# Patient Record
Sex: FEMALE | Race: WHITE | NOT HISPANIC OR LATINO | Employment: OTHER | URBAN - METROPOLITAN AREA
[De-identification: names, ages, dates, MRNs, and addresses within clinical notes are randomized per-mention and may not be internally consistent; named-entity substitution may affect disease eponyms.]

---

## 2017-01-04 DIAGNOSIS — Z94.0 KIDNEY REPLACED BY TRANSPLANT: Primary | ICD-10-CM

## 2017-01-13 ENCOUNTER — DOCUMENTATION ONLY (OUTPATIENT)
Dept: TRANSPLANT | Facility: CLINIC | Age: 67
End: 2017-01-13

## 2017-01-13 LAB
EXT ALBUMIN: 3.9
EXT ALKALINE PHOSPHATASE: 67
EXT ALT: 19
EXT AST: 25
EXT BILIRUBIN TOTAL: 0.4
EXT BUN: 25
EXT CALCIUM: 9.5
EXT CHLORIDE: 106
EXT CO2: 29
EXT CREATININE: 1.7 MG/DL
EXT EOSINOPHIL%: 1.7
EXT GFR MDRD NON AF AMER: 37
EXT GLUCOSE: 89
EXT HEMATOCRIT: 29.8
EXT HEMOGLOBIN: 9.6
EXT LYMPH%: 24.5
EXT MONOCYTES%: 8.3
EXT PLATELETS: 209
EXT POTASSIUM: 3.8
EXT PROT/CREAT RATIO UR: 2.02
EXT PROTEIN TOTAL: 5.6
EXT SODIUM: 140 MMOL/L
EXT TACROLIMUS LVL: 3.7
EXT WBC: 3.5

## 2017-01-16 NOTE — PROGRESS NOTES
Results reviewed, and action is needed: Please obtain 24 hr urine for protein and creatinine clearance. Also, I recommend increaseing tacro,imus to 4/3 if this is a 12 hr trough.

## 2017-01-17 ENCOUNTER — TELEPHONE (OUTPATIENT)
Dept: TRANSPLANT | Facility: CLINIC | Age: 67
End: 2017-01-17

## 2017-01-17 DIAGNOSIS — Z94.0 KIDNEY REPLACED BY TRANSPLANT: Primary | ICD-10-CM

## 2017-01-17 RX ORDER — TACROLIMUS 1 MG/1
CAPSULE ORAL
Qty: 210 CAPSULE | Refills: 11 | Status: SHIPPED | OUTPATIENT
Start: 2017-01-17 | End: 2019-07-26 | Stop reason: DRUGHIGH

## 2017-01-17 NOTE — TELEPHONE ENCOUNTER
Spoke to pt, reviewed labs and Dr hCau's instructions.  Pt confirmed 12hr prograf level, will increase to 4/3.  Pt annual clinic apt scheduled for 1/25/17, will give pt 24hr urine collection jug and will schedule 24hr urine clearance and repeat prograf level.  Pt voices no complaints at present.

## 2017-01-25 ENCOUNTER — OFFICE VISIT (OUTPATIENT)
Dept: NEPHROLOGY | Facility: CLINIC | Age: 67
End: 2017-01-25
Payer: MEDICARE

## 2017-01-25 ENCOUNTER — OFFICE VISIT (OUTPATIENT)
Dept: TRANSPLANT | Facility: CLINIC | Age: 67
End: 2017-01-25
Payer: MEDICARE

## 2017-01-25 ENCOUNTER — LAB VISIT (OUTPATIENT)
Dept: LAB | Facility: HOSPITAL | Age: 67
End: 2017-01-25
Payer: MEDICARE

## 2017-01-25 VITALS
BODY MASS INDEX: 19.94 KG/M2 | WEIGHT: 119.69 LBS | HEART RATE: 102 BPM | DIASTOLIC BLOOD PRESSURE: 78 MMHG | SYSTOLIC BLOOD PRESSURE: 140 MMHG | OXYGEN SATURATION: 98 % | HEIGHT: 65 IN

## 2017-01-25 VITALS
OXYGEN SATURATION: 100 % | HEART RATE: 95 BPM | HEIGHT: 65 IN | RESPIRATION RATE: 18 BRPM | SYSTOLIC BLOOD PRESSURE: 146 MMHG | BODY MASS INDEX: 19.98 KG/M2 | DIASTOLIC BLOOD PRESSURE: 92 MMHG | WEIGHT: 119.94 LBS | TEMPERATURE: 97 F

## 2017-01-25 DIAGNOSIS — N18.4 CKD (CHRONIC KIDNEY DISEASE), STAGE IV: Chronic | ICD-10-CM

## 2017-01-25 DIAGNOSIS — D84.9 IMMUNOSUPPRESSION: ICD-10-CM

## 2017-01-25 DIAGNOSIS — I12.9 RENAL HYPERTENSION, STAGE 1-4 OR UNSPECIFIED CHRONIC KIDNEY DISEASE: Chronic | ICD-10-CM

## 2017-01-25 DIAGNOSIS — Z94.0 LIVING-DONOR KIDNEY TRANSPLANT RECIPIENT: Chronic | ICD-10-CM

## 2017-01-25 DIAGNOSIS — Z94.0 LIVING-DONOR KIDNEY TRANSPLANT RECIPIENT: Primary | Chronic | ICD-10-CM

## 2017-01-25 DIAGNOSIS — R80.1 PERSISTENT PROTEINURIA: ICD-10-CM

## 2017-01-25 DIAGNOSIS — T86.11 ACUTE REJECTION OF KIDNEY TRANSPLANT: ICD-10-CM

## 2017-01-25 DIAGNOSIS — N18.4 CKD (CHRONIC KIDNEY DISEASE), STAGE IV: Primary | Chronic | ICD-10-CM

## 2017-01-25 LAB
BACTERIA #/AREA URNS AUTO: ABNORMAL /HPF
BILIRUB UR QL STRIP: NEGATIVE
CLARITY UR REFRACT.AUTO: ABNORMAL
COLOR UR AUTO: YELLOW
CREAT UR-MCNC: 122 MG/DL
GLUCOSE UR QL STRIP: NEGATIVE
HGB UR QL STRIP: NEGATIVE
HYALINE CASTS UR QL AUTO: 6 /LPF
KETONES UR QL STRIP: NEGATIVE
LEUKOCYTE ESTERASE UR QL STRIP: ABNORMAL
MICROSCOPIC COMMENT: ABNORMAL
NITRITE UR QL STRIP: NEGATIVE
NON-SQ EPI CELLS #/AREA URNS AUTO: 1 /HPF
PH UR STRIP: 5 [PH] (ref 5–8)
PROT UR QL STRIP: ABNORMAL
PROT UR-MCNC: 251 MG/DL
PROT/CREAT RATIO, UR: 2.06
RBC #/AREA URNS AUTO: 1 /HPF (ref 0–4)
SP GR UR STRIP: 1.02 (ref 1–1.03)
SQUAMOUS #/AREA URNS AUTO: 1 /HPF
URN SPEC COLLECT METH UR: ABNORMAL
UROBILINOGEN UR STRIP-ACNC: NEGATIVE EU/DL
WBC #/AREA URNS AUTO: 5 /HPF (ref 0–5)

## 2017-01-25 PROCEDURE — 99215 OFFICE O/P EST HI 40 MIN: CPT | Mod: S$PBB,,, | Performed by: INTERNAL MEDICINE

## 2017-01-25 PROCEDURE — 99999 PR PBB SHADOW E&M-EST. PATIENT-LVL IV: CPT | Mod: PBBFAC,GC,,

## 2017-01-25 PROCEDURE — 99213 OFFICE O/P EST LOW 20 MIN: CPT | Mod: PBBFAC | Performed by: INTERNAL MEDICINE

## 2017-01-25 PROCEDURE — 99213 OFFICE O/P EST LOW 20 MIN: CPT | Mod: S$PBB,GC,, | Performed by: INTERNAL MEDICINE

## 2017-01-25 PROCEDURE — 99214 OFFICE O/P EST MOD 30 MIN: CPT | Mod: PBBFAC,27 | Performed by: INTERNAL MEDICINE

## 2017-01-25 PROCEDURE — 99999 PR PBB SHADOW E&M-EST. PATIENT-LVL III: CPT | Mod: PBBFAC,,, | Performed by: INTERNAL MEDICINE

## 2017-01-25 NOTE — MR AVS SNAPSHOT
[facility]  [address]  [place] [address]  Phone: [phone]                  Malissa Schwartz   2017 3:00 PM   Office Visit    Description:  Female : 1950   Provider:  Nicole Trejo MD   Department:  [facility]           Reason for Visit     Kidney Transplant Reassessment           Diagnoses this Visit        Comments    CKD (chronic kidney disease), stage IV    -  Primary     Living-donor kidney transplant recipient         Acute rejection of kidney transplant         Immunosuppression         Renal hypertension, stage 1-4 or unspecified chronic kidney disease         Persistent proteinuria                To Do List           Goals (5 Years of Data)     None      [facility]     [facility] Care [facility] -  Assistance  Registered nurses in the [facility] provide clinical advisement, health education, appointment booking, and other advisory services.  Call for this free service at [phone].             Medications           Message regarding Medications     Verify the changes and/or additions to your medication regime listed below are the same as discussed with your clinician today.  If any of these changes or additions are incorrect, please notify your healthcare provider.             Verify that the below list of medications is an accurate representation of the medications you are currently taking.  If none reported, the list may be blank. If incorrect, please contact your healthcare provider. Carry this list with you in case of emergency.           Current Medications     calcium carbonate (OS-STEVEN) 600 mg (1,500 mg) Tab Take 600 mg by mouth once daily.     cloNIDine 0.2 mg/24 hr td ptwk (CATAPRES-TTS-2) 0.2 mg/24 hr Place 1 patch onto the skin every 7 days.    furosemide (LASIX) 20 MG tablet Take 1 tablet (20 mg total) by mouth 2 (two) times daily.    losartan (COZAAR) 50 MG tablet Take 2 tablets (100 mg total) by  "mouth once daily.    metoprolol succinate (TOPROL-XL) 25 MG 24 hr tablet Take 2 tablets (50 mg total) by mouth once daily.    multivitamin capsule Take 1 capsule by mouth once daily.    mycophenolate (CELLCEPT) 250 mg Cap Take 4 capsules (1,000 mg total) by mouth 2 (two) times daily.    omega-3 acid ethyl esters (LOVAZA) 1 gram capsule Take 1 capsule (1 g total) by mouth 2 (two) times daily.    ondansetron (ZOFRAN-ODT) 8 MG TbDL Take 1 tablet (8 mg total) by mouth every 6 (six) hours as needed (nausea/vomiting).    oxycodone-acetaminophen (PERCOCET) 5-325 mg per tablet Take 2 tablets by mouth every 4 (four) hours as needed for Pain.    polyethylene glycol (GLYCOLAX) 17 gram PwPk Take 17 g by mouth once daily.    pravastatin (PRAVACHOL) 10 MG tablet Take 10 mg by mouth once daily.    predniSONE (DELTASONE) 5 MG tablet TAKE ONE TABLET BY MOUTH ONCE DAILY    tacrolimus (PROGRAF) 1 MG Cap Take 4 mg every AM and 3 mg every PM.  Route: Oral.  Z94.0 Kidney Transplant.    trazodone (DESYREL) 100 MG tablet Take 100 mg by mouth every evening.     zolpidem (AMBIEN CR) 12.5 MG CR tablet Take 12.5 mg by mouth nightly as needed.            Clinical Reference Information           Vital Signs - Last Recorded  Most recent update: 1/25/2017  3:05 PM by Letha Banegas MA    BP Pulse Temp Resp Ht Wt    (!) 146/92 (BP Location: Right arm, Patient Position: Sitting, BP Method: Automatic) 95 97.2 °F (36.2 °C) (Oral) 18 5' 5" (1.651 m) 54.4 kg (119 lb 14.9 oz)    SpO2 BMI             100% 19.96 kg/m2         Blood Pressure          Most Recent Value    BP  (!)  146/92      Allergies as of 1/25/2017     Codeine      Immunizations Administered on Date of Encounter - 1/25/2017     None      Maintenance Dialysis History     Patient has no recorded history of maintenance dialysis.      Transplant Information        Txp Date Organ Coordinator Care Team     Kidney Tracy Cisse Referring Physician:  Nicole Trejo MD   Current " Nephrologist:  Kina Minor MD          Txp Date Organ Coordinator Care Team    1/15/2007 Kidney Joycelyn PEPE Rojo, ALAINA Surgeon:  Zac Orona MD   Current Nephrologist:  Toño Mejía MD         Instructions    Thank you for entrusting your transplant care to us, and visiting me today.    Plan to see a kidney doctor closer to home every 3-4 months.  See your primary care doctor about weight loss; ask about thyroid.  Please feel free to ask any questions and raise any concerns regarding your health care.    Warmest Regards,  Dr. Maria Del Carmen Trejo

## 2017-01-25 NOTE — PROGRESS NOTES
Kidney Post-Transplant Assessment    Referring Physician: Nicole Trejo  Current Nephrologist: Kina Blank    ORGAN: LEFT KIDNEY  Donor Type: living  Cold Ischemia: 60 mins  Induction Medications: zenapax - daclizumab    Subjective:     CC:  Reassessment of renal allograft function and management of chronic immunosuppression.    HPI:  Ms. Schwartz is a 66 y.o. year old White female who received a living kidney transplant on 1/15/07.  She has CKD stage 3 - GFR 30-59 and her baseline creatinine is between 1.5-1.9. She takes mycophenolate mofetil, prednisone and tacrolimus for maintenance immunosuppression. She denies any recent hospitalizations or ER visits since her previous clinic visit.    Pertinent History:  -Malissa received a living unrelated kidney transplant from a friend on 1/15/07. 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   -Enrolled in [employer] Study until March 2009 when study closed, the started tacrolimus.  -09/10/2007 mild acute rejection; +C4D. Glomerulitis with one activated endothelial cell: treated with Solumedrol pulse 375 mg IVBP x 3 doses with short prednisone taper; thymoglobin 100 mg x 6 doses  -10/04/2007 minimal histologic changes no acute rejection  - Kidney biopsy 1/8/15: C4d + suspicious of AMR, inflammatory changes also noted with signs of chronic rejection. Biopsy negative for ACR and vascular rejection, 10-20% fibrosis. Rx: IVIG x 2- (1/14 & 1/15) rituximab 1/16.   -Biopsy 3/4/16: 23 glomeruli, 3 globally sclerosed. 10% fibrosis. Mod microcirc inflamation with glomerulitis but C4D negative (<5%). Also +TG, and possible CNI toxicity. No AVR or ACR.    Malissa feels well and has no complaints. She is concerned about the protein in urine and worsened renal function. She reports no edema, CP, dyspnea. She denies any kidney-related complaints, such as pain over allograft, flank pain, dysuria, frequency, or other LUTS.     Review of  "Systems   Constitutional: Positive for appetite change (poor) and unexpected weight change. Negative for fatigue. Fever: 20 # in last year.   HENT: Negative.    Eyes: Negative for visual disturbance.   Respiratory: Negative for shortness of breath.    Cardiovascular: Negative for chest pain.   Gastrointestinal: Negative for abdominal pain.   Genitourinary: Negative for difficulty urinating.   Musculoskeletal: Negative for joint swelling.   Skin: Negative for rash.   Allergic/Immunologic: Positive for immunocompromised state.   Neurological: Negative for tremors and headaches.   Hematological: Does not bruise/bleed easily.   Psychiatric/Behavioral: Negative.      Objective:   Blood pressure (!) 146/92, pulse 95, temperature 97.2 °F (36.2 °C), temperature source Oral, resp. rate 18, height 5' 5" (1.651 m), weight 54.4 kg (119 lb 14.9 oz), SpO2 100 %.body mass index is 19.96 kg/(m^2).    Physical Exam   Constitutional: No distress.   HENT:   Head: Normocephalic.   Eyes: Conjunctivae are normal. Pupils are equal, round, and reactive to light.   Neck: No JVD present.   Cardiovascular: Normal rate, regular rhythm and normal heart sounds.    Pulmonary/Chest: Effort normal and breath sounds normal. No respiratory distress.   Abdominal: Soft. Bowel sounds are normal. She exhibits no mass. There is no tenderness.   Musculoskeletal: She exhibits no edema.   Psychiatric: She has a normal mood and affect. Thought content normal.     Labs:  Lab Results   Component Value Date    EXTANC 1528 02/16/2015    EXTWBC 3.5 (L) 01/10/2017    EXTSEGS 62.7 02/16/2016    EXTPLATELETS 209 01/10/2017    EXTHEMOGLOBI 9.6 (L) 01/10/2017    EXTHEMATOCRI 29.8 (L) 01/10/2017    EXTCREATININ 1.7 01/10/2017    EXTSODIUM 140 01/10/2017    EXTPOTASSIUM 3.8 01/10/2017    EXTBUN 25 01/10/2017    EXTCO2 29 01/10/2017    EXTCALCIUM 9.5 01/10/2017    EXTPHOSPHORU 3.4 02/16/2016    EXTGLUCOSE 89 01/10/2017    EXTALBUMIN 3.9 01/10/2017    EXTAST 25 01/10/2017 "    EXTALT 19 01/10/2017    EXTBILITOTAL 0.4 01/10/2017     Lab Results   Component Value Date    EXTTACROLVL 3.7 01/10/2017    EXTPROTCRE 2.02 (H) 01/10/2017   Labs were reviewed with the patient.    Assessment:     1. CKD (chronic kidney disease), stage IV    2. Living-donor kidney transplant recipient on 01/15/2007    3. ABMR of kidney transplant 1/8/15    4. Immunosuppression - Inducted with Medrol 500 mg; maintained on Prograf, Cellcept and Prednisone    5. Renal hypertension, stage 1-4 or unspecified chronic kidney disease    6. Persistent proteinuria        Plan:   Allograft function assessment  - CKD is declining, and she is likely not a candidate for intervention.     She also has Proteinuria with known h/o AMR and TG. A 24 hr urine has been requested given the urine p/c ratios are fluctuating.    Immunosuppression evaluation  -Continue tacrolimus-based immunosuppression.  Will continue to watch signs, symptoms and levels for side effects and drug toxicity. Plan no changes    Renal HTN - I educated on importance of keeping BPs <140/90    Education dominated our conversation of 45 min face to face, about 35 min on counseling. I discussed progressive nature of CKD. I suggested she establish with a nephrologist close to home. We discussed she should avoid NSAIDS. She needs labs Q3-4 mos and ongoing BP monitoring. We also touched on possible re-transplant in the future, but I explained she would have to start the process as does everyone else.    Follow-up:   Annual follow-up with kidney transplant clinic with repeat labs, including renal function panel with UA, urine protein/creatinine ratio, and drug trough level q3 months.  Patient also reminded to follow-up with general nephrologist.    Nicole Trejo MD       Education:   Material provided to the patient.  Patient reminded to call with any health changes since these can be early signs of significant complications.  Also, I advised the patient to  be sure any new medications or changes of old medications are discussed with either a pharmacist or physician knowledgeable with transplant to avoid rejection/drug toxicity related to significant drug interactions.    UNOS Patient Status  Functional Status: 80% - Normal activity with effort: some symptoms of disease  Physical Capacity: No Limitations

## 2017-01-25 NOTE — LETTER
January 25, 2017        Kina Minor  [address]  [facility] [address]  Phone: [phone]  Fax: [phone]     Toño Mejía  [address] [place] [address]  Phone: [phone]  Fax: [phone]             [facility]  [address]  [facility] [address]  Phone: [phone]   Patient: Malissa Schwartz   MR Number: [id number]   YOB: 1950   Date of Visit: 1/25/2017       Dear Dr. Toño Mejía, Kina Minor    Thank you for referring Malissa Schwartz to me for evaluation. Attached you will find relevant portions of my assessment and plan of care.    If you have questions, please do not hesitate to call me. I look forward to following Malissa Schwartz along with you.    Sincerely,    Nicole Trejo MD    Enclosure    If you would like to receive this communication electronically, please contact [email or web address] or [phone] to request [employer] access.    [employer] is a tool which provides read-only access to select patient information with whom you have a relationship. Its easy to use and provides real time access to review your patients record including encounter summaries, notes, results, and demographic information.    If you feel you have received this communication in error or would no longer like to receive these types of communications, please e-mail [email or web address]

## 2017-01-25 NOTE — PATIENT INSTRUCTIONS
Thank you for entrusting your transplant care to us, and visiting me today.    Plan to see a kidney doctor closer to home every 3-4 months.  See your primary care doctor about weight loss; ask about thyroid.  Please feel free to ask any questions and raise any concerns regarding your health care.    Warmest Regards,  Dr. Maria Del Carmen Trejo

## 2017-01-30 NOTE — PROGRESS NOTES
CHIEF COMPLAINT/HPI: Malissa is a 66 y.o. female for evaluation of CKD 4 with hx of renal transplant.     Ms Schwartz has a hx of live donor renal transplant in 2007. 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   She is undergoing re-evaluation for re-transplant. Her baseline creatinine is 1.5-1.7. BP is 140/78.       She is on increased dose of prograf 4 mg in am and 3 mg in pm , cellcept 1000 mg twice daily and prednisone 5 mg daily. .     She had a CT scan on 7/21 for evaluation of pulm nodules and renal mass and mass was characterized as    She had a left nephrectomy  on 9/22/16 due to complex partially cystic mass in the left midpole measuring 5.7 x 4.7 x 5.0 cm.    Her Hgb is stable at 9.6/29.   TSAT of 20 and Ferritin 270.  UPC is up to 2.0. She is on Lasix 20 mg daily, losartan 100mg daily and clonidine 0.2 mg patch.     She continues to complain of fatigue.          Past Medical History   Diagnosis Date    Acute rejection of kidney transplant 9/10/07 9/10/2007     09/10/2007 mild acute rejection; +C4D. Glomerulitis with one activated endothelial cell: treated with Solumedrol pulse 375 mg IVBP x 3 doses with short prednisone taper; thymoglobin 100 mg x 6 doses 10/04/2007 minimal histologic changes no acute rejection     Allergy     Anemia of renal disease     CKD (chronic kidney disease), stage IV     CMV (cytomegalovirus) status positive     Colon polyp      benign    GN (glomerulonephritis)     Herpes infection, other 2009     on buttocks    Hyperlipidemia     Hypertension     Immunosuppression     Living-donor kidney transplant recipient 01/15/2007    Meningitis      history of viral meningitis    Proteinuria 1/5/2015    Renal hypertension     S/P breast augmentation     Skin cancer     Urinary tract infection        Malissa reports that she has quit smoking. Her smoking use included Cigarettes. She has a 15.00 pack-year smoking history. She has never used smokeless tobacco. She reports that she  drinks alcohol. She reports that she does not use illicit drugs.    Family History   Problem Relation Age of Onset    Kidney disease Sister      ESRD    Kidney disease Brother      CKD from HTN    Hypertension Brother     Heart failure Mother     Heart failure Father     Diabetes Neg Hx     Stroke Neg Hx        ROS:    General: No fever, chills, change in appetite or weight loss. +fatigue  Skin: No rashes or pruritus  Head: No headaches or recent head trauma  Eyes: No changes in vision or eye pain  Nodes: No swollen glands  Pulmonary: No dyspnea, wheezes, cough or sputum production  Cardiovascular: No chest pain, edema, PND, orthopnea or reduced exercise tolerance  Abdomen: No abdominal pain, nausea, vomiting, constipation or diarrhea  Urinary: No flank pain, dysuria or hematuria  Peripheral Vascular: No claudication or cyanosis  Musculoskeletal: No joint stiffness, swelling or back pain  Neurologic: No history of seizures, tremors, forcal weakness or numbness    PE:    HEENT: Normocephalic, atraumatic, EOMI, PERRLA, normal conjunctive and lids, supple neck with no thyromeglay or masses, normal oropharynx, hearing intact  Cardiovascular: Regular without murmur, gallop or rub, no edema  Respiratory: Clear bilaterally without rales, rhonchi, wheezes with normal effort  Abdomen: Soft, nontender/nondistended, positive bowel sounds, no hepatospenomegaly  Extremities: No cyanosis, clubbing, normal gait, no edema  Skin: No rashes, ulcers, induration  Psych: Oriented x 3 with normal mood and effect    Impression/Plan:      1. CKD 4 in tranplanted kidney       Ms Schwartz has a hx of live donor renal transplant in 2007. 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   She is undergoing re-evaluation for re-transplant. Her baseline creatinine is 1.5-1.7. BP is 140/78.       She is on increased dose of prograf 4 mg in am and 3 mg in pm , cellcept 1000 mg twice daily and prednisone 5 mg daily. .     She had a CT scan on 7/21 for  evaluation of pulm nodules and renal mass and mass was characterized as    She had a left nephrectomy  on 9/22/16 due to complex partially cystic mass in the left midpole measuring 5.7 x 4.7 x 5.0 cm.    Her Hgb is stable at 9.6/29. Since previous visit she has received IV iron transfusions in [place].   TSAT of 20 and Ferritin 270.  UPC is up to 2.0. She is on Lasix 20 mg daily, losartan 100mg daily, clonidine 0.2 mg patch and Toprol XL 25 mg daily.     She continues to complain of fatigue.         Her increase in creatinine may be due to increased prograf dose. She will see transplant nephrology today.   She was instructed in importance of low salt diet, and she will keep a blood pressure log at home x 2 weeks and we will discuss the BP results.          Lab Results   Component Value Date    CREATININE 1.5 (H) 11/09/2016     Protein Creatinine Ratios:  Currently on max dose Cozaar 100mg daily. UPC increased to 2 grams.     Prot/Creat Ratio, Ur   Date Value Ref Range Status   01/25/2017 2.06 (H) 0.00 - 0.20 Final   11/09/2016 1.29 (H) 0.00 - 0.20 Final   08/31/2016 1.10 (H) 0.00 - 0.20 Final     ·   ·   Acid-Base: lytes stable   Lab Results   Component Value Date     11/09/2016    K 3.9 11/09/2016    CO2 25 11/09/2016     2. HTN: Blood pressures- well controlled on current regimen  Clonidine patch  Lasix 20 mg twice daily  Cozaar 100 mg daily  toprol xl 25 mg daily        3. Renal osteodystrophy: last PTH   Lab Results   Component Value Date    PTH 96.0 (H) 11/09/2016    CALCIUM 9.1 11/09/2016    PHOS 3.5 11/09/2016       4. Anemia:   Lab Results   Component Value Date    HGB 9.1 (L) 09/21/2016        5. DM:  Last HbA1C - no hx of DM.     6. Lipid management:   Lab Results   Component Value Date    LDLCALC 82.6 06/15/2016         Follow up in 6 months.    Seen with Dr Suzi Tim MD  Nephrology Fellow  Pager [phone]   Cell [phone]

## 2017-02-03 NOTE — PROGRESS NOTES
Patient seen and examined with Dr Nelson;   I have reviewed and agree with assessment and plan

## 2017-02-07 ENCOUNTER — TELEPHONE (OUTPATIENT)
Dept: NEPHROLOGY | Facility: CLINIC | Age: 67
End: 2017-02-07

## 2017-02-11 ENCOUNTER — NURSE TRIAGE (OUTPATIENT)
Dept: ADMINISTRATIVE | Facility: CLINIC | Age: 67
End: 2017-02-11

## 2017-02-12 NOTE — TELEPHONE ENCOUNTER
Reason for Disposition   Caller has medication question, adult has minor symptoms, caller declines triage, and triager answers question    Protocols used: ST MEDICATION QUESTION CALL-A-    Kidney Transplant:  01/15/2007  BPA:  9    Patient calling in due to unable to receive medication (Prograf) because insurance does not cover anymore but is able to pay for medication.  Pharmacy filled the medication but pharmacy is now closed and may not be able to  in the morning.  Patient does have her dose for today and is leaving out of town to [place] tomorrow.  Pt states if she cannot get the medication tomorrow she will call back for a prescription (Prograf).

## 2017-02-13 ENCOUNTER — TELEPHONE (OUTPATIENT)
Dept: NEPHROLOGY | Facility: CLINIC | Age: 67
End: 2017-02-13

## 2017-02-17 ENCOUNTER — DOCUMENTATION ONLY (OUTPATIENT)
Dept: TRANSPLANT | Facility: CLINIC | Age: 67
End: 2017-02-17

## 2017-02-17 NOTE — NURSING
Submitted PA request for Prograf on 2/16/17 via [email or web address], received approval, effective 2/16/17 - [facility]16[facility].

## 2017-02-27 ENCOUNTER — TELEPHONE (OUTPATIENT)
Dept: TRANSPLANT | Facility: CLINIC | Age: 67
End: 2017-02-27

## 2017-02-27 NOTE — TELEPHONE ENCOUNTER
Spoke to patient, provided phone number to psychiatry to schedule her appointment. Will also reschedule CT of the chest and Pulmonary consult for clearance.

## 2017-03-03 DIAGNOSIS — N18.30 CKD (CHRONIC KIDNEY DISEASE) STAGE 3, GFR 30-59 ML/MIN: Primary | Chronic | ICD-10-CM

## 2017-03-09 ENCOUNTER — DOCUMENTATION ONLY (OUTPATIENT)
Dept: TRANSPLANT | Facility: CLINIC | Age: 67
End: 2017-03-09

## 2017-03-09 LAB
EXT BUN: 20
EXT CALCIUM: 9
EXT CHLORIDE: 106
EXT CO2: 26
EXT CREATININE: 1.58 MG/DL
EXT GFR MDRD AF AMER: 39
EXT GFR MDRD NON AF AMER: 33
EXT GLUCOSE: 98
EXT POTASSIUM: 3.9
EXT SODIUM: 140 MMOL/L
EXT TACROLIMUS LVL: 5.9
Lab: 1.01
Lab: ABNORMAL

## 2017-03-10 RX ORDER — METOPROLOL SUCCINATE 25 MG/1
50 TABLET, EXTENDED RELEASE ORAL DAILY
Qty: 60 TABLET | Refills: 3 | Status: SHIPPED | OUTPATIENT
Start: 2017-03-10 | End: 2022-05-13

## 2017-03-10 NOTE — PROGRESS NOTES
Results reviewed, and action is needed:1900 mg protein with h/o AMR is expected. I recommend CKD care and especially tight BP control (<140/90)

## 2017-03-14 DIAGNOSIS — N18.30 CKD (CHRONIC KIDNEY DISEASE) STAGE 3, GFR 30-59 ML/MIN: Primary | Chronic | ICD-10-CM

## 2017-03-17 RX ORDER — FUROSEMIDE 20 MG/1
20 TABLET ORAL 2 TIMES DAILY
Qty: 30 TABLET | Refills: 11 | Status: SHIPPED | OUTPATIENT
Start: 2017-03-17 | End: 2023-09-27 | Stop reason: ALTCHOICE

## 2017-03-26 RX ORDER — PREDNISONE 5 MG/1
TABLET ORAL
Qty: 30 TABLET | Refills: 11 | Status: SHIPPED | OUTPATIENT
Start: 2017-03-26 | End: 2018-01-08 | Stop reason: SDUPTHER

## 2017-04-12 DIAGNOSIS — N18.30 CKD (CHRONIC KIDNEY DISEASE) STAGE 3, GFR 30-59 ML/MIN: Chronic | ICD-10-CM

## 2017-04-12 RX ORDER — AMLODIPINE BESYLATE 5 MG/1
TABLET ORAL
Qty: 60 TABLET | OUTPATIENT
Start: 2017-04-12

## 2017-04-13 DIAGNOSIS — N18.4 CKD (CHRONIC KIDNEY DISEASE), STAGE IV: Primary | Chronic | ICD-10-CM

## 2017-04-13 RX ORDER — LOSARTAN POTASSIUM 100 MG/1
100 TABLET ORAL DAILY
Qty: 90 TABLET | Refills: 3 | Status: SHIPPED | OUTPATIENT
Start: 2017-04-13 | End: 2018-07-16 | Stop reason: SDUPTHER

## 2017-05-11 ENCOUNTER — HOSPITAL ENCOUNTER (OUTPATIENT)
Dept: RADIOLOGY | Facility: HOSPITAL | Age: 67
Discharge: HOME | End: 2017-05-11
Attending: NURSE PRACTITIONER
Payer: MEDICARE

## 2017-05-11 ENCOUNTER — INITIAL CONSULT (OUTPATIENT)
Dept: PSYCHIATRY | Facility: CLINIC | Age: 67
End: 2017-05-11
Payer: MEDICARE

## 2017-05-11 VITALS
BODY MASS INDEX: 19.83 KG/M2 | HEIGHT: 65 IN | WEIGHT: 119 LBS | DIASTOLIC BLOOD PRESSURE: 69 MMHG | HEART RATE: 92 BPM | SYSTOLIC BLOOD PRESSURE: 123 MMHG

## 2017-05-11 DIAGNOSIS — Z01.818 ENCOUNTER FOR PRE-TRANSPLANT EVALUATION FOR KIDNEY TRANSPLANT: Primary | ICD-10-CM

## 2017-05-11 PROCEDURE — 71250 CT THORAX DX C-: CPT | Mod: TC,TXP

## 2017-05-11 PROCEDURE — 71250 CT THORAX DX C-: CPT | Mod: 26,GC,TXP, | Performed by: RADIOLOGY

## 2017-05-11 PROCEDURE — 90792 PSYCH DIAG EVAL W/MED SRVCS: CPT | Mod: S$PBB,TXP,, | Performed by: PSYCHIATRY & NEUROLOGY

## 2017-05-11 PROCEDURE — 90792 PSYCH DIAG EVAL W/MED SRVCS: CPT | Mod: PBBFAC,TXP | Performed by: PSYCHIATRY & NEUROLOGY

## 2017-05-11 PROCEDURE — 99999 PR PBB SHADOW E&M-EST. PATIENT-LVL II: CPT | Mod: PBBFAC,TXP,, | Performed by: PSYCHIATRY & NEUROLOGY

## 2017-05-11 NOTE — PROGRESS NOTES
Outpatient Psychiatry Initial Visit (MD/NP)    5/11/2017    Malissa Schwartz, a 67 y.o. female, presenting for initial evaluation visit. Met with patient.    Reason for Encounter: Consult from renal transplant. Patient complains of No chief complaint on file.  .    History of Present Illness: .The patient is a 67 year old lady referred by renal transplant for an evaluation.  Pt was found to have kidney disease 18 years ago during routine insurance exam.  She received her initial transplant in 2007.  She had one bout of rejection 9 months later.  Since then, she has had gradual deterioration of kidney function until recentlly when there was improved functioning.  She denies all psych symptoms, including significant depression, psychosis, or suicidal ideation.  No alcohol or substance abuse.  She indicates that she has been compliant with meds and appointments in regards to her first transplant.  She has the support of her husban and two children.    Review Of Systems:     GENERAL:  No weight gain or loss  SKIN:  No rashes or lacerations  HEAD:  No headaches  EYES:  No exophthalmos, jaundice or blindness  EARS:  No dizziness, tinnitus or hearing loss  NOSE:  No changes in smell  MOUTH & THROAT:  No dyskinetic movements or obvious goiter  CHEST:  No shortness of breath, hyperventilation or cough  CARDIOVASCULAR:  No tachycardia or chest pain  ABDOMEN:  No nausea, vomiting, pain, constipation or diarrhea  URINARY:  No frequency, dysuria or sexual dysfunction  ENDOCRINE:  No polydipsia, polyuria  MUSCULOSKELETAL:  No pain or stiffness of the joints  NEUROLOGIC:  No weakness, sensory changes, seizures, confusion, memory loss, tremor or other abnormal movements    Current Evaluation:     Nutritional Screening: Considering the patient's height and weight, medications, medical history and preferences, should a referral be made to the dietitian? no    Constitutional  Vitals:  Most recent vital signs, dated less than 90 days  "prior to this appointment, were reviewed.    Vitals:    05/11/17 0835   BP: 123/69   Pulse: 92   Weight: 54 kg (119 lb)   Height: 5' 5" (1.651 m)        General:  unremarkable, age appropriate     Musculoskeletal  Muscle Strength/Tone:  no tremor   Gait & Station:  non-ataxic     Psychiatric  Speech:  slowed, non-spontaneous   Mood & Affect:  euthymic  blunted   Thought Process:  normal and logical   Associations:  intact   Thought Content:  normal, no suicidality, no homicidality, delusions, or paranoia   Insight:  intact   Judgement: behavior is adequate to circumstances   Orientation:  grossly intact   Memory: intact for content of interview   Language: grossly intact   Attention Span & Concentration:  able to focus   Fund of Knowledge:  intact and appropriate to age and level of education       Relevant Elements of Neurological Exam: normal gait    Functioning in Relationships:  Spouse/partner:  supportive  Peers: Yazidism, has friends  Employers: retired     Laboratory Data  No visits with results within 1 Month(s) from this visit.  Latest known visit with results is:    Documentation Only on 03/09/2017   Component Date Value Ref Range Status    EXT Sodium 03/02/2017 140  mmol/L Final    EXT Potassium 03/02/2017 3.9   Final    EXT Chloride 03/02/2017 106   Final    EXT CO2 03/02/2017 26   Final    EXT BUN 03/02/2017 20   Final    EXT Creatinine 03/02/2017 1.58* mg/dL Final    EXT GFR MDRD NON AF AMER 03/02/2017 33*  Final    EXT GFR MDRD AF AMER 03/02/2017 39   Final    EXT Calcium 03/02/2017 9.0   Final    EXT Glucose 03/02/2017 98   Final    EXT Tacrolimus Lvl 03/02/2017 5.9   Final    EXT 24 HR CREATININE CLEARANCE 03/02/2017 1.01   Final    EXT 24 HR CREATININE CLEARANCE 03/02/2017    Final         Medications  Outpatient Encounter Prescriptions as of 5/11/2017   Medication Sig Dispense Refill    calcium carbonate (OS-STEVEN) 600 mg (1,500 mg) Tab Take 600 mg by mouth " once daily.       cloNIDine 0.2 mg/24 hr td ptwk (CATAPRES-TTS-2) 0.2 mg/24 hr Place 1 patch onto the skin every 7 days. 4 patch 11    furosemide (LASIX) 20 MG tablet Take 1 tablet (20 mg total) by mouth 2 (two) times daily. 30 tablet 11    losartan (COZAAR) 100 MG tablet Take 1 tablet (100 mg total) by mouth once daily. 90 tablet 3    metoprolol succinate (TOPROL-XL) 25 MG 24 hr tablet Take 2 tablets (50 mg total) by mouth once daily. 60 tablet 3    multivitamin capsule Take 1 capsule by mouth once daily.      mycophenolate (CELLCEPT) 250 mg Cap Take 4 capsules (1,000 mg total) by mouth 2 (two) times daily. 240 capsule 11    omega-3 acid ethyl esters (LOVAZA) 1 gram capsule Take 1 capsule (1 g total) by mouth 2 (two) times daily.      ondansetron (ZOFRAN-ODT) 8 MG TbDL Take 1 tablet (8 mg total) by mouth every 6 (six) hours as needed (nausea/vomiting). 12 tablet 2    oxycodone-acetaminophen (PERCOCET) 5-325 mg per tablet Take 2 tablets by mouth every 4 (four) hours as needed for Pain. 41 tablet 0    polyethylene glycol (GLYCOLAX) 17 gram PwPk Take 17 g by mouth once daily. 30 packet 0    pravastatin (PRAVACHOL) 10 MG tablet Take 10 mg by mouth once daily.      predniSONE (DELTASONE) 5 MG tablet TAKE ONE TABLET BY MOUTH ONCE DAILY 30 tablet 11    tacrolimus (PROGRAF) 1 MG Cap Take 4 mg every AM and 3 mg every PM.  Route: Oral.  Z94.0 Kidney Transplant. 210 capsule 11    trazodone (DESYREL) 100 MG tablet Take 100 mg by mouth every evening.       zolpidem (AMBIEN CR) 12.5 MG CR tablet Take 12.5 mg by mouth nightly as needed.        No facility-administered encounter medications on file as of 5/11/2017.            Assessment - Diagnosis - Goals:     Impression: Acceptable candidate for retransplant from psych standpoint    No diagnosis found.    Strengths and Liabilities: Strength: Patient is intelligent., Strength: Patient has positive support network.    Treatment Goals:  Specify outcomes written in  observable, behavioral terms:   Psych treatment not indicated at this time    Treatment Plan/Recommendations:   · Psych treatment not indicated at this time      Return to Clinic: as needed    Counseling time: 20  Total time: 60  Consulting clinician was informed of the encounter and consult note.

## 2017-05-15 ENCOUNTER — TELEPHONE (OUTPATIENT)
Dept: TRANSPLANT | Facility: CLINIC | Age: 67
End: 2017-05-15

## 2017-05-15 NOTE — TELEPHONE ENCOUNTER
"----- Message from Ira Solares MD sent at 5/15/2017  1:07 PM CDT -----  They appear to be stable, but radiology makes no comment that these nodules are stable only "similar".  The previous measurement was 5 mm and now they are saying 7mm.  Please ask radiology to review and confirm.  Their impression is that they are stable and continue to be monitored but there is reassurance that these are stable since the measurements are stated to be different.  Patient should have an appt scheduled with me once this is complete.  Should not get listed unless smoking has ceased.    Ira    ----- Message -----     From: Tracy Cisse     Sent: 5/15/2017  10:02 AM       To: MD Dr. Sujatha Gudino,   Please review CT of chest regarding pulmonary nodules. Previous CT was 7/21/16. You saw her on 8/31/16. Please advise. Pt in evaluation for kidney transplant. Thank you.    "

## 2017-05-17 RX ORDER — MYCOPHENOLATE MOFETIL 250 MG/1
CAPSULE ORAL
Qty: 240 CAPSULE | Refills: 6 | Status: SHIPPED | OUTPATIENT
Start: 2017-05-17 | End: 2018-01-08 | Stop reason: SDUPTHER

## 2017-05-17 RX ORDER — CLONIDINE 0.2 MG/24H
PATCH, EXTENDED RELEASE TRANSDERMAL
OUTPATIENT
Start: 2017-05-17

## 2017-05-23 ENCOUNTER — TELEPHONE (OUTPATIENT)
Dept: TRANSPLANT | Facility: CLINIC | Age: 67
End: 2017-05-23

## 2017-05-23 NOTE — TELEPHONE ENCOUNTER
"----- Message from Tracy Hemphill MD sent at 5/22/2017  3:41 PM CDT -----  I apologize for my delayed reply...    Dr. Flores and I agree that yes, the findings on 5/11/17 are stable to the prior scan on 7/21/16.  They are better seen on the most recent scan due to technical differences (i.e. MIPs are available on the most recent scan), but the findings are stable.      Please let me know if you have any further questions.    Regards,    SF    ----- Message -----  From: Tracy Cisse  Sent: 5/15/2017   3:09 PM  To: MD Dr. Roxana Castro,  Dr. Solares is requesting for you to review and confirm if the nodules in this scan are stable. She noted that the comments only state they are "similar" to previous scan. Please advise. Patient is in evaluation for kidney transplant and will need follow up with Dr. Solares once you clarify. Thanks!    "

## 2017-05-25 ENCOUNTER — TELEPHONE (OUTPATIENT)
Dept: TRANSPLANT | Facility: CLINIC | Age: 67
End: 2017-05-25

## 2017-05-25 NOTE — TELEPHONE ENCOUNTER
Call received from Dr Flores to clarify reading of Chest Ct. She states she is unable to say that there is no definite cancer somewhere in these nodules as there is some change possibly related to change in technique. Her best response is to continue to observe as she has stated. She reiterated the nodules are similar when comparing scans.

## 2017-05-30 ENCOUNTER — TELEPHONE (OUTPATIENT)
Dept: TRANSPLANT | Facility: CLINIC | Age: 67
End: 2017-05-30

## 2017-05-30 NOTE — TELEPHONE ENCOUNTER
MD Tracy Gudino             They should place it in an addendum on the CT report stating such.  Ideally, we need two year stability to ensure that this is benign, but I feel that one year is adequate if her MELD is rising.   Ira

## 2017-06-09 ENCOUNTER — COMMITTEE REVIEW (OUTPATIENT)
Dept: TRANSPLANT | Facility: CLINIC | Age: 67
End: 2017-06-09

## 2017-06-09 NOTE — LETTER
June 9, 2017    Tahmina Schwartz  [address]  [place] [address]    Dear Tahmina Schwartz:  MRN: [id number]    The [facility] team reviewed your transplant candidacy.  The kidney team has denied the option of transplantation due to pulmonary nodules not cleared by Pulmonology, lack of follow up with appointments such as Dermatology follow up for previous skin cancer, need for mammogram, and need for updated GYN exam.     Although the [facility] team believes you are not a suitable transplant candidate, you have the option to be evaluated at other transplant centers.  You may request your [facility] records be sent to any center of your choice by contacting our Medical Records Department at [phone].    Attached is a letter from the [employer] Organ [employer]aring (UNOS).  It describes the services and information offered to patients by UNOS and the Organ Procurement and Transplant Network.                                                                                                                                      The [facility] team sincerely wishes you the best and remains available to answer any questions.  Please do not hesitate to contact our pre-transplant office if we can assist you in any other way.  You should follow-up with your primary care physician for routine medical care.                                                                             Sincerely,        Tracy FRANKELN, RN  Pre-Kidney Coordinators  [facility]  [address] [place] [id number] [phone]    Cc: Dr. Minor          OPTN/UNOS: Your Resource for Organ Transplant Information        If you have a question regarding your own medical care, you always should call your transplant center first. However, for general organ transplant-related information, you can call the [employer] Organ [employer]aring (UNOS) toll-free patient services line at  [phone].    Anyone, including potential transplant candidates, recipients, family members/friends, living donors, and/or donor family members can call this number to:    · talk about organ donation, living donation, how transplant and donation work, the donation process, transplant policies, and transplant/donor information;  · get a free patient information kit with helpful booklets, waiting list and transplant information, and a list of all transplant centers;  · ask questions about the Organ Procurement and Transplantation Network (OPTN) web site (www.optn.transplant.hrsa.gov); the UNOS Web site (www.unos.org); or the UNOS web site for living donors and transplant recipients (www.transplantliving.org);  · learn how UNOS and the OPTN can help you;  · talk about any concerns that you may have with a transplant center and how they perform    UNOS is a not-for-profit organization that provides all of the administrative services for the national OPTN under federal contract to the Health Resources and Services Administration (HRSA), an agency under the U.S. Department of Health and Human Services (HHS).     UNOS and OPTN responsibilities include:    · writing educational material for patients, the public and professionals;  · helping to make people aware of the need for donated organs and tissue;  · writing organ transplant policy with help from doctors, nurses, transplant patients/candidates, donor families, living donors, and the public;  · coordinating the organ matching and placement process;  · collecting information about every organ transplant and donation that occurs in the [place].    Remember, you should contact your transplant center directly if you have questions or concerns about your own medical care including medical records, work-up progress and test reports. [facility] is not your transplant center, and staff at [facility] will not be able to transfer you to your transplant center, so keep your  transplant centers phone number handy. But, while you research your transplant needs and learn as much as you can about transplantation and donation, we welcome your call to our toll-free patient services line at [phone].

## 2017-06-09 NOTE — COMMITTEE REVIEW
Native Organ Dx: Chronic Glomerulonephritis - Unspecified      Not approved for LRD/CAD transplant due pulmonary nodules not cleared by Pulmonology, lack of follow up with appointments (outstanding for dermatology follow up for skin cancer, mammogram, and pap smear/GYN exam) and failure to follow up with completion of evaluation in a timely manner.      Note written by Tracy Cisse RN    ===============================================    I was present at the meeting and attest to the decision of the committee.

## 2017-07-11 ENCOUNTER — TELEPHONE (OUTPATIENT)
Dept: NEPHROLOGY | Facility: CLINIC | Age: 67
End: 2017-07-11

## 2017-07-11 NOTE — TELEPHONE ENCOUNTER
----- Message from Elizabeth Beasley sent at 7/11/2017 10:10 AM CDT -----  Contact: self  [phone]  Patient calling to make an appt to see the    Call back number [phone]   Or  [phone]  Thanks,

## 2017-07-11 NOTE — TELEPHONE ENCOUNTER
----- Message from Elizabeth Beasley sent at 7/11/2017 10:10 AM CDT -----  Contact: self  [phone]  Patient calling to make an appt to see the    Call back number [phone]   Or  [phone]  Thanks,      Spoke with pt pt will like a few days notice before scheduling appt pt will be place in the next 2 weeks on schedule

## 2017-08-31 ENCOUNTER — HISTORICAL (OUTPATIENT)
Dept: SURGERY | Facility: HOSPITAL | Age: 67
End: 2017-08-31

## 2017-08-31 LAB
ABS NEUT (OLG): 6.5 X10(3)/MCL (ref 1.5–6.9)
BUN SERPL-MCNC: 30 MG/DL (ref 10–20)
CALCIUM SERPL-MCNC: 9.3 MG/DL (ref 8–10.5)
CHLORIDE SERPL-SCNC: 105 MMOL/L (ref 100–108)
CO2 SERPL-SCNC: 23 MMOL/L (ref 21–35)
CREAT SERPL-MCNC: 2.06 MG/DL (ref 0.7–1.3)
ERYTHROCYTE [DISTWIDTH] IN BLOOD BY AUTOMATED COUNT: 12.4 % (ref 11.5–17)
GLUCOSE SERPL-MCNC: 133 MG/DL (ref 75–116)
GROUP & RH: NORMAL
HCT VFR BLD AUTO: 30.8 % (ref 36–48)
HGB BLD-MCNC: 10.7 GM/DL (ref 12–16)
MCH RBC QN AUTO: 30 PG (ref 27–34)
MCHC RBC AUTO-ENTMCNC: 35 GM/DL (ref 31–36)
MCV RBC AUTO: 86 FL (ref 80–99)
PLATELET # BLD AUTO: 230 X10(3)/MCL (ref 140–400)
PMV BLD AUTO: 9.3 FL (ref 6.8–10)
POTASSIUM SERPL-SCNC: 4 MMOL/L (ref 3.6–5.2)
RBC # BLD AUTO: 3.56 X10(6)/MCL (ref 4.2–5.4)
SODIUM SERPL-SCNC: 139 MMOL/L (ref 135–145)
WBC # SPEC AUTO: 7.3 X10(3)/MCL (ref 4.5–11.5)

## 2017-09-05 ENCOUNTER — HISTORICAL (OUTPATIENT)
Dept: ANESTHESIOLOGY | Facility: HOSPITAL | Age: 67
End: 2017-09-05

## 2017-12-15 DIAGNOSIS — N18.30 CKD (CHRONIC KIDNEY DISEASE) STAGE 3, GFR 30-59 ML/MIN: Chronic | ICD-10-CM

## 2017-12-15 RX ORDER — AMLODIPINE BESYLATE 5 MG/1
TABLET ORAL
Qty: 60 TABLET | Refills: 10 | Status: SHIPPED | OUTPATIENT
Start: 2017-12-15 | End: 2018-01-08 | Stop reason: ALTCHOICE

## 2018-01-05 ENCOUNTER — TELEPHONE (OUTPATIENT)
Dept: TRANSPLANT | Facility: CLINIC | Age: 68
End: 2018-01-05

## 2018-01-05 ENCOUNTER — DOCUMENTATION ONLY (OUTPATIENT)
Dept: TRANSPLANT | Facility: CLINIC | Age: 68
End: 2018-01-05

## 2018-01-05 DIAGNOSIS — Z94.0 KIDNEY REPLACED BY TRANSPLANT: Primary | ICD-10-CM

## 2018-01-05 LAB
EXT ALBUMIN: 4.2
EXT ALKALINE PHOSPHATASE: 66
EXT ALT: 20
EXT AST: 25
EXT BILIRUBIN DIRECT: 0.1 MG/DL
EXT BILIRUBIN TOTAL: 0.5
EXT BUN: 27
EXT CALCIUM: 8.7
EXT CHLORIDE: 106
EXT CO2: 24
EXT CREATININE: 1.63 MG/DL
EXT EOSINOPHIL%: 1.2
EXT GFR MDRD AF AMER: 32
EXT GLUCOSE: 120
EXT HEMATOCRIT: 25.6
EXT HEMOGLOBIN: 8.8
EXT LYMPH%: 10.5
EXT MONOCYTES%: 5.4
EXT PLATELETS: 181
EXT POTASSIUM: 4.3
EXT PROTEIN TOTAL: 133
EXT SODIUM: 137 MMOL/L
EXT TACROLIMUS LVL: 11.9
EXT WBC: 3.3

## 2018-01-05 NOTE — TELEPHONE ENCOUNTER
Spoke to pt, reviewed labs and Dr Lepe's instructions, pt confirms that she will [place] [facility] [place] for her Monday morning apt, she will repeat labs with 12hr fasting prograf level on Monday morning prior to clinic apt.  She voices no complaints at present time.

## 2018-01-05 NOTE — PROGRESS NOTES
Low WBC, in alst 12 months, please check CMV with next lab  Anemia with Hb of 8.8: trending down. Please GI consult and inform her nephrologist  Tac level 11.9: please decrease tac 3 mg BID and check level in 1-2 weeks.     Thank you!

## 2018-01-05 NOTE — TELEPHONE ENCOUNTER
----- Message from Dinah Lepe MD sent at 1/5/2018 10:17 AM CST -----  Low WBC, in alst 12 months, please check CMV with next lab  Anemia with Hb of 8.8: trending down. Please GI consult and inform her nephrologist  Tac level 11.9: please decrease tac 3 mg BID and check level in 1-2 weeks.     Thank you!

## 2018-01-08 ENCOUNTER — LAB VISIT (OUTPATIENT)
Dept: LAB | Facility: HOSPITAL | Age: 68
End: 2018-01-08
Payer: MEDICARE

## 2018-01-08 ENCOUNTER — OFFICE VISIT (OUTPATIENT)
Dept: TRANSPLANT | Facility: CLINIC | Age: 68
End: 2018-01-08
Payer: MEDICARE

## 2018-01-08 VITALS
DIASTOLIC BLOOD PRESSURE: 67 MMHG | WEIGHT: 124.75 LBS | RESPIRATION RATE: 17 BRPM | TEMPERATURE: 98 F | BODY MASS INDEX: 20.79 KG/M2 | OXYGEN SATURATION: 98 % | HEART RATE: 88 BPM | HEIGHT: 65 IN | SYSTOLIC BLOOD PRESSURE: 122 MMHG

## 2018-01-08 DIAGNOSIS — N18.9 ANEMIA OF RENAL DISEASE: Chronic | ICD-10-CM

## 2018-01-08 DIAGNOSIS — Z94.0 KIDNEY REPLACED BY TRANSPLANT: Primary | ICD-10-CM

## 2018-01-08 DIAGNOSIS — Z90.5 S/P NEPHRECTOMY: Chronic | ICD-10-CM

## 2018-01-08 DIAGNOSIS — R80.9 PROTEINURIA, UNSPECIFIED TYPE: ICD-10-CM

## 2018-01-08 DIAGNOSIS — Z94.0 LIVING-DONOR KIDNEY TRANSPLANT RECIPIENT: Chronic | ICD-10-CM

## 2018-01-08 DIAGNOSIS — N05.9 GN (GLOMERULONEPHRITIS): ICD-10-CM

## 2018-01-08 DIAGNOSIS — D63.1 ANEMIA OF RENAL DISEASE: Chronic | ICD-10-CM

## 2018-01-08 DIAGNOSIS — Z94.0 KIDNEY REPLACED BY TRANSPLANT: ICD-10-CM

## 2018-01-08 DIAGNOSIS — I12.9 RENAL HYPERTENSION: Chronic | ICD-10-CM

## 2018-01-08 DIAGNOSIS — D84.9 IMMUNOSUPPRESSION: Primary | ICD-10-CM

## 2018-01-08 LAB
ALBUMIN SERPL BCP-MCNC: 3.3 G/DL
ANION GAP SERPL CALC-SCNC: 7 MMOL/L
BASOPHILS # BLD AUTO: 0.02 K/UL
BASOPHILS NFR BLD: 0.6 %
BUN SERPL-MCNC: 24 MG/DL
CALCIUM SERPL-MCNC: 9.1 MG/DL
CHLORIDE SERPL-SCNC: 110 MMOL/L
CO2 SERPL-SCNC: 22 MMOL/L
CREAT SERPL-MCNC: 1.8 MG/DL
DIFFERENTIAL METHOD: ABNORMAL
EOSINOPHIL # BLD AUTO: 0.1 K/UL
EOSINOPHIL NFR BLD: 3 %
ERYTHROCYTE [DISTWIDTH] IN BLOOD BY AUTOMATED COUNT: 12.3 %
EST. GFR  (AFRICAN AMERICAN): 32.9 ML/MIN/1.73 M^2
EST. GFR  (NON AFRICAN AMERICAN): 28.5 ML/MIN/1.73 M^2
GLUCOSE SERPL-MCNC: 103 MG/DL
HCT VFR BLD AUTO: 27.3 %
HGB BLD-MCNC: 9.1 G/DL
IMM GRANULOCYTES # BLD AUTO: 0 K/UL
IMM GRANULOCYTES NFR BLD AUTO: 0 %
LYMPHOCYTES # BLD AUTO: 1.1 K/UL
LYMPHOCYTES NFR BLD: 32 %
MCH RBC QN AUTO: 28.7 PG
MCHC RBC AUTO-ENTMCNC: 33.3 G/DL
MCV RBC AUTO: 86 FL
MONOCYTES # BLD AUTO: 0.3 K/UL
MONOCYTES NFR BLD: 10.1 %
NEUTROPHILS # BLD AUTO: 1.8 K/UL
NEUTROPHILS NFR BLD: 54.3 %
NRBC BLD-RTO: 0 /100 WBC
PHOSPHATE SERPL-MCNC: 3.9 MG/DL
PLATELET # BLD AUTO: 184 K/UL
PMV BLD AUTO: 10 FL
POTASSIUM SERPL-SCNC: 4.6 MMOL/L
RBC # BLD AUTO: 3.17 M/UL
SODIUM SERPL-SCNC: 139 MMOL/L
TACROLIMUS BLD-MCNC: 7.7 NG/ML
WBC # BLD AUTO: 3.37 K/UL

## 2018-01-08 PROCEDURE — 99214 OFFICE O/P EST MOD 30 MIN: CPT | Mod: PBBFAC | Performed by: NURSE PRACTITIONER

## 2018-01-08 PROCEDURE — 36415 COLL VENOUS BLD VENIPUNCTURE: CPT

## 2018-01-08 PROCEDURE — 80197 ASSAY OF TACROLIMUS: CPT

## 2018-01-08 PROCEDURE — 99999 PR PBB SHADOW E&M-EST. PATIENT-LVL IV: CPT | Mod: PBBFAC,,, | Performed by: NURSE PRACTITIONER

## 2018-01-08 PROCEDURE — 85025 COMPLETE CBC W/AUTO DIFF WBC: CPT

## 2018-01-08 PROCEDURE — 80069 RENAL FUNCTION PANEL: CPT

## 2018-01-08 PROCEDURE — 99215 OFFICE O/P EST HI 40 MIN: CPT | Mod: S$PBB,,, | Performed by: NURSE PRACTITIONER

## 2018-01-08 RX ORDER — MYCOPHENOLATE MOFETIL 250 MG/1
CAPSULE ORAL
Qty: 240 CAPSULE | Refills: 11 | Status: SHIPPED | OUTPATIENT
Start: 2018-01-08 | End: 2018-03-13 | Stop reason: SDUPTHER

## 2018-01-08 RX ORDER — AMLODIPINE BESYLATE 10 MG/1
10 TABLET ORAL DAILY
Refills: 11 | COMMUNITY
Start: 2017-12-15 | End: 2018-12-28 | Stop reason: SDUPTHER

## 2018-01-08 RX ORDER — PREDNISONE 5 MG/1
5 TABLET ORAL DAILY
Qty: 30 TABLET | Refills: 11 | Status: SHIPPED | OUTPATIENT
Start: 2018-01-08 | End: 2019-01-10 | Stop reason: SDUPTHER

## 2018-01-08 NOTE — LETTER
January 8, 2018        Toño Mejía  [address]  [place] [address]  Phone: [phone]  Fax: [phone]             [facility]  [address]  [facility] [address]  Phone: [phone]   Patient: Malissa Schwartz   MR Number: [id number]   YOB: 1950   Date of Visit: 1/8/2018       Dear Dr. Toño Mejía    Thank you for referring Malissa Schwartz to me for evaluation. Attached you will find relevant portions of my assessment and plan of care.    If you have questions, please do not hesitate to call me. I look forward to following Malissa Schwartz along with you.    Sincerely,    Hayley Myles, NP    Enclosure    If you would like to receive this communication electronically, please contact [email or web address] or [phone] to request eMeter Link access.    eMeter Link is a tool which provides read-only access to select patient information with whom you have a relationship. Its easy to use and provides real time access to review your patients record including encounter summaries, notes, results, and demographic information.    If you feel you have received this communication in error or would no longer like to receive these types of communications, please e-mail [email or web address]

## 2018-01-08 NOTE — PROGRESS NOTES
Kidney Post-Transplant Assessment    Referring Physician:   Current Nephrologist: Toño Mejía    ORGAN: LEFT KIDNEY  Donor Type: living  PHS Increased Risk:    Cold Ischemia: 60 mins  Induction Medications: zenapax - daclizumab    Subjective:     CC:  Reassessment of renal allograft function and management of chronic immunosuppression.    HPI:  Ms. Schwartz is a 67 y.o. year old White female  With HX ESRD d/t Unknown type GN, who received a living kidney transplant on 1/15/07 ( Inductionzenapax - daclizumab).  She has CKD stage 3 - GFR 30-59 and her baseline creatinine is between ~1.63. She takes mycophenolate mofetil, prednisone and tacrolimus for maintenance immunosuppression. She denies any recent hospitalizations or ER visits since her previous clinic visit.    Pulmonary nodules, which are currently followed by Pulmonology / Dr Solares   Patient reports recent GI consult ~ 2 months ago in [place] with Dr Mustafa/ GI  She reports 1 polyp removal  Overall feels well. No health concerns today.   Denies chest pain, SOB, leg pain, abdominal pain or LUTs.        Pertinent History:  -Malissa received a living unrelated kidney transplant from a friend on 1/15/07. 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   -Enrolled in [employer] Study until March 2009 when study closed, the started tacrolimus.  -09/10/2007 mild acute rejection; +C4D. Glomerulitis with one activated endothelial cell: treated with Solumedrol pulse 375 mg IVBP x 3 doses with short prednisone taper; thymoglobin 100 mg x 6 doses  -10/04/2007 minimal histologic changes no acute rejection  - Kidney biopsy 1/8/15: C4d + suspicious of AMR, inflammatory changes also noted with signs of chronic rejection. Biopsy negative for ACR and vascular rejection, 10-20% fibrosis. Rx: IVIG x 2- (1/14 & 1/15) rituximab 1/16.   -Biopsy 3/4/16: 23 glomeruli, 3 globally sclerosed. 10% fibrosis. Mod microcirc inflamation with glomerulitis but C4D  negative (<5%). Also +TG, and possible CNI toxicity. No AVR or ACR.      Past Medical History:   Diagnosis Date    Acute rejection of kidney transplant 9/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 Glomerulitis with one activated endothelial cell: treated with Solumedrol pulse 375 mg IVBP x 3 doses with short prednisone taper; thymoglobin 100 mg x 6 doses 10/04/2007 minimal histologic changes no acute rejection     Allergy     Anemia of renal disease     CKD (chronic kidney disease), stage IV     CMV (cytomegalovirus) status positive     Colon polyp     benign    GN (glomerulonephritis)     Herpes infection, other 2009    on buttocks    Hyperlipidemia     Hypertension     Immunosuppression     Living-donor kidney transplant recipient 01/15/2007    Meningitis     history of viral meningitis    Proteinuria 1/5/2015    Renal hypertension     S/P breast augmentation     Skin cancer     Urinary tract infection        Review of Systems   Constitutional: Negative for activity change, appetite change, chills, fatigue, fever and unexpected weight change.   HENT: Negative for congestion, facial swelling, postnasal drip, rhinorrhea, sinus pressure, sore throat and trouble swallowing.    Eyes: Negative for pain, redness and visual disturbance.   Respiratory: Negative for cough, chest tightness, shortness of breath and wheezing.    Cardiovascular: Negative.  Negative for chest pain, palpitations and leg swelling.   Gastrointestinal: Negative for abdominal pain, diarrhea, nausea and vomiting.   Genitourinary: Negative for dysuria, flank pain and urgency.   Musculoskeletal: Negative for gait problem, neck pain and neck stiffness.        Peripheral neuropathy bilaterally to the feet-->  Takes Vit B complex . Followed by Dr Mendoza/ neurology in [place]   Skin: Negative for rash.   Allergic/Immunologic: Positive for immunocompromised state. Negative for environmental allergies and food allergies.  "  Neurological: Negative for dizziness, weakness, light-headedness and headaches.   Psychiatric/Behavioral: Negative for agitation and confusion. The patient is not nervous/anxious.        Objective:   Blood pressure 122/67, pulse 88, temperature 97.6 °F (36.4 °C), temperature source Oral, resp. rate 17, height 5' 5" (1.651 m), weight 56.6 kg (124 lb 12.5 oz), SpO2 98 %.body mass index is 20.76 kg/m².    Physical Exam   Constitutional: She is oriented to person, place, and time. She appears well-developed and well-nourished.   HENT:   Head: Normocephalic.   Mouth/Throat: Oropharynx is clear and moist. No oropharyngeal exudate.   Eyes: Conjunctivae and EOM are normal. Pupils are equal, round, and reactive to light. No scleral icterus.   Neck: Normal range of motion. Neck supple.   Cardiovascular: Normal rate, regular rhythm and normal heart sounds.    Pulmonary/Chest: Effort normal and breath sounds normal.   Abdominal: Soft. Normal appearance and bowel sounds are normal. She exhibits no distension and no mass. There is no splenomegaly or hepatomegaly. There is no tenderness. There is no rebound, no guarding, no CVA tenderness, no tenderness at McBurney's point and negative Murillo's sign.       Musculoskeletal: Normal range of motion. She exhibits no edema.   Lymphadenopathy:     She has no cervical adenopathy.   Neurological: She is alert and oriented to person, place, and time. She exhibits normal muscle tone. Coordination normal.   Skin: Skin is warm and dry.   Psychiatric: She has a normal mood and affect. Her behavior is normal.   Vitals reviewed.      Labs:  Lab Results   Component Value Date    WBC 3.37 (L) 01/08/2018    HGB 9.1 (L) 01/08/2018    HCT 27.3 (L) 01/08/2018     01/08/2018    K 4.6 01/08/2018     01/08/2018    CO2 22 (L) 01/08/2018    BUN 24 (H) 01/08/2018    CREATININE 1.8 (H) 01/08/2018    EGFRNONAA 28.5 (A) 01/08/2018    GLUCOSE 134 (H) 01/15/2007    CALCIUM 9.1 01/08/2018    PHOS " 3.9 01/08/2018    MG 2.1 08/31/2016    ALBUMIN 3.3 (L) 01/08/2018    AST 23 09/19/2016    ALT 11 09/19/2016       Lab Results   Component Value Date    EXTANC 1,528 02/16/2015    EXTWBC 3.3 (L) 01/02/2018    EXTSEGS 62.7 02/16/2016    EXTPLATELETS 181 01/02/2018    EXTHEMOGLOBI 8.8 (L) 01/02/2018    EXTHEMATOCRI 25.6 (L) 01/02/2018    EXTCREATININ 1.63 01/02/2018    EXTSODIUM 137 01/02/2018    EXTPOTASSIUM 4.3 01/02/2018    EXTBUN 27 01/02/2018    EXTCO2 24 01/02/2018    EXTCALCIUM 8.7 01/02/2018    EXTPHOSPHORU 3.4 02/16/2016    EXTGLUCOSE 120 01/02/2018    EXTALBUMIN 4.2 01/02/2018    EXTAST 25 01/02/2018    EXTALT 20 01/02/2018    EXTBILITOTAL 0.5 01/02/2018       Lab Results   Component Value Date    EXTTACROLVL 11.9 (H) 01/02/2018    EXTPROTCRE 2.02 (H) 01/10/2017       Labs were reviewed with the patient.    Assessment:     1. Immunosuppression - Inducted with Medrol 500 mg; maintained on Prograf, Cellcept and Prednisone    2. Living-donor kidney transplant recipient on 01/15/2007    3. GN (glomerulonephritis)    4. Renal hypertension    5. Anemia of renal disease    6. Proteinuria, unspecified type    7. S/p nephrectomy--> left on 9/22/2016        Plan:   Dose prograf 4/3-->Repeated prograf trough 1/8--> results pending . If trough remains supra therapeutic (target 4-6) will lower dose to 3/3.  Leukopenia and H/H trending down over the last 12 months  --CMV PCR       Patient reports recent GI consult ~ 2 months ago in [place] with Dr Mustafa/ GI  She reports 1 polyp removal --> get records      Proteinuria   -->MGMT deferred to general nephrology  CKD care and especially tight BP control (<140/90)    Follow-up:   1. CKD stage: 3 siria optimal     2. Immunosuppression:   Prograf trough 11.9, which is  Supra therapeutic target 4-6. Continue  Prograf dose 4/3, MMF 1000Mg BID, and Prednisone 5 mg QD. Will continue to monitor for drug toxicities    3. Allograft Function: Stable. Continue good po hydration.        EXTCREATININ 1.63 01/02/2018 1/2/2018  EXT GFR MDRD AF AMER  32         4. Hypertension management: advise low salt diet and home BP monitoring    LOSARTAN 100 MG qD , Clonidine 0.2 mg PATCH q WEEK , TOPROL  50 mg QD, Norvasc 10mg QD  Lasix 20 mg daily/ prn -->MGMT deferred to general nephrology    5. Metabolic Bone Disease/Secondary Hyperparathyroidism:stable  Will monitor PTH, CA and Vit D/guidelines,    EXTCALCIUM 8.7 01/02/2018   EXTPHOSPHORU 3.4 02/16/2016     6. Electrolytes:  Will monitor /guidelines  EXTSODIUM 137 01/02/2018   EXTPOTASSIUM 4.3 01/02/2018   EXTBUN 27 01/02/2018   EXTCO2 24 01/02/2018       7. Anemia: stable. No need for intervention    Will monitor /guidelines  Lab Results   Component Value Date    WBC 3.37 (L) 01/08/2018    HGB 9.1 (L) 01/08/2018    HCT 27.3 (L) 01/08/2018    MCV 86 01/08/2018     01/08/2018       8.  Cytopenias: no significant cytopenias will monitor as per our guidelines. Medicine list reviewed including potential causes of drug-induced cytopenias  ANC 1800 ---no intervention needed, will continue to monitor/ guidelines     9.Proteinuria: continue p/c ratio as per guidelines    1/25/2017  Prot/Creat Ratio, Ur 0.00 - 0.20 2.06    -->MGMT deferred to general nephrology with good CKD care and especially tight BP control (<140/90)    10. BK virus infection screening:  will continue to monitor/ guidelines    11. Weight education: provided during the clinic visit   Body mass index is 20.76 kg/m².          12.Patient safety education regarding immunosuppression including prophylaxis posttransplant for CMV, PCP : Education provided about vaccination and prevention of infections            Follow-up:   Annual follow-up with kidney transplant clinic with repeat labs, including renal function panel with UA, urine protein/creatinine ratio, and drug trough level q3 months.  Patient also reminded to follow-up with general nephrologist.    Hayley Myles NP       Education:    Material provided to the patient.  Patient reminded to call with any health changes since these can be early signs of significant complications.  Also, I advised the patient to be sure any new medications or changes of old medications are discussed with either a pharmacist or physician knowledgeable with transplant to avoid rejection/drug toxicity related to significant drug interactions.    UNOS Patient Status  Functional Status: 80% - Normal activity with effort: some symptoms of disease  Physical Capacity: No Limitations

## 2018-02-20 ENCOUNTER — TELEPHONE (OUTPATIENT)
Dept: TRANSPLANT | Facility: CLINIC | Age: 68
End: 2018-02-20

## 2018-02-20 NOTE — TELEPHONE ENCOUNTER
Requested and received coverage determination for Mycophenolate Mofetil 250mg from 2/19/18 - 2/19/19 per Zighra.

## 2018-03-13 RX ORDER — MYCOPHENOLATE MOFETIL 250 MG/1
CAPSULE ORAL
Qty: 240 CAPSULE | Refills: 11 | Status: SHIPPED | OUTPATIENT
Start: 2018-03-13 | End: 2019-05-06 | Stop reason: SDUPTHER

## 2018-04-10 DIAGNOSIS — N18.30 CKD (CHRONIC KIDNEY DISEASE) STAGE 3, GFR 30-59 ML/MIN: Primary | Chronic | ICD-10-CM

## 2018-04-23 RX ORDER — FUROSEMIDE 20 MG/1
20 TABLET ORAL 2 TIMES DAILY
Qty: 30 TABLET | Refills: 11 | OUTPATIENT
Start: 2018-04-23 | End: 2019-04-23

## 2018-05-07 ENCOUNTER — TELEPHONE (OUTPATIENT)
Dept: NEPHROLOGY | Facility: HOSPITAL | Age: 68
End: 2018-05-07

## 2018-05-07 DIAGNOSIS — N18.30 CKD (CHRONIC KIDNEY DISEASE) STAGE 3, GFR 30-59 ML/MIN: Primary | ICD-10-CM

## 2018-05-07 NOTE — TELEPHONE ENCOUNTER
Patient was called to have lab work to be done before appointment. UA, UPCR, CBC, Renal function.

## 2018-06-20 ENCOUNTER — LAB VISIT (OUTPATIENT)
Dept: LAB | Facility: HOSPITAL | Age: 68
End: 2018-06-20
Payer: MEDICARE

## 2018-06-20 ENCOUNTER — OFFICE VISIT (OUTPATIENT)
Dept: NEPHROLOGY | Facility: CLINIC | Age: 68
End: 2018-06-20
Payer: MEDICARE

## 2018-06-20 VITALS
SYSTOLIC BLOOD PRESSURE: 126 MMHG | BODY MASS INDEX: 20.75 KG/M2 | HEIGHT: 65 IN | WEIGHT: 124.56 LBS | DIASTOLIC BLOOD PRESSURE: 62 MMHG | OXYGEN SATURATION: 99 % | HEART RATE: 100 BPM

## 2018-06-20 DIAGNOSIS — I12.9 RENAL HYPERTENSION: Chronic | ICD-10-CM

## 2018-06-20 DIAGNOSIS — D84.9 IMMUNOSUPPRESSION: ICD-10-CM

## 2018-06-20 DIAGNOSIS — Z94.0 LIVING-DONOR KIDNEY TRANSPLANT RECIPIENT: Chronic | ICD-10-CM

## 2018-06-20 DIAGNOSIS — N18.9 ANEMIA OF RENAL DISEASE: Chronic | ICD-10-CM

## 2018-06-20 DIAGNOSIS — D63.1 ANEMIA OF RENAL DISEASE: Chronic | ICD-10-CM

## 2018-06-20 DIAGNOSIS — N18.30 STAGE 3 CHRONIC KIDNEY DISEASE: Primary | ICD-10-CM

## 2018-06-20 DIAGNOSIS — E78.5 HYPERLIPIDEMIA, UNSPECIFIED HYPERLIPIDEMIA TYPE: ICD-10-CM

## 2018-06-20 DIAGNOSIS — N18.30 CKD (CHRONIC KIDNEY DISEASE) STAGE 3, GFR 30-59 ML/MIN: ICD-10-CM

## 2018-06-20 DIAGNOSIS — R80.9 PROTEINURIA, UNSPECIFIED TYPE: ICD-10-CM

## 2018-06-20 LAB
BACTERIA #/AREA URNS AUTO: ABNORMAL /HPF
BILIRUB UR QL STRIP: NEGATIVE
CLARITY UR REFRACT.AUTO: ABNORMAL
COLOR UR AUTO: YELLOW
CREAT UR-MCNC: 77 MG/DL
GLUCOSE UR QL STRIP: NEGATIVE
HGB UR QL STRIP: NEGATIVE
HYALINE CASTS UR QL AUTO: 20 /LPF
KETONES UR QL STRIP: NEGATIVE
LEUKOCYTE ESTERASE UR QL STRIP: NEGATIVE
MICROSCOPIC COMMENT: ABNORMAL
NITRITE UR QL STRIP: NEGATIVE
PH UR STRIP: 5 [PH] (ref 5–8)
PROT UR QL STRIP: ABNORMAL
PROT UR-MCNC: 188 MG/DL
PROT/CREAT RATIO, UR: 2.44
RBC #/AREA URNS AUTO: 1 /HPF (ref 0–4)
SP GR UR STRIP: 1.01 (ref 1–1.03)
SQUAMOUS #/AREA URNS AUTO: 0 /HPF
URN SPEC COLLECT METH UR: ABNORMAL
UROBILINOGEN UR STRIP-ACNC: NEGATIVE EU/DL
WBC #/AREA URNS AUTO: 1 /HPF (ref 0–5)

## 2018-06-20 PROCEDURE — 99213 OFFICE O/P EST LOW 20 MIN: CPT | Mod: PBBFAC | Performed by: HOSPITALIST

## 2018-06-20 PROCEDURE — 84156 ASSAY OF PROTEIN URINE: CPT

## 2018-06-20 PROCEDURE — 99213 OFFICE O/P EST LOW 20 MIN: CPT | Mod: S$PBB,GC,, | Performed by: HOSPITALIST

## 2018-06-20 PROCEDURE — 81001 URINALYSIS AUTO W/SCOPE: CPT

## 2018-06-20 PROCEDURE — 99999 PR PBB SHADOW E&M-EST. PATIENT-LVL III: CPT | Mod: PBBFAC,GC,, | Performed by: HOSPITALIST

## 2018-06-27 NOTE — PROGRESS NOTES
" Patient ID: Malissa Schwartz is a 68 y.o. White female who presents for follow-up evaluation of Kidney Transplant; Chronic Renal Failure; and Hypertension  .    Interval History:  Malissa Schwartz is a 68 y.o. White female who present to Nephrology Clinic and who I am seeing for the first time. She was followed by Dr Santiago Last see on 1/2017 ans was lost to followed up. She presents was referred again from Transplant Nephrology to reestablish care In General Nephrology. She was last seen in Transplant Nephrology 1/2018 with CKD satge 3 post transplant. Allograft is 10 years old. Her baseline sCr is hard to establish as she has no labs for 2017. For 2016 she had of 1.5-1.7 mg/dL. In addition to his for 2018 only have 2 measured sCr making her baseline sCr 1.8-1.9 mg/dL. She has evidence of Proteiuria since 11/2016 of 1.1 g by UPCr and slowly been treding up to last UPCr on 6/20/2018 of 2.44 g. Her Etiology of ESRD is unknown GN. B is well control today in clinic. She had an episode  Of Mild acute rejection on 9/2017 +C4D Glomerulitis treated wth solumedrol  Pulses and Thymogobin x 6 doses. FK levels higher since then ~ 7. She feels well and has no complains.   The patient denies taking NSAIDs or new antibiotics, recreational drugs, recent episode of dehydration, diarrhea, nausea or vomiting, acute illness, hospitalization or exposure to IV radiocontrast.    HPI  As per Dr Tim, " Ms Schwartz has a hx of live donor renal transplant in 2007. 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   She is undergoing re-evaluation for re-transplant. Her baseline creatinine is 1.5-1.7. BP is 140/78. She is on increased dose of prograf 4 mg in am and 3 mg in pm , cellcept 1000 mg twice daily and prednisone 5 mg daily.  She had a CT scan on 7/21 for evaluation of pulm nodules and renal mass and mass was characterized as  She had a left nephrectomy  on 9/22/16 due to complex partially cystic mass in the left midpole measuring 5.7 x " "4.7 x 5.0 cm.  Her Hgb is stable at 9.6/29.   TSAT of 20 and Ferritin 270.  UPC is up to 2.0. She is on Lasix 20 mg daily, losartan 100mg daily and clonidine 0.2 mg patch.   She continues to complain of fatigue. "    Review of Systems    Constitutional: Negative for chills, diaphoresis, fever and weight loss.   HENT: Negative for nosebleeds and tinnitus.    Eyes: Negative for blurred vision, double vision and photophobia.   Respiratory: Negative for cough and shortness of breath.    Cardiovascular: . Negative for chest pain, palpitations, swelling orthopnea and PND.   Gastrointestinal: Negative for abdominal pain, diarrhea, constipation nausea and vomiting.   Genitourinary: Negative for dysuria, flank pain, frequency, hematuria and urgency.   Musculoskeletal: Negative for back pain, falls, joint pain, myalgias and neck pain.   Skin: Negative.    Neurological: Negative for dizziness, tingling, tremors, sensory change, speech change, focal weakness, seizures, loss of consciousness, weakness and headaches.   Endo/Heme/Allergies: Negative for environmental allergies and polydipsia. Does not bruise/bleed easily.   Psychiatric/Behavioral: Negative for depression, hallucinations, memory loss, substance abuse and suicidal ideas. The patient is not nervous/anxious and does not have insomnia.        Objective:      Physical Exam    Constitutional:  well-developed and well-nourished. No distress.   HENT:   Head: Normocephalic and atraumatic.   Neck: Normal range of motion. Neck supple.   Cardiovascular: Normal rate, regular rhythm, normal heart sounds and intact distal pulses.  Exam reveals no gallop and no friction rub.    No murmur heard.  Pulmonary/Chest: Effort normal and breath sounds normal. No respiratory distress. no wheezes, no rales, no tenderness.   Abdominal: Soft. Bowel sounds are normal, no distension. There is no tenderness. There is no rebound and no guarding.   Musculoskeletal: Normal range of motion. No edema " or deformity.   Neurological: Awake alert and oriented x 4. Motor strength preserved 5/5. DTR symmetrical.  Skin: Skin is warm and dry. No rash noted. She is not diaphoretic. No erythema. No pallor.        Assessment:         ICD-10-CM ICD-9-CM    1. Stage 3 chronic kidney disease N18.3 585.3    2. Renal hypertension I12.9 403.90    3. Living-donor kidney transplant recipient on 01/15/2007 Z94.0 V42.0    4. Anemia of renal disease D63.1 285.21    5. Hyperlipidemia, unspecified hyperlipidemia type E78.5 272.4    6. Proteinuria, unspecified type R80.9 791.0    7. Immunosuppression - Inducted with Medrol 500 mg; maintained on Prograf, Cellcept and Prednisone D89.9 279.9         Plan:     1. CKD stage 4: s/p post LDKTx 2007. Allograft 10 years old. Seems like decrease in eGRF is most likely proggression of disease no evidence of rejaction as UA reflects no Hematuria. No other symptoms of Fever chills weakness and fatigue. Proteinuria suspect is from progression of CKD. She is on ARB and BP is optimize. Will consider referral to KTM for re- evalution of Immunosuppressive therapy to prolong graft survival. SHe is compliant with low salt diet.    Lab Results   Component Value Date    CREATININE 1.9 (H) 06/20/2018   · Continue low salt diet  · BP optimize and she is on ARB  · High risk for decrease immunosuppression as she has prior hx of Rejection.   · Will ask KTM if there is any role to decreasea CNI therapy or use of combination with mTOR at lower dose  of CNI to increase allograft survival and explore options.  · Avoid Aleve, Motrin, Ibuprofen and other products containing NSAIDs (non-steroidal anti-inflammatories).  · Encourage hydration: she is not drinking enough water, discusses increase fluid intake to at least 2 lts. Daily.     Protein Creatinine Ratios: Suspect progression of disease. An decrease allograft function.   Prot/Creat Ratio, Ur   Date Value Ref Range Status   06/20/2018 2.44 (H) 0.00 - 0.20 Final    01/25/2017 2.06 (H) 0.00 - 0.20 Final   11/09/2016 1.29 (H) 0.00 - 0.20 Final   Continue Losartan  Low salt diet  BP optimize.     Acid-base: on target   Lab Results   Component Value Date     06/20/2018    K 4.8 06/20/2018    CO2 23 06/20/2018     2. HTN: Blood pressures   · Well control BP, will continue to monitor. At Goal:  BP  <130 mmHg SBP and BDP <80 mmHg.   · Continue Amlodipine 10 mg  · Clonidine transdermal 0.2 mg/24hrs   · Continue Furosemide 20 mg daily.   · Cont Losartan 100 mg daily  · Cont Metoprolol succinate 25 mg daily  · Low salt diet       3. Renal Mineral Bone disease: last PTH   Lab Results   Component Value Date    PTH 96.0 (H) 11/09/2016    CALCIUM 9.7 06/20/2018    PHOS 3.8 06/20/2018   1. Will check PTH and Vit D levels   2. She is currently taking Calcium carbonate for Osteoporosis.     4. Anemia:  Lab Results   Component Value Date    HGB 10.5 (L) 06/20/2018   1. Will Get Fe panel   2. Hg 10.5 g trend Hg no need for MICHAEL at this time.    5. Lipid management: continue statin  Lab Results   Component Value Date    LDLCALC 82.6 06/15/2016   · As per PCP    7. ESRD planing: She may need another transplant, dicussed the modalities of dialysis available for her. Will plan to keep close monitor of eGRF for TOPS referral and evaluate access after modality of dialysis has been chosen.     Follow up in 3 months with labs  Salvador Moeller MD  Nephrology Fellow   [phone]

## 2018-06-27 NOTE — PATIENT INSTRUCTIONS
Low-Salt Diet  This diet removes foods that are high in salt. It also limits the amount of salt you use when cooking. It is most often used for people with high blood pressure, edema (fluid retention), and kidney, liver, or heart disease.  Table salt contains the mineral sodium. Your body needs sodium to work normally. But too much sodium can make your health problems worse. Your healthcare provider is recommending a low-salt (also called low-sodium) diet for you. Your total daily allowance of salt is 1,500 to 2,300 milligrams (mg). It is less than 1 teaspoon of table salt. This means you can have only about 500 to 700 mg of sodium at each meal. People with certain health problems should limit salt intake to the lower end of the recommended range.    When you cook, dont add much salt. If you can cook without using salt, even better. Dont add salt to your food at the table.  When shopping, read food labels. Salt is often called sodium on the label. Choose foods that are salt-free, low salt, or very low salt. Note that foods with reduced salt may not lower your salt intake enough.    Beans, potatoes, and pasta  Ok: Dry beans, split peas, lentils, potatoes, rice, macaroni, pasta, spaghetti without added salt  Avoid: Potato chips, tortilla chips, and similar products  Breads and cereals  Ok: Low-sodium breads, rolls, cereals, and cakes; low-salt crackers, matzo crackers  Avoid: Salted crackers, pretzels, popcorn, [place] toast, pancakes, muffins  Dairy  Ok: Milk, chocolate milk, hot chocolate mix, low-salt cheeses, and yogurt  Avoid: Processed cheese and cheese spreads; Roquefort, Camembert, and cottage cheese; buttermilk, instant breakfast drink  Desserts  Ok: Ice cream, frozen yogurt, juice bars, gelatin, cookies and pies, sugar, honey, jelly, hard candy  Avoid: Most pies, cakes and cookies prepared or processed with salt; instant pudding  Drinks  Ok: Tea, coffee, fizzy (carbonated) drinks, juices  Avoid: Flavored  coffees, electrolyte replacement drinks, sports drinks  Meats  Ok: All fresh meat, fish, poultry, low-salt tuna, eggs, egg substitute  Avoid: Smoked, pickled, brine-cured, or salted meats and fish. This includes santizo, chipped beef, corned beef, hot dogs, deli meats, ham, kosher meats, salt pork, sausage, canned tuna, salted codfish, smoked salmon, herring, sardines, or anchovies.  Seasonings and spices  Ok: Most seasonings are okay. Good substitutes for salt include: fresh herb blends, hot sauce, lemon, garlic, junior, vinegar, dry mustard, parsley, cilantro, horseradish, tomato paste, regular margarine, mayonnaise, unsalted butter, cream cheese, vegetable oil, cream, low-salt salad dressing and gravy.  Avoid: Regular ketchup, relishes, pickles, soy sauce, teriyaki sauce, Worcestershire sauce, BBQ sauce, tartar sauce, meat tenderizer, chili sauce, regular gravy, regular salad dressing, salted butter  Soups  Ok: Low-salt soups and broths made with allowed foods  Avoid: Bouillon cubes, soups with smoked or salted meats, regular soup and broth  Vegetables  Ok: Most vegetables are okay; also low-salt tomato and vegetable juices  Avoid: Sauerkraut and other brine-soaked vegetables; pickles and other pickled vegetables; tomato juice, olives  Date Last Reviewed: 8/1/2016 © 2000-2017 [employer]. [address] [place] [address]. All rights reserved. This information is not intended as a substitute for professional medical care. Always follow your healthcare professional's instructions.        Chronic Kidney Disease (CKD)     The role of the kidneys is to remove waste products and extra water from the blood.  When the kidneys do not work as they should, waste products begin to build up in the blood. This is called chronic kidney disease (CKD). CKD means that you have kidney damage or a decrease in kidney function lasting at least 3 months. CKD allows extra water, waste, and toxins to build up in the  body. This can eventually become life-threatening. You might need dialysis or a kidney transplant to stay alive. This most severe form is called end stage renal disease.  Diabetes is the leading causes of chronic renal failure. Other causes include high blood pressure, hardening of the arteries (atherosclerosis), lupus, inflammation of the blood vessels (vasculitis), and past viral or bacterial infections. Certain over-the-counter pain medicines can cause renal failure when taken often over a long period of time. These include aspirin, ibuprofen, and related anti-inflammatory medicines called NSAIDs (nonsteroidal anti-inflammatory drugs).  Home care  The following guidelines will help you care for yourself at home:  · If you have diabetes, talk with your healthcare provider about keeping your blood sugar under control. Ask if you need to make and changes to your diet, lifestyle, or medicines.  · If you have high blood pressure:  ¨ Take prescribed medicine to lower your blood pressure to the recommended goal of less than 130/80.  ¨ Start a regular exercise program that you enjoy. Check with your healthcare provider to be sure your planned exercise program is right for you.  ¨ Eat less salt (sodium). Your healthcare provider can tell you how much salt per day is safe for you.  · If you are overweight, talk with your healthcare provider about a weight loss plan.  · If you smoke, you must quit. Smoking makes kidney disease worse. Talk with your healthcare provider about ways to help you quit.  For more information, visit the following links:  ¨ www.smokefree.gov/sites/default/files/pdf/clearing-the-air-accessible.pdf  ¨ www.smokefree.gov  ¨ www.cancer.org/healthy/stayawayfromtobacco/guidetoquittingsmoking/  · Most people with CKD need to follow a special diet.  Be sure you understand yours. In general, you will need to limit protein, salt, potassium, and phosphorus. You also need to limit how much fluid you  drink.   · CKD is a risk factor for heart disease. Talk with your healthcare provider about any other risk factors you might have and what you can do to lessen them.  · Talk with your healthcare provider about any medicines you are taking to find out if they need to be reduced or stopped.  · Don't use the following over-the-counter medicines, or consult your healthcare provider before using:  ¨ Aspirin and NSAIDs such as ibuprofen or naproxen. Using acetaminophen for fever or pain is OK.  ¨ Laxatives and antacids containing magnesium or aluminum  ¨ Fleet or phospho soda enemas containing phosphorus  ¨ Certain stomach acid-blocking medicine such as cimetidine or ranitidine   ¨ Decongestants containing pseudoephedrine   ¨ Herbal supplements  Follow-up care  Follow up with your healthcare provider, or as advised. Contact one of the following for more information:  · American Association of Kidney Patients 542-625-2460 www.aakp.org  · National Kidney Foundation 003-654-0395 www.kidney.org  · American Kidney Fund 499-788-9598 www.kidneyfund.org  · National Kidney Disease Education Program 866-4KIDNEY www.nkdep.nih.gov  If an X-ray, ECG (cardiogram), or other diagnostic test was taken, you will be told of any new findings that may affect your care.  Call 911  Call 911 if you have any of the following:  · Severe weakness, dizziness, fainting, drowsiness, or confusion  · Chest pain or shortness of breath  · Heart beating fast, slow, or irregularly  When to seek medical advice  Call your healthcare provider right away if any of these occur:  · Nausea or vomiting  · Fever of 100.4°F (38°C) or higher, or as directed by your healthcare provider  · Unexpected weight gain or swelling in the legs, ankles, or around the eyes  · Decrease or absent urine output  Date Last Reviewed: 9/1/2016 © 2000-2017 [employer]. [address] [place] [address], [place] [address]. All rights reserved. This information is not intended as a  substitute for professional medical care. Always follow your healthcare professional's instructions.    Avoid Aleve, Motrin, Ibuprofen and other products containing NSAIDs (non-steroidal anti-inflammatories).

## 2018-07-16 DIAGNOSIS — N18.30 STAGE 3 CHRONIC KIDNEY DISEASE: Primary | ICD-10-CM

## 2018-07-16 RX ORDER — LOSARTAN POTASSIUM 100 MG/1
100 TABLET ORAL DAILY
Qty: 90 TABLET | Refills: 3 | Status: SHIPPED | OUTPATIENT
Start: 2018-07-16 | End: 2019-09-09 | Stop reason: SDUPTHER

## 2018-07-18 NOTE — PROGRESS NOTES
ATTENDING PHYSICIAN ATTESTATION  I have personally interviewed and examined the patient. I thoroughly reviewed the demographic, clinical, laboratorial and imaging information available in medical records. I agree with the assessment and recommendations provided by the subspecialty resident. Dr. Moeller was under my supervision.

## 2018-12-28 RX ORDER — AMLODIPINE BESYLATE 10 MG/1
TABLET ORAL
Qty: 30 TABLET | Refills: 10 | Status: SHIPPED | OUTPATIENT
Start: 2018-12-28 | End: 2020-01-02

## 2019-01-10 DIAGNOSIS — D84.9 IMMUNOSUPPRESSION: ICD-10-CM

## 2019-01-10 DIAGNOSIS — Z94.0 LIVING-DONOR KIDNEY TRANSPLANT RECIPIENT: Chronic | ICD-10-CM

## 2019-01-10 RX ORDER — PREDNISONE 5 MG/1
TABLET ORAL
Qty: 30 TABLET | Refills: 11 | Status: SHIPPED | OUTPATIENT
Start: 2019-01-10 | End: 2020-02-24

## 2019-02-07 ENCOUNTER — HISTORICAL (OUTPATIENT)
Dept: INFUSION THERAPY | Facility: HOSPITAL | Age: 69
End: 2019-02-07

## 2019-02-07 LAB
FERRITIN SERPL-MCNC: 427 NG/ML (ref 3–252)
HCT VFR BLD AUTO: 29.7 % (ref 36–48)
HGB BLD-MCNC: 9.8 GM/DL (ref 12–16)
IRON SATN MFR SERPL: 39 % (ref 15–55)
IRON SERPL-MCNC: 97 MCG/DL (ref 50–170)
TIBC SERPL-MCNC: 149 MCG/DL (ref 150–375)
TIBC SERPL-MCNC: 246 MCG/DL (ref 250–450)

## 2019-04-05 ENCOUNTER — HISTORICAL (OUTPATIENT)
Dept: INFUSION THERAPY | Facility: HOSPITAL | Age: 69
End: 2019-04-05

## 2019-04-05 LAB
HCT VFR BLD AUTO: 28.1 % (ref 36–48)
HGB BLD-MCNC: 9.3 GM/DL (ref 12–16)

## 2019-05-06 ENCOUNTER — HISTORICAL (OUTPATIENT)
Dept: INFUSION THERAPY | Facility: HOSPITAL | Age: 69
End: 2019-05-06

## 2019-05-06 RX ORDER — MYCOPHENOLATE MOFETIL 250 MG/1
CAPSULE ORAL
Qty: 240 CAPSULE | Refills: 0 | Status: SHIPPED | OUTPATIENT
Start: 2019-05-06 | End: 2019-07-25 | Stop reason: SDUPTHER

## 2019-05-06 NOTE — TELEPHONE ENCOUNTER
MMF refill requested.  Chart review indicates no labs or txp assessment in > 12 months. I will refill MMF for 30 days during which I ask we get new labs and assess over phone if she is unable to come see us. Alternatively, she may obtain IS from her nephrologist.

## 2019-05-10 ENCOUNTER — TELEPHONE (OUTPATIENT)
Dept: TRANSPLANT | Facility: CLINIC | Age: 69
End: 2019-05-10

## 2019-05-10 NOTE — TELEPHONE ENCOUNTER
Received request for Part B vs Part D coverage determination for Mycophenolate from [facility] .  Faxed required forms and documentation to [phone].

## 2019-05-14 ENCOUNTER — TELEPHONE (OUTPATIENT)
Dept: TRANSPLANT | Facility: CLINIC | Age: 69
End: 2019-05-14

## 2019-05-17 ENCOUNTER — TELEPHONE (OUTPATIENT)
Dept: TRANSPLANT | Facility: CLINIC | Age: 69
End: 2019-05-17

## 2019-05-17 NOTE — TELEPHONE ENCOUNTER
MMF still not approved, as directed by [employer] [place], I called the [facility] auth dept, [phone] to request an expedited appeal, confirmation # [id number].

## 2019-05-22 ENCOUNTER — TELEPHONE (OUTPATIENT)
Dept: TRANSPLANT | Facility: CLINIC | Age: 69
End: 2019-05-22

## 2019-05-22 NOTE — TELEPHONE ENCOUNTER
Received fax from [employer], for Mycophenolate: part B vs part D authorization is needed, additional information is needed to determine if this should be covered under part B or part D.  Called the coverage determination unit [phone], was informed that this transplant was covered by medicare and transplant meds are covered under medicare part B.  Called [facility] pharmacy again and notified them of this information and request to run under part B.

## 2019-07-23 RX ORDER — MYCOPHENOLATE MOFETIL 250 MG/1
CAPSULE ORAL
Qty: 240 CAPSULE | Refills: 0 | OUTPATIENT
Start: 2019-07-23

## 2019-07-23 NOTE — TELEPHONE ENCOUNTER
Annual Transplant follow up assessment    Call placed to annual post transplant patient for health assessment and evaluation of need for annual transplant clinic visit.    -Are you following with a General Nephrologist? If so, who, and when was your last visit?  Dr Mendoza, every 3mo, last visit with labs in June 2019    -Have you had any hospitalizations since your last visit?  Hospitalized for Virus last year     -What is your current dose of Immunos? Who prescribed your last refill?  Prograf 3mg BID, MMF 500mg BID, Prednisone 5mg Daily  Transplant prescribes refills, but Dr Mendoza decreased mmf from 1000mg BID to 500mg BID    -Do you have any new health problems?  No    -Have you been told you have any form of Cancer?  No    -Have you had any recurrent infections?  No

## 2019-07-23 NOTE — TELEPHONE ENCOUNTER
[employer] request for refill denied at this time, since last clinic visit January 2018 and last labs June 2018. ICE recently we refilled her medications in ask her for update, but none noted.  I also see she has had issues with medication  That have been addressed expertly by transplant coordinator.  Recommendation:  Obtain follow-up labs and do telephone assessment ASAP.   Fall findings are stable and she is following with her home nephrologist, meds can be refilled for the next 6 months and she can be scheduled for an appointment with transplant January 2020.

## 2019-07-25 ENCOUNTER — TELEPHONE (OUTPATIENT)
Dept: TRANSPLANT | Facility: CLINIC | Age: 69
End: 2019-07-25

## 2019-07-25 ENCOUNTER — DOCUMENTATION ONLY (OUTPATIENT)
Dept: TRANSPLANT | Facility: CLINIC | Age: 69
End: 2019-07-25

## 2019-07-25 LAB
EXT ALBUMIN: 4 (ref 3.6–5.1)
EXT ALKALINE PHOSPHATASE: 76 (ref 33–130)
EXT ALT: 13 (ref 6–29)
EXT AST: 15 (ref 10–35)
EXT BILIRUBIN TOTAL: 0.5 (ref 0.2–1.2)
EXT BUN: 35 (ref 7–25)
EXT CALCIUM: 9 (ref 8.5–10.4)
EXT CHLORIDE: 106 (ref 98–110)
EXT CO2: 25 (ref 20–32)
EXT CREATININE: 2.32 MG/DL (ref 0.6–0.93)
EXT EOSINOPHIL%: 2.2
EXT GFR MDRD NON AF AMER: 21
EXT GLUCOSE: 91 (ref 65–99)
EXT HEMATOCRIT: 27.4 (ref 35–45)
EXT HEMOGLOBIN: 9.2 (ref 11.7–15.5)
EXT LYMPH%: 33.8
EXT MAGNESIUM: 2.5 (ref 1.5–4.3)
EXT MONOCYTES%: 9.5
EXT PHOSPHORUS: 4.5 (ref 2.1–4.5)
EXT PLATELETS: 217 (ref 140–400)
EXT POTASSIUM: 4.7 (ref 3.5–5.3)
EXT SODIUM: 138 MMOL/L (ref 135–146)
EXT TACROLIMUS LVL: 7.1 (ref 4–6)
EXT URIC ACID: 7.4 (ref 2.5–7)
EXT WBC: 4.6 (ref 3.8–10.8)

## 2019-07-25 RX ORDER — MYCOPHENOLATE MOFETIL 250 MG/1
500 CAPSULE ORAL 2 TIMES DAILY
Qty: 120 CAPSULE | Refills: 11 | Status: SHIPPED | OUTPATIENT
Start: 2019-07-25 | End: 2022-05-13

## 2019-07-25 NOTE — TELEPHONE ENCOUNTER
----- Message from Nicole Trejo MD sent at 7/25/2019  3:36 PM CDT -----  Results reviewed.   Creatinine is usually 1.5-1.8, now significantly elevated.  Please ask her to hydrate and repeat.  At the same time I would like to obtain an Allosure and immuknow cylex.  Additionally,  Review her meds make sure they are all accurate.   Recent tacrolimus level look high.     Plan to repeat renal panel and tacrolimus Hayes next week.  Thank you.  OK to refill immunos while we sort our the WILLIAM.

## 2019-07-26 ENCOUNTER — HISTORICAL (OUTPATIENT)
Dept: INFUSION THERAPY | Facility: HOSPITAL | Age: 69
End: 2019-07-26

## 2019-07-26 DIAGNOSIS — Z94.0 KIDNEY REPLACED BY TRANSPLANT: ICD-10-CM

## 2019-07-26 LAB
HCT VFR BLD AUTO: 28.1 % (ref 36–48)
HGB BLD-MCNC: 9.1 GM/DL (ref 12–16)

## 2019-07-26 RX ORDER — TACROLIMUS 1 MG/1
CAPSULE ORAL
Qty: 150 CAPSULE | Refills: 11 | Status: SHIPPED | OUTPATIENT
Start: 2019-07-26 | End: 2022-05-13

## 2019-07-26 NOTE — TELEPHONE ENCOUNTER
Spoke to pt, reviewed labs and Dr Chau's instructions to hydrate, repeat labs with allosure and cylex.  Will confirm if allosure can be drawn at her local lab and call her back to schedule, if not drawn at local lab will schedule mobile phlebotomy with [facility].  Reviewed and updated med list.

## 2019-08-06 ENCOUNTER — TELEPHONE (OUTPATIENT)
Dept: TRANSPLANT | Facility: CLINIC | Age: 69
End: 2019-08-06

## 2019-08-06 NOTE — TELEPHONE ENCOUNTER
Spoke to pt, she received Allosure test kit yesterday and went to lab this morning, allosure was drawn but nothing else.  Pt will go to lab again tomorrow for Tacro, CBC, RP, mg level.

## 2019-08-09 ENCOUNTER — HISTORICAL (OUTPATIENT)
Dept: INFUSION THERAPY | Facility: HOSPITAL | Age: 69
End: 2019-08-09

## 2019-08-09 ENCOUNTER — DOCUMENTATION ONLY (OUTPATIENT)
Dept: TRANSPLANT | Facility: CLINIC | Age: 69
End: 2019-08-09

## 2019-08-09 LAB
EXT ALLOSURE: 0.82
FERRITIN SERPL-MCNC: 273 NG/ML (ref 3–252)
HCT VFR BLD AUTO: 29.1 % (ref 36–48)
HGB BLD-MCNC: 9.3 GM/DL (ref 12–16)
IRON SATN MFR SERPL: ABNORMAL % (ref 15–55)
IRON SERPL-MCNC: 14 MCG/DL (ref 50–170)
TIBC SERPL-MCNC: <36 MCG/DL (ref 250–450)
TIBC SERPL-MCNC: ABNORMAL MCG/DL (ref 150–375)

## 2019-08-12 ENCOUNTER — DOCUMENTATION ONLY (OUTPATIENT)
Dept: TRANSPLANT | Facility: CLINIC | Age: 69
End: 2019-08-12

## 2019-08-12 LAB
EXT BUN: 36 (ref 7–25)
EXT CALCIUM: 8.7 (ref 8.6–10.4)
EXT CHLORIDE: 107 (ref 98–110)
EXT CO2: 25 (ref 20–32)
EXT CREATININE: 2.16 MG/DL (ref 0.6–0.93)
EXT GFR MDRD NON AF AMER: 22
EXT GLUCOSE: 85 (ref 65–99)
EXT PHOSPHORUS: 4.8 (ref 2.1–4.3)
EXT POTASSIUM: 5.1 (ref 3.5–5.3)
EXT SODIUM: 137 MMOL/L (ref 135–146)
EXT TACROLIMUS LVL: 5

## 2019-08-13 ENCOUNTER — TELEPHONE (OUTPATIENT)
Dept: TRANSPLANT | Facility: CLINIC | Age: 69
End: 2019-08-13

## 2019-08-13 NOTE — LETTER
August 15, 2019             [facility]   [phone]  [phone]    Please see the enclosed lab order for the patient below.    RE: Julien Schwartz   MRN: [id number]   SEX female   : 1950     Insurance:   Payor/Plan Subscr  Sex Relation Sub. Ins. ID Effective Group Num   1. MEDICARE - [place]* JULIEN SCHWARTZ 1950 Female  [id number]                                    [address]   2. [employer] - MEDIC* JULIEN SCHWARTZ 1950 Female  [id number]                                    [address]       Sincerely,     Joycelyn Rojo RN  Clinical Transplant Coordinator  [facility]  [address] [place] [address]  [phone] office  [phone] fax

## 2019-08-13 NOTE — TELEPHONE ENCOUNTER
----- Message from Flori Quan sent at 8/13/2019 10:22 AM CDT -----  Contact: Pt  Test Results    Type of Test: Lab    Date of Test: 8/5 or 8/6    Communication Preference: [phone]     Additional Information: N/A

## 2019-08-14 ENCOUNTER — HISTORICAL (OUTPATIENT)
Dept: INFUSION THERAPY | Facility: HOSPITAL | Age: 69
End: 2019-08-14

## 2019-08-14 NOTE — TELEPHONE ENCOUNTER
----- Message from Nicole Trejo MD sent at 8/13/2019  6:00 PM CDT -----  Results reviewed. Please increase fluid intake and recheck renal panel in next few days.

## 2019-08-15 ENCOUNTER — TELEPHONE (OUTPATIENT)
Dept: TRANSPLANT | Facility: CLINIC | Age: 69
End: 2019-08-15

## 2019-08-15 NOTE — TELEPHONE ENCOUNTER
Returned call, see previous note.  ----- Message from Tyrone Pitt sent at 8/15/2019  1:42 PM CDT -----  Contact: Tahmina  Ms. Schwartz would like you to contact her in regards to the results from her blood work. She can be reached at [phone].

## 2019-08-15 NOTE — TELEPHONE ENCOUNTER
Spoke to pt, reviewed labs and Dr Chau's instructions, she will hydrate well, at least 2-3 L water daily and will repeat labs on Monday 8/19/19.  Lab order faxed to [facility] in [place].

## 2019-08-21 ENCOUNTER — DOCUMENTATION ONLY (OUTPATIENT)
Dept: TRANSPLANT | Facility: CLINIC | Age: 69
End: 2019-08-21

## 2019-08-21 LAB
EXT BUN: 33 (ref 7–25)
EXT CALCIUM: 8.8 (ref 8.6–10.4)
EXT CHLORIDE: 105 (ref 98–110)
EXT CO2: 24 (ref 20–32)
EXT CREATININE: 2.36 MG/DL (ref 0.6–0.93)
EXT GFR MDRD NON AF AMER: 20
EXT GLUCOSE UA: 101 (ref 65–99)
EXT GLUCOSE: 101 (ref 65–99)
EXT PHOSPHORUS: 4.5 (ref 2.1–4.3)
EXT POTASSIUM: 4.7 (ref 3.5–5.3)
EXT SODIUM: 135 MMOL/L (ref 135–146)

## 2019-08-22 ENCOUNTER — TELEPHONE (OUTPATIENT)
Dept: TRANSPLANT | Facility: CLINIC | Age: 69
End: 2019-08-22

## 2019-08-22 NOTE — TELEPHONE ENCOUNTER
Left voicemail message for pt with  Dr Del Valle instructions.  Lab results sent to Dr Mendoza.    ----- Message from Mahamed Del Valle MD sent at 8/22/2019 10:33 AM CDT -----  this creatinine is above her baseline lets encourage hydration ask for acute illness  She is on losartan and furosemide. Please direct these results to her nephrologist as well.

## 2019-08-22 NOTE — PROGRESS NOTES
this creatinine is above her baseline lets encourage hydration ask for acute illness  She is on losartan and furosemide. Please direct these results to her nephrologist as well

## 2019-08-23 ENCOUNTER — TELEPHONE (OUTPATIENT)
Dept: TRANSPLANT | Facility: CLINIC | Age: 69
End: 2019-08-23

## 2019-08-23 NOTE — TELEPHONE ENCOUNTER
Spoke to pt, reviewed labs and Dr Del Valle instructions, she denies any acute illness or changes.  Encouraged close follow-up with Dr Mendoza for CKD care, she confirms that she has labs and an apt scheduled with him in the next month or two, she will have labs sent here for transplant review.  Voices no complaints at present.   -  ---- Message from Tyrone Pitt sent at 8/23/2019  1:02 PM CDT -----  Contact: Malissa Schwartz calling to get the results of her blood test. She can be reached at [phone].

## 2019-08-28 ENCOUNTER — HISTORICAL (OUTPATIENT)
Dept: INFUSION THERAPY | Facility: HOSPITAL | Age: 69
End: 2019-08-28

## 2019-08-28 LAB
HCT VFR BLD AUTO: 29.4 % (ref 36–48)
HGB BLD-MCNC: 9.5 GM/DL (ref 12–16)

## 2019-09-09 DIAGNOSIS — N18.30 STAGE 3 CHRONIC KIDNEY DISEASE: ICD-10-CM

## 2019-09-11 ENCOUNTER — HISTORICAL (OUTPATIENT)
Dept: INFUSION THERAPY | Facility: HOSPITAL | Age: 69
End: 2019-09-11

## 2019-09-11 LAB
HCT VFR BLD AUTO: 33 % (ref 36–48)
HGB BLD-MCNC: 10.6 GM/DL (ref 12–16)

## 2019-09-27 ENCOUNTER — HISTORICAL (OUTPATIENT)
Dept: INFUSION THERAPY | Facility: HOSPITAL | Age: 69
End: 2019-09-27

## 2019-09-27 LAB
HCT VFR BLD AUTO: 35.7 % (ref 36–48)
HGB BLD-MCNC: 11 GM/DL (ref 12–16)

## 2019-10-16 ENCOUNTER — HISTORICAL (OUTPATIENT)
Dept: INFUSION THERAPY | Facility: HOSPITAL | Age: 69
End: 2019-10-16

## 2019-10-16 LAB
HCT VFR BLD AUTO: 32.6 % (ref 36–48)
HGB BLD-MCNC: 10.6 GM/DL (ref 12–16)

## 2019-10-29 ENCOUNTER — HISTORICAL (OUTPATIENT)
Dept: LAB | Facility: HOSPITAL | Age: 69
End: 2019-10-29

## 2019-10-29 LAB
ABS NEUT (OLG): 1.9 X10(3)/MCL (ref 1.5–6.9)
ALBUMIN SERPL-MCNC: 3.6 GM/DL (ref 3.4–5)
ALBUMIN/GLOB SERPL: 1.4 RATIO
ALP SERPL-CCNC: 72 UNIT/L (ref 30–113)
ALT SERPL-CCNC: 27 UNIT/L (ref 10–45)
APPEARANCE, UA: CLEAR
AST SERPL-CCNC: 20 UNIT/L (ref 15–37)
BACTERIA SPEC CULT: NORMAL /HPF
BILIRUB SERPL-MCNC: 0.4 MG/DL (ref 0.1–0.9)
BILIRUB UR QL STRIP: NEGATIVE
BILIRUBIN DIRECT+TOT PNL SERPL-MCNC: 0.1 MG/DL (ref 0–0.3)
BILIRUBIN DIRECT+TOT PNL SERPL-MCNC: 0.3 MG/DL
BUN SERPL-MCNC: 37 MG/DL (ref 10–20)
CALCIUM SERPL-MCNC: 8.6 MG/DL (ref 8–10.5)
CHLORIDE SERPL-SCNC: 108 MMOL/L (ref 100–108)
CO2 SERPL-SCNC: 20 MMOL/L (ref 21–35)
COLOR UR: ABNORMAL
CREAT SERPL-MCNC: 2.51 MG/DL (ref 0.7–1.3)
CREAT UR-MCNC: 84.1 MG/DL
ERYTHROCYTE [DISTWIDTH] IN BLOOD BY AUTOMATED COUNT: 14.9 % (ref 11.5–17)
GLOBULIN SER-MCNC: 2.6 GM/DL
GLUCOSE (UA): NEGATIVE
GLUCOSE SERPL-MCNC: 87 MG/DL (ref 75–116)
HCT VFR BLD AUTO: 32.6 % (ref 36–48)
HGB BLD-MCNC: 10 GM/DL (ref 12–16)
HGB UR QL STRIP: NEGATIVE
KETONES UR QL STRIP: NEGATIVE
LEUKOCYTE ESTERASE UR QL STRIP: NEGATIVE
MCH RBC QN AUTO: 27 PG (ref 27–34)
MCHC RBC AUTO-ENTMCNC: 31 GM/DL (ref 31–36)
MCV RBC AUTO: 88 FL (ref 80–99)
NITRITE UR QL STRIP: NEGATIVE
PH UR STRIP: 5.5 [PH]
PHOSPHATE SERPL-MCNC: 4.4 MG/DL (ref 2.6–4.7)
PLATELET # BLD AUTO: 184 X10(3)/MCL (ref 140–400)
PMV BLD AUTO: 9.6 FL (ref 6.8–10)
POTASSIUM SERPL-SCNC: 4.2 MMOL/L (ref 3.6–5.2)
PROT SERPL-MCNC: 6.2 GM/DL (ref 6.4–8.2)
PROT UR QL STRIP: 30 MG/DL
PROT UR STRIP-MCNC: 76 MG/DL (ref 0–10)
RBC # BLD AUTO: 3.7 X10(6)/MCL (ref 4.2–5.4)
RBC #/AREA URNS HPF: NORMAL /[HPF]
SODIUM SERPL-SCNC: 141 MMOL/L (ref 135–145)
SP GR UR STRIP: 1.02
SQUAMOUS EPITHELIAL, UA: NORMAL
URATE SERPL-MCNC: 9.3 MG/DL (ref 2.6–7.2)
UROBILINOGEN UR STRIP-ACNC: 0.2 EU/DL
WBC # SPEC AUTO: 3.2 X10(3)/MCL (ref 4.5–11.5)
WBC #/AREA URNS HPF: NORMAL /HPF

## 2019-10-30 ENCOUNTER — HISTORICAL (OUTPATIENT)
Dept: INFUSION THERAPY | Facility: HOSPITAL | Age: 69
End: 2019-10-30

## 2019-11-21 ENCOUNTER — HISTORICAL (OUTPATIENT)
Dept: INFUSION THERAPY | Facility: HOSPITAL | Age: 69
End: 2019-11-21

## 2019-11-21 LAB
HCT VFR BLD AUTO: 29.7 % (ref 36–48)
HGB BLD-MCNC: 9.3 GM/DL (ref 12–16)

## 2019-12-13 ENCOUNTER — HISTORICAL (OUTPATIENT)
Dept: INFUSION THERAPY | Facility: HOSPITAL | Age: 69
End: 2019-12-13

## 2019-12-13 LAB
HCT VFR BLD AUTO: 29.6 % (ref 36–48)
HGB BLD-MCNC: 9.3 GM/DL (ref 12–16)

## 2020-01-02 ENCOUNTER — HISTORICAL (OUTPATIENT)
Dept: INFUSION THERAPY | Facility: HOSPITAL | Age: 70
End: 2020-01-02

## 2020-01-02 DIAGNOSIS — Z94.0 STATUS POST KIDNEY TRANSPLANT: ICD-10-CM

## 2020-01-02 LAB
ABS NEUT (OLG): 3.8 X10(3)/MCL (ref 1.5–6.9)
ALBUMIN SERPL-MCNC: 3.6 GM/DL (ref 3.4–5)
ALBUMIN/GLOB SERPL: 1.6 RATIO
ALP SERPL-CCNC: 76 UNIT/L (ref 30–113)
ALT SERPL-CCNC: 38 UNIT/L (ref 10–45)
AST SERPL-CCNC: 27 UNIT/L (ref 15–37)
BASOPHILS # BLD AUTO: 0 X10(3)/MCL (ref 0–0.1)
BASOPHILS NFR BLD AUTO: 1 % (ref 0–1)
BILIRUB SERPL-MCNC: 0.3 MG/DL (ref 0.1–0.9)
BILIRUBIN DIRECT+TOT PNL SERPL-MCNC: 0.1 MG/DL (ref 0–0.3)
BILIRUBIN DIRECT+TOT PNL SERPL-MCNC: 0.2 MG/DL
BUN SERPL-MCNC: 48 MG/DL (ref 10–20)
CALCIUM SERPL-MCNC: 8.2 MG/DL (ref 8–10.5)
CHLORIDE SERPL-SCNC: 109 MMOL/L (ref 100–108)
CO2 SERPL-SCNC: 23 MMOL/L (ref 21–35)
CREAT SERPL-MCNC: 3.18 MG/DL (ref 0.7–1.3)
EOSINOPHIL # BLD AUTO: 0.2 X10(3)/MCL (ref 0–0.6)
EOSINOPHIL NFR BLD AUTO: 4 % (ref 0–5)
ERYTHROCYTE [DISTWIDTH] IN BLOOD BY AUTOMATED COUNT: 15.3 % (ref 11.5–17)
GLOBULIN SER-MCNC: 2.3 GM/DL
GLUCOSE SERPL-MCNC: 101 MG/DL (ref 75–116)
HCT VFR BLD AUTO: 31.7 % (ref 36–48)
HGB BLD-MCNC: 9.7 GM/DL (ref 12–16)
IMM GRANULOCYTES # BLD AUTO: 0.03 10*3/UL (ref 0–0.02)
IMM GRANULOCYTES NFR BLD AUTO: 0.6 % (ref 0–0.43)
LYMPHOCYTES # BLD AUTO: 0.9 X10(3)/MCL (ref 0.5–4.1)
LYMPHOCYTES NFR BLD AUTO: 17 % (ref 15–40)
MCH RBC QN AUTO: 27 PG (ref 27–34)
MCHC RBC AUTO-ENTMCNC: 31 GM/DL (ref 31–36)
MCV RBC AUTO: 88 FL (ref 80–99)
MONOCYTES # BLD AUTO: 0.4 X10(3)/MCL (ref 0–1.1)
MONOCYTES NFR BLD AUTO: 8 % (ref 4–12)
NEUTROPHILS # BLD AUTO: 3.8 X10(3)/MCL (ref 1.5–6.9)
NEUTROPHILS NFR BLD AUTO: 70 % (ref 43–75)
PLATELET # BLD AUTO: 182 X10(3)/MCL (ref 140–400)
PMV BLD AUTO: 9.1 FL (ref 6.8–10)
POTASSIUM SERPL-SCNC: 4.5 MMOL/L (ref 3.6–5.2)
PROT SERPL-MCNC: 5.9 GM/DL (ref 6.4–8.2)
RBC # BLD AUTO: 3.61 X10(6)/MCL (ref 4.2–5.4)
SODIUM SERPL-SCNC: 143 MMOL/L (ref 135–145)
WBC # SPEC AUTO: 5.4 X10(3)/MCL (ref 4.5–11.5)

## 2020-01-02 RX ORDER — AMLODIPINE BESYLATE 10 MG/1
TABLET ORAL
Qty: 30 TABLET | Refills: 10 | Status: SHIPPED | OUTPATIENT
Start: 2020-01-02 | End: 2022-05-13

## 2020-01-23 ENCOUNTER — TELEPHONE (OUTPATIENT)
Dept: TRANSPLANT | Facility: CLINIC | Age: 70
End: 2020-01-23

## 2020-01-23 NOTE — TELEPHONE ENCOUNTER
----- Message from Nicole Trejo MD sent at 1/23/2020  5:20 PM CST -----  Results reviewed.  I suspect decline in renal function is related to age of kidney.  However, I would like to get transplant ultrasound since 1 has not been checked in years to look for reversible causes of renal injury.  Remind her to follow-up with her PCP for CKD care.    Tacrolimus levels continue to fluctuate widely.   I see her dose was decreased from 04/04 2 4/3 months ago, but this level seems unusually low.  If there is any question on accuracy, ask her to repeat the tacrolimus vol in the next week.  Otherwise, increase dose to 4/for and have a repeat level a week later.

## 2020-01-28 RX ORDER — LOSARTAN POTASSIUM 100 MG/1
TABLET ORAL
Qty: 90 TABLET | Refills: 3 | Status: SHIPPED | OUTPATIENT
Start: 2020-01-28 | End: 2022-05-13

## 2020-02-13 ENCOUNTER — HISTORICAL (OUTPATIENT)
Dept: INFUSION THERAPY | Facility: HOSPITAL | Age: 70
End: 2020-02-13

## 2020-02-13 LAB
HCT VFR BLD AUTO: 31.7 % (ref 36–48)
HGB BLD-MCNC: 10 GM/DL (ref 12–16)

## 2020-02-18 ENCOUNTER — HISTORICAL (OUTPATIENT)
Dept: LAB | Facility: HOSPITAL | Age: 70
End: 2020-02-18

## 2020-02-18 LAB
APPEARANCE, UA: ABNORMAL
BACTERIA SPEC CULT: ABNORMAL /HPF
BILIRUB UR QL STRIP: ABNORMAL
COLOR UR: ABNORMAL
GLUCOSE (UA): NEGATIVE
HGB UR QL STRIP: ABNORMAL
KETONES UR QL STRIP: 15 MG/DL
LEUKOCYTE ESTERASE UR QL STRIP: ABNORMAL
NITRITE UR QL STRIP: POSITIVE
PH UR STRIP: 5.5 [PH]
PROT UR QL STRIP: >=300 MG/DL
RBC #/AREA URNS HPF: ABNORMAL /HPF
SP GR UR STRIP: 1.01
SQUAMOUS EPITHELIAL, UA: ABNORMAL
UROBILINOGEN UR STRIP-ACNC: 1 EU/DL
WBC #/AREA URNS HPF: ABNORMAL /HPF

## 2020-02-24 DIAGNOSIS — Z94.0 LIVING-DONOR KIDNEY TRANSPLANT RECIPIENT: Chronic | ICD-10-CM

## 2020-02-24 DIAGNOSIS — D84.9 IMMUNOSUPPRESSION: ICD-10-CM

## 2020-02-24 RX ORDER — PREDNISONE 5 MG/1
TABLET ORAL
Qty: 30 TABLET | Refills: 11 | Status: ON HOLD | OUTPATIENT
Start: 2020-02-24 | End: 2022-05-13 | Stop reason: HOSPADM

## 2020-02-28 ENCOUNTER — HISTORICAL (OUTPATIENT)
Dept: INFUSION THERAPY | Facility: HOSPITAL | Age: 70
End: 2020-02-28

## 2020-02-28 LAB
HCT VFR BLD AUTO: 29.7 % (ref 36–48)
HGB BLD-MCNC: 8.9 GM/DL (ref 12–16)

## 2020-03-03 ENCOUNTER — HISTORICAL (OUTPATIENT)
Dept: INFUSION THERAPY | Facility: HOSPITAL | Age: 70
End: 2020-03-03

## 2020-03-03 LAB
HCT VFR BLD AUTO: 29.5 % (ref 36–48)
HGB BLD-MCNC: 9.1 GM/DL (ref 12–16)

## 2020-03-08 ENCOUNTER — HISTORICAL (OUTPATIENT)
Dept: ADMINISTRATIVE | Facility: HOSPITAL | Age: 70
End: 2020-03-08

## 2020-03-15 ENCOUNTER — HISTORICAL (OUTPATIENT)
Dept: ADMINISTRATIVE | Facility: HOSPITAL | Age: 70
End: 2020-03-15

## 2020-03-18 ENCOUNTER — HISTORICAL (OUTPATIENT)
Dept: ADMINISTRATIVE | Facility: HOSPITAL | Age: 70
End: 2020-03-18

## 2020-03-19 ENCOUNTER — HISTORICAL (OUTPATIENT)
Dept: INFUSION THERAPY | Facility: HOSPITAL | Age: 70
End: 2020-03-19

## 2020-03-19 ENCOUNTER — TELEPHONE (OUTPATIENT)
Dept: TRANSPLANT | Facility: CLINIC | Age: 70
End: 2020-03-19

## 2020-03-19 LAB
HCT VFR BLD AUTO: 24.3 % (ref 36–48)
HGB BLD-MCNC: 7.4 GM/DL (ref 12–16)

## 2020-03-26 ENCOUNTER — HISTORICAL (OUTPATIENT)
Dept: INFUSION THERAPY | Facility: HOSPITAL | Age: 70
End: 2020-03-26

## 2020-03-26 LAB
FERRITIN SERPL-MCNC: 252.77 NG/ML (ref 4.63–204)
HCT VFR BLD AUTO: 28.5 % (ref 37–47)
HGB BLD-MCNC: 8.4 GM/DL (ref 12–16)
IRON SATN MFR SERPL: 14 %
IRON SERPL-MCNC: 28 UG/DL (ref 50–170)
TIBC SERPL-MCNC: 172 UG/DL (ref 70–310)
TIBC SERPL-MCNC: 200 UG/DL
TRANSFERRIN SERPL-MCNC: 169 MG/DL (ref 173–360)

## 2020-04-02 ENCOUNTER — HISTORICAL (OUTPATIENT)
Dept: INFUSION THERAPY | Facility: HOSPITAL | Age: 70
End: 2020-04-02

## 2020-04-02 LAB
HCT VFR BLD AUTO: 31.7 % (ref 36–48)
HGB BLD-MCNC: 9.4 GM/DL (ref 12–16)

## 2020-04-16 ENCOUNTER — HISTORICAL (OUTPATIENT)
Dept: INFUSION THERAPY | Facility: HOSPITAL | Age: 70
End: 2020-04-16

## 2020-04-16 LAB
HCT VFR BLD AUTO: 32.9 % (ref 36–48)
HGB BLD-MCNC: 9.8 GM/DL (ref 12–16)

## 2020-05-22 ENCOUNTER — TELEPHONE (OUTPATIENT)
Dept: TRANSPLANT | Facility: CLINIC | Age: 70
End: 2020-05-22

## 2020-05-22 NOTE — TELEPHONE ENCOUNTER
Returned call to John at number below, [facility] dialysis, was instructed to call [phone].  Pt has returned to hemodialysis, first HD in hospital, 4/28/20, first In-center HD 5/5/20.  Will notify transplant team.    ----- Message from Radha Choudhary sent at 5/22/2020 10:45 AM CDT -----  Contact: [facility]  John is calling stating that he needed to report about a functioning kidney      John ph [phone]

## 2020-06-02 LAB
ABS NEUT (OLG): 2.59 X10(3)/MCL (ref 2.1–9.2)
BASOPHILS # BLD AUTO: 0.1 X10(3)/MCL (ref 0–0.2)
BASOPHILS NFR BLD AUTO: 1 %
BUN SERPL-MCNC: 47.5 MG/DL (ref 9.8–20.1)
CALCIUM SERPL-MCNC: 9.2 MG/DL (ref 8.4–10.2)
CHLORIDE SERPL-SCNC: 100 MMOL/L (ref 98–107)
CO2 SERPL-SCNC: 30 MMOL/L (ref 23–31)
CREAT SERPL-MCNC: 4.54 MG/DL (ref 0.55–1.02)
CREAT/UREA NIT SERPL: 10
EOSINOPHIL # BLD AUTO: 0.2 X10(3)/MCL (ref 0–0.9)
EOSINOPHIL NFR BLD AUTO: 6 %
ERYTHROCYTE [DISTWIDTH] IN BLOOD BY AUTOMATED COUNT: 15.3 % (ref 11.5–17)
GLUCOSE SERPL-MCNC: 106 MG/DL (ref 82–115)
HCT VFR BLD AUTO: 33.9 % (ref 37–47)
HGB BLD-MCNC: 10.2 GM/DL (ref 12–16)
LYMPHOCYTES # BLD AUTO: 0.8 X10(3)/MCL (ref 0.6–4.6)
LYMPHOCYTES NFR BLD AUTO: 19 %
MCH RBC QN AUTO: 26.2 PG (ref 27–31)
MCHC RBC AUTO-ENTMCNC: 30.1 GM/DL (ref 33–36)
MCV RBC AUTO: 87.1 FL (ref 80–94)
MONOCYTES # BLD AUTO: 0.5 X10(3)/MCL (ref 0.1–1.3)
MONOCYTES NFR BLD AUTO: 12 %
NEUTROPHILS # BLD AUTO: 2.59 X10(3)/MCL (ref 2.1–9.2)
NEUTROPHILS NFR BLD AUTO: 61 %
PLATELET # BLD AUTO: 196 X10(3)/MCL (ref 130–400)
PMV BLD AUTO: 10.3 FL (ref 9.4–12.4)
POTASSIUM SERPL-SCNC: 3.9 MMOL/L (ref 3.5–5.1)
RBC # BLD AUTO: 3.89 X10(6)/MCL (ref 4.2–5.4)
SODIUM SERPL-SCNC: 144 MMOL/L (ref 136–145)
WBC # SPEC AUTO: 4.2 X10(3)/MCL (ref 4.5–11.5)

## 2020-06-05 ENCOUNTER — HISTORICAL (OUTPATIENT)
Dept: SURGERY | Facility: HOSPITAL | Age: 70
End: 2020-06-05

## 2020-06-05 LAB
ANION GAP SERPL CALC-SCNC: 14 MMOL/L
BUN SERPL-MCNC: 21 MG/DL (ref 8–26)
CHLORIDE SERPL-SCNC: 100 MMOL/L (ref 98–109)
CREAT SERPL-MCNC: 3 MG/DL (ref 0.6–1.3)
GLUCOSE SERPL-MCNC: 91 MG/DL (ref 70–105)
HCT VFR BLD CALC: 32 % (ref 38–51)
HGB BLD-MCNC: 10.9 MG/DL (ref 12–17)
POC IONIZED CALCIUM: 1.15 MMOL/L (ref 1.12–1.32)
POC TCO2: 30 MMOL/L (ref 24–29)
POTASSIUM BLD-SCNC: 3.4 MMOL/L (ref 3.5–4.9)
SODIUM BLD-SCNC: 140 MMOL/L (ref 138–146)

## 2020-06-26 ENCOUNTER — HISTORICAL (OUTPATIENT)
Dept: PREADMISSION TESTING | Facility: HOSPITAL | Age: 70
End: 2020-06-26

## 2020-07-01 ENCOUNTER — HISTORICAL (OUTPATIENT)
Dept: ADMINISTRATIVE | Facility: HOSPITAL | Age: 70
End: 2020-07-01

## 2020-07-10 ENCOUNTER — TELEPHONE (OUTPATIENT)
Dept: TRANSPLANT | Facility: CLINIC | Age: 70
End: 2020-07-10

## 2020-07-10 NOTE — TELEPHONE ENCOUNTER
Returned call, instructed to call pt's primary nephrologist for prograf prescription, he is tapering immuno's, pt has returned to dialysis.  ----- Message from Destiny Jim sent at 7/10/2020  9:41 AM CDT -----  Contact: [facility]  Please call [facility] at [phone]    Need clarification for  tacrolimus (PROGRAF) 1 MG Cap     Use [facility] - [place], [place] - [address]    Thank you

## 2021-03-10 ENCOUNTER — TELEPHONE (OUTPATIENT)
Dept: TRANSPLANT | Facility: CLINIC | Age: 71
End: 2021-03-10

## 2021-03-18 ENCOUNTER — TELEPHONE (OUTPATIENT)
Dept: TRANSPLANT | Facility: CLINIC | Age: 71
End: 2021-03-18

## 2021-04-30 ENCOUNTER — HOSPITAL ENCOUNTER (OUTPATIENT)
Dept: MEDSURG UNIT | Facility: HOSPITAL | Age: 71
End: 2021-05-01
Attending: INTERNAL MEDICINE | Admitting: INTERNAL MEDICINE

## 2021-05-01 LAB
ABS NEUT (OLG): 2.77 X10(3)/MCL (ref 2.1–9.2)
ALBUMIN SERPL-MCNC: 2.8 GM/DL (ref 3.4–4.8)
ALBUMIN/GLOB SERPL: 0.7 RATIO (ref 1.1–2)
ALP SERPL-CCNC: 93 UNIT/L (ref 40–150)
ALT SERPL-CCNC: 15 UNIT/L (ref 0–55)
AST SERPL-CCNC: 20 UNIT/L (ref 5–34)
BASOPHILS # BLD AUTO: 0.04 X10(3)/MCL (ref 0–0.2)
BASOPHILS NFR BLD AUTO: 0.8 % (ref 0–1)
BILIRUB SERPL-MCNC: 0.3 MG/DL (ref 0.2–1.2)
BILIRUBIN DIRECT+TOT PNL SERPL-MCNC: 0.1 MG/DL (ref 0–0.8)
BILIRUBIN DIRECT+TOT PNL SERPL-MCNC: 0.2 MG/DL (ref 0–0.5)
BUN SERPL-MCNC: 47.2 MG/DL (ref 9.8–20.1)
CALCIUM SERPL-MCNC: 9.4 MG/DL (ref 8.4–10.2)
CHLORIDE SERPL-SCNC: 98 MMOL/L (ref 98–107)
CK MB SERPL-MCNC: 1.7 NG/ML
CK SERPL-CCNC: 34 U/L (ref 29–168)
CO2 SERPL-SCNC: 25 MMOL/L (ref 23–31)
CREAT SERPL-MCNC: 8.43 MG/DL (ref 0.57–1.11)
EOSINOPHIL # BLD AUTO: 0.19 X10(3)/MCL (ref 0–0.9)
EOSINOPHIL NFR BLD AUTO: 3.8 % (ref 0–6.4)
ERYTHROCYTE [DISTWIDTH] IN BLOOD BY AUTOMATED COUNT: 16.7 % (ref 11.5–17)
GLOBULIN SER-MCNC: 4.3 GM/DL (ref 2.4–3.5)
GLUCOSE SERPL-MCNC: 119 MG/DL (ref 82–115)
HCT VFR BLD AUTO: 42.1 % (ref 37–47)
HGB BLD-MCNC: 13 GM/DL (ref 12–16)
IMM GRANULOCYTES # BLD AUTO: 0.01 10*3/UL (ref 0–0.02)
IMM GRANULOCYTES NFR BLD AUTO: 0.2 % (ref 0–0.43)
LYMPHOCYTES # BLD AUTO: 1.37 X10(3)/MCL (ref 0.6–4.6)
LYMPHOCYTES NFR BLD AUTO: 27.6 % (ref 16–44)
MAGNESIUM SERPL-MCNC: 2.41 MG/DL (ref 1.6–2.6)
MCH RBC QN AUTO: 27 PG (ref 27–31)
MCHC RBC AUTO-ENTMCNC: 30.9 GM/DL (ref 33–36)
MCV RBC AUTO: 87.5 FL (ref 80–94)
MONOCYTES # BLD AUTO: 0.58 X10(3)/MCL (ref 0.1–1.3)
MONOCYTES NFR BLD AUTO: 11.7 % (ref 4–12.1)
NEUTROPHILS # BLD AUTO: 2.77 X10(3)/MCL (ref 2.1–9.2)
NEUTROPHILS NFR BLD AUTO: 55.9 % (ref 43–73)
NRBC BLD AUTO-RTO: 0 % (ref 0–0.2)
PHOSPHATE SERPL-MCNC: 6.7 MG/DL (ref 2.3–4.7)
PLATELET # BLD AUTO: 267 X10(3)/MCL (ref 130–400)
PMV BLD AUTO: 8.6 FL (ref 7.4–10.4)
POTASSIUM SERPL-SCNC: 4.2 MMOL/L (ref 3.5–5.1)
PROT SERPL-MCNC: 7.1 GM/DL (ref 5.8–7.6)
RBC # BLD AUTO: 4.81 X10(6)/MCL (ref 4.2–5.4)
SARS-COV-2 AG RESP QL IA.RAPID: NEGATIVE
SODIUM SERPL-SCNC: 139 MMOL/L (ref 136–145)
TROPONIN I SERPL-MCNC: 0.02 NG/ML (ref 0–0.04)
TROPONIN I SERPL-MCNC: 0.29 NG/ML (ref 0.01–0.03)
WBC # SPEC AUTO: 5 X10(3)/MCL (ref 4.5–11.5)

## 2021-05-12 ENCOUNTER — PATIENT MESSAGE (OUTPATIENT)
Dept: RESEARCH | Facility: HOSPITAL | Age: 71
End: 2021-05-12

## 2021-07-08 ENCOUNTER — HISTORICAL (OUTPATIENT)
Dept: ADMINISTRATIVE | Facility: HOSPITAL | Age: 71
End: 2021-07-08

## 2021-07-13 ENCOUNTER — HISTORICAL (OUTPATIENT)
Dept: ENDOSCOPY | Facility: HOSPITAL | Age: 71
End: 2021-07-13

## 2021-07-13 LAB
ABS NEUT (OLG): 2.11 X10(3)/MCL (ref 2.1–9.2)
ALBUMIN SERPL-MCNC: 2.7 GM/DL (ref 3.4–4.8)
ALBUMIN/GLOB SERPL: 0.8 RATIO (ref 1.1–2)
ALP SERPL-CCNC: 67 UNIT/L (ref 40–150)
ALT SERPL-CCNC: 10 UNIT/L (ref 0–55)
AST SERPL-CCNC: 15 UNIT/L (ref 5–34)
BASOPHILS # BLD AUTO: 0 X10(3)/MCL (ref 0–0.2)
BASOPHILS NFR BLD AUTO: 1 %
BILIRUB SERPL-MCNC: 0.3 MG/DL
BILIRUBIN DIRECT+TOT PNL SERPL-MCNC: 0.1 MG/DL (ref 0–0.5)
BILIRUBIN DIRECT+TOT PNL SERPL-MCNC: 0.2 MG/DL (ref 0–0.8)
BUN SERPL-MCNC: 42.7 MG/DL (ref 9.8–20.1)
CALCIUM SERPL-MCNC: 8.7 MG/DL (ref 8.4–10.2)
CHLORIDE SERPL-SCNC: 92 MMOL/L (ref 98–107)
CO2 SERPL-SCNC: 28 MMOL/L (ref 23–31)
CREAT SERPL-MCNC: 9.76 MG/DL (ref 0.55–1.02)
EOSINOPHIL # BLD AUTO: 0.1 X10(3)/MCL (ref 0–0.9)
EOSINOPHIL NFR BLD AUTO: 2 %
ERYTHROCYTE [DISTWIDTH] IN BLOOD BY AUTOMATED COUNT: 16.4 % (ref 11.5–17)
GLOBULIN SER-MCNC: 3.4 GM/DL (ref 2.4–3.5)
GLUCOSE SERPL-MCNC: 86 MG/DL (ref 82–115)
HCT VFR BLD AUTO: 28.9 % (ref 37–47)
HGB BLD-MCNC: 9.2 GM/DL (ref 12–16)
LYMPHOCYTES # BLD AUTO: 1 X10(3)/MCL (ref 0.6–4.6)
LYMPHOCYTES NFR BLD AUTO: 26 %
MCH RBC QN AUTO: 29.8 PG (ref 27–31)
MCHC RBC AUTO-ENTMCNC: 31.8 GM/DL (ref 33–36)
MCV RBC AUTO: 93.5 FL (ref 80–94)
MONOCYTES # BLD AUTO: 0.4 X10(3)/MCL (ref 0.1–1.3)
MONOCYTES NFR BLD AUTO: 12 %
NEUTROPHILS # BLD AUTO: 2.11 X10(3)/MCL (ref 2.1–9.2)
NEUTROPHILS NFR BLD AUTO: 59 %
PLATELET # BLD AUTO: 228 X10(3)/MCL (ref 130–400)
PMV BLD AUTO: 9.7 FL (ref 9.4–12.4)
POTASSIUM SERPL-SCNC: 4.8 MMOL/L (ref 3.5–5.1)
PROT SERPL-MCNC: 6.1 GM/DL (ref 5.8–7.6)
RBC # BLD AUTO: 3.09 X10(6)/MCL (ref 4.2–5.4)
SODIUM SERPL-SCNC: 139 MMOL/L (ref 136–145)
WBC # SPEC AUTO: 3.6 X10(3)/MCL (ref 4.5–11.5)

## 2022-04-30 NOTE — OP NOTE
Patient:   Malissa Schwartz             MRN: [id number]            FIN: [id number]               Age:   70 years     Sex:  Female     :  1950   Associated Diagnoses:   CKD - chronic kidney disease   Author:   Aleksandar Patel MD      Operative Note   Operative Information   Date/ Time:  2020 15:03:00.     Procedures Performed: Procedure Code   10448 - LAPAROSCOPY INSERT TUNNELED INTRAPERITONEAL CATH (None) on 2020 at 70 Years.  Comments:  2020 11:17 LINDAT - Dylan FOLEY, Kalli Adams  auto-populated from documented surgical case.     Indications: on HD, needs peritoneal dialysis .     Preoperative Diagnosis: CKD - chronic kidney disease (FFF02-XR N18.9).     Postoperative Diagnosis: CKD - chronic kidney disease (IGD82-NP N18.9).     Surgeon: Aleksandar Patel MD.     Anesthesia: geta.     Description of Procedure/Findings/    Complications: laid supine on OR table    general endotracheal anesthesia administered    abdomen prepped and draped in usual sterile fashion    an 5mm visiport was inserted in the left upper quadrant with visiport technique    pneumoperitoneum achieved    an 11mm trocar was placed 4cm right lateral and superior to umbilicus at a 45degree angle toward the pelvis    a 5mm trocar was placed in the left flank    there was no injury to any abdominal organs or vessels    the peritoneal dialysis catheter was pushed thought the paraumbilical port and the coiled tip was placed in the pelvis    it was slushed with saline    the paraumbilical port was then withdrawn while the catheter was left in place, the distal tissue cuff was positioned within the rectus abdominus muscle    the distal tissue cuff was tunnelled through the subcutaneus tissue to the left of the proximal cuff and the distal catheter tip was pulled through a 7mm incision in the skin about 3cm lateral and left of the distall cuff    the catheter was flushed with heperanized saline    the remaining ports were  removed under direct vision     the incsions were closed with 4-0 vicryl and dermabond skin glue    the catheter was covered with sterile dressing     patient taken to pacu.     Esimated blood loss: loss  2  cc.     Findings: minimal adhesions, rlq transplanted kidney seen.     Complications: None.

## 2022-04-30 NOTE — OP NOTE
Patient:   Malissa Schwartz             MRN: [id number]            FIN: [id number]               Age:   67 years     Sex:  Female     :  1950   Associated Diagnoses:   None   Author:   Angie Honeycutt MD      Operative Note   Operative Information   Date/ Time:  2017 07:45:00.     Procedures Performed: Wide local excision of the right perineum.     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     Postoperative Diagnosis: Same.     Surgeon: Angie Honeycutt MD.     Anesthesia: LMA with local anesthesia.     Speciman Removed: Right perineal lesion.     Description of Procedure/Findings/    Complications: Patient was taken to the operating room where LMA anesthesia was obtained without difficulty she was prepped and draped in the usual sterile fashion in the dorsolithotomy position.  At this time the abnormal lesion was located approximately 3 cm from the anus on the right side.  This area was demarcated with a 2 cm margin around the abnormal lesion.  The area was then infiltrated with 1% lidocaine with epi.  The demarcated area was then excised circumferentially with a scalpel and then deeper margins were taken below the lesion and this was handed off the table.  Another deeper margin was taken at the site of the abnormal lesion.  The bed was then cauterized with the Bovie to control the majority of the bleeding.  2-0 Vicryl was then used to reapproximate the skin edges using figure-of-eight sutures that were placed to close the dead space.  The subcutaneous tissues qwas closed with 3-0 Dexon.  Mattress sutures were then placed to reinforce the perineal incision.  Dermabond was then placed.  A rectal exam was performed and there was normal sphincter noted without any sutures in this area.  All counts were correct.  The patient was awakened and taken to recovery in stable condition.  .     Findings: Approximately a 2 cm lesion with previous biopsy site located on the right  perineum.     Complications: None.

## 2022-04-30 NOTE — ED PROVIDER NOTES
Patient:   Malissa Schwartz            MRN: [id number]            FIN: [id number]               Age:   71 years     Sex:  Female     :  1950   Associated Diagnoses:   Non-ST elevation MI (NSTEMI); Essential hypertension   Author:   Paula Felix MD      Basic Information   History source: Patient.   Arrival mode: Private vehicle.   History limitation: None.   Additional information: Chief Complaint from Nursing Triage Note : Chief Complaint   2021 23:36 CDT      Chief Complaint            B/P 178/101 heart rate 103, Does dialysis at home    2021 18:02 CDT      Chief Complaint           Headache. reports high blood pressure today. HX of peritoneal dialysis at home.  .      History of Present Illness   The patient presents with high blood pressure and Ms. Schwartz is a 71-year-old female with a history of end-stage renal disease on peritoneal dialysis which she does daily.  She is having no problems with her dialysis, last blood work was 6 weeks ago.  She also has a history of hypertension and multiple medications and states that since yesterday her blood pressures been running high.  This morning her blood pressure was 190/102.  She's been feeling overall bad, mild headache that's been coming and going since yesterday, flushed in her face.  On review of her medications, we came to the realization that she did not put her new, clonidine patch on yesterday and has been without her clonidine patch since yesterday which is when her symptoms and elevated blood pressure started.  She had called Dr. Mendoza today who recommended that she increase her amlodipine, but Dr. Mendoza did not realize that she was supposed to have on a clonidine patch and she forgot to put it back on.  She has no chest pain or shortness of breath..  The onset was 2  days ago.  The course/duration of symptoms is fluctuating in intensity.  The current degree of hypertension systolic blood pressure: 178 mmHg diastolic blood  pressure: 90 mmHg.  The maximum degree of hypertension is systolic blood pressure: 190 mmHg, diastolic blood pressure: 102 mmHg recorded: at home.  Risk factors consist of hypertension and Renal failure.  Prior episodes: Went to [facility] ER earlier but left due to long wait times.  Therapy today: see nurses notes.  Associated symptoms: headache and malaise.        Review of Systems   Constitutional symptoms:  Fatigue.   Neurologic symptoms:  Headache.             Additional review of systems information: All other systems reviewed and otherwise negative.      Health Status   Allergies:    Allergic Reactions (Selected)  Severity Not Documented  Allergy to codeine- Heart rate increases.  Nonallergic Reactions (Selected)  Severity Not Documented  Cefepime- Encephalopathy.  Levaquin- Encephalopathy.,    Allergies (3) Active Reaction  Allergy to codeine heart rate increases  cefepime Encephalopathy  Levaquin Encephalopathy  .   Medications:  (Selected)   Inpatient Medications  Pending Complete  midazolam: 2 mg, form: Injection, IV Push, q5min, Order duration: 2 dose(s), first dose 20 7:10:00 CDT, stop date 20 7:19:00 CDT, (up to 5 mg for moderate anxiety)  Prescriptions  Prescribed  Nephrocaps oral capsule: 1 cap(s), Oral, Daily, # 30 cap(s), 0 Refill(s)  Phenergan 12.5 mg Suppository: 12.5 mg = 1 supp, WI, q6hr, PRN PRN for nausea/vomiting, # 12 supp, 0 Refill(s)  Zofran ODT 4 mg oral tablet, disintegratin mg = 1 tab(s), Oral, q4hr, PRN PRN nausea/vomiting, # 12 tab(s), 0 Refill(s)  calcium acetate 667 mg oral capsule: 667 mg = 1 cap(s), Oral, With Meals, # 90 cap(s), 5 Refill(s), Pharmacy: [facility], 163, cm, Height/Length Dosing, 21 16:32:00 CST, 64, kg, Weight Dosing, 21 16:32:00 CST  carvedilol 12.5 mg oral tablet: 6.25 mg = 0.5 tab(s), Oral, BID, # 30 tab(s), 5 Refill(s), Pharmacy: [facility], 163, cm, Height/Length  Dosing, 01/01/21 16:32:00 CST, 64, kg, Weight Dosing, 01/01/21 16:32:00 CST  ferrous sulfate 325 mg (65 mg elemental iron) oral tablet: 325 mg = 1 tab(s), Oral, Daily, # 30 tab(s), 5 Refill(s), Pharmacy: [facility], 163, cm, Height/Length Dosing, 01/01/21 16:32:00 CST, 64, kg, Weight Dosing, 01/01/21 16:32:00 CST  folic acid 1 mg oral tablet: 1 mg = 1 tab(s), Oral, Daily, # 30 tab(s), 5 Refill(s), Pharmacy: [facility], 163, cm, Height/Length Dosing, 01/01/21 16:32:00 CST, 64, kg, Weight Dosing, 01/01/21 16:32:00 CST  Documented Medications  Documented  DULoxetine 30 mg oral delayed release capsule: 30 mg = 1 cap(s), Oral, qPM  Epogen 10,000 units/mL injectable (ESRD on Dialysis): Subcutaneous, qMWF, 0 Refill(s)  Lasix: 40 mg, Oral, Daily, 0 Refill(s)  amLODIPine 5 mg oral tablet: 5 mg = 1 tab(s), Oral, Daily  calcitriol 0.25 mcg oral capsule: 0.25 mcg = 1 cap(s), Oral, Daily, 0 Refill(s)  calcitriol 0.5 mcg oral capsule: 0.5 mcg = 1 cap(s), Oral, Daily  carvedilol 6.25 mg oral tablet: 6.25 mg = 1 tab(s), Oral, BID  cefdinir 300 mg oral capsule: 300 mg = 1 cap(s), Oral, q12hr  cholecalciferol 5000 intl units (125 mcg) oral tablet: 5000 IntUnit = 1 tab(s), Oral, Daily  cloNIDine 0.3 mg/24 hr transdermal film, extended release: 1 patch(es), TOP, qWeek  furosemide 20 mg oral tablet: 20 mg = 1 tab(s), Oral, Daily  gabapentin 300 mg oral capsule: 300 mg = 1 cap(s), Oral, qPM  hydrALAZINE 25 mg oral tablet: 1 tab, Oral, BID, 0 Refill(s)  hydrALAZINE 50 mg oral tablet: 75 mg = 1.5 tab(s), Oral, BID  losartan 100 mg oral tablet: 100 mg = 1 tab(s), Oral, Daily, # 30 tab(s), 0 Refill(s)  promethazine 25 mg oral tablet: 25 mg = 1 tab(s), Oral, q6hr  sodium bicarbonate 650 mg oral tablet: 650 mg = 1 tab(s), Oral, BIDAC, # 60 tab(s), 0 Refill(s)  zolpidem 12.5 mg oral tablet, extended release: 12.5 mg = 1 tab(s), Oral, qPM.      Past Medical/ Family/  Social History   Medical history: Negative.   Surgical history:    76076 - LAPAROSCOPY INSERT TUNNELED INTRAPERITONEAL CATH (None) on 7/1/2020 at 70 Years.  Comments:  7/1/2020 11:17 CDT - Dylan FOLEY, Kalli Adams  auto-populated from documented surgical case  Arteriovenous Fistula (Left) on 6/5/2020 at 70 Years.  Comments:  6/5/2020 11:22 CDT - Catherine Rees  auto-populated from documented surgical case  Catheter Insertion Palindrome (.) on 4/28/2020 at 70 Years.  Comments:  4/28/2020 17:13 CDT - Daniel Lopez RN  auto-populated from documented surgical case  Peer Reviewed (.) on 4/28/2020 at 70 Years.  Comments:  4/28/2020 17:13 CDT - Daniel Lopez RN  auto-populated from documented surgical case  Excision Lesion (Right) on 9/5/2017 at 67 Years.  Comments:  9/5/2017 7:42 CDT - Jeri Garza RN  auto-populated from documented surgical case  breast bx.  hysterectomy.  breast augmentation.  colonoscopy.  kidney transplant., Reviewed as documented in chart.   Family history:    .   Social history: Tobacco use: Denies.   Problem list:    Active Problems (8)  Anemia   CKD (chronic kidney disease)   Dialysis service   HTN - Hypertension   Kidney disease   Kidney transplant   Malnutrition   Neck pain   , per nurse's notes.      Physical Examination               Vital Signs   Vital Signs   4/30/2021 23:36 CDT      Temperature Temporal Artery               36.6 DegC                             Peripheral Pulse Rate     109 bpm  HI                             Respiratory Rate          18 br/min                             SpO2                      94 %                             Oxygen Therapy            Room air                             Systolic Blood Pressure   157 mmHg  HI                             Diastolic Blood Pressure  93 mmHg  HI    4/30/2021 18:02 CDT      Temperature Oral          36.7 DegC                             Temperature Oral (calculated)             98.06 DegF                              Peripheral Pulse Rate     94 bpm                             Respiratory Rate          20 br/min                             SpO2                      98 %                             Oxygen Therapy            Room air                             Systolic Blood Pressure   178 mmHg  HI                             Diastolic Blood Pressure  90 mmHg  .      Vital Signs (last 24 hrs)_____  Last Charted___________  Heart Rate Peripheral   H 109bpm  (APR 30 23:36)  Resp Rate         18 br/min  (APR 30 23:36)  SBP      H 157mmHg  (APR 30 23:36)  DBP      H 93mmHg  (APR 30 23:36)  SpO2      94 %  (APR 30 23:36)  .   Oxygen saturation.   Measurements   4/30/2021 23:36 CDT      Weight Dosing             56.5 kg                             Weight Measured and Calculated in Lbs     124.56 lb                             Weight Estimated          56.5 kg                             Height/Length Dosing      165 cm                             Height/Length Estimated   165 cm                             Body Mass Index Estimated 20.75 kg/m2    4/30/2021 18:02 CDT      Weight Dosing             56.5 kg                             Weight Measured and Calculated in Lbs     124.56 lb                             Weight Estimated          56.5 kg                             Height/Length Dosing      165.10 cm                             Height/Length Estimated   165.10 cm                             Body Mass Index Estimated 20.73 kg/m2  .   General:  Alert.   Skin:  Warm, dry, pink, intact.    Eye:  Pupils are equal, round and reactive to light.   Neck:  Supple, no JVD.    Cardiovascular:  Regular rate and rhythm.   Respiratory:  Lungs are clear to auscultation.   Gastrointestinal:  Soft, Nontender.    Musculoskeletal:  Normal ROM, normal strength, no tenderness, no swelling, no deformity, No sign of DVT.    Neurological:  Alert and oriented to person, place, time, and situation.   Psychiatric:  Cooperative.       Medical Decision Making   Differential Diagnosis:  Uncontrolled hypertension, Coronary syndrome, demand ischemia, non-ST elevation MI, Hypertensive emergency.    Documents reviewed:  Emergency department nurses' notes.   Orders  Launch Orders   Pharmacy:  [facility] 0.1 mg oral tablet (Order): 0.1 mg, form: Tab, Oral, Once-NOW, first dose 4/30/2021 23:58 CDT, stop date 4/30/2021 23:58 CDT, STAT, PRN SBP>180 OR DBP >85, Launch Orders   Laboratory:  Phosphorus Level (Order): Stat collect, 4/30/2021 23:58 CDT, Blood, Lab Collect, Print Label By Order Location, 4/30/2021 23:58 CDT  Magnesium Level (Order): Stat collect, 4/30/2021 23:58 CDT, Blood, Lab Collect, 4/30/2021 23:58 CDT  Troponin-I (Order): Stat collect, 4/30/2021 23:58 CDT, Blood, Lab Collect, 4/30/2021 23:58 CDT  CMP (Order): Stat collect, 4/30/2021 23:58 CDT, Blood, Lab Collect, 4/30/2021 23:58 CDT  CBC w/ Auto Diff (Order): Now collect, 4/30/2021 23:58 CDT, Blood, Lab Collect, 4/30/2021 23:58 CDT  Cardiology:  EKG (Order): 4/30/2021 23:58 CDT, Stat, Once, 4/30/2021 23:58 CDT, Ambulatory, Standard Precautions, -1, -1, 4/30/2021 23:58 CDT.    Electrocardiogram:  Time 5/1/2021 00:10:00, rate 98, normal sinus rhythm, no ectopy, normal WA & QRS intervals, Nonspecific ST depressions seen in lead 2.    Electrocardiogram:  Time 5/1/2021 01:35:00, rate 91, No acute abnormality noted.    Results review:  Lab results : Lab View   5/1/2021 0:50 CDT        Est Creat Clearance Ser   5.42 mL/min    5/1/2021 0:05 CDT        Sodium Lvl                139 mmol/L                             Potassium Lvl             4.2 mmol/L                             Chloride                  98 mmol/L                             CO2                       25 mmol/L                             Calcium Lvl               9.4 mg/dL                             Magnesium Lvl             2.41 mg/dL                             Glucose Lvl               119 mg/dL  HI                              BUN                       47.2 mg/dL  HI                             Creatinine                8.43 mg/dL  HI                             eGFR-AA                   6  NA                             eGFR-NAIDA                  5 mL/min/1.73 m2  NA                             Bili Total                0.3 mg/dL                             Bili Direct               0.2 mg/dL                             Bili Indirect             0.10 mg/dL                             AST                       20 unit/L                             ALT                       15 unit/L                             Alk Phos                  93 unit/L                             Total Protein             7.1 gm/dL                             Albumin Lvl               2.8 gm/dL  LOW                             Globulin                  4.3 gm/dL  HI                             A/G Ratio                 0.7 ratio  LOW                             Phosphorus                6.7 mg/dL  HI                             Troponin-I                0.29 ng/mL  HI                             WBC                       5.0 x10(3)/mcL                             RBC                       4.81 x10(6)/mcL                             Hgb                       13.0 gm/dL                             Hct                       42.1 %                             Platelet                  267 x10(3)/mcL                             MCV                       87.5 fL                             MCH                       27.0 pg                             MCHC                      30.9 gm/dL  LOW                             RDW                       16.7 %                             MPV                       8.6 fL                             Abs Neut                  2.77 x10(3)/mcL                             Neutro Auto               55.9 %                             Lymph Auto                27.6 %                             Mono Auto                 11.7 %                              Eos Auto                  3.8 %                             Abs Eos                   0.19 x10(3)/mcL                             Basophil Auto             0.8 %                             Abs Neutro                2.77 x10(3)/mcL                             Abs Lymph                 1.37 x10(3)/mcL                             Abs Mono                  0.58 x10(3)/mcL                             Abs Baso                  0.04 x10(3)/mcL                             NRBC%                     0.0 %                             IG%                       0.200 %                             IG#                       0.0100  ,    No qualifying data available.       Reexamination/ Reevaluation   Vital signs   results included from flowsheet : Vital Signs   5/1/2021 3:08 CDT        Peripheral Pulse Rate     88 bpm                             Systolic Blood Pressure   152 mmHg  HI                             Diastolic Blood Pressure  81 mmHg     Basic Oxygen Information   4/30/2021 23:36 CDT      SpO2                      94 %                             Oxygen Therapy            Room air    4/30/2021 18:02 CDT      SpO2                      98 %                             Oxygen Therapy            Room air     Notes: Patient is pain free with no complaints at this time.  Note that the amlodipine dose is usually 5 mg daily and she was told to increase it to 10 when her blood pressure was running high.  However, Dr Mendoza did not realize that she had discontinued her Catapres patch..      Impression and Plan   Diagnosis   Non-ST elevation MI (NSTEMI) (QIP53-HA I21.4)   Essential hypertension ([id number] I10)      Calls-Consults   -  5/1/2021 01:17:00 , cis, Case discussed with Vadim nurse practitioner on-call for Dr. Hess.  He requests that the patient be admitted to the hospitalist service with consult to [facility].  feels the elevated troponin is most likely hypertensive emergency as patient has no chest pain  or SOB.    -  5/1/2021 01:43:00 , Case discussed with Marisabel nurse practitioner on-call for the hospitalist service and we will place this patient in observation to the hospitalist service with consult to [facility].    Plan   Condition: Stable.    Disposition: Admit time  5/1/2021 01:43:00, Place in Observation Unit.    Counseled: Patient, Regarding diagnosis, Regarding diagnostic results, Regarding treatment plan, Patient indicated understanding of instructions.

## 2022-04-30 NOTE — OP NOTE
Patient:   Malissa Schwartz             MRN: [id number]            FIN: [id number]               Age:   70 years     Sex:  Female     :  1950   Associated Diagnoses:   End stage renal disease   Author:   Kiara Valenzuela MD      Operative Note   Operative Information   Date/ Time:  2020 11:12:00.     Procedures Performed: Left radiocephalic fistula   .     Indications: Mrs. Schwartz is a 71 y/o woman with ESRD who needs long term dialysis access.    She presents today for creation of a left upper extremity arteriovenous fistula.  .     Preoperative Diagnosis: End stage renal disease (IYD99-AX N18.6).     Postoperative Diagnosis: End stage renal disease ([id number]-UW N18.6).     Surgeon: Kiara Valenzuela MD.     Assistant: Aurora Morel.     Speciman Removed: none   .     Esimated blood loss: loss less than  100  cc.     Description of Procedure/Findings/    Complications: A regional block was placed prior to arriving in the room.  The left arm was prepped in the usual sterile fashion.  Ancef was administered prior to the incision.  An appropriate time out was performed.  Ultrasound was utilized to identify the cephalic vein.   A longitudinal incision was made between the radial artery and the cephalic vein.   Dissection was performed through the superficial soft tissues and then followed the appropriate plane laterally and medially to identify the cephalic vein and radial artery.  Each of these vessels were isolated and side branches were ligated with 4-0 silk when appropriate.  The cephalic vein was ligated distally with a 3-0 silk. The radial artery had some mild disease but appeared appropriate for fistula creation.  A longitudinal incision was made on the radial artery.  The vein was spatulated appropriately.  Anastamosis was created with a 6-0 prolene suture. Hemostasis was obtained and the soft tissue was dissected to allow for appropriate lay of the vein graft.  3-0 vicryl was utilized to  close the subcutaneous structures and a 4-0 monocryl was utilized to close the skin. Dermabond dressing was used to dress the wound.  The fistula had a light thrill, good pulsatility with compression, and a radial pulse.  This completed the procedure.     Aurora Nelson expertly assisted throughout the case.  She assisted with vessel exposure and anastamosis.  She performed wound closure.      .     Findings:    ,    .     Complications: None.

## 2022-05-05 NOTE — HISTORICAL [facility] CERNER
This is a historical note converted from [place]. Formatting and pictures may have been removed.  Please reference [facility] for original formatting and attached multimedia. Admit and Discharge Dates  Admit Date: 04/30/2021  Discharge Date: 05/01/2021  Physicians  Attending Physician - Kyle NINO, Naga ASHRAF  Admitting Physician - Kyle NINO, Naga ASHRAF  Consulting Physician - Calvin NINO, Christian LYONS  Consulting Physician - Deshawn NINO, Jey HILLMAN  Primary Care Physician - Leyda NINO, Sergio  ?  Discharge Diagnosis:?  Hypertensive emergency  Type II MI, demand ischemia  End-stage renal disease on PD  Surgical Procedures  No procedures recorded for this visit.  Immunizations  No immunizations recorded for this visit.  Hospital Course  ?  ?  71-year-old female with past medical history of hypertension?and end-stage renal disease on peritoneal dialysis presented to?[facility] emergency room with uncontrolled hypertension and headache.? It was discovered at that time that she?was missing a couple of her blood pressure medications. ?She states that she has been?performing her PD at home without any difficulty. ?She is followed up?by renal as an outpatient.? In the emergency room she resumed on her home blood pressure medication regimen?with good response.? Blood work done in the emergency room did show that she had an elevated troponin although her EKG was normal. ?Cardiology was consulted from the ER and they recommended observing overnight and trending out her enzymes.? She was admitted to the hospitalist group. ?At this time she has no chest pain shortness of breath or any other symptoms. ?She is asking to go home. ?Neurology evaluated the patient.? Repeat enzymes negative. ?They recommend that she follow-up in the clinic in 2 weeks for a Lexiscan PET, calcium score.? Okay to discharge  Time Spent on discharge  Time to discharge 31 minutes  Objective  Vitals & Measurements  T:?36.6? ?C (Oral)? TMIN:?36.4? ?C (Oral)?  TMAX:?36.7? ?C (Oral)? HR:?75(Peripheral)? RR:?18? BP:?134/73? SpO2:?95%? WT:?57.5?kg? BMI:?21.12?  Physical Exam  General: No acute distress, Awake, Alert,Oriented x3  HEENT: PERRL, EOMI, no scleral icterus, OP clear  Respiratory:CTA bilateral, no wheezes, rales, or rhonchi  Cardiovascular:Regular rate and rhythm, no murmurs, rubs or gallops, +s1/s2,_  Gastrointestinal: soft,nontender, nondistended, +BS  Musculoskeletal: Normal range of motion, no pertinent deformity  Integumentary: clean, warm, dry, intact  Neurologic: CN 2 - 12 grossly intact, no acute deficit noted  Patient Discharge Condition  stable  Discharge Disposition  home   Discharge Medication Reconciliation  Continue  amLODIPine (amLODIPine 5 mg oral tablet)?5 mg, Oral, Daily  calcitriol (calcitriol 0.25 mcg oral capsule)?0.25 mcg, Oral, Daily  calcium acetate (calcium acetate 667 mg oral capsule)?667 mg, Oral, With Meals  carvedilol (carvedilol 12.5 mg oral tablet)?6.25 mg, Oral, BID  cholecalciferol (cholecalciferol 5000 intl units (125 mcg) oral tablet)?5000 IntUnit, Oral, Daily  cloNIDine (cloNIDine 0.3 mg/24 hr transdermal film, extended release)?1 patch(es), TOP, qWeek  epoetin jak (Epogen 10,000 units/mL injectable (ESRD on Dialysis)), Subcutaneous, qMWF  ferrous sulfate (ferrous sulfate 325 mg (65 mg elemental iron) oral tablet)?325 mg, Oral, Daily  folic acid (folic acid 1 mg oral tablet)?1 mg, Oral, Daily  furosemide (Lasix)?40 mg, Oral, Daily  furosemide (furosemide 20 mg oral tablet)?20 mg, Oral, Daily  hydrALAZINE (hydrALAZINE 25 mg oral tablet)?1 tab, Oral, BID  hydrALAZINE (hydrALAZINE 50 mg oral tablet)?75 mg, Oral, BID  losartan (losartan 100 mg oral tablet)?100 mg, Oral, Daily  multivitamin (Nephrocaps oral capsule)?1 cap(s), Oral, Daily  ondansetron (Zofran ODT 4 mg oral tablet, disintegrating)?4 mg, Oral, q4hr, PRN nausea/vomiting  promethazine (Phenergan 12.5 mg Suppository)?12.5 mg, TX (rectal), q6hr, PRN for  nausea/vomiting  sodium bicarbonate (sodium bicarbonate 650 mg oral tablet)?650 mg, Oral, BIDAC  zolpidem (zolpidem 12.5 mg oral tablet, extended release)?12.5 mg, Oral, qPM  Discontinue  DULoxetine (DULoxetine 30 mg oral delayed release capsule)?30 mg, Oral, qPM  calcitriol (calcitriol 0.5 mcg oral capsule)?0.5 mcg, Oral, Daily  carvedilol (carvedilol 6.25 mg oral tablet)?6.25 mg, Oral, BID  cefdinir (cefdinir 300 mg oral capsule)?300 mg, Oral, q12hr  gabapentin (gabapentin 300 mg oral capsule)?300 mg, Oral, qPM  promethazine (promethazine 25 mg oral tablet)?25 mg, Oral, q6hr  Education and Orders Provided  Discharge - 05/01/21 16:36:00 CDT, Home, Give all scheduled vaccinations prior to discharge.?  Discharge Activity - Activity as Tolerated?  Discharge Diet - Cardiac?  Follow up  Jey Hess, within 1 to 2 weeks  ????Nikolai CASTANO, records from PCP

## 2022-05-05 NOTE — HISTORICAL [facility] CERNER
This is a historical note converted from [employer]. Formatting and pictures may have been removed.  Please reference [employer] for original formatting and attached multimedia. Chief Complaint  B/P 178/101 heart rate 103, Does dialysis at home  History of Present Illness  CC:?Uncontrolled hypertension  ?  HPI:  71-year-old female with past medical history of hypertension?and end-stage renal disease on peritoneal dialysis presented to?[facility] emergency room with uncontrolled hypertension and headache.? It was discovered at that time that she?was missing a couple of her blood pressure medications. ?She states that she has been?performing her PD at home without any difficulty. ?She is followed up?by renal as an outpatient.? In the emergency room she resumed on her home blood pressure medication regimen?with good response.? Blood work done in the emergency room did show that she had an elevated troponin although her EKG was normal. ?Cardiology was consulted from the ER and they recommended observing overnight and trending out her enzymes.? She was admitted to the hospitalist group. ?At this time she has no chest pain shortness of breath or any other symptoms. ?She is asking to go home.  Review of Systems  Except as documented, all other systems reviewed and negative.  Physical Exam  Vitals & Measurements  T:?36.4? ?C (Oral)? TMIN:?36.4? ?C (Oral)? TMAX:?36.7? ?C (Oral)? HR:?81(Peripheral)? RR:?18? BP:?145/80? SpO2:?96%? WT:?57.5?kg? BMI:?21.12?  General: No acute distress, Awake, Alert,Oriented x3  HEENT: PERRL, EOMI, no scleral icterus, OP clear  Respiratory:CTA bilateral, no wheezes, rales, or rhonchi  Cardiovascular:Regular rate and rhythm, no murmurs, rubs or gallops, +s1/s2,_  Gastrointestinal: soft,nontender, nondistended, +BS, +peritoneal catheter  Musculoskeletal: Normal range of motion, no pertinent deformity  Integumentary: clean, warm, dry, intact  Neurologic: CN 2 - 12 grossly intact, no acute deficit  noted  ?  Assessment/Plan:  Hypertensive emergency  Type II MI, demand ischemia  End-stage renal disease on PD  ?   Patient currently asymptomatic. ?Her blood pressure is improved. ?Her systolic is currently in the 150s.  We will follow up any further?cardiology?recommendations.  She states that she is followed by cardiology as an outpatient. ?She had a stress test about a year ago which was negative.? She cannot remember when?her previous angiogram was.  Waiting on repeat?cardiac enzymes?currently.  She is maintained?normal sinus rhythm on?telemetry.  ?   Critical Care Diagnosis:?Hypertensive emergency requiring IV intervention  Critical care interventions: hands on evaluation, review of labs/radiographs/records and discussions with family.?  Critical care time spent > 32 minutes.?  ?   Problem List/Past Medical History  Ongoing  Anemia  CKD (chronic kidney disease)  Dialysis service  HTN - Hypertension  Kidney disease  Kidney transplant  Neck pain  Historical  Anemia  CKD - chronic kidney disease  Dysplasia  HTN - Hypertension  Procedure/Surgical History  Monitoring of Central Nervous Electrical Activity, External Approach (01/05/2021)  Insertion of Infusion Device into Superior Vena Cava, Percutaneous Approach (01/03/2021)  99406 - LAPAROSCOPY INSERT TUNNELED INTRAPERITONEAL CATH (None) (07/01/2020)  Insertion of Infusion Device into Peritoneal Cavity, Percutaneous Endoscopic Approach (07/01/2020)  Laparoscopy, surgical; with insertion of tunneled intraperitoneal catheter (07/01/2020)  Arteriovenous anastomosis, open; by upper arm cephalic vein transposition (06/05/2020)  Arteriovenous Fistula (Left) (06/05/2020)  Bypass Left Radial Artery to Lower Arm Vein, Percutaneous Approach (06/05/2020)  Injection(s), anesthetic agent(s) and/or steroid; brachial plexus (06/05/2020)  Introduction of Anesthetic Agent into Peripheral Nerves and Plexi, Percutaneous Approach (06/05/2020)  Catheter Insertion Palindrome (.)  (04/28/2020)  Fluoroscopy of Superior Vena Cava using Low Osmolar Contrast, Guidance (04/28/2020)  Insertion of Infusion Device into Superior Vena Cava, Percutaneous Approach (04/28/2020)  Insertion of Tunneled Vascular Access Device into Chest Subcutaneous Tissue and Fascia, Percutaneous Approach (04/28/2020)  Peer Reviewed (.) (04/28/2020)  Excision Lesion (Right) (09/05/2017)  Excision of Vulva, Open Approach (09/05/2017)  Excision, malignant lesion including margins, scalp, neck, hands, feet, genitalia; excised diameter 1.1 to 2.0 cm (09/05/2017)  breast augmentation  breast bx  colonoscopy  hysterectomy  kidney transplant   Medications  Inpatient  acetaminophen, 650 mg= 20.3 mL, Oral, q6hr, PRN  acetaminophen, 1000 mg= 2 tab(s), Oral, q6hr, PRN  amlodipine 5 mg oral tablet, 5 mg= 1 tab(s), Oral, Daily  aspirin 325 mg oral tablet, 325 mg= 1 tab(s), Oral, Daily  calcitriol 0.25 mcg oral capsule, 0.25 mcg= 1 cap(s), Oral, Daily  carvedilol 12.5 mg oral tablet, 6.25 mg= 0.5 tab(s), Oral, BID  cloNIDine, 0.1 mg= 1 tab(s), Oral, TID, PRN  cloNIDine 0.3 mg/24 hr transdermal film, extended release, 1 patch(es), TOP, q7day  folic acid 1 mg oral tablet, 1 mg= 1 tab(s), Oral, Daily  hydrALAZINE (Apresoline) Inj., 20 mg= 1 mL, IV Push, q2hr, PRN  hydrALAZINE 50 mg oral tablet, 75 mg= 3 tab(s), Oral, BID  labetalol 5 mg/mL intravenous solution, 10 mg= 2 mL, IV Push, q2hr, PRN  Lasix, 40 mg, Oral, Daily  losartan, 100 mg= 4 tab(s), Oral, Daily  Lovenox, 30 mg= 0.3 mL, Subcutaneous, Daily  sodium bicarbonate 650 mg oral tablet, 650 mg= 1 tab(s), Oral, BIDAC  Zofran, 4 mg= 2 mL, IV Push, q4hr, PRN  Home  amLODIPine 5 mg oral tablet, 5 mg= 1 tab(s), Oral, Daily  calcitriol 0.25 mcg oral capsule, 0.25 mcg= 1 cap(s), Oral, Daily  calcitriol 0.5 mcg oral capsule, 0.5 mcg= 1 cap(s), Oral, Daily,? ?Not taking  calcium acetate 667 mg oral capsule, 667 mg= 1 cap(s), Oral, With Meals, 5 refills  carvedilol 12.5 mg oral tablet, 6.25 mg=  0.5 tab(s), Oral, BID, 5 refills  carvedilol 6.25 mg oral tablet, 6.25 mg= 1 tab(s), Oral, BID,? ?Not taking  cefdinir 300 mg oral capsule, 300 mg= 1 cap(s), Oral, q12hr,? ?Not taking  cholecalciferol 5000 intl units (125 mcg) oral tablet, 5000 IntUnit= 1 tab(s), Oral, Daily,? ?Not taking  cloNIDine 0.3 mg/24 hr transdermal film, extended release, 1 patch(es), TOP, qWeek  DULoxetine 30 mg oral delayed release capsule, 30 mg= 1 cap(s), Oral, qPM,? ?Not taking  Epogen 10,000 units/mL injectable (ESRD on Dialysis), Subcutaneous, qMWF  ferrous sulfate 325 mg (65 mg elemental iron) oral tablet, 325 mg= 1 tab(s), Oral, Daily, 5 refills  folic acid 1 mg oral tablet, 1 mg= 1 tab(s), Oral, Daily, 5 refills  furosemide 20 mg oral tablet, 20 mg= 1 tab(s), Oral, Daily  gabapentin 300 mg oral capsule, 300 mg= 1 cap(s), Oral, qPM,? ?Not taking  hydrALAZINE 25 mg oral tablet, 1 tab, Oral, BID  hydrALAZINE 50 mg oral tablet, 75 mg= 1.5 tab(s), Oral, BID  Lasix, 40 mg, Oral, Daily  losartan 100 mg oral tablet, 100 mg= 1 tab(s), Oral, Daily  Nephrocaps oral capsule, 1 cap(s), Oral, Daily  Phenergan 12.5 mg Suppository, 12.5 mg= 1 supp, VT (rectal), q6hr, PRN  promethazine 25 mg oral tablet, 25 mg= 1 tab(s), Oral, q6hr,? ?Not taking  sodium bicarbonate 650 mg oral tablet, 650 mg= 1 tab(s), Oral, BIDAC  Zofran ODT 4 mg oral tablet, disintegrating, 4 mg= 1 tab(s), Oral, q4hr, PRN  zolpidem 12.5 mg oral tablet, extended release, 12.5 mg= 1 tab(s), Oral, qPM  Allergies  Allergy to codeine?(heart rate increases)  Levaquin?(Encephalopathy)  cefepime?(Encephalopathy)  Social History  Abuse/Neglect  No, 05/01/2021  No, No, Yes, 04/30/2021  No, 04/10/2021  No, 01/25/2021  No, No, Yes, 01/01/2021  No, 11/27/2020  No, 07/01/2020  Alcohol  Current, 08/31/2017  Employment/School  Retired, 08/31/2017  Home/Environment  Lives with Spouse., 08/31/2017  Nutrition/Health  Regular, 08/31/2017  Substance Use  Never, 08/31/2017  Tobacco  Former smoker,  quit more than 30 days ago, N/A, 05/01/2021  Former smoker, quit more than 30 days ago, N/A, 04/30/2021  Former smoker, quit more than 30 days ago, No, 04/10/2021  Former smoker, quit more than 30 days ago, No, 01/25/2021  Former smoker, 08/31/2017  Family History  Father: History is unknown  Mother: History is unknown  Lab Results  Labs Last 24 Hours?  ?Chemistry? Hematology/Coagulation?   Sodium Lvl: 139 mmol/L (05/01/21 00:05:00) WBC: 5 x10(3)/mcL (05/01/21 00:05:00)   Potassium Lvl: 4.2 mmol/L (05/01/21 00:05:00) RBC: 4.81 x10(6)/mcL (05/01/21 00:05:00)   Chloride: 98 mmol/L (05/01/21 00:05:00) Hgb: 13 gm/dL (05/01/21 00:05:00)   CO2: 25 mmol/L (05/01/21 00:05:00) Hct: 42.1 % (05/01/21 00:05:00)   Calcium Lvl: 9.4 mg/dL (05/01/21 00:05:00) Platelet: 267 x10(3)/mcL (05/01/21 00:05:00)   Magnesium Lvl: 2.41 mg/dL (05/01/21 00:05:00) MCV: 87.5 fL (05/01/21 00:05:00)   Glucose Lvl:?119 mg/dL?High (05/01/21 00:05:00) MCH: 27 pg (05/01/21 00:05:00)   BUN:?47.2 mg/dL?High (05/01/21 00:05:00) MCHC:?30.9 gm/dL?Low (05/01/21 00:05:00)   Creatinine:?8.43 mg/dL?High (05/01/21 00:05:00) RDW: 16.7 % (05/01/21 00:05:00)   Est Creat Clearance Ser: 5.42 mL/min (05/01/21 00:50:21) MPV: 8.6 fL (05/01/21 00:05:00)   eGFR-AA: 6 (05/01/21 00:05:00) Abs Neut: 2.77 x10(3)/mcL (05/01/21 00:05:00)   eGFR-NAIDA: 5 mL/min/1.73 m2 (05/01/21 00:05:00) Neutro Auto: 55.9 % (05/01/21 00:05:00)   Bili Total: 0.3 mg/dL (05/01/21 00:05:00) Lymph Auto: 27.6 % (05/01/21 00:05:00)   Bili Direct: 0.2 mg/dL (05/01/21 00:05:00) Mono Auto: 11.7 % (05/01/21 00:05:00)   Bili Indirect: 0.1 mg/dL (05/01/21 00:05:00) Eos Auto: 3.8 % (05/01/21 00:05:00)   AST: 20 unit/L (05/01/21 00:05:00) Abs Eos: 0.19 x10(3)/mcL (05/01/21 00:05:00)   ALT: 15 unit/L (05/01/21 00:05:00) Basophil Auto: 0.8 % (05/01/21 00:05:00)   Alk Phos: 93 unit/L (05/01/21 00:05:00) Abs Neutro: 2.77 x10(3)/mcL (05/01/21 00:05:00)   Total Protein: 7.1 gm/dL (05/01/21 00:05:00) Abs Lymph: 1.37  x10(3)/mcL (05/01/21 00:05:00)   Albumin Lvl:?2.8 gm/dL?Low (05/01/21 00:05:00) Abs Mono: 0.58 x10(3)/mcL (05/01/21 00:05:00)   Globulin:?4.3 gm/dL?High (05/01/21 00:05:00) Abs Baso: 0.04 x10(3)/mcL (05/01/21 00:05:00)   A/G Ratio:?0.7 ratio?Low (05/01/21 00:05:00) NRBC%: 0.0 (05/01/21 00:05:00)   Phosphorus:?6.7 mg/dL?High (05/01/21 00:05:00) IG%: 0.2 % (05/01/21 00:05:00)   Troponin-I:?0.29 ng/mL?High (05/01/21 00:05:00) IG#: 0.01 (05/01/21 00:05:00)

## 2022-05-11 ENCOUNTER — PATIENT MESSAGE (OUTPATIENT)
Dept: RESEARCH | Facility: CLINIC | Age: 72
End: 2022-05-11
Payer: MEDICARE

## 2022-05-12 ENCOUNTER — HOSPITAL ENCOUNTER (INPATIENT)
Facility: HOSPITAL | Age: 72
LOS: 2 days | Discharge: HOME | DRG: 314 | End: 2022-05-14
Attending: STUDENT IN AN ORGANIZED HEALTH CARE EDUCATION/TRAINING PROGRAM | Admitting: INTERNAL MEDICINE
Payer: MEDICARE

## 2022-05-12 DIAGNOSIS — N05.9 GN (GLOMERULONEPHRITIS): ICD-10-CM

## 2022-05-12 DIAGNOSIS — N18.9 ANEMIA OF RENAL DISEASE: Chronic | ICD-10-CM

## 2022-05-12 DIAGNOSIS — I12.9 RENAL HYPERTENSION: Chronic | ICD-10-CM

## 2022-05-12 DIAGNOSIS — Z94.0 LIVING-DONOR KIDNEY TRANSPLANT RECIPIENT: Chronic | ICD-10-CM

## 2022-05-12 DIAGNOSIS — D84.9 IMMUNOSUPPRESSION: ICD-10-CM

## 2022-05-12 DIAGNOSIS — D63.1 ANEMIA OF RENAL DISEASE: Chronic | ICD-10-CM

## 2022-05-12 DIAGNOSIS — I95.9 HYPOTENSION: Primary | ICD-10-CM

## 2022-05-12 DIAGNOSIS — Z90.5 S/P NEPHRECTOMY: Chronic | ICD-10-CM

## 2022-05-12 DIAGNOSIS — N28.89 LEFT RENAL MASS: ICD-10-CM

## 2022-05-12 DIAGNOSIS — E87.20 LACTIC ACIDOSIS: ICD-10-CM

## 2022-05-12 DIAGNOSIS — R07.9 CHEST PAIN: ICD-10-CM

## 2022-05-12 LAB
ALBUMIN SERPL-MCNC: 3.3 GM/DL (ref 3.4–4.8)
ALBUMIN/GLOB SERPL: 0.9 RATIO (ref 1.1–2)
ALP SERPL-CCNC: 93 UNIT/L (ref 40–150)
ALT SERPL-CCNC: 20 UNIT/L (ref 0–55)
AST SERPL-CCNC: 23 UNIT/L (ref 5–34)
BASOPHILS # BLD AUTO: 0.03 X10(3)/MCL (ref 0–0.2)
BASOPHILS NFR BLD AUTO: 0.6 %
BILIRUBIN DIRECT+TOT PNL SERPL-MCNC: 0.5 MG/DL
BNP BLD-MCNC: 62.7 PG/ML
BUN SERPL-MCNC: 38.4 MG/DL (ref 9.8–20.1)
CALCIUM SERPL-MCNC: 9.2 MG/DL (ref 8.4–10.2)
CHLORIDE SERPL-SCNC: 93 MMOL/L (ref 98–107)
CO2 SERPL-SCNC: 27 MMOL/L (ref 23–31)
CREAT SERPL-MCNC: 10.07 MG/DL (ref 0.55–1.02)
EOSINOPHIL # BLD AUTO: 0.07 X10(3)/MCL (ref 0–0.9)
EOSINOPHIL NFR BLD AUTO: 1.3 %
ERYTHROCYTE [DISTWIDTH] IN BLOOD BY AUTOMATED COUNT: 14.6 % (ref 11.5–17)
GLOBULIN SER-MCNC: 3.5 GM/DL (ref 2.4–3.5)
GLUCOSE SERPL-MCNC: 85 MG/DL (ref 82–115)
HCT VFR BLD AUTO: 42.7 % (ref 37–47)
HGB BLD-MCNC: 14.5 GM/DL (ref 12–16)
IMM GRANULOCYTES # BLD AUTO: 0.03 X10(3)/MCL (ref 0–0.02)
IMM GRANULOCYTES NFR BLD AUTO: 0.6 % (ref 0–0.43)
LACTATE SERPL-SCNC: 1.3 MMOL/L (ref 0.5–2.2)
LACTATE SERPL-SCNC: 2.1 MMOL/L (ref 0.5–2.2)
LACTATE SERPL-SCNC: 2.6 MMOL/L (ref 0.5–2.2)
LYMPHOCYTES # BLD AUTO: 1.62 X10(3)/MCL (ref 0.6–4.6)
LYMPHOCYTES NFR BLD AUTO: 30.7 %
MCH RBC QN AUTO: 31.4 PG (ref 27–31)
MCHC RBC AUTO-ENTMCNC: 34 MG/DL (ref 33–36)
MCV RBC AUTO: 92.4 FL (ref 80–94)
MONOCYTES # BLD AUTO: 0.47 X10(3)/MCL (ref 0.1–1.3)
MONOCYTES NFR BLD AUTO: 8.9 %
NEUTROPHILS # BLD AUTO: 3.1 X10(3)/MCL (ref 2.1–9.2)
NEUTROPHILS NFR BLD AUTO: 57.9 %
NRBC BLD AUTO-RTO: 0.4 %
PLATELET # BLD AUTO: 265 X10(3)/MCL (ref 130–400)
PMV BLD AUTO: 9.7 FL (ref 9.4–12.4)
POTASSIUM SERPL-SCNC: 3.7 MMOL/L (ref 3.5–5.1)
PROT SERPL-MCNC: 6.8 GM/DL (ref 5.8–7.6)
RBC # BLD AUTO: 4.62 X10(6)/MCL (ref 4.2–5.4)
SODIUM SERPL-SCNC: 136 MMOL/L (ref 136–145)
TROPONIN I SERPL-MCNC: <0.01 NG/ML (ref 0–0.04)
WBC # SPEC AUTO: 5.3 X10(3)/MCL (ref 4.5–11.5)

## 2022-05-12 PROCEDURE — 93010 EKG 12-LEAD: ICD-10-PCS | Mod: ,,, | Performed by: INTERNAL MEDICINE

## 2022-05-12 PROCEDURE — 96361 HYDRATE IV INFUSION ADD-ON: CPT

## 2022-05-12 PROCEDURE — 84484 ASSAY OF TROPONIN QUANT: CPT | Performed by: NURSE PRACTITIONER

## 2022-05-12 PROCEDURE — 87040 BLOOD CULTURE FOR BACTERIA: CPT | Performed by: STUDENT IN AN ORGANIZED HEALTH CARE EDUCATION/TRAINING PROGRAM

## 2022-05-12 PROCEDURE — 63600175 PHARM REV CODE 636 W HCPCS: Performed by: STUDENT IN AN ORGANIZED HEALTH CARE EDUCATION/TRAINING PROGRAM

## 2022-05-12 PROCEDURE — 83880 ASSAY OF NATRIURETIC PEPTIDE: CPT | Performed by: NURSE PRACTITIONER

## 2022-05-12 PROCEDURE — 96372 THER/PROPH/DIAG INJ SC/IM: CPT

## 2022-05-12 PROCEDURE — 85025 COMPLETE CBC W/AUTO DIFF WBC: CPT | Performed by: NURSE PRACTITIONER

## 2022-05-12 PROCEDURE — 96360 HYDRATION IV INFUSION INIT: CPT

## 2022-05-12 PROCEDURE — 83605 ASSAY OF LACTIC ACID: CPT | Performed by: STUDENT IN AN ORGANIZED HEALTH CARE EDUCATION/TRAINING PROGRAM

## 2022-05-12 PROCEDURE — 36415 COLL VENOUS BLD VENIPUNCTURE: CPT | Performed by: STUDENT IN AN ORGANIZED HEALTH CARE EDUCATION/TRAINING PROGRAM

## 2022-05-12 PROCEDURE — 36415 COLL VENOUS BLD VENIPUNCTURE: CPT | Performed by: NURSE PRACTITIONER

## 2022-05-12 PROCEDURE — 63600175 PHARM REV CODE 636 W HCPCS: Performed by: NURSE PRACTITIONER

## 2022-05-12 PROCEDURE — 99285 EMERGENCY DEPT VISIT HI MDM: CPT | Mod: 25

## 2022-05-12 PROCEDURE — 89051 BODY FLUID CELL COUNT: CPT | Performed by: INTERNAL MEDICINE

## 2022-05-12 PROCEDURE — 93005 ELECTROCARDIOGRAM TRACING: CPT

## 2022-05-12 PROCEDURE — 80053 COMPREHEN METABOLIC PANEL: CPT | Performed by: NURSE PRACTITIONER

## 2022-05-12 PROCEDURE — 93010 ELECTROCARDIOGRAM REPORT: CPT | Mod: ,,, | Performed by: INTERNAL MEDICINE

## 2022-05-12 PROCEDURE — 11000001 HC ACUTE MED/SURG PRIVATE ROOM

## 2022-05-12 PROCEDURE — 87070 CULTURE OTHR SPECIMN AEROBIC: CPT | Performed by: INTERNAL MEDICINE

## 2022-05-12 RX ORDER — ONDANSETRON 2 MG/ML
4 INJECTION INTRAMUSCULAR; INTRAVENOUS EVERY 8 HOURS PRN
Status: DISCONTINUED | OUTPATIENT
Start: 2022-05-12 | End: 2022-05-14 | Stop reason: HOSPADM

## 2022-05-12 RX ORDER — IBUPROFEN 200 MG
16 TABLET ORAL
Status: DISCONTINUED | OUTPATIENT
Start: 2022-05-12 | End: 2022-05-14 | Stop reason: HOSPADM

## 2022-05-12 RX ORDER — ENOXAPARIN SODIUM 100 MG/ML
30 INJECTION SUBCUTANEOUS EVERY 24 HOURS
Status: DISCONTINUED | OUTPATIENT
Start: 2022-05-12 | End: 2022-05-14 | Stop reason: HOSPADM

## 2022-05-12 RX ORDER — SODIUM CHLORIDE 0.9 % (FLUSH) 0.9 %
10 SYRINGE (ML) INJECTION EVERY 12 HOURS PRN
Status: DISCONTINUED | OUTPATIENT
Start: 2022-05-12 | End: 2022-05-14 | Stop reason: HOSPADM

## 2022-05-12 RX ORDER — ACETAMINOPHEN 325 MG/1
650 TABLET ORAL EVERY 4 HOURS PRN
Status: DISCONTINUED | OUTPATIENT
Start: 2022-05-12 | End: 2022-05-14 | Stop reason: HOSPADM

## 2022-05-12 RX ORDER — IBUPROFEN 200 MG
24 TABLET ORAL
Status: DISCONTINUED | OUTPATIENT
Start: 2022-05-12 | End: 2022-05-14 | Stop reason: HOSPADM

## 2022-05-12 RX ORDER — NALOXONE HCL 0.4 MG/ML
0.02 VIAL (ML) INJECTION
Status: DISCONTINUED | OUTPATIENT
Start: 2022-05-12 | End: 2022-05-14 | Stop reason: HOSPADM

## 2022-05-12 RX ORDER — GLUCAGON 1 MG
1 KIT INJECTION
Status: DISCONTINUED | OUTPATIENT
Start: 2022-05-12 | End: 2022-05-14 | Stop reason: HOSPADM

## 2022-05-12 RX ADMIN — SODIUM CHLORIDE, POTASSIUM CHLORIDE, SODIUM LACTATE AND CALCIUM CHLORIDE 1000 ML: 600; 310; 30; 20 INJECTION, SOLUTION INTRAVENOUS at 12:05

## 2022-05-12 RX ADMIN — ENOXAPARIN SODIUM 30 MG: 30 INJECTION SUBCUTANEOUS at 07:05

## 2022-05-12 RX ADMIN — SODIUM CHLORIDE, POTASSIUM CHLORIDE, SODIUM LACTATE AND CALCIUM CHLORIDE 1000 ML: 600; 310; 30; 20 INJECTION, SOLUTION INTRAVENOUS at 04:05

## 2022-05-12 NOTE — ED PROVIDER NOTES
Encounter Date: 5/12/2022    SCRIBE #1 NOTE: I, Nickie Mccarty, am scribing for, and in the presence of,  Uziel Newell MD. I have scribed the following portions of the note - the EKG reading. Other sections scribed: HPI, ROS, and physical examination.       History     Chief Complaint   Patient presents with    Hypotension     Pt presents to er c/o gen weakness, and hypotension since Monday.  States dialysis pt runs every night at home with peritoneal dialysis.  Pt denies sob, denies chest pain.  Hx of kidney transplant     72 y.o. female presents to ED c/o generalized weakness for the past few days. Pt reports having an appointment with Dr. Mendoza (nephrology) on Monday (5/9/22) at which time it was discovered that her blood pressure was low. Pt states that since then her blood pressure has not improved. No prior episodes of symptoms. Pt denies fever, chest pain, SOB, or n/v/d. Normal appetite. Pt has a history of HTN, anemia, and CKD; she is currently on peritoneal dialysis that she does every night for 10 hours. She denies having any issues recently. No redness surrounding the site. Pt had a kidney transplant in the past that was rejected; she states that she has not taken immunosuppressants for a while now. Pt was also on HTN medications prior to current symptoms, but these were also stopped.    The history is provided by the patient. No  was used.   General Illness   The current episode started several days ago. The problem occurs continuously. The problem has been unchanged. Nothing relieves the symptoms. Nothing aggravates the symptoms. Pertinent negatives include no fever, no diarrhea, no nausea, no vomiting and no shortness of breath.     Review of patient's allergies indicates:   Allergen Reactions    Codeine Palpitations and Rash     Past Medical History:   Diagnosis Date    Acute rejection of kidney transplant 9/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 Glomerulitis  with one activated endothelial cell: treated with Solumedrol pulse 375 mg IVBP x 3 doses with short prednisone taper; thymoglobin 100 mg x 6 doses 10/04/2007 minimal histologic changes no acute rejection     Allergy     Anemia of renal disease     CKD (chronic kidney disease), stage IV     CMV (cytomegalovirus) status positive     Colon polyp     benign    Depression     GN (glomerulonephritis)     GN (glomerulonephritis)     unknown type    Herpes infection, other 2009    on buttocks    Hyperlipidemia     Hypertension     Immunosuppression     Living-donor kidney transplant recipient 01/15/2007    Meningitis     history of viral meningitis    Proteinuria 1/5/2015    Proteinuria     Pulmonary nodules     Renal hypertension     S/P breast augmentation     Skin cancer     Urinary tract infection      Past Surgical History:   Procedure Laterality Date    COLON SURGERY      COLONOSCOPY W/ BIOPSIES      cosemetic surgery      facelift and breast augmentation     HYSTERECTOMY      KIDNEY TRANSPLANT      NEPHRECTOMY Left     9/2016    RENAL BIOPSY      TONSILLECTOMY, ADENOIDECTOMY       Family History   Problem Relation Age of Onset    Kidney disease Sister         ESRD    Kidney disease Brother         CKD from HTN    Hypertension Brother     Heart failure Mother     Heart failure Father     Diabetes Neg Hx     Stroke Neg Hx      Social History     Tobacco Use    Smoking status: Former Smoker     Packs/day: 1.00     Years: 15.00     Pack years: 15.00     Types: Cigarettes    Smokeless tobacco: Never Used    Tobacco comment: quit in 2014; smoked for 15 years and at the most smoked 1 ppd   Substance Use Topics    Alcohol use: Yes     Comment: seldom    Drug use: No     Review of Systems   Constitutional: Negative for fever.        Generalized weakness   HENT: Negative.    Eyes: Negative.    Respiratory: Negative.  Negative for shortness of breath.    Cardiovascular: Negative.   Negative for chest pain.   Gastrointestinal: Negative.  Negative for diarrhea, nausea and vomiting.   Endocrine: Negative.    Genitourinary: Negative.    Musculoskeletal: Negative.    Skin: Negative.    Allergic/Immunologic: Negative.    Neurological: Negative.    Hematological: Negative.    Psychiatric/Behavioral: Negative.    All other systems reviewed and are negative.      Physical Exam     Initial Vitals [05/12/22 1120]   BP Pulse Resp Temp SpO2   (!) 68/54 76 20 97.7 °F (36.5 °C) 98 %      MAP       --         Physical Exam    Nursing note and vitals reviewed.  Constitutional: She appears well-developed. She does not appear ill. No distress.   HENT:   Head: Normocephalic and atraumatic.   Mouth/Throat: Oropharynx is clear and moist.   Eyes: Conjunctivae and EOM are normal.   Neck: Neck supple.   Normal range of motion.  Cardiovascular: Normal rate and regular rhythm.   No murmur heard.  Pulmonary/Chest: Breath sounds normal. No respiratory distress. She exhibits no tenderness.   Abdominal: Abdomen is soft. Bowel sounds are normal. She exhibits no distension. There is no abdominal tenderness.   Peritoneal dialysis to LUQ, no surrounding redness or drainage.    No right CVA tenderness.  No left CVA tenderness.   Musculoskeletal:         General: Normal range of motion.      Cervical back: Normal range of motion and neck supple.      Lumbar back: Normal. Normal range of motion.     Neurological: She is alert and oriented to person, place, and time. She has normal strength. No cranial nerve deficit or sensory deficit.   Psychiatric: She has a normal mood and affect. Her mood appears not anxious.         ED Course   Procedures  Labs Reviewed   COMPREHENSIVE METABOLIC PANEL - Abnormal; Notable for the following components:       Result Value    Chloride 93 (*)     Blood Urea Nitrogen 38.4 (*)     Creatinine 10.07 (*)     Albumin Level 3.3 (*)     Albumin/Globulin Ratio 0.9 (*)     All other components within normal  limits   CBC WITH DIFFERENTIAL - Abnormal; Notable for the following components:    MCH 31.4 (*)     IG# 0.03 (*)     IG% 0.6 (*)     All other components within normal limits   LACTIC ACID, PLASMA - Abnormal; Notable for the following components:    Lactic Acid Level 2.6 (*)     All other components within normal limits   B-TYPE NATRIURETIC PEPTIDE - Normal   TROPONIN I - Normal   LACTIC ACID, PLASMA - Normal   BLOOD CULTURE [facility]   BLOOD CULTURE [facility]   CBC W/ AUTO DIFFERENTIAL    Narrative:     The following orders were created for panel order CBC Auto Differential.  Procedure                               Abnormality         Status                     ---------                               -----------         ------                     CBC with Differential[[id number]]        Abnormal            Final result                 Please view results for these tests on the individual orders.   URINALYSIS, REFLEX TO URINE CULTURE   POCT TROPONIN     EKG Readings: (Independently Interpreted)   Initial Reading: No STEMI. Rhythm: Normal Sinus Rhythm. Heart Rate: 75. Ectopy: No Ectopy. Conduction: Normal. ST Segments: Normal ST Segments. T Waves: Normal. Axis: Normal. Clinical Impression: Normal Sinus Rhythm   EKG performed at 1122 on 5/12/22       Imaging Results          X-Ray Chest PA And Lateral (Final result)  Result time 05/12/22 11:50:42    Final result by Jaime Costa MD (05/12/22 11:50:42)                 Impression:      No acute cardiopulmonary process.  No significant interval radiographic change in comparison with 12/25/2021.      Electronically signed by: Jaime Costa  Date:    05/12/2022  Time:    11:50             Narrative:    EXAMINATION:  XR CHEST PA AND LATERAL    CLINICAL HISTORY:  chest pain;    TECHNIQUE:  PA and lateral views of the chest.    COMPARISON:  Chest radiograph 12/25/2021.    FINDINGS:  The cardiomediastinal silhouette is stable in size and contour. Pulmonary vascularity is within normal  limits. The lungs are well-inflated and are without focal consolidation, pleural effusion, or pneumothorax.    The imaged osseous structures and soft tissues are without acute abnormality.  Redemonstrated peripherally calcified breast implants.                                 Medications   lactated ringers bolus 1,000 mL (has no administration in time range)     Medical Decision Making:   Clinical Tests:   Lab Tests: Ordered and Reviewed  Radiological Study: Ordered and Reviewed  Medical Tests: Ordered and Reviewed  ED Management:  Patient is a 73 y/o female presents for generalzied weakness/fatigue/hypotension.  See HPI/labs.  Given IVF with improvmeent.  Lactic acidosis noted.  No obvious source of infection.  Discussed with medicine for admission.  Consideration for abx, however given afebrile, no leukotycosis, improvmeent in BP with IVF, will hold; low threshold to intiatie.  I have reassessed the patient.  Patient resting comfortably.  Discussed all results and discussed treatment plan.  Discussed need for admission for continued evaluating, management, diagnosis, and treatment.  Patient and family verbalized understanding and agreed to plan as discussed.             Scribe Attestation:   Scribe #1: I performed the above scribed service and the documentation accurately describes the services I performed. I attest to the accuracy of the note.              I, Uziel Newell MD, personally performed the services described in the documentation as documented by the scribe in my presence and is both accurate and complete.       Clinical Impression:   Final diagnoses:  [R07.9] Chest pain  [I95.9] Hypotension (Primary)  [E87.2] Lactic acidosis                 Uziel Newell MD  05/13/22 [id number]

## 2022-05-12 NOTE — FIRST PROVIDER EVALUATION
Medical screening exam completed.  I have conducted a focused provider triage encounter, findings are as follows:    Brief history of present illness:  States that her BP is low and weakness. States that she does peritoneal dialysis.     There were no vitals filed for this visit.    Pertinent physical exam:  Awake, alert    Brief workup plan:  Labs, EKG    Preliminary workup initiated; this workup will be continued and followed by the physician or advanced practice provider that is assigned to the patient when roomed.

## 2022-05-12 NOTE — Clinical Note
Diagnosis: Chest pain [763848]   Admitting Provider:: JESICA OJEDA [[id number]]   Future Attending Provider: JESICA OJEDA [[id number]]   Reason for IP Medical Treatment  (Clinical interventions that can only be accomplished in the IP setting? ) :: Anemia   Estimated Length of Stay:: 2 midnights   I certify that Inpatient services for greater than or equal to 2 midnights are medically necessary:: Yes   Plans for Post-Acute care--if anticipated (pick the single best option):: A. No post acute care anticipated at this time

## 2022-05-12 NOTE — H&P
[facility]  Hospital Medicine History & Physical Examination       Patient Name: Malissa Schwartz  MRN: [id number]  Patient Class: IP- Inpatient   Admission Date: Laureano Dewitt MD   Admitting Physician:  Service   Length of Stay: 0  Attending Physician:DR JUDI DEWITT  Primary Care Provider: Sergio Crabtree MD  Face-to-Face encounter date: 05/12/2022  Code Status: FULL CODE  Chief Complaint: Hypotension (Pt presents to er c/o gen weakness, and hypotension since Monday.  States dialysis pt runs every night at home with peritoneal dialysis.  Pt denies sob, denies chest pain.  Hx of kidney transplant)        Patient information was obtained from patient, patient's family, past medical records and ER records.     HISTORY OF PRESENT ILLNESS:   Malissa Schwartz is a 72 y.o. 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 PT has ESRD on PD. She typically does her dialysis at night. No reports of cloudy, bloody , or change in PD fluid. No reports of fever, cough, congestion N/V/D or any sick contacts. Pt denies any chest pain. Labs significant for BUN 38.4, Creat 10.07, lactic acid 2.1, other indicis unremarkable. No reports of redness or drainage from PD site.  PT is admitted to hospital medicine services for further management.           PAST MEDICAL HISTORY:     Past Medical  History:   Diagnosis Date    Acute rejection of kidney transplant 9/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 Glomerulitis with one activated endothelial cell: treated with Solumedrol pulse 375 mg IVBP x 3 doses with short prednisone taper; thymoglobin 100 mg x 6 doses 10/04/2007 minimal histologic changes no acute rejection     Allergy     Anemia of renal disease     CKD (chronic kidney disease), stage IV     CMV (cytomegalovirus) status positive     Colon polyp     benign    Depression     GN (glomerulonephritis)     GN (glomerulonephritis)     unknown type    Herpes infection, other 2009    on buttocks    Hyperlipidemia     Hypertension     Immunosuppression     Living-donor kidney transplant recipient 01/15/2007    Meningitis     history of viral meningitis    Proteinuria 1/5/2015    Proteinuria     Pulmonary nodules     Renal hypertension     S/P breast augmentation     Skin cancer     Urinary tract infection        PAST SURGICAL HISTORY:     Past Surgical History:   Procedure Laterality Date    COLON SURGERY      COLONOSCOPY W/ BIOPSIES      cosemetic surgery      facelift and breast augmentation     HYSTERECTOMY      KIDNEY TRANSPLANT      NEPHRECTOMY Left     9/2016    RENAL BIOPSY      TONSILLECTOMY, ADENOIDECTOMY         ALLERGIES:   Codeine    FAMILY HISTORY:   Reviewed and negative    SOCIAL HISTORY:     Social History     Tobacco Use    Smoking status: Former Smoker     Packs/day: 1.00     Years: 15.00     Pack years: 15.00     Types: Cigarettes    Smokeless tobacco: Never Used    Tobacco comment: quit in 2014; smoked for 15 years and at the most smoked 1 ppd   Substance Use Topics    Alcohol use: Yes     Comment: seldom        HOME MEDICATIONS:   As documented  Prior to Admission medications    Medication Sig Start Date End Date Taking? Authorizing Provider   amLODIPine (NORVASC) 10 MG tablet TAKE ONE TABLET BY MOUTH ONCE DAILY 1/2/20   Dinah CORREA  MD Roberth   calcium carbonate (OS-STEVEN) 600 mg (1,500 mg) Tab Take 600 mg by mouth once daily.     Historical Provider   cloNIDine 0.2 mg/24 hr td ptwk (CATAPRES-TTS-2) 0.2 mg/24 hr Place 1 patch onto the skin every 7 days. 4/4/16 1/8/18  Nicole Trejo MD   furosemide (LASIX) 20 MG tablet Take 1 tablet (20 mg total) by mouth 2 (two) times daily.  Patient taking differently: Take 20 mg by mouth once daily.  3/17/17 3/17/18  Linda Nelson MD   losartan (COZAAR) 100 MG tablet TAKE ONE TABLET BY MOUTH ONCE DAILY 1/28/20   Salvador Moeller MD   metoprolol succinate (TOPROL-XL) 25 MG 24 hr tablet Take 2 tablets (50 mg total) by mouth once daily. 3/10/17   Linda Nelson MD   multivitamin capsule Take 1 capsule by mouth once daily.    Historical Provider   mycophenolate (CELLCEPT) 250 mg Cap Take 2 capsules (500 mg total) by mouth 2 (two) times daily. Z94.0 kidney transplant 7/25/19   Nicole Trejo MD   predniSONE (DELTASONE) 5 MG tablet TAKE ONE TABLET BY MOUTH ONCE DAILY 2/24/20   Hayley Myles, NP   tacrolimus (PROGRAF) 1 MG Cap Take 3mg every AM and 2mg every PM.  Route: Oral.  Z94.0 Kidney Transplant. 7/26/19   Nicole Trejo MD   zolpidem (AMBIEN CR) 12.5 MG CR tablet Take 12.5 mg by mouth nightly as needed.     Historical Provider       REVIEW OF SYSTEMS:   Except as documented, all other systems reviewed and negative     PHYSICAL EXAM:     VITAL SIGNS: 24 HRS MIN & MAX LAST   Temp  Min: 97.7 °F (36.5 °C)  Max: 97.7 °F (36.5 °C) 97.7 °F (36.5 °C)   BP  Min: 68/54  Max: 120/78 120/78   Pulse  Min: 63  Max: 76  74   Resp  Min: 13  Max: 23 13   SpO2  Min: 96 %  Max: 100 % 100 %       General appearance: elderly female chronically ill appearing, non-toxic  HENT: Atraumatic head. Moist mucous membranes of oral cavity.  Eyes: Normal extraocular movements.   Neck: Supple.   Lungs: Clear to auscultation bilaterally. No wheezing present.   Heart: Regular rate and rhythm.  Cap refill brisk.  No pedal edema. No JVD present.   Abdomen: Soft, non-distended, non-tender. No rebound tenderness/guarding. Bowel sounds are normal.   Extremities: No cyanosis, clubbing, or edema.  Skin: No Rash, PD cath site intact.   Neuro: Motor and sensory exams grossly intact.JONES  Psych/mental status: Appropriate mood and affect. Responds appropriately to questions.     LABS AND IMAGING:     Recent Labs   Lab 05/12/22  1212   WBC 5.3   RBC 4.62   HGB 14.5   HCT 42.7   MCV 92.4   MCH 31.4*   MCHC 34.0   RDW 14.6      MPV 9.7       Recent Labs   Lab 05/12/22  1212      K 3.7   CO2 27   BUN 38.4*   CREATININE 10.07*   CALCIUM 9.2   ALBUMIN 3.3*   ALKPHOS 93   ALT 20   AST 23   BILITOT 0.5       Microbiology Results (last 7 days)     Procedure Component Value Units Date/Time    Blood culture #2 **CANNOT BE ORDERED STAT** [[id number]] Collected: 05/12/22 1231    Order Status: Resulted Specimen: Blood from Hand, Right Updated: 05/12/22 1238    Blood culture #1 **CANNOT BE ORDERED STAT** [[id number]] Collected: 05/12/22 1230    Order Status: Resulted Specimen: Blood from Arm, Right Updated: 05/12/22 1238           X-Ray Chest PA And Lateral  Narrative: EXAMINATION:  XR CHEST PA AND LATERAL    CLINICAL HISTORY:  chest pain;    TECHNIQUE:  PA and lateral views of the chest.    COMPARISON:  Chest radiograph 12/25/2021.    FINDINGS:  The cardiomediastinal silhouette is stable in size and contour. Pulmonary vascularity is within normal limits. The lungs are well-inflated and are without focal consolidation, pleural effusion, or pneumothorax.    The imaged osseous structures and soft tissues are without acute abnormality.  Redemonstrated peripherally calcified breast implants.  Impression: No acute cardiopulmonary process.  No significant interval radiographic change in comparison with 12/25/2021.    Electronically signed by: Jaime Costa  Date:    05/12/2022  Time:    11:50        ASSESSMENT & PLAN:   Acute  hypovolemic shock- resolving  SIRS with suspected sepsis  Lactic acidosis- likely secondary to sepsis  ESRD with PD  Exertional dyspnea with SOB  S/P Donor Kidney- with subsequent failed transplant  HTN- essential, had hypotension on presentation  Anemia- chronic disease  Weakness    PLAN:   Gently hydration  Trend out lactic acid levels  Follow C&S  Consult renal services appreciate assistance and recommendations  Labs in the am  Resume home medication as deemed necessary  GI prophylaxis      VTE Prophylaxis: will be placed on  Lovenox SCD for DVT prophylaxis and will be advised to be as mobile as possible and sit in a chair as tolerated    Patient condition:  Stable    __________________________________________________________________________  INPATIENT LIST OF MEDICATIONS     Current Facility-Administered Medications:     acetaminophen tablet 650 mg, 650 mg, Oral, Q4H PRN, OMID LangfordP    dextrose 10% bolus 125 mL, 12.5 g, Intravenous, PRN, Agatha Craig FNP    dextrose 10% bolus 250 mL, 25 g, Intravenous, PRN, Agatha Craig, FNP    enoxaparin injection 30 mg, 30 mg, Subcutaneous, Daily, LETHA Langford    glucagon (human recombinant) injection 1 mg, 1 mg, Intramuscular, PRN, LETHA Langford    glucose chewable tablet 16 g, 16 g, Oral, PRN, OMID LangfordP    glucose chewable tablet 24 g, 24 g, Oral, PRN, LETHA Langford    naloxone 0.4 mg/mL injection 0.02 mg, 0.02 mg, Intravenous, PRN, OMID LangfordP    ondansetron injection 4 mg, 4 mg, Intravenous, Q8H PRN, Agatha Craig, OMIDP    sodium chloride 0.9% flush 10 mL, 10 mL, Intravenous, Q12H PRN, LETHA Langford    Current Outpatient Medications:     amLODIPine (NORVASC) 10 MG tablet, TAKE ONE TABLET BY MOUTH ONCE DAILY, Disp: 30 tablet, Rfl: 10    calcium carbonate (OS-STEVEN) 600 mg (1,500 mg) Tab, Take 600 mg by mouth once daily. , Disp: , Rfl:      cloNIDine 0.2 mg/24 hr td ptwk (CATAPRES-TTS-2) 0.2 mg/24 hr, Place 1 patch onto the skin every 7 days., Disp: 4 patch, Rfl: 11    furosemide (LASIX) 20 MG tablet, Take 1 tablet (20 mg total) by mouth 2 (two) times daily. (Patient taking differently: Take 20 mg by mouth once daily. ), Disp: 30 tablet, Rfl: 11    losartan (COZAAR) 100 MG tablet, TAKE ONE TABLET BY MOUTH ONCE DAILY, Disp: 90 tablet, Rfl: 3    metoprolol succinate (TOPROL-XL) 25 MG 24 hr tablet, Take 2 tablets (50 mg total) by mouth once daily., Disp: 60 tablet, Rfl: 3    multivitamin capsule, Take 1 capsule by mouth once daily., Disp: , Rfl:     mycophenolate (CELLCEPT) 250 mg Cap, Take 2 capsules (500 mg total) by mouth 2 (two) times daily. Z94.0 kidney transplant, Disp: 120 capsule, Rfl: 11    predniSONE (DELTASONE) 5 MG tablet, TAKE ONE TABLET BY MOUTH ONCE DAILY, Disp: 30 tablet, Rfl: 11    tacrolimus (PROGRAF) 1 MG Cap, Take 3mg every AM and 2mg every PM.  Route: Oral.  Z94.0 Kidney Transplant., Disp: 150 capsule, Rfl: 11    zolpidem (AMBIEN CR) 12.5 MG CR tablet, Take 12.5 mg by mouth nightly as needed. , Disp: , Rfl:       Scheduled Meds:   enoxaparin  30 mg Subcutaneous Daily     Continuous Infusions:  PRN Meds:.acetaminophen, dextrose 10%, dextrose 10%, glucagon (human recombinant), glucose, glucose, naloxone, ondansetron, sodium chloride 0.9%      Discharge Planning and Disposition: No mobility needs. Ambulating well. Good social support system.   Anticipated discharge TBD  I, Agatha Craig, APRN, NP have reviewed and discussed the case with Dr. Raul Nealot   Please see the following addendum for further assessment and plan from there attending MD.  05/12/2022    ________________________________________________________________________________    MD Addendum:  IDr.     assumed care of this patient today at    am/pm  For the patient encounter, I performed the substantive portion of the visit, I reviewed the NP/PA  documentation, treatment plan, and medical decision making.  I had face to face time with this patient     A. History:    B. Physical exam:    C. Medical decision making:      All diagnosis and differential diagnosis have been reviewed; assessment and plan has been documented; I have personally reviewed the labs and test results that are presently available; I have reviewed the patients medication list; I have reviewed the consulting providers response and recommendations. I have reviewed or attempted to review medical records based upon their availability.    All of the patient and family questions have been addressed and answered. Patient's is agreeable to the above stated plan. I will continue to monitor closely and make adjustments to medical management as needed.    Agatha Craig, [facility]   05/12/2022

## 2022-05-12 NOTE — ED TRIAGE NOTES
Patient identifiers verified and correct for Mrs. Malissa Schwartz. Pt arrives to ED with reports of hypotension since Monday.   LOC: The patient is awake, alert and aware of environment with an appropriate affect, the patient is oriented x 3 and speaking appropriately.   APPEARANCE: Patient appears comfortable and in no acute distress, patient is clean and well groomed.  SKIN: The skin is warm and dry, color consistent with ethnicity, patient has normal skin turgor and moist mucus membranes, skin intact, no breakdown or bruising noted.   MUSCULOSKELETAL: Patient moving all extremities spontaneously, no swelling noted.  RESPIRATORY: Airway is open and patent, respirations are spontaneous, patient has a normal effort and rate, no accessory muscle use noted, pt placed on continuous pulse ox with O2 sats noted at 97% on room air.  CARDIAC: Pt placed on cardiac monitor. Patient has a normal rate and regular rhythm, no edema noted, capillary refill < 3 seconds.   GASTRO: Soft and non tender to palpation, no distention noted, normoactive bowel sounds present in all four quadrants. Pt states bowel movements have been regular.  : Pt denies any pain or frequency with urination.  NEURO: Pt opens eyes spontaneously, behavior appropriate to situation, follows commands, facial expression symmetrical, bilateral hand grasp equal and even, purposeful motor response noted, normal sensation in all extremities when touched with a finger. Pt states feeling weaker than normal.

## 2022-05-13 PROBLEM — I95.9 HYPOTENSION: Status: ACTIVE | Noted: 2022-05-13

## 2022-05-13 LAB
ALBUMIN SERPL-MCNC: 2.4 GM/DL (ref 3.4–4.8)
ALBUMIN/GLOB SERPL: 1.1 RATIO (ref 1.1–2)
ALP SERPL-CCNC: 68 UNIT/L (ref 40–150)
ALT SERPL-CCNC: 15 UNIT/L (ref 0–55)
AST SERPL-CCNC: 18 UNIT/L (ref 5–34)
BASOPHILS # BLD AUTO: 0.03 X10(3)/MCL (ref 0–0.2)
BASOPHILS NFR BLD AUTO: 0.9 %
BILIRUBIN DIRECT+TOT PNL SERPL-MCNC: 0.4 MG/DL
BUN SERPL-MCNC: 44.1 MG/DL (ref 9.8–20.1)
CALCIUM SERPL-MCNC: 8.1 MG/DL (ref 8.4–10.2)
CHLORIDE SERPL-SCNC: 96 MMOL/L (ref 98–107)
CO2 SERPL-SCNC: 27 MMOL/L (ref 23–31)
CREAT SERPL-MCNC: 10.28 MG/DL (ref 0.55–1.02)
EOSINOPHIL # BLD AUTO: 0.1 X10(3)/MCL (ref 0–0.9)
EOSINOPHIL NFR BLD AUTO: 2.9 %
ERYTHROCYTE [DISTWIDTH] IN BLOOD BY AUTOMATED COUNT: 14.5 % (ref 11.5–17)
GLOBULIN SER-MCNC: 2.2 GM/DL (ref 2.4–3.5)
GLUCOSE SERPL-MCNC: 81 MG/DL (ref 82–115)
HCT VFR BLD AUTO: 35.1 % (ref 37–47)
HGB BLD-MCNC: 11.6 GM/DL (ref 12–16)
IMM GRANULOCYTES # BLD AUTO: 0.01 X10(3)/MCL (ref 0–0.02)
IMM GRANULOCYTES NFR BLD AUTO: 0.3 % (ref 0–0.43)
LYMPHOCYTES # BLD AUTO: 1.27 X10(3)/MCL (ref 0.6–4.6)
LYMPHOCYTES NFR BLD AUTO: 36.5 %
Lab: 50
MCH RBC QN AUTO: 30.4 PG (ref 27–31)
MCHC RBC AUTO-ENTMCNC: 33 MG/DL (ref 33–36)
MCV RBC AUTO: 92.1 FL (ref 80–94)
MONOCYTES # BLD AUTO: 0.33 X10(3)/MCL (ref 0.1–1.3)
MONOCYTES NFR BLD AUTO: 9.5 %
NEUTROPHILS # BLD AUTO: 1.7 X10(3)/MCL (ref 2.1–9.2)
NEUTROPHILS NFR BLD AUTO: 49.9 %
NRBC BLD AUTO-RTO: 0 %
PLATELET # BLD AUTO: 182 X10(3)/MCL (ref 130–400)
PMV BLD AUTO: 9.7 FL (ref 9.4–12.4)
POTASSIUM SERPL-SCNC: 3.3 MMOL/L (ref 3.5–5.1)
PROT SERPL-MCNC: 4.6 GM/DL (ref 5.8–7.6)
RBC # BLD AUTO: 3.81 X10(6)/MCL (ref 4.2–5.4)
SODIUM SERPL-SCNC: 136 MMOL/L (ref 136–145)
WBC # FLD AUTO: 50 /MCL (ref 0–10)
WBC # SPEC AUTO: 3.5 X10(3)/MCL (ref 4.5–11.5)

## 2022-05-13 PROCEDURE — 63600175 PHARM REV CODE 636 W HCPCS: Performed by: NURSE PRACTITIONER

## 2022-05-13 PROCEDURE — 11000001 HC ACUTE MED/SURG PRIVATE ROOM

## 2022-05-13 PROCEDURE — 25000003 PHARM REV CODE 250: Performed by: STUDENT IN AN ORGANIZED HEALTH CARE EDUCATION/TRAINING PROGRAM

## 2022-05-13 PROCEDURE — 85025 COMPLETE CBC W/AUTO DIFF WBC: CPT | Performed by: NURSE PRACTITIONER

## 2022-05-13 PROCEDURE — 36415 COLL VENOUS BLD VENIPUNCTURE: CPT | Performed by: NURSE PRACTITIONER

## 2022-05-13 PROCEDURE — 80053 COMPREHEN METABOLIC PANEL: CPT | Performed by: NURSE PRACTITIONER

## 2022-05-13 RX ORDER — POTASSIUM CHLORIDE 20 MEQ/1
40 TABLET, EXTENDED RELEASE ORAL ONCE
Status: COMPLETED | OUTPATIENT
Start: 2022-05-13 | End: 2022-05-13

## 2022-05-13 RX ADMIN — ENOXAPARIN SODIUM 30 MG: 30 INJECTION SUBCUTANEOUS at 05:05

## 2022-05-13 RX ADMIN — POTASSIUM CHLORIDE 40 MEQ: 1500 TABLET, EXTENDED RELEASE ORAL at 05:05

## 2022-05-13 NOTE — PROGRESS NOTES
05/13/22 1600   Peritoneal Dialysis   Exchange Type Cycler   Peritoneal Treatment Status Started   Dianeal Solution Dextrose 1.5% in 6000 mL   Peritoneal dialysis cath accessed per p&p. New dressing applied per p&p.

## 2022-05-13 NOTE — PROGRESS NOTES
[facility] Hospital Medicine Progress Note        Chief Complaint: Inpatient Follow-up for hypotension    HPI:   Malissa Schwartz is a 72 y.o. 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 PT has ESRD on PD. She typically does her dialysis at night. No reports of cloudy, bloody , or change in PD fluid. No reports of fever, cough, congestion N/V/D or any sick contacts. Pt denies any chest pain. Labs significant for BUN 38.4, Creat 10.07, lactic acid 2.1, other indicis unremarkable. No reports of redness or drainage from PD site. Pt is admitted to the hospital medicine service with a consult to nephrology.    Interval Hx:   Feels well and very anxious for discharge.  RN reports hypokalemia o/w no issues.  Afebrile, on room air, and hemodynamically stable.  at bedside.    Objective/physical exam:  General: in no acute distress   HENT: normocephalic, atraumatic   Eye: PERRL, EOMI, clear conjunctiva   Neck: full ROM, no thyromegaly, no JVD   Respiratory: clear to auscultation bilaterally   Cardiovascular: regular rate and rhythm, no murmur   Gastrointestinal: non-distended, positive bowel sounds, non-tender with PD catheter   Musculoskeletal: no gross deformity   Integumentary: warm, dry, intact, no rashes   Neurological: cranial nerves grossly intact, no focal neurological  deficit   Psychiatric: cooperative, flat affect, depressed      VITAL SIGNS: 24 HRS MIN & MAX LAST   Temp  Min: 97.5 °F (36.4 °C)  Max: 98.1 °F (36.7 °C) 98 °F (36.7 °C)   BP  Min: 119/77  Max: 151/84 136/77   Pulse  Min: 56  Max: 75  60   Resp  Min: 11  Max: 23 20   SpO2  Min: 96 %  Max: 100 % 98 %       Recent Labs   Lab 05/12/22  1212 05/13/22  0634   WBC 5.3 3.5*   RBC 4.62 3.81*   HGB 14.5 11.6*   HCT 42.7 35.1*   MCV 92.4 92.1   MCH 31.4* 30.4   MCHC 34.0 33.0   RDW 14.6 14.5    182   MPV 9.7 9.7       Recent Labs   Lab 05/12/22 1212 05/13/22  0425    136   K 3.7 3.3*   CO2 27 27   BUN 38.4* 44.1*   CREATININE 10.07* 10.28*   CALCIUM 9.2 8.1*   ALBUMIN 3.3* 2.4*   ALKPHOS 93 68   ALT 20 15   AST 23 18   BILITOT 0.5 0.4          Microbiology Results (last 7 days)     Procedure Component Value Units Date/Time    Body Fluid Culture [[id number]] Collected: 05/12/22 1805    Order Status: Completed Specimen: Abdominal Fluid Updated: 05/13/22 0738     Body Fluid Culture No Growth At 24 Hours     GRAM STAIN Moderate WBC observed      No bacteria seen     Blood culture #2 **CANNOT BE ORDERED STAT** [[id number]] Collected: 05/12/22 1231    Order Status: Resulted Specimen: Blood from Hand, Right Updated: 05/12/22 1238    Blood culture #1 **CANNOT BE ORDERED STAT** [[id number]] Collected: 05/12/22 1230    Order Status: Resulted Specimen: Blood from Arm, Right Updated: 05/12/22 1238           X-Ray Chest PA And Lateral  Narrative: EXAMINATION:  XR CHEST PA AND LATERAL    CLINICAL HISTORY:  chest pain;    TECHNIQUE:  PA and lateral views of the chest.    COMPARISON:  Chest radiograph 12/25/2021.    FINDINGS:  The cardiomediastinal silhouette is stable in size and contour. Pulmonary vascularity is within normal limits. The lungs are well-inflated and are without focal consolidation, pleural effusion, or pneumothorax.    The imaged osseous structures and soft tissues are without acute abnormality.  Redemonstrated  peripherally calcified breast implants.  Impression: No acute cardiopulmonary process.  No significant interval radiographic change in comparison with 12/25/2021.    Electronically signed by: Jaime Costa  Date:    05/12/2022  Time:    11:50    Scheduled Med:   enoxaparin  30 mg Subcutaneous Daily    potassium chloride  40 mEq Oral Once        Continuous Infusions:  None.    PRN Meds:  acetaminophen, dextrose 10%, dextrose 10%, glucagon (human recombinant), glucose, glucose, naloxone, ondansetron, sodium chloride 0.9%       Assessment/Plan:  ESRD on PD  Hypovolemia likely due to aggressive volume removal during PD   Anemia of chronic disease  Hypokalemia  Anxiety and depression      Plan:  Replace potassium  No evidence of peritonitis   No need for antibiotics   BP improved in ED after 2 liters of IVF's   Consulted nephrology to adjust PD prescription      Patient condition:  Stable    Anticipated discharge and Disposition:  Home tomorrow    All diagnosis and differential diagnosis have been reviewed; assessment and plan has been documented; I have personally reviewed the labs and test results that are presently available; I have reviewed the patients medication list; I have reviewed the consulting providers response and recommendations. I have reviewed or attempted to review medical records based upon their availability    All of the patient's questions have been  addressed and answered. Patient's is agreeable to the above stated plan. I will continue to monitor closely and make adjustments to medical management as needed.    Laureano Maravilla MD   05/13/2022

## 2022-05-13 NOTE — CONSULTS
Nephrology Initial Consult Note    Patient Name: Malissa Schwartz  Age: 72 y.o.  : 1950  MRN: [id number]  Admission Date: 2022    Reason for Consult:      ESRD on PD  Self, Aaareferral    HPI:     Malissa Schwartz is a 72 y.o. female who presents with hypotension.  Past medical history significant for ESRD on peritoneal dialysis nightly, history of renal transplant status post rejection, anemia, disease, depression, hyperlipidemia, and hypertension.  She presents to the hospital on 2022 persistent hypotension.  She is followed by Dr. Mendoza for her PD.  She had a clinic visit with him on Monday to be admitted.  She continued to be hypotensive over the next few days and was advised to come hospital for further evaluation.  She does have associated weakness. She says with 1.5% bags she normally has 500-1500ml UF.  No chest pain, shortness of breath, abdominal pain, nausea, vomiting, dysuria, or lower extremity edema.  Her normal PD prescription is 4 cycles, 2.2 L per cycle, 10.5 hours, no last fill.  Her PD was held last night and her blood pressures are better today.        Current Facility-Administered Medications   Medication Dose Route Frequency Provider Last Rate Last Admin    acetaminophen tablet 650 mg  650 mg Oral Q4H PRN Agatha Craig, FNP        dextrose 10% bolus 125 mL  12.5 g Intravenous PRN Agatha Craig, FNP        dextrose 10% bolus 250 mL  25 g Intravenous PRN Agatha Craig, FNP        enoxaparin injection 30 mg  30 mg Subcutaneous Daily Agatha Craig FNP   30 mg at 22 1900    glucagon (human recombinant) injection 1 mg  1 mg Intramuscular PRN Agatha Craig, FNP        glucose chewable tablet 16 g  16 g Oral PRN Agatha Craig, FNP        glucose chewable tablet 24 g  24 g Oral PRN Agatha Craig, FNP        naloxone 0.4 mg/mL injection 0.02 mg  0.02 mg Intravenous PRN Agatha Craig, FNP        ondansetron  injection 4 mg  4 mg Intravenous Q8H PRN LETHA Langford        sodium chloride 0.9% flush 10 mL  10 mL Intravenous Q12H PRN LETHA Langford MD    Past Medical History:   Diagnosis Date    Acute rejection of kidney transplant 9/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 Glomerulitis with one activated endothelial cell: treated with Solumedrol pulse 375 mg IVBP x 3 doses with short prednisone taper; thymoglobin 100 mg x 6 doses 10/04/2007 minimal histologic changes no acute rejection     Allergy     Anemia of renal disease     CKD (chronic kidney disease), stage IV     CMV (cytomegalovirus) status positive     Colon polyp     benign    Depression     GN (glomerulonephritis)     GN (glomerulonephritis)     unknown type    Herpes infection, other 2009    on buttocks    Hyperlipidemia     Hypertension     Immunosuppression     Living-donor kidney transplant recipient 01/15/2007    Meningitis     history of viral meningitis    Proteinuria 1/5/2015    Proteinuria     Pulmonary nodules     Renal hypertension     S/P breast augmentation     Skin cancer     Urinary tract infection       Past Surgical History:   Procedure Laterality Date    COLON SURGERY      COLONOSCOPY W/ BIOPSIES      cosemetic surgery      facelift and breast augmentation     HYSTERECTOMY      KIDNEY TRANSPLANT      NEPHRECTOMY Left     9/2016    RENAL BIOPSY      TONSILLECTOMY, ADENOIDECTOMY        Family History   Problem Relation Age of Onset    Kidney disease Sister         ESRD    Kidney disease Brother         CKD from HTN    Hypertension Brother     Heart failure Mother     Heart failure Father     Diabetes Neg Hx     Stroke Neg Hx      Social History     Tobacco Use    Smoking status: Former Smoker     Packs/day: 1.00     Years: 15.00     Pack years: 15.00     Types: Cigarettes    Smokeless tobacco: Never Used    Tobacco comment: quit in  2014; smoked for 15 years and at the most smoked 1 ppd   Substance Use Topics    Alcohol use: Yes     Comment: seldom     Medications Prior to Admission   Medication Sig Dispense Refill Last Dose    furosemide (LASIX) 20 MG tablet Take 1 tablet (20 mg total) by mouth 2 (two) times daily. (Patient taking differently: Take 20 mg by mouth once daily.) 30 tablet 11     [DISCONTINUED] amLODIPine (NORVASC) 10 MG tablet TAKE ONE TABLET BY MOUTH ONCE DAILY 30 tablet 10 5/5/2022    [DISCONTINUED] losartan (COZAAR) 100 MG tablet TAKE ONE TABLET BY MOUTH ONCE DAILY 90 tablet 3     [DISCONTINUED] zolpidem (AMBIEN CR) 12.5 MG CR tablet Take 12.5 mg by mouth nightly as needed.        multivitamin capsule Take 1 capsule by mouth once daily.       [DISCONTINUED] calcium carbonate (OS-STEVEN) 600 mg (1,500 mg) Tab Take 600 mg by mouth once daily.        [DISCONTINUED] cloNIDine 0.2 mg/24 hr td ptwk (CATAPRES-TTS-2) 0.2 mg/24 hr Place 1 patch onto the skin every 7 days. (Patient not taking: Reported on 5/12/2022) 4 patch 11 Not Taking at Unknown time    [DISCONTINUED] metoprolol succinate (TOPROL-XL) 25 MG 24 hr tablet Take 2 tablets (50 mg total) by mouth once daily. (Patient not taking: Reported on 5/12/2022) 60 tablet 3 Not Taking at Unknown time    [DISCONTINUED] mycophenolate (CELLCEPT) 250 mg Cap Take 2 capsules (500 mg total) by mouth 2 (two) times daily. Z94.0 kidney transplant (Patient not taking: Reported on 5/12/2022) 120 capsule 11 Not Taking at Unknown time    [DISCONTINUED] predniSONE (DELTASONE) 5 MG tablet TAKE ONE TABLET BY MOUTH ONCE DAILY (Patient not taking: Reported on 5/12/2022) 30 tablet 11 Not Taking at Unknown time    [DISCONTINUED] tacrolimus (PROGRAF) 1 MG Cap Take 3mg every AM and 2mg every PM.  Route: Oral.  Z94.0 Kidney Transplant. (Patient not taking: Reported on 5/12/2022) 150 capsule 11 Not Taking at Unknown time     Review of patient's allergies indicates:   Allergen Reactions    Codeine  Palpitations and Rash            Review of Systems:     Comprehensive 10pt ROS negative except as noted per HPI.      Objective:       VITAL SIGNS: 24 HR MIN & MAX LAST    Temp  Min: 97.5 °F (36.4 °C)  Max: 98.1 °F (36.7 °C)  97.5 °F (36.4 °C)        BP  Min: 68/54  Max: 151/84  125/66     Pulse  Min: 56  Max: 76  (!) 56     Resp  Min: 11  Max: 23  20    SpO2  Min: 96 %  Max: 100 %  98 %      GEN: Chronically ill appearing WF in NAD  HEENT: Conjunctiva anicteric, pupils equal  CV: RRR +S1,S2 without murmur  PULM: CTAB, unlabored  ABD: Soft, NT/ND abdomen with NABS  EXT: No cyanosis or edema  SKIN: Warm and dry  PSYCH: Awake, alert and appropriately conversant.   Dialysis access: LLQ PD catheter             Component Value Date/Time    CHLORIDE 96 (L) 05/13/2022 0425    CHLORIDE 93 (L) 05/12/2022 1212    CO2 27 05/13/2022 0425    CO2 27 05/12/2022 1212    CO2 23 06/20/2018 1402    CO2 22 (L) 01/08/2018 0835    BUN 44.1 (H) 05/13/2022 0425    BUN 38.4 (H) 05/12/2022 1212    BUN 30 (H) 06/20/2018 1402    BUN 24 (H) 01/08/2018 0835    CREATININE 10.28 (H) 05/13/2022 0425    CREATININE 10.07 (H) 05/12/2022 1212    CREATININE 1.9 (H) 06/20/2018 1402    CREATININE 1.8 (H) 01/08/2018 0835    CALCIUM 8.1 (L) 05/13/2022 0425    CALCIUM 9.2 05/12/2022 1212    CALCIUM 9.7 06/20/2018 1402    CALCIUM 9.1 01/08/2018 0835            Component Value Date/Time    WBC 3.5 (L) 05/13/2022 0634    WBC 5.3 05/12/2022 1212    WBC 6.90 06/20/2018 1402    WBC 3.37 (L) 01/08/2018 0835    HGB 11.6 (L) 05/13/2022 0634    HGB 14.5 05/12/2022 1212    HGB 10.5 (L) 06/20/2018 1402    HGB 9.1 (L) 01/08/2018 0835    HCT 35.1 (L) 05/13/2022 0634    HCT 42.7 05/12/2022 1212    HCT 31.6 (L) 06/20/2018 1402    HCT 27.3 (L) 01/08/2018 0835             X-Ray Chest PA And Lateral   Final Result      No acute cardiopulmonary process.  No significant interval radiographic change in comparison with 12/25/2021.         Electronically signed by: Jaime Costa    Date:    05/12/2022   Time:    11:50          Assessment / Plan:       Active Hospital Problems    Diagnosis  POA    *Hypotension [I95.9]  Yes      Resolved Hospital Problems   No resolved problems to display.       Hypotension  ESRD on PD - Normal Rx 10.5h, 4 cycles, 2.2L/cycle, 1.5% bags.   Hypokalemia  Anemia    Plan:  PD held last night and BP improved. Will decrease PD Rx to 4 cycles, 1.8L/cycle, 8h, 1.5% bags. Would like to do a session of PD tonight to ensure her BP is tolerating it. If BP remains stable, she can likely be discharged with new Rx tomorrow. No peritonitis on PD fluid studies. Will follow.       Isaiah Mosquera DO  Nephrology  [facility]  Clinic number: [phone]

## 2022-05-14 VITALS
OXYGEN SATURATION: 99 % | HEIGHT: 65 IN | TEMPERATURE: 98 F | BODY MASS INDEX: 21.96 KG/M2 | DIASTOLIC BLOOD PRESSURE: 94 MMHG | SYSTOLIC BLOOD PRESSURE: 149 MMHG | WEIGHT: 131.81 LBS | RESPIRATION RATE: 18 BRPM | HEART RATE: 79 BPM

## 2022-05-14 LAB
ALBUMIN SERPL-MCNC: 2.3 GM/DL (ref 3.4–4.8)
ALBUMIN/GLOB SERPL: 0.9 RATIO (ref 1.1–2)
ALP SERPL-CCNC: 71 UNIT/L (ref 40–150)
ALT SERPL-CCNC: 13 UNIT/L (ref 0–55)
AST SERPL-CCNC: 17 UNIT/L (ref 5–34)
BASOPHILS # BLD AUTO: 0.02 X10(3)/MCL (ref 0–0.2)
BASOPHILS NFR BLD AUTO: 0.6 %
BILIRUBIN DIRECT+TOT PNL SERPL-MCNC: 0.3 MG/DL
BUN SERPL-MCNC: 46.6 MG/DL (ref 9.8–20.1)
CALCIUM SERPL-MCNC: 8.5 MG/DL (ref 8.4–10.2)
CHLORIDE SERPL-SCNC: 98 MMOL/L (ref 98–107)
CO2 SERPL-SCNC: 24 MMOL/L (ref 23–31)
CREAT SERPL-MCNC: 10.98 MG/DL (ref 0.55–1.02)
EOSINOPHIL # BLD AUTO: 0.09 X10(3)/MCL (ref 0–0.9)
EOSINOPHIL NFR BLD AUTO: 2.9 %
ERYTHROCYTE [DISTWIDTH] IN BLOOD BY AUTOMATED COUNT: 14.7 % (ref 11.5–17)
GLOBULIN SER-MCNC: 2.7 GM/DL (ref 2.4–3.5)
GLUCOSE SERPL-MCNC: 76 MG/DL (ref 82–115)
HCT VFR BLD AUTO: 32.2 % (ref 37–47)
HGB BLD-MCNC: 10.6 GM/DL (ref 12–16)
IMM GRANULOCYTES # BLD AUTO: 0.02 X10(3)/MCL (ref 0–0.02)
IMM GRANULOCYTES NFR BLD AUTO: 0.6 % (ref 0–0.43)
LYMPHOCYTES # BLD AUTO: 1.08 X10(3)/MCL (ref 0.6–4.6)
LYMPHOCYTES NFR BLD AUTO: 34.3 %
MCH RBC QN AUTO: 31.4 PG (ref 27–31)
MCHC RBC AUTO-ENTMCNC: 32.9 MG/DL (ref 33–36)
MCV RBC AUTO: 95.3 FL (ref 80–94)
MONOCYTES # BLD AUTO: 0.33 X10(3)/MCL (ref 0.1–1.3)
MONOCYTES NFR BLD AUTO: 10.5 %
NEUTROPHILS # BLD AUTO: 1.6 X10(3)/MCL (ref 2.1–9.2)
NEUTROPHILS NFR BLD AUTO: 51.1 %
NRBC BLD AUTO-RTO: 0 %
PLATELET # BLD AUTO: 166 X10(3)/MCL (ref 130–400)
PMV BLD AUTO: 9.8 FL (ref 9.4–12.4)
POTASSIUM SERPL-SCNC: 3.9 MMOL/L (ref 3.5–5.1)
PROT SERPL-MCNC: 5 GM/DL (ref 5.8–7.6)
RBC # BLD AUTO: 3.38 X10(6)/MCL (ref 4.2–5.4)
SODIUM SERPL-SCNC: 136 MMOL/L (ref 136–145)
WBC # SPEC AUTO: 3.2 X10(3)/MCL (ref 4.5–11.5)

## 2022-05-14 PROCEDURE — 85025 COMPLETE CBC W/AUTO DIFF WBC: CPT | Performed by: NURSE PRACTITIONER

## 2022-05-14 PROCEDURE — 80053 COMPREHEN METABOLIC PANEL: CPT | Performed by: NURSE PRACTITIONER

## 2022-05-14 PROCEDURE — 90945 DIALYSIS ONE EVALUATION: CPT

## 2022-05-14 PROCEDURE — 27200950 HC CAPD SUPPORT

## 2022-05-14 PROCEDURE — 36415 COLL VENOUS BLD VENIPUNCTURE: CPT | Performed by: NURSE PRACTITIONER

## 2022-05-14 NOTE — NURSING
DC instructions reviewed with pt, she verbalized understanding, IV removed by pt, pressure dressing applied, tele removed, PD dialysis order were given by NP to patient. NP will follow up with outpatient center for orders/prescription. pt is AAOX4, respiration even and unlabored on RA, state no pain or distress. Pt transported off unit by wheelchair in stable conditions

## 2022-05-14 NOTE — DISCHARGE SUMMARY
[facility] Hospital Medicine Discharge Summary    Admit Date: 5/12/2022  Discharge Date and Time: 5/14/20222:53 PM  Admitting Physician: Laureano Maravilla MD  Discharging Physician: Laureano Maravilla MD.  Primary Care Physician: Sergio Crabtree MD  Consults: Isaiah Mosquera, DO     Discharge Diagnoses:  1. ESRD on PD  2. Hypotension likely due to excessive volume removal during PD   3. Hypokalemia  4. Anemia of CKD  5. Anxiety and depression    Hospital Course:   Malissa Schwartz is a 72 y.o. white female who has a past medical history of ESRD due to GN (glomerulonephritis) on PD, failed living-donor kidney transplant recipient, Depression,  Hyperlipidemia, Hypertension, who presents to the ED at [facility] on 5/12/2022 with a primary complaint of weakness.No reports of cloudy, bloody, or change in PD fluid. No reports of fever, cough, congestion N/V/D or any sick contacts. Pt denies any chest pain. Labs significant for BUN 38.4, Creat 10.07, lactic acid 2.1, other indicis unremarkable. No reports of redness or drainage from PD site. PD fluid with no evidence of infection and pt without evidence of peritonitis. Pt was found to be hypotensive and hemoconcentrated.  She has been holding her antihypertensives for the past week. She was given 2 liters of IVF's with improvement. Pt was admitted to the hospital medicine service with a consult to nephrology. Her normal PD prescription is 4 cycles, 2.2 L per cycle, 10.5 hours, no last fill.  Her PD was held on the night of admission and her blood pressure has improved.  PD prescription was changed by nephrology to to 4 cycles, 1.8 L per cycle, 1.5% bags with continued improvement and pt was cleared for discharge by nephrology.  They notified PD Nurse on-call at [facility] and provided new prescription.    Vitals:  VITAL SIGNS: 24 HRS MIN & MAX LAST   Temp  Min: 97.6 °F (36.4 °C)  Max: 98.4 °F (36.9 °C) 98.2 °F (36.8 °C)   BP  Min: 116/73   Max: 149/94 (!) 149/94   Pulse  Min: 63  Max: 79  79   Resp  Min: 17  Max: 20 18   SpO2  Min: 95 %  Max: 99 % 99 %       Physical Exam:  General: in no acute distress   HENT: normocephalic, atraumatic   Eye: PERRL, EOMI, clear conjunctiva   Neck: full ROM, no thyromegaly, no JVD   Respiratory: clear to auscultation bilaterally   Cardiovascular: regular rate and rhythm, no murmur   Gastrointestinal: non-distended, positive bowel sounds, non-tender with PD catheter   Musculoskeletal: no gross deformity   Integumentary: warm, dry, intact, no rashes   Neurological: cranial nerves grossly intact, no focal neurological deficit   Psychiatric: cooperative, flat affect, depressed    Procedures Performed: No admission procedures for hospital encounter.     Significant Diagnostic Studies: See Full reports for all details    Recent Labs   Lab 05/12/22  1212 05/13/22  0634 05/14/22  0608   WBC 5.3 3.5* 3.2*   RBC 4.62 3.81* 3.38*   HGB 14.5 11.6* 10.6*   HCT 42.7 35.1* 32.2*   MCV 92.4 92.1 95.3*   MCH 31.4* 30.4 31.4*   MCHC 34.0 33.0 32.9*   RDW 14.6 14.5 14.7    182 166   MPV 9.7 9.7 9.8       Recent Labs   Lab 05/12/22  1212 05/13/22  0425 05/14/22  0608    136 136   K 3.7 3.3* 3.9   CO2 27 27 24   BUN 38.4* 44.1* 46.6*   CREATININE 10.07* 10.28* 10.98*   CALCIUM 9.2 8.1* 8.5   ALBUMIN 3.3* 2.4* 2.3*   ALKPHOS 93 68 71   ALT 20 15 13   AST 23 18 17   BILITOT 0.5 0.4 0.3        Microbiology Results (last 7 days)     Procedure Component Value Units Date/Time    Body Fluid Culture [[id number]] Collected: 05/12/22 1805    Order Status: Completed Specimen: Abdominal Fluid Updated: 05/14/22 0914     Body Fluid Culture No Growth At 48 Hours     GRAM STAIN Moderate WBC observed      No bacteria seen            X-Ray Chest PA And Lateral  Narrative: EXAMINATION:  XR CHEST PA AND LATERAL    CLINICAL HISTORY:  chest pain;    TECHNIQUE:  PA and lateral views of the chest.    COMPARISON:  Chest radiograph  12/25/2021.    FINDINGS:  The cardiomediastinal silhouette is stable in size and contour. Pulmonary vascularity is within normal limits. The lungs are well-inflated and are without focal consolidation, pleural effusion, or pneumothorax.    The imaged osseous structures and soft tissues are without acute abnormality.  Redemonstrated peripherally calcified breast implants.  Impression: No acute cardiopulmonary process.  No significant interval radiographic change in comparison with 12/25/2021.    Electronically signed by: Jaime Costa  Date:    05/12/2022  Time:    11:50         Medication List      CHANGE how you take these medications    furosemide 20 MG tablet  Commonly known as: LASIX  Take 1 tablet (20 mg total) by mouth 2 (two) times daily.  What changed: when to take this        CONTINUE taking these medications    multivitamin capsule        STOP taking these medications    amLODIPine 10 MG tablet  Commonly known as: NORVASC     calcium carbonate 600 mg calcium (1,500 mg) Tab  Commonly known as: OS-STEVEN     cloNIDine 0.2 mg/24 hr td ptwk 0.2 mg/24 hr  Commonly known as: CATAPRES-TTS-2     losartan 100 MG tablet  Commonly known as: COZAAR     metoprolol succinate 25 MG 24 hr tablet  Commonly known as: TOPROL-XL     mycophenolate 250 mg Cap  Commonly known as: CELLCEPT     predniSONE 5 MG tablet  Commonly known as: DELTASONE     tacrolimus 1 MG Cap  Commonly known as: PROGRAF     zolpidem 12.5 MG CR tablet  Commonly known as: AMBIEN CR             Explained in detail to the patient about the discharge plan, medications, and follow-up visits. Pt understands and agrees with the treatment plan  Discharged Condition: stable  Diet- Renal  Medications Per ND med rec  Activities as tolerated   Follow-up Information     George Mendoza MD. Go on 6/13/2022.    Specialty: Nephrology  Why: @ 1:10 pm  Contact information:  [facility].  [place] [address]  [phone]                       For further questions  contact hospitalist office    Discharge time 35 minutes    For worsening symptoms, chest pain, shortness of breath, increased abdominal pain, high grade fever, stroke or stroke like symptoms, immediately go to the nearest Emergency Room or call 911 as soon as possible.      Laureano Rosales M.D, on 5/14/2022. at 2:53 PM.

## 2022-05-14 NOTE — PROGRESS NOTES
Nephrology consult follow up note    HPI:      Malissa Schwartz is a 72 y.o. female who presents with hypotension.  Past medical history significant for ESRD on peritoneal dialysis nightly, history of renal transplant status post rejection, anemia, disease, depression, hyperlipidemia, and hypertension.  She presents to the hospital on 05/12/2022 persistent hypotension.  She is followed by Dr. Mendoza for her PD.  She had a clinic visit with him on Monday to be admitted.  She continued to be hypotensive over the next few days and was advised to come hospital for further evaluation.  She does have associated weakness. She says with 1.5% bags she normally has 500-1500ml UF.  No chest pain, shortness of breath, abdominal pain, nausea, vomiting, dysuria, or lower extremity edema.  Her normal PD prescription is 4 cycles, 2.2 L per cycle, 10.5 hours, no last fill.  Her PD was held last night and her blood pressures are better today.  We were consulted for management of RRT, PD.    Interval history:     Tolerated PD well last night per her report. BP now stable, 120-130/70 range.     Review of Systems:     Comprehensive 10pt ROS negative except as noted per HPI.     Past medical, family, surgical, and social history reviewed and unchanged from initial consult note.     MAR reviewed. See bottom of note for current medications.    Objective:       VITAL SIGNS: 24 HR MIN & MAX LAST    Temp  Min: 97.6 °F (36.4 °C)  Max: 98.4 °F (36.9 °C)  97.6 °F (36.4 °C)        BP  Min: 116/73  Max: 145/79  (!) 145/79     Pulse  Min: 60  Max: 71  69     Resp  Min: 17  Max: 20  17    SpO2  Min: 95 %  Max: 98 %  98 %      GEN: Chronically ill appearing in NAD  HEENT: Conjunctiva anicteric, pupils equal, MMM, OP benign  CV: RRR +S1,S2 without murmur  PULM: CTAB, unlabored  ABD: Soft, NT/ND abdomen with NABS  EXT: No cyanosis or edema  SKIN: Warm and dry  PSYCH: Warm and dry  Vascular access: PD catheter  24h I&O: 1400/803(with PD fluid removal),            Component Value Date/Time     05/14/2022 0608     05/13/2022 0425     06/20/2018 1402     01/08/2018 0835    K 3.9 05/14/2022 0608    K 3.3 (L) 05/13/2022 0425    K 4.8 06/20/2018 1402    K 4.6 01/08/2018 0835     06/20/2018 1402     01/08/2018 0835    CO2 24 05/14/2022 0608    CO2 27 05/13/2022 0425    CO2 23 06/20/2018 1402    CO2 22 (L) 01/08/2018 0835    BUN 46.6 (H) 05/14/2022 0608    BUN 44.1 (H) 05/13/2022 0425    BUN 30 (H) 06/20/2018 1402    BUN 24 (H) 01/08/2018 0835    CREATININE 10.98 (H) 05/14/2022 0608    CREATININE 10.28 (H) 05/13/2022 0425    CREATININE 1.9 (H) 06/20/2018 1402    CREATININE 1.8 (H) 01/08/2018 0835    CALCIUM 8.5 05/14/2022 0608    CALCIUM 8.1 (L) 05/13/2022 0425    CALCIUM 9.7 06/20/2018 1402    CALCIUM 9.1 01/08/2018 0835    PHOS 6.7 (H) 05/01/2021 0005    PHOS 3.8 06/20/2018 1402            Component Value Date/Time    WBC 3.2 (L) 05/14/2022 0608    WBC 3.5 (L) 05/13/2022 0634    WBC 6.90 06/20/2018 1402    WBC 3.37 (L) 01/08/2018 0835    HGB 10.6 (L) 05/14/2022 0608    HGB 11.6 (L) 05/13/2022 0634    HGB 10.5 (L) 06/20/2018 1402    HGB 9.1 (L) 01/08/2018 0835    HCT 32.2 (L) 05/14/2022 0608    HCT 35.1 (L) 05/13/2022 0634    HCT 31.6 (L) 06/20/2018 1402    HCT 27.3 (L) 01/08/2018 0835     05/14/2022 0608     05/13/2022 0634     06/20/2018 1402     01/08/2018 0835     Imaging reviewed      Assessment / Plan:       Active Hospital Problems    Diagnosis  POA    *Hypotension [I95.9]  Yes      Resolved Hospital Problems   No resolved problems to display.     1. ESRD on PD  2. Hypotension--corrected  3. Hypokalemia  4. Anemia of CKD-stable    BP improved with adjustment in PD to 4 cycles, 1.8l/cycle, 1.5% bags. She is ready to discharge from renal standpoint with new PD prescription as above. Spoke with PD Nurse on-call at [facility] and provided new RX.    Laura Cisneros NP      Current  Facility-Administered Medications   Medication Dose Route Frequency Provider Last Rate Last Admin    acetaminophen tablet 650 mg  650 mg Oral Q4H PRN Agatha Craig, FNP        dextrose 10% bolus 125 mL  12.5 g Intravenous PRN Agatha Craig, FNP        dextrose 10% bolus 250 mL  25 g Intravenous PRN Agatha Craig, FNP        enoxaparin injection 30 mg  30 mg Subcutaneous Daily Agatha Craig, FNP   30 mg at 05/13/22 1724    glucagon (human recombinant) injection 1 mg  1 mg Intramuscular PRN Agatha Craig, FNP        glucose chewable tablet 16 g  16 g Oral PRN Agatha Craig, FNP        glucose chewable tablet 24 g  24 g Oral PRN Agatha Craig, FNP        naloxone 0.4 mg/mL injection 0.02 mg  0.02 mg Intravenous PRN Agatha Craig, FNP        ondansetron injection 4 mg  4 mg Intravenous Q8H PRN Agatha Craig, FNP        sodium chloride 0.9% flush 10 mL  10 mL Intravenous Q12H PRN Agatha Craig, FNP          sudden onset

## 2022-05-16 ENCOUNTER — PATIENT OUTREACH (OUTPATIENT)
Dept: ADMINISTRATIVE | Facility: CLINIC | Age: 72
End: 2022-05-16
Payer: MEDICARE

## 2022-05-16 NOTE — PROGRESS NOTES
C3 nurse attempted to contact Malissa Schwartz for a TCC post hospital discharge follow up call. No answer. Left voicemail with callback information.

## 2022-05-17 LAB
BACTERIA BLD CULT: NORMAL
BACTERIA BLD CULT: NORMAL
BACTERIA FLD CULT: NORMAL
GRAM STN SPEC: NORMAL
GRAM STN SPEC: NORMAL

## 2022-05-17 NOTE — PROGRESS NOTES
C3 nurse spoke with Malissa Schwartz for a TCC post hospital discharge follow up call. The patient has a scheduled HOSFU appointment with Sergio Crabtree MD on 06/07/2022 but patient states she will call her pcp herself and make one for 7-14 days post discharge.       Patient has listed these meds she is taking and states her Kidney Dr knows she is on this.     1-Clonidine 0.3mg patch 1 weekly- taking  2- Spironolactone 25 mg 1 BID  3- Coreig 6.25mg 1 BID  4- Losartan 100mg 1 Qhs  5- Sodium Bicarb-1 BID  6-Lasix 20mg 1 daily

## 2022-10-15 ENCOUNTER — HOSPITAL ENCOUNTER (EMERGENCY)
Facility: HOSPITAL | Age: 72
Discharge: HOME | End: 2022-10-15
Attending: EMERGENCY MEDICINE
Payer: MEDICARE

## 2022-10-15 VITALS
TEMPERATURE: 98 F | BODY MASS INDEX: 21.66 KG/M2 | RESPIRATION RATE: 18 BRPM | OXYGEN SATURATION: 98 % | WEIGHT: 130 LBS | HEIGHT: 65 IN | SYSTOLIC BLOOD PRESSURE: 148 MMHG | HEART RATE: 82 BPM | DIASTOLIC BLOOD PRESSURE: 89 MMHG

## 2022-10-15 DIAGNOSIS — A08.4 VIRAL GASTROENTERITIS: Primary | ICD-10-CM

## 2022-10-15 LAB
ALBUMIN SERPL-MCNC: 2.9 GM/DL (ref 3.4–4.8)
ALBUMIN/GLOB SERPL: 0.7 RATIO (ref 1.1–2)
ALP SERPL-CCNC: 114 UNIT/L (ref 40–150)
ALT SERPL-CCNC: 23 UNIT/L (ref 0–55)
AST SERPL-CCNC: 45 UNIT/L (ref 5–34)
BASOPHILS # BLD AUTO: 0.02 X10(3)/MCL (ref 0–0.2)
BASOPHILS NFR BLD AUTO: 0.6 %
BILIRUBIN DIRECT+TOT PNL SERPL-MCNC: 0.4 MG/DL
BUN SERPL-MCNC: 37.4 MG/DL (ref 9.8–20.1)
CALCIUM SERPL-MCNC: 9.4 MG/DL (ref 8.4–10.2)
CHLORIDE SERPL-SCNC: 97 MMOL/L (ref 98–107)
CO2 SERPL-SCNC: 28 MMOL/L (ref 23–31)
CREAT SERPL-MCNC: 7.67 MG/DL (ref 0.55–1.02)
EOSINOPHIL # BLD AUTO: 0.06 X10(3)/MCL (ref 0–0.9)
EOSINOPHIL NFR BLD AUTO: 1.8 %
ERYTHROCYTE [DISTWIDTH] IN BLOOD BY AUTOMATED COUNT: 14.8 % (ref 11.5–17)
GFR SERPLBLD CREATININE-BSD FMLA CKD-EPI: 5 MLS/MIN/1.73/M2
GLOBULIN SER-MCNC: 4.1 GM/DL (ref 2.4–3.5)
GLUCOSE SERPL-MCNC: 92 MG/DL (ref 82–115)
HCT VFR BLD AUTO: 38.7 % (ref 37–47)
HGB BLD-MCNC: 12.3 GM/DL (ref 12–16)
IMM GRANULOCYTES # BLD AUTO: 0.01 X10(3)/MCL (ref 0–0.04)
IMM GRANULOCYTES NFR BLD AUTO: 0.3 %
LYMPHOCYTES # BLD AUTO: 0.41 X10(3)/MCL (ref 0.6–4.6)
LYMPHOCYTES NFR BLD AUTO: 12.2 %
MCH RBC QN AUTO: 30.5 PG (ref 27–31)
MCHC RBC AUTO-ENTMCNC: 31.8 MG/DL (ref 33–36)
MCV RBC AUTO: 96 FL (ref 80–94)
MONOCYTES # BLD AUTO: 0.15 X10(3)/MCL (ref 0.1–1.3)
MONOCYTES NFR BLD AUTO: 4.5 %
NEUTROPHILS # BLD AUTO: 2.7 X10(3)/MCL (ref 2.1–9.2)
NEUTROPHILS NFR BLD AUTO: 80.6 %
NRBC BLD AUTO-RTO: 0 %
PLATELET # BLD AUTO: 220 X10(3)/MCL (ref 130–400)
PMV BLD AUTO: 9.8 FL (ref 7.4–10.4)
POTASSIUM SERPL-SCNC: 4.4 MMOL/L (ref 3.5–5.1)
PROT SERPL-MCNC: 7 GM/DL (ref 5.8–7.6)
RBC # BLD AUTO: 4.03 X10(6)/MCL (ref 4.2–5.4)
SODIUM SERPL-SCNC: 138 MMOL/L (ref 136–145)
WBC # SPEC AUTO: 3.4 X10(3)/MCL (ref 4.5–11.5)

## 2022-10-15 PROCEDURE — 25000003 PHARM REV CODE 250: Performed by: EMERGENCY MEDICINE

## 2022-10-15 PROCEDURE — 96361 HYDRATE IV INFUSION ADD-ON: CPT

## 2022-10-15 PROCEDURE — 96374 THER/PROPH/DIAG INJ IV PUSH: CPT

## 2022-10-15 PROCEDURE — 85025 COMPLETE CBC W/AUTO DIFF WBC: CPT | Performed by: EMERGENCY MEDICINE

## 2022-10-15 PROCEDURE — 99284 EMERGENCY DEPT VISIT MOD MDM: CPT | Mod: 25

## 2022-10-15 PROCEDURE — 36415 COLL VENOUS BLD VENIPUNCTURE: CPT | Performed by: EMERGENCY MEDICINE

## 2022-10-15 PROCEDURE — 63600175 PHARM REV CODE 636 W HCPCS: Performed by: EMERGENCY MEDICINE

## 2022-10-15 PROCEDURE — 80053 COMPREHEN METABOLIC PANEL: CPT | Performed by: EMERGENCY MEDICINE

## 2022-10-15 RX ORDER — BUTALBITAL, ACETAMINOPHEN AND CAFFEINE 50; 325; 40 MG/1; MG/1; MG/1
2 TABLET ORAL
Status: COMPLETED | OUTPATIENT
Start: 2022-10-15 | End: 2022-10-15

## 2022-10-15 RX ORDER — DIPHENOXYLATE HYDROCHLORIDE AND ATROPINE SULFATE 2.5; .025 MG/1; MG/1
2 TABLET ORAL 4 TIMES DAILY PRN
Qty: 24 TABLET | Refills: 0 | Status: SHIPPED | OUTPATIENT
Start: 2022-10-15 | End: 2022-10-18

## 2022-10-15 RX ORDER — ONDANSETRON 8 MG/1
8 TABLET, ORALLY DISINTEGRATING ORAL EVERY 6 HOURS PRN
Qty: 20 TABLET | Refills: 0 | Status: SHIPPED | OUTPATIENT
Start: 2022-10-15 | End: 2022-10-20

## 2022-10-15 RX ORDER — ONDANSETRON 2 MG/ML
4 INJECTION INTRAMUSCULAR; INTRAVENOUS
Status: COMPLETED | OUTPATIENT
Start: 2022-10-15 | End: 2022-10-15

## 2022-10-15 RX ADMIN — ONDANSETRON 4 MG: 2 INJECTION INTRAMUSCULAR; INTRAVENOUS at 10:10

## 2022-10-15 RX ADMIN — BUTALBITAL, ACETAMINOPHEN AND CAFFEINE 2 TABLET: 50; 325; 40 TABLET ORAL at 11:10

## 2022-10-15 RX ADMIN — SODIUM CHLORIDE 1000 ML: 9 INJECTION, SOLUTION INTRAVENOUS at 10:10

## 2022-10-15 NOTE — ED PROVIDER NOTES
Encounter Date: 10/15/2022       History     Chief Complaint   Patient presents with    Vomiting     Pt to er c/o nausea, vomiting and diarrhea onset last night along with headache and weakness.     The history is provided by the patient. No  was used.   Emesis   This is a new problem. The current episode started yesterday. The problem has been unchanged. The emesis has an appearance of stomach contents. Associated symptoms include chills and diarrhea. Pertinent negatives include no abdominal pain and no fever. Risk factors include ill contacts.    seen in ED 2 days ago for same.    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 Glomerulitis with one activated endothelial cell: treated with Solumedrol pulse 375 mg IVBP x 3 doses with short prednisone taper; thymoglobin 100 mg x 6 doses 10/04/2007 minimal histologic changes no acute rejection     Allergy     Anemia of renal disease     CKD (chronic kidney disease), stage IV     CMV (cytomegalovirus) status positive     Colon polyp     benign    Depression     GN (glomerulonephritis)     GN (glomerulonephritis)     unknown type    Herpes infection, other 2009    on buttocks    Hyperlipidemia     Hypertension     Immunosuppression     Living-donor kidney transplant recipient 01/15/2007    Meningitis     history of viral meningitis    Proteinuria 1/5/2015    Proteinuria     Pulmonary nodules     Renal hypertension     S/P breast augmentation     Skin cancer     Urinary tract infection      Past Surgical History:   Procedure Laterality Date    COLON SURGERY      COLONOSCOPY W/ BIOPSIES      cosemetic surgery      facelift and breast augmentation     HYSTERECTOMY      KIDNEY TRANSPLANT      NEPHRECTOMY Left     9/2016    RENAL BIOPSY      TONSILLECTOMY,  ADENOIDECTOMY       Family History   Problem Relation Age of Onset    Kidney disease Sister         ESRD    Kidney disease Brother         CKD from HTN    Hypertension Brother     Heart failure Mother     Heart failure Father     Diabetes Neg Hx     Stroke Neg Hx      Social History     Tobacco Use    Smoking status: Former     Packs/day: 1.00     Years: 15.00     Pack years: 15.00     Types: Cigarettes    Smokeless tobacco: Never    Tobacco comments:     quit in 2014; smoked for 15 years and at the most smoked 1 ppd   Substance Use Topics    Alcohol use: Yes     Comment: seldom    Drug use: No     Review of Systems   Constitutional:  Positive for chills. Negative for fever.   HENT:  Negative for sore throat.    Respiratory:  Negative for shortness of breath.    Cardiovascular:  Negative for chest pain.   Gastrointestinal:  Positive for diarrhea and vomiting. Negative for abdominal pain and nausea.   Genitourinary:  Negative for dysuria.   Musculoskeletal:  Negative for back pain.   Skin:  Negative for rash.   Neurological:  Negative for weakness.   Hematological:  Does not bruise/bleed easily.     Physical Exam     Initial Vitals [10/15/22 0954]   BP Pulse Resp Temp SpO2   (!) 155/92 83 20 97.9 °F (36.6 °C) 98 %      MAP       --         Physical Exam    Nursing note and vitals reviewed.  Constitutional: She appears well-developed and well-nourished.   HENT:   Head: Normocephalic and atraumatic.   Right Ear: External ear normal.   Left Ear: External ear normal.   Eyes: Conjunctivae and EOM are normal. Pupils are equal, round, and reactive to light.   Neck: Neck supple.   Normal range of motion.  Cardiovascular:  Normal rate, regular rhythm, normal heart sounds and intact distal pulses.           Pulmonary/Chest: Breath sounds normal.   Abdominal: Abdomen is soft. Bowel sounds are normal.   Musculoskeletal:         General: Normal range of motion.      Cervical back: Normal range of motion and neck supple.      Neurological: She is alert and oriented to person, place, and time. GCS score is 15. GCS eye subscore is 4. GCS verbal subscore is 5. GCS motor subscore is 6.   Skin: Skin is warm and dry. Capillary refill takes less than 2 seconds.   Psychiatric: She has a normal mood and affect. Her behavior is normal. Judgment and thought content normal.       ED Course   Procedures  Labs Reviewed   COMPREHENSIVE METABOLIC PANEL - Abnormal; Notable for the following components:       Result Value    Chloride 97 (*)     Blood Urea Nitrogen 37.4 (*)     Creatinine 7.67 (*)     Albumin Level 2.9 (*)     Globulin 4.1 (*)     Albumin/Globulin Ratio 0.7 (*)     Aspartate Aminotransferase 45 (*)     All other components within normal limits   CBC WITH DIFFERENTIAL - Abnormal; Notable for the following components:    WBC 3.4 (*)     RBC 4.03 (*)     MCV 96.0 (*)     MCHC 31.8 (*)     Lymph # 0.41 (*)     All other components within normal limits   CBC W/ AUTO DIFFERENTIAL    Narrative:     The following orders were created for panel order CBC auto differential.  Procedure                               Abnormality         Status                     ---------                               -----------         ------                     CBC with Differential[[id number]]        Abnormal            Final result                 Please view results for these tests on the individual orders.          Imaging Results    None          Medications   sodium chloride 0.9% bolus 1,000 mL (1,000 mLs Intravenous New Bag 10/15/22 1008)   butalbital-acetaminophen-caffeine -40 mg per tablet 2 tablet (has no administration in time range)   ondansetron injection 4 mg (4 mg Intravenous Given 10/15/22 1008)                              Clinical Impression:   Final diagnoses:  [A08.4] Viral gastroenteritis (Primary)      ED Disposition Condition    Discharge Stable          ED Prescriptions       Medication Sig Dispense Start Date End Date Auth. Provider     ondansetron (ZOFRAN-ODT) 8 MG TbDL Take 1 tablet (8 mg total) by mouth every 6 (six) hours as needed (nausea). 20 tablet 10/15/2022 10/20/2022 Naga Iyer MD    diphenoxylate-atropine 2.5-0.025 mg (LOMOTIL) 2.5-0.025 mg per tablet Take 2 tablets by mouth 4 (four) times daily as needed for Diarrhea. 24 tablet 10/15/2022 10/18/2022 Naga Iyer MD          Follow-up Information       Follow up With Specialties Details Why Contact Info    Follow up with your primary MD in 3-5 days if not improved.  Return to ED for worsening symptoms.                 Naga Iyer MD  10/15/22 1673

## 2023-09-26 ENCOUNTER — HOSPITAL ENCOUNTER (INPATIENT)
Facility: HOSPITAL | Age: 73
LOS: 3 days | Discharge: HOME | DRG: 919 | End: 2023-09-29
Attending: STUDENT IN AN ORGANIZED HEALTH CARE EDUCATION/TRAINING PROGRAM | Admitting: INTERNAL MEDICINE
Payer: MEDICARE

## 2023-09-26 DIAGNOSIS — T85.611A PERITONEAL DIALYSIS CATHETER DYSFUNCTION: ICD-10-CM

## 2023-09-26 LAB
ALBUMIN SERPL-MCNC: 2.8 G/DL (ref 3.4–4.8)
ALBUMIN/GLOB SERPL: 0.9 RATIO (ref 1.1–2)
ALP SERPL-CCNC: 118 UNIT/L (ref 40–150)
ALT SERPL-CCNC: 29 UNIT/L (ref 0–55)
AST SERPL-CCNC: 37 UNIT/L (ref 5–34)
BASOPHILS # BLD AUTO: 0.04 X10(3)/MCL
BASOPHILS NFR BLD AUTO: 1 %
BILIRUB SERPL-MCNC: 0.3 MG/DL
BUN SERPL-MCNC: 66.6 MG/DL (ref 9.8–20.1)
CALCIUM SERPL-MCNC: 8.5 MG/DL (ref 8.4–10.2)
CHLORIDE SERPL-SCNC: 100 MMOL/L (ref 98–107)
CO2 SERPL-SCNC: 25 MMOL/L (ref 23–31)
CREAT SERPL-MCNC: 11.31 MG/DL (ref 0.55–1.02)
EOSINOPHIL # BLD AUTO: 0.17 X10(3)/MCL (ref 0–0.9)
EOSINOPHIL NFR BLD AUTO: 4.1 %
ERYTHROCYTE [DISTWIDTH] IN BLOOD BY AUTOMATED COUNT: 15.5 % (ref 11.5–17)
GFR SERPLBLD CREATININE-BSD FMLA CKD-EPI: 3 MLS/MIN/1.73/M2
GLOBULIN SER-MCNC: 3.1 GM/DL (ref 2.4–3.5)
GLUCOSE SERPL-MCNC: 82 MG/DL (ref 82–115)
HCT VFR BLD AUTO: 41.5 % (ref 37–47)
HGB BLD-MCNC: 13.6 G/DL (ref 12–16)
IMM GRANULOCYTES # BLD AUTO: 0.01 X10(3)/MCL (ref 0–0.04)
IMM GRANULOCYTES NFR BLD AUTO: 0.2 %
LYMPHOCYTES # BLD AUTO: 1.03 X10(3)/MCL (ref 0.6–4.6)
LYMPHOCYTES NFR BLD AUTO: 25.1 %
MAGNESIUM SERPL-MCNC: 2.4 MG/DL (ref 1.6–2.6)
MCH RBC QN AUTO: 30.5 PG (ref 27–31)
MCHC RBC AUTO-ENTMCNC: 32.8 G/DL (ref 33–36)
MCV RBC AUTO: 93 FL (ref 80–94)
MONOCYTES # BLD AUTO: 0.41 X10(3)/MCL (ref 0.1–1.3)
MONOCYTES NFR BLD AUTO: 10 %
NEUTROPHILS # BLD AUTO: 2.45 X10(3)/MCL (ref 2.1–9.2)
NEUTROPHILS NFR BLD AUTO: 59.6 %
NRBC BLD AUTO-RTO: 0 %
PHOSPHATE SERPL-MCNC: 5.4 MG/DL (ref 2.3–4.7)
PLATELET # BLD AUTO: 190 X10(3)/MCL (ref 130–400)
PMV BLD AUTO: 9.9 FL (ref 7.4–10.4)
POTASSIUM SERPL-SCNC: 4.3 MMOL/L (ref 3.5–5.1)
PROT SERPL-MCNC: 5.9 GM/DL (ref 5.8–7.6)
RBC # BLD AUTO: 4.46 X10(6)/MCL (ref 4.2–5.4)
SODIUM SERPL-SCNC: 139 MMOL/L (ref 136–145)
WBC # SPEC AUTO: 4.11 X10(3)/MCL (ref 4.5–11.5)

## 2023-09-26 PROCEDURE — 11000001 HC ACUTE MED/SURG PRIVATE ROOM

## 2023-09-26 PROCEDURE — 85025 COMPLETE CBC W/AUTO DIFF WBC: CPT | Performed by: STUDENT IN AN ORGANIZED HEALTH CARE EDUCATION/TRAINING PROGRAM

## 2023-09-26 PROCEDURE — 80053 COMPREHEN METABOLIC PANEL: CPT | Performed by: STUDENT IN AN ORGANIZED HEALTH CARE EDUCATION/TRAINING PROGRAM

## 2023-09-26 PROCEDURE — 87340 HEPATITIS B SURFACE AG IA: CPT | Performed by: NURSE PRACTITIONER

## 2023-09-26 PROCEDURE — 99285 EMERGENCY DEPT VISIT HI MDM: CPT

## 2023-09-26 PROCEDURE — 83735 ASSAY OF MAGNESIUM: CPT | Performed by: STUDENT IN AN ORGANIZED HEALTH CARE EDUCATION/TRAINING PROGRAM

## 2023-09-26 PROCEDURE — 84100 ASSAY OF PHOSPHORUS: CPT | Performed by: STUDENT IN AN ORGANIZED HEALTH CARE EDUCATION/TRAINING PROGRAM

## 2023-09-26 RX ORDER — ONDANSETRON 2 MG/ML
4 INJECTION INTRAMUSCULAR; INTRAVENOUS EVERY 4 HOURS PRN
Status: DISCONTINUED | OUTPATIENT
Start: 2023-09-27 | End: 2023-09-29 | Stop reason: HOSPADM

## 2023-09-26 RX ORDER — HEPARIN SODIUM 5000 [USP'U]/ML
5000 INJECTION, SOLUTION INTRAVENOUS; SUBCUTANEOUS EVERY 8 HOURS
Status: DISCONTINUED | OUTPATIENT
Start: 2023-09-27 | End: 2023-09-29 | Stop reason: HOSPADM

## 2023-09-26 RX ORDER — ACETAMINOPHEN 325 MG/1
650 TABLET ORAL EVERY 4 HOURS PRN
Status: DISCONTINUED | OUTPATIENT
Start: 2023-09-27 | End: 2023-09-29 | Stop reason: HOSPADM

## 2023-09-26 RX ORDER — SODIUM CHLORIDE 0.9 % (FLUSH) 0.9 %
10 SYRINGE (ML) INJECTION
Status: DISCONTINUED | OUTPATIENT
Start: 2023-09-27 | End: 2023-09-29 | Stop reason: HOSPADM

## 2023-09-26 RX ORDER — MAG HYDROX/ALUMINUM HYD/SIMETH 200-200-20
30 SUSPENSION, ORAL (FINAL DOSE FORM) ORAL EVERY 4 HOURS PRN
Status: DISCONTINUED | OUTPATIENT
Start: 2023-09-27 | End: 2023-09-29 | Stop reason: HOSPADM

## 2023-09-26 RX ORDER — PROCHLORPERAZINE EDISYLATE 5 MG/ML
5 INJECTION INTRAMUSCULAR; INTRAVENOUS EVERY 6 HOURS PRN
Status: DISCONTINUED | OUTPATIENT
Start: 2023-09-27 | End: 2023-09-29 | Stop reason: HOSPADM

## 2023-09-26 RX ORDER — BISACODYL 10 MG
10 SUPPOSITORY, RECTAL RECTAL DAILY PRN
Status: DISCONTINUED | OUTPATIENT
Start: 2023-09-27 | End: 2023-09-29 | Stop reason: HOSPADM

## 2023-09-27 LAB — HBV SURFACE AG SERPL QL IA: NONREACTIVE

## 2023-09-27 PROCEDURE — 99222 PR INITIAL HOSPITAL CARE,LEVL II: ICD-10-PCS | Mod: 95,GC,, | Performed by: SURGERY

## 2023-09-27 PROCEDURE — 25000003 PHARM REV CODE 250: Performed by: NURSE PRACTITIONER

## 2023-09-27 PROCEDURE — 11000001 HC ACUTE MED/SURG PRIVATE ROOM

## 2023-09-27 PROCEDURE — 99222 1ST HOSP IP/OBS MODERATE 55: CPT | Mod: 95,GC,, | Performed by: SURGERY

## 2023-09-27 PROCEDURE — 21400001 HC TELEMETRY ROOM

## 2023-09-27 PROCEDURE — 63600175 PHARM REV CODE 636 W HCPCS: Performed by: NURSE PRACTITIONER

## 2023-09-27 RX ORDER — HYDRALAZINE HYDROCHLORIDE 20 MG/ML
10 INJECTION INTRAMUSCULAR; INTRAVENOUS EVERY 4 HOURS PRN
Status: DISCONTINUED | OUTPATIENT
Start: 2023-09-27 | End: 2023-09-29 | Stop reason: HOSPADM

## 2023-09-27 RX ORDER — ASPIRIN 81 MG/1
81 TABLET ORAL DAILY
Status: DISCONTINUED | OUTPATIENT
Start: 2023-09-27 | End: 2023-09-29 | Stop reason: HOSPADM

## 2023-09-27 RX ORDER — FOLIC ACID 1 MG/1
1 TABLET ORAL DAILY
Status: DISCONTINUED | OUTPATIENT
Start: 2023-09-27 | End: 2023-09-29 | Stop reason: HOSPADM

## 2023-09-27 RX ORDER — ASPIRIN 81 MG/1
81 TABLET ORAL DAILY
COMMUNITY

## 2023-09-27 RX ORDER — CLONIDINE 0.3 MG/24H
1 PATCH, EXTENDED RELEASE TRANSDERMAL
Status: ON HOLD | COMMUNITY
End: 2024-01-18 | Stop reason: HOSPADM

## 2023-09-27 RX ORDER — CALCITRIOL 0.25 UG/1
0.25 CAPSULE ORAL
COMMUNITY

## 2023-09-27 RX ORDER — FOLIC ACID 1 MG/1
1 TABLET ORAL DAILY
COMMUNITY

## 2023-09-27 RX ORDER — FOLIC ACID/VIT B COMPLEX AND C 0.8 MG
0.8 TABLET ORAL DAILY
COMMUNITY

## 2023-09-27 RX ORDER — SPIRONOLACTONE 25 MG/1
25 TABLET ORAL DAILY
COMMUNITY

## 2023-09-27 RX ORDER — LACTULOSE 10 G/15ML
20 SOLUTION ORAL DAILY
Status: DISCONTINUED | OUTPATIENT
Start: 2023-09-27 | End: 2023-09-29 | Stop reason: HOSPADM

## 2023-09-27 RX ADMIN — LACTULOSE 20 G: 10 SOLUTION ORAL at 09:09

## 2023-09-27 RX ADMIN — HEPARIN SODIUM 5000 UNITS: 5000 INJECTION, SOLUTION INTRAVENOUS; SUBCUTANEOUS at 05:09

## 2023-09-27 RX ADMIN — HEPARIN SODIUM 5000 UNITS: 5000 INJECTION, SOLUTION INTRAVENOUS; SUBCUTANEOUS at 07:09

## 2023-09-27 RX ADMIN — ASPIRIN 81 MG: 81 TABLET, COATED ORAL at 09:09

## 2023-09-27 RX ADMIN — FOLIC ACID 1 MG: 1 TABLET ORAL at 09:09

## 2023-09-27 NOTE — CONSULTS
HISTORY & PHYSICAL  General Surgery    Patient Name: Malissa Schwartz  YOB: 1950    Date: 09/27/2023                     PRESENTING HISTORY     Chief Complaint/Reason for Admission: Peritoneal dialysis catheter dysfunction    History of Present Illness:  Ms. Malissa Schwartz is a 73 y.o. female with ESRD on PD. She is s/p kidney transplant in 2007 and laparoscopic left nephrectomy in 2016. She states she has been on PD for the past 4 years with one episode of peritonitis. She reports PD catheter was functioning normally on the cycler until Sunday night when she was unable to manually complete treatment. She states the PD catheter was placed somewhere in [place] but cannot recall who placed the PD catheter. She states the dialysis nurses were unable to flush catheter earlier today. She reports tenderness to RLQ that is chronic in nature since the transplant, otherwise denies abdominal pain. She denies fever / chills.         Review of Systems:  12 point ROS negative except as stated in HPI    PAST HISTORY:     Past Medical History:   Diagnosis Date    Acute rejection of kidney transplant 9/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 Glomerulitis with one activated endothelial cell: treated with Solumedrol pulse 375 mg IVBP x 3 doses with short prednisone taper; thymoglobin 100 mg x 6 doses 10/04/2007 minimal histologic changes no acute rejection     Allergy     Anemia of renal disease     CKD (chronic kidney disease), stage IV     CMV (cytomegalovirus) status positive     Colon polyp     benign    Depression     GN (glomerulonephritis)     GN (glomerulonephritis)     unknown type    Herpes infection, other 2009    on buttocks    Hyperlipidemia     Hypertension     Immunosuppression     Living-donor kidney transplant recipient 01/15/2007    Meningitis     history of viral meningitis    Proteinuria 1/5/2015    Proteinuria     Pulmonary nodules     Renal hypertension     S/P breast  augmentation     Skin cancer     Urinary tract infection        Past Surgical History:   Procedure Laterality Date    COLON SURGERY      COLONOSCOPY W/ BIOPSIES      cosemetic surgery      facelift and breast augmentation     HYSTERECTOMY      KIDNEY TRANSPLANT      NEPHRECTOMY Left     9/2016    RENAL BIOPSY      TONSILLECTOMY, ADENOIDECTOMY         Family History   Problem Relation Age of Onset    Kidney disease Sister         ESRD    Kidney disease Brother         CKD from HTN    Hypertension Brother     Heart failure Mother     Heart failure Father     Diabetes Neg Hx     Stroke Neg Hx        Social History     Socioeconomic History    Marital status:    Tobacco Use    Smoking status: Former     Current packs/day: 1.00     Average packs/day: 1 pack/day for 15.0 years (15.0 ttl pk-yrs)     Types: Cigarettes    Smokeless tobacco: Never    Tobacco comments:     quit in 2014; smoked for 15 years and at the most smoked 1 ppd   Substance and Sexual Activity    Alcohol use: Yes     Comment: seldom    Drug use: No    Sexual activity: Never   Social History Narrative    Likes to travel and follow [employer] (has season tickets).    Lives with , 2 dogs and 3 cats    Used to work in real estate and banking       MEDICATIONS & ALLERGIES:     No current facility-administered medications on file prior to encounter.     Current Outpatient Medications on File Prior to Encounter   Medication Sig    aspirin (ECOTRIN) 81 MG EC tablet Take 81 mg by mouth once daily.    B complex-vitamin C-folic acid (NEPHRO-JENN) 0.8 mg Tab Take 0.8 mg by mouth Daily.    calcitRIOL (ROCALTROL) 0.25 MCG Cap Take 0.25 mcg by mouth.    cloNIDine 0.2 mg/24 hr td ptwk (CATAPRES) 0.2 mg/24 hr Place 1 patch onto the skin every 7 days.    folic acid (FOLVITE) 1 MG tablet Take 1 mg by mouth once daily.    multivitamin capsule Take 1 capsule by mouth once daily.    spironolactone (ALDACTONE) 25 MG tablet Take 25 mg by mouth once daily.     "[DISCONTINUED] amLODIPine (NORVASC) 10 MG tablet TAKE ONE TABLET BY MOUTH ONCE DAILY    [DISCONTINUED] furosemide (LASIX) 20 MG tablet Take 1 tablet (20 mg total) by mouth 2 (two) times daily. (Patient taking differently: Take 20 mg by mouth once daily.)    [DISCONTINUED] losartan (COZAAR) 100 MG tablet TAKE ONE TABLET BY MOUTH ONCE DAILY    [DISCONTINUED] metoprolol succinate (TOPROL-XL) 25 MG 24 hr tablet Take 2 tablets (50 mg total) by mouth once daily. (Patient not taking: Reported on 5/12/2022)    [DISCONTINUED] mycophenolate (CELLCEPT) 250 mg Cap Take 2 capsules (500 mg total) by mouth 2 (two) times daily. Z94.0 kidney transplant (Patient not taking: Reported on 5/12/2022)    [DISCONTINUED] tacrolimus (PROGRAF) 1 MG Cap Take 3mg every AM and 2mg every PM.  Route: Oral.  Z94.0 Kidney Transplant. (Patient not taking: Reported on 5/12/2022)       Review of patient's allergies indicates:   Allergen Reactions    Codeine Palpitations and Rash       OBJECTIVE:     Vital Signs:  Temp:  [97.5 °F (36.4 °C)-98.8 °F (37.1 °C)] 98.2 °F (36.8 °C)  Pulse:  [52-87] 55  Resp:  [12-23] 20  SpO2:  [97 %-100 %] 97 %  BP: (142-166)/(75-98) 150/75  Body mass index is 23.19 kg/m².     Physical Exam:  General:  Well developed, well nourished, no acute distress  HEENT:  Normocephalic, atraumatic, PERRL, EOMI, clear sclera, ears normal, neck supple, throat clear without erythema or exudates  CVS:  RRR, S1 and S2 normal, no murmurs, rubs, gallops  Resp:  Lungs clear to auscultation, no wheezes, rales, rhonchi, cough  GI:  Abdomen soft, non-tender, non-distended, normoactive bowel sounds, no masses. + RLQ TTP  :  Deferred  MSK:  No muscle atrophy, cyanosis, peripheral edema, full range of motion  Skin:  No rashes, ulcers, erythema. LFA AVF, + bruit/thrill  Neuro:  CNII-XII grossly intact  Psych:  Alert and oriented to person, place, and time    Laboratory  Troponin:  No results for input(s): "TROPONINI" in the last 72 " hours.  CBC:  Recent Labs     09/26/23 2123   WBC 4.11*   RBC 4.46   HGB 13.6   HCT 41.5      MCV 93.0   MCH 30.5   MCHC 32.8*     CMP:  Recent Labs     09/26/23 2123   CALCIUM 8.5   ALBUMIN 2.8*      K 4.3   CO2 25   BUN 66.6*   CREATININE 11.31*   ALKPHOS 118   ALT 29   AST 37*   BILITOT 0.3       Diagnostic Results:  Imaging Results    None     KUB FINDINGS:  Peritoneal dialysis catheter coils in the right lower quadrant.  Nonobstructive bowel gas pattern with moderate volume stool.     Impression:     Coiling of the peritoneal dialysis catheter in the right lower quadrant.      ASSESSMENT & PLAN:   Ms. Malissa Schwartz is a 73 y.o. female with malfunctioning PD catheter. Had lengthy discussion with pt and spouse regarding previous abdominal surgeries and current status with PD catheter. Will obtain CT and discuss surgical options once imaging complete.     Plan:  - CT abd/pelvis          Aurora Ferro  General Surgery    9/27/2023  5:46 PM      Abdominal Pain, N/V/D

## 2023-09-27 NOTE — PROGRESS NOTES
[facility]  Hospital Medicine Progress Note       Chief Complaint   PD catheter malfunction        History of Present Illness   Mrs. Schwartz is a 72 yo female with pmhx ESRD on PD, hx of renal transplant s/p rejection in 2007, anemia of chronic disease and HTN who presents to the ED with c/o PD malfunction, stating that she has not been able to run her PD for past 48 hrs. She also c/o paresthesia to bilateral hands and feet.      Upon arrival into the ED patient was hemodynamically stable and afebrile.  Labs remarkable for a BUN of 66, creatinine 11.3 with no electrolyte derangements.  Nephrology was consulted and recommended admission and they will see the patient in consult tomorrow to trouble shoot PD catheter.  She does have a fistula left arm.         73-year-old female medical history of ESRD on peritoneal dialysis and has a functioning left arm AV fistula present to the ED with complaint of nonfunctioning peritoneal dialysis catheter that would not drain for the past 2 days.  Nephrology consulted.    Today's information   Vitals reviewed and stable on room air   Labs have been stable in keeping with end-stage renal disease  Patient complaining of constipation lactulose has been added on by nephrology team   Follow-up KUB per nephro recs          exam  General: Appears comfortable  HEENT: NC/AT  Neck:  No JVD  Chest: CTABL  CVS: Regular rhythm. Normal S1/S2.  Abdomen: nondistended, normoactive BS, soft and non-tender. PD catheter noted to left upper abdomen, w/o drainage or erythema surrounding the area  MSK: No obvious deformity or joint swelling  Skin: Warm and dry  Neuro: AAOx3, no focal neurological deficit  Psych: Cooperative       Assessment & Plan   PD Catheter malfunction  ESRD on PD  Hx renal transplant in 2007 s/p rejection   Essential HTN, controlled  Constipation         PLAN:  - f/up Nephrology consult for PD malfunction  - continue po lactulose 20mg daily   - Home meds  reviewed and resumed. PRN antihypertensives  - VTE Prophylaxis: Heparin 5000 unit SQ TID  - am labs     Dispo- pending PD Catheter functions      VITAL SIGNS: 24 HRS MIN & MAX LAST   Temp  Min: 97.5 °F (36.4 °C)  Max: 98.8 °F (37.1 °C) 97.5 °F (36.4 °C)   BP  Min: 142/80  Max: 166/90 (!) 152/81   Pulse  Min: 52  Max: 87  (!) 56   Resp  Min: 12  Max: 23 18   SpO2  Min: 98 %  Max: 100 % 98 %     I have reviewed the following labs:  Recent Labs   Lab 09/26/23 2123   WBC 4.11*   RBC 4.46   HGB 13.6   HCT 41.5   MCV 93.0   MCH 30.5   MCHC 32.8*   RDW 15.5      MPV 9.9     Recent Labs   Lab 09/26/23 2123      K 4.3   CO2 25   BUN 66.6*   CREATININE 11.31*   CALCIUM 8.5   MG 2.40   ALBUMIN 2.8*   ALKPHOS 118   ALT 29   AST 37*   BILITOT 0.3     Microbiology Results (last 7 days)       ** No results found for the last 168 hours. **             See below for Radiology    Scheduled Med:   aspirin  81 mg Oral Daily    folic acid  1 mg Oral Daily    heparin (porcine)  5,000 Units Subcutaneous Q8H    lactulose 10 gram/15 ml  20 g Oral Daily      Continuous Infusions:     PRN Meds:  acetaminophen, aluminum-magnesium hydroxide-simethicone, bisacodyL, hydrALAZINE, ondansetron, prochlorperazine, sodium chloride 0.9%, trazodone     Assessment/Plan:      VTE prophylaxis:     Patient condition:  Stable/Fair/Guarded/ Serious/ Critical    Anticipated discharge and Disposition:         All diagnosis and differential diagnosis have been reviewed; assessment and plan has been documented; I have personally reviewed the labs and test results that are presently available; I have reviewed the patients medication list; I have reviewed the consulting providers response and recommendations. I have reviewed or attempted to review medical records based upon their availability    All of the patient's questions have been  addressed and answered. Patient's is agreeable to the above stated plan. I will continue to monitor closely and make  adjustments to medical management as needed.  _____________________________________________________________________    Nutrition Status:    Radiology:  I have personally reviewed the following imaging and agree with the radiologist.     X-Ray Abdomen AP 1 View  Narrative: EXAMINATION:  XR ABDOMEN AP 1 VIEW    CLINICAL HISTORY:  check PD catheter;    TECHNIQUE:  AP View(s) of the abdomen.    COMPARISON:  Radiography 02/05/2021    FINDINGS:  Peritoneal dialysis catheter coils in the right lower quadrant.  Nonobstructive bowel gas pattern with moderate volume stool.  Impression: Coiling of the peritoneal dialysis catheter in the right lower quadrant.    Electronically signed by: Isaac Palomino  Date:    09/27/2023  Time:    11:39      Jose Raul Nielsen MD   09/27/2023

## 2023-09-27 NOTE — H&P
[facility] Hospital Medicine - H&P Note    Patient Name: Malissa Schwartz  : 1950  MRN: [id number]  PCP: Sergio Crabtree MD  Admitting Physician: Dr. Schultz  Admission Class: IP- Inpatient   Code status: Full    Chief Complaint   PD catheter malfunction      History of Present Illness   Mrs. Schwartz is a 72 yo female with pmhx ESRD on PD, hx of renal transplant s/p rejection in , anemia of chronic disease and HTN who presents to the ED with c/o PD malfunction, stating that she has not been able to run her PD for past 48 hrs. She also c/o paresthesia to bilateral hands and feet.     Upon arrival into the ED patient was hemodynamically stable and afebrile.  Labs remarkable for a BUN of 66, creatinine 11.3 with no electrolyte derangements.  Nephrology was consulted and recommended admission and they will see the patient in consult tomorrow to trouble shoot PD catheter.  She does have a fistula left arm.         ROS  Except as documented, all other systems reviewed and negative     Past Medical History   ESRD on PD, previosuly on HD (4 yrs ago)  Hx Renal Transplant s/p rejection ()  Anemia of chronic disease  MDD  Colon polyps  Viral Meningitis  Essential HTN      Past Surgical History     Past Surgical History:   Procedure Laterality Date    COLON SURGERY      COLONOSCOPY W/ BIOPSIES      cosemetic surgery      facelift and breast augmentation     HYSTERECTOMY      KIDNEY TRANSPLANT      NEPHRECTOMY Left     2016    RENAL BIOPSY      TONSILLECTOMY, ADENOIDECTOMY         Social History     Social History     Tobacco Use    Smoking status: Former     Current packs/day: 1.00     Average packs/day: 1 pack/day for 15.0 years (15.0 ttl pk-yrs)     Types: Cigarettes    Smokeless tobacco: Never    Tobacco comments:     quit in ; smoked for 15 years and at the most smoked 1 ppd   Substance Use Topics    Alcohol use: Yes     Comment: seldom        Family History   Reviewed and  "negative    Allergies   Codeine    Home Medications   See chart for home med list  Physical Exam   Vital Signs  Temp:  [98.8 °F (37.1 °C)]   Pulse:  [60-87]   Resp:  [19-20]   BP: (149-164)/(82-90)   SpO2:  [99 %]    General: Appears comfortable  HEENT: NC/AT  Neck:  No JVD  Chest: CTABL  CVS: Regular rhythm. Normal S1/S2.  Abdomen: nondistended, normoactive BS, soft and non-tender. PD catheter noted to left upper abdomen, w/o drainage or erythema surrounding the area  MSK: No obvious deformity or joint swelling  Skin: Warm and dry  Neuro: AAOx3, no focal neurological deficit  Psych: Cooperative    Labs     Recent Labs     09/26/23 2123   WBC 4.11*   RBC 4.46   HGB 13.6   HCT 41.5   MCV 93.0   MCH 30.5   MCHC 32.8*   RDW 15.5        No results for input(s): "PROTIME", "INR", "PTT", "D-DIMER", "FERRITIN", "IRON", "TRANS", "TIBC", "LABIRON", "[id number]", "FOLATE", "LDH", "HAPTOGLOBIN", "RETICCNTAUTO", "RETABS", "PERIPSMEAREV" in the last 72 hours.   Recent Labs     09/26/23 2123      K 4.3   CHLORIDE 100   CO2 25   BUN 66.6*   CREATININE 11.31*   EGFRNORACEVR 3   GLUCOSE 82   CALCIUM 8.5   MG 2.40   PHOS 5.4*   ALBUMIN 2.8*   GLOBULIN 3.1   ALKPHOS 118   ALT 29   AST 37*   BILITOT 0.3     No results for input(s): "LACTIC" in the last 72 hours.  No results for input(s): "CPK", "TROPONINI" in the last 72 hours.     Microbiology Results (last 7 days)       ** No results found for the last 168 hours. **           Imaging     No orders to display     Assessment & Plan   PD Catheter malfunction  ESRD on PD  Hx renal transplant in 2007 s/p rejection   Essential HTN      PLAN:  - Nephrology consult for PD malfunction  - Home meds reviewed and resumed. PRN antihypertensives  - CBC, CMP, Mag, Phos in AM  - VTE Prophylaxis: Heparin 5000 unit SQ TID     Chaya GROVE FNP-BC have discussed this patients case with Dr. Schultz  who agrees with the diagnosis and treatment plan.    "

## 2023-09-27 NOTE — CONSULTS
[facility] Tele[facility]  Nephrology  Consult Note    Patient Name: Malissa Schwartz  MRN: [id number]  Admission Date: 9/26/2023  Hospital Length of Stay: 1 days  Attending Provider: Tanja Schultz MD   Primary Care Physician: Sergio Crabtree MD  Principal Problem:Peritoneal dialysis catheter dysfunction    Consults  Subjective:     HPI:  This is a 73-year-old  female with ESRD on peritoneal dialysis followed by Dr. Mendoza.  She is been peritoneal dialysis for the past 3 years with 1 episode of peritonitis.  During treatment on Sunday she had a physically move her body around to get the treatment started.  By Monday the catheter was completely nonfunctional and same on Tuesday.  She then presented to ED for further evaluation.  With recent bowel movement was Sunday.  There is some tenderness to right lower quadrant overlying transplanted kidney which is chronic to some degree.  Denies fever, chills, body aches.  Denies change in peritoneal fluid up until treatments had to stop on Sunday.    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 Glomerulitis with one activated endothelial cell: treated with Solumedrol pulse 375 mg IVBP x 3 doses with short prednisone taper; thymoglobin 100 mg x 6 doses 10/04/2007 minimal histologic changes no acute rejection     Allergy     Anemia of renal disease     CKD (chronic kidney disease), stage IV     CMV (cytomegalovirus) status positive     Colon polyp     benign    Depression     GN (glomerulonephritis)     GN (glomerulonephritis)     unknown type    Herpes infection, other 2009    on buttocks    Hyperlipidemia     Hypertension     Immunosuppression     Living-donor kidney transplant recipient 01/15/2007    Meningitis     history of viral meningitis    Proteinuria 1/5/2015    Proteinuria     Pulmonary nodules     Renal hypertension     S/P breast  augmentation     Skin cancer     Urinary tract infection        Past Surgical History:   Procedure Laterality Date    COLON SURGERY      COLONOSCOPY W/ BIOPSIES      cosemetic surgery      facelift and breast augmentation     HYSTERECTOMY      KIDNEY TRANSPLANT      NEPHRECTOMY Left     9/2016    RENAL BIOPSY      TONSILLECTOMY, ADENOIDECTOMY         Review of patient's allergies indicates:   Allergen Reactions    Codeine Palpitations and Rash     Current Facility-Administered Medications   Medication Frequency    acetaminophen tablet 650 mg Q4H PRN    aluminum-magnesium hydroxide-simethicone 200-200-20 mg/5 mL suspension 30 mL Q4H PRN    aspirin EC tablet 81 mg Daily    bisacodyL suppository 10 mg Daily PRN    folic acid tablet 1 mg Daily    heparin (porcine) injection 5,000 Units Q8H    hydrALAZINE injection 10 mg Q4H PRN    lactulose 10 gram/15 ml solution 20 g Daily    ondansetron injection 4 mg Q4H PRN    prochlorperazine injection Soln 5 mg Q6H PRN    sodium chloride 0.9% flush 10 mL PRN    trazodone split tablet 25 mg Nightly PRN     Family History       Problem Relation (Age of Onset)    Heart failure Mother, Father    Hypertension Brother    Kidney disease Sister, Brother          Tobacco Use    Smoking status: Former     Current packs/day: 1.00     Average packs/day: 1 pack/day for 15.0 years (15.0 ttl pk-yrs)     Types: Cigarettes    Smokeless tobacco: Never    Tobacco comments:     quit in 2014; smoked for 15 years and at the most smoked 1 ppd   Substance and Sexual Activity    Alcohol use: Yes     Comment: seldom    Drug use: No    Sexual activity: Never       Objective:     Vital Signs (Most Recent):  Temp: 97.7 °F (36.5 °C) (09/27/23 0813)  Pulse: (!) 52 (09/27/23 0813)  Resp: 20 (09/27/23 0635)  BP: (!) 147/75 (09/27/23 0813)  SpO2: 99 % (09/27/23 0813) Vital Signs (24h Range):  Temp:  [97.7 °F (36.5 °C)-98.8 °F (37.1 °C)] 97.7 °F (36.5 °C)  Pulse:  [52-87] 52  Resp:  [12-23] 20  SpO2:  [98 %-100 %]  99 %  BP: (142-166)/(75-98) 147/75     Weight: 59 kg (130 lb) (09/26/23 2051)  Body mass index is 21.63 kg/m².  Body surface area is 1.64 meters squared.    No intake/output data recorded.    Physical Exam  Constitutional:       General: She is not in acute distress.  HENT:      Nose: Nose normal.   Eyes:      Extraocular Movements: Extraocular movements intact.      Pupils: Pupils are equal, round, and reactive to light.   Cardiovascular:      Rate and Rhythm: Normal rate and regular rhythm.   Abdominal:      General: Abdomen is flat.      Palpations: Abdomen is soft.      Tenderness: There is abdominal tenderness.      Comments: PD catheter intact without dressing, no drainage from site   Musculoskeletal:         General: Swelling present.      Cervical back: Normal range of motion and neck supple.      Comments: 1+ pretibial pitting edema    Skin:     General: Skin is warm and dry.   Neurological:      Mental Status: She is alert and oriented to person, place, and time.         Significant Labs:  BMP:   Recent Labs   Lab 09/26/23 2123      K 4.3   CO2 25   BUN 66.6*   CREATININE 11.31*   CALCIUM 8.5   MG 2.40     CBC:   Recent Labs   Lab 09/26/23 2123   WBC 4.11*   RBC 4.46   HGB 13.6   HCT 41.5      MCV 93.0   MCH 30.5   MCHC 32.8*       Significant Imaging:  Pending KUB    Assessment/Plan:     ESRD on PD  Malfunctioning PD catheter  Constipation  RLQ tenderness overlying transplanted kidney (living related donor)      -KUB now to assess catheter placement  -Lactulose daily for constipation. Titrate to one BM per day at home. Dicussed importance of bowel regimen again with patient   -If KUB without catheter issues I will order manual flushes to be done by dialysis nurses       GABRIELA Flores  Nephrology  [facility]

## 2023-09-27 NOTE — NURSING
Nurses Note -- 4 Eyes      9/27/2023   5:40 PM      Skin assessed during: Admit      [x] No Altered Skin Integrity Present    []Prevention Measures Documented      [] Yes- Altered Skin Integrity Present or Discovered   [] LDA Added if Not in Epic (Describe Wound)   [] New Altered Skin Integrity was Present on Admit and Documented in LDA   [] Wound Image Taken    Wound Care Consulted? No    Attending Nurse:  Anali Simpson RN/Staff Member:   Tom Jefferson

## 2023-09-28 LAB
ANION GAP SERPL CALC-SCNC: 16 MEQ/L
BUN SERPL-MCNC: 84.1 MG/DL (ref 9.8–20.1)
CALCIUM SERPL-MCNC: 8 MG/DL (ref 8.4–10.2)
CHLORIDE SERPL-SCNC: 99 MMOL/L (ref 98–107)
CO2 SERPL-SCNC: 22 MMOL/L (ref 23–31)
CREAT SERPL-MCNC: 12.68 MG/DL (ref 0.55–1.02)
CREAT/UREA NIT SERPL: 7
GFR SERPLBLD CREATININE-BSD FMLA CKD-EPI: 3 MLS/MIN/1.73/M2
GLUCOSE SERPL-MCNC: 80 MG/DL (ref 82–115)
POTASSIUM SERPL-SCNC: 4.8 MMOL/L (ref 3.5–5.1)
SODIUM SERPL-SCNC: 137 MMOL/L (ref 136–145)

## 2023-09-28 PROCEDURE — 25000003 PHARM REV CODE 250: Performed by: NURSE PRACTITIONER

## 2023-09-28 PROCEDURE — 27201247 HC HEMODIALYSIS, SET-UP & CANCEL

## 2023-09-28 PROCEDURE — 11000001 HC ACUTE MED/SURG PRIVATE ROOM

## 2023-09-28 PROCEDURE — 90945 DIALYSIS ONE EVALUATION: CPT

## 2023-09-28 PROCEDURE — 27200950 HC CAPD SUPPORT

## 2023-09-28 PROCEDURE — 21400001 HC TELEMETRY ROOM

## 2023-09-28 PROCEDURE — 80048 BASIC METABOLIC PNL TOTAL CA: CPT | Performed by: INTERNAL MEDICINE

## 2023-09-28 PROCEDURE — 63600175 PHARM REV CODE 636 W HCPCS: Performed by: NURSE PRACTITIONER

## 2023-09-28 RX ADMIN — ASPIRIN 81 MG: 81 TABLET, COATED ORAL at 09:09

## 2023-09-28 RX ADMIN — HEPARIN SODIUM 5000 UNITS: 5000 INJECTION, SOLUTION INTRAVENOUS; SUBCUTANEOUS at 01:09

## 2023-09-28 RX ADMIN — HEPARIN SODIUM 5000 UNITS: 5000 INJECTION, SOLUTION INTRAVENOUS; SUBCUTANEOUS at 06:09

## 2023-09-28 RX ADMIN — FOLIC ACID 1 MG: 1 TABLET ORAL at 09:09

## 2023-09-28 NOTE — PROGRESS NOTES
Surgical Consultation followup    I personally reviewed the imaging (CT) and did not see any obvious signs of obstruction.  After review of the CT, I evaluated the tubing at beside.    Using sterile technique, I was unable to have any flow from the catheter as it was set up.  I removed all the tubing conduit (extension tubing) and directly connected to the PD catheter and had easy flow of 20cc and good return.  We connect new extension tubing and once again displayed good flow and drainage.      The patient may resume PD at this time through the current setup.    Thank you for this consultation

## 2023-09-28 NOTE — PROGRESS NOTES
"                                                                                                                        NEPHROLOGY: Progress   73-year-old female with end-stage renal disease following renal transplant failure in 2007.  Patient is on peritoneal dialysis nightly with for 2-1/2 L exchanges and 1.5 L last fill who suddenly was unable to fill.          Scheduled Meds:   aspirin  81 mg Oral Daily    folic acid  1 mg Oral Daily    heparin (porcine)  5,000 Units Subcutaneous Q8H    lactulose 10 gram/15 ml  20 g Oral Daily     Continuous Infusions:  PRN Meds:.acetaminophen, aluminum-magnesium hydroxide-simethicone, bisacodyL, hydrALAZINE, ondansetron, prochlorperazine, sodium chloride 0.9%, sodium chloride 0.9%, trazodone          /68   Pulse (!) 54   Temp 98.4 °F (36.9 °C) (Oral)   Resp 18   Ht 5' 5" (1.651 m)   Wt 63.5 kg (139 lb 15.9 oz)   SpO2 96%   BMI 23.30 kg/m²     Physical Exam:    GEN: Awake and appropriate.  Affect is very flat.  HEENT: Atraumatic. EOMI, no icterus  NECK : No JVD  CARD : RRR s rub or gallop  LUNGS : Clear to auscultation  ABD : Soft,non-tender. BS active  EXT : No pitting edema.  Weak, narrow left forearm fistula          Intake/Output Summary (Last 24 hours) at 9/28/2023 1107  Last data filed at 9/27/2023 1400  Gross per 24 hour   Intake 780 ml   Output --   Net 780 ml         Laboratory:  Recent Results (from the past 24 hour(s))   Basic Metabolic Panel    Collection Time: 09/28/23  5:07 AM   Result Value Ref Range    Sodium Level 137 136 - 145 mmol/L    Potassium Level 4.8 3.5 - 5.1 mmol/L    Chloride 99 98 - 107 mmol/L    Carbon Dioxide 22 (L) 23 - 31 mmol/L    Glucose Level 80 (L) 82 - 115 mg/dL    Blood Urea Nitrogen 84.1 (H) 9.8 - 20.1 mg/dL    Creatinine 12.68 (H) 0.55 - 1.02 mg/dL    BUN/Creatinine Ratio 7     Calcium Level Total 8.0 (L) 8.4 - 10.2 mg/dL    Anion Gap 16.0 mEq/L    eGFR 3 mls/min/1.73/m2         Assessment/Plan:   ESRD-PD catheter " malfunction-catheter tip migration  History of renal transplant  Hypertension   Chronic constipation      We will attempt to use her left forearm fistula with small gauge needles for dialysis today for clearance purposes.  Peritoneal dialysis catheter may need to be removed and likely replaced.  If left forearm fistula is not successfully used the patient will need tunneled catheter for dialysis purposes.    Leland Anand MD

## 2023-09-28 NOTE — NURSING
09/28/23 1451 09/28/23 1454        Peritoneal Dialysis Catheter Continuous cycling Mid lower abdomen   No placement date or time found.   Present Prior to Hospital Arrival?: Yes  Dialysis Type: Continuous cycling  Catheter Location: Mid lower abdomen   Site Assessment  --  Clean;Dry;Intact;No redness;No swelling;No warmth;No drainage   Dressing Intervention  --  Sterile dressing change   Status  --  Accessed   Dressing Status  --  Clean;Dry;Intact   Dressing  --  Gauze   Securement  --  secured to abdomen with tape   Clamp Status  --  clamped   Exchange  --  completed   Manual Peritoneal Dialysis   Manual Fill Volume (mL) 3000 mL  --    Manual Drain Volume (mL) 3000 mL  --    Effluent Appearance Clear  --    Post Treatment Output (mL) 0 mL  --    Net Postive (mL) 0 mL  --    Manual PD Total UF (mL) 0 mL  --    Manual Treatment Comments tolerated manual treatment well, transfer set exchanged, no uf  --

## 2023-09-28 NOTE — PLAN OF CARE
09/28/23 1643   Discharge Assessment   Assessment Type Discharge Planning Assessment   Confirmed/corrected address, phone number and insurance Yes   Confirmed Demographics Correct on Facesheet   Source of Information patient;family   Reason For Admission Peritoneal Dialysis Cath. Malfunction   People in Home spouse   Do you expect to return to your current living situation? Yes   Do you have help at home or someone to help you manage your care at home? Yes   Who are your caregiver(s) and their phone number(s)? Brayden Schwartz spouse cell#  [phone]   Prior to hospitilization cognitive status: Alert/Oriented   Current cognitive status: Alert/Oriented   Home Layout Able to live on 1st floor   Equipment Currently Used at Home none   Who is going to help you get home at discharge? Brayden Schwartz spouse cell# [phone]   How do you get to doctors appointments? car, drives self;family or friend will provide   Are you on dialysis? Yes   Dialysis Name and Scheduled days Peritoneal @ home- [facility] provides supplies etc.   DME Needed Upon Discharge  none   Discharge Plan discussed with: Spouse/sig other;Patient   Name(s) and Number(s) Brayden Schwartz cell# [phone]   Transition of Care Barriers None   Discharge Plan A Home with family     Pt states she was independent prior to admit and could care for self.  Her spouse Brayden Barnard cell# [phone] can assist at home.  Pt does peritoneal dialysis @ home and uses the [facility] clinic for supplies etc.  No HH services or use of DME at home..  Pharmacy is [facility] in [place].

## 2023-09-28 NOTE — NURSING
Attempted to perform a CAPD on pt. Was unable to instill any of the dialysate fluid. Notified MADELINE Flowers NP. Gave the order to try to aspirate using a 50 mL syringe, or flush using a 10 mL NS Flush. Unable to do either. Contacted MADELINE Flowers NP again. Stated she would consult general surgery to see about getting this line removed. The pt has a Left UA AVF. The pt stated she has been cannulated at [facility] before, but had poor success with her access at the clinic. MADELINE Flowers NP stated they would round on her tomorrow (9/28) to determine her need for possible HD.

## 2023-09-28 NOTE — NURSING
Unable to run hemodialysis after setup due to infiltrated arterial needle cannulation to left forearm avf, needle removed, hemostasis achieved, cannulation dressed with gauze and tape, Dr. Anand notified, orders received to discontinue hemodialysis orders

## 2023-09-28 NOTE — NURSING
Received bedside SBAR from Tom PEREZ. Discussed HX, DX, POC and activity. Reviewed Vitals, Tele, Lab results and new orders. Met patient and assumed care. Observed patient resting quietly in bed without needs or complaints. Will continue to monitor for needs and provide care as needed.

## 2023-09-29 VITALS
HEART RATE: 59 BPM | OXYGEN SATURATION: 98 % | WEIGHT: 143.5 LBS | DIASTOLIC BLOOD PRESSURE: 87 MMHG | HEIGHT: 65 IN | SYSTOLIC BLOOD PRESSURE: 159 MMHG | BODY MASS INDEX: 23.91 KG/M2 | TEMPERATURE: 98 F | RESPIRATION RATE: 20 BRPM

## 2023-09-29 PROCEDURE — 63600175 PHARM REV CODE 636 W HCPCS: Performed by: NURSE PRACTITIONER

## 2023-09-29 PROCEDURE — 25000003 PHARM REV CODE 250: Performed by: NURSE PRACTITIONER

## 2023-09-29 RX ADMIN — HEPARIN SODIUM 5000 UNITS: 5000 INJECTION, SOLUTION INTRAVENOUS; SUBCUTANEOUS at 06:09

## 2023-09-29 RX ADMIN — HEPARIN SODIUM 5000 UNITS: 5000 INJECTION, SOLUTION INTRAVENOUS; SUBCUTANEOUS at 12:09

## 2023-09-29 RX ADMIN — ASPIRIN 81 MG: 81 TABLET, COATED ORAL at 09:09

## 2023-09-29 RX ADMIN — HYDRALAZINE HYDROCHLORIDE 10 MG: 20 INJECTION INTRAMUSCULAR; INTRAVENOUS at 12:09

## 2023-09-29 RX ADMIN — FOLIC ACID 1 MG: 1 TABLET ORAL at 09:09

## 2023-09-29 NOTE — NURSING
Received bedside SBAR from ALAINA Herrera. Discussed HX, DX and POC. Reviewed Vitals, Tele, Lab results and new orders. Met patient and resumed care. Observed patient resting quietly in bed without needs or complaints. Will continue to monitor for needs and provide care as needed.

## 2023-09-29 NOTE — NURSING
09/29/23 0747   Cycler Peritoneal Dialysis   Initial Drain Volume (mL) 641 mL   Effluent Appearance Clear   Number of Cycles 4   Fill Volume (mL) 2500 mL   Total Volume (mL) 41788 mL   Cycler PD Total UF (mL) 1305 mL   Cycler Treatment Comments CCPD completed overnight. No reported issues. NET fluid removed = 1305ml. Effluent clear, yellow. No complaints

## 2023-09-29 NOTE — DISCHARGE SUMMARY
[facility] Medicine Discharge Summary    Admit Date: 9/26/2023  Discharge Date and Time: 9/29/20232:41 PM  Admitting Physician: POLINA Team  Discharging Physician: Tonie Shirley DO.  Primary Care Physician: Sergio Crabtree MD  Consults: Nephrology    Discharge Diagnoses:  ESRD-PD catheter malfunction-catheter tip migration  History of renal transplant  Hypertension   Chronic constipation    Hospital Course:   72 yo female with pmhx ESRD on PD, hx of renal transplant s/p rejection in 2007, anemia of chronic disease and HTN who presents to the ED with c/o PD malfunction, stating that she has not been able to run her PD for past 48 hrs. She also c/o paresthesia to bilateral hands and feet.      Upon arrival into the ED patient was hemodynamically stable and afebrile.  Labs remarkable for a BUN of 66, creatinine 11.3 with no electrolyte derangements.  Nephrology was consulted and recommended admission and they will see the patient in consult tomorrow to trouble shoot PD catheter.  She does have a fistula left arm.       Presents to the ED with pertinent dialysis malfunction, general surgeon consulted.    Malfunction has been addressed, good flow and drainage noted and PD catheter.  Patient underwent peritoneal dialysis and tolerated well.  Nephrologist has cleared patient for discharge.  On day of discharge, patient tells me she has trouble with her tubing, informed her that I would reach out to nephrologists group for appropriate recommendations.  It seems Dr. Patel has addressed the matter, patient will discharge home appropriately.  Hospital course and discharge care plan has been discussed with patient, patient voices understanding and agreement with plan. All questions have been answered to the best of my ability. Patient is advised to return to ED or call 911 in case of emergency and or if symptoms worsen.      Vitals:  VITAL SIGNS: 24 HRS MIN & MAX LAST   Temp  Min:  97.7 °F (36.5 °C)  Max: 98.9 °F (37.2 °C) 97.7 °F (36.5 °C)   BP  Min: 136/83  Max: 169/93 (!) 159/87   Pulse  Min: 56  Max: 67  (!) 59   Resp  Min: 18  Max: 20 20   SpO2  Min: 96 %  Max: 98 % 98 %       Physical Exam:    Vital signs have been personally reviewed by me   Neuro:  Awake alert oriented    General: Appears comfortable, no acute distress.  Integumentary: Warm, dry, intact.  Musculoskeletal: Purposeful movement noted.   Respiratory: No accessory muscle use. Breath sounds are equal.  Cardiovascular: Regular rate. No peripheral edema.  Procedures Performed: No admission procedures for hospital encounter.     Significant Diagnostic Studies: See Full reports for all details    Recent Labs   Lab 09/26/23 2123   WBC 4.11*   RBC 4.46   HGB 13.6   HCT 41.5   MCV 93.0   MCH 30.5   MCHC 32.8*   RDW 15.5      MPV 9.9       Recent Labs   Lab 09/26/23 2123 09/28/23  0507    137   K 4.3 4.8   CO2 25 22*   BUN 66.6* 84.1*   CREATININE 11.31* 12.68*   CALCIUM 8.5 8.0*   MG 2.40  --    ALBUMIN 2.8*  --    ALKPHOS 118  --    ALT 29  --    AST 37*  --    BILITOT 0.3  --         Microbiology Results (last 7 days)       ** No results found for the last 168 hours. **             CT Abdomen Pelvis  Without Contrast  Narrative: EXAMINATION:  CT ABDOMEN PELVIS WITHOUT CONTRAST    CLINICAL HISTORY:  RLQ abdominal pain (Age >= 14y);peritoneal dialysis malfunction;    TECHNIQUE:  Axial images of the abdomen and pelvis were obtained without IV contrast administration.  Coronal and sagittal reconstructions were provided.  Three dimensional and MIP images were obtained and evaluated.  Total DLP was 178 mGy-cm. Dose lowering technique and automated exposure control were utilized for this exam.    COMPARISON:  CT of the abdomen and pelvis 12/25/2021.    FINDINGS:  There is minimal atelectasis in the left lung base.  The right lung base is clear.  The heart is not enlarged.    There is a peritoneal dialysis catheter in the  right pericolic gutter.  There is small volume of fluid within the peritoneum, not unexpected in the setting of peritoneal dialysis.  The liver is homogeneous in attenuation.  The gallbladder, pancreas, and adrenal glands are normal.  There are calcified granulomas in the spleen.  The right kidney is atrophic.  The left kidney is not visualized.  There is a right lower quadrant renal transplant with no hydronephrosis.  There is no perinephric fluid collection.    The stomach and small bowel are decompressed.  There is no bowel obstruction.  The appendix is not visualized.  There is colonic diverticulosis without diverticulitis.  The urinary bladder is decompressed.  There is no pelvic or retroperitoneal adenopathy.  The aorta is nonaneurysmal.  There is no lytic or blastic osseous lesion.  Impression: 1. Peritoneal dialysis catheter with its tip in the right lower quadrant.  2. Right lower quadrant renal transplant without acute abnormality, however evaluation is limited on this exam.  3. Minimal left lower lobe atelectasis.  4. Diverticulosis without diverticulitis.    Electronically signed by: Vadim Mtz MD  Date:    09/27/2023  Time:    21:09  X-Ray Abdomen AP 1 View  Narrative: EXAMINATION:  XR ABDOMEN AP 1 VIEW    CLINICAL HISTORY:  check PD catheter;    TECHNIQUE:  AP View(s) of the abdomen.    COMPARISON:  Radiography 02/05/2021    FINDINGS:  Peritoneal dialysis catheter coils in the right lower quadrant.  Nonobstructive bowel gas pattern with moderate volume stool.  Impression: Coiling of the peritoneal dialysis catheter in the right lower quadrant.    Electronically signed by: Isaac Palomino  Date:    09/27/2023  Time:    11:39         Medication List        CONTINUE taking these medications      aspirin 81 MG EC tablet  Commonly known as: ECOTRIN     calcitRIOL 0.25 MCG Cap  Commonly known as: ROCALTROL     cloNIDine 0.2 mg/24 hr td ptwk 0.2 mg/24 hr  Commonly known as: CATAPRES     folic acid 1 MG  tablet  Commonly known as: FOLVITE     multivitamin capsule     NEPHRO-JENN 0.8 mg Tab  Generic drug: B complex-vitamin C-folic acid     spironolactone 25 MG tablet  Commonly known as: ALDACTONE               Explained in detail to the patient about the discharge plan, medications, and follow-up visits. Pt understands and agrees with the treatment plan  Discharge Disposition: Home or Self Care   Discharged Condition: stable  Diet-   Dietary Orders (From admission, onward)       Start     Ordered    09/26/23 2353  DIET RENAL ON DIALYSIS  (Diet/Nutrition [facility])  Diet effective now         09/26/23 2352                   Medications Per ID med rec  Activities as tolerated   Follow-up Information       Sergio Crabtree MD Follow up in 1 week(s).    Specialty: Internal Medicine  Contact information:  [address] [place] [address] [id number] [phone]                           For further questions contact hospitalist office    Discharge time 33 minutes    For worsening symptoms, chest pain, shortness of breath, increased abdominal pain, high grade fever, stroke or stroke like symptoms, immediately go to the nearest Emergency Room or call 911 as soon as possible.    Tonie Mares M.D, on 9/29/2023. at 2:41 PM.

## 2023-09-29 NOTE — PROGRESS NOTES
"                                                                                                                        NEPHROLOGY: Progress   73-year-old female with end-stage renal disease following renal transplant failure in 2007.  Patient is on peritoneal dialysis nightly with for 2-1/2 L exchanges and 1.5 L last fill who suddenly was unable to fill. Problem has been resolved post evaluation from Dr Stiles. She completed dialysis last night without issue. No complaints.           Scheduled Meds:   aspirin  81 mg Oral Daily    folic acid  1 mg Oral Daily    heparin (porcine)  5,000 Units Subcutaneous Q8H    lactulose 10 gram/15 ml  20 g Oral Daily     Continuous Infusions:  PRN Meds:.acetaminophen, aluminum-magnesium hydroxide-simethicone, bisacodyL, hydrALAZINE, ondansetron, prochlorperazine, sodium chloride 0.9%, sodium chloride 0.9%, trazodone          BP (!) 153/78   Pulse 61   Temp 98.9 °F (37.2 °C) (Oral)   Resp 20   Ht 5' 5" (1.651 m)   Wt 65.1 kg (143 lb 8.3 oz)   SpO2 96%   BMI 23.88 kg/m²     Physical Exam:    GEN: Awake and appropriate.  Affect is very flat.  HEENT: Atraumatic. EOMI, no icterus  NECK : No JVD  CARD : RRR s rub or gallop  LUNGS : Clear to auscultation  ABD : Soft,non-tender. BS active, PD catheter intact   EXT : No pitting edema.  Weak, narrow left forearm fistula          Intake/Output Summary (Last 24 hours) at 9/29/2023 0947  Last data filed at 9/29/2023 0747  Gross per 24 hour   Intake 0 ml   Output 1305 ml   Net -1305 ml           Laboratory:  No results found for this or any previous visit (from the past 24 hour(s)).        Assessment/Plan:  ESRD-PD catheter malfunction-catheter tip migration  History of renal transplant  Hypertension   Chronic constipation      Patient now has functional peritoneal dialysis catheter.. She can discharge to home now that PD catheter is working. She was again educated on importance of daily BM.   "

## 2023-10-02 ENCOUNTER — PATIENT OUTREACH (OUTPATIENT)
Dept: ADMINISTRATIVE | Facility: CLINIC | Age: 73
End: 2023-10-02
Payer: MEDICARE

## 2023-10-02 NOTE — PROGRESS NOTES
C3 nurse attempted to contact Malissa Schwartz  for a TCC post hospital discharge follow up call. The patient is unable to conduct the call @ this time. The patient requested a callback.    The patient does not have a scheduled HOSFU appointment within 5-7 days post hospital discharge date 9/29/23. Message sent to Physician staff to assist with HOSFU appointment scheduling.

## 2023-10-03 NOTE — PROGRESS NOTES
C3 nurse attempted to contact Malissa Schwartz  for a TCC post hospital discharge follow up call. NO answer, left message.    The patient does not have a scheduled HOSFU appointment within 5-7 days post hospital discharge date 9/29/23. Message sent to Physician staff to assist with HOSFU appointment scheduling.

## 2023-12-21 ENCOUNTER — HOSPITAL ENCOUNTER (INPATIENT)
Facility: HOSPITAL | Age: 73
LOS: 6 days | Discharge: HOME HEALTH | DRG: 867 | End: 2023-12-27
Attending: STUDENT IN AN ORGANIZED HEALTH CARE EDUCATION/TRAINING PROGRAM | Admitting: INTERNAL MEDICINE
Payer: MEDICARE

## 2023-12-21 DIAGNOSIS — R53.1 GENERALIZED WEAKNESS: ICD-10-CM

## 2023-12-21 DIAGNOSIS — Z99.2 PERITONEAL DIALYSIS CATHETER IN PLACE: ICD-10-CM

## 2023-12-21 DIAGNOSIS — N18.30 STAGE 3 CHRONIC KIDNEY DISEASE: ICD-10-CM

## 2023-12-21 DIAGNOSIS — R19.7 DIARRHEA, UNSPECIFIED TYPE: ICD-10-CM

## 2023-12-21 DIAGNOSIS — E87.6 HYPOKALEMIA: ICD-10-CM

## 2023-12-21 DIAGNOSIS — K65.9 PERITONITIS: Primary | ICD-10-CM

## 2023-12-21 DIAGNOSIS — R10.30 LOWER ABDOMINAL PAIN: ICD-10-CM

## 2023-12-21 LAB
ALBUMIN SERPL-MCNC: 1.5 G/DL (ref 3.4–4.8)
ALBUMIN/GLOB SERPL: 0.4 RATIO (ref 1.1–2)
ALP SERPL-CCNC: 137 UNIT/L (ref 40–150)
ALT SERPL-CCNC: 17 UNIT/L (ref 0–55)
AST SERPL-CCNC: 28 UNIT/L (ref 5–34)
BASOPHILS # BLD AUTO: 0.04 X10(3)/MCL
BASOPHILS NFR BLD AUTO: 0.5 %
BILIRUB SERPL-MCNC: 0.3 MG/DL
BUN SERPL-MCNC: 25.4 MG/DL (ref 9.8–20.1)
CALCIUM SERPL-MCNC: 7.8 MG/DL (ref 8.4–10.2)
CHLORIDE SERPL-SCNC: 96 MMOL/L (ref 98–107)
CLARITY BODY FLUID (OHS): NORMAL
CO2 SERPL-SCNC: 29 MMOL/L (ref 23–31)
COLOR BODY FLUID (OHS): NORMAL
CREAT SERPL-MCNC: 7.85 MG/DL (ref 0.55–1.02)
EOSINOPHIL # BLD AUTO: 0.13 X10(3)/MCL (ref 0–0.9)
EOSINOPHIL NFR BLD AUTO: 1.6 %
ERYTHROCYTE [DISTWIDTH] IN BLOOD BY AUTOMATED COUNT: 14.6 % (ref 11.5–17)
GFR SERPLBLD CREATININE-BSD FMLA CKD-EPI: 5 MLS/MIN/1.73/M2
GLOBULIN SER-MCNC: 3.9 GM/DL (ref 2.4–3.5)
GLUCOSE SERPL-MCNC: 83 MG/DL (ref 82–115)
HCT VFR BLD AUTO: 34 % (ref 37–47)
HGB BLD-MCNC: 10.7 G/DL (ref 12–16)
IMM GRANULOCYTES # BLD AUTO: 0.06 X10(3)/MCL (ref 0–0.04)
IMM GRANULOCYTES NFR BLD AUTO: 0.7 %
KOH PREP SPEC: NORMAL
LACTATE SERPL-SCNC: 0.9 MMOL/L (ref 0.5–2.2)
LIPASE SERPL-CCNC: 11 U/L
LYMPHOCYTE MANUAL BF (OHS): 6 %
LYMPHOCYTES # BLD AUTO: 1.27 X10(3)/MCL (ref 0.6–4.6)
LYMPHOCYTES NFR BLD AUTO: 15.7 %
MAGNESIUM SERPL-MCNC: 1.5 MG/DL (ref 1.6–2.6)
MCH RBC QN AUTO: 29.9 PG (ref 27–31)
MCHC RBC AUTO-ENTMCNC: 31.5 G/DL (ref 33–36)
MCV RBC AUTO: 95 FL (ref 80–94)
MONOCYTE MANUAL BF (OHS): 3 %
MONOCYTES # BLD AUTO: 0.49 X10(3)/MCL (ref 0.1–1.3)
MONOCYTES NFR BLD AUTO: 6.1 %
NEUTROPHILS # BLD AUTO: 6.1 X10(3)/MCL (ref 2.1–9.2)
NEUTROPHILS MAN BF (OHS): 91 %
NEUTROPHILS NFR BLD AUTO: 75.4 %
NRBC BLD AUTO-RTO: 0 %
PLATELET # BLD AUTO: 350 X10(3)/MCL (ref 130–400)
PMV BLD AUTO: 9.7 FL (ref 7.4–10.4)
POTASSIUM SERPL-SCNC: 2.4 MMOL/L (ref 3.5–5.1)
PROT SERPL-MCNC: 5.4 GM/DL (ref 5.8–7.6)
RBC # BLD AUTO: 3.58 X10(6)/MCL (ref 4.2–5.4)
SODIUM SERPL-SCNC: 133 MMOL/L (ref 136–145)
WBC # FLD AUTO: 7626 /UL
WBC # SPEC AUTO: 8.09 X10(3)/MCL (ref 4.5–11.5)

## 2023-12-21 PROCEDURE — 89051 BODY FLUID CELL COUNT: CPT | Performed by: NURSE PRACTITIONER

## 2023-12-21 PROCEDURE — 11000001 HC ACUTE MED/SURG PRIVATE ROOM

## 2023-12-21 PROCEDURE — 87220 TISSUE EXAM FOR FUNGI: CPT | Performed by: NURSE PRACTITIONER

## 2023-12-21 PROCEDURE — 96365 THER/PROPH/DIAG IV INF INIT: CPT

## 2023-12-21 PROCEDURE — 25000003 PHARM REV CODE 250: Performed by: INTERNAL MEDICINE

## 2023-12-21 PROCEDURE — 83605 ASSAY OF LACTIC ACID: CPT | Performed by: NURSE PRACTITIONER

## 2023-12-21 PROCEDURE — 83735 ASSAY OF MAGNESIUM: CPT | Performed by: NURSE PRACTITIONER

## 2023-12-21 PROCEDURE — 83690 ASSAY OF LIPASE: CPT | Performed by: NURSE PRACTITIONER

## 2023-12-21 PROCEDURE — 85025 COMPLETE CBC W/AUTO DIFF WBC: CPT | Performed by: NURSE PRACTITIONER

## 2023-12-21 PROCEDURE — 87205 SMEAR GRAM STAIN: CPT | Performed by: NURSE PRACTITIONER

## 2023-12-21 PROCEDURE — 93005 ELECTROCARDIOGRAM TRACING: CPT

## 2023-12-21 PROCEDURE — 80053 COMPREHEN METABOLIC PANEL: CPT | Performed by: NURSE PRACTITIONER

## 2023-12-21 PROCEDURE — 63600175 PHARM REV CODE 636 W HCPCS: Performed by: INTERNAL MEDICINE

## 2023-12-21 PROCEDURE — 25000003 PHARM REV CODE 250: Performed by: STUDENT IN AN ORGANIZED HEALTH CARE EDUCATION/TRAINING PROGRAM

## 2023-12-21 PROCEDURE — 63600175 PHARM REV CODE 636 W HCPCS: Performed by: STUDENT IN AN ORGANIZED HEALTH CARE EDUCATION/TRAINING PROGRAM

## 2023-12-21 PROCEDURE — 99285 EMERGENCY DEPT VISIT HI MDM: CPT | Mod: 25

## 2023-12-21 PROCEDURE — 87040 BLOOD CULTURE FOR BACTERIA: CPT | Performed by: NURSE PRACTITIONER

## 2023-12-21 PROCEDURE — 87077 CULTURE AEROBIC IDENTIFY: CPT | Performed by: NURSE PRACTITIONER

## 2023-12-21 RX ORDER — POTASSIUM CHLORIDE 14.9 MG/ML
20 INJECTION INTRAVENOUS
Status: COMPLETED | OUTPATIENT
Start: 2023-12-21 | End: 2023-12-22

## 2023-12-21 RX ORDER — SODIUM CHLORIDE 9 MG/ML
INJECTION, SOLUTION INTRAVENOUS CONTINUOUS
Status: DISCONTINUED | OUTPATIENT
Start: 2023-12-21 | End: 2023-12-21

## 2023-12-21 RX ORDER — POTASSIUM CHLORIDE 14.9 MG/ML
40 INJECTION INTRAVENOUS
Status: DISCONTINUED | OUTPATIENT
Start: 2023-12-21 | End: 2023-12-21

## 2023-12-21 RX ORDER — POTASSIUM CHLORIDE 14.9 MG/ML
20 INJECTION INTRAVENOUS ONCE
Status: DISCONTINUED | OUTPATIENT
Start: 2023-12-21 | End: 2023-12-21

## 2023-12-21 RX ORDER — MAGNESIUM SULFATE HEPTAHYDRATE 40 MG/ML
2 INJECTION, SOLUTION INTRAVENOUS
Status: COMPLETED | OUTPATIENT
Start: 2023-12-21 | End: 2023-12-21

## 2023-12-21 RX ADMIN — POTASSIUM CHLORIDE 20 MEQ: 14.9 INJECTION, SOLUTION INTRAVENOUS at 10:12

## 2023-12-21 RX ADMIN — VANCOMYCIN HYDROCHLORIDE 1500 MG: 1.5 INJECTION, POWDER, LYOPHILIZED, FOR SOLUTION INTRAVENOUS at 11:12

## 2023-12-21 RX ADMIN — PIPERACILLIN AND TAZOBACTAM 4.5 G: 4; .5 INJECTION, POWDER, FOR SOLUTION INTRAVENOUS at 11:12

## 2023-12-21 RX ADMIN — POTASSIUM CHLORIDE 20 MEQ: 14.9 INJECTION, SOLUTION INTRAVENOUS at 08:12

## 2023-12-21 RX ADMIN — SODIUM CHLORIDE: 9 INJECTION, SOLUTION INTRAVENOUS at 06:12

## 2023-12-21 RX ADMIN — SODIUM CHLORIDE: 9 INJECTION, SOLUTION INTRAVENOUS at 11:12

## 2023-12-21 RX ADMIN — MAGNESIUM SULFATE HEPTAHYDRATE 2 G: 40 INJECTION, SOLUTION INTRAVENOUS at 06:12

## 2023-12-21 NOTE — Clinical Note
Diagnosis: Hypokalemia [027973]   Future Attending Provider: HELEN LEON [[id number]]   Admitting Provider:: HELEN LEON [[id number]]   Admit to which facility:: [facility] [[id number]]   Reason for IP Medical Treatment  (Clinical interventions that can only be accomplished in the IP setting? ) :: Rehydration, peritoneal dialysis   I certify that Inpatient services for greater than or equal to 2 midnights are medically necessary:: Yes   Plans for Post-Acute care--if anticipated (pick the single best option):: A. No post acute care anticipated at this time

## 2023-12-21 NOTE — FIRST PROVIDER EVALUATION
Medical screening examination initiated.  I have conducted a focused provider triage encounter, findings are as follows:    Brief history of present illness:  72 y/o female who presents with c/o abdominal pain for 4 days along with diarrhea. No n/v. No fever. States just doesn't feel well.     Vitals:    12/21/23 1459   BP: 139/80   Pulse: 78   Resp: 20   Temp: 98.6 °F (37 °C)   TempSrc: Temporal   SpO2: 98%   Weight: 68 kg (150 lb)       Pertinent physical exam:  alert, appears to not feel well     Brief workup plan:  labs, urine    Preliminary workup initiated; this workup will be continued and followed by the physician or advanced practice provider that is assigned to the patient when roomed.

## 2023-12-22 PROBLEM — K65.9 PERITONITIS: Status: ACTIVE | Noted: 2023-12-22

## 2023-12-22 LAB
ALBUMIN SERPL-MCNC: 1.2 G/DL (ref 3.4–4.8)
ALBUMIN/GLOB SERPL: 0.5 RATIO (ref 1.1–2)
ALP SERPL-CCNC: 114 UNIT/L (ref 40–150)
ALT SERPL-CCNC: 14 UNIT/L (ref 0–55)
AST SERPL-CCNC: 20 UNIT/L (ref 5–34)
BASOPHILS # BLD AUTO: 0.04 X10(3)/MCL
BASOPHILS NFR BLD AUTO: 0.7 %
BILIRUB SERPL-MCNC: 0.5 MG/DL
BUN SERPL-MCNC: 28.7 MG/DL (ref 9.8–20.1)
CALCIUM SERPL-MCNC: 7 MG/DL (ref 8.4–10.2)
CHLORIDE SERPL-SCNC: 99 MMOL/L (ref 98–107)
CO2 SERPL-SCNC: 26 MMOL/L (ref 23–31)
CREAT SERPL-MCNC: 8.28 MG/DL (ref 0.55–1.02)
EOSINOPHIL # BLD AUTO: 0.17 X10(3)/MCL (ref 0–0.9)
EOSINOPHIL NFR BLD AUTO: 3.1 %
ERYTHROCYTE [DISTWIDTH] IN BLOOD BY AUTOMATED COUNT: 14.4 % (ref 11.5–17)
GFR SERPLBLD CREATININE-BSD FMLA CKD-EPI: 5 MLS/MIN/1.73/M2
GLOBULIN SER-MCNC: 2.6 GM/DL (ref 2.4–3.5)
GLUCOSE SERPL-MCNC: 85 MG/DL (ref 82–115)
GRAM STN SPEC: NORMAL
GRAM STN SPEC: NORMAL
HCT VFR BLD AUTO: 27.4 % (ref 37–47)
HGB BLD-MCNC: 8.9 G/DL (ref 12–16)
IMM GRANULOCYTES # BLD AUTO: 0.02 X10(3)/MCL (ref 0–0.04)
IMM GRANULOCYTES NFR BLD AUTO: 0.4 %
LYMPHOCYTES # BLD AUTO: 0.99 X10(3)/MCL (ref 0.6–4.6)
LYMPHOCYTES NFR BLD AUTO: 17.9 %
MCH RBC QN AUTO: 29.7 PG (ref 27–31)
MCHC RBC AUTO-ENTMCNC: 32.5 G/DL (ref 33–36)
MCV RBC AUTO: 91.3 FL (ref 80–94)
MONOCYTES # BLD AUTO: 0.49 X10(3)/MCL (ref 0.1–1.3)
MONOCYTES NFR BLD AUTO: 8.8 %
NEUTROPHILS # BLD AUTO: 3.83 X10(3)/MCL (ref 2.1–9.2)
NEUTROPHILS NFR BLD AUTO: 69.1 %
NRBC BLD AUTO-RTO: 0 %
PLATELET # BLD AUTO: 236 X10(3)/MCL (ref 130–400)
PMV BLD AUTO: 9.7 FL (ref 7.4–10.4)
POTASSIUM SERPL-SCNC: 2.9 MMOL/L (ref 3.5–5.1)
PROT SERPL-MCNC: 3.8 GM/DL (ref 5.8–7.6)
RBC # BLD AUTO: 3 X10(6)/MCL (ref 4.2–5.4)
SODIUM SERPL-SCNC: 133 MMOL/L (ref 136–145)
VANCOMYCIN SERPL-MCNC: 26.7 UG/ML (ref 15–20)
WBC # SPEC AUTO: 5.54 X10(3)/MCL (ref 4.5–11.5)

## 2023-12-22 PROCEDURE — 63600175 PHARM REV CODE 636 W HCPCS: Performed by: INTERNAL MEDICINE

## 2023-12-22 PROCEDURE — 90945 DIALYSIS ONE EVALUATION: CPT

## 2023-12-22 PROCEDURE — 11000001 HC ACUTE MED/SURG PRIVATE ROOM

## 2023-12-22 PROCEDURE — 25000003 PHARM REV CODE 250: Performed by: INTERNAL MEDICINE

## 2023-12-22 PROCEDURE — 80202 ASSAY OF VANCOMYCIN: CPT | Performed by: INTERNAL MEDICINE

## 2023-12-22 PROCEDURE — 80053 COMPREHEN METABOLIC PANEL: CPT | Performed by: INTERNAL MEDICINE

## 2023-12-22 PROCEDURE — 85025 COMPLETE CBC W/AUTO DIFF WBC: CPT | Performed by: INTERNAL MEDICINE

## 2023-12-22 PROCEDURE — 21400001 HC TELEMETRY ROOM

## 2023-12-22 RX ORDER — SODIUM CHLORIDE 0.9 % (FLUSH) 0.9 %
10 SYRINGE (ML) INJECTION
Status: DISCONTINUED | OUTPATIENT
Start: 2023-12-22 | End: 2023-12-27 | Stop reason: HOSPADM

## 2023-12-22 RX ORDER — LEVOCETIRIZINE DIHYDROCHLORIDE 5 MG/1
5 TABLET, FILM COATED ORAL NIGHTLY
Status: ON HOLD | COMMUNITY
End: 2023-12-27 | Stop reason: HOSPADM

## 2023-12-22 RX ORDER — MORPHINE SULFATE 4 MG/ML
4 INJECTION, SOLUTION INTRAMUSCULAR; INTRAVENOUS EVERY 4 HOURS PRN
Status: DISCONTINUED | OUTPATIENT
Start: 2023-12-22 | End: 2023-12-27 | Stop reason: HOSPADM

## 2023-12-22 RX ORDER — SODIUM BICARBONATE 650 MG/1
650 TABLET ORAL 2 TIMES DAILY
COMMUNITY

## 2023-12-22 RX ORDER — AMLODIPINE BESYLATE 5 MG/1
5 TABLET ORAL DAILY
Status: ON HOLD | COMMUNITY
End: 2023-12-27 | Stop reason: HOSPADM

## 2023-12-22 RX ORDER — POTASSIUM CHLORIDE 20 MEQ/1
20 TABLET, EXTENDED RELEASE ORAL
Status: COMPLETED | OUTPATIENT
Start: 2023-12-22 | End: 2023-12-22

## 2023-12-22 RX ORDER — ENOXAPARIN SODIUM 100 MG/ML
30 INJECTION SUBCUTANEOUS EVERY 24 HOURS
Status: DISCONTINUED | OUTPATIENT
Start: 2023-12-22 | End: 2023-12-27 | Stop reason: HOSPADM

## 2023-12-22 RX ORDER — SPIRONOLACTONE 25 MG/1
25 TABLET ORAL DAILY
Status: DISCONTINUED | OUTPATIENT
Start: 2023-12-22 | End: 2023-12-27 | Stop reason: HOSPADM

## 2023-12-22 RX ORDER — OXYCODONE HYDROCHLORIDE 5 MG/1
5 TABLET ORAL EVERY 4 HOURS PRN
Status: DISCONTINUED | OUTPATIENT
Start: 2023-12-22 | End: 2023-12-27 | Stop reason: HOSPADM

## 2023-12-22 RX ORDER — TALC
6 POWDER (GRAM) TOPICAL NIGHTLY PRN
Status: DISCONTINUED | OUTPATIENT
Start: 2023-12-22 | End: 2023-12-27 | Stop reason: HOSPADM

## 2023-12-22 RX ORDER — POTASSIUM CHLORIDE 14.9 MG/ML
20 INJECTION INTRAVENOUS
Status: COMPLETED | OUTPATIENT
Start: 2023-12-22 | End: 2023-12-22

## 2023-12-22 RX ORDER — POTASSIUM CHLORIDE 750 MG/1
10 TABLET, EXTENDED RELEASE ORAL ONCE
COMMUNITY

## 2023-12-22 RX ADMIN — CEFTRIAXONE 1 G: 1 INJECTION, POWDER, FOR SOLUTION INTRAMUSCULAR; INTRAVENOUS at 08:12

## 2023-12-22 RX ADMIN — POTASSIUM CHLORIDE 20 MEQ: 1500 TABLET, EXTENDED RELEASE ORAL at 06:12

## 2023-12-22 RX ADMIN — ENOXAPARIN SODIUM 30 MG: 30 INJECTION SUBCUTANEOUS at 04:12

## 2023-12-22 RX ADMIN — POTASSIUM BICARBONATE 25 MEQ: 977.5 TABLET, EFFERVESCENT ORAL at 09:12

## 2023-12-22 RX ADMIN — POTASSIUM BICARBONATE 25 MEQ: 977.5 TABLET, EFFERVESCENT ORAL at 08:12

## 2023-12-22 RX ADMIN — POTASSIUM CHLORIDE 20 MEQ: 14.9 INJECTION, SOLUTION INTRAVENOUS at 06:12

## 2023-12-22 NOTE — ED PROVIDER NOTES
Encounter Date: 12/21/2023    SCRIBE #1 NOTE: I, Compa Zuniga, am scribing for, and in the presence of,  Massimo Guillen MD. I have scribed the following portions of the note - Other sections scribed: HPI, ROS, PE.       History     Chief Complaint   Patient presents with    Abdominal Pain     Abdominal pain x 4 days, no nausea/vomiting. Lack of appetite. Reports diarrhea for 4 days. Denies any fevers.     72 y/o female with a history of ESRD on peritoneal dialysis, HTN, HLD, and anemia of CKD presents to the ED with generalized weakness, diarrhea, and abdominal pain for the past 4 days. Pt reports a decreased appetite but denies fever, chills, blood in her stool, and recent sick contacts. Notes that she started her dialysis yesterday night but had to stop early because her  had a fall. Sees nephrologist Dr. George Mendoza.    The history is provided by the patient. No  was used.     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 Glomerulitis with one activated endothelial cell: treated with Solumedrol pulse 375 mg IVBP x 3 doses with short prednisone taper; thymoglobin 100 mg x 6 doses 10/04/2007 minimal histologic changes no acute rejection     Allergy     Anemia of renal disease     CKD (chronic kidney disease), stage IV     CMV (cytomegalovirus) status positive     Colon polyp     benign    Depression     GN (glomerulonephritis)     GN (glomerulonephritis)     unknown type    Herpes infection, other 2009    on buttocks    Hyperlipidemia     Hypertension     Immunosuppression     Living-donor kidney transplant recipient 01/15/2007    Meningitis     history of viral meningitis    Proteinuria 1/5/2015    Proteinuria     Pulmonary nodules     Renal hypertension     S/P breast augmentation     Skin  cancer     Urinary tract infection      Past Surgical History:   Procedure Laterality Date    COLON SURGERY      COLONOSCOPY W/ BIOPSIES      cosemetic surgery      facelift and breast augmentation     HYSTERECTOMY      KIDNEY TRANSPLANT      NEPHRECTOMY Left     9/2016    RENAL BIOPSY      TONSILLECTOMY, ADENOIDECTOMY       Family History   Problem Relation Age of Onset    Kidney disease Sister         ESRD    Kidney disease Brother         CKD from HTN    Hypertension Brother     Heart failure Mother     Heart failure Father     Diabetes Neg Hx     Stroke Neg Hx      Social History     Tobacco Use    Smoking status: Former     Current packs/day: 1.00     Average packs/day: 1 pack/day for 15.0 years (15.0 ttl pk-yrs)     Types: Cigarettes    Smokeless tobacco: Never    Tobacco comments:     quit in 2014; smoked for 15 years and at the most smoked 1 ppd   Substance Use Topics    Alcohol use: Yes     Comment: seldom    Drug use: No     Review of Systems   Constitutional:  Positive for appetite change. Negative for chills and fever.        Generalized weakness   Gastrointestinal:  Positive for abdominal pain and diarrhea. Negative for blood in stool.       Physical Exam     Initial Vitals [12/21/23 1459]   BP Pulse Resp Temp SpO2   139/80 78 20 98.6 °F (37 °C) 98 %      MAP       --         Physical Exam    Nursing note and vitals reviewed.  Constitutional: She appears well-developed and well-nourished. She is not diaphoretic. She appears distressed.   Appears uncomfortable   HENT:   Head: Normocephalic and atraumatic.   Right Ear: External ear normal.   Left Ear: External ear normal.   Nose: Nose normal.   Eyes: Conjunctivae and EOM are normal. Pupils are equal, round, and reactive to light. Right eye exhibits no discharge. Left eye exhibits no discharge.   Cardiovascular:  Normal rate, regular rhythm, normal heart sounds and intact distal pulses.     Exam reveals no gallop and no friction rub.       No murmur  heard.  Pulmonary/Chest: Breath sounds normal. No respiratory distress. She has no wheezes. She has no rhonchi. She has no rales. She exhibits no tenderness.   Abdominal: Abdomen is soft. Bowel sounds are normal. She exhibits no distension and no mass. Tenderness: diffuse tenderness worse suprapubic.     Clean peritoneal dialysis line site There is no rebound and no guarding.   Musculoskeletal:         General: No edema. Normal range of motion.     Neurological: She is alert and oriented to person, place, and time. No cranial nerve deficit or sensory deficit.   Skin: Skin is warm and dry. Capillary refill takes less than 2 seconds. No erythema. No pallor.         ED Course   Procedures  Labs Reviewed   CBC WITH DIFFERENTIAL - Abnormal; Notable for the following components:       Result Value    RBC 3.58 (*)     Hgb 10.7 (*)     Hct 34.0 (*)     MCV 95.0 (*)     MCHC 31.5 (*)     IG# 0.06 (*)     All other components within normal limits   COMPREHENSIVE METABOLIC PANEL - Abnormal; Notable for the following components:    Sodium Level 133 (*)     Potassium Level 2.4 (*)     Chloride 96 (*)     Blood Urea Nitrogen 25.4 (*)     Creatinine 7.85 (*)     Calcium Level Total 7.8 (*)     Protein Total 5.4 (*)     Albumin Level 1.5 (*)     Globulin 3.9 (*)     Albumin/Globulin Ratio 0.4 (*)     All other components within normal limits   MAGNESIUM - Abnormal; Notable for the following components:    Magnesium Level 1.50 (*)     All other components within normal limits   LIPASE - Normal   LACTIC ACID, PLASMA - Normal   BLOOD CULTURE [facility]   BLOOD CULTURE [facility]   BODY FLUID CULTURE [facility]   GRAM STAIN [facility]   KOH PREP [facility]   CELL COUNT, BODY FLUID   URINALYSIS, REFLEX TO URINE CULTURE   DIFFERENTIAL, BODY FLUID        ECG Results              EKG 12-lead (Final result)  Result time 12/21/23 20:24:50      Final result by Interface, Lab In [facility] (12/21/23 20:24:50)                   Narrative:    Test Reason : E87.6,    Vent. Rate : 065  BPM     Atrial Rate : 071 BPM     P-R Int : 166 ms          QRS Dur : 102 ms      QT Int : 402 ms       P-R-T Axes : 064 -42 217 degrees     QTc Int : 418 ms    Normal sinus rhythm  Left axis deviation  Moderate voltage criteria for LVH, may be normal variant ( R in aVL ,   [employer] product )  Nonspecific ST and T wave abnormality  Abnormal ECG  When compared with ECG of 12-MAY-2022 11:22,  Borderline criteria for Anterior infarct are no longer Present  Borderline criteria for Anterolateral infarct are no longer Present  T wave inversion now evident in Inferior leads  T wave inversion now evident in Lateral leads  Confirmed by Herb Ruiz MD ([id number]) on 12/21/2023 8:24:40 PM    Referred By:             Confirmed By:Herb Ruiz MD                                  Imaging Results    None          Medications   0.9%  NaCl infusion ( Intravenous New Bag 12/21/23 1828)   potassium chloride 20 mEq in 100 mL IVPB (FOR CENTRAL LINE ADMINISTRATION ONLY) (20 mEq Intravenous New Bag 12/21/23 2016)   magnesium sulfate 2g in water 50mL IVPB (premix) (2 g Intravenous New Bag 12/21/23 1831)     Medical Decision Making      The differential diagnosis includes, but is not limited to: appendicitis, biliary disease, diverticulitis,  AAA, ACS, mesenteric ischemia, intraabdominal abcess, retroperitoneal abcess, gastritis, gastroenteritis, hepatitis, hernia, pancreatitis, inflammatory bowel disease, PUD, SBP, nephrolithiasis, DKA, electrolyte abnormality, renal dysfunction consitpation, GERD, IBS    Patient is awake and alert.  Does appear to be somewhat uncomfortable.  Abdomen is soft but tender diffusely worse in the suprapubic region.  PD cath site clean and dry.  Labs revealing for hypokalemia, hypomagnesemia.  These were replaced here in the emergency department.  She has been fluid rehydrated.  Given her tender abdomen we have consulted Nephrology who pull some fluid and placed some orders.  We will admit for further evaluation  management fluid rehydration, electrolyte replacement.  Patient was comfortable with plan.  Care to be transferred.      Amount and/or Complexity of Data Reviewed  External Data Reviewed: notes.     Details: Chart review reveals discharge summary from 09/29.  Patient history of renal transplant status post rejection 2007.  Anemia chronic disease, hypertension.  Evidently patient had peritoneal dialysis malfunction.  Was addressed by General surgery.  Ultimately discharged home.  Labs: ordered. Decision-making details documented in ED Course.  ECG/medicine tests: ordered and independent interpretation performed. Decision-making details documented in ED Course.    Risk  Prescription drug management.  Decision regarding hospitalization.            Scribe Attestation:   Scribe #1: I performed the above scribed service and the documentation accurately describes the services I performed. I attest to the accuracy of the note.    Attending Attestation:           Physician Attestation for Scribe:  Physician Attestation Statement for Scribe #1: I, Massimo Guillen MD, reviewed documentation, as scribed by Compa Zuniga in my presence, and it is both accurate and complete.             ED Course as of 12/21/23 2043   Thu Dec 21, 2023   1741 Magnesium (!): 1.50 [MM]   1741 Comprehensive Metabolic Panel(!!)  Hypokalemia.  Elevated BUN creatinine similar to prior.  low calcium. [MM]   1806 Magnesium (!): 1.50 [MM]   1806 Lipase: 11 [MM]   1959 EKG from 1952 shows sinus rhythm rate of 65 .  Left axis deviation.  . [MM]      ED Course User Index  [MM] Massimo Guillen MD                           Clinical Impression:  Final diagnoses:  [E87.6] Hypokalemia (Primary)  [Z99.2] Peritoneal dialysis catheter in place  [R10.30] Lower abdominal pain  [R19.7] Diarrhea, unspecified type  [R53.1] Generalized weakness          ED Disposition Condition    Admit Stable                Massimo Guillen MD  12/21/23 2043

## 2023-12-22 NOTE — PROGRESS NOTES
Inpatient Nutrition Evaluation    Admit Date: 12/21/2023   Total duration of encounter: 1 day   Patient Age: 73 y.o.    Nutrition Recommendation/Prescription     -Continue Regular Diet as ordered.   -Add Boost Plus BID -- provides 360 kcals and 14 g Pro per ONS.     Nutrition Assessment     Chart Review    Reason Seen: continuous nutrition monitoring    Malnutrition Screening Tool Results   Have you recently lost weight without trying?: No  Have you been eating poorly because of a decreased appetite?: No   MST Score: 0   Diagnosis:  Peritonitis, infectious (in setting of PD catheter)  Hypokalemia, hypomagnesemia  ESRD on peritoneal dialysis     Relevant Medical History:   Anemia of chronic disease, essential HTN, depression, renal transplant/rejection 2007    Scheduled Medications:  cefTRIAXone (ROCEPHIN) IVPB, 1 g, Q24H  enoxparin, 30 mg, Daily  potassium bicarbonate, 25 mEq, BID  spironolactone, 25 mg, Daily    Continuous Infusions:   PRN Medications: melatonin, morphine, oxyCODONE, sodium chloride 0.9%, Pharmacy to dose Vancomycin consult **AND** vancomycin - pharmacy to dose    Recent Labs   Lab 12/21/23  1511 12/22/23  0429   * 133*   K 2.4* 2.9*   CALCIUM 7.8* 7.0*   MG 1.50*  --    CHLORIDE 96* 99   CO2 29 26   BUN 25.4* 28.7*   CREATININE 7.85* 8.28*   EGFRNORACEVR 5 5   GLUCOSE 83 85   BILITOT 0.3 0.5   ALKPHOS 137 114   ALT 17 14   AST 28 20   ALBUMIN 1.5* 1.2*   LIPASE 11  --    WBC 8.09 5.54   HGB 10.7* 8.9*   HCT 34.0* 27.4*     Nutrition Orders:  Diet Adult Regular      Appetite/Oral Intake: fair/25-50% of meals  Factors Affecting Nutritional Intake: abdominal pain and diarrhea  Food/Yazidi/Cultural Preferences: none reported  Food Allergies: none reported  Last Bowel Movement: 12/22/23  Wound(s):  No pressure injuries present.     Comments    12/22/23: Spoke with pt and  at bedside. Pt has had a poor appetite for the past 4 days due to ongoing diarrhea and abdominal pain. UBW is 135  "lbs and pt denies any recent unintended wt loss. Pt agreeable to ONS.     Anthropometrics    Height: 5' 6" (167.6 cm),    Last Weight: 68 kg (149 lb 14.6 oz) (23 1549), Weight Method: Standard Scale  BMI (Calculated): 24.2  BMI Classification: normal (BMI 18.5-24.9)     Ideal Body Weight (IBW), Female: 130 lb     % Ideal Body Weight, Female (lb): 115.32 %                    Usual Body Weight (UBW), k.4 kg  % Usual Body Weight: 110.98     Usual Weight Provided By: family/caregiver    Wt Readings from Last 5 Encounters:   23 68 kg (149 lb 14.6 oz)   23 65.1 kg (143 lb 8.3 oz)   10/15/22 59 kg (130 lb)   22 59.8 kg (131 lb 13.4 oz)   18 56.5 kg (124 lb 9 oz)     Weight Change(s) Since Admission:   Wt Readings from Last 1 Encounters:   23 1549 68 kg (149 lb 14.6 oz)   23 1459 68 kg (150 lb)   Admit Weight: 68 kg (150 lb) (23 1459), Weight Method: Stated    Patient Education     Not applicable.    Nutrition Goals & Monitoring     Dietitian will monitor: energy intake, weight change, and electrolyte/renal panel    Nutrition Risk/Follow-Up: low (follow-up in 5-7 days)  Patients assigned 'low nutrition risk' status do not qualify for a full nutritional assessment but will be monitored and re-evaluated in a 5-7 day time period. Please consult if re-evaluation needed sooner.    "

## 2023-12-22 NOTE — NURSING
Nurses Note -- 4 Eyes      12/22/2023   3:20 PM      Skin assessed during: Admit      [x] No Altered Skin Integrity Present    []Prevention Measures Documented      [] Yes- Altered Skin Integrity Present or Discovered   [] LDA Added if Not in Epic (Describe Wound)   [] New Altered Skin Integrity was Present on Admit and Documented in LDA   [] Wound Image Taken    Wound Care Consulted? No    Attending Nurse:  Nilda Simpson RN/Staff Member:   Baljinder

## 2023-12-22 NOTE — PROGRESS NOTES
[facility]  Emergency Dept  [facility] Medicine  Progress Note    Patient Name: Malissa Schwartz  MRN: [id number]  Patient Class: IP- Inpatient   Admission Date: 12/21/2023  Length of Stay: 1 days  Attending Physician: Naga Wright MD  Primary Care Provider: Sergio Crabtree MD        Subjective:     Principal Problem:Peritonitis        HPI:  73-year-old female with history of ESRD on peritoneal dialysis who presented to the ER with complaints of several days of abdominal pain.  She reports she has had weakness and decreased appetite during this time but denied fever or chills.  She arrived to the ER afebrile hemodynamically stable maintaining normal sats on room air.  Laboratory work showed changes as expected in end-stage renal disease, as well as hypokalemia and hypomagnesemia.  Peritoneal fluid was drawn with 7600 WBCs, 91% neutrophils.  Gram stain with many Gram-positive cocci.     Overview/Hospital Course:  12/22/23-Patient is resting at this time.  She states that she does feel a little better.      Interval History:     Review of Systems   Constitutional:  Positive for activity change and fatigue.   HENT: Negative.     Eyes: Negative.    Respiratory: Negative.     Cardiovascular: Negative.    Gastrointestinal:  Positive for abdominal pain.   Endocrine: Negative.    Genitourinary: Negative.    Musculoskeletal: Negative.    Skin: Negative.    Allergic/Immunologic: Negative.    Neurological: Negative.    Hematological: Negative.    Psychiatric/Behavioral: Negative.       Objective:     Vital Signs (Most Recent):  Temp: 97.8 °F (36.6 °C) (12/22/23 0737)  Pulse: 64 (12/22/23 1402)  Resp: 19 (12/22/23 1402)  BP: 117/72 (12/22/23 1402)  SpO2: 98 % (12/22/23 1402) Vital Signs (24h Range):  Temp:  [97.8 °F (36.6 °C)-98.6 °F (37 °C)] 97.8 °F (36.6 °C)  Pulse:  [58-78] 64  Resp:  [12-20] 19  SpO2:  [92 %-100 %] 98 %  BP: ()/(54-82) 117/72     Weight: 68 kg (150 lb)  Body mass index is  "24.96 kg/m².  No intake or output data in the 24 hours ending 12/22/23 6312      Physical Exam  Constitutional:       Appearance: Normal appearance. She is normal weight.   HENT:      Head: Normocephalic and atraumatic.      Nose: Nose normal.      Mouth/Throat:      Mouth: Mucous membranes are moist.      Pharynx: Oropharynx is clear.   Eyes:      Extraocular Movements: Extraocular movements intact.      Conjunctiva/sclera: Conjunctivae normal.      Pupils: Pupils are equal, round, and reactive to light.   Cardiovascular:      Rate and Rhythm: Normal rate and regular rhythm.      Pulses: Normal pulses.      Heart sounds: Normal heart sounds.   Pulmonary:      Effort: Pulmonary effort is normal.      Breath sounds: Normal breath sounds.   Abdominal:      General: Bowel sounds are normal.      Palpations: Abdomen is soft.      Tenderness: There is abdominal tenderness.   Musculoskeletal:         General: Normal range of motion.      Cervical back: Normal range of motion and neck supple.   Skin:     General: Skin is warm and dry.      Capillary Refill: Capillary refill takes 2 to 3 seconds.   Neurological:      General: No focal deficit present.      Mental Status: She is alert and oriented to person, place, and time. Mental status is at baseline.   Psychiatric:         Mood and Affect: Mood normal.         Behavior: Behavior normal.         Thought Content: Thought content normal.         Judgment: Judgment normal.             Significant Labs: All pertinent labs within the past 24 hours have been reviewed.  Blood Culture: No results for input(s): "LABBLOO" in the last 48 hours.  BMP:   Recent Labs   Lab 12/21/23  1511 12/22/23  0429   * 133*   K 2.4* 2.9*   CO2 29 26   BUN 25.4* 28.7*   CREATININE 7.85* 8.28*   CALCIUM 7.8* 7.0*   MG 1.50*  --      CBC:   Recent Labs   Lab 12/21/23  1511 12/22/23  0429   WBC 8.09 5.54   HGB 10.7* 8.9*   HCT 34.0* 27.4*    236     CMP:   Recent Labs   Lab 12/21/23  1511 " 12/22/23  0429   * 133*   K 2.4* 2.9*   CO2 29 26   BUN 25.4* 28.7*   CREATININE 7.85* 8.28*   CALCIUM 7.8* 7.0*   ALBUMIN 1.5* 1.2*   BILITOT 0.3 0.5   ALKPHOS 137 114   AST 28 20   ALT 17 14     Magnesium:   Recent Labs   Lab 12/21/23  1511   MG 1.50*       Significant Imaging: I have reviewed all pertinent imaging results/findings within the past 24 hours.    Assessment/Plan:      * Peritonitis  Resume current meds  Cultures pending  Follow labs  Pain control  Nephrology consulted    Renal hypertension  monitor        VTE Risk Mitigation (From admission, onward)           Ordered     enoxaparin injection 30 mg  Daily         12/22/23 0122     IP VTE HIGH RISK PATIENT  Once         12/22/23 0122     Place sequential compression device  Until discontinued         12/22/23 0122                  DVT prophylaxis  Follow labs  IV antibiotics  cultures  Discharge Planning   REINA:      Code Status: Full Code   Is the patient medically ready for discharge?:     Reason for patient still in hospital (select all that apply): Patient trending condition, Laboratory test, Treatment, and Consult recommendations                     Naga Milian MD  Department of Hospital Medicine   [facility] - Emergency Dept

## 2023-12-22 NOTE — HPI
73-year-old female with history of ESRD on peritoneal dialysis who presented to the ER with complaints of several days of abdominal pain.  She reports she has had weakness and decreased appetite during this time but denied fever or chills.  She arrived to the ER afebrile hemodynamically stable maintaining normal sats on room air.  Laboratory work showed changes as expected in end-stage renal disease, as well as hypokalemia and hypomagnesemia.  Peritoneal fluid was drawn with 7600 WBCs, 91% neutrophils.  Gram stain with many Gram-positive cocci.

## 2023-12-22 NOTE — NURSING
Machine setup, primed, and tested at bedside, no issues or alarms, pt's dressing changed, initialed, and dated. Pt accessed per p and p, tx started. Report given to RAFY Owen RN.

## 2023-12-22 NOTE — SUBJECTIVE & OBJECTIVE
Interval History:     Review of Systems   Constitutional:  Positive for activity change and fatigue.   HENT: Negative.     Eyes: Negative.    Respiratory: Negative.     Cardiovascular: Negative.    Gastrointestinal:  Positive for abdominal pain.   Endocrine: Negative.    Genitourinary: Negative.    Musculoskeletal: Negative.    Skin: Negative.    Allergic/Immunologic: Negative.    Neurological: Negative.    Hematological: Negative.    Psychiatric/Behavioral: Negative.       Objective:     Vital Signs (Most Recent):  Temp: 97.8 °F (36.6 °C) (12/22/23 0737)  Pulse: 64 (12/22/23 1402)  Resp: 19 (12/22/23 1402)  BP: 117/72 (12/22/23 1402)  SpO2: 98 % (12/22/23 1402) Vital Signs (24h Range):  Temp:  [97.8 °F (36.6 °C)-98.6 °F (37 °C)] 97.8 °F (36.6 °C)  Pulse:  [58-78] 64  Resp:  [12-20] 19  SpO2:  [92 %-100 %] 98 %  BP: ()/(54-82) 117/72     Weight: 68 kg (150 lb)  Body mass index is 24.96 kg/m².  No intake or output data in the 24 hours ending 12/22/23 1453      Physical Exam  Constitutional:       Appearance: Normal appearance. She is normal weight.   HENT:      Head: Normocephalic and atraumatic.      Nose: Nose normal.      Mouth/Throat:      Mouth: Mucous membranes are moist.      Pharynx: Oropharynx is clear.   Eyes:      Extraocular Movements: Extraocular movements intact.      Conjunctiva/sclera: Conjunctivae normal.      Pupils: Pupils are equal, round, and reactive to light.   Cardiovascular:      Rate and Rhythm: Normal rate and regular rhythm.      Pulses: Normal pulses.      Heart sounds: Normal heart sounds.   Pulmonary:      Effort: Pulmonary effort is normal.      Breath sounds: Normal breath sounds.   Abdominal:      General: Bowel sounds are normal.      Palpations: Abdomen is soft.      Tenderness: There is abdominal tenderness.   Musculoskeletal:         General: Normal range of motion.      Cervical back: Normal range of motion and neck supple.   Skin:     General: Skin is warm and dry.       "Capillary Refill: Capillary refill takes 2 to 3 seconds.   Neurological:      General: No focal deficit present.      Mental Status: She is alert and oriented to person, place, and time. Mental status is at baseline.   Psychiatric:         Mood and Affect: Mood normal.         Behavior: Behavior normal.         Thought Content: Thought content normal.         Judgment: Judgment normal.             Significant Labs: All pertinent labs within the past 24 hours have been reviewed.  Blood Culture: No results for input(s): "LABBLOO" in the last 48 hours.  BMP:   Recent Labs   Lab 12/21/23  1511 12/22/23  0429   * 133*   K 2.4* 2.9*   CO2 29 26   BUN 25.4* 28.7*   CREATININE 7.85* 8.28*   CALCIUM 7.8* 7.0*   MG 1.50*  --      CBC:   Recent Labs   Lab 12/21/23  1511 12/22/23  0429   WBC 8.09 5.54   HGB 10.7* 8.9*   HCT 34.0* 27.4*    236     CMP:   Recent Labs   Lab 12/21/23  1511 12/22/23  0429   * 133*   K 2.4* 2.9*   CO2 29 26   BUN 25.4* 28.7*   CREATININE 7.85* 8.28*   CALCIUM 7.8* 7.0*   ALBUMIN 1.5* 1.2*   BILITOT 0.3 0.5   ALKPHOS 137 114   AST 28 20   ALT 17 14     Magnesium:   Recent Labs   Lab 12/21/23  1511   MG 1.50*       Significant Imaging: I have reviewed all pertinent imaging results/findings within the past 24 hours.  "

## 2023-12-22 NOTE — CONSULTS
[facility]                       [address] [id number]    PATIENT NAME:       JULIEN WONG  YOB: 1950  CSN:                [id number]   MRN:                [id number]  ADMIT DATE:         12/21/2023 15:01:00  PHYSICIAN:          George Mendoza MD                            CONSULTATION    DATE OF CONSULT:  12/22/2023 00:00:00    REASON FOR CONSULTATION:  Patient with end-stage renal disease, needs evaluation   followup.    HISTORY OF PRESENT ILLNESS:  This is 73 years old white female who is known to   me for a long time and has had chronic transplant kidney rejection and has been   on dialysis and has been quite stable, but she did have periodic admissions to   the hospital mostly for sepsis and peritonitis.    She presented to the hospital with complaints of diarrhea for the last 3 to 4   days and loss of appetite and lower abdominal pains.  She say she did not notice   that her PD fluid was cloudy at all last night when the PD nurse came and they   took some sample and it was found to be cloudy.  She did not have any fever or   chills.  No cough, cold, congestion.  No nausea, vomiting.  No headache,   blurring, diplopia.  No dysuria.  No hematuria.  No blurred vision.  Just weak.    PD fluid was drawn and PD fluid has white blood cell count of more than 7600 and   more than 91% neutrophils.  Gram stain positive for gram-positive cocci.  The   patient has been started on vancomycin last night.    PAST MEDICAL HISTORY:  Positive for ESRD, on CCPD, anemia of chronic kidney   disease, essential hypertension, depression, chronic kidney transplant   rejection.    SURGICAL HISTORY:  Positive for colon surgery, hysterectomy, kidney transplant,   kidney biopsies, tonsillectomy, adenoidectomy, and left nephrectomy.    ALLERGIES:  CODEINE.     FAMILY HISTORY:  Positive for high blood pressure.  Nobody with  kidney failure.    SOCIAL HISTORY:  She is a former smoker, quit many years ago.  No alcoholism.    No drug abuse.  Lives with her  at home.    REVIEW OF SYSTEMS:  All 12-point review of systems done and negative except for history of present   illness.    PHYSICAL EXAMINATION:  GENERAL:  The patient is conscious, awake, alert, oriented to time, person, and   place, lying down in the bed.  She had small amount of breakfast.  Complains a   lot of pain in the lower abdominal area.  VITAL SIGNS:  Blood pressure 100/60 at present time.  She is receiving some   potassium chloride IV piggyback.    HEENT:  Atraumatic, normocephalic.  Pupils are reactive.  No oral lesion.    NECK:  Supple.  No JVD.  No lymphadenopathy.  No thyromegaly .  LUNGS:  Clear to auscultation.  HEART:  Without rub or gallop.    ABDOMEN:  Soft.  Diffuse discomfort on palpation mostly in the lower abdominal   area, but belly is very soft.  Bowel sounds are positive.  EXTREMITIES:  There is no edema.    SKIN:  Warm and dry.    NEUROLOGIC:  Grossly intact.    PSYCH:  Cooperative.    LABORATORY DATA:  Labs revealed this morning, hemoglobin 8.9, white blood cell   count 5.54.  Serum sodium 133, potassium 2.9, chloride 99, bicarb 26, BUN 28,   creatinine 8.28, albumin 1.2.  PD fluid shows white blood cell count 7626, 91%   neutrophils.  Gram stain positive for gram-positive cocci.  KOH prep is   negative.  Blood cultures have been done.  Fluid cultures have been done.    IMPRESSION:    1. Acute peritonitis.  2. End-stage renal disease.  3. Hypertension.  4. Anemia.  5. Protein malnutrition.  6. Diarrhea.  7. Persistent hypokalemia.    RECOMMENDATIONS:    1. Agree with present workup management.  2. We will give the patient 3 quick runs of CAPD to help flush any abdominal   abscess in the abdominal cavity and thereafter we will give a CCPD starting   tonight on a daily basis.  3. We will broaden the antibiotic coverage and add some Rocephin pending  final   culture sensitivity results.  4. We will give her some potassium bicarb by mouth with Effer-K at 50 mEq p.o.   q.d. and continue to monitor her electrolytes.  5. Once her diarrhea resolves and she is able to eat and drink, then she might   need some protein drinks by mouth also to help her with high-protein   malnutrition also.    Thank you for this consultation.        ______________________________  MD KADEN Hilton/LESLY  DD:  12/22/2023  Time:  08:05AM  DT:  12/22/2023  Time:  08:54AM  Job #:  [id number]      CONSULTATION

## 2023-12-22 NOTE — PROGRESS NOTES
"Pharmacokinetic Initial Assessment: IV Vancomycin    Assessment/Plan:    Initiate intravenous vancomycin with loading dose of 1500 mg once with subsequent doses when random concentrations are less than 20 mcg/mL  Desired empiric serum trough concentration is 15 to 20 mcg/mL  Draw vancomycin random level on 12/22 at 0430.  Pharmacy will continue to follow and monitor vancomycin.      Please contact pharmacy at extension [phone] with any questions regarding this assessment.     Thank you for the consult,   Keisha Hagen       Patient brief summary:  Malissa Schwartz is a 73 y.o. female initiated on antimicrobial therapy with IV Vancomycin for treatment of suspected intra-abdominal infection    Drug Allergies:   Review of patient's allergies indicates:   Allergen Reactions    Codeine Palpitations and Rash       Actual Body Weight:   68 kg    Renal Function:   Estimated Creatinine Clearance: 5.7 mL/min (A) (based on SCr of 7.85 mg/dL (H)).,     Dialysis Method (if applicable):  peritoneal dialysis    CBC (last 72 hours):  Recent Labs   Lab Result Units 12/21/23  1511   WBC x10(3)/mcL 8.09   Hgb g/dL 10.7*   Hct % 34.0*   Platelet x10(3)/mcL 350   Mono % % 6.1   Eos % % 1.6   Basophil % % 0.5       Metabolic Panel (last 72 hours):  Recent Labs   Lab Result Units 12/21/23  1511   Sodium Level mmol/L 133*   Potassium Level mmol/L 2.4*   Chloride mmol/L 96*   Carbon Dioxide mmol/L 29   Glucose Level mg/dL 83   Blood Urea Nitrogen mg/dL 25.4*   Creatinine mg/dL 7.85*   Albumin Level g/dL 1.5*   Bilirubin Total mg/dL 0.3   Alkaline Phosphatase unit/L 137   Aspartate Aminotransferase unit/L 28   Alanine Aminotransferase unit/L 17   Magnesium Level mg/dL 1.50*       Drug levels (last 3 results):  No results for input(s): "VANCOMYCINRA", "VANCORANDOM", "VANCOMYCINPE", "VANCOPEAK", "VANCOMYCINTR", "VANCOTROUGH" in the last 72 hours.    Microbiologic Results:  Microbiology Results (last 7 days)       Procedure Component Value Units " Date/Time    SUSY Prep [[id number]] Collected: 12/21/23 1919    Order Status: Completed Specimen: Body Fluid from Peritoneal Fluid Updated: 12/21/23 2048     KOH Prep No fungal elements seen    Body Fluid Culture [[id number]] Collected: 12/21/23 1919    Order Status: Sent Specimen: Body Fluid from Peritoneal Fluid Updated: 12/21/23 1923    Gram Stain [[id number]] Collected: 12/21/23 1919    Order Status: Sent Specimen: Body Fluid from Peritoneal Fluid Updated: 12/21/23 1923    Blood Culture [[id number]] Collected: 12/21/23 1707    Order Status: Resulted Specimen: Blood Updated: 12/21/23 1758    Blood Culture [[id number]] Collected: 12/21/23 1707    Order Status: Resulted Specimen: Blood Updated: 12/21/23 1758

## 2023-12-22 NOTE — PROGRESS NOTES
Pharmacokinetic Assessment Follow Up: IV Vancomycin    Vancomycin serum concentration assessment(s):  The random level was drawn correctly and can be used to guide therapy at this time. The measurement is above the desired definitive target range of 15 to 20 mcg/mL.    Vancomycin Regimen Plan:  Due to patient's poor renal function, regimen is pulse-dosed.  Hold vancomycin for today.  Re-dose when the random level is less than 20 mcg/mL, next level to be drawn at 0430 on 12/23    Scheduled Administration Times  N/A      Drug levels (last 3 results):  Recent Labs   Lab Result Units 12/22/23  0429   Vanc Lvl Random ug/ml 26.7*       Vancomycin Administrations:  vancomycin given in the last 96 hours                     vancomycin 1.5 g in dextrose 5 % 250 mL IVPB (ready to mix) (mg) 1,500 mg New Bag 12/21/23 1144                    Pharmacy will continue to follow and monitor vancomycin.    Please contact pharmacy at extension [phone] for questions regarding this assessment.    Thank you for the consult,   Missy Heller       Patient brief summary:  Malissa Schwartz is a 73 y.o. female initiated on antimicrobial therapy with IV Vancomycin for treatment of intra-abdominal infection    The patient's current regimen is pulse-dosed    Drug Allergies:   Review of patient's allergies indicates:   Allergen Reactions    Codeine Palpitations and Rash       Actual Body Weight:   68 kg    Renal Function:   Estimated Creatinine Clearance: 5.4 mL/min (A) (based on SCr of 8.28 mg/dL (H)).,     Dialysis Method (if applicable):  peritoneal dialysis    CBC (last 72 hours):  Recent Labs   Lab Result Units 12/21/23  1511 12/22/23  0429   WBC x10(3)/mcL 8.09 5.54   Hgb g/dL 10.7* 8.9*   Hct % 34.0* 27.4*   Platelet x10(3)/mcL 350 236   Mono % % 6.1 8.8   Eos % % 1.6 3.1   Basophil % % 0.5 0.7       Metabolic Panel (last 72 hours):  Recent Labs   Lab Result Units 12/21/23  1511 12/22/23  0429   Sodium Level mmol/L 133* 133*   Potassium Level  mmol/L 2.4* 2.9*   Chloride mmol/L 96* 99   Carbon Dioxide mmol/L 29 26   Glucose Level mg/dL 83 85   Blood Urea Nitrogen mg/dL 25.4* 28.7*   Creatinine mg/dL 7.85* 8.28*   Albumin Level g/dL 1.5* 1.2*   Bilirubin Total mg/dL 0.3 0.5   Alkaline Phosphatase unit/L 137 114   Aspartate Aminotransferase unit/L 28 20   Alanine Aminotransferase unit/L 17 14   Magnesium Level mg/dL 1.50*  --        Microbiologic Results:  Microbiology Results (last 7 days)       Procedure Component Value Units Date/Time    Body Fluid Culture [[id number]] Collected: 12/21/23 1919    Order Status: Completed Specimen: Body Fluid from Peritoneal Fluid Updated: 12/22/23 0650     Body Fluid Culture No Growth At 24 Hours    Gram Stain [[id number]] Collected: 12/21/23 1919    Order Status: Completed Specimen: Body Fluid from Peritoneal Fluid Updated: 12/22/23 0019     GRAM STAIN Many WBC observed      Moderate Gram positive cocci    SUSY Prep [[id number]] Collected: 12/21/23 1919    Order Status: Completed Specimen: Body Fluid from Peritoneal Fluid Updated: 12/21/23 2048     KOH Prep No fungal elements seen    Blood Culture [[id number]] Collected: 12/21/23 1707    Order Status: Resulted Specimen: Blood Updated: 12/21/23 1758    Blood Culture [[id number]] Collected: 12/21/23 1707    Order Status: Resulted Specimen: Blood Updated: 12/21/23 1758

## 2023-12-22 NOTE — NURSING
12/21/23 1940   Manual Peritoneal Dialysis   Manual Treatment Comments Called to ER to collect PD fluid sample from pt. Upon arrival to bedside found pt PD cath without cap and open to air, no dressing at PD cath insertion site. Site prepped, and dressing applied. PD cath scrubbed vigorously with alcavis and transfer set applied, cath accessed per P&P, and PD sample collected and pt deaccessed per P&P.  carried to lab.

## 2023-12-22 NOTE — H&P
[facility] Hospital Medicine History & Physical Examination       Patient Name: Malissa Schwartz  MRN: [id number]  Patient Class: IP- Inpatient   Admission Date: 12/21/2023  3:01 PM  Length of Stay: 1  Admitting Service: Hospital Medicine   Attending Physician: Naga Wright MD   Primary Care Provider: Sergio Crabtree MD  History source: EMR, patient and/or patient's family    CHIEF COMPLAINT   Diffuse abdominal pain    HISTORY OF PRESENT ILLNESS:   Patient is a 73-year-old female with history of ESRD on peritoneal dialysis who presented to the ER with complaints of several days of abdominal pain.  She reports she has had weakness and decreased appetite during this time but denied fever or chills.  She arrived to the ER afebrile hemodynamically stable maintaining normal sats on room air.  Laboratory work showed changes as expected in end-stage renal disease, as well as hypokalemia and hypomagnesemia.  Peritoneal fluid was drawn with 7600 WBCs, 91% neutrophils.  Gram stain with many Gram-positive cocci.    PAST MEDICAL HISTORY:   ESRD on peritoneal dialysis   Anemia of chronic kidney disease   Essential hypertension  Depression   Prior renal transplant/rejection 2007    PAST SURGICAL HISTORY:     Past Surgical History:   Procedure Laterality Date    COLON SURGERY      COLONOSCOPY W/ BIOPSIES      cosemetic surgery      facelift and breast augmentation     HYSTERECTOMY      KIDNEY TRANSPLANT      NEPHRECTOMY Left     9/2016    RENAL BIOPSY      TONSILLECTOMY, ADENOIDECTOMY         ALLERGIES:   Codeine    FAMILY HISTORY:   Reviewed and non-contributory     SOCIAL HISTORY:     Social History     Tobacco Use    Smoking status: Former     Current packs/day: 1.00     Average packs/day: 1 pack/day for 15.0 years (15.0 ttl pk-yrs)     Types: Cigarettes    Smokeless tobacco: Never    Tobacco comments:     quit in 2014; smoked for 15 years and at the most smoked 1 ppd   Substance Use  Topics    Alcohol use: Yes     Comment: seldom        HOME MEDICATIONS:     Prior to Admission medications    Medication Sig Start Date End Date Taking? Authorizing Provider   aspirin (ECOTRIN) 81 MG EC tablet Take 81 mg by mouth once daily.    Provider, Historical   B complex-vitamin C-folic acid (NEPHRO-JENN) 0.8 mg Tab Take 0.8 mg by mouth Daily.    Provider, Historical   calcitRIOL (ROCALTROL) 0.25 MCG Cap Take 0.25 mcg by mouth.    Provider, Historical   cloNIDine 0.2 mg/24 hr td ptwk (CATAPRES) 0.2 mg/24 hr Place 1 patch onto the skin every 7 days.    Provider, Historical   folic acid (FOLVITE) 1 MG tablet Take 1 mg by mouth once daily.    Provider, Historical   multivitamin capsule Take 1 capsule by mouth once daily.    Provider, Historical   spironolactone (ALDACTONE) 25 MG tablet Take 25 mg by mouth once daily.    Provider, Historical   amLODIPine (NORVASC) 10 MG tablet TAKE ONE TABLET BY MOUTH ONCE DAILY 1/2/20 5/13/22  Dinah Lepe MD   losartan (COZAAR) 100 MG tablet TAKE ONE TABLET BY MOUTH ONCE DAILY 1/28/20 5/13/22  Salvador Moeller MD   metoprolol succinate (TOPROL-XL) 25 MG 24 hr tablet Take 2 tablets (50 mg total) by mouth once daily.  Patient not taking: Reported on 5/12/2022 3/10/17 5/13/22  Linda Nelson MD   mycophenolate (CELLCEPT) 250 mg Cap Take 2 capsules (500 mg total) by mouth 2 (two) times daily. Z94.0 kidney transplant  Patient not taking: Reported on 5/12/2022 7/25/19 5/13/22  Nicole Trejo MD   tacrolimus (PROGRAF) 1 MG Cap Take 3mg every AM and 2mg every PM.  Route: Oral.  Z94.0 Kidney Transplant.  Patient not taking: Reported on 5/12/2022 7/26/19 5/13/22  Nicole Trejo MD       REVIEW OF SYSTEMS:   Except as documented, all other systems reviewed and negative     PHYSICAL EXAM:   T 98.6 °F (37 °C)   /67   P 64   RR 15   O2 99 %  GENERAL: awake, alert, oriented and in no acute distress, non-toxic appearing   HEENT: normocephalic  "atraumatic   NECK: supple   LUNGS: Clear bilaterally, no wheezing or rales, no accessory muscle use   CVS: Regular rate and rhythm, normal peripheral perfusion  ABD: Soft, diffusely tender, bowel sounds present  EXTREMITIES: no clubbing or cyanosis  SKIN: Warm, dry.   NEURO: alert and oriented, grossly without focal deficits   PSYCHIATRIC: Cooperative    LABS AND IMAGING:     Recent Labs     12/21/23  1511   WBC 8.09   RBC 3.58*   HGB 10.7*   HCT 34.0*   MCV 95.0*   MCH 29.9   MCHC 31.5*   RDW 14.6        Recent Labs     12/21/23  1707   LACTIC 0.9     No results for input(s): "INR", "APTT", "D-DIMER" in the last 72 hours.  No results for input(s): "HGBA1C", "CHOL", "TRIG", "LDL", "VLDL", "HDL" in the last 72 hours.   Recent Labs     12/21/23  1511   *   K 2.4*   CHLORIDE 96*   CO2 29   BUN 25.4*   CREATININE 7.85*   GLUCOSE 83   CALCIUM 7.8*   MG 1.50*   ALBUMIN 1.5*   GLOBULIN 3.9*   ALKPHOS 137   ALT 17   AST 28   BILITOT 0.3   LIPASE 11     No results for input(s): "BNP", "CPK", "TROPONINI" in the last 72 hours.       CT Abdomen Pelvis  Without Contrast  Narrative: EXAMINATION:  CT ABDOMEN PELVIS WITHOUT CONTRAST    CLINICAL HISTORY:  RLQ abdominal pain (Age >= 14y);peritoneal dialysis malfunction;    TECHNIQUE:  Axial images of the abdomen and pelvis were obtained without IV contrast administration.  Coronal and sagittal reconstructions were provided.  Three dimensional and MIP images were obtained and evaluated.  Total DLP was 178 mGy-cm. Dose lowering technique and automated exposure control were utilized for this exam.    COMPARISON:  CT of the abdomen and pelvis 12/25/2021.    FINDINGS:  There is minimal atelectasis in the left lung base.  The right lung base is clear.  The heart is not enlarged.    There is a peritoneal dialysis catheter in the right pericolic gutter.  There is small volume of fluid within the peritoneum, not unexpected in the setting of peritoneal dialysis.  The liver is " homogeneous in attenuation.  The gallbladder, pancreas, and adrenal glands are normal.  There are calcified granulomas in the spleen.  The right kidney is atrophic.  The left kidney is not visualized.  There is a right lower quadrant renal transplant with no hydronephrosis.  There is no perinephric fluid collection.    The stomach and small bowel are decompressed.  There is no bowel obstruction.  The appendix is not visualized.  There is colonic diverticulosis without diverticulitis.  The urinary bladder is decompressed.  There is no pelvic or retroperitoneal adenopathy.  The aorta is nonaneurysmal.  There is no lytic or blastic osseous lesion.  Impression: 1. Peritoneal dialysis catheter with its tip in the right lower quadrant.  2. Right lower quadrant renal transplant without acute abnormality, however evaluation is limited on this exam.  3. Minimal left lower lobe atelectasis.  4. Diverticulosis without diverticulitis.    Electronically signed by: Vadim Mtz MD  Date:    09/27/2023  Time:    21:09  X-Ray Abdomen AP 1 View  Narrative: EXAMINATION:  XR ABDOMEN AP 1 VIEW    CLINICAL HISTORY:  check PD catheter;    TECHNIQUE:  AP View(s) of the abdomen.    COMPARISON:  Radiography 02/05/2021    FINDINGS:  Peritoneal dialysis catheter coils in the right lower quadrant.  Nonobstructive bowel gas pattern with moderate volume stool.  Impression: Coiling of the peritoneal dialysis catheter in the right lower quadrant.    Electronically signed by: Isaac Palomino  Date:    09/27/2023  Time:    11:39      ASSESSMENT & PLAN:   Peritonitis, infectious (in setting of PD catheter)  Hypokalemia, hypomagnesemia  ESRD on peritoneal dialysis   Essential hypertension   Anemia of chronic kidney disease   Remote renal transplant/rejection    -peritoneal fluid sent for culture, GPC on Gram stain, deescalate Zosyn  -continue vancomycin  -analgesics as needed  -nephrology recommends holding off on PD tonight  -replete electrolytes as  indicated    DVT prophylaxis:  Lovenox  Code status:  Full code    If patient was admitted under observational status it is with my approval/permission.     At least 55 min was spent on this history and physical.  Time seen:  12:05 a.m. 12/22/2023   Naga Wright MD

## 2023-12-22 NOTE — NURSING
12/22/23 1445   Cycler Peritoneal Dialysis   Initial Drain Volume (mL) 706 mL   Effluent Appearance Cloudy   Number of Cycles 3   Fill Volume (mL) 2000 mL   Total Volume (mL) 6000 mL   Cycler PD Total UF (mL) 301 mL   Cycler Treatment Comments CCPD tx completed, no issues or alarms, pt deaccessed per p and p, Net  mL.

## 2023-12-23 LAB
ALBUMIN SERPL-MCNC: 1.2 G/DL (ref 3.4–4.8)
ALBUMIN/GLOB SERPL: 0.4 RATIO (ref 1.1–2)
ALP SERPL-CCNC: 122 UNIT/L (ref 40–150)
ALT SERPL-CCNC: 15 UNIT/L (ref 0–55)
ANION GAP SERPL CALC-SCNC: 8 MEQ/L
AST SERPL-CCNC: 23 UNIT/L (ref 5–34)
BILIRUB SERPL-MCNC: 0.3 MG/DL
BUN SERPL-MCNC: 26.6 MG/DL (ref 9.8–20.1)
BUN SERPL-MCNC: 30.6 MG/DL (ref 9.8–20.1)
CALCIUM SERPL-MCNC: 7.4 MG/DL (ref 8.4–10.2)
CALCIUM SERPL-MCNC: 7.7 MG/DL (ref 8.4–10.2)
CHLORIDE SERPL-SCNC: 100 MMOL/L (ref 98–107)
CHLORIDE SERPL-SCNC: 98 MMOL/L (ref 98–107)
CO2 SERPL-SCNC: 27 MMOL/L (ref 23–31)
CO2 SERPL-SCNC: 29 MMOL/L (ref 23–31)
CREAT SERPL-MCNC: 7.42 MG/DL (ref 0.55–1.02)
CREAT SERPL-MCNC: 8.18 MG/DL (ref 0.55–1.02)
CREAT/UREA NIT SERPL: 4
GFR SERPLBLD CREATININE-BSD FMLA CKD-EPI: 5 MLS/MIN/1.73/M2
GFR SERPLBLD CREATININE-BSD FMLA CKD-EPI: 5 MLS/MIN/1.73/M2
GLOBULIN SER-MCNC: 2.8 GM/DL (ref 2.4–3.5)
GLUCOSE SERPL-MCNC: 115 MG/DL (ref 82–115)
GLUCOSE SERPL-MCNC: 118 MG/DL (ref 82–115)
MAGNESIUM SERPL-MCNC: 1.8 MG/DL (ref 1.6–2.6)
POTASSIUM SERPL-SCNC: 2.8 MMOL/L (ref 3.5–5.1)
POTASSIUM SERPL-SCNC: 3.1 MMOL/L (ref 3.5–5.1)
PROT SERPL-MCNC: 4 GM/DL (ref 5.8–7.6)
SODIUM SERPL-SCNC: 134 MMOL/L (ref 136–145)
SODIUM SERPL-SCNC: 135 MMOL/L (ref 136–145)
VANCOMYCIN SERPL-MCNC: 20.4 UG/ML (ref 15–20)

## 2023-12-23 PROCEDURE — 3E1M39Z IRRIGATION OF PERITONEAL CAVITY USING DIALYSATE, PERCUTANEOUS APPROACH: ICD-10-PCS | Performed by: INTERNAL MEDICINE

## 2023-12-23 PROCEDURE — 83735 ASSAY OF MAGNESIUM: CPT | Performed by: NURSE PRACTITIONER

## 2023-12-23 PROCEDURE — 11000001 HC ACUTE MED/SURG PRIVATE ROOM

## 2023-12-23 PROCEDURE — 90945 DIALYSIS ONE EVALUATION: CPT

## 2023-12-23 PROCEDURE — 63600175 PHARM REV CODE 636 W HCPCS: Performed by: INTERNAL MEDICINE

## 2023-12-23 PROCEDURE — 80053 COMPREHEN METABOLIC PANEL: CPT | Performed by: INTERNAL MEDICINE

## 2023-12-23 PROCEDURE — 80202 ASSAY OF VANCOMYCIN: CPT | Performed by: INTERNAL MEDICINE

## 2023-12-23 PROCEDURE — 83735 ASSAY OF MAGNESIUM: CPT | Performed by: INTERNAL MEDICINE

## 2023-12-23 PROCEDURE — 25000003 PHARM REV CODE 250: Performed by: INTERNAL MEDICINE

## 2023-12-23 PROCEDURE — 63600175 PHARM REV CODE 636 W HCPCS

## 2023-12-23 PROCEDURE — 21400001 HC TELEMETRY ROOM

## 2023-12-23 RX ORDER — POTASSIUM CHLORIDE 14.9 MG/ML
20 INJECTION INTRAVENOUS ONCE
Status: COMPLETED | OUTPATIENT
Start: 2023-12-23 | End: 2023-12-23

## 2023-12-23 RX ADMIN — POTASSIUM BICARBONATE 25 MEQ: 977.5 TABLET, EFFERVESCENT ORAL at 08:12

## 2023-12-23 RX ADMIN — ENOXAPARIN SODIUM 30 MG: 30 INJECTION SUBCUTANEOUS at 06:12

## 2023-12-23 RX ADMIN — POTASSIUM CHLORIDE 20 MEQ: 14.9 INJECTION, SOLUTION INTRAVENOUS at 06:12

## 2023-12-23 RX ADMIN — CEFTRIAXONE 1 G: 1 INJECTION, POWDER, FOR SOLUTION INTRAMUSCULAR; INTRAVENOUS at 10:12

## 2023-12-23 RX ADMIN — SPIRONOLACTONE 25 MG: 25 TABLET ORAL at 10:12

## 2023-12-23 RX ADMIN — POTASSIUM BICARBONATE 25 MEQ: 977.5 TABLET, EFFERVESCENT ORAL at 10:12

## 2023-12-23 RX ADMIN — OXYCODONE HYDROCHLORIDE 5 MG: 5 TABLET ORAL at 03:12

## 2023-12-23 NOTE — PLAN OF CARE
Problem: Adult Inpatient Plan of Care  Goal: Plan of Care Review  Outcome: Ongoing, Progressing  Flowsheets (Taken 12/22/2023 1921)  Plan of Care Reviewed With: patient  Goal: Patient-Specific Goal (Individualized)  Outcome: Ongoing, Progressing  Flowsheets (Taken 12/22/2023 1921)  Individualized Care Needs: antibiotic as ordered, PD per RX, assist with adls  Goal: Absence of Hospital-Acquired Illness or Injury  Outcome: Ongoing, Progressing  Intervention: Identify and Manage Fall Risk  Flowsheets (Taken 12/22/2023 1921)  Safety Promotion/Fall Prevention:   assistive device/personal item within reach   nonskid shoes/socks when out of bed   side rails raised x 2  Intervention: Prevent Skin Injury  Flowsheets (Taken 12/22/2023 1921)  Body Position:   weight shifting   position changed independently  Skin Protection: adhesive use limited  Intervention: Prevent and Manage VTE (Venous Thromboembolism) Risk  Flowsheets (Taken 12/22/2023 1921)  Activity Management: Ambulated to bathroom - L4  VTE Prevention/Management: (lovenox)   bleeding precations maintained   bleeding risk assessed   other (see comments)  Range of Motion: active ROM (range of motion) encouraged  Intervention: Prevent Infection  Flowsheets (Taken 12/22/2023 1921)  Infection Prevention:   hand hygiene promoted   single patient room provided   rest/sleep promoted  Goal: Optimal Comfort and Wellbeing  Outcome: Ongoing, Progressing  Intervention: Monitor Pain and Promote Comfort  Flowsheets (Taken 12/22/2023 1921)  Pain Management Interventions:   care clustered   quiet environment facilitated  Intervention: Provide Person-Centered Care  Flowsheets (Taken 12/22/2023 1921)  Trust Relationship/Rapport:   care explained   choices provided   questions encouraged   questions answered   thoughts/feelings acknowledged   emotional support provided   reassurance provided     Problem: Skin Injury Risk Increased  Goal: Skin Health and Integrity  Outcome: Ongoing,  Progressing

## 2023-12-23 NOTE — PROGRESS NOTES
12/23/23 1345   Peritoneal Dialysis   Exchange Type Cycler   Peritoneal Treatment Status Other (comment)  (system set up and primed at bedside, on call nurse will initiate tx later in pm)   Dianeal Solution Dextrose 1.5% in 6000 mL

## 2023-12-23 NOTE — PROGRESS NOTES
[facility] Hospital Medicine Progress Note        Chief Complaint: Inpatient Follow-up for     HPI: Patient is a 73-year-old female with history of ESRD on peritoneal dialysis who presented to the ER with complaints of several days of abdominal pain.  She reports she has had weakness and decreased appetite during this time but denied fever or chills.  She arrived to the ER afebrile hemodynamically stable maintaining normal sats on room air.  Laboratory work showed changes as expected in end-stage renal disease, as well as hypokalemia and hypomagnesemia.  Peritoneal fluid was drawn with 7600 WBCs, 91% neutrophils.  Gram stain with many Gram-positive cocci.     Interval Hx:   Seen and examined. No acute events overnight. Peritoneal fluid - gram pos cocci   Remains on iv vanc   Afebrile    Objective/physical exam:  General: In no acute distress, afebrile  Chest: Clear to auscultation bilaterally  Heart: RRR, +S1, S2, no appreciable murmur  Abdomen: Soft, nontender, BS +  MSK: Warm, no lower extremity edema, no clubbing or cyanosis  Neurologic: Alert and oriented x4, Cranial nerve II-XII intact, Strength 5/5 in all 4 extremities    VITAL SIGNS: 24 HRS MIN & MAX LAST   Temp  Min: 97.6 °F (36.4 °C)  Max: 98.3 °F (36.8 °C) 97.6 °F (36.4 °C)   BP  Min: 101/66  Max: 142/84 117/71   Pulse  Min: 59  Max: 68  (!) 59   Resp  Min: 12  Max: 19 18   SpO2  Min: 95 %  Max: 99 % 95 %     I have reviewed the following labs:  Recent Labs   Lab 12/21/23  1511 12/22/23 0429   WBC 8.09 5.54   RBC 3.58* 3.00*   HGB 10.7* 8.9*   HCT 34.0* 27.4*   MCV 95.0* 91.3   MCH 29.9 29.7   MCHC 31.5* 32.5*   RDW 14.6 14.4    236   MPV 9.7 9.7     Recent Labs   Lab 12/21/23  1511 12/22/23  0429 12/23/23  0403   * 133* 134*   K 2.4* 2.9* 2.8*   CO2 29 26 29   BUN 25.4* 28.7* 26.6*   CREATININE 7.85* 8.28* 7.42*   CALCIUM 7.8* 7.0* 7.4*   MG 1.50*  --  1.80   ALBUMIN 1.5* 1.2* 1.2*   ALKPHOS 137 114 122   ALT  17 14 15   AST 28 20 23   BILITOT 0.3 0.5 0.3     Microbiology Results (last 7 days)       Procedure Component Value Units Date/Time    Body Fluid Culture [[id number]]  (Abnormal) Collected: 12/21/23 1919    Order Status: Completed Specimen: Body Fluid from Peritoneal Fluid Updated: 12/23/23 0743     Body Fluid Culture Many Gram-positive Cocci    Blood Culture [[id number]]  (Normal) Collected: 12/21/23 1707    Order Status: Completed Specimen: Blood Updated: 12/22/23 2100     CULTURE, BLOOD ([facility]) No Growth At 24 Hours    Blood Culture [[id number]]  (Normal) Collected: 12/21/23 1707    Order Status: Completed Specimen: Blood Updated: 12/22/23 2100     CULTURE, BLOOD ([facility]) No Growth At 24 Hours    Gram Stain [[id number]] Collected: 12/21/23 1919    Order Status: Completed Specimen: Body Fluid from Peritoneal Fluid Updated: 12/22/23 0019     GRAM STAIN Many WBC observed      Moderate Gram positive cocci    SUSY Prep [[id number]] Collected: 12/21/23 1919    Order Status: Completed Specimen: Body Fluid from Peritoneal Fluid Updated: 12/21/23 2048     KOH Prep No fungal elements seen             See below for Radiology    Scheduled Med:   cefTRIAXone (ROCEPHIN) IVPB  1 g Intravenous Q24H    enoxparin  30 mg Subcutaneous Daily    potassium bicarbonate  25 mEq Oral BID    spironolactone  25 mg Oral Daily      Continuous Infusions:     PRN Meds:  melatonin, morphine, oxyCODONE, sodium chloride 0.9%, Pharmacy to dose Vancomycin consult **AND** vancomycin - pharmacy to dose     Assessment/Plan:  Peritonitis, infectious (in setting of PD catheter)  Hypokalemia,  ESRD on peritoneal dialysis   Essential hypertension   Anemia of chronic kidney disease   Remote renal transplant/rejection     -peritoneal fluid sent for culture, GPC on Gram stain, deescalate Zosyn  -continue vancomycin, peitoneal fluid cx - many gram pos cocci, f/u on final results   -analgesics as needed  -pd held last night per renal recs  K 2.8. replaced  Repeat  bmp later today   -replete electrolytes as indicated     DVT prophylaxis:  Lovenox  Code status:  Full code          All diagnosis and differential diagnosis have been reviewed; assessment and plan has been documented; I have personally reviewed the labs and test results that are presently available; I have reviewed the patients medication list; I have reviewed the consulting providers response and recommendations. I have reviewed or attempted to review medical records based upon their availability    All of the patient's questions have been  addressed and answered. Patient's is agreeable to the above stated plan. I will continue to monitor closely and make adjustments to medical management as needed.  _____________________________________________________________________    Nutrition Status:    Radiology:  I have personally reviewed the following imaging and agree with the radiologist.     CT Abdomen Pelvis  Without Contrast  Narrative: EXAMINATION:  CT ABDOMEN PELVIS WITHOUT CONTRAST    CLINICAL HISTORY:  RLQ abdominal pain (Age >= 14y);peritoneal dialysis malfunction;    TECHNIQUE:  Axial images of the abdomen and pelvis were obtained without IV contrast administration.  Coronal and sagittal reconstructions were provided.  Three dimensional and MIP images were obtained and evaluated.  Total DLP was 178 mGy-cm. Dose lowering technique and automated exposure control were utilized for this exam.    COMPARISON:  CT of the abdomen and pelvis 12/25/2021.    FINDINGS:  There is minimal atelectasis in the left lung base.  The right lung base is clear.  The heart is not enlarged.    There is a peritoneal dialysis catheter in the right pericolic gutter.  There is small volume of fluid within the peritoneum, not unexpected in the setting of peritoneal dialysis.  The liver is homogeneous in attenuation.  The gallbladder, pancreas, and adrenal glands are normal.  There are calcified granulomas in the spleen.  The right kidney  is atrophic.  The left kidney is not visualized.  There is a right lower quadrant renal transplant with no hydronephrosis.  There is no perinephric fluid collection.    The stomach and small bowel are decompressed.  There is no bowel obstruction.  The appendix is not visualized.  There is colonic diverticulosis without diverticulitis.  The urinary bladder is decompressed.  There is no pelvic or retroperitoneal adenopathy.  The aorta is nonaneurysmal.  There is no lytic or blastic osseous lesion.  Impression: 1. Peritoneal dialysis catheter with its tip in the right lower quadrant.  2. Right lower quadrant renal transplant without acute abnormality, however evaluation is limited on this exam.  3. Minimal left lower lobe atelectasis.  4. Diverticulosis without diverticulitis.    Electronically signed by: Vadim Mtz MD  Date:    09/27/2023  Time:    21:09  X-Ray Abdomen AP 1 View  Narrative: EXAMINATION:  XR ABDOMEN AP 1 VIEW    CLINICAL HISTORY:  check PD catheter;    TECHNIQUE:  AP View(s) of the abdomen.    COMPARISON:  Radiography 02/05/2021    FINDINGS:  Peritoneal dialysis catheter coils in the right lower quadrant.  Nonobstructive bowel gas pattern with moderate volume stool.  Impression: Coiling of the peritoneal dialysis catheter in the right lower quadrant.    Electronically signed by: Isaac Palomino  Date:    09/27/2023  Time:    11:39      Janie Acosta MD   12/23/2023

## 2023-12-23 NOTE — NURSING
12/23/23 0714   Peritoneal Dialysis   Exchange Type Cycler   Peritoneal Treatment Status Completed   Dianeal Solution Dextrose 1.5% in 6000 mL   Cycler Peritoneal Dialysis   Initial Drain Volume (mL) 174 mL   Effluent Appearance Yellow   Number of Cycles 4   Fill Volume (mL) 2000 mL   Total Volume (mL) 9500 mL   Cycler PD Total UF (mL) 537 mL   Cycler Treatment Comments Tolerated well.  No issues, complaints, or alarms overnight.  Net uf of 537 ml.  Effluent clean and yellow.

## 2023-12-23 NOTE — PROGRESS NOTES
Nephrology follow up progress note    HPI:      This is 73 years old white female who is known to me for a long time and has had chronic transplant kidney rejection and has been   on dialysis and has been quite stable, but she did have periodic admissions to the hospital mostly for sepsis and peritonitis.     She presented to the hospital with complaints of diarrhea for the last 3 to 4 days and loss of appetite and lower abdominal pains.  She say she did not notice that her PD fluid was cloudy at all last night when the PD nurse came and they took some sample and it was found to be cloudy.  She did not have any fever or chills.  No cough, cold, congestion.  No nausea, vomiting.  No headache, blurring, diplopia.  No dysuria.  No hematuria.  No blurred vision.  Just weak.     PD fluid was drawn and PD fluid has white blood cell count of more than 7600 and more than 91% neutrophils.  Gram stain positive for gram-positive cocci.  The patient has been started on vancomycin last night.    Interval history:     Denies SOB, CP, N/V. Reports improvement in abdominal tenderness.    Review of Systems:   Review of Systems   Constitutional: Negative.    Respiratory: Negative.     Cardiovascular: Negative.    Gastrointestinal:  Positive for abdominal pain (intermittent).         Past medical, family, surgical, and social history reviewed and unchanged from initial consult note.     Objective:       VITAL SIGNS: 24 HR MIN & MAX LAST    Temp  Min: 97.5 °F (36.4 °C)  Max: 97.6 °F (36.4 °C)  97.5 °F (36.4 °C)        BP  Min: 117/71  Max: 142/84  123/80     Pulse  Min: 59  Max: 72  72     Resp  Min: 18  Max: 18  18    SpO2  Min: 95 %  Max: 98 %  98 %      Physical Exam  Constitutional:       General: She is not in acute distress.  HENT:      Head: Normocephalic.   Eyes:      Extraocular Movements: Extraocular movements intact.   Cardiovascular:      Rate and Rhythm: Normal rate and regular rhythm.      Heart sounds: Normal heart  sounds.   Pulmonary:      Effort: No respiratory distress.      Breath sounds: Normal breath sounds.   Abdominal:      General: There is no distension.      Palpations: Abdomen is soft.   Musculoskeletal:         General: Normal range of motion.      Cervical back: Normal range of motion.   Skin:     General: Skin is warm and dry.   Neurological:      General: No focal deficit present.      Mental Status: She is alert.                 Component Value Date/Time     (L) 12/23/2023 0403     (L) 12/22/2023 0429     06/20/2018 1402     01/08/2018 0835    K 2.8 (L) 12/23/2023 0403    K 2.9 (L) 12/22/2023 0429    K 4.8 06/20/2018 1402    K 4.6 01/08/2018 0835    CHLORIDE 98 12/23/2023 0403    CHLORIDE 99 12/22/2023 0429    CO2 29 12/23/2023 0403    CO2 26 12/22/2023 0429    CO2 23 06/20/2018 1402    CO2 22 (L) 01/08/2018 0835    BUN 26.6 (H) 12/23/2023 0403    BUN 28.7 (H) 12/22/2023 0429    BUN 30 (H) 06/20/2018 1402    BUN 24 (H) 01/08/2018 0835    CREATININE 7.42 (H) 12/23/2023 0403    CREATININE 8.28 (H) 12/22/2023 0429    CREATININE 1.9 (H) 06/20/2018 1402    CREATININE 1.8 (H) 01/08/2018 0835    CALCIUM 7.4 (L) 12/23/2023 0403    CALCIUM 7.0 (L) 12/22/2023 0429    CALCIUM 9.7 06/20/2018 1402    CALCIUM 9.1 01/08/2018 0835    PHOS 5.4 (H) 09/26/2023 2123    PHOS 3.8 06/20/2018 1402            Component Value Date/Time    WBC 5.54 12/22/2023 0429    WBC 8.09 12/21/2023 1511    WBC 6.90 06/20/2018 1402    WBC 3.37 (L) 01/08/2018 0835    HGB 8.9 (L) 12/22/2023 0429    HGB 10.7 (L) 12/21/2023 1511    HGB 10.5 (L) 06/20/2018 1402    HGB 9.1 (L) 01/08/2018 0835    HCT 27.4 (L) 12/22/2023 0429    HCT 34.0 (L) 12/21/2023 1511    HCT 32.0 (L) 06/05/2020 0815    HCT 31.6 (L) 06/20/2018 1402    HCT 27.3 (L) 01/08/2018 0835     12/22/2023 0429     12/21/2023 1511     06/20/2018 1402     01/08/2018 0835       Imaging reviewed      Assessment / Plan:     1. Acute peritonitis.  2.  End-stage renal disease.  3. Hypertension.  4. Anemia.  5. Protein malnutrition.  6. Diarrhea.  7. Persistent hypokalemia.     RECOMMENDATIONS:    1. Continue CCPD nightly  2. PD fluid culture with many staphylococcus epidermidis. Awaiting final cultures.  3. We will give her some potassium bicarb by mouth with Effer-K at 50 mEq p.o. Give additional 20 mEq IV Kcl today.  4. BMP/ Mag in AM

## 2023-12-23 NOTE — PROGRESS NOTES
Pharmacokinetic Assessment Follow Up: IV Vancomycin    Vancomycin serum concentration assessment(s):    The random level was drawn correctly and can be used to guide therapy at this time. The measurement is above the desired definitive target range of 10 to 20 mcg/mL.    Vancomycin Regimen Plan:    Re-dose when the random level is less than 20 mcg/mL, next level to be drawn at 0430 on 12/24.    Drug levels (last 3 results):  Recent Labs   Lab Result Units 12/22/23  0429 12/23/23  0403   Vanc Lvl Random ug/ml 26.7* 20.4*       Pharmacy will continue to follow and monitor vancomycin.    Please contact pharmacy at extension [phone] for questions regarding this assessment.    Thank you for the consult,   Brian Richey       Patient brief summary:  Malissa Schwartz is a 73 y.o. female initiated on antimicrobial therapy with IV Vancomycin for treatment of intra-abdominal infection    The patient's current regimen is pulse dosing.    Drug Allergies:   Review of patient's allergies indicates:   Allergen Reactions    Codeine Palpitations and Rash       Actual Body Weight:   68kg    Renal Function:   Estimated Creatinine Clearance: 6.2 mL/min (A) (based on SCr of 7.42 mg/dL (H)).,     Dialysis Method (if applicable):  peritoneal dialysis    CBC (last 72 hours):  Recent Labs   Lab Result Units 12/21/23  1511 12/22/23  0429   WBC x10(3)/mcL 8.09 5.54   Hgb g/dL 10.7* 8.9*   Hct % 34.0* 27.4*   Platelet x10(3)/mcL 350 236   Mono % % 6.1 8.8   Eos % % 1.6 3.1   Basophil % % 0.5 0.7       Metabolic Panel (last 72 hours):  Recent Labs   Lab Result Units 12/21/23  1511 12/22/23  0429 12/23/23  0403   Sodium Level mmol/L 133* 133* 134*   Potassium Level mmol/L 2.4* 2.9* 2.8*   Chloride mmol/L 96* 99 98   Carbon Dioxide mmol/L 29 26 29   Glucose Level mg/dL 83 85 115   Blood Urea Nitrogen mg/dL 25.4* 28.7* 26.6*   Creatinine mg/dL 7.85* 8.28* 7.42*   Albumin Level g/dL 1.5* 1.2* 1.2*   Bilirubin Total mg/dL 0.3 0.5 0.3   Alkaline  Phosphatase unit/L 137 114 122   Aspartate Aminotransferase unit/L 28 20 23   Alanine Aminotransferase unit/L 17 14 15   Magnesium Level mg/dL 1.50*  --  1.80       Vancomycin Administrations:  vancomycin given in the last 96 hours                     vancomycin 1.5 g in dextrose 5 % 250 mL IVPB (ready to mix) (mg) 1,500 mg New Bag 12/21/23 8486                    Microbiologic Results:  Microbiology Results (last 7 days)       Procedure Component Value Units Date/Time    Body Fluid Culture [[id number]]  (Abnormal) Collected: 12/21/23 1919    Order Status: Completed Specimen: Body Fluid from Peritoneal Fluid Updated: 12/23/23 0743     Body Fluid Culture Many Gram-positive Cocci    Blood Culture [[id number]]  (Normal) Collected: 12/21/23 1707    Order Status: Completed Specimen: Blood Updated: 12/22/23 2100     CULTURE, BLOOD ([facility]) No Growth At 24 Hours    Blood Culture [[id number]]  (Normal) Collected: 12/21/23 1707    Order Status: Completed Specimen: Blood Updated: 12/22/23 2100     CULTURE, BLOOD ([facility]) No Growth At 24 Hours    Gram Stain [[id number]] Collected: 12/21/23 1919    Order Status: Completed Specimen: Body Fluid from Peritoneal Fluid Updated: 12/22/23 0019     GRAM STAIN Many WBC observed      Moderate Gram positive cocci    SUSY Prep [[id number]] Collected: 12/21/23 1919    Order Status: Completed Specimen: Body Fluid from Peritoneal Fluid Updated: 12/21/23 2048     KOH Prep No fungal elements seen

## 2023-12-23 NOTE — NURSING
PD machine was setup at bedside, no issue or alarms, passed tests. Pt was accessed per p and p. Pt connected and tx initiated.

## 2023-12-24 LAB
ANION GAP SERPL CALC-SCNC: 12 MEQ/L
BACTERIA FLD CULT: ABNORMAL
BASOPHILS # BLD AUTO: 0.04 X10(3)/MCL
BASOPHILS NFR BLD AUTO: 0.8 %
BUN SERPL-MCNC: 25.5 MG/DL (ref 9.8–20.1)
CALCIUM SERPL-MCNC: 7.3 MG/DL (ref 8.4–10.2)
CHLORIDE SERPL-SCNC: 99 MMOL/L (ref 98–107)
CO2 SERPL-SCNC: 27 MMOL/L (ref 23–31)
CREAT SERPL-MCNC: 7.26 MG/DL (ref 0.55–1.02)
CREAT/UREA NIT SERPL: 4
EOSINOPHIL # BLD AUTO: 0.33 X10(3)/MCL (ref 0–0.9)
EOSINOPHIL NFR BLD AUTO: 6.5 %
ERYTHROCYTE [DISTWIDTH] IN BLOOD BY AUTOMATED COUNT: 14.5 % (ref 11.5–17)
GFR SERPLBLD CREATININE-BSD FMLA CKD-EPI: 5 MLS/MIN/1.73/M2
GLUCOSE SERPL-MCNC: 99 MG/DL (ref 82–115)
HCT VFR BLD AUTO: 29.4 % (ref 37–47)
HGB BLD-MCNC: 9.5 G/DL (ref 12–16)
IMM GRANULOCYTES # BLD AUTO: 0.04 X10(3)/MCL (ref 0–0.04)
IMM GRANULOCYTES NFR BLD AUTO: 0.8 %
LYMPHOCYTES # BLD AUTO: 0.92 X10(3)/MCL (ref 0.6–4.6)
LYMPHOCYTES NFR BLD AUTO: 18.3 %
MAGNESIUM SERPL-MCNC: 1.7 MG/DL (ref 1.6–2.6)
MAGNESIUM SERPL-MCNC: 1.9 MG/DL (ref 1.6–2.6)
MCH RBC QN AUTO: 30.3 PG (ref 27–31)
MCHC RBC AUTO-ENTMCNC: 32.3 G/DL (ref 33–36)
MCV RBC AUTO: 93.6 FL (ref 80–94)
MONOCYTES # BLD AUTO: 0.41 X10(3)/MCL (ref 0.1–1.3)
MONOCYTES NFR BLD AUTO: 8.1 %
NEUTROPHILS # BLD AUTO: 3.3 X10(3)/MCL (ref 2.1–9.2)
NEUTROPHILS NFR BLD AUTO: 65.5 %
NRBC BLD AUTO-RTO: 0 %
PLATELET # BLD AUTO: 240 X10(3)/MCL (ref 130–400)
PMV BLD AUTO: 9.8 FL (ref 7.4–10.4)
POTASSIUM SERPL-SCNC: 3 MMOL/L (ref 3.5–5.1)
RBC # BLD AUTO: 3.14 X10(6)/MCL (ref 4.2–5.4)
SODIUM SERPL-SCNC: 138 MMOL/L (ref 136–145)
VANCOMYCIN SERPL-MCNC: 17.8 UG/ML (ref 15–20)
WBC # SPEC AUTO: 5.04 X10(3)/MCL (ref 4.5–11.5)

## 2023-12-24 PROCEDURE — 85025 COMPLETE CBC W/AUTO DIFF WBC: CPT | Performed by: INTERNAL MEDICINE

## 2023-12-24 PROCEDURE — 25000003 PHARM REV CODE 250: Performed by: INTERNAL MEDICINE

## 2023-12-24 PROCEDURE — 63600175 PHARM REV CODE 636 W HCPCS: Performed by: INTERNAL MEDICINE

## 2023-12-24 PROCEDURE — 90945 DIALYSIS ONE EVALUATION: CPT

## 2023-12-24 PROCEDURE — 99223 1ST HOSP IP/OBS HIGH 75: CPT | Mod: ,,, | Performed by: HOSPITALIST

## 2023-12-24 PROCEDURE — 25000003 PHARM REV CODE 250

## 2023-12-24 PROCEDURE — 83735 ASSAY OF MAGNESIUM: CPT

## 2023-12-24 PROCEDURE — 25000003 PHARM REV CODE 250: Performed by: NURSE PRACTITIONER

## 2023-12-24 PROCEDURE — 21400001 HC TELEMETRY ROOM

## 2023-12-24 PROCEDURE — 80048 BASIC METABOLIC PNL TOTAL CA: CPT | Performed by: INTERNAL MEDICINE

## 2023-12-24 PROCEDURE — 11000001 HC ACUTE MED/SURG PRIVATE ROOM

## 2023-12-24 PROCEDURE — 80202 ASSAY OF VANCOMYCIN: CPT | Performed by: INTERNAL MEDICINE

## 2023-12-24 RX ORDER — MAG HYDROX/ALUMINUM HYD/SIMETH 200-200-20
30 SUSPENSION, ORAL (FINAL DOSE FORM) ORAL EVERY 6 HOURS PRN
Status: DISCONTINUED | OUTPATIENT
Start: 2023-12-24 | End: 2023-12-27 | Stop reason: HOSPADM

## 2023-12-24 RX ORDER — VANCOMYCIN HCL IN 5 % DEXTROSE 1G/250ML
15 PLASTIC BAG, INJECTION (ML) INTRAVENOUS ONCE
Status: DISCONTINUED | OUTPATIENT
Start: 2023-12-24 | End: 2023-12-24

## 2023-12-24 RX ORDER — POTASSIUM CHLORIDE 20 MEQ/1
40 TABLET, EXTENDED RELEASE ORAL EVERY 6 HOURS
Status: COMPLETED | OUTPATIENT
Start: 2023-12-24 | End: 2023-12-24

## 2023-12-24 RX ORDER — ALPRAZOLAM 0.5 MG/1
0.5 TABLET ORAL 2 TIMES DAILY PRN
Status: DISCONTINUED | OUTPATIENT
Start: 2023-12-24 | End: 2023-12-27 | Stop reason: HOSPADM

## 2023-12-24 RX ADMIN — ALUMINUM HYDROXIDE, MAGNESIUM HYDROXIDE, AND SIMETHICONE 30 ML: 200; 200; 20 SUSPENSION ORAL at 09:12

## 2023-12-24 RX ADMIN — ALPRAZOLAM 0.5 MG: 0.5 TABLET ORAL at 12:12

## 2023-12-24 RX ADMIN — SPIRONOLACTONE 25 MG: 25 TABLET ORAL at 08:12

## 2023-12-24 RX ADMIN — POTASSIUM CHLORIDE 40 MEQ: 1500 TABLET, EXTENDED RELEASE ORAL at 12:12

## 2023-12-24 RX ADMIN — ENOXAPARIN SODIUM 30 MG: 30 INJECTION SUBCUTANEOUS at 05:12

## 2023-12-24 RX ADMIN — POTASSIUM CHLORIDE 40 MEQ: 1500 TABLET, EXTENDED RELEASE ORAL at 05:12

## 2023-12-24 RX ADMIN — POTASSIUM BICARBONATE 25 MEQ: 977.5 TABLET, EFFERVESCENT ORAL at 08:12

## 2023-12-24 RX ADMIN — VANCOMYCIN HYDROCHLORIDE 1000 MG: 1 INJECTION, POWDER, LYOPHILIZED, FOR SOLUTION INTRAVENOUS at 10:12

## 2023-12-24 NOTE — PROGRESS NOTES
Pharmacokinetic Assessment Follow Up: IV Vancomycin    Vancomycin serum concentration assessment(s):    The random level was drawn correctly and can be used to guide therapy at this time. The measurement is within the desired definitive target range of 15 to 20 mcg/mL.    Vancomycin Regimen Plan:    Re-dose with vancomycin 15 mg/kg = 1000mg x1.  Re-dose when the random level is less than 20 mcg/mL, next level to be drawn at 0430 on 12/26.    Drug levels (last 3 results):  Recent Labs   Lab Result Units 12/22/23  0429 12/23/23  0403 12/24/23  0402   Vanc Lvl Random ug/ml 26.7* 20.4* 17.8       Pharmacy will continue to follow and monitor vancomycin.    Please contact pharmacy at extension [phone] for questions regarding this assessment.    Thank you for the consult,   Brian Richey       Patient brief summary:  Malissa Schwartz is a 73 y.o. female initiated on antimicrobial therapy with IV Vancomycin for treatment of intra-abdominal infection    The patient's current regimen is pulse dosing.    Drug Allergies:   Review of patient's allergies indicates:   Allergen Reactions    Codeine Palpitations and Rash       Actual Body Weight:   68kg    Renal Function:   Estimated Creatinine Clearance: 6.4 mL/min (A) (based on SCr of 7.26 mg/dL (H)).,     Dialysis Method (if applicable):  peritoneal dialysis    CBC (last 72 hours):  Recent Labs   Lab Result Units 12/21/23  1511 12/22/23  0429 12/24/23  0402   WBC x10(3)/mcL 8.09 5.54 5.04   Hgb g/dL 10.7* 8.9* 9.5*   Hct % 34.0* 27.4* 29.4*   Platelet x10(3)/mcL 350 236 240   Mono % % 6.1 8.8 8.1   Eos % % 1.6 3.1 6.5   Basophil % % 0.5 0.7 0.8       Metabolic Panel (last 72 hours):  Recent Labs   Lab Result Units 12/21/23  1511 12/22/23  0429 12/23/23  0403 12/23/23  1603 12/24/23  0402   Sodium Level mmol/L 133* 133* 134* 135* 138   Potassium Level mmol/L 2.4* 2.9* 2.8* 3.1* 3.0*   Chloride mmol/L 96* 99 98 100 99   Carbon Dioxide mmol/L 29 26 29 27 27   Glucose Level mg/dL 83 85  115 118* 99   Blood Urea Nitrogen mg/dL 25.4* 28.7* 26.6* 30.6* 25.5*   Creatinine mg/dL 7.85* 8.28* 7.42* 8.18* 7.26*   Albumin Level g/dL 1.5* 1.2* 1.2*  --   --    Bilirubin Total mg/dL 0.3 0.5 0.3  --   --    Alkaline Phosphatase unit/L 137 114 122  --   --    Aspartate Aminotransferase unit/L 28 20 23  --   --    Alanine Aminotransferase unit/L 17 14 15  --   --    Magnesium Level mg/dL 1.50*  --  1.80 1.90 1.70       Vancomycin Administrations:  vancomycin given in the last 96 hours                     vancomycin 1.5 g in dextrose 5 % 250 mL IVPB (ready to mix) (mg) 1,500 mg New Bag 12/21/23 2335                    Microbiologic Results:  Microbiology Results (last 7 days)       Procedure Component Value Units Date/Time    Body Fluid Culture [[id number]]  (Abnormal)  (Susceptibility) Collected: 12/21/23 1919    Order Status: Completed Specimen: Body Fluid from Peritoneal Fluid Updated: 12/24/23 0733     Body Fluid Culture Many Staphylococcus epidermidis    Blood Culture [[id number]]  (Normal) Collected: 12/21/23 1707    Order Status: Completed Specimen: Blood Updated: 12/23/23 2101     CULTURE, BLOOD ([facility]) No Growth At 48 Hours    Blood Culture [[id number]]  (Normal) Collected: 12/21/23 1707    Order Status: Completed Specimen: Blood Updated: 12/23/23 2101     CULTURE, BLOOD ([facility]) No Growth At 48 Hours    Gram Stain [[id number]] Collected: 12/21/23 1919    Order Status: Completed Specimen: Body Fluid from Peritoneal Fluid Updated: 12/22/23 0019     GRAM STAIN Many WBC observed      Moderate Gram positive cocci    SUSY Prep [[id number]] Collected: 12/21/23 1919    Order Status: Completed Specimen: Body Fluid from Peritoneal Fluid Updated: 12/21/23 2048     KOH Prep No fungal elements seen

## 2023-12-24 NOTE — PROGRESS NOTES
Nephrology follow up progress note    HPI:      This is 73 years old white female who is known to me for a long time and has had chronic transplant kidney rejection and has been   on dialysis and has been quite stable, but she did have periodic admissions to the hospital mostly for sepsis and peritonitis.     She presented to the hospital with complaints of diarrhea for the last 3 to 4 days and loss of appetite and lower abdominal pains.  She say she did not notice that her PD fluid was cloudy at all last night when the PD nurse came and they took some sample and it was found to be cloudy.  She did not have any fever or chills.  No cough, cold, congestion.  No nausea, vomiting.  No headache, blurring, diplopia.  No dysuria.  No hematuria.  No blurred vision.  Just weak.     PD fluid was drawn and PD fluid has white blood cell count of more than 7600 and more than 91% neutrophils.  Gram stain positive for gram-positive cocci.  The patient has been started on vancomycin last night.    Interval history:     Denies SOB, CP, N/V. Reports improvement in abdominal tenderness. Tolerated PD well last night.    Review of Systems:   Review of Systems   Constitutional: Negative.    Respiratory: Negative.     Cardiovascular: Negative.    Gastrointestinal:  Positive for abdominal pain (intermittent).         Past medical, family, surgical, and social history reviewed and unchanged from initial consult note.     Objective:       VITAL SIGNS: 24 HR MIN & MAX LAST    Temp  Min: 98.1 °F (36.7 °C)  Max: 98.4 °F (36.9 °C)  98.1 °F (36.7 °C)        BP  Min: 106/70  Max: 115/72  107/71     Pulse  Min: 60  Max: 72  71     Resp  Min: 18  Max: 20  20    SpO2  Min: 95 %  Max: 98 %  96 %      Physical Exam  Constitutional:       General: She is not in acute distress.  HENT:      Head: Normocephalic.   Eyes:      Extraocular Movements: Extraocular movements intact.   Cardiovascular:      Rate and Rhythm: Normal rate and regular rhythm.       Heart sounds: Normal heart sounds.   Pulmonary:      Effort: No respiratory distress.      Breath sounds: Normal breath sounds.   Abdominal:      General: There is no distension.      Palpations: Abdomen is soft.   Musculoskeletal:         General: Normal range of motion.      Cervical back: Normal range of motion.   Skin:     General: Skin is warm and dry.   Neurological:      General: No focal deficit present.      Mental Status: She is alert.                 Component Value Date/Time     12/24/2023 0402     (L) 12/23/2023 1603     06/20/2018 1402     01/08/2018 0835    K 3.0 (L) 12/24/2023 0402    K 3.1 (L) 12/23/2023 1603    K 4.8 06/20/2018 1402    K 4.6 01/08/2018 0835    CHLORIDE 99 12/24/2023 0402    CHLORIDE 100 12/23/2023 1603    CO2 27 12/24/2023 0402    CO2 27 12/23/2023 1603    CO2 23 06/20/2018 1402    CO2 22 (L) 01/08/2018 0835    BUN 25.5 (H) 12/24/2023 0402    BUN 30.6 (H) 12/23/2023 1603    BUN 30 (H) 06/20/2018 1402    BUN 24 (H) 01/08/2018 0835    CREATININE 7.26 (H) 12/24/2023 0402    CREATININE 8.18 (H) 12/23/2023 1603    CREATININE 1.9 (H) 06/20/2018 1402    CREATININE 1.8 (H) 01/08/2018 0835    CALCIUM 7.3 (L) 12/24/2023 0402    CALCIUM 7.7 (L) 12/23/2023 1603    CALCIUM 9.7 06/20/2018 1402    CALCIUM 9.1 01/08/2018 0835    PHOS 5.4 (H) 09/26/2023 2123    PHOS 3.8 06/20/2018 1402            Component Value Date/Time    WBC 5.04 12/24/2023 0402    WBC 5.54 12/22/2023 0429    WBC 6.90 06/20/2018 1402    WBC 3.37 (L) 01/08/2018 0835    HGB 9.5 (L) 12/24/2023 0402    HGB 8.9 (L) 12/22/2023 0429    HGB 10.5 (L) 06/20/2018 1402    HGB 9.1 (L) 01/08/2018 0835    HCT 29.4 (L) 12/24/2023 0402    HCT 27.4 (L) 12/22/2023 0429    HCT 32.0 (L) 06/05/2020 0815    HCT 31.6 (L) 06/20/2018 1402    HCT 27.3 (L) 01/08/2018 0835     12/24/2023 0402     12/22/2023 0429     06/20/2018 1402     01/08/2018 0835       Imaging reviewed      Assessment / Plan:     1.  Acute peritonitis.  2. End-stage renal disease.  3. Hypertension.  4. Anemia.  5. Protein malnutrition.  6. Diarrhea.  7. Persistent hypokalemia.     RECOMMENDATIONS:    1. Continue CCPD nightly  2. PD fluid culture with many staphylococcus epidermidis sensitive to Vancomycin. Change antibiotics from IV (last dose today) to IP per peritonitis protocol. IP vanc to start Tuesday morning with last fill.  3. Give additional 80 mEq IV Kcl PO today.  4. BMP in AM

## 2023-12-24 NOTE — PROGRESS NOTES
12/24/23 1438   Peritoneal Dialysis   Exchange Type Cycler   Peritoneal Treatment Status Other (comment)  (system setup and primed at bedside, on call rn will initiate tx later this evening)   Dianeal Solution Dextrose 1.5% in 6000 mL

## 2023-12-24 NOTE — CONSULTS
Infectious Disease        Patient ID: Malissa Schwartz  73 y.o. female.    Chief Complaint: Abdominal Pain (Abdominal pain x 4 days, no nausea/vomiting. Lack of appetite. Reports diarrhea for 4 days. Denies any fevers.)      Interval HPI:    12/24 - peritonitis without systemic infection. Switched to IP vanco.  recommend recheck fluid to ensure response, and minimum #14 days treatment given immunosuppressed host. Nephro can manage this course. ID will sign off, call back if needed   Assessment and Plan:   1) Peritonitis  - in setting of PD on 3 years)   - one priror episode  - fluid analysis 12/21 - WBC 7626,N91, L6, M3  - Peritoneal fluid 12/21 - Staphylococcus epidermis (S-gent, cipro, linezolid, tetra, tige, tmp/smx, vanco)   - BCx 12/21 - ngtd x 2  - s/p vanco 12/21, pip/tazo, ceftriaxone, vancomycin  - currently on vanco IP   - recommend recheck fluid to ensure response, and minimum #14 days treatment given immunosuppressed host     2) ESRD  - chronic renal transplant rejection   - on PD    3) Immunosuppressed host   - s/p LDRT on 2007  - chronic rejection   - on immunosuppression with tacro 3/2 and cellcept 500/500      Discussed with patient  Discussed with RN  Discussed with Hospitalist, Dr Dave Andrew MD, MPH  [facility]    Thank you for this consultation. I will follow up with the patient. Please contact via Epic secure chat with any questions.       HPI:   Patient is Malissa Schwartz a 73 y.o. female admitted on 12/21 for abdominal pain, diarrhea, and decreased appetitite for last 3-4 days. Upon evalution patient with cloudy PD fluid and analysis consistent with infection and cx isolated CONS. Patient has known ESRD on PD with prior LDRT 2007 with chronic rejection, hx of skin cancer, VZV, s/p MATHIEU, tonsillectomy, left nephrectomy. Infectious diseases consulted for evaluation and management.     Past Medical History:   Diagnosis Date    Acute  rejection of kidney transplant 9/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 Glomerulitis with one activated endothelial cell: treated with Solumedrol pulse 375 mg IVBP x 3 doses with short prednisone taper; thymoglobin 100 mg x 6 doses 10/04/2007 minimal histologic changes no acute rejection     Allergy     Anemia of renal disease     CKD (chronic kidney disease), stage IV     CMV (cytomegalovirus) status positive     Colon polyp     benign    Depression     GN (glomerulonephritis)     GN (glomerulonephritis)     unknown type    Herpes infection, other 2009    on buttocks    Hyperlipidemia     Hypertension     Immunosuppression     Living-donor kidney transplant recipient 01/15/2007    Meningitis     history of viral meningitis    Proteinuria 1/5/2015    Proteinuria     Pulmonary nodules     Renal hypertension     S/P breast augmentation     Skin cancer     Urinary tract infection      Past Surgical History:   Procedure Laterality Date    COLON SURGERY      COLONOSCOPY W/ BIOPSIES      cosemetic surgery      facelift and breast augmentation     HYSTERECTOMY      KIDNEY TRANSPLANT      NEPHRECTOMY Left     9/2016    RENAL BIOPSY      TONSILLECTOMY, ADENOIDECTOMY       Review of patient's allergies indicates:   Allergen Reactions    Codeine Palpitations and Rash     Current Outpatient Medications   Medication Instructions    amLODIPine (NORVASC) 5 mg, Oral, Daily    aspirin (ECOTRIN) 81 mg, Oral, Daily    B complex-vitamin C-folic acid (NEPHRO-JENN) 0.8 mg Tab 0.8 mg, Oral, Daily    calcitRIOL (ROCALTROL) 0.25 mcg, Oral, Three times weekly    cloNIDine 0.3 mg/24 hr td ptwk (CATAPRES) 0.3 mg/24 hr 1 patch, Transdermal, Every 7 days    folic acid (FOLVITE) 1 mg, Oral, Daily    levocetirizine (XYZAL) 5 mg, Oral, Nightly    multivitamin capsule 1 capsule, Oral, Daily    potassium chloride (KLOR-CON) 10 MEQ TbSR 10 mEq, Oral, Once    sodium bicarbonate 650 mg, Oral, 2 times daily    spironolactone  (ALDACTONE) 25 mg, Oral, Daily       Current Facility-Administered Medications:     enoxaparin injection 30 mg, 30 mg, Subcutaneous, Daily, Naga Wright MD, 30 mg at 12/23/23 1806    melatonin tablet 6 mg, 6 mg, Oral, Nightly PRN, Naga Wright MD    morphine injection 4 mg, 4 mg, Intravenous, Q4H PRN, Naga Wright MD    oxyCODONE immediate release tablet 5 mg, 5 mg, Oral, Q4H PRN, Naga Wright MD, 5 mg at 12/23/23 1532    potassium bicarbonate disintegrating tablet 25 mEq, 25 mEq, Oral, BID, George Mendoza MD, 25 mEq at 12/24/23 0850    sodium chloride 0.9% flush 10 mL, 10 mL, Intravenous, PRN, Naga Wright MD    spironolactone tablet 25 mg, 25 mg, Oral, Daily, George Mendoza MD, 25 mg at 12/24/23 0850    Pharmacy to dose Vancomycin consult, , , Once **AND** vancomycin - pharmacy to dose, , Intravenous, pharmacy to manage frequency, Naga Wright MD    vancomycin in dextrose 5 % 1 gram/250 mL IVPB 1,000 mg, 15 mg/kg, Intravenous, Once, Naga Wright MD  Review of Systems   Constitutional:  Negative for chills and fever.   HENT:  Negative for congestion, ear discharge, ear pain, facial swelling, mouth sores, postnasal drip, rhinorrhea, sinus pressure, sinus pain, sneezing, sore throat and trouble swallowing.    Eyes:  Negative for discharge, redness and itching.   Respiratory:  Negative for cough, chest tightness, shortness of breath and wheezing.    Cardiovascular:  Negative for chest pain, palpitations and leg swelling.   Gastrointestinal:  Negative for abdominal distention, abdominal pain, diarrhea, nausea and vomiting.   Genitourinary:  Negative for dysuria, flank pain, frequency and urgency.   Musculoskeletal:  Negative for back pain, myalgias and neck stiffness.   Skin:  Negative for rash and wound.   Allergic/Immunologic: Negative for immunocompromised state.   Neurological:  Negative for dizziness, light-headedness and headaches.    Hematological:  Negative for adenopathy.   Psychiatric/Behavioral:  Negative for agitation, confusion and suicidal ideas. The patient is not nervous/anxious.        Objective:   Temp:  [97.5 °F (36.4 °C)-98.4 °F (36.9 °C)] 98.4 °F (36.9 °C)  Pulse:  [60-72] 63  Resp:  [18-20] 20  SpO2:  [95 %-98 %] 95 %  BP: (106-123)/(70-80) 112/73     Physical Exam  Constitutional:       Appearance: Normal appearance. She is well-developed.   HENT:      Head: Normocephalic.      Nose: Nose normal.      Mouth/Throat:      Pharynx: No oropharyngeal exudate.   Eyes:      General: Lids are normal. No scleral icterus.        Right eye: No discharge.      Conjunctiva/sclera: Conjunctivae normal.      Pupils: Pupils are equal, round, and reactive to light.   Neck:      Thyroid: No thyromegaly.      Vascular: No JVD.      Trachea: Trachea normal.   Cardiovascular:      Rate and Rhythm: Normal rate and regular rhythm.      Pulses: Normal pulses.      Heart sounds: Normal heart sounds. No murmur heard.     No friction rub.   Pulmonary:      Effort: Pulmonary effort is normal. No respiratory distress.      Breath sounds: Normal breath sounds. No wheezing.   Chest:      Chest wall: No tenderness.   Abdominal:      General: Bowel sounds are normal. There is no distension.      Palpations: Abdomen is soft.      Tenderness: There is no abdominal tenderness. There is no guarding or rebound.      Comments: PD cath clear fluid    Musculoskeletal:         General: No tenderness. Normal range of motion.      Cervical back: Full passive range of motion without pain, normal range of motion and neck supple.   Lymphadenopathy:      Cervical: No cervical adenopathy.   Skin:     General: Skin is warm and dry.      Findings: No rash.   Neurological:      Mental Status: She is alert and oriented to person, place, and time.      Cranial Nerves: No cranial nerve deficit.      Sensory: No sensory deficit.   Psychiatric:         Speech: Speech normal.          Behavior: Behavior normal.         Thought Content: Thought content normal.         Judgment: Judgment normal.         Estimated Creatinine Clearance: 6.4 mL/min (A) (based on SCr of 7.26 mg/dL (H)).  Recent Labs   Lab 12/24/23  0402   WBC 5.04        Microbiology Results (last 7 days)       Procedure Component Value Units Date/Time    Body Fluid Culture [[id number]]  (Abnormal)  (Susceptibility) Collected: 12/21/23 1919    Order Status: Completed Specimen: Body Fluid from Peritoneal Fluid Updated: 12/24/23 0733     Body Fluid Culture Many Staphylococcus epidermidis    Blood Culture [[id number]]  (Normal) Collected: 12/21/23 1707    Order Status: Completed Specimen: Blood Updated: 12/23/23 2101     CULTURE, BLOOD ([facility]) No Growth At 48 Hours    Blood Culture [[id number]]  (Normal) Collected: 12/21/23 1707    Order Status: Completed Specimen: Blood Updated: 12/23/23 2101     CULTURE, BLOOD ([facility]) No Growth At 48 Hours    Gram Stain [[id number]] Collected: 12/21/23 1919    Order Status: Completed Specimen: Body Fluid from Peritoneal Fluid Updated: 12/22/23 0019     GRAM STAIN Many WBC observed      Moderate Gram positive cocci    SUSY Prep [[id number]] Collected: 12/21/23 1919    Order Status: Completed Specimen: Body Fluid from Peritoneal Fluid Updated: 12/21/23 2048     KOH Prep No fungal elements seen            Significant Labs: All pertinent labs within the past 24 hours have been reviewed.    Significant Imaging: I have reviewed all relevant and available imaging results/findings within the past 24 hours.      Plan -- see top of note

## 2023-12-24 NOTE — NURSING
CCPD initiated at this time per MD order. POC discussed with patient, no questions or concerns noted.    none

## 2023-12-24 NOTE — NURSING
12/24/23 0815   Cycler Peritoneal Dialysis   Initial Drain Volume (mL) 1589 mL   Effluent Appearance Clear   Number of Cycles 4   Fill Volume (mL) 2000 mL   Total Volume (mL) 9500 mL   Cycler PD Total UF (mL) 455 mL   Cycler Treatment Comments CCPD treatment completed overnight. NET fluid removed = 455ml. No reported issues. Effluent clear yellow. No complaints.

## 2023-12-24 NOTE — PROGRESS NOTES
[facility] Hospital Medicine Progress Note        Chief Complaint: Inpatient Follow-up for     HPI: Patient is a 73-year-old female with history of ESRD on peritoneal dialysis who presented to the ER with complaints of several days of abdominal pain.  She reports she has had weakness and decreased appetite during this time but denied fever or chills.  She arrived to the ER afebrile hemodynamically stable maintaining normal sats on room air.  Laboratory work showed changes as expected in end-stage renal disease, as well as hypokalemia and hypomagnesemia.  Peritoneal fluid was drawn with 7600 WBCs, 91% neutrophils.  Gram stain with many Gram-positive cocci.     Interval Hx:   Seen and examined. No acute events overnight. Peritoneal fluid - gram pos cocci   Remains on iv vanc   Afebrile  Pt is feeling sad and low due to not being able to go home for florentino . Requests something for anxiety and help her relax    Objective/physical exam:  General: In no acute distress, afebrile  Chest: Clear to auscultation bilaterally  Heart: RRR, +S1, S2, no appreciable murmur  Abdomen: Soft, nontender, BS +  MSK: Warm, no lower extremity edema, no clubbing or cyanosis  Neurologic: Alert and oriented x4, Cranial nerve II-XII intact, Strength 5/5 in all 4 extremities    VITAL SIGNS: 24 HRS MIN & MAX LAST   Temp  Min: 97.5 °F (36.4 °C)  Max: 98.4 °F (36.9 °C) 98.4 °F (36.9 °C)   BP  Min: 106/70  Max: 123/80 112/73   Pulse  Min: 60  Max: 72  63   Resp  Min: 18  Max: 20 20   SpO2  Min: 95 %  Max: 98 % 95 %     I have reviewed the following labs:  Recent Labs   Lab 12/21/23  1511 12/22/23  0429 12/24/23  0402   WBC 8.09 5.54 5.04   RBC 3.58* 3.00* 3.14*   HGB 10.7* 8.9* 9.5*   HCT 34.0* 27.4* 29.4*   MCV 95.0* 91.3 93.6   MCH 29.9 29.7 30.3   MCHC 31.5* 32.5* 32.3*   RDW 14.6 14.4 14.5    236 240   MPV 9.7 9.7 9.8       Recent Labs   Lab 12/21/23  1511 12/22/23  0429 12/23/23  0403 12/23/23  1603  12/24/23  0402   * 133* 134* 135* 138   K 2.4* 2.9* 2.8* 3.1* 3.0*   CO2 29 26 29 27 27   BUN 25.4* 28.7* 26.6* 30.6* 25.5*   CREATININE 7.85* 8.28* 7.42* 8.18* 7.26*   CALCIUM 7.8* 7.0* 7.4* 7.7* 7.3*   MG 1.50*  --  1.80 1.90 1.70   ALBUMIN 1.5* 1.2* 1.2*  --   --    ALKPHOS 137 114 122  --   --    ALT 17 14 15  --   --    AST 28 20 23  --   --    BILITOT 0.3 0.5 0.3  --   --        Microbiology Results (last 7 days)       Procedure Component Value Units Date/Time    Body Fluid Culture [[id number]]  (Abnormal)  (Susceptibility) Collected: 12/21/23 1919    Order Status: Completed Specimen: Body Fluid from Peritoneal Fluid Updated: 12/24/23 0733     Body Fluid Culture Many Staphylococcus epidermidis    Blood Culture [[id number]]  (Normal) Collected: 12/21/23 1707    Order Status: Completed Specimen: Blood Updated: 12/23/23 2101     CULTURE, BLOOD ([facility]) No Growth At 48 Hours    Blood Culture [[id number]]  (Normal) Collected: 12/21/23 1707    Order Status: Completed Specimen: Blood Updated: 12/23/23 2101     CULTURE, BLOOD ([facility]) No Growth At 48 Hours    Gram Stain [[id number]] Collected: 12/21/23 1919    Order Status: Completed Specimen: Body Fluid from Peritoneal Fluid Updated: 12/22/23 0019     GRAM STAIN Many WBC observed      Moderate Gram positive cocci    SUSY Prep [[id number]] Collected: 12/21/23 1919    Order Status: Completed Specimen: Body Fluid from Peritoneal Fluid Updated: 12/21/23 2048     KOH Prep No fungal elements seen             See below for Radiology    Scheduled Med:   cefTRIAXone (ROCEPHIN) IVPB  1 g Intravenous Q24H    enoxparin  30 mg Subcutaneous Daily    potassium bicarbonate  25 mEq Oral BID    spironolactone  25 mg Oral Daily      Continuous Infusions:     PRN Meds:  melatonin, morphine, oxyCODONE, sodium chloride 0.9%, Pharmacy to dose Vancomycin consult **AND** vancomycin - pharmacy to dose     Assessment/Plan:  Peritonitis, infectious (in setting of PD catheter)  Hypokalemia,  ESRD  on peritoneal dialysis   Essential hypertension   Anemia of chronic kidney disease   Remote renal transplant/rejection  anxiety     -peritoneal fluid - staph epi, s to vanc, cont vanc . Dc rocephin   Consult ID  -analgesics as needed  -CPPD per renal   - k replaced   -replete electrolytes as indicated  -prn xanax 0.5 mg bid   Labs in the am      DVT prophylaxis:  Lovenox  Code status:  Full code          All diagnosis and differential diagnosis have been reviewed; assessment and plan has been documented; I have personally reviewed the labs and test results that are presently available; I have reviewed the patients medication list; I have reviewed the consulting providers response and recommendations. I have reviewed or attempted to review medical records based upon their availability    All of the patient's questions have been  addressed and answered. Patient's is agreeable to the above stated plan. I will continue to monitor closely and make adjustments to medical management as needed.  _____________________________________________________________________    Nutrition Status:    Radiology:  I have personally reviewed the following imaging and agree with the radiologist.     CT Abdomen Pelvis  Without Contrast  Narrative: EXAMINATION:  CT ABDOMEN PELVIS WITHOUT CONTRAST    CLINICAL HISTORY:  RLQ abdominal pain (Age >= 14y);peritoneal dialysis malfunction;    TECHNIQUE:  Axial images of the abdomen and pelvis were obtained without IV contrast administration.  Coronal and sagittal reconstructions were provided.  Three dimensional and MIP images were obtained and evaluated.  Total DLP was 178 mGy-cm. Dose lowering technique and automated exposure control were utilized for this exam.    COMPARISON:  CT of the abdomen and pelvis 12/25/2021.    FINDINGS:  There is minimal atelectasis in the left lung base.  The right lung base is clear.  The heart is not enlarged.    There is a peritoneal dialysis catheter in the right  pericolic gutter.  There is small volume of fluid within the peritoneum, not unexpected in the setting of peritoneal dialysis.  The liver is homogeneous in attenuation.  The gallbladder, pancreas, and adrenal glands are normal.  There are calcified granulomas in the spleen.  The right kidney is atrophic.  The left kidney is not visualized.  There is a right lower quadrant renal transplant with no hydronephrosis.  There is no perinephric fluid collection.    The stomach and small bowel are decompressed.  There is no bowel obstruction.  The appendix is not visualized.  There is colonic diverticulosis without diverticulitis.  The urinary bladder is decompressed.  There is no pelvic or retroperitoneal adenopathy.  The aorta is nonaneurysmal.  There is no lytic or blastic osseous lesion.  Impression: 1. Peritoneal dialysis catheter with its tip in the right lower quadrant.  2. Right lower quadrant renal transplant without acute abnormality, however evaluation is limited on this exam.  3. Minimal left lower lobe atelectasis.  4. Diverticulosis without diverticulitis.    Electronically signed by: Vadim Mtz MD  Date:    09/27/2023  Time:    21:09  X-Ray Abdomen AP 1 View  Narrative: EXAMINATION:  XR ABDOMEN AP 1 VIEW    CLINICAL HISTORY:  check PD catheter;    TECHNIQUE:  AP View(s) of the abdomen.    COMPARISON:  Radiography 02/05/2021    FINDINGS:  Peritoneal dialysis catheter coils in the right lower quadrant.  Nonobstructive bowel gas pattern with moderate volume stool.  Impression: Coiling of the peritoneal dialysis catheter in the right lower quadrant.    Electronically signed by: Isaac Palomino  Date:    09/27/2023  Time:    11:39      Janie Acosta MD   12/24/2023

## 2023-12-25 LAB
ANION GAP SERPL CALC-SCNC: 5 MEQ/L
BASOPHILS # BLD AUTO: 0.03 X10(3)/MCL
BASOPHILS NFR BLD AUTO: 0.7 %
BUN SERPL-MCNC: 23.3 MG/DL (ref 9.8–20.1)
CALCIUM SERPL-MCNC: 7.7 MG/DL (ref 8.4–10.2)
CHLORIDE SERPL-SCNC: 101 MMOL/L (ref 98–107)
CO2 SERPL-SCNC: 27 MMOL/L (ref 23–31)
CREAT SERPL-MCNC: 7.12 MG/DL (ref 0.55–1.02)
CREAT/UREA NIT SERPL: 3
EOSINOPHIL # BLD AUTO: 0.27 X10(3)/MCL (ref 0–0.9)
EOSINOPHIL NFR BLD AUTO: 5.9 %
ERYTHROCYTE [DISTWIDTH] IN BLOOD BY AUTOMATED COUNT: 14.2 % (ref 11.5–17)
GFR SERPLBLD CREATININE-BSD FMLA CKD-EPI: 6 MLS/MIN/1.73/M2
GLUCOSE SERPL-MCNC: 76 MG/DL (ref 82–115)
HCT VFR BLD AUTO: 30.8 % (ref 37–47)
HGB BLD-MCNC: 9.9 G/DL (ref 12–16)
IMM GRANULOCYTES # BLD AUTO: 0.03 X10(3)/MCL (ref 0–0.04)
IMM GRANULOCYTES NFR BLD AUTO: 0.7 %
LYMPHOCYTES # BLD AUTO: 0.92 X10(3)/MCL (ref 0.6–4.6)
LYMPHOCYTES NFR BLD AUTO: 20.2 %
MCH RBC QN AUTO: 30.2 PG (ref 27–31)
MCHC RBC AUTO-ENTMCNC: 32.1 G/DL (ref 33–36)
MCV RBC AUTO: 93.9 FL (ref 80–94)
MONOCYTES # BLD AUTO: 0.37 X10(3)/MCL (ref 0.1–1.3)
MONOCYTES NFR BLD AUTO: 8.1 %
NEUTROPHILS # BLD AUTO: 2.94 X10(3)/MCL (ref 2.1–9.2)
NEUTROPHILS NFR BLD AUTO: 64.4 %
NRBC BLD AUTO-RTO: 0 %
PLATELET # BLD AUTO: 239 X10(3)/MCL (ref 130–400)
PMV BLD AUTO: 9.6 FL (ref 7.4–10.4)
POTASSIUM SERPL-SCNC: 4.2 MMOL/L (ref 3.5–5.1)
RBC # BLD AUTO: 3.28 X10(6)/MCL (ref 4.2–5.4)
SODIUM SERPL-SCNC: 133 MMOL/L (ref 136–145)
WBC # SPEC AUTO: 4.56 X10(3)/MCL (ref 4.5–11.5)

## 2023-12-25 PROCEDURE — 25000003 PHARM REV CODE 250: Performed by: NURSE PRACTITIONER

## 2023-12-25 PROCEDURE — 85025 COMPLETE CBC W/AUTO DIFF WBC: CPT | Performed by: INTERNAL MEDICINE

## 2023-12-25 PROCEDURE — 90945 DIALYSIS ONE EVALUATION: CPT

## 2023-12-25 PROCEDURE — 25000003 PHARM REV CODE 250: Performed by: INTERNAL MEDICINE

## 2023-12-25 PROCEDURE — 80048 BASIC METABOLIC PNL TOTAL CA: CPT | Performed by: INTERNAL MEDICINE

## 2023-12-25 PROCEDURE — 63600175 PHARM REV CODE 636 W HCPCS: Performed by: NURSE PRACTITIONER

## 2023-12-25 PROCEDURE — 63600175 PHARM REV CODE 636 W HCPCS: Performed by: INTERNAL MEDICINE

## 2023-12-25 PROCEDURE — 21400001 HC TELEMETRY ROOM

## 2023-12-25 RX ORDER — SODIUM BICARBONATE 650 MG/1
650 TABLET ORAL 2 TIMES DAILY
Status: DISCONTINUED | OUTPATIENT
Start: 2023-12-25 | End: 2023-12-27 | Stop reason: HOSPADM

## 2023-12-25 RX ORDER — ONDANSETRON 2 MG/ML
4 INJECTION INTRAMUSCULAR; INTRAVENOUS EVERY 6 HOURS PRN
Status: DISCONTINUED | OUTPATIENT
Start: 2023-12-25 | End: 2023-12-27 | Stop reason: HOSPADM

## 2023-12-25 RX ORDER — FOLIC ACID 1 MG/1
1 TABLET ORAL DAILY
Status: DISCONTINUED | OUTPATIENT
Start: 2023-12-26 | End: 2023-12-27 | Stop reason: HOSPADM

## 2023-12-25 RX ORDER — CALCITRIOL 0.25 UG/1
0.25 CAPSULE ORAL
Status: DISCONTINUED | OUTPATIENT
Start: 2023-12-25 | End: 2023-12-27 | Stop reason: HOSPADM

## 2023-12-25 RX ORDER — ASPIRIN 81 MG/1
81 TABLET ORAL DAILY
Status: DISCONTINUED | OUTPATIENT
Start: 2023-12-26 | End: 2023-12-27 | Stop reason: HOSPADM

## 2023-12-25 RX ADMIN — ALUMINUM HYDROXIDE, MAGNESIUM HYDROXIDE, AND SIMETHICONE 30 ML: 200; 200; 20 SUSPENSION ORAL at 10:12

## 2023-12-25 RX ADMIN — Medication 6 MG: at 09:12

## 2023-12-25 RX ADMIN — ONDANSETRON 4 MG: 2 INJECTION INTRAMUSCULAR; INTRAVENOUS at 09:12

## 2023-12-25 RX ADMIN — ALPRAZOLAM 0.5 MG: 0.5 TABLET ORAL at 09:12

## 2023-12-25 RX ADMIN — CALCITRIOL CAPSULES 0.25 MCG 0.25 MCG: 0.25 CAPSULE ORAL at 09:12

## 2023-12-25 RX ADMIN — SPIRONOLACTONE 25 MG: 25 TABLET ORAL at 10:12

## 2023-12-25 RX ADMIN — ENOXAPARIN SODIUM 30 MG: 30 INJECTION SUBCUTANEOUS at 04:12

## 2023-12-25 RX ADMIN — SODIUM BICARBONATE 650 MG TABLET 650 MG: at 09:12

## 2023-12-25 NOTE — PROGRESS NOTES
Nephrology follow up progress note    HPI:      This is 73 years old white female who is known to me for a long time and has had chronic transplant kidney rejection and has been   on dialysis and has been quite stable, but she did have periodic admissions to the hospital mostly for sepsis and peritonitis.     She presented to the hospital with complaints of diarrhea for the last 3 to 4 days and loss of appetite and lower abdominal pains.  She say she did not notice that her PD fluid was cloudy at all last night when the PD nurse came and they took some sample and it was found to be cloudy.  She did not have any fever or chills.  No cough, cold, congestion.  No nausea, vomiting.  No headache, blurring, diplopia.  No dysuria.  No hematuria.  No blurred vision.  Just weak.     PD fluid was drawn and PD fluid has white blood cell count of more than 7600 and more than 91% neutrophils.  Gram stain positive for gram-positive cocci.  The patient has been started on vancomycin last night.    Interval history:     Denies SOB, CP, N/V. Reports improvement in abdominal tenderness. Tolerated PD well last night.  10/25/2023  Still with a lot of abdominal pains.  Tolerating CCPD.  No shortness of breath.  No nausea vomiting.  Reports that she does have bowel movements.  Has not been able to get out of bed too much.    Review of Systems:   Review of Systems   Constitutional: Negative.    Respiratory: Negative.     Cardiovascular: Negative.    Gastrointestinal:  Positive for abdominal pain (intermittent).         Past medical, family, surgical, and social history reviewed and unchanged from initial consult note.     Objective:       VITAL SIGNS: 24 HR MIN & MAX LAST    Temp  Min: 98.1 °F (36.7 °C)  Max: 99 °F (37.2 °C)  98.9 °F (37.2 °C)        BP  Min: 107/71  Max: 123/78  123/78     Pulse  Min: 65  Max: 71  69     Resp  Min: 18  Max: 18  18    SpO2  Min: 95 %  Max: 100 %  95 %      Physical Exam  Constitutional:       General: She  is not in acute distress.     Appearance: Normal appearance.   HENT:      Head: Normocephalic and atraumatic.      Nose: Nose normal.      Mouth/Throat:      Mouth: Mucous membranes are moist.   Eyes:      Extraocular Movements: Extraocular movements intact.   Cardiovascular:      Rate and Rhythm: Normal rate and regular rhythm.      Pulses: Normal pulses.      Heart sounds: Normal heart sounds.   Pulmonary:      Effort: Pulmonary effort is normal. No respiratory distress.      Breath sounds: Normal breath sounds.   Abdominal:      General: Bowel sounds are normal. There is no distension.      Palpations: Abdomen is soft.      Comments: Diffuse abdominal discomfort on palpation.  No guarding noted   Musculoskeletal:         General: Normal range of motion.      Cervical back: Normal range of motion.   Skin:     General: Skin is warm and dry.   Neurological:      General: No focal deficit present.      Mental Status: She is alert and oriented to person, place, and time.   Psychiatric:         Mood and Affect: Mood normal.         Behavior: Behavior normal.                 Component Value Date/Time     (L) 12/25/2023 0403     12/24/2023 0402     06/20/2018 1402     01/08/2018 0835    K 4.2 12/25/2023 0403    K 3.0 (L) 12/24/2023 0402    K 4.8 06/20/2018 1402    K 4.6 01/08/2018 0835    CHLORIDE 101 12/25/2023 0403    CHLORIDE 99 12/24/2023 0402    CO2 27 12/25/2023 0403    CO2 27 12/24/2023 0402    CO2 23 06/20/2018 1402    CO2 22 (L) 01/08/2018 0835    BUN 23.3 (H) 12/25/2023 0403    BUN 25.5 (H) 12/24/2023 0402    BUN 30 (H) 06/20/2018 1402    BUN 24 (H) 01/08/2018 0835    CREATININE 7.12 (H) 12/25/2023 0403    CREATININE 7.26 (H) 12/24/2023 0402    CREATININE 1.9 (H) 06/20/2018 1402    CREATININE 1.8 (H) 01/08/2018 0835    CALCIUM 7.7 (L) 12/25/2023 0403    CALCIUM 7.3 (L) 12/24/2023 0402    CALCIUM 9.7 06/20/2018 1402    CALCIUM 9.1 01/08/2018 0835    PHOS 5.4 (H) 09/26/2023 2123    PHOS  3.8 06/20/2018 1402            Component Value Date/Time    WBC 4.56 12/25/2023 0403    WBC 5.04 12/24/2023 0402    WBC 6.90 06/20/2018 1402    WBC 3.37 (L) 01/08/2018 0835    HGB 9.9 (L) 12/25/2023 0403    HGB 9.5 (L) 12/24/2023 0402    HGB 10.5 (L) 06/20/2018 1402    HGB 9.1 (L) 01/08/2018 0835    HCT 30.8 (L) 12/25/2023 0403    HCT 29.4 (L) 12/24/2023 0402    HCT 32.0 (L) 06/05/2020 0815    HCT 31.6 (L) 06/20/2018 1402    HCT 27.3 (L) 01/08/2018 0835     12/25/2023 0403     12/24/2023 0402     06/20/2018 1402     01/08/2018 0835       Imaging reviewed      Assessment / Plan:     1. Acute peritonitis.  2. End-stage renal disease.  3. Hypertension.  4. Anemia.  5. Protein malnutrition.  6. Diarrhea.  7. Persistent hypokalemia.     RECOMMENDATIONS:    Continue CCPD  Physical therapy occupational therapy to mobilize her  PD fluid cell count to be drawn at the start of CCPD tonight.  Ensure chocolate flavor at per patient request.

## 2023-12-25 NOTE — NURSING
CCPD tx information:        12/25/23 1728   Peritoneal Dialysis   Exchange Type Cycler   Peritoneal Treatment Status Started  (Pt connected to nightly CCPD tx per P and P)

## 2023-12-25 NOTE — NURSING
Peritoneal dialysis information:        12/25/23 1514   Peritoneal Dialysis   Exchange Type Cycler   Peritoneal Treatment Status Other (comment)  (CCPD set-up and tested at bedside per P and P. Pt/family have no questions or concerns at this time. Appropriate supplies left with the patient at bedside. RN on call will round tonight to ensure patient is connected to therapy.)   Dianeal Solution Dextrose 1.5% in 6000 mL

## 2023-12-25 NOTE — NURSING
12/25/23 0750   Peritoneal Dialysis   Exchange Type Cycler   Peritoneal Treatment Status Completed   Dianeal Solution Dextrose 1.5% in 6000 mL   Cycler Peritoneal Dialysis   Initial Drain Volume (mL) 1352 mL   Effluent Appearance Clear  (Yellow)   Number of Cycles 4   Fill Volume (mL) 2000 mL   Total Volume (mL) 9500 mL   Net Positive (mL) 46 mL   Cycler Treatment Comments Pt tolerated nightly CCPD tx well; NAD noted/ VSS. Total cycler time 8hrs; resulting in postive net UF: (+) 46 ml. Pt deaccessed per P and P

## 2023-12-25 NOTE — PROGRESS NOTES
[facility] Hospital Medicine Progress Note        Chief Complaint: Inpatient Follow-up for peritonitis     HPI:   Patient is a 73-year-old female with history of ESRD, Failed kidney transplant, currently on peritoneal dialysis for 3 years with one prior episode of peritonitis, who presented to the ER with complaints of several days of lower abdominal pain associated with diarrhea and decreased appetite. Denied associated fever or chills, nausea, vomiting, dysuria, hematuria. She reported that she herself did not notice cloudy PD fluid, however PD nurse  took some sample and it was found to be cloudy .     She arrived to the the ER afebrile hemodynamically stable maintaining normal sats on room air.  Laboratory work showed changes as expected in end-stage renal disease, as well as hypokalemia and hypomagnesemia.  Peritoneal fluid was drawn on 12/21/23 showed WBC 7626  with 91% neutrophils. Pt was initiated on Vancomycin IV and Zosyn  on admission Gram stain with many Gram-positive cocci with culture grew Staphylococcus epidermis. ID was consulted. Antibiotic transitioned to IP Vancomycin with recommendation to continue for 14 days . Nephrology is following pt for CCPD and they will start IP Vancomycin per peritonitis protocol. IP vanc to start Tuesday morning with last fill. Blood cultures x2 - NGTD.         Interval Hx:   Pt appears very weak in general.  Less interactive with flat affect.   Abd tenderness appreciated on palpation.    Afebrile. On RA. Other vitals are stable   Labs showed WBC 4.5. Hgb 9.9,  K 4.2, Cr 7.1  Case was discussed with patient's nurse and  on the floor.    Objective/physical exam:  General: In no acute distress, afebrile  Chest: Clear to auscultation bilaterally  Heart: RRR, +S1, S2, no appreciable murmur  Abdomen: Soft, (+) generalized  tenderness, BS +  MSK: Warm, no lower extremity edema, no clubbing or cyanosis  Neurologic: Awake and  alert.    VITAL SIGNS: 24 HRS MIN & MAX LAST   Temp  Min: 97.8 °F (36.6 °C)  Max: 99 °F (37.2 °C) 98.3 °F (36.8 °C)   BP  Min: 107/75  Max: 123/78 107/75   Pulse  Min: 65  Max: 84  84   Resp  Min: 18  Max: 18 18   SpO2  Min: 95 %  Max: 100 % 95 %     I have reviewed the following labs:  Recent Labs   Lab 12/22/23  0429 12/24/23  0402 12/25/23  0403   WBC 5.54 5.04 4.56   RBC 3.00* 3.14* 3.28*   HGB 8.9* 9.5* 9.9*   HCT 27.4* 29.4* 30.8*   MCV 91.3 93.6 93.9   MCH 29.7 30.3 30.2   MCHC 32.5* 32.3* 32.1*   RDW 14.4 14.5 14.2    240 239   MPV 9.7 9.8 9.6     Recent Labs   Lab 12/21/23  1511 12/22/23  0429 12/23/23  0403 12/23/23  1603 12/24/23  0402 12/25/23  0403   * 133* 134* 135* 138 133*   K 2.4* 2.9* 2.8* 3.1* 3.0* 4.2   CO2 29 26 29 27 27 27   BUN 25.4* 28.7* 26.6* 30.6* 25.5* 23.3*   CREATININE 7.85* 8.28* 7.42* 8.18* 7.26* 7.12*   CALCIUM 7.8* 7.0* 7.4* 7.7* 7.3* 7.7*   MG 1.50*  --  1.80 1.90 1.70  --    ALBUMIN 1.5* 1.2* 1.2*  --   --   --    ALKPHOS 137 114 122  --   --   --    ALT 17 14 15  --   --   --    AST 28 20 23  --   --   --    BILITOT 0.3 0.5 0.3  --   --   --      Microbiology Results (last 7 days)       Procedure Component Value Units Date/Time    Blood Culture [[id number]]  (Normal) Collected: 12/21/23 1707    Order Status: Completed Specimen: Blood Updated: 12/24/23 2101     CULTURE, BLOOD ([facility]) No Growth At 72 Hours    Blood Culture [[id number]]  (Normal) Collected: 12/21/23 1707    Order Status: Completed Specimen: Blood Updated: 12/24/23 2101     CULTURE, BLOOD ([facility]) No Growth At 72 Hours    Body Fluid Culture [[id number]]  (Abnormal)  (Susceptibility) Collected: 12/21/23 1919    Order Status: Completed Specimen: Body Fluid from Peritoneal Fluid Updated: 12/24/23 0733     Body Fluid Culture Many Staphylococcus epidermidis    Gram Stain [[id number]] Collected: 12/21/23 1919    Order Status: Completed Specimen: Body Fluid from Peritoneal Fluid Updated: 12/22/23 0019     GRAM STAIN  Many WBC observed      Moderate Gram positive cocci    SUSY Prep [[id number]] Collected: 12/21/23 1919    Order Status: Completed Specimen: Body Fluid from Peritoneal Fluid Updated: 12/21/23 2048     KOH Prep No fungal elements seen             See below for Radiology    Scheduled Med:   [START ON 12/26/2023] aspirin  81 mg Oral Daily    [START ON 12/26/2023] B complex-vitamin C-folic acid  0.8 mg Oral Daily    calcitRIOL  0.25 mcg Oral Once per day on Mon Wed Fri    enoxparin  30 mg Subcutaneous Daily    [START ON 12/26/2023] folic acid  1 mg Oral Daily    [START ON 12/26/2023] Peritoneal Dialysis Solution with Additives WITH VANCOMYCIN 1500MG   Intraperitoneal Once    sodium bicarbonate  650 mg Oral BID    spironolactone  25 mg Oral Daily      Continuous Infusions:     PRN Meds:  ALPRAZolam, aluminum-magnesium hydroxide-simethicone, melatonin, morphine, oxyCODONE, sodium chloride 0.9%, Pharmacy to dose Vancomycin consult **AND** vancomycin - pharmacy to dose     Assessment/Plan:  Acute infectious peritonitis in the setting of PD catheter   ESRD on peritoneal dialysis   Hypokalemia, POA  Hyponatremia , mild-POA  Hypomagnesemia , POA  Anemia of chronic disease     Hx- Failed kidney transplant with chronic rejection     Plan-  Continue CCPD and IP Vancomycin per Nephrology   Correct and replete electrolytes as indicated   PT/OT consult   Review home meds and resume as appropriate       VTE prophylaxis: Lovenox    Patient condition:  Fair    Anticipated discharge and Disposition:     TBD    All diagnosis and differential diagnosis have been reviewed; assessment and plan has been documented; I have personally reviewed the labs and test results that are presently available; I have reviewed the patients medication list; I have reviewed the consulting providers response and recommendations. I have reviewed or attempted to review medical records based upon their availability    All of the patient's questions have been   addressed and answered. Patient's is agreeable to the above stated plan. I will continue to monitor closely and make adjustments to medical management as needed.  __________________    Radha Huerta MD   12/25/2023

## 2023-12-26 LAB
BACTERIA BLD CULT: NORMAL
BACTERIA BLD CULT: NORMAL
LYMPHOCYTE MANUAL BF (OHS): 39 %
NEUTROPHILS MAN BF (OHS): 61 %
VANCOMYCIN SERPL-MCNC: 25.7 UG/ML (ref 15–20)
WBC # FLD AUTO: 200 /UL

## 2023-12-26 PROCEDURE — 90945 DIALYSIS ONE EVALUATION: CPT

## 2023-12-26 PROCEDURE — 25000003 PHARM REV CODE 250: Performed by: INTERNAL MEDICINE

## 2023-12-26 PROCEDURE — 94761 N-INVAS EAR/PLS OXIMETRY MLT: CPT

## 2023-12-26 PROCEDURE — 80202 ASSAY OF VANCOMYCIN: CPT | Performed by: INTERNAL MEDICINE

## 2023-12-26 PROCEDURE — 63600175 PHARM REV CODE 636 W HCPCS: Performed by: INTERNAL MEDICINE

## 2023-12-26 PROCEDURE — 97162 PT EVAL MOD COMPLEX 30 MIN: CPT

## 2023-12-26 PROCEDURE — 89051 BODY FLUID CELL COUNT: CPT | Performed by: NURSE PRACTITIONER

## 2023-12-26 PROCEDURE — 21400001 HC TELEMETRY ROOM

## 2023-12-26 PROCEDURE — 97166 OT EVAL MOD COMPLEX 45 MIN: CPT

## 2023-12-26 RX ADMIN — NEPHROCAP 1 CAPSULE: 1 CAP ORAL at 05:12

## 2023-12-26 RX ADMIN — ENOXAPARIN SODIUM 30 MG: 30 INJECTION SUBCUTANEOUS at 05:12

## 2023-12-26 RX ADMIN — SPIRONOLACTONE 25 MG: 25 TABLET ORAL at 10:12

## 2023-12-26 RX ADMIN — FOLIC ACID 1 MG: 1 TABLET ORAL at 10:12

## 2023-12-26 RX ADMIN — SODIUM BICARBONATE 650 MG TABLET 650 MG: at 08:12

## 2023-12-26 RX ADMIN — ASPIRIN 81 MG: 81 TABLET, COATED ORAL at 10:12

## 2023-12-26 RX ADMIN — SODIUM BICARBONATE 650 MG TABLET 650 MG: at 10:12

## 2023-12-26 NOTE — NURSING
12/26/23 0944   Cycler Peritoneal Dialysis   Effluent Appearance Clear   Number of Cycles 4   Fill Volume (mL) 2000 mL   Total Volume (mL) 9500 mL   Net Positive (mL) 317 mL   Cycler PD Total UF (mL) 0 mL   Cycler Treatment Comments CCPD completed overnight. NET + 317ml. Effluent clear, yellow. No reported issues overnight. No complaints.

## 2023-12-26 NOTE — PLAN OF CARE
Problem: Physical Therapy  Goal: Physical Therapy Goal  Description: Goals to be met by: 24     Patient will increase functional independence with mobility by performin. Supine to sit with Modified [place]  2. Sit to supine with Modified [place]  3. Sit to stand transfer with Modified [place]  4. Bed to chair transfer with Modified [place] using Rolling Walker  5. Gait  x 200 feet with Modified [place] using Rolling Walker.     Outcome: Ongoing, Progressing

## 2023-12-26 NOTE — PLAN OF CARE
Problem: Adult Inpatient Plan of Care  Goal: Plan of Care Review  Outcome: Ongoing, Progressing  Flowsheets (Taken 12/26/2023 0359)  Plan of Care Reviewed With: patient  Goal: Patient-Specific Goal (Individualized)  Outcome: Ongoing, Progressing  Goal: Absence of Hospital-Acquired Illness or Injury  Outcome: Ongoing, Progressing  Goal: Optimal Comfort and Wellbeing  Outcome: Ongoing, Progressing  Intervention: Monitor Pain and Promote Comfort  Flowsheets (Taken 12/26/2023 0359)  Pain Management Interventions:   care clustered   medication offered   pillow support provided   relaxation techniques promoted   quiet environment facilitated  Intervention: Provide Person-Centered Care  Flowsheets (Taken 12/26/2023 0359)  Trust Relationship/Rapport:   questions encouraged   questions answered   empathic listening provided   emotional support provided   thoughts/feelings acknowledged   choices provided   reassurance provided   care explained  Goal: Readiness for Transition of Care  Outcome: Ongoing, Progressing     Problem: Skin Injury Risk Increased  Goal: Skin Health and Integrity  Outcome: Ongoing, Progressing

## 2023-12-26 NOTE — PT/OT/SLP EVAL
Occupational Therapy  Evaluation    Name: Malissa Schwartz  MRN: [id number]  Admitting Diagnosis: Acute peritonitis, hypokalemia   Recent Surgery: * No surgery found *      Recommendations:     Discharge therapy intensity: Low Intensity Therapy   Discharge Equipment Recommendations:  walker, rolling, bedside commode  Barriers to discharge:  None    Assessment:     Malissa Schwartz is a 73 y.o. female with a medical diagnosis of Acute peritonitis, hypokalemia.  She presents with the following performance deficits affecting function: weakness, impaired endurance, impaired self care skills, impaired functional mobility, impaired balance. Pt reported that prior to admit she was living c her spouse and IND c ADLs and mobility without AD. Pt able to ambulate to bathroom using RW c SBA/Min A. Pt would benefit from low intensity therapy at d/c.     Rehab Prognosis: Good; patient would benefit from acute skilled OT services to address these deficits and reach maximum level of function.       Plan:     Patient to be seen 3 x/week to address the above listed problems via self-care/home management, therapeutic activities, therapeutic exercises  Plan of Care Expires: 01/23/24  Plan of Care Reviewed with: patient, friend    Subjective     Chief Complaint: None verbalized   Patient/Family Comments/goals: To go home     Occupational Profile:  Living Environment: Pt lives in a [facility] c her spouse. Reported she has a tub c a ttb.   Previous level of function: IND c ADLs and mobility eithout AD.   Equipment Used at Home: bath bench  Assistance upon Discharge: Pt's spouse and friend will be able to assist at d/c.     Pain/Comfort:  Pain Rating 1: 0/10    Patients cultural, spiritual, Jainism conflicts given the current situation: no    Objective:     OT communicated with RN prior to session.      Patient was found supine with telemetry upon OT entry to room.    General Precautions: Standard, fall  Orthopedic Precautions: N/A  Braces:  N/A      Bed Mobility:    Patient completed Supine to Sit with minimum assistance  Patient completed Sit to Supine with minimum assistance    Functional Mobility/Transfers:  Patient completed Sit <> Stand Transfer with minimum assistance  with  rolling walker   Patient completed Toilet Transfer Step Transfer technique with minimum assistance with  rolling walker  Functional Mobility: Pt ambulated to bathroom using RW c CGA/SBA.    Activities of Daily Living:  Grooming: Pt declined completing grooming task     Functional Cognition:  Intact    Visual Perceptual Skills:  Intact    Upper Extremity Function:  Right Upper Extremity:   WFL    Left Upper Extremity:  WFL    Therapeutic Positioning  Risk for acquired pressure injuries is decreased due to ability to get to BSC/toilet with assist.    OT interventions performed during the course of today's session:   Education was provided on benefits of and recommendations for therapeutic positioning    Skin assessment: all bony prominences were assessed    Findings: no redness or breakdown noted    OT recommendations for therapeutic positioning throughout hospitalization:   Follow [facility] Pressure Injury Prevention Protocol    Patient Education:  Patient provided with verbal education education regarding OT role/goals/POC.  Understanding was verbalized.     Patient left up in chair with call button in reach    GOALS:   Multidisciplinary Problems       Occupational Therapy Goals          Problem: Occupational Therapy    Goal Priority Disciplines Outcome Interventions   Occupational Therapy Goal     OT, PT/OT Ongoing, Progressing    Description: Goals to be met by 1/23/24:    Pt will complete grooming standing at sink with LRAD with Mod I.  Pt will complete UB dressing with Mod I.  Pt will complete LB dressing with SBA using Mod I.  Pt will complete toileting with SBA using Mod I.  Pt will complete functional mobility to/from toilet and toilet transfer with SBA using Mod I.                         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 Glomerulitis with one activated endothelial cell: treated with Solumedrol pulse 375 mg IVBP x 3 doses with short prednisone taper; thymoglobin 100 mg x 6 doses 10/04/2007 minimal histologic changes no acute rejection     Allergy     Anemia of renal disease     CKD (chronic kidney disease), stage IV     CMV (cytomegalovirus) status positive     Colon polyp     benign    Depression     GN (glomerulonephritis)     GN (glomerulonephritis)     unknown type    Herpes infection, other 2009    on buttocks    Hyperlipidemia     Hypertension     Immunosuppression     Living-donor kidney transplant recipient 01/15/2007    Meningitis     history of viral meningitis    Proteinuria 1/5/2015    Proteinuria     Pulmonary nodules     Renal hypertension     S/P breast augmentation     Skin cancer     Urinary tract infection          Past Surgical History:   Procedure Laterality Date    COLON SURGERY      COLONOSCOPY W/ BIOPSIES      cosemetic surgery      facelift and breast augmentation     HYSTERECTOMY      KIDNEY TRANSPLANT      NEPHRECTOMY Left     9/2016    RENAL BIOPSY      TONSILLECTOMY, ADENOIDECTOMY         Time Tracking:     OT Date of Treatment: 12/26/23  OT Start Time: 1107  OT Stop Time: 1124  OT Total Time (min): 17 min    Billable Minutes:Evaluation Moderate complexity     12/26/2023

## 2023-12-26 NOTE — PROGRESS NOTES
[facility] Hospital Medicine Progress Note        Chief Complaint: Inpatient Follow-up for peritonitis     HPI:   Patient is a 73-year-old female with history of ESRD, Failed kidney transplant, currently on peritoneal dialysis for 3 years with one prior episode of peritonitis, who presented to the ER with complaints of several days of lower abdominal pain associated with diarrhea and decreased appetite. Denied associated fever or chills, nausea, vomiting, dysuria, hematuria. She reported that she herself did not notice cloudy PD fluid, however PD nurse  took some sample and it was found to be cloudy .      She arrived to the the ER afebrile hemodynamically stable maintaining normal sats on room air.  Laboratory work showed changes as expected in end-stage renal disease, as well as hypokalemia and hypomagnesemia.  Peritoneal fluid was drawn on 12/21/23 showed WBC 7626  with 91% neutrophils. Pt was initiated on Vancomycin IV and Zosyn  on admission Gram stain with many Gram-positive cocci with culture grew Staphylococcus epidermis. ID was consulted. Antibiotic transitioned to IP Vancomycin with recommendation to continue for 14 days . Nephrology is following pt for CCPD and they will start IP Vancomycin per peritonitis protocol. IP vanc to start Tuesday morning with last fill. Blood cultures x2 - NGTD.         Interval Hx:   Per Nephro PD fluid was clear this morning   Pt has less abd pain   She is more interactive today   PT/OT to evaluate pt today to assess current functional status and discharge need     PD nurse to collect peritoneal fluid tonight for follow up cell count.   Pt to receive IP Vancomycin tonight       Case was discussed with patient's nurse and  on the floor.    Objective/physical exam:  General: In no acute distress, afebrile  Chest: Clear to auscultation bilaterally  Heart: RRR, +S1, S2, no appreciable murmur  Abdomen: Soft, (+) very minimal  generalized   tenderness, BS +  MSK: Warm, no lower extremity edema, no clubbing or cyanosis  Neurologic: Awake and alert.    VITAL SIGNS: 24 HRS MIN & MAX LAST   Temp  Min: 97.8 °F (36.6 °C)  Max: 98.5 °F (36.9 °C) 98.2 °F (36.8 °C)   BP  Min: 104/65  Max: 138/86 131/81   Pulse  Min: 61  Max: 82  61   Resp  Min: 18  Max: 18 18   SpO2  Min: 95 %  Max: 97 % 96 %     I have reviewed the following labs:  Recent Labs   Lab 12/22/23  0429 12/24/23  0402 12/25/23  0403   WBC 5.54 5.04 4.56   RBC 3.00* 3.14* 3.28*   HGB 8.9* 9.5* 9.9*   HCT 27.4* 29.4* 30.8*   MCV 91.3 93.6 93.9   MCH 29.7 30.3 30.2   MCHC 32.5* 32.3* 32.1*   RDW 14.4 14.5 14.2    240 239   MPV 9.7 9.8 9.6     Recent Labs   Lab 12/21/23  1511 12/22/23  0429 12/23/23  0403 12/23/23  1603 12/24/23  0402 12/25/23  0403   * 133* 134* 135* 138 133*   K 2.4* 2.9* 2.8* 3.1* 3.0* 4.2   CO2 29 26 29 27 27 27   BUN 25.4* 28.7* 26.6* 30.6* 25.5* 23.3*   CREATININE 7.85* 8.28* 7.42* 8.18* 7.26* 7.12*   CALCIUM 7.8* 7.0* 7.4* 7.7* 7.3* 7.7*   MG 1.50*  --  1.80 1.90 1.70  --    ALBUMIN 1.5* 1.2* 1.2*  --   --   --    ALKPHOS 137 114 122  --   --   --    ALT 17 14 15  --   --   --    AST 28 20 23  --   --   --    BILITOT 0.3 0.5 0.3  --   --   --      Microbiology Results (last 7 days)       Procedure Component Value Units Date/Time    Blood Culture [[id number]]  (Normal) Collected: 12/21/23 1707    Order Status: Completed Specimen: Blood Updated: 12/25/23 2100     CULTURE, BLOOD ([facility]) No Growth At 96 Hours    Blood Culture [[id number]]  (Normal) Collected: 12/21/23 1707    Order Status: Completed Specimen: Blood Updated: 12/25/23 2100     CULTURE, BLOOD ([facility]) No Growth At 96 Hours    Body Fluid Culture [[id number]]  (Abnormal)  (Susceptibility) Collected: 12/21/23 1919    Order Status: Completed Specimen: Body Fluid from Peritoneal Fluid Updated: 12/24/23 0733     Body Fluid Culture Many Staphylococcus epidermidis    Gram Stain [[id number]] Collected: 12/21/23 1919     Order Status: Completed Specimen: Body Fluid from Peritoneal Fluid Updated: 12/22/23 0019     GRAM STAIN Many WBC observed      Moderate Gram positive cocci    SUSY Prep [[id number]] Collected: 12/21/23 1919    Order Status: Completed Specimen: Body Fluid from Peritoneal Fluid Updated: 12/21/23 2048     KOH Prep No fungal elements seen             See below for Radiology    Scheduled Med:   aspirin  81 mg Oral Daily    calcitRIOL  0.25 mcg Oral Once per day on Mon Wed Fri    enoxparin  30 mg Subcutaneous Daily    folic acid  1 mg Oral Daily    Peritoneal Dialysis Solution with Additives WITH VANCOMYCIN 1500MG   Intraperitoneal Once    sodium bicarbonate  650 mg Oral BID    spironolactone  25 mg Oral Daily    vitamin renal formula (B-complex-vitamin c-folic acid)  1 capsule Oral Daily      Continuous Infusions:     PRN Meds:  ALPRAZolam, aluminum-magnesium hydroxide-simethicone, melatonin, morphine, ondansetron, oxyCODONE, sodium chloride 0.9%, Pharmacy to dose Vancomycin consult **AND** vancomycin - pharmacy to dose     Assessment/Plan:  Acute infectious peritonitis in the setting of PD catheter   ESRD on peritoneal dialysis   Hypokalemia, POA  Hyponatremia , mild-POA  Hypomagnesemia , POA  Anemia of chronic disease      Hx- Failed kidney transplant with chronic rejection      Plan-  PD nurse to obtain peritoneal fluid tonight for repeat cell count   Continue CCPD and IP Vancomycin per Nephrology recs  Correct and replete electrolytes as indicated   PT/OT consulted to assess current functional status  and assess discharge need    Review home meds and resume as appropriate         VTE prophylaxis: Lovenox     Patient condition:  Fair     Anticipated discharge and Disposition:     TBD        All diagnosis and differential diagnosis have been reviewed; assessment and plan has been documented; I have personally reviewed the labs and test results that are presently available; I have reviewed the patients medication list;  I have reviewed the consulting providers response and recommendations. I have reviewed or attempted to review medical records based upon their availability    All of the patient's questions have been  addressed and answered. Patient's is agreeable to the above stated plan. I will continue to monitor closely and make adjustments to medical management as needed.  ________________________    Radha Huerta MD   12/26/2023

## 2023-12-26 NOTE — PROGRESS NOTES
Nephrology follow up progress note    HPI:      This is 73 years old white female who is known to Dr Mendoza for a long time and has had chronic transplant kidney rejection and has been on dialysis and has been quite stable, but she did have periodic admissions to the hospital mostly for sepsis and peritonitis.     She presented to the hospital with complaints of diarrhea for the last 3 to 4 days and loss of appetite and lower abdominal pains.  She say she did not notice that her PD fluid was cloudy at all last night when the PD nurse came and they took some sample and it was found to be cloudy.  She did not have any fever or chills.  No cough, cold, congestion.  No nausea, vomiting.  No headache, blurring, diplopia.  No dysuria.  No hematuria.  No blurred vision.  Just weak.     PD fluid was drawn and PD fluid has white blood cell count of more than 7600 and more than 91% neutrophils.  Gram stain positive for gram-positive cocci.  The patient has been started on vancomycin. Culture positive for staph epidermis.     Interval history:     12/25/2023  Still with a lot of abdominal pains.  Tolerating CCPD.  No shortness of breath.  No nausea vomiting.  Reports that she does have bowel movements.  Has not been able to get out of bed too much.    12/26/23 - Patient reports emesis this morning. At present she is not nauseous. Abdominal tenderness remains to lesser degree. PD fluid clear this morning.     Objective:       VITAL SIGNS: 24 HR MIN & MAX LAST    Temp  Min: 97.8 °F (36.6 °C)  Max: 98.5 °F (36.9 °C)  98.5 °F (36.9 °C)        BP  Min: 104/65  Max: 138/86  104/65     Pulse  Min: 65  Max: 84  65     Resp  Min: 18  Max: 18  18    SpO2  Min: 95 %  Max: 99 %  95 %      Physical Exam  Constitutional:       General: She is not in acute distress.     Appearance: Normal appearance.   HENT:      Head: Normocephalic and atraumatic.      Nose: Nose normal.      Mouth/Throat:      Mouth: Mucous membranes are moist.   Eyes:       Extraocular Movements: Extraocular movements intact.   Cardiovascular:      Rate and Rhythm: Normal rate and regular rhythm.      Pulses: Normal pulses.      Heart sounds: Normal heart sounds.   Pulmonary:      Effort: Pulmonary effort is normal. No respiratory distress.      Breath sounds: Normal breath sounds.   Abdominal:      General: Bowel sounds are normal. There is no distension.      Palpations: Abdomen is soft.      Comments: Diffuse abdominal discomfort on palpation.  No guarding noted   Musculoskeletal:         General: Normal range of motion.      Cervical back: Normal range of motion.   Skin:     General: Skin is warm and dry.   Neurological:      General: No focal deficit present.      Mental Status: She is alert and oriented to person, place, and time.   Psychiatric:         Mood and Affect: Mood normal.         Behavior: Behavior normal.                 Component Value Date/Time     (L) 12/25/2023 0403     12/24/2023 0402     06/20/2018 1402     01/08/2018 0835    K 4.2 12/25/2023 0403    K 3.0 (L) 12/24/2023 0402    K 4.8 06/20/2018 1402    K 4.6 01/08/2018 0835    CHLORIDE 101 12/25/2023 0403    CHLORIDE 99 12/24/2023 0402    CO2 27 12/25/2023 0403    CO2 27 12/24/2023 0402    CO2 23 06/20/2018 1402    CO2 22 (L) 01/08/2018 0835    BUN 23.3 (H) 12/25/2023 0403    BUN 25.5 (H) 12/24/2023 0402    BUN 30 (H) 06/20/2018 1402    BUN 24 (H) 01/08/2018 0835    CREATININE 7.12 (H) 12/25/2023 0403    CREATININE 7.26 (H) 12/24/2023 0402    CREATININE 1.9 (H) 06/20/2018 1402    CREATININE 1.8 (H) 01/08/2018 0835    CALCIUM 7.7 (L) 12/25/2023 0403    CALCIUM 7.3 (L) 12/24/2023 0402    CALCIUM 9.7 06/20/2018 1402    CALCIUM 9.1 01/08/2018 0835    PHOS 5.4 (H) 09/26/2023 2123    PHOS 3.8 06/20/2018 1402            Component Value Date/Time    WBC 4.56 12/25/2023 0403    WBC 5.04 12/24/2023 0402    WBC 6.90 06/20/2018 1402    WBC 3.37 (L) 01/08/2018 0835    HGB 9.9 (L) 12/25/2023 0403     HGB 9.5 (L) 12/24/2023 0402    HGB 10.5 (L) 06/20/2018 1402    HGB 9.1 (L) 01/08/2018 0835    HCT 30.8 (L) 12/25/2023 0403    HCT 29.4 (L) 12/24/2023 0402    HCT 32.0 (L) 06/05/2020 0815    HCT 31.6 (L) 06/20/2018 1402    HCT 27.3 (L) 01/08/2018 0835     12/25/2023 0403     12/24/2023 0402     06/20/2018 1402     01/08/2018 0835       Imaging reviewed      Assessment / Plan:     1. Acute peritonitis.  2. End-stage renal disease.  3. Hypertension.  4. Anemia.  5. Protein malnutrition.  6. Diarrhea.  7. Persistent hypokalemia.     RECOMMENDATIONS:    Continue CCPD  Repeat cell count of peritoneal fluid when connected this evening to monitor response to therapy   She will need minimum 14 days of IP vanc per ID recommendations.   Random vanc level is 25.7 today. She did not receive IP Vanc last night per conversation with pharmacist via phone. Awaiting return message from pharmacy regarding next vanc draw time.

## 2023-12-26 NOTE — PT/OT/SLP EVAL
Physical Therapy Evaluation    Patient Name:  Malissa Schwartz   MRN:  [id number]    Recommendations:     Discharge therapy intensity: Low Intensity Therapy   Discharge Equipment Recommendations: walker, rolling, bedside commode   Barriers to discharge: Decreased caregiver support    Assessment:     Malissa Schwartz is a 73 y.o. female admitted with a medical diagnosis of Peritonitis at peritoneal dialysis catheter, ESRD on peritoneal dialysis, hypokalemia. She presents with the following impairments/functional limitations: weakness, gait instability, impaired balance, impaired endurance, decreased safety awareness, impaired functional mobility. The pt tolerated session well. She demonstrates poor endurance, and difficulty with supine to sit. The pt would benefit from low intensity PT upon discharge, and assistance at home.     Rehab Prognosis: Good; patient would benefit from acute skilled PT services to address these deficits and reach maximum level of function.    Recent Surgery: * No surgery found *      Plan:     During this hospitalization, patient to be seen 5 x/week to address the identified rehab impairments via gait training, therapeutic activities, therapeutic exercises and progress toward the following goals:    Plan of Care Expires:  01/26/24    Subjective     Chief Complaint: none  Patient/Family Comments/goals: return to PLOF  Pain/Comfort:       Patients cultural, spiritual, Mandaen conflicts given the current situation:      Living Environment:  Home with spouse,  home, 2 steps to enter with L rail.  Prior to admission, patients level of function was independent.  Equipment used at home: none.  DME owned (not currently used): rolling walker.  Upon discharge, patient will have assistance from spouse.    Objective:     Communicated with NSG prior to session.  Patient found supine with telemetry  upon PT entry to room.    General Precautions: Standard, fall  Orthopedic Precautions:N/A   Braces:  N/A  Respiratory Status: Room air  Blood Pressure: NA      Exams:  RLE ROM: WFL  RLE Strength: WFL  LLE ROM: WFL  LLE Strength: WFL  Skin integrity: Visible skin intact      Functional Mobility:  Bed Mobility:     Scooting: contact guard assistance  Supine to Sit: minimum assistance  Transfers:     Sit to Stand:  contact guard assistance with rolling walker  Toilet tf: CGA with RW  Gait: pt ambulates x 115 feet with CGA/SBA and RW.      Patient provided with verbal education education regarding PT role/goals/POC.  Understanding was verbalized.     Patient left up in chair with all lines intact, call button in reach, and NSG notified.    GOALS:   Multidisciplinary Problems       Physical Therapy Goals          Problem: Physical Therapy    Goal Priority Disciplines Outcome Goal Variances Interventions   Physical Therapy Goal     PT, PT/OT Ongoing, Progressing     Description: Goals to be met by: 24     Patient will increase functional independence with mobility by performin. Supine to sit with Modified [place]  2. Sit to supine with Modified [place]  3. Sit to stand transfer with Modified [place]  4. Bed to chair transfer with Modified [place] using Rolling Walker  5. Gait  x 200 feet with Modified [place] using Rolling Walker.                          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 Glomerulitis with one activated endothelial cell: treated with Solumedrol pulse 375 mg IVBP x 3 doses with short prednisone taper; thymoglobin 100 mg x 6 doses 10/04/2007 minimal histologic changes no acute rejection     Allergy     Anemia of renal disease     CKD (chronic kidney disease), stage IV     CMV (cytomegalovirus) status positive     Colon polyp     benign    Depression     GN (glomerulonephritis)     GN (glomerulonephritis)     unknown type    Herpes infection, other      on buttocks    Hyperlipidemia     Hypertension     Immunosuppression     Living-donor kidney transplant recipient 01/15/2007    Meningitis     history of viral meningitis    Proteinuria 1/5/2015    Proteinuria     Pulmonary nodules     Renal hypertension     S/P breast augmentation     Skin cancer     Urinary tract infection        Past Surgical History:   Procedure Laterality Date    COLON SURGERY      COLONOSCOPY W/ BIOPSIES      cosemetic surgery      facelift and breast augmentation     HYSTERECTOMY      KIDNEY TRANSPLANT      NEPHRECTOMY Left     9/2016    RENAL BIOPSY      TONSILLECTOMY, ADENOIDECTOMY         Time Tracking:     PT Received On: 12/26/23  PT Start Time: 1108     PT Stop Time: 1124  PT Total Time (min): 16 min     Billable Minutes: Evaluation 16      12/26/2023

## 2023-12-26 NOTE — PLAN OF CARE
Problem: Occupational Therapy  Goal: Occupational Therapy Goal  Description: Goals to be met by 1/23/24:    Pt will complete grooming standing at sink with LRAD with Mod I.  Pt will complete UB dressing with Mod I.  Pt will complete LB dressing with SBA using Mod I.  Pt will complete toileting with SBA using Mod I.  Pt will complete functional mobility to/from toilet and toilet transfer with SBA using Mod I.   Outcome: Ongoing, Progressing

## 2023-12-26 NOTE — PLAN OF CARE
12/26/23 1210   Discharge Assessment   Assessment Type Discharge Planning Assessment   Confirmed/corrected address, phone number and insurance Yes   Confirmed Demographics Correct on Facesheet   Source of Information patient;family   Communicated REINA with patient/caregiver Yes   Reason For Admission Hypokalemia   People in Home spouse   Do you expect to return to your current living situation? Yes   Do you have help at home or someone to help you manage your care at home? Yes   Who are your caregiver(s) and their phone number(s)? Spouse: Brayden Schwartz: [phone]   Prior to hospitilization cognitive status: Unable to Assess   Current cognitive status: Alert/Oriented   Walking or Climbing Stairs Difficulty yes   Walking or Climbing Stairs ambulation difficulty, requires equipment   Dressing/Bathing Difficulty no   Home Accessibility stairs to enter home   Number of Stairs, Main Entrance two   Home Layout Able to live on 1st floor   Equipment Currently Used at Home shower chair;walker, rolling   Readmission within 30 days? No   Patient currently being followed by outpatient case management? No   Do you currently have service(s) that help you manage your care at home? No   Do you take prescription medications? Yes   Do you have prescription coverage? Yes   Coverage Medicare Part A & B   Do you have any problems affording any of your prescribed medications? No   Is the patient taking medications as prescribed? yes   Who is going to help you get home at discharge? Patient reports her friend, Pamela at bedside.   How do you get to doctors appointments? car, drives self   Are you on dialysis? Yes   Dialysis Name and Scheduled days Home/labs at [facility] in [place]   Do you take coumadin? No  (Aspirin)   Discharge Plan A Home with family   Discharge Plan B Home Health   DME Needed Upon Discharge  none   Discharge Plan discussed with: Patient;Friend   Name(s) and Number(s) Friend: Pamela: No contact provided.   Transition of  Care Barriers None   OTHER   Name(s) of People in Home Patient resides with her spouse at home.       Patient's PCP is Dr. Sergio Crabtree in [place], [place].  Patient would like [facility] given her spouse is currently receiving services through them at this time.  Patient's friend, Pamela (at bedside) will transport patient at discharge.  No barriers to discharge at this time.

## 2023-12-27 VITALS
TEMPERATURE: 97 F | HEIGHT: 66 IN | OXYGEN SATURATION: 93 % | BODY MASS INDEX: 24.1 KG/M2 | RESPIRATION RATE: 18 BRPM | DIASTOLIC BLOOD PRESSURE: 73 MMHG | SYSTOLIC BLOOD PRESSURE: 106 MMHG | WEIGHT: 149.94 LBS | HEART RATE: 83 BPM

## 2023-12-27 LAB
ANION GAP SERPL CALC-SCNC: 8 MEQ/L
BASOPHILS # BLD AUTO: 0.04 X10(3)/MCL
BASOPHILS NFR BLD AUTO: 0.8 %
BUN SERPL-MCNC: 24.5 MG/DL (ref 9.8–20.1)
CALCIUM SERPL-MCNC: 7.9 MG/DL (ref 8.4–10.2)
CHLORIDE SERPL-SCNC: 98 MMOL/L (ref 98–107)
CO2 SERPL-SCNC: 28 MMOL/L (ref 23–31)
CREAT SERPL-MCNC: 7.54 MG/DL (ref 0.55–1.02)
CREAT/UREA NIT SERPL: 3
EOSINOPHIL # BLD AUTO: 0.19 X10(3)/MCL (ref 0–0.9)
EOSINOPHIL NFR BLD AUTO: 3.7 %
ERYTHROCYTE [DISTWIDTH] IN BLOOD BY AUTOMATED COUNT: 13.8 % (ref 11.5–17)
GFR SERPLBLD CREATININE-BSD FMLA CKD-EPI: 5 MLS/MIN/1.73/M2
GLUCOSE SERPL-MCNC: 126 MG/DL (ref 82–115)
HCT VFR BLD AUTO: 29.6 % (ref 37–47)
HGB BLD-MCNC: 9.1 G/DL (ref 12–16)
IMM GRANULOCYTES # BLD AUTO: 0.03 X10(3)/MCL (ref 0–0.04)
IMM GRANULOCYTES NFR BLD AUTO: 0.6 %
LYMPHOCYTES # BLD AUTO: 0.84 X10(3)/MCL (ref 0.6–4.6)
LYMPHOCYTES NFR BLD AUTO: 16.4 %
MAGNESIUM SERPL-MCNC: 2 MG/DL (ref 1.6–2.6)
MCH RBC QN AUTO: 29.5 PG (ref 27–31)
MCHC RBC AUTO-ENTMCNC: 30.7 G/DL (ref 33–36)
MCV RBC AUTO: 96.1 FL (ref 80–94)
MONOCYTES # BLD AUTO: 0.35 X10(3)/MCL (ref 0.1–1.3)
MONOCYTES NFR BLD AUTO: 6.8 %
NEUTROPHILS # BLD AUTO: 3.67 X10(3)/MCL (ref 2.1–9.2)
NEUTROPHILS NFR BLD AUTO: 71.7 %
NRBC BLD AUTO-RTO: 0 %
PATH REV: NORMAL
PHOSPHATE SERPL-MCNC: 3.1 MG/DL (ref 2.3–4.7)
PLATELET # BLD AUTO: 232 X10(3)/MCL (ref 130–400)
PMV BLD AUTO: 10.1 FL (ref 7.4–10.4)
POTASSIUM SERPL-SCNC: 3.5 MMOL/L (ref 3.5–5.1)
RBC # BLD AUTO: 3.08 X10(6)/MCL (ref 4.2–5.4)
SODIUM SERPL-SCNC: 134 MMOL/L (ref 136–145)
VANCOMYCIN SERPL-MCNC: 25.1 UG/ML (ref 15–20)
WBC # SPEC AUTO: 5.12 X10(3)/MCL (ref 4.5–11.5)

## 2023-12-27 PROCEDURE — 84100 ASSAY OF PHOSPHORUS: CPT | Performed by: INTERNAL MEDICINE

## 2023-12-27 PROCEDURE — 63600175 PHARM REV CODE 636 W HCPCS: Performed by: INTERNAL MEDICINE

## 2023-12-27 PROCEDURE — 25000003 PHARM REV CODE 250: Performed by: INTERNAL MEDICINE

## 2023-12-27 PROCEDURE — 80202 ASSAY OF VANCOMYCIN: CPT | Performed by: INTERNAL MEDICINE

## 2023-12-27 PROCEDURE — 80048 BASIC METABOLIC PNL TOTAL CA: CPT | Performed by: NURSE PRACTITIONER

## 2023-12-27 PROCEDURE — 85025 COMPLETE CBC W/AUTO DIFF WBC: CPT | Performed by: NURSE PRACTITIONER

## 2023-12-27 PROCEDURE — 83735 ASSAY OF MAGNESIUM: CPT | Performed by: INTERNAL MEDICINE

## 2023-12-27 RX ORDER — FAMCICLOVIR 500 MG/1
500 TABLET ORAL EVERY 8 HOURS
Status: DISCONTINUED | OUTPATIENT
Start: 2023-12-27 | End: 2023-12-27

## 2023-12-27 RX ORDER — FAMCICLOVIR 500 MG/1
500 TABLET ORAL ONCE
Status: COMPLETED | OUTPATIENT
Start: 2023-12-27 | End: 2023-12-27

## 2023-12-27 RX ORDER — FAMCICLOVIR 500 MG/1
500 TABLET ORAL EVERY 8 HOURS
Qty: 21 TABLET | Refills: 0 | Status: SHIPPED | OUTPATIENT
Start: 2023-12-27 | End: 2023-12-27 | Stop reason: HOSPADM

## 2023-12-27 RX ORDER — FAMCICLOVIR 500 MG/1
250 TABLET ORAL DAILY
Qty: 4 TABLET | Refills: 0 | Status: SHIPPED | OUTPATIENT
Start: 2023-12-27 | End: 2024-01-04

## 2023-12-27 RX ORDER — PREDNISONE 20 MG/1
40 TABLET ORAL DAILY
Qty: 8 TABLET | Refills: 0 | Status: SHIPPED | OUTPATIENT
Start: 2023-12-27 | End: 2023-12-31

## 2023-12-27 RX ORDER — PREDNISONE 20 MG/1
40 TABLET ORAL DAILY
Status: COMPLETED | OUTPATIENT
Start: 2023-12-27 | End: 2023-12-27

## 2023-12-27 RX ADMIN — CALCITRIOL CAPSULES 0.25 MCG 0.25 MCG: 0.25 CAPSULE ORAL at 10:12

## 2023-12-27 RX ADMIN — SPIRONOLACTONE 25 MG: 25 TABLET ORAL at 09:12

## 2023-12-27 RX ADMIN — PREDNISONE 40 MG: 20 TABLET ORAL at 10:12

## 2023-12-27 RX ADMIN — FOLIC ACID 1 MG: 1 TABLET ORAL at 09:12

## 2023-12-27 RX ADMIN — ASPIRIN 81 MG: 81 TABLET, COATED ORAL at 09:12

## 2023-12-27 RX ADMIN — SODIUM BICARBONATE 650 MG TABLET 650 MG: at 09:12

## 2023-12-27 RX ADMIN — FAMCICLOVIR 500 MG: 500 TABLET, FILM COATED ORAL at 10:12

## 2023-12-27 NOTE — NURSING
12/27/23 0811   Cycler Peritoneal Dialysis   Initial Drain Volume (mL) 1067 mL   Effluent Appearance Clear   Fill Volume (mL) 2000 mL   Total Volume (mL) 9500 mL   Cycler PD Total UF (mL) 165 mL   Cycler Treatment Comments pt tolerated tx well with no issues. New cap applied.

## 2023-12-27 NOTE — PROGRESS NOTES
[facility] Acute  Wound Care    Patient Name:  Malissa Schwartz   MRN:  [id number]  Date: 12/27/2023  Diagnosis: Peritonitis    History:     Past Medical History:   Diagnosis Date    Acute rejection of kidney transplant 9/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 Glomerulitis with one activated endothelial cell: treated with Solumedrol pulse 375 mg IVBP x 3 doses with short prednisone taper; thymoglobin 100 mg x 6 doses 10/04/2007 minimal histologic changes no acute rejection     Allergy     Anemia of renal disease     CKD (chronic kidney disease), stage IV     CMV (cytomegalovirus) status positive     Colon polyp     benign    Depression     GN (glomerulonephritis)     GN (glomerulonephritis)     unknown type    Herpes infection, other 2009    on buttocks    Hyperlipidemia     Hypertension     Immunosuppression     Living-donor kidney transplant recipient 01/15/2007    Meningitis     history of viral meningitis    Proteinuria 1/5/2015    Proteinuria     Pulmonary nodules     Renal hypertension     S/P breast augmentation     Skin cancer     Urinary tract infection        Social History     Socioeconomic History    Marital status:    Tobacco Use    Smoking status: Former     Current packs/day: 1.00     Average packs/day: 1 pack/day for 15.0 years (15.0 ttl pk-yrs)     Types: Cigarettes    Smokeless tobacco: Never    Tobacco comments:     quit in 2014; smoked for 15 years and at the most smoked 1 ppd   Substance and Sexual Activity    Alcohol use: Yes     Comment: seldom    Drug use: No    Sexual activity: Never   Social History Narrative    Likes to travel and follow [employer] (has season tickets).    Lives with , 2 dogs and 3 cats    Used to work in real estate and banking       Precautions:     Allergies as of 12/21/2023 - Reviewed 12/21/2023   Allergen Reaction Noted    Codeine Palpitations and Rash 01/07/2013       WOC Assessment Details/Treatment     Initial  visit regarding affected area over buttocks and sacral redness, noting area at left buttocks suggestive of shingles lesions with small vesicles in a bed of erythema and blanchable redness to sacrum and gluteal cleft area. Patient is resting on a pressure redistribution mattress with ability to mobilize self well in bed to off load her bony prominences and provide hygiene measures. Attending MD present also and assessed lesions suggestive of shingles. Areas assessed and  cleansed and shingles lesions dressed with bordered foam dressing.         12/27/23 0900   Peripheral Neurovascular   VTE Prevention/Management ambulation promoted;bleeding risk assessed;ROM (active) performed   Pressure Injury Prevention    Sacral Foam Dressing Peel back sacral foam dressing, assess skin and reapply   Heel protection technique Pillow   Heel preventative measures Apply        Altered Skin Integrity 12/26/23 1752 upper Buttocks #1   Date First Assessed/Time First Assessed: 12/26/23 1752   Altered Skin Integrity Present on Admission - Did Patient arrive to the hospital with altered skin?: suspected hospital acquired  Orientation: upper  Location: Buttocks  Wound Number: #1  Is thi...   Wound Image    Drainage Amount None   Appearance Red  (vesicles in a bed of erythema suggestive of shingles lesions)   Periwound Area Intact;Dry;Redness   Wound Length (cm) 3 cm   Wound Width (cm) 3 cm   Wound Surface Area (cm^2) 9 cm^2   Care Antimicrobial agent   Dressing Applied;Foam   Dressing Change Due 12/29/23        Altered Skin Integrity 12/27/23 0900 Gluteal cleft    Date First Assessed/Time First Assessed: 12/27/23 0900   Location: Gluteal cleft  Primary Wound Type: (c)    Wound Image    Dressing Appearance Open to air   Drainage Amount None   Appearance Red  (blanchable erythema)   Tissue loss description Not applicable   Red (%), Wound Tissue Color 80 %   Periwound Area Intact   Wound Edges Defined   Wound Length (cm) 10 cm   Wound Width  (cm) 10 cm   Wound Surface Area (cm^2) 100 cm^2   Care Antimicrobial agent;Soap and water   Off Loading Other (see comments)  (turns self)     Recommendations made to primary team for local wound care  . Orders placed.     12/27/2023

## 2023-12-27 NOTE — PLAN OF CARE
Problem: Adult Inpatient Plan of Care  Goal: Plan of Care Review  Outcome: Ongoing, Progressing  Flowsheets (Taken 12/26/2023 1955)  Plan of Care Reviewed With: patient  Goal: Patient-Specific Goal (Individualized)  Outcome: Ongoing, Progressing  Goal: Absence of Hospital-Acquired Illness or Injury  Outcome: Ongoing, Progressing  Intervention: Identify and Manage Fall Risk  Flowsheets (Taken 12/26/2023 1955)  Safety Promotion/Fall Prevention:   assistive device/personal item within reach   medications reviewed   nonskid shoes/socks when out of bed   side rails raised x 2  Intervention: Prevent Skin Injury  Flowsheets (Taken 12/26/2023 1955)  Body Position: position changed independently  Skin Protection:   adhesive use limited   incontinence pads utilized   tubing/devices free from skin contact  Intervention: Prevent and Manage VTE (Venous Thromboembolism) Risk  Flowsheets (Taken 12/26/2023 1955)  Activity Management: Ambulated in room - L4  VTE Prevention/Management:   ambulation promoted   bleeding risk assessed   ROM (active) performed  Range of Motion:   active ROM (range of motion) encouraged   ROM (range of motion) performed  Intervention: Prevent Infection  Flowsheets (Taken 12/26/2023 1955)  Infection Prevention:   rest/sleep promoted   single patient room provided  Goal: Optimal Comfort and Wellbeing  Outcome: Ongoing, Progressing  Intervention: Monitor Pain and Promote Comfort  Flowsheets (Taken 12/26/2023 1955)  Pain Management Interventions:   care clustered   medication offered   pain management plan reviewed with patient/caregiver   pillow support provided   position adjusted  Intervention: Provide Person-Centered Care  Flowsheets (Taken 12/26/2023 1955)  Trust Relationship/Rapport: care explained  Goal: Readiness for Transition of Care  Outcome: Ongoing, Progressing     Problem: Skin Injury Risk Increased  Goal: Skin Health and Integrity  Outcome: Ongoing, Progressing  Intervention: Optimize Skin  Protection  Flowsheets (Taken 12/26/2023 1955)  Pressure Reduction Techniques:   frequent weight shift encouraged   weight shift assistance provided  Pressure Reduction Devices: positioning supports utilized  Skin Protection:   adhesive use limited   incontinence pads utilized   tubing/devices free from skin contact  Head of Bed (HOB) Positioning: HOB at 30-45 degrees  Intervention: Promote and Optimize Oral Intake  Flowsheets (Taken 12/26/2023 1955)  Oral Nutrition Promotion: rest periods promoted     Problem: Impaired Wound Healing  Goal: Optimal Wound Healing  Outcome: Ongoing, Progressing  Intervention: Promote Wound Healing  Flowsheets (Taken 12/26/2023 1955)  Oral Nutrition Promotion: rest periods promoted  Activity Management: Ambulated in room - L4  Pain Management Interventions:   care clustered   medication offered   pain management plan reviewed with patient/caregiver   pillow support provided   position adjusted

## 2023-12-27 NOTE — DISCHARGE INSTRUCTIONS
Please return to the ER with any worsening of your symptoms. Call your PCP with any further questions or concerns.

## 2023-12-27 NOTE — NURSING
12/26/23 1957   Cycler Peritoneal Dialysis   Initial Drain Volume (mL) 0 mL   Effluent Appearance Clear   Cycler Treatment Comments Pt accessed per P&P, effluent sample collected and hand delivered to lab. Sample clear, no obvious blood or fibrin content. Pt connected to cycler, treatment initiated.

## 2023-12-27 NOTE — PROGRESS NOTES
Nephrology follow up progress note    HPI:      This is 73 years old white female who is known to Dr Mendoza for a long time and has had chronic transplant kidney rejection and has been on dialysis and has been quite stable, but she did have periodic admissions to the hospital mostly for sepsis and peritonitis.     She presented to the hospital with complaints of diarrhea for the last 3 to 4 days and loss of appetite and lower abdominal pains.  She say she did not notice that her PD fluid was cloudy at all last night when the PD nurse came and they took some sample and it was found to be cloudy.  She did not have any fever or chills.  No cough, cold, congestion.  No nausea, vomiting.  No headache, blurring, diplopia.  No dysuria.  No hematuria.  No blurred vision.  Just weak.     PD fluid was drawn and PD fluid has white blood cell count of more than 7600 and more than 91% neutrophils.  Gram stain positive for gram-positive cocci.  The patient has been started on vancomycin. Culture positive for staph epidermis.     Interval history:     12/25/2023  Still with a lot of abdominal pains.  Tolerating CCPD.  No shortness of breath.  No nausea vomiting.  Reports that she does have bowel movements.  Has not been able to get out of bed too much.    12/26/23 - Patient reports emesis this morning. At present she is not nauseous. Abdominal tenderness remains to lesser degree. PD fluid clear this morning.   12/27/2023   No more nausea vomiting.  No abdominal pains.  PD fluid is clear.  Anxious to go home.  No fever or chills.  Tolerating oral nutrition.  PD cell count was 200 yesterday at the start of dialysis.  Vancomycin level is 25.1 today.    Objective:       VITAL SIGNS: 24 HR MIN & MAX LAST    Temp  Min: 98.2 °F (36.8 °C)  Max: 98.8 °F (37.1 °C)  98.2 °F (36.8 °C)        BP  Min: 107/75  Max: 131/81  107/75     Pulse  Min: 61  Max: 70  64     Resp  Min: 18  Max: 18  18    SpO2  Min: 94 %  Max: 98 %  (!) 94 %      Physical  Exam  Constitutional:       General: She is not in acute distress.     Appearance: Normal appearance.   HENT:      Head: Normocephalic and atraumatic.      Nose: Nose normal.      Mouth/Throat:      Mouth: Mucous membranes are moist.   Eyes:      Extraocular Movements: Extraocular movements intact.   Cardiovascular:      Rate and Rhythm: Normal rate and regular rhythm.      Pulses: Normal pulses.      Heart sounds: Normal heart sounds.   Pulmonary:      Effort: Pulmonary effort is normal. No respiratory distress.      Breath sounds: Normal breath sounds.   Abdominal:      General: Abdomen is flat. Bowel sounds are normal. There is no distension.      Palpations: Abdomen is soft.   Musculoskeletal:         General: Normal range of motion.      Cervical back: Normal range of motion.   Skin:     General: Skin is warm and dry.   Neurological:      General: No focal deficit present.      Mental Status: She is alert and oriented to person, place, and time.   Psychiatric:         Mood and Affect: Mood normal.         Behavior: Behavior normal.         Thought Content: Thought content normal.                 Component Value Date/Time     (L) 12/27/2023 0311     (L) 12/25/2023 0403     06/20/2018 1402     01/08/2018 0835    K 3.5 12/27/2023 0311    K 4.2 12/25/2023 0403    K 4.8 06/20/2018 1402    K 4.6 01/08/2018 0835    CHLORIDE 98 12/27/2023 0311    CHLORIDE 101 12/25/2023 0403    CO2 28 12/27/2023 0311    CO2 27 12/25/2023 0403    CO2 23 06/20/2018 1402    CO2 22 (L) 01/08/2018 0835    BUN 24.5 (H) 12/27/2023 0311    BUN 23.3 (H) 12/25/2023 0403    BUN 30 (H) 06/20/2018 1402    BUN 24 (H) 01/08/2018 0835    CREATININE 7.54 (H) 12/27/2023 0311    CREATININE 7.12 (H) 12/25/2023 0403    CREATININE 1.9 (H) 06/20/2018 1402    CREATININE 1.8 (H) 01/08/2018 0835    CALCIUM 7.9 (L) 12/27/2023 0311    CALCIUM 7.7 (L) 12/25/2023 0403    CALCIUM 9.7 06/20/2018 1402    CALCIUM 9.1 01/08/2018 0835    PHOS 3.1  12/27/2023 0311    PHOS 3.8 06/20/2018 1402            Component Value Date/Time    WBC 5.12 12/27/2023 0311    WBC 4.56 12/25/2023 0403    WBC 6.90 06/20/2018 1402    WBC 3.37 (L) 01/08/2018 0835    HGB 9.1 (L) 12/27/2023 0311    HGB 9.9 (L) 12/25/2023 0403    HGB 10.5 (L) 06/20/2018 1402    HGB 9.1 (L) 01/08/2018 0835    HCT 29.6 (L) 12/27/2023 0311    HCT 30.8 (L) 12/25/2023 0403    HCT 32.0 (L) 06/05/2020 0815    HCT 31.6 (L) 06/20/2018 1402    HCT 27.3 (L) 01/08/2018 0835     12/27/2023 0311     12/25/2023 0403     06/20/2018 1402     01/08/2018 0835       Imaging reviewed      Assessment / Plan:     1. Acute peritonitis.  2. End-stage renal disease.  3. Hypertension.  4. Anemia.  5. Protein malnutrition.  6. Diarrhea.  7. Persistent hypokalemia.     RECOMMENDATIONS:    Continue CCPD  Arrangements for outpatient IP vancomycin has not made [place] [facility]  I spoke to Ms. Kori FOLEY who will call this patient on Friday and do the vancomycin level and follow the protocol.  She knows that patient's 1st dose was on 12/21/2023 and patient needs vancomycin for a total of 14 days.

## 2023-12-28 NOTE — PHYSICIAN QUERY
PT Name: Malissa Schwartz  MR #: [id number]     DOCUMENTATION CLARIFICATION      CDS/: Diana Chisholm RN BSN              Contact information:[email or web address]  This form is a permanent document in the medical record.     Query Date: December 28, 2023    By submitting this query, we are merely seeking further clarification of documentation.  Please utilize your independent clinical judgment when addressing the question(s) below.     The Medical Record contains the following:    Clinical Information Location in Medical Record   12/21 Culture: Body fluid from Peritoneal fluid  Many Staphylococcus epidermidis     12/22 Nephrology Consult   She say she did not notice that her PD fluid was cloudy at all last night when the PD nurse came and they took some sample and it was found to be cloudy.    PD fluid was drawn and PD fluid has white blood cell count of more than 7600 and   more than 91% neutrophils.  Gram stain positive for gram-positive cocci.  The   patient has been started on vancomycin last night.      12/27 D/C summary  Acute infectious peritonitis in the setting of PD catheter     Peritoneal fluid was drawn on 12/21/23 showed WBC 7626  with 91% neutrophils. Pt was initiated on Vancomycin IV and Zosyn  on admission Gram stain with many Gram-positive cocci with culture grew Staphylococcus epidermis. ID was consulted. Antibiotic transitioned to IP Vancomycin with recommendation to continue for 14 days . Nephrology followed pt for CCPD with plan to  start IP Vancomycin per peritonitis protocol. Blood cultures x2 - NGTD. Pt's random vancomycin from initial IV treatment on 12/21/23 remained supra therapeutic and did not need to start IP Vancomycin treatment in the hospital. Repeat PD fluid WBC count on 12/26/23 was down to 200  12/21/23 Microbiology      12/22/23 Consult                    12/27/23 D/C summary     Please document your best medical opinion regarding the etiology of Peritonitis:       [  x  ] Due to infected peritoneal fluid   [   ] Due to infected peritoneal catheter   [   ] Due to both infected peritoneal fluid and infected peritoneal catheter   [   ] Other etiology (please specify):___________________             Please document in your progress notes daily for the duration of treatment, until resolved, and include in your discharge summary.

## 2023-12-28 NOTE — DISCHARGE SUMMARY
[facility] Medicine Discharge Summary    Admit Date: 12/21/2023  Discharge Date and Time: 12/27/20239:22 PM  Admitting Physician:  Team  Discharging Physician: Radha Huerta MD.  Primary Care Physician: Sergio Crabtree MD  Consults: Infectious Disease and Nephrology    Discharge Diagnoses:  Acute infectious peritonitis in the setting of PD catheter   ESRD on peritoneal dialysis   Hypokalemia, POA  Hyponatremia , mild-POA  Hypomagnesemia , POA  Anemia of chronic disease   Herpes Zoster, sacral area     Hx- Failed kidney transplant with chronic rejection     Hospital Course:   Patient is a 73-year-old female with history of ESRD, Failed kidney transplant, currently on peritoneal dialysis for 3 years with one prior episode of peritonitis, who presented to the ER with complaints of several days of lower abdominal pain associated with diarrhea and decreased appetite. Denied associated fever or chills, nausea, vomiting, dysuria, hematuria. She reported that she herself did not notice cloudy PD fluid, however PD nurse  took some sample and it was found to be cloudy .      She arrived to the the ER afebrile hemodynamically stable maintaining normal sats on room air.  Laboratory work showed changes as expected in end-stage renal disease, as well as hypokalemia and hypomagnesemia.  Peritoneal fluid was drawn on 12/21/23 showed WBC 7626  with 91% neutrophils. Pt was initiated on Vancomycin IV and Zosyn  on admission Gram stain with many Gram-positive cocci with culture grew Staphylococcus epidermis. ID was consulted. Antibiotic transitioned to IP Vancomycin with recommendation to continue for 14 days . Nephrology followed pt for CCPD with plan to  start IP Vancomycin per peritonitis protocol. Blood cultures x2 - NGTD. Pt's random vancomycin from initial IV treatment on 12/21/23 remained supra therapeutic and did not need to start IP Vancomycin treatment in the hospital. Repeat  PD fluid WBC count on 12/26/23 was down to 200. Abd pain resolved and no further nausea and vomiting reported. Nephrology cleared pt for discharge to home with plan to check Vancomycin level next Friday and will arrange IP vancomycin dosing as an outpatient through the home program of dialysis therapy as indicated. Of note per Wound care nurse assessment on the morning of discharge pt is noted to have rash in her sacral area c/w Herpes Zoster. Subsequently examined by me and noted  was mild form. Pt was given Rx for Famciclovir at renal dose along with short course of Prednisone on discharge. Home health and PCP appointment setup completed per .       Pt was seen and examined on the day of discharge.    Vitals:  VITAL SIGNS: 24 HRS MIN & MAX LAST   Temp  Min: 97.3 °F (36.3 °C)  Max: 98.8 °F (37.1 °C) 97.3 °F (36.3 °C) (Simultaneous filing. User may not have seen previous data.)   BP  Min: 106/73  Max: 109/77 106/73 (Simultaneous filing. User may not have seen previous data.)   Pulse  Min: 64  Max: 83  83 (Simultaneous filing. User may not have seen previous data.)   Resp  Min: 18  Max: 18 18 (Simultaneous filing. User may not have seen previous data.)   SpO2  Min: 93 %  Max: 98 % (!) 93 % (Simultaneous filing. User may not have seen previous data.)       Physical Exam:  General: In no acute distress, afebrile  Chest: Clear to auscultation bilaterally  Heart: RRR, +S1, S2, no appreciable murmur  Abdomen: Soft, (+) very minimal  generalized  tenderness, BS +  MSK: Warm, no lower extremity edema, no clubbing or cyanosis  Neurologic: Awake and alert.      Procedures Performed: No admission procedures for hospital encounter.     Significant Diagnostic Studies: See Full reports for all details    Recent Labs   Lab 12/24/23  0402 12/25/23  0403 12/27/23  0311   WBC 5.04 4.56 5.12   RBC 3.14* 3.28* 3.08*   HGB 9.5* 9.9* 9.1*   HCT 29.4* 30.8* 29.6*   MCV 93.6 93.9 96.1*   MCH 30.3 30.2 29.5   MCHC 32.3* 32.1* 30.7*   RDW  14.5 14.2 13.8    239 232   MPV 9.8 9.6 10.1       Recent Labs   Lab 12/21/23  1511 12/22/23  0429 12/23/23  0403 12/23/23  1603 12/24/23  0402 12/25/23  0403 12/27/23  0311   * 133* 134* 135* 138 133* 134*   K 2.4* 2.9* 2.8* 3.1* 3.0* 4.2 3.5   CO2 29 26 29 27 27 27 28   BUN 25.4* 28.7* 26.6* 30.6* 25.5* 23.3* 24.5*   CREATININE 7.85* 8.28* 7.42* 8.18* 7.26* 7.12* 7.54*   CALCIUM 7.8* 7.0* 7.4* 7.7* 7.3* 7.7* 7.9*   MG 1.50*  --  1.80 1.90 1.70  --  2.00   ALBUMIN 1.5* 1.2* 1.2*  --   --   --   --    ALKPHOS 137 114 122  --   --   --   --    ALT 17 14 15  --   --   --   --    AST 28 20 23  --   --   --   --    BILITOT 0.3 0.5 0.3  --   --   --   --         Microbiology Results (last 7 days)       Procedure Component Value Units Date/Time    Blood Culture [[id number]]  (Normal) Collected: 12/21/23 1707    Order Status: Completed Specimen: Blood Updated: 12/26/23 2100     CULTURE, BLOOD ([facility]) No Growth at 5 days    Blood Culture [[id number]]  (Normal) Collected: 12/21/23 1707    Order Status: Completed Specimen: Blood Updated: 12/26/23 2100     CULTURE, BLOOD ([facility]) No Growth at 5 days    Body Fluid Culture [[id number]]  (Abnormal)  (Susceptibility) Collected: 12/21/23 1919    Order Status: Completed Specimen: Body Fluid from Peritoneal Fluid Updated: 12/24/23 0733     Body Fluid Culture Many Staphylococcus epidermidis    Gram Stain [[id number]] Collected: 12/21/23 1919    Order Status: Completed Specimen: Body Fluid from Peritoneal Fluid Updated: 12/22/23 0019     GRAM STAIN Many WBC observed      Moderate Gram positive cocci    SUSY Prep [[id number]] Collected: 12/21/23 1919    Order Status: Completed Specimen: Body Fluid from Peritoneal Fluid Updated: 12/21/23 2048     KOH Prep No fungal elements seen             CT Abdomen Pelvis  Without Contrast  Narrative: EXAMINATION:  CT ABDOMEN PELVIS WITHOUT CONTRAST    CLINICAL HISTORY:  RLQ abdominal pain (Age >= 14y);peritoneal dialysis  malfunction;    TECHNIQUE:  Axial images of the abdomen and pelvis were obtained without IV contrast administration.  Coronal and sagittal reconstructions were provided.  Three dimensional and MIP images were obtained and evaluated.  Total DLP was 178 mGy-cm. Dose lowering technique and automated exposure control were utilized for this exam.    COMPARISON:  CT of the abdomen and pelvis 12/25/2021.    FINDINGS:  There is minimal atelectasis in the left lung base.  The right lung base is clear.  The heart is not enlarged.    There is a peritoneal dialysis catheter in the right pericolic gutter.  There is small volume of fluid within the peritoneum, not unexpected in the setting of peritoneal dialysis.  The liver is homogeneous in attenuation.  The gallbladder, pancreas, and adrenal glands are normal.  There are calcified granulomas in the spleen.  The right kidney is atrophic.  The left kidney is not visualized.  There is a right lower quadrant renal transplant with no hydronephrosis.  There is no perinephric fluid collection.    The stomach and small bowel are decompressed.  There is no bowel obstruction.  The appendix is not visualized.  There is colonic diverticulosis without diverticulitis.  The urinary bladder is decompressed.  There is no pelvic or retroperitoneal adenopathy.  The aorta is nonaneurysmal.  There is no lytic or blastic osseous lesion.  Impression: 1. Peritoneal dialysis catheter with its tip in the right lower quadrant.  2. Right lower quadrant renal transplant without acute abnormality, however evaluation is limited on this exam.  3. Minimal left lower lobe atelectasis.  4. Diverticulosis without diverticulitis.    Electronically signed by: Vadim Mtz MD  Date:    09/27/2023  Time:    21:09  X-Ray Abdomen AP 1 View  Narrative: EXAMINATION:  XR ABDOMEN AP 1 VIEW    CLINICAL HISTORY:  check PD catheter;    TECHNIQUE:  AP View(s) of the abdomen.    COMPARISON:  Radiography  02/05/2021    FINDINGS:  Peritoneal dialysis catheter coils in the right lower quadrant.  Nonobstructive bowel gas pattern with moderate volume stool.  Impression: Coiling of the peritoneal dialysis catheter in the right lower quadrant.    Electronically signed by: Isaac Palomino  Date:    09/27/2023  Time:    11:39         Medication List        START taking these medications      famciclovir 500 MG tablet  Commonly known as: FAMVIR  Take 0.5 tablets (250 mg total) by mouth once daily. for 8 days     predniSONE 20 MG tablet  Commonly known as: DELTASONE  Take 2 tablets (40 mg total) by mouth once daily. for 4 days            CONTINUE taking these medications      aspirin 81 MG EC tablet  Commonly known as: ECOTRIN     calcitRIOL 0.25 MCG Cap  Commonly known as: ROCALTROL     cloNIDine 0.3 mg/24 hr td ptwk 0.3 mg/24 hr  Commonly known as: CATAPRES     folic acid 1 MG tablet  Commonly known as: FOLVITE     NEPHRO-JENN 0.8 mg Tab  Generic drug: B complex-vitamin C-folic acid     potassium chloride 10 MEQ Tbsr  Commonly known as: KLOR-CON     sodium bicarbonate 650 MG tablet     spironolactone 25 MG tablet  Commonly known as: ALDACTONE            STOP taking these medications      amLODIPine 5 MG tablet  Commonly known as: NORVASC     levocetirizine 5 MG tablet  Commonly known as: XYZAL     multivitamin capsule               Where to Get Your Medications        These medications were sent to [facility] [place] [address]  [address], [facility] [address]      Phone: [phone]   famciclovir 500 MG tablet  predniSONE 20 MG tablet          Explained in detail to the patient about the discharge plan, medications, and follow-up visits. Pt understands and agrees with the treatment plan  Discharge Disposition: Home-Health Care Sv   Discharged Condition: stable  Diet-    Medications Per DC med rec  Activities as tolerated   Follow-up Information       Sergio Crabtree  MD LEONEL Follow up on 1/4/2024.    Specialty: Internal Medicine  Why: Appointment is on January 4th @3:00pm  Contact information:  [place] [id number]  [phone]                           For further questions contact hospitalist office    Discharge time 33 minutes    For worsening symptoms, chest pain, shortness of breath, increased abdominal pain, high grade fever, stroke or stroke like symptoms, immediately go to the nearest Emergency Room or call 911 as soon as possible.      Radha Barrera M.D, on 12/27/2023. at 9:22 PM.

## 2023-12-29 ENCOUNTER — LAB VISIT (OUTPATIENT)
Dept: LAB | Facility: HOSPITAL | Age: 73
End: 2023-12-29
Attending: INTERNAL MEDICINE
Payer: MEDICARE

## 2023-12-29 DIAGNOSIS — Z94.0 LIVING-DONOR KIDNEY TRANSPLANT RECIPIENT: Primary | Chronic | ICD-10-CM

## 2023-12-29 LAB — VANCOMYCIN SERPL-MCNC: 22.9 UG/ML (ref 15–20)

## 2023-12-29 PROCEDURE — 36415 COLL VENOUS BLD VENIPUNCTURE: CPT

## 2023-12-29 PROCEDURE — 80202 ASSAY OF VANCOMYCIN: CPT

## 2024-01-02 ENCOUNTER — LAB VISIT (OUTPATIENT)
Dept: LAB | Facility: HOSPITAL | Age: 74
End: 2024-01-02
Attending: INTERNAL MEDICINE
Payer: MEDICARE

## 2024-01-02 DIAGNOSIS — Z94.0 LIVING-DONOR KIDNEY TRANSPLANT RECIPIENT: Primary | ICD-10-CM

## 2024-01-02 LAB — VANCOMYCIN SERPL-MCNC: 14.5 UG/ML (ref 15–20)

## 2024-01-02 PROCEDURE — 36415 COLL VENOUS BLD VENIPUNCTURE: CPT

## 2024-01-02 PROCEDURE — 80202 ASSAY OF VANCOMYCIN: CPT

## 2024-01-05 ENCOUNTER — LAB VISIT (OUTPATIENT)
Dept: LAB | Facility: HOSPITAL | Age: 74
End: 2024-01-05
Attending: INTERNAL MEDICINE
Payer: MEDICARE

## 2024-01-05 DIAGNOSIS — Z94.0 LIVING-DONOR KIDNEY TRANSPLANT RECIPIENT: Primary | ICD-10-CM

## 2024-01-05 LAB — VANCOMYCIN SERPL-MCNC: 20.2 UG/ML (ref 15–20)

## 2024-01-05 PROCEDURE — 36415 COLL VENOUS BLD VENIPUNCTURE: CPT

## 2024-01-05 PROCEDURE — 80202 ASSAY OF VANCOMYCIN: CPT

## 2024-01-08 ENCOUNTER — HOSPITAL ENCOUNTER (EMERGENCY)
Facility: HOSPITAL | Age: 74
Discharge: HOME | End: 2024-01-08
Attending: INTERNAL MEDICINE
Payer: MEDICARE

## 2024-01-08 VITALS
RESPIRATION RATE: 18 BRPM | DIASTOLIC BLOOD PRESSURE: 60 MMHG | HEART RATE: 66 BPM | SYSTOLIC BLOOD PRESSURE: 104 MMHG | WEIGHT: 130 LBS | HEIGHT: 66 IN | OXYGEN SATURATION: 96 % | BODY MASS INDEX: 20.89 KG/M2

## 2024-01-08 DIAGNOSIS — S81.801A LEG WOUND, RIGHT, INITIAL ENCOUNTER: Primary | ICD-10-CM
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
## 2024-01-08 DIAGNOSIS — M79.661 PAIN IN RIGHT LOWER LEG: ICD-10-CM

## 2024-01-08 DIAGNOSIS — L03.115 CELLULITIS OF RIGHT LOWER LEG: ICD-10-CM

## 2024-01-08 PROCEDURE — 25000003 PHARM REV CODE 250: Performed by: PHYSICIAN ASSISTANT

## 2024-01-08 PROCEDURE — 99284 EMERGENCY DEPT VISIT MOD MDM: CPT

## 2024-01-08 RX ORDER — HYDROCODONE BITARTRATE AND ACETAMINOPHEN 5; 325 MG/1; MG/1
1 TABLET ORAL
Status: COMPLETED | OUTPATIENT
Start: 2024-01-08 | End: 2024-01-08

## 2024-01-08 RX ORDER — HYDROCODONE BITARTRATE AND ACETAMINOPHEN 5; 325 MG/1; MG/1
1 TABLET ORAL
Status: CANCELLED | OUTPATIENT
Start: 2024-01-08 | End: 2024-01-08

## 2024-01-08 RX ORDER — CEPHALEXIN 500 MG/1
500 CAPSULE ORAL EVERY 12 HOURS
Qty: 14 CAPSULE | Refills: 0 | Status: ON HOLD | OUTPATIENT
Start: 2024-01-08 | End: 2024-01-18 | Stop reason: HOSPADM

## 2024-01-08 RX ORDER — HYDROCODONE BITARTRATE AND ACETAMINOPHEN 5; 325 MG/1; MG/1
1 TABLET ORAL EVERY 8 HOURS PRN
Qty: 15 TABLET | Refills: 0 | Status: SHIPPED | OUTPATIENT
Start: 2024-01-08 | End: 2024-01-13

## 2024-01-08 RX ADMIN — HYDROCODONE BITARTRATE AND ACETAMINOPHEN 1 TABLET: 5; 325 TABLET ORAL at 03:01

## 2024-01-08 NOTE — ED PROVIDER NOTES
Encounter Date: 1/8/2024       History     Chief Complaint   Patient presents with    Fall     Fall 2 days ago; skin tear on right lower leg; complaining of leg pain     73 y.o. female with a history of CKD on PD, HTN, and HLD presents to the ED with right lower leg pain s/t fall onset 1 week ago. Patient states she tripped and fell over the phone  and notes it swung around and hit her behind the leg. Per family friend in the room, he notes taking her to urgent care last week for evaluation of the leg and they were told it was just a skin tear, no imaging done. Patient states she has been taking tylenol with no relief. Denies hitting head or LOC during the fall.     The history is provided by the patient. No  was used.     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 Glomerulitis with one activated endothelial cell: treated with Solumedrol pulse 375 mg IVBP x 3 doses with short prednisone taper; thymoglobin 100 mg x 6 doses 10/04/2007 minimal histologic changes no acute rejection     Allergy     Anemia of renal disease     CKD (chronic kidney disease), stage IV     CMV (cytomegalovirus) status positive     Colon polyp     benign    Depression     GN (glomerulonephritis)     GN (glomerulonephritis)     unknown type    Herpes infection, other 2009    on buttocks    Hyperlipidemia     Hypertension     Immunosuppression     Living-donor kidney transplant recipient 01/15/2007    Meningitis     history of viral meningitis    Proteinuria 1/5/2015    Proteinuria     Pulmonary nodules     Renal hypertension     S/P breast augmentation     Skin cancer     Urinary tract infection      Past Surgical History:   Procedure Laterality Date    COLON SURGERY      COLONOSCOPY W/ BIOPSIES      cosemetic surgery       facelift and breast augmentation     HYSTERECTOMY      KIDNEY TRANSPLANT      NEPHRECTOMY Left     9/2016    RENAL BIOPSY      TONSILLECTOMY, ADENOIDECTOMY       Family History   Problem Relation Age of Onset    Kidney disease Sister         ESRD    Kidney disease Brother         CKD from HTN    Hypertension Brother     Heart failure Mother     Heart failure Father     Diabetes Neg Hx     Stroke Neg Hx      Social History     Tobacco Use    Smoking status: Former     Current packs/day: 1.00     Average packs/day: 1 pack/day for 15.0 years (15.0 ttl pk-yrs)     Types: Cigarettes    Smokeless tobacco: Never    Tobacco comments:     quit in 2014; smoked for 15 years and at the most smoked 1 ppd   Substance Use Topics    Alcohol use: Yes     Comment: seldom    Drug use: No     Review of Systems   Constitutional:  Negative for chills and fever.   Eyes:  Negative for visual disturbance.   Respiratory:  Negative for cough and shortness of breath.    Cardiovascular:  Negative for chest pain.   Gastrointestinal:  Negative for abdominal pain, nausea and vomiting.   Genitourinary:  Negative for dysuria.   Musculoskeletal:  Positive for arthralgias.   Skin:  Positive for wound. Negative for color change and rash.   Neurological:  Negative for dizziness and headaches.   Psychiatric/Behavioral:  Negative for behavioral problems.    All other systems reviewed and are negative.      Physical Exam     Initial Vitals [01/08/24 1450]   BP Pulse Resp Temp SpO2   104/60 66 16 -- 96 %      MAP       --         Physical Exam    Nursing note and vitals reviewed.  Constitutional: She appears well-developed and well-nourished.   HENT:   Head: Normocephalic and atraumatic.   Eyes: EOM are normal. Pupils are equal, round, and reactive to light.   Neck: Neck supple.   Cardiovascular:  Normal rate, regular rhythm, normal heart sounds and intact distal pulses.           Pulmonary/Chest: Breath sounds normal.   Musculoskeletal:         General:  Normal range of motion.      Cervical back: Neck supple.      Right lower leg: Tenderness present. No swelling. No edema.      Right ankle: No swelling. No tenderness. No lateral malleolus or medial malleolus tenderness. Normal pulse.      Right Achilles Tendon: Normal.        Legs:      Neurological: She is alert and oriented to person, place, and time. She has normal strength. GCS score is 15. GCS eye subscore is 4. GCS verbal subscore is 5. GCS motor subscore is 6.   Skin: Skin is warm and dry. Capillary refill takes less than 2 seconds.   Psychiatric: She has a normal mood and affect.         ED Course   Procedures  Labs Reviewed - No data to display       Imaging Results              X-Ray Foot Complete Right (Final result)  Result time 01/08/24 16:11:01      Final result by Violet Gillette MD (01/08/24 16:11:01)                   Impression:      No acute bony abnormality.      Electronically signed by: Violet Gillette  Date:    01/08/2024  Time:    16:11               Narrative:    EXAMINATION:  XR FOOT COMPLETE 3 VIEW RIGHT    CLINICAL HISTORY:  Pain in unspecified foot    COMPARISON:  None.    FINDINGS:  There is no acute fracture or malalignment.  Scattered vascular calcifications are noted.                                       X-Ray Tibia Fibula 2 View Right (Final result)  Result time 01/08/24 16:06:10      Final result by Violet Gillette MD (01/08/24 16:06:10)                   Impression:      No acute bony abnormality.      Electronically signed by: Violet Gillette  Date:    01/08/2024  Time:    16:06               Narrative:    EXAMINATION:  XR TIBIA FIBULA 2 VIEW RIGHT    CLINICAL HISTORY:  Pain, unspecified    COMPARISON:  None.    FINDINGS:  There is no acute fracture or malalignment.  Vascular calcifications are noted.                                       X-Ray Ankle Complete Right (Final result)  Result time 01/08/24 15:51:14      Final result by Yogesh Wolf MD (01/08/24  15:51:14)                   Impression:      No acute osseous abnormality identified.      Electronically signed by: Yogesh Wolf  Date:    01/08/2024  Time:    15:51               Narrative:    EXAMINATION:  XR ANKLE COMPLETE 3 VIEW RIGHT    CLINICAL HISTORY:  Pain, unspecified    TECHNIQUE:  Three views    COMPARISON:  None available    FINDINGS:  Osseous and articular surfaces are unremarkable.  No acute fracture, dislocation or arthritic change.  Position and alignment is satisfactory.  There is demineralization of the bones.                                       Medications   HYDROcodone-acetaminophen 5-325 mg per tablet 1 tablet (1 tablet Oral Given 1/8/24 1550)     Medical Decision Making  Differential diagnosis: skin tear, abrasion, cellulitis, fracture, sprain, wound     73 y.o. female with a history of CKD on PD, HTN, and HLD presents to the ED with right lower leg pain s/t fall onset 1 week ago. Patient states she tripped and fell over the phone  and notes it swung around and hit her behind the leg. Per family friend in the room, he notes taking her to urgent care last week for evaluation of the leg and they were told it was just a skin tear, no imaging done. Patient states she has been taking tylenol with no relief. Denies hitting head or LOC during the fall.         Amount and/or Complexity of Data Reviewed  Radiology: ordered.    Risk  Prescription drug management.               ED Course as of 01/08/24 1700   Mon Jan 08, 2024   1624 Patient states pain has improved after [place]. Will d/c home with short course of pain meds to take as needed along with abx to treat for superimposed cellulitis near leg wound.  [MA]      ED Course User Index  [MA] Nikhil Marmolejo, ALISIA                           Clinical Impression:  Final diagnoses:  [S81.801A] Leg wound, right, initial encounter (Primary)  [L03.115] Cellulitis of right lower leg  [W19.XXXD] Fall, subsequent encounter  [M79.661] Pain in right  lower leg          ED Disposition Condition    Discharge Stable          ED Prescriptions       Medication Sig Dispense Start Date End Date Auth. Provider    HYDROcodone-acetaminophen (NORCO) 5-325 mg per tablet Take 1 tablet by mouth every 8 (eight) hours as needed for Pain. 15 tablet 1/8/2024 1/13/2024 Nikhil Marmolejo PA-C    cephALEXin (KEFLEX) 500 MG capsule Take 1 capsule (500 mg total) by mouth every 12 (twelve) hours. for 7 days 14 capsule 1/8/2024 1/15/2024 Nikhil Marmolejo PA-C          Follow-up Information       Follow up With Specialties Details Why Contact Info    Sergio Crabtree MD Internal Medicine   [address] [facility] [address]  [phone]      [facility] - Emergency Dept Emergency Medicine In 1 week If symptoms worsen [id number]  [facility] [id number] [phone]             Nikhil Marmolejo PA-C  01/08/24 [id number]

## 2024-01-08 NOTE — PHYSICIAN QUERY
PT Name: Malissa Schwartz  MR #: [id number]     Documentation Clarification      CDS/: Diana Chisholm RN BSN              Contact information:[email or web address]    This form is a permanent document in the medical record.     Query Date: January 8, 2024    By submitting this query, we are merely seeking further clarification of documentation. Please utilize your independent clinical judgment when addressing the question(s) below.    The Medical Record reflects the following:    Supporting Clinical Findings Location in Medical Record   12/28/23 Query response  Please document your best medical opinion regarding the etiology of Peritonitis:   Due to infected peritoneal fluid    12/21 Culture: Body fluid from Peritoneal fluid  Many Staphylococcus epidermidis  12/28/2023  Query response   12/27 D/C summary  Acute infectious peritonitis in the setting of PD catheter      Peritoneal fluid was drawn on 12/21/23 showed WBC 7626  with 91% neutrophils. Pt was initiated on Vancomycin IV and Zosyn  on admission Gram stain with many Gram-positive cocci with culture grew Staphylococcus epidermis. ID was consulted. Antibiotic transitioned to IP Vancomycin with recommendation to continue for 14 days . Nephrology followed pt for CCPD with plan to  start IP Vancomycin per peritonitis protocol. Blood cultures x2 - NGTD. Pt's random vancomycin from initial IV treatment on 12/21/23 remained supra therapeutic and did not need to start IP Vancomycin treatment in the hospital. Repeat PD fluid WBC count on 12/26/23 was down to 200  12/27/23 D/C Summary                                                                            Provider, please further clarify infected peritoneal fluid:    [ x  ] Infected peritoneal dialysis fluid is related to infusion process   [   ] Other (please specify): ____________

## 2024-01-16 ENCOUNTER — HOSPITAL ENCOUNTER (INPATIENT)
Facility: HOSPITAL | Age: 74
LOS: 2 days | Discharge: HOME HEALTH | DRG: 919 | End: 2024-01-18
Attending: STUDENT IN AN ORGANIZED HEALTH CARE EDUCATION/TRAINING PROGRAM | Admitting: STUDENT IN AN ORGANIZED HEALTH CARE EDUCATION/TRAINING PROGRAM
Payer: MEDICARE

## 2024-01-16 DIAGNOSIS — Z99.2 DEPENDENCE ON PERITONEAL DIALYSIS: Primary | ICD-10-CM

## 2024-01-16 DIAGNOSIS — R07.9 CHEST PAIN: ICD-10-CM
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
## 2024-01-16 DIAGNOSIS — T85.611A PERITONEAL DIALYSIS CATHETER DYSFUNCTION, INITIAL ENCOUNTER: ICD-10-CM

## 2024-01-16 DIAGNOSIS — R53.1 WEAKNESS: ICD-10-CM

## 2024-01-16 LAB
ALBUMIN SERPL-MCNC: 1.8 G/DL (ref 3.4–4.8)
ALBUMIN/GLOB SERPL: 0.6 RATIO (ref 1.1–2)
ALP SERPL-CCNC: 126 UNIT/L (ref 40–150)
ALT SERPL-CCNC: 20 UNIT/L (ref 0–55)
AST SERPL-CCNC: 27 UNIT/L (ref 5–34)
BASOPHILS # BLD AUTO: 0.03 X10(3)/MCL
BASOPHILS NFR BLD AUTO: 0.8 %
BILIRUB SERPL-MCNC: 0.2 MG/DL
BUN SERPL-MCNC: 41.5 MG/DL (ref 9.8–20.1)
CALCIUM SERPL-MCNC: 8 MG/DL (ref 8.4–10.2)
CHLORIDE SERPL-SCNC: 99 MMOL/L (ref 98–107)
CO2 SERPL-SCNC: 27 MMOL/L (ref 23–31)
CREAT SERPL-MCNC: 8.5 MG/DL (ref 0.55–1.02)
EOSINOPHIL # BLD AUTO: 0.22 X10(3)/MCL (ref 0–0.9)
EOSINOPHIL NFR BLD AUTO: 5.6 %
ERYTHROCYTE [DISTWIDTH] IN BLOOD BY AUTOMATED COUNT: 15.1 % (ref 11.5–17)
GFR SERPLBLD CREATININE-BSD FMLA CKD-EPI: 5 MLS/MIN/1.73/M2
GLOBULIN SER-MCNC: 2.9 GM/DL (ref 2.4–3.5)
GLUCOSE SERPL-MCNC: 106 MG/DL (ref 82–115)
HCT VFR BLD AUTO: 29.7 % (ref 37–47)
HGB BLD-MCNC: 9.4 G/DL (ref 12–16)
IMM GRANULOCYTES # BLD AUTO: 0.01 X10(3)/MCL (ref 0–0.04)
IMM GRANULOCYTES NFR BLD AUTO: 0.3 %
LYMPHOCYTES # BLD AUTO: 0.57 X10(3)/MCL (ref 0.6–4.6)
LYMPHOCYTES NFR BLD AUTO: 14.4 %
MAGNESIUM SERPL-MCNC: 1.9 MG/DL (ref 1.6–2.6)
MCH RBC QN AUTO: 29.6 PG (ref 27–31)
MCHC RBC AUTO-ENTMCNC: 31.6 G/DL (ref 33–36)
MCV RBC AUTO: 93.4 FL (ref 80–94)
MONOCYTES # BLD AUTO: 0.33 X10(3)/MCL (ref 0.1–1.3)
MONOCYTES NFR BLD AUTO: 8.3 %
NEUTROPHILS # BLD AUTO: 2.8 X10(3)/MCL (ref 2.1–9.2)
NEUTROPHILS NFR BLD AUTO: 70.6 %
NRBC BLD AUTO-RTO: 0 %
PHOSPHATE SERPL-MCNC: 3.8 MG/DL (ref 2.3–4.7)
PLATELET # BLD AUTO: 216 X10(3)/MCL (ref 130–400)
PMV BLD AUTO: 9.9 FL (ref 7.4–10.4)
POTASSIUM SERPL-SCNC: 3.6 MMOL/L (ref 3.5–5.1)
PROT SERPL-MCNC: 4.7 GM/DL (ref 5.8–7.6)
RBC # BLD AUTO: 3.18 X10(6)/MCL (ref 4.2–5.4)
SODIUM SERPL-SCNC: 138 MMOL/L (ref 136–145)
TROPONIN I SERPL-MCNC: <0.01 NG/ML (ref 0–0.04)
WBC # SPEC AUTO: 3.96 X10(3)/MCL (ref 4.5–11.5)

## 2024-01-16 PROCEDURE — 84484 ASSAY OF TROPONIN QUANT: CPT | Performed by: PHYSICIAN ASSISTANT

## 2024-01-16 PROCEDURE — 93005 ELECTROCARDIOGRAM TRACING: CPT

## 2024-01-16 PROCEDURE — 21400001 HC TELEMETRY ROOM

## 2024-01-16 PROCEDURE — 83735 ASSAY OF MAGNESIUM: CPT | Performed by: STUDENT IN AN ORGANIZED HEALTH CARE EDUCATION/TRAINING PROGRAM

## 2024-01-16 PROCEDURE — 80053 COMPREHEN METABOLIC PANEL: CPT | Performed by: PHYSICIAN ASSISTANT

## 2024-01-16 PROCEDURE — 84100 ASSAY OF PHOSPHORUS: CPT | Performed by: STUDENT IN AN ORGANIZED HEALTH CARE EDUCATION/TRAINING PROGRAM

## 2024-01-16 PROCEDURE — 11000001 HC ACUTE MED/SURG PRIVATE ROOM

## 2024-01-16 PROCEDURE — 93010 ELECTROCARDIOGRAM REPORT: CPT | Mod: ,,, | Performed by: INTERNAL MEDICINE

## 2024-01-16 PROCEDURE — 85025 COMPLETE CBC W/AUTO DIFF WBC: CPT | Performed by: PHYSICIAN ASSISTANT

## 2024-01-16 PROCEDURE — 99285 EMERGENCY DEPT VISIT HI MDM: CPT | Mod: 25

## 2024-01-16 RX ORDER — TALC
6 POWDER (GRAM) TOPICAL NIGHTLY PRN
Status: DISCONTINUED | OUTPATIENT
Start: 2024-01-16 | End: 2024-01-18 | Stop reason: HOSPADM

## 2024-01-16 RX ORDER — ONDANSETRON HYDROCHLORIDE 2 MG/ML
4 INJECTION, SOLUTION INTRAVENOUS EVERY 4 HOURS PRN
Status: DISCONTINUED | OUTPATIENT
Start: 2024-01-16 | End: 2024-01-18 | Stop reason: HOSPADM

## 2024-01-16 RX ORDER — ACETAMINOPHEN 325 MG/1
650 TABLET ORAL EVERY 6 HOURS PRN
Status: DISCONTINUED | OUTPATIENT
Start: 2024-01-16 | End: 2024-01-18 | Stop reason: HOSPADM

## 2024-01-16 RX ORDER — PROCHLORPERAZINE EDISYLATE 5 MG/ML
5 INJECTION INTRAMUSCULAR; INTRAVENOUS EVERY 6 HOURS PRN
Status: DISCONTINUED | OUTPATIENT
Start: 2024-01-16 | End: 2024-01-18 | Stop reason: HOSPADM

## 2024-01-16 RX ORDER — ALUMINUM HYDROXIDE, MAGNESIUM HYDROXIDE, AND SIMETHICONE 1200; 120; 1200 MG/30ML; MG/30ML; MG/30ML
30 SUSPENSION ORAL 4 TIMES DAILY PRN
Status: DISCONTINUED | OUTPATIENT
Start: 2024-01-16 | End: 2024-01-18 | Stop reason: HOSPADM

## 2024-01-16 RX ORDER — SIMETHICONE 80 MG
1 TABLET,CHEWABLE ORAL 4 TIMES DAILY PRN
Status: DISCONTINUED | OUTPATIENT
Start: 2024-01-16 | End: 2024-01-18 | Stop reason: HOSPADM

## 2024-01-16 RX ORDER — POLYETHYLENE GLYCOL 3350 17 G/17G
17 POWDER, FOR SOLUTION ORAL 2 TIMES DAILY PRN
Status: DISCONTINUED | OUTPATIENT
Start: 2024-01-16 | End: 2024-01-18 | Stop reason: HOSPADM

## 2024-01-16 RX ORDER — ENOXAPARIN SODIUM 100 MG/ML
30 INJECTION SUBCUTANEOUS EVERY 24 HOURS
Status: DISCONTINUED | OUTPATIENT
Start: 2024-01-17 | End: 2024-01-18 | Stop reason: HOSPADM

## 2024-01-16 RX ORDER — NALOXONE HCL 0.4 MG/ML
0.02 VIAL (ML) INJECTION
Status: DISCONTINUED | OUTPATIENT
Start: 2024-01-16 | End: 2024-01-18 | Stop reason: HOSPADM

## 2024-01-16 NOTE — FIRST PROVIDER EVALUATION
"Medical screening examination initiated.  I have conducted a focused provider triage encounter, findings are as follows:    Chief Complaint   Patient presents with    Fatigue     Pt c/o weakness. On peritoneal dialysis. Pt reports dialysis equipment at home broken/malfunctioning x 1 week. GCS 15. Last dialyzed on 1/10.     Brief history of present illness:  73 y.o. female presents to the ED via EMS with weakness s/t not having PD run since last Wednesday. States her machine has not been working and she tried contacting the company however has not heard back.     Vitals:    01/16/24 1522   BP: 104/70   Pulse: 96   Resp: 20   Temp: 97.9 °F (36.6 °C)   SpO2: 96%   Weight: 54 kg (119 lb)   Height: 5' 6" (1.676 m)       Pertinent physical exam:  Awake, alert, non-labored respirations    Brief workup plan:  labs, EKG    Preliminary workup initiated; this workup will be continued and followed by the physician or advanced practice provider that is assigned to the patient when roomed.  "

## 2024-01-16 NOTE — ED PROVIDER NOTES
Encounter Date: 1/16/2024    SCRIBE #1 NOTE: I, Rachid Traceybert, am scribing for, and in the presence of,  Freddy Nelson MD. I have scribed the following portions of the note - Other sections scribed: HPI, ROS, PE.   SCRIBE #2 NOTE: I, Leah Fer, am scribing for, and in the presence of,  Freddy Nelson MD. I have scribed the remaining portions of the note not scribed by Scribe #1. Other sections scribed: HPI, ROS, PE.     History     Chief Complaint   Patient presents with    Fatigue     Pt c/o weakness. On peritoneal dialysis. Pt reports dialysis equipment at home broken/malfunctioning x 1 week. GCS 15. Last dialyzed on 1/10.     74 yo female with a PMHx of stage IV CKD, HLD, and HTN reports to the ED for fatigue. Patient is on peritoneal dialysis at home and states that she has not been able to dialyze in 4 days due to her machine malfunctioning. Patient reports decreased appetite. She denies any fever, pain, nausea, vomiting, or noticeable color change in PD fluid when she has been able to dialyze.     The pt's nephrologist is George Mendoza MD.     The history is provided by the patient. No  was used.     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 Glomerulitis with one activated endothelial cell: treated with Solumedrol pulse 375 mg IVBP x 3 doses with short prednisone taper; thymoglobin 100 mg x 6 doses 10/04/2007 minimal histologic changes no acute rejection     Allergy     Anemia of renal disease     CKD (chronic kidney disease), stage IV     CMV (cytomegalovirus) status positive     Colon polyp     benign    Depression     GN (glomerulonephritis)     GN (glomerulonephritis)     unknown type    Herpes infection, other 2009    on buttocks    Hyperlipidemia     Hypertension      Immunosuppression     Living-donor kidney transplant recipient 01/15/2007    Meningitis     history of viral meningitis    Proteinuria 1/5/2015    Proteinuria     Pulmonary nodules     Renal hypertension     S/P breast augmentation     Skin cancer     Urinary tract infection      Past Surgical History:   Procedure Laterality Date    COLON SURGERY      COLONOSCOPY W/ BIOPSIES      cosemetic surgery      facelift and breast augmentation     HYSTERECTOMY      KIDNEY TRANSPLANT      NEPHRECTOMY Left     9/2016    RENAL BIOPSY      TONSILLECTOMY, ADENOIDECTOMY       Family History   Problem Relation Age of Onset    Kidney disease Sister         ESRD    Kidney disease Brother         CKD from HTN    Hypertension Brother     Heart failure Mother     Heart failure Father     Diabetes Neg Hx     Stroke Neg Hx      Social History     Tobacco Use    Smoking status: Former     Current packs/day: 1.00     Average packs/day: 1 pack/day for 15.0 years (15.0 ttl pk-yrs)     Types: Cigarettes    Smokeless tobacco: Never    Tobacco comments:     quit in 2014; smoked for 15 years and at the most smoked 1 ppd   Substance Use Topics    Alcohol use: Yes     Comment: seldom    Drug use: No     Review of Systems   Constitutional:  Positive for appetite change (decreased). Negative for fever.   Gastrointestinal:  Negative for nausea and vomiting.   Musculoskeletal:  Negative for myalgias.       Physical Exam     Initial Vitals [01/16/24 1522]   BP Pulse Resp Temp SpO2   104/70 96 20 97.9 °F (36.6 °C) 96 %      MAP       --         Physical Exam    Nursing note and vitals reviewed.  Constitutional: She appears well-developed and well-nourished. She is not diaphoretic. She appears ill. No distress.   HENT:   Head: Normocephalic and atraumatic.   Nose: Nose normal.   Mouth/Throat: Oropharynx is clear and moist. Mucous membranes are dry.   Eyes: EOM are normal. Pupils are equal, round, and reactive to light.   Neck: Neck supple.   Normal range  of motion.  Cardiovascular:  Normal rate and regular rhythm.           No murmur heard.  Pulses:       Dorsalis pedis pulses are 2+ on the right side and 2+ on the left side.   Pulmonary/Chest: Breath sounds normal. No respiratory distress. She has no wheezes. She has no rales.   Abdominal: Abdomen is soft. She exhibits no distension. There is no abdominal tenderness.   Peritoneal dialysis catheter in place with clear fluid in catheter line.    Musculoskeletal:      Cervical back: Normal range of motion and neck supple.      Comments: Chronic wounds to the bilateral lower extremities.     Neurological: She is alert and oriented to person, place, and time. She has normal strength. No cranial nerve deficit or sensory deficit.   Skin: Skin is warm. Capillary refill takes less than 2 seconds. No rash noted.         ED Course   Procedures  Labs Reviewed   COMPREHENSIVE METABOLIC PANEL - Abnormal; Notable for the following components:       Result Value    Blood Urea Nitrogen 41.5 (*)     Creatinine 8.50 (*)     Calcium Level Total 8.0 (*)     Protein Total 4.7 (*)     Albumin Level 1.8 (*)     Albumin/Globulin Ratio 0.6 (*)     All other components within normal limits   CBC WITH DIFFERENTIAL - Abnormal; Notable for the following components:    WBC 3.96 (*)     RBC 3.18 (*)     Hgb 9.4 (*)     Hct 29.7 (*)     MCHC 31.6 (*)     Lymph # 0.57 (*)     All other components within normal limits   TROPONIN I - Normal   MAGNESIUM - Normal   PHOSPHORUS - Normal   CBC W/ AUTO DIFFERENTIAL    Narrative:     The following orders were created for panel order CBC auto differential.  Procedure                               Abnormality         Status                     ---------                               -----------         ------                     CBC with Differential[[id number]]       Abnormal            Final result                 Please view results for these tests on the individual orders.   URINALYSIS, REFLEX TO URINE  CULTURE        ECG Results              EKG 12-lead (Final result)  Result time 01/16/24 17:33:57      Final result by Interface, Lab In [facility] (01/16/24 17:33:57)                   Narrative:    Test Reason : R53.1,    Vent. Rate : 071 BPM     Atrial Rate : 071 BPM     P-R Int : 152 ms          QRS Dur : 084 ms      QT Int : 418 ms       P-R-T Axes : 046 -30 049 degrees     QTc Int : 454 ms    Sinus rhythm with Premature atrial complexes with Aberrant conduction  Left axis deviation  Minimal voltage criteria for LVH, may be normal variant ( R in aVL )  Abnormal ECG  When compared with ECG of 21-DEC-2023 19:52,  Aberrant conduction is now Present  T wave inversion no longer evident in Inferior leads  T wave inversion no longer evident in Lateral leads  Confirmed by Andrey Meneses MD ([id number]) on 1/16/2024 5:33:48 PM    Referred By: [email or web address]   SELF           Confirmed By:Andrey Meneses MD                                  Imaging Results              X-Ray Chest AP Portable (Final result)  Result time 01/16/24 15:55:02      Final result by Andrew Varela MD (01/16/24 15:55:02)                   Impression:      Stable chronic findings seen no acute process      Electronically signed by: Dariel Varela  Date:    01/16/2024  Time:    15:55               Narrative:    EXAMINATION:  XR CHEST AP PORTABLE    CLINICAL HISTORY:  Weakness    TECHNIQUE:  Single frontal view of the chest was performed.    COMPARISON:  05/12/2022    FINDINGS:  There are chronic appearing lung changes seen bilaterally. No evidence of acute infiltrate is seen. No mass is seen. No lesion is seen.  There are some calcified granulomas in the left upper lobe.  No pleural effusion is seen. The heart appears normal. The aorta appears normal.  There is some calcified lymphadenopathy in the left hilum.  It is stable.  The bones and joints show no acute abnormality.                                       Medications   melatonin tablet 6 mg (has no  administration in time range)   ondansetron injection 4 mg (has no administration in time range)   prochlorperazine injection Soln 5 mg (has no administration in time range)   polyethylene glycol packet 17 g (has no administration in time range)   acetaminophen tablet 650 mg (has no administration in time range)   simethicone chewable tablet 80 mg (has no administration in time range)   aluminum-magnesium hydroxide-simethicone 200-200-20 mg/5 mL suspension 30 mL (has no administration in time range)   naloxone 0.4 mg/mL injection 0.02 mg (has no administration in time range)   enoxaparin injection 30 mg (has no administration in time range)     Medical Decision Making  Problems Addressed:  Weakness: acute illness or injury    Amount and/or Complexity of Data Reviewed  Labs: ordered.  Radiology: ordered and independent interpretation performed. Decision-making details documented in ED Course.    Risk  Decision regarding hospitalization.    Differential diagnosis (includes but is not limited to):   Kidney injury, electrolyte abnormalities, dehydration, peritoneal dialysis catheter malfunction, anemia, ACS, arrhythmia    MDM Narrative  73-year-old female presents for worsening generalized weakness and fatigue over the past several days.  She also reports that her home peritoneal dialysis equipment is not functioning.  Labs are pending.  EKG reviewed.  Chest x-ray pending.  Patient's abdomen is soft and nontender to palpation.  Nephrology consulted, appreciate recommendations.    Update:  Labs reviewed.  Case discussed with Nephrology, recommends admission and case management to help assist with home peritoneal dialysis equipment and they will follow in consult.  Hospitalist consulted and will admit.  Patient and family agree with plan of care and have no questions at this time.    Dispo: Admit    My independent radiology interpretation: as above  Point of care US (independently performed and interpreted):   Decision  rules/clinical scoring:     Sepsis Perfusion Assessment:     Amount and/or Complexity of Data Reviewed  Independent historian: spouse    Summary of history: History corroborated by spouse at bedside  External data reviewed: notes from previous admissions, notes from previous ED visits, and notes from clinic visits  Summary of data reviewed: Prior records reviewed  Risk and benefits of testing: discussed   Labs: ordered and reviewed  Radiology: ordered and independent interpretation performed (see above or ED course)  ECG/medicine tests: ordered and independent interpretation performed (see above or ED course)  Discussion of management or test interpretation with external provider(s): discussed with hospitalist physician and discussed with nephrology consultant   Summary of discussion: as above    Risk  Parenteral controlled substances   Drug therapy requiring intense monitoring for toxicity   Decision regarding hospitalization  Shared decision making     Critical Care  none    Data Reviewed/Counseling: I have personally reviewed the patient's vital signs, nursing notes, and other relevant tests, information, and imaging. I had a detailed discussion regarding the historical points, exam findings, and any diagnostic results supporting the discharge diagnosis. I personally performed the history, PE, MDM and procedures as documented above and agree with the scribe's documentation.    Portions of this note were dictated using voice recognition software. Although it was reviewed for accuracy, some inherent voice recognition errors may have occurred and may be present in this document.         Scribe Attestation:   Scribe #1: I performed the above scribed service and the documentation accurately describes the services I performed. I attest to the accuracy of the note.  Scribe #2: I performed the above scribed service and the documentation accurately describes the services I performed. I attest to the accuracy of the  note.    Attending Attestation:           Physician Attestation for Scribe:  Physician Attestation Statement for Scribe #1: ILeslie Michael C., MD, reviewed documentation, as scribed by Rachid Palomino in my presence, and it is both accurate and complete.   Physician Attestation Statement for Scribe #2: Freddy GROVE MD, reviewed documentation, as scribed by Leah Monroe in my presence, and it is both accurate and complete. I also acknowledge and confirm the content of the note done by Scribe #1.          ED Course as of 01/17/24 0017 Tue Jan 16, 2024   1600 EKG independently interpreted by me.  EKG: SR @ 71, no STEMI, PACs, LVH, Qtc 454 [MC]   1618 X-Ray Chest AP Portable  Independently visualized/reviewed by me during the ED visit.  - Chronic hazy opacities to BUL, appears stable from previous [MC]      ED Course User Index  [MC] Freddy Nelson MD                           Clinical Impression:  Final diagnoses:  [R53.1] Weakness  [Z99.2] Dependence on peritoneal dialysis (Primary)          ED Disposition Condition    Admit Stable                Freddy Nelson MD  01/17/24 0017

## 2024-01-17 PROBLEM — R53.1 WEAKNESS: Status: ACTIVE | Noted: 2024-01-17

## 2024-01-17 LAB
ALBUMIN SERPL-MCNC: 1.7 G/DL (ref 3.4–4.8)
ALBUMIN/GLOB SERPL: 0.6 RATIO (ref 1.1–2)
ALP SERPL-CCNC: 118 UNIT/L (ref 40–150)
ALT SERPL-CCNC: 17 UNIT/L (ref 0–55)
AST SERPL-CCNC: 27 UNIT/L (ref 5–34)
AV INDEX (PROSTH): 0.73
AV MEAN GRADIENT: 3 MMHG
AV PEAK GRADIENT: 6 MMHG
AV VALVE AREA BY VELOCITY RATIO: 2.17 CM²
AV VALVE AREA: 2.3 CM²
AV VELOCITY RATIO: 0.69
BASOPHILS # BLD AUTO: 0.03 X10(3)/MCL
BASOPHILS NFR BLD AUTO: 0.9 %
BILIRUB SERPL-MCNC: 0.3 MG/DL
BSA FOR ECHO PROCEDURE: 1.69 M2
BUN SERPL-MCNC: 46.6 MG/DL (ref 9.8–20.1)
CALCIUM SERPL-MCNC: 8 MG/DL (ref 8.4–10.2)
CHLORIDE SERPL-SCNC: 103 MMOL/L (ref 98–107)
CO2 SERPL-SCNC: 23 MMOL/L (ref 23–31)
CREAT SERPL-MCNC: 9.46 MG/DL (ref 0.55–1.02)
CV ECHO LV RWT: 0.38 CM
DOP CALC AO PEAK VEL: 1.2 M/S
DOP CALC AO VTI: 22.9 CM
DOP CALC LVOT AREA: 3.1 CM2
DOP CALC LVOT DIAMETER: 2 CM
DOP CALC LVOT PEAK VEL: 0.83 M/S
DOP CALC LVOT STROKE VOLUME: 52.75 CM3
DOP CALC MV VTI: 26 CM
DOP CALCLVOT PEAK VEL VTI: 16.8 CM
E WAVE DECELERATION TIME: 293 MSEC
E/A RATIO: 0.57
E/E' RATIO: 7.5 M/S
ECHO LV POSTERIOR WALL: 0.86 CM (ref 0.6–1.1)
EOSINOPHIL # BLD AUTO: 0.35 X10(3)/MCL (ref 0–0.9)
EOSINOPHIL MANUAL BF (OHS): 1 %
EOSINOPHIL NFR BLD AUTO: 11.1 %
ERYTHROCYTE [DISTWIDTH] IN BLOOD BY AUTOMATED COUNT: 15.1 % (ref 11.5–17)
FRACTIONAL SHORTENING: 31 % (ref 28–44)
GFR SERPLBLD CREATININE-BSD FMLA CKD-EPI: 4 MLS/MIN/1.73/M2
GLOBULIN SER-MCNC: 2.8 GM/DL (ref 2.4–3.5)
GLUCOSE SERPL-MCNC: 74 MG/DL (ref 82–115)
HBV SURFACE AB SER-ACNC: 18.5 MIU/ML
HBV SURFACE AB SERPL IA-ACNC: REACTIVE M[IU]/ML
HBV SURFACE AG SERPL QL IA: NONREACTIVE
HCT VFR BLD AUTO: 32.7 % (ref 37–47)
HGB BLD-MCNC: 10.1 G/DL (ref 12–16)
IMM GRANULOCYTES # BLD AUTO: 0.02 X10(3)/MCL (ref 0–0.04)
IMM GRANULOCYTES NFR BLD AUTO: 0.6 %
INTERVENTRICULAR SEPTUM: 1.05 CM (ref 0.6–1.1)
KOH PREP SPEC: NORMAL
LEFT ATRIUM SIZE: 3.5 CM
LEFT ATRIUM VOLUME INDEX MOD: 14.2 ML/M2
LEFT ATRIUM VOLUME MOD: 24.1 CM3
LEFT INTERNAL DIMENSION IN SYSTOLE: 3.14 CM (ref 2.1–4)
LEFT VENTRICLE DIASTOLIC VOLUME INDEX: 55.53 ML/M2
LEFT VENTRICLE DIASTOLIC VOLUME: 94.4 ML
LEFT VENTRICLE MASS INDEX: 86 G/M2
LEFT VENTRICLE SYSTOLIC VOLUME INDEX: 23 ML/M2
LEFT VENTRICLE SYSTOLIC VOLUME: 39.1 ML
LEFT VENTRICULAR INTERNAL DIMENSION IN DIASTOLE: 4.54 CM (ref 3.5–6)
LEFT VENTRICULAR MASS: 146 G
LV LATERAL E/E' RATIO: 5.45 M/S
LV SEPTAL E/E' RATIO: 12 M/S
LVOT MG: 1 MMHG
LVOT MV: 0.56 CM/S
LYMPHOCYTE MANUAL BF (OHS): 74 %
LYMPHOCYTES # BLD AUTO: 0.94 X10(3)/MCL (ref 0.6–4.6)
LYMPHOCYTES NFR BLD AUTO: 29.7 %
MAGNESIUM SERPL-MCNC: 2.1 MG/DL (ref 1.6–2.6)
MCH RBC QN AUTO: 29.5 PG (ref 27–31)
MCHC RBC AUTO-ENTMCNC: 30.9 G/DL (ref 33–36)
MCV RBC AUTO: 95.6 FL (ref 80–94)
MESOTHELIAL CELLS, FLUID MAN COUNT (OHS): 1
MONOCYTE MANUAL BF (OHS): 13 %
MONOCYTES # BLD AUTO: 0.39 X10(3)/MCL (ref 0.1–1.3)
MONOCYTES NFR BLD AUTO: 12.3 %
MV MEAN GRADIENT: 2 MMHG
MV PEAK A VEL: 1.05 M/S
MV PEAK E VEL: 0.6 M/S
MV PEAK GRADIENT: 5 MMHG
MV STENOSIS PRESSURE HALF TIME: 42 MS
MV VALVE AREA BY CONTINUITY EQUATION: 2.03 CM2
MV VALVE AREA P 1/2 METHOD: 5.24 CM2
NEUTROPHILS # BLD AUTO: 1.43 X10(3)/MCL (ref 2.1–9.2)
NEUTROPHILS MAN BF (OHS): 12 %
NEUTROPHILS NFR BLD AUTO: 45.4 %
NRBC BLD AUTO-RTO: 0 %
OHS LV EJECTION FRACTION SIMPSONS BIPLANE MOD: 61 %
PHOSPHATE SERPL-MCNC: 4.4 MG/DL (ref 2.3–4.7)
PISA TR MAX VEL: 2.15 M/S
PLATELET # BLD AUTO: 181 X10(3)/MCL (ref 130–400)
PMV BLD AUTO: 9.8 FL (ref 7.4–10.4)
POTASSIUM SERPL-SCNC: 3.7 MMOL/L (ref 3.5–5.1)
PROT SERPL-MCNC: 4.5 GM/DL (ref 5.8–7.6)
PV PEAK GRADIENT: 3 MMHG
PV PEAK VELOCITY: 0.87 M/S
RA PRESSURE ESTIMATED: 3 MMHG
RBC # BLD AUTO: 3.42 X10(6)/MCL (ref 4.2–5.4)
RV TB RVSP: 5 MMHG
SODIUM SERPL-SCNC: 138 MMOL/L (ref 136–145)
TDI LATERAL: 0.11 M/S
TDI SEPTAL: 0.05 M/S
TDI: 0.08 M/S
TR MAX PG: 18 MMHG
TRICUSPID ANNULAR PLANE SYSTOLIC EXCURSION: 1.71 CM
TV REST PULMONARY ARTERY PRESSURE: 21 MMHG
WBC # FLD AUTO: 145 /UL
WBC # SPEC AUTO: 3.16 X10(3)/MCL (ref 4.5–11.5)
Z-SCORE OF LEFT VENTRICULAR DIMENSION IN END DIASTOLE: -0.4
Z-SCORE OF LEFT VENTRICULAR DIMENSION IN END SYSTOLE: 0.55

## 2024-01-17 PROCEDURE — 90945 DIALYSIS ONE EVALUATION: CPT

## 2024-01-17 PROCEDURE — 89051 BODY FLUID CELL COUNT: CPT | Performed by: NURSE PRACTITIONER

## 2024-01-17 PROCEDURE — 87340 HEPATITIS B SURFACE AG IA: CPT | Performed by: NURSE PRACTITIONER

## 2024-01-17 PROCEDURE — 86706 HEP B SURFACE ANTIBODY: CPT | Performed by: NURSE PRACTITIONER

## 2024-01-17 PROCEDURE — 83735 ASSAY OF MAGNESIUM: CPT | Performed by: NURSE PRACTITIONER

## 2024-01-17 PROCEDURE — 25000003 PHARM REV CODE 250: Performed by: NURSE PRACTITIONER

## 2024-01-17 PROCEDURE — 87205 SMEAR GRAM STAIN: CPT | Performed by: NURSE PRACTITIONER

## 2024-01-17 PROCEDURE — 87220 TISSUE EXAM FOR FUNGI: CPT | Performed by: NURSE PRACTITIONER

## 2024-01-17 PROCEDURE — 85025 COMPLETE CBC W/AUTO DIFF WBC: CPT | Performed by: NURSE PRACTITIONER

## 2024-01-17 PROCEDURE — 3E1M39Z IRRIGATION OF PERITONEAL CAVITY USING DIALYSATE, PERCUTANEOUS APPROACH: ICD-10-PCS | Performed by: STUDENT IN AN ORGANIZED HEALTH CARE EDUCATION/TRAINING PROGRAM

## 2024-01-17 PROCEDURE — 21400001 HC TELEMETRY ROOM

## 2024-01-17 PROCEDURE — 27200950 HC CAPD SUPPORT

## 2024-01-17 PROCEDURE — 87070 CULTURE OTHR SPECIMN AEROBIC: CPT | Performed by: NURSE PRACTITIONER

## 2024-01-17 PROCEDURE — 84100 ASSAY OF PHOSPHORUS: CPT | Performed by: NURSE PRACTITIONER

## 2024-01-17 PROCEDURE — 80053 COMPREHEN METABOLIC PANEL: CPT | Performed by: NURSE PRACTITIONER

## 2024-01-17 RX ORDER — FOLIC ACID 1 MG/1
1 TABLET ORAL DAILY
Status: DISCONTINUED | OUTPATIENT
Start: 2024-01-17 | End: 2024-01-18 | Stop reason: HOSPADM

## 2024-01-17 RX ORDER — ASPIRIN 81 MG/1
81 TABLET ORAL DAILY
Status: DISCONTINUED | OUTPATIENT
Start: 2024-01-17 | End: 2024-01-18 | Stop reason: HOSPADM

## 2024-01-17 RX ORDER — CALCITRIOL 0.25 UG/1
0.25 CAPSULE ORAL
Status: DISCONTINUED | OUTPATIENT
Start: 2024-01-17 | End: 2024-01-18 | Stop reason: HOSPADM

## 2024-01-17 RX ADMIN — ASPIRIN 81 MG: 81 TABLET, COATED ORAL at 09:01

## 2024-01-17 RX ADMIN — NEPHROCAP 1 CAPSULE: 1 CAP ORAL at 09:01

## 2024-01-17 RX ADMIN — FOLIC ACID 1 MG: 1 TABLET ORAL at 09:01

## 2024-01-17 RX ADMIN — CALCITRIOL CAPSULES 0.25 MCG 0.25 MCG: 0.25 CAPSULE ORAL at 09:01

## 2024-01-17 NOTE — PROGRESS NOTES
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 Glomerulitis with one activated endothelial cell: treated with Solumedrol pulse 375 mg IVBP x 3 doses with short prednisone taper; thymoglobin 100 mg x 6 doses 10/04/2007 minimal histologic changes no acute rejection     Allergy     Anemia of renal disease     CKD (chronic kidney disease), stage IV     CMV (cytomegalovirus) status positive     Colon polyp     benign    Depression     GN (glomerulonephritis)     GN (glomerulonephritis)     unknown type    Herpes infection, other 2009    on buttocks    Hyperlipidemia     Hypertension     Immunosuppression     Living-donor kidney transplant recipient 01/15/2007    Meningitis     history of viral meningitis    Proteinuria 1/5/2015    Proteinuria     Pulmonary nodules     Renal hypertension     S/P breast augmentation     Skin cancer     Urinary tract infection        Social History     Socioeconomic History    Marital status:    Tobacco Use    Smoking status: Former     Current packs/day: 1.00     Average packs/day: 1 pack/day for 15.0 years (15.0 ttl pk-yrs)     Types: Cigarettes    Smokeless tobacco: Never    Tobacco comments:     quit in 2014; smoked for 15 years and at the most smoked 1 ppd   Substance and Sexual Activity    Alcohol use: Yes     Comment: seldom    Drug use: No    Sexual activity: Never   Social History Narrative    Likes to travel and follow [place] (has season tickets).    Lives with , 2 dogs and 3 cats    Used to work in real estate and banking       Precautions:     Allergies as of 01/16/2024 - Reviewed 01/16/2024   Allergen Reaction Noted    Codeine Palpitations and Rash 01/07/2013       [facility] Assessment Details/Treatment         01/17/24 1036   WOCN Assessment   Visit Date 01/17/24   Visit Time 1036   Consult Type New   Intervention assessed;chart review;orders   Teaching on-going        Altered Skin Integrity 01/16/24 2242 Right lower;proximal;dorsal Arm   Date First Assessed/Time First Assessed: 01/16/24 2242   Altered Skin Integrity Present on Admission - Did Patient arrive to the hospital with altered skin?: yes  Side: Right  Orientation: lower;proximal;dorsal  Location: Arm   Wound Image    Dressing Appearance Dry;Intact;Clean   Drainage Amount Scant   Drainage Characteristics/Odor Serosanguineous   Appearance Red   Tissue loss description Partial thickness   Red (%), Wound Tissue Color 100 %   Periwound Area Intact;Dry   Wound Edges Irregular   Wound Length (cm) 3.5 cm   Wound Width (cm) 2 cm   Wound Depth (cm) 0.1 cm   Wound Volume (cm^3) 0.7 cm^3   Wound Surface Area (cm^2) 7 cm^2   Care Cleansed with:;Antimicrobial agent   Dressing Applied;Non-adherent;Foam        Altered Skin Integrity 01/16/24 2243 Nose   Date First Assessed/Time First Assessed: 01/16/24 2243   Altered Skin Integrity Present on Admission - Did Patient arrive to the hospital with altered skin?: yes  Location: Nose   Wound Image    Dressing Appearance Open to air   Drainage Amount None   Appearance Red   Tissue loss description Partial thickness   Red (%), Wound Tissue Color 100 %   Periwound Area Intact   Wound Edges Defined   Wound Length (cm) 1 cm   Wound Width (cm) 1 cm   Wound Surface Area (cm^2) 1 cm^2   Care Cleansed with:;Antimicrobial agent        Incision/Site Right leg lower   No Date First Assessed or Time First Assessed found.   Present Prior to Hospital Arrival?: Yes  Side: Right  Location: leg  Orientation: lower  Closure Method: sutures   Wound Image    Dressing Appearance Dry;Intact;Clean   Drainage Amount Small   Drainage Characteristics/Odor Serosanguineous   Appearance Red;Yellow;Fibrin   Red (%), Wound Tissue Color 100 %   Periwound Area Dry    Wound Edges Defined   Wound Length (cm) 4 cm   Wound Width (cm) 3.5 cm   Wound Surface Area (cm^2) 14 cm^2   Care Cleansed with:;Sterile normal saline   Dressing Applied;Gauze, wet to dry;Absorptive Pad;Rolled gauze     WOCN consulted for left right arm, right leg and nose. Discussed with nurse. Patient had a fall approximately 5 days ago. Treatment recommendations put into place. Right leg: Cleanse with vashe. Apply vashe moistened 4x4, Abd pad, Wrap with kerlix. Secure with medipore tape. Change BID and PRN soilage. Right arm Skin tear: Cleanse with vashe. Apply adaptic, cover with mepilex bordered foam. Change daily. No adult briefs while in bed. Nursing to continue with turning every 2 hours, wedge, and float heels. Will follow up.     01/17/2024

## 2024-01-17 NOTE — CONSULTS
[facility] - 9th Floor Med Surg  Nephrology  Consult Note    Patient Name: Malissa Schwartz  MRN: [id number]  Admission Date: 1/16/2024  Hospital Length of Stay: 1 days  Attending Provider: Catie Shepard DO   Primary Care Physician: Sergio Crabtree MD  Principal Problem:Weakness    Consults  Subjective:     HPI: This is a 73-year-old  female with ESRD on peritoneal dialysis followed by Dr Mendoza. She was treated for staph epidermis peritonitis (12/21) during last admission. DC home with 14 days of IP Vancomycin per ID recommendations. Patient presented to ED on 1/16 with weakness. Last dialysis was Wednesday due to broken machine. At present she is weakened. Noted abrasion to nose and RLE from falls at home. She endorses some lower abdominal tenderness. Otherwise no complaints. Unable to tell me if she completed her treatment of peritonitis or what the PD fluid looked like last treatment.     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 Glomerulitis with one activated endothelial cell: treated with Solumedrol pulse 375 mg IVBP x 3 doses with short prednisone taper; thymoglobin 100 mg x 6 doses 10/04/2007 minimal histologic changes no acute rejection     Allergy     Anemia of renal disease     CKD (chronic kidney disease), stage IV     CMV (cytomegalovirus) status positive     Colon polyp     benign    Depression     GN (glomerulonephritis)     GN (glomerulonephritis)     unknown type    Herpes infection, other 2009    on buttocks    Hyperlipidemia     Hypertension     Immunosuppression     Living-donor kidney transplant recipient 01/15/2007    Meningitis     history of viral meningitis    Proteinuria 1/5/2015    Proteinuria     Pulmonary nodules     Renal hypertension     S/P breast augmentation     Skin cancer     Urinary tract infection        Past Surgical History:   Procedure Laterality Date     COLON SURGERY      COLONOSCOPY W/ BIOPSIES      cosemetic surgery      facelift and breast augmentation     HYSTERECTOMY      KIDNEY TRANSPLANT      NEPHRECTOMY Left     9/2016    RENAL BIOPSY      TONSILLECTOMY, ADENOIDECTOMY         Review of patient's allergies indicates:   Allergen Reactions    Codeine Palpitations and Rash     Current Facility-Administered Medications   Medication Frequency    acetaminophen tablet 650 mg Q6H PRN    aluminum-magnesium hydroxide-simethicone 200-200-20 mg/5 mL suspension 30 mL QID PRN    aspirin EC tablet 81 mg Daily    calcitRIOL capsule 0.25 mcg Once per day on Mon Wed Fri    enoxaparin injection 30 mg Daily    folic acid tablet 1 mg Daily    melatonin tablet 6 mg Nightly PRN    naloxone 0.4 mg/mL injection 0.02 mg PRN    ondansetron injection 4 mg Q4H PRN    polyethylene glycol packet 17 g BID PRN    prochlorperazine injection Soln 5 mg Q6H PRN    simethicone chewable tablet 80 mg QID PRN    vitamin renal formula (B-complex-vitamin c-folic acid) 1 mg per capsule 1 capsule Daily     Family History       Problem Relation (Age of Onset)    Heart failure Mother, Father    Hypertension Brother    Kidney disease Sister, Brother          Tobacco Use    Smoking status: Former     Current packs/day: 1.00     Average packs/day: 1 pack/day for 15.0 years (15.0 ttl pk-yrs)     Types: Cigarettes    Smokeless tobacco: Never    Tobacco comments:     quit in 2014; smoked for 15 years and at the most smoked 1 ppd   Substance and Sexual Activity    Alcohol use: Yes     Comment: seldom    Drug use: No    Sexual activity: Never     Review of Systems   Constitutional:  Negative for chills and fever.   Respiratory:  Negative for shortness of breath.    Cardiovascular:  Positive for leg swelling. Negative for chest pain.   Gastrointestinal:  Positive for abdominal pain.   Neurological:  Positive for weakness.     Objective:     Vital Signs (Most Recent):  Temp: 97.7 °F (36.5 °C) (01/17/24  "0734)  Pulse: 65 (01/17/24 0734)  Resp: 18 (01/17/24 0734)  BP: 134/78 (01/17/24 0734)  SpO2: 100 % (01/17/24 0734) Vital Signs (24h Range):  Temp:  [97.6 °F (36.4 °C)-97.9 °F (36.6 °C)] 97.7 °F (36.5 °C)  Pulse:  [59-96] 65  Resp:  [10-20] 18  SpO2:  [94 %-100 %] 100 %  BP: (104-144)/() 134/78     Weight: 61.7 kg (136 lb 0.4 oz) (01/16/24 2368)  Body mass index is 21.95 kg/m².  Body surface area is 1.7 meters squared.    I/O last 3 completed shifts:  In: 240 [P.O.:240]  Out: -     Physical Exam  Constitutional:       General: She is not in acute distress.     Appearance: She is ill-appearing.   HENT:      Head: Atraumatic.      Nose: Nose normal.      Mouth/Throat:      Mouth: Mucous membranes are moist.   Eyes:      Extraocular Movements: Extraocular movements intact.      Conjunctiva/sclera: Conjunctivae normal.   Cardiovascular:      Rate and Rhythm: Normal rate and regular rhythm.      Pulses: Normal pulses.      Heart sounds: Normal heart sounds.   Pulmonary:      Effort: Pulmonary effort is normal.      Breath sounds: Normal breath sounds.   Abdominal:      Tenderness: There is abdominal tenderness.      Comments: Midline lower abdominal tenderness, PD cath    Musculoskeletal:      Cervical back: Neck supple.   Skin:     General: Skin is warm and dry.      Comments: RLE abrasion covered in guaze wrap   Neurological:      General: No focal deficit present.      Mental Status: She is alert.         Significant Labs:  BMP:   Recent Labs   Lab 01/17/24  0439      K 3.7   CO2 23   BUN 46.6*   CREATININE 9.46*   CALCIUM 8.0*   MG 2.10     CBC:   Recent Labs   Lab 01/17/24  0439   WBC 3.16*   RBC 3.42*   HGB 10.1*   HCT 32.7*      MCV 95.6*   MCH 29.5   MCHC 30.9*     Microbiology Results (last 7 days)       ** No results found for the last 168 hours. **          Specimen (24h ago, onward)      None          No results for input(s): "COLORU", "CLARITYU", "SPECGRAV", "PHUR", "PROTEINUA", " ""GLUCOSEU", "BILIRUBINCON", "BLOODU", "WBCU", "RBCU", "BACTERIA", "MUCUS", "NITRITE", "LEUKOCYTESUR", "UROBILINOGEN", "HYALINECASTS" in the last 168 hours.    Significant Imaging:  X-Ray Chest AP Portable [[id number]] Resulted: 01/16/24 1555   Order Status: Completed Updated: 01/16/24 7183   Narrative:     EXAMINATION:  XR CHEST AP PORTABLE    CLINICAL HISTORY:  Weakness    TECHNIQUE:  Single frontal view of the chest was performed.    COMPARISON:  05/12/2022    FINDINGS:  There are chronic appearing lung changes seen bilaterally. No evidence of acute infiltrate is seen. No mass is seen. No lesion is seen.  There are some calcified granulomas in the left upper lobe.  No pleural effusion is seen. The heart appears normal. The aorta appears normal.  There is some calcified lymphadenopathy in the left hilum.  It is stable.  The bones and joints show no acute abnormality.   Impression:       Stable chronic findings seen no acute process      Electronically signed by: Dariel Varela  Date: 01/16/2024  Time: 15:55       Assessment/Plan:   ESRD on CCPD - failed renal transplant   Lapse in dialysis due to machine malfunction   Weakness   Recent peritonitis in December 2023 treated with 14 days of IP Vanc     Resume home prescription of 2.4 L exchange x4 with dextrose 1.5%, last fill 1.5 L total treatment time 10 hours   -Discussed case with home therapies nurse, Francy. They are following up with machine issues. Patient spoke with tech support and was unable to follow directions to trouble shoot and neither was her . Unable to tell me if PD fluid is cloudy though she does endorse lower abdominal tenderness. Patient may not be appropriate to continue PD at this time if she cannot follow directions to troubleshoot issue at home. We will make decision prior to discharge.   -In the meantime I will rule out peritonitis and send fluid sample to lab.       GABRIELA Flores  Nephrology  [facility] - 9th " Floor Med Surg

## 2024-01-17 NOTE — H&P
[facility] Hospital Medicine History & Physical Examination       Patient Name: Malissa Schwartz  MRN: [id number]  Patient Class: IP- Inpatient   Admission Date: 1/16/2024  3:25 PM  Length of Stay: 0  Admitting Service: Hospital Medicine   Attending Physician: Naga Wright MD   Primary Care Provider: Sergio Crabtree MD  History source: EMR, patient and/or patient's family    CHIEF COMPLAINT   Fatigue (Pt c/o weakness. On peritoneal dialysis. Pt reports dialysis equipment at home broken/malfunctioning x 1 week. GCS 15. Last dialyzed on 1/10.)      HISTORY OF PRESENT ILLNESS:   Ms. Schwartz is a 73 year old female with a pmh of HTN, HLD, ESRD on PD, anemia of chronic disease, depression, skin cancer who presented to the ED with c/o fatigue x 3 days. She states he does PD at home but has not been able to dialyze in 4 days to her machine malfunctioning.  She denies any chest pain, shortness of breath, fever, nausea, vomiting.    ED vitals on arrival:  Temp 97.9° F, pulse 96, resp 20, /70, SpO2 96% on RA.  Today's ED lab work revealed H&H 9.4/29.7, BUN/CR 41.5/8.5.  CXR showed no acute process.  EKG showed NSR with PACs with aberrant conduction.  Left axis deviation. Nephrology was consulted, she was admitted to hospital medicine for management.      PAST MEDICAL HISTORY:     ESRD on PD   Anemia of chronic disease   Essential hypertension  Hyperlipidemia  Depression   Prior renal transplant/rejection 2007  Skin cancer    PAST SURGICAL HISTORY:     Past Surgical History:   Procedure Laterality Date    COLON SURGERY      COLONOSCOPY W/ BIOPSIES      cosemetic surgery      facelift and breast augmentation     HYSTERECTOMY      KIDNEY TRANSPLANT      NEPHRECTOMY Left     9/2016    RENAL BIOPSY      TONSILLECTOMY, ADENOIDECTOMY         ALLERGIES:   Codeine    FAMILY HISTORY:   Reviewed and non-contributory     SOCIAL HISTORY:     Social History     Tobacco Use    Smoking status:  Former     Current packs/day: 1.00     Average packs/day: 1 pack/day for 15.0 years (15.0 ttl pk-yrs)     Types: Cigarettes    Smokeless tobacco: Never    Tobacco comments:     quit in 2014; smoked for 15 years and at the most smoked 1 ppd   Substance Use Topics    Alcohol use: Yes     Comment: seldom        HOME MEDICATIONS:     Prior to Admission medications    Medication Sig Start Date End Date Taking? Authorizing Provider   aspirin (ECOTRIN) 81 MG EC tablet Take 81 mg by mouth once daily.    Provider, Historical   B complex-vitamin C-folic acid (NEPHRO-JENN) 0.8 mg Tab Take 0.8 mg by mouth Daily.    Provider, Historical   calcitRIOL (ROCALTROL) 0.25 MCG Cap Take 0.25 mcg by mouth 3 (three) times a week.    Provider, Historical   cephALEXin (KEFLEX) 500 MG capsule Take 1 capsule (500 mg total) by mouth every 12 (twelve) hours. for 7 days 1/8/24 1/15/24  Nikhil Marmolejo PA-C   cloNIDine 0.3 mg/24 hr td ptwk (CATAPRES) 0.3 mg/24 hr Place 1 patch onto the skin every 7 days.    Provider, Historical   folic acid (FOLVITE) 1 MG tablet Take 1 mg by mouth once daily.    Provider, Historical   potassium chloride (KLOR-CON) 10 MEQ TbSR Take 10 mEq by mouth once.    Provider, Historical   sodium bicarbonate 650 MG tablet Take 650 mg by mouth 2 (two) times daily.    Provider, Historical   spironolactone (ALDACTONE) 25 MG tablet Take 25 mg by mouth once daily.    Provider, Historical   losartan (COZAAR) 100 MG tablet TAKE ONE TABLET BY MOUTH ONCE DAILY 1/28/20 5/13/22  Salvador Moeller MD   metoprolol succinate (TOPROL-XL) 25 MG 24 hr tablet Take 2 tablets (50 mg total) by mouth once daily.  Patient not taking: Reported on 5/12/2022 3/10/17 5/13/22  Linda Nelson MD   mycophenolate (CELLCEPT) 250 mg Cap Take 2 capsules (500 mg total) by mouth 2 (two) times daily. Z94.0 kidney transplant  Patient not taking: Reported on 5/12/2022 7/25/19 5/13/22  Nicole Trejo MD   tacrolimus (PROGRAF) 1 MG Cap Take  "3mg every AM and 2mg every PM.  Route: Oral.  Z94.0 Kidney Transplant.  Patient not taking: Reported on 5/12/2022 7/26/19 5/13/22  Nicole Trejo MD       REVIEW OF SYSTEMS:   Except as documented, all other systems reviewed and negative     PHYSICAL EXAM:   T 97.8 °F (36.6 °C)   /81   P 67   RR 16   O2 99 %  GENERAL: Well developed, well nourished. In no acute distress, non-toxic appearing.   HEENT: normocephalic atraumatic   NECK: supple   LUNGS: Clear bilaterally, no wheezing or rales, no accessory muscle use   CVS: Regular rate and rhythm, normal peripheral perfusion  ABD: Soft, non-tender, non-distended, bowel sounds present  EXTREMITIES: no clubbing or cyanosis  SKIN: Dry, flaky. RLE chronic appearing wound, skin tear to right forearm, wound/abrasion to nose  NEURO: alert and oriented, grossly without focal deficits   PSYCHIATRIC: Cooperative    LABS AND IMAGING:     Recent Labs     01/16/24  1556   WBC 3.96*   RBC 3.18*   HGB 9.4*   HCT 29.7*   MCV 93.4   MCH 29.6   MCHC 31.6*   RDW 15.1        No results for input(s): "LACTIC" in the last 72 hours.  No results for input(s): "INR", "APTT", "D-DIMER" in the last 72 hours.  No results for input(s): "HGBA1C", "CHOL", "TRIG", "LDL", "VLDL", "HDL" in the last 72 hours.   Recent Labs     01/16/24  1556      K 3.6   CHLORIDE 99   CO2 27   BUN 41.5*   CREATININE 8.50*   GLUCOSE 106   CALCIUM 8.0*   MG 1.90   PHOS 3.8   ALBUMIN 1.8*   GLOBULIN 2.9   ALKPHOS 126   ALT 20   AST 27   BILITOT 0.2     Recent Labs     01/16/24  1556   TROPONINI <0.010          X-Ray Chest AP Portable  Narrative: EXAMINATION:  XR CHEST AP PORTABLE    CLINICAL HISTORY:  Weakness    TECHNIQUE:  Single frontal view of the chest was performed.    COMPARISON:  05/12/2022    FINDINGS:  There are chronic appearing lung changes seen bilaterally. No evidence of acute infiltrate is seen. No mass is seen. No lesion is seen.  There are some calcified granulomas in the left " upper lobe.  No pleural effusion is seen. The heart appears normal. The aorta appears normal.  There is some calcified lymphadenopathy in the left hilum.  It is stable.  The bones and joints show no acute abnormality.  Impression: Stable chronic findings seen no acute process    Electronically signed by: Dariel Varela  Date:    01/16/2024  Time:    15:55      ASSESSMENT & PLAN:     1.  Generalized weakness 2/2 multiple missed peritoneal dialysis sessions    History:  HTN, HLD, ESRD on PD, anemia of chronic disease, depression, skin cancer    PLAN:  -Nephrology consulted for inpatient PD  -Case management consulted for assistance with PD equipment  -Wound care consult  -PT/OT eval and treat  -Daily weights  -Strict I & O  -Resume home meds as appropriate  -Labs in AM    I, Sonam Bartholomew NP have reviewed and discussed this case with Dr. Wright.  Please see addendum for further assessment and plan from attending MD.    DVT prophylaxis: Lovenox  Code status: Full    ________________________________________________________________________  I, Dr. Naga Wright assumed care of this patient.  For the patient encounter, I performed the substantive portion of the visit, I reviewed the NPPA documentation, treatment plan, and medical decision making.  I had face to face time with this patient.  I have personally reviewed the labs and test results that are presently available. I have reviewed or attempted to review medical records based upon their availability. If patient was admitted under observational status it is with my approval.      73-year-old female on peritoneal dialysis presented to the ER due to her home dialysis equipment being broken or malfunctioning.  She was not dialyzed in the last 4 days.  Only complaint is weakness.  She has a benign abdomen on exam, right lower extremity chronic appearing wound, and agree with remainder of above.  Nephrology consulted for peritoneal dialysis and case management  consult to assist with obtaining the correct home equipment.    Time seen:  11:00 p.m. 01/16/2024  Naga Wright MD

## 2024-01-17 NOTE — PROGRESS NOTES
[facility] Hospital Medicine Progress Note        Chief Complaint: Inpatient Follow-up for     Subjective:    Ms. Schwartz is a 73 year old female with a pmh of HTN, HLD, ESRD on PD, anemia of chronic disease, depression, skin cancer who presented to the ED with c/o fatigue x 3 days. She states he does PD at home but has not been able to dialyze in 4 days to her machine malfunctioning.  She denies any chest pain, shortness of breath, fever, nausea, vomiting.     ED vitals on arrival:  Temp 97.9° F, pulse 96, resp 20, /70, SpO2 96% on RA.  Today's ED lab work revealed H&H 9.4/29.7, BUN/CR 41.5/8.5.  CXR showed no acute process.  EKG showed NSR with PACs with aberrant conduction.  Left axis deviation. Nephrology was consulted, she was admitted to hospital medicine for management.     - Patient has presented to the hospital with falls and not able to receive dialysis.    - She states that fall started after failed dialysis episodes.  She was feeling weak.    At this time she is afebrile and vitals stable.  Denying any abdominal pain nausea vomiting or diarrhea.    She has right kneecap abrasion and also right lower calf abrasion does are clean with no smell or drainage and no surrounding erythema with no suggestion of infection.     Objective/physical exam:  General: In no acute distress, AAOx3   Chest: Clear to auscultation bilaterally, non-labored   Heart: RRR, +S1, S2, no appreciable murmur  Abdomen: Soft, nontender, BS +  MSK: Warm, no lower extremity edema, no clubbing or cyanosis  Neurologic:  Cranial nerve II-XII intact, Strength 5/5 in all 4 extremities    VITAL SIGNS: 24 HRS MIN & MAX LAST   Temp  Min: 97.6 °F (36.4 °C)  Max: 98.6 °F (37 °C) 98.6 °F (37 °C)   BP  Min: 120/85  Max: 156/83 136/77   Pulse  Min: 59  Max: 77  72   Resp  Min: 12  Max: 18 18   SpO2  Min: 94 %  Max: 100 % 99 %       Labs, Microbiology and Imaging were Reviewed.      Microbiology Results (last 7 days)        Procedure Component Value Units Date/Time    Body Fluid Culture [[id number]]     Order Status: Sent Specimen: Abdominal Fluid from Peritoneal Fluid     KOH Prep [[id number]]     Order Status: Sent Specimen: Body Fluid from Peritoneal     Gram Stain [[id number]]     Order Status: Sent Specimen: Body Fluid from Peritoneal                Medications   aspirin  81 mg Oral Daily    calcitRIOL  0.25 mcg Oral Once per day on Mon Wed Fri    enoxparin  30 mg Subcutaneous Daily    folic acid  1 mg Oral Daily    vitamin renal formula (B-complex-vitamin c-folic acid)  1 capsule Oral Daily        acetaminophen, aluminum-magnesium hydroxide-simethicone, melatonin, naloxone, ondansetron, polyethylene glycol, prochlorperazine, simethicone     Radiology:  X-Ray Pelvis Routine AP  Narrative: EXAMINATION:  XR PELVIS ROUTINE AP    CLINICAL HISTORY:  PD catheter, ensure appropriate placement in pelvis.;    COMPARISON:  None.    FINDINGS:  No acute displaced fractures or dislocations.    There is narrowing of the inferior medial aspect of both hip joints with some degenerative changes of the sacroiliac joints and lumbosacral spine articular spaces otherwise preserved with smooth articular surfaces    Peritoneal dialysis catheter identified with the tip in the right hemiabdomen    No blastic or lytic lesions.    Soft tissues within normal limits.  Impression: Degenerative changes.    Peritoneal catheter as above.    Electronically signed by: Rafael Marcano  Date:    01/17/2024  Time:    14:46      Assessment/Plan:  Generalized weakness 2/2 multiple missed peritoneal dialysis sessions     History:  HTN, HLD, ESRD on PD, anemia of chronic disease, depression, skin cancer    - Due to recent falls we will get echocardiogram and carotid duplex.    - She is high-risk for falls due to her current comorbidities and especially not being able to receive dialysis for last 5 days.  We will rule out other possible causes   -check  orthostatics.  - CT head WO C   -she was recently treated for peritonitis however she does not recall if she completed the vancomycin.    -peritoneal fluid culture was sent if positive start patient on antibiotics at this time she does not have signs of infection like fever abdominal pain or other constitutional symptoms.      All diagnosis and differential diagnosis have been reviewed; assessment and plan has been documented; I have personally reviewed the labs and test results that are presently available; I have reviewed the patients medication list; I have reviewed the consulting providers response and recommendations. I have reviewed or attempted to review medical records based upon their availability.       Jamaal Yu MD   01/17/2024

## 2024-01-17 NOTE — PLAN OF CARE
Patient lives in a single story home with spouse. Patient reports 2 friends live nearby and assist in patient care as needed. Patient reports being independent with ambulation prior to hospitalization. DME in home: shower chair, grab bars in bathroom. Patient does PD at home but equipment is broken. Spoke to Kimberly with renal and she was in contact to get it fixed. Patient reports being active with [facility]. Updates sent via [employer].      01/17/24 2545   Discharge Assessment   Assessment Type Discharge Planning Assessment   Confirmed/corrected address, phone number and insurance Yes   Confirmed Demographics Correct on Facesheet   Source of Information patient   Reason For Admission Pt c/o weakness. On peritoneal dialysis. Pt reports dialysis equipment at home broken/malfunctioning x 1 week. GCS 15. Last dialyzed on 1/10   People in Home spouse   Facility Arrived From: Home   Do you expect to return to your current living situation? Yes   Do you have help at home or someone to help you manage your care at home? Yes   Prior to hospitilization cognitive status: Alert/Oriented   Current cognitive status: Alert/Oriented   Home Accessibility wheelchair accessible   Home Layout Able to live on 1st floor   Equipment Currently Used at Home shower chair;grab bar   Readmission within 30 days? Yes   Patient currently being followed by outpatient case management? No   Do you currently have service(s) that help you manage your care at home? Yes   Name and Contact number of agency [facility]   Is the pt/caregiver preference to resume services with current agency Yes   Do you take prescription medications? Yes   Do you have prescription coverage? Yes   Coverage Medicare/   Do you have any problems affording any of your prescribed medications? No   Is the patient taking medications as prescribed? yes   How do you get to doctors appointments? car, drives self   Are you on dialysis? Yes   Dialysis Name and Scheduled  days Peritoneal dialysis   Do you take coumadin? No   Discharge Plan A Home Health   Discharge Plan B Home with family   DME Needed Upon Discharge  other (see comments)  (TBD)   Discharge Plan discussed with: Patient   Transition of Care Barriers None

## 2024-01-17 NOTE — PLAN OF CARE
"Received consult stating "Assistance with malfunctioning PD equipment." Spoke to JOSE Harris and stated this has already be taken care of.   "

## 2024-01-17 NOTE — NURSING
Nurses Note -- 4 Eyes      1/16/2024   10:44 PM      Skin assessed during: Admit      [] No Altered Skin Integrity Present    []Prevention Measures Documented      [x] Yes- Altered Skin Integrity Present or Discovered   [x] LDA Added if Not in Epic (Describe Wound)   [x] New Altered Skin Integrity was Present on Admit and Documented in LDA   [x] Wound Image Taken    Wound Care Consulted? Yes    Attending Nurse:  Marco A FOLEY    Second RN/Staff Member:   Cynthia FOLEY

## 2024-01-17 NOTE — PT/OT/SLP PROGRESS
Physical Therapy      Patient Name:  Malissa Schwartz   MRN:  [id number]    Patient not seen today secondary to declining participation until after dialysis . Will follow-up tomorrow as appropriate.

## 2024-01-17 NOTE — PROGRESS NOTES
"Attempted eval this date and pt reporting feeling very tired/fatigued,  states that "she just needs dialysis, not therapy right now". Spoke with pt and  and educated on purpose of eval and role of therapy and agreed to attempt tomorrow after hopeful dialysis today. Will con't to attempt as appropriate.   "

## 2024-01-18 VITALS
BODY MASS INDEX: 21.86 KG/M2 | HEIGHT: 66 IN | OXYGEN SATURATION: 96 % | DIASTOLIC BLOOD PRESSURE: 79 MMHG | SYSTOLIC BLOOD PRESSURE: 133 MMHG | WEIGHT: 136 LBS | HEART RATE: 68 BPM | RESPIRATION RATE: 16 BRPM | TEMPERATURE: 97 F

## 2024-01-18 PROBLEM — R53.1 WEAKNESS: Status: RESOLVED | Noted: 2024-01-17 | Resolved: 2024-01-18

## 2024-01-18 LAB
ALBUMIN SERPL-MCNC: 1.6 G/DL (ref 3.4–4.8)
ALBUMIN/GLOB SERPL: 0.5 RATIO (ref 1.1–2)
ALP SERPL-CCNC: 109 UNIT/L (ref 40–150)
ALT SERPL-CCNC: 15 UNIT/L (ref 0–55)
AST SERPL-CCNC: 18 UNIT/L (ref 5–34)
BASOPHILS # BLD AUTO: 0.02 X10(3)/MCL
BASOPHILS NFR BLD AUTO: 0.5 %
BILIRUB SERPL-MCNC: 0.2 MG/DL
BUN SERPL-MCNC: 40.5 MG/DL (ref 9.8–20.1)
CALCIUM SERPL-MCNC: 8.5 MG/DL (ref 8.4–10.2)
CHLORIDE SERPL-SCNC: 100 MMOL/L (ref 98–107)
CO2 SERPL-SCNC: 26 MMOL/L (ref 23–31)
CREAT SERPL-MCNC: 9.34 MG/DL (ref 0.55–1.02)
EOSINOPHIL # BLD AUTO: 0.32 X10(3)/MCL (ref 0–0.9)
EOSINOPHIL NFR BLD AUTO: 8.7 %
ERYTHROCYTE [DISTWIDTH] IN BLOOD BY AUTOMATED COUNT: 14.9 % (ref 11.5–17)
GFR SERPLBLD CREATININE-BSD FMLA CKD-EPI: 4 MLS/MIN/1.73/M2
GLOBULIN SER-MCNC: 3.2 GM/DL (ref 2.4–3.5)
GLUCOSE SERPL-MCNC: 70 MG/DL (ref 82–115)
GRAM STN SPEC: NORMAL
HCT VFR BLD AUTO: 30.2 % (ref 37–47)
HGB BLD-MCNC: 9.6 G/DL (ref 12–16)
IMM GRANULOCYTES # BLD AUTO: 0.01 X10(3)/MCL (ref 0–0.04)
IMM GRANULOCYTES NFR BLD AUTO: 0.3 %
LYMPHOCYTES # BLD AUTO: 0.92 X10(3)/MCL (ref 0.6–4.6)
LYMPHOCYTES NFR BLD AUTO: 24.9 %
MCH RBC QN AUTO: 30 PG (ref 27–31)
MCHC RBC AUTO-ENTMCNC: 31.8 G/DL (ref 33–36)
MCV RBC AUTO: 94.4 FL (ref 80–94)
MONOCYTES # BLD AUTO: 0.34 X10(3)/MCL (ref 0.1–1.3)
MONOCYTES NFR BLD AUTO: 9.2 %
NEUTROPHILS # BLD AUTO: 2.08 X10(3)/MCL (ref 2.1–9.2)
NEUTROPHILS NFR BLD AUTO: 56.4 %
NRBC BLD AUTO-RTO: 0 %
PLATELET # BLD AUTO: 226 X10(3)/MCL (ref 130–400)
PMV BLD AUTO: 9.7 FL (ref 7.4–10.4)
POTASSIUM SERPL-SCNC: 3.2 MMOL/L (ref 3.5–5.1)
PROT SERPL-MCNC: 4.8 GM/DL (ref 5.8–7.6)
RBC # BLD AUTO: 3.2 X10(6)/MCL (ref 4.2–5.4)
SODIUM SERPL-SCNC: 137 MMOL/L (ref 136–145)
WBC # SPEC AUTO: 3.69 X10(3)/MCL (ref 4.5–11.5)

## 2024-01-18 PROCEDURE — 85025 COMPLETE CBC W/AUTO DIFF WBC: CPT | Performed by: STUDENT IN AN ORGANIZED HEALTH CARE EDUCATION/TRAINING PROGRAM

## 2024-01-18 PROCEDURE — 80053 COMPREHEN METABOLIC PANEL: CPT | Performed by: STUDENT IN AN ORGANIZED HEALTH CARE EDUCATION/TRAINING PROGRAM

## 2024-01-18 PROCEDURE — 25000003 PHARM REV CODE 250: Performed by: INTERNAL MEDICINE

## 2024-01-18 PROCEDURE — 25000003 PHARM REV CODE 250: Performed by: NURSE PRACTITIONER

## 2024-01-18 RX ORDER — POTASSIUM CHLORIDE 20 MEQ/1
40 TABLET, EXTENDED RELEASE ORAL EVERY 6 HOURS
Status: DISCONTINUED | OUTPATIENT
Start: 2024-01-18 | End: 2024-01-18 | Stop reason: HOSPADM

## 2024-01-18 RX ORDER — POTASSIUM CHLORIDE 20 MEQ/1
40 TABLET, EXTENDED RELEASE ORAL ONCE
Status: DISCONTINUED | OUTPATIENT
Start: 2024-01-18 | End: 2024-01-18

## 2024-01-18 RX ADMIN — POTASSIUM CHLORIDE 40 MEQ: 1500 TABLET, EXTENDED RELEASE ORAL at 08:01

## 2024-01-18 RX ADMIN — ASPIRIN 81 MG: 81 TABLET, COATED ORAL at 08:01

## 2024-01-18 RX ADMIN — NEPHROCAP 1 CAPSULE: 1 CAP ORAL at 08:01

## 2024-01-18 RX ADMIN — FOLIC ACID 1 MG: 1 TABLET ORAL at 08:01

## 2024-01-18 NOTE — PLAN OF CARE
01/18/24 1521   Final Note   Assessment Type Final Discharge Note   Anticipated Discharge Disposition Home-Health   Hospital Resources/Appts/Education Provided Post-Acute resouces added to AVS   Post-Acute Status   Post-Acute Authorization Home Health   Home Health Status Set-up Complete/Auth obtained  (Resume with [facility])

## 2024-01-18 NOTE — PROGRESS NOTES
[facility] Hospital Medicine Progress Note        Chief Complaint: Inpatient Follow-up for fatigue weakness due to missed PD     HPI: Ms. Schwartz is a 73 year old female with a pmh of HTN, HLD, ESRD on PD, anemia of chronic disease, depression, skin cancer who presented to the ED with c/o fatigue x 3 days. She states he does PD at home but has not been able to dialyze in 4 days to her machine malfunctioning.  She denies any chest pain, shortness of breath, fever, nausea, vomiting.  ED vitals on arrival:  Temp 97.9° F, pulse 96, resp 20, /70, SpO2 96% on RA.  Today's ED lab work revealed H&H 9.4/29.7, BUN/CR 41.5/8.5.  CXR showed no acute process.  EKG showed NSR with PACs with aberrant conduction.  Left axis deviation. Nephrology was consulted, she was admitted to hospital medicine for management.  Patient has presented to the hospital with falls and not able to receive dialysis.    She states that fall started after failed dialysis episodes.  She was feeling weak.  At this time she is afebrile and vitals stable.  Denying any abdominal pain nausea vomiting or diarrhea.    She has right kneecap abrasion and also right lower calf abrasion does are clean with no smell or drainage and no surrounding erythema with no suggestion of infection    Interval Hx:   Laying in bed, family is at bedside.  Patient reports she wants to go home.  Discussed that we are ruling out bacterial peritonitis.  Patient reports she has no pain and she does not think she has peritonitis  Reported her home dialysis machine has been fixed as they have figure out the problem and stated she does not want to stay.  Discussed we will reach out to Nephrology regarding clearance for discharge  Case was discussed with patient's nurse and  on the floor.    Objective/physical exam:  General: In no acute distress, afebrile  Chest: Clear to auscultation bilaterally anteriorly  Heart: RRR, +S1, S2, no  appreciable murmur  Abdomen: Soft, nontender, BS +, PD catheter in LUQ   MSK: Warm, no lower extremity edema, no clubbing or cyanosis  Skin: Dry, flaky. RLE chronic appearing wound, skin tear to right forearm, wound/abrasion to nose  Neurologic:  Flat affect    VITAL SIGNS: 24 HRS MIN & MAX LAST   Temp  Min: 97.7 °F (36.5 °C)  Max: 98.6 °F (37 °C) 98.5 °F (36.9 °C)   BP  Min: 134/82  Max: 157/81 134/82   Pulse  Min: 65  Max: 78  66   Resp  Min: 17  Max: 20 20   SpO2  Min: 90 %  Max: 100 % (!) 90 %     I reviewed the labs below:  Recent Labs   Lab 01/16/24  1556 01/17/24  0439 01/18/24 0618   WBC 3.96* 3.16* 3.69*   RBC 3.18* 3.42* 3.20*   HGB 9.4* 10.1* 9.6*   HCT 29.7* 32.7* 30.2*   MCV 93.4 95.6* 94.4*   MCH 29.6 29.5 30.0   MCHC 31.6* 30.9* 31.8*   RDW 15.1 15.1 14.9    181 226   MPV 9.9 9.8 9.7     Recent Labs   Lab 01/16/24 1556 01/17/24 0439 01/18/24 0618    138 137   K 3.6 3.7 3.2*   CO2 27 23 26   BUN 41.5* 46.6* 40.5*   CREATININE 8.50* 9.46* 9.34*   CALCIUM 8.0* 8.0* 8.5   MG 1.90 2.10  --    ALBUMIN 1.8* 1.7* 1.6*   ALKPHOS 126 118 109   ALT 20 17 15   AST 27 27 18   BILITOT 0.2 0.3 0.2     Microbiology Results (last 7 days)       Procedure Component Value Units Date/Time    Body Fluid Culture [[id number]] Collected: 01/17/24 1729    Order Status: Completed Specimen: Abdominal Fluid from Peritoneal Fluid Updated: 01/18/24 0647     Body Fluid Culture No Growth At 24 Hours    SUSY Prep [[id number]] Collected: 01/17/24 1729    Order Status: Completed Specimen: Body Fluid from Peritoneal Updated: 01/17/24 1914     SUSY Prep No fungal elements seen    Gram Stain [[id number]] Collected: 01/17/24 1729    Order Status: Sent Specimen: Body Fluid from Peritoneal Updated: 01/17/24 1729           See below for Radiology    Scheduled Med:   aspirin  81 mg Oral Daily    calcitRIOL  0.25 mcg Oral Once per day on Mon Wed Fri    enoxparin  30 mg Subcutaneous Daily    folic acid  1 mg Oral Daily    vitamin  renal formula (B-complex-vitamin c-folic acid)  1 capsule Oral Daily      Continuous Infusions:     PRN Meds:  acetaminophen, aluminum-magnesium hydroxide-simethicone, melatonin, naloxone, ondansetron, polyethylene glycol, prochlorperazine, simethicone     Assessment/Plan:  Generalized weakness 2/2 multiple missed peritoneal dialysis sessions at home due to PD machine malfunctioning  History:  HTN, HLD, ESRD on PD, anemia of chronic disease, depression, skin cancer  Hypokalemia        Nephrology on board for PD needs, per their note, patient has been advised to call customer service regarding machine instructions when she gets home  Underwent peritoneal dialysis last night successfully  She was recently treated for peritonitis however she does not recall if she completed the vancomycin course     Peritoneal fluid culture-negative at 24 hours. Currently pt has no signs of infection like fever abdominal pain or other constitutional symptoms  Mild hypokalemia with potassium of 3.2, ordered KCl 40 mEq p.o. q.6 x2 doses  Echo shows normal systolic function with EF 55-60%.  There is diastolic dysfunction.  Right ventricle systolic function is normal, mild aortic valve sclerosis, mild mitral regurg  She is high-risk for falls due to her current comorbidities and especially not being able to receive dialysis for last 5 days.  consulted PT/OT--> patient refused therapy with PT and OT both stating she has home health with therapy   CT head WO contrast has been ordered on admission- patient refused  Morning renal functions ordered      VTE prophylaxis:  Lovenox 30 mg subQ    Patient condition:  Fair    Anticipated discharge and Disposition:   Possible discharge today or tomorrow      All diagnosis and differential diagnosis have been reviewed; assessment and plan has been documented; I have personally reviewed the labs and test results that are presently available; I have reviewed the patients medication list; I have reviewed  the consulting providers response and recommendations. I have reviewed or attempted to review medical records based upon their availability    All of the patient's questions have been  addressed and answered. Patient's is agreeable to the above stated plan. I will continue to monitor closely and make adjustments to medical management as needed.  _____________________________________________________________________    Nutrition Status:  -  Radiology:   I have personally reviewed the images and agree with radiologist report  Echo    Left Ventricle: The left ventricle is normal in size. Normal wall   thickness. There is normal systolic function with a visually estimated   ejection fraction of 55 - 60%. There is diastolic dysfunction.    Right Ventricle: Normal right ventricular cavity size. Systolic   function is normal.    Aortic Valve: There is mild aortic valve sclerosis.    Mitral Valve: There is mild mitral annular calcification present.   Moderately calcified subvalvular apparatus. There is mild (1+)   regurgitation.  X-Ray Pelvis Routine AP  Narrative: EXAMINATION:  XR PELVIS ROUTINE AP    CLINICAL HISTORY:  PD catheter, ensure appropriate placement in pelvis.;    COMPARISON:  None.    FINDINGS:  No acute displaced fractures or dislocations.    There is narrowing of the inferior medial aspect of both hip joints with some degenerative changes of the sacroiliac joints and lumbosacral spine articular spaces otherwise preserved with smooth articular surfaces    Peritoneal dialysis catheter identified with the tip in the right hemiabdomen    No blastic or lytic lesions.    Soft tissues within normal limits.  Impression: Degenerative changes.    Peritoneal catheter as above.    Electronically signed by: Rafael Marcano  Date:    01/17/2024  Time:    14:46      Seymour Hernandez MD  Department of Hospital Medicine   [facility]   01/18/2024

## 2024-01-18 NOTE — PROGRESS NOTES
Pt declined any tx at this time, reports that she's going to go home and has dialysis and HH PT and OT already setup up. Will f/u one more time if pt does not d/c.

## 2024-01-18 NOTE — PROGRESS NOTES
Pt was educated on why it isn't recommended leaving this soon d/t test results pending, but pt stated she was ready to go and did not care. Also was educated on stopping the medications recommended and attending follow-up appointments. Pt was brought down via wheelchair with transport and spouse picked her up in the front of the hospital.

## 2024-01-18 NOTE — PROGRESS NOTES
[facility] - 9th Floor Med Surg  Nephrology  Progress Note    Patient Name: Malissa Schwartz  MRN: [id number]  Admission Date: 1/16/2024  Hospital Length of Stay: 2 days  Attending Provider: Catie Shepard DO   Primary Care Physician: Sergio Crabtree MD  Principal Problem:Weakness      Subjective:   HPI: This is a 73-year-old  female with ESRD on peritoneal dialysis followed by Dr Mendoza. She was treated for staph epidermis peritonitis (12/21) during last admission. DC home with 14 days of IP Vancomycin per ID recommendations. Patient presented to ED on 1/16 with weakness. Last dialysis was Wednesday due to broken machine. At present she is weakened. Noted abrasion to nose and RLE from falls at home. She endorses some lower abdominal tenderness. Otherwise no complaints. Unable to tell me if she completed her treatment of peritonitis or what the PD fluid looked like last treatment.        Interval History: Patient was able to dialyze overnight without issue. Lab workup with no evidence of peritonitis.     Review of patient's allergies indicates:   Allergen Reactions    Codeine Palpitations and Rash     Current Facility-Administered Medications   Medication Frequency    acetaminophen tablet 650 mg Q6H PRN    aluminum-magnesium hydroxide-simethicone 200-200-20 mg/5 mL suspension 30 mL QID PRN    aspirin EC tablet 81 mg Daily    calcitRIOL capsule 0.25 mcg Once per day on Mon Wed Fri    enoxaparin injection 30 mg Daily    folic acid tablet 1 mg Daily    melatonin tablet 6 mg Nightly PRN    naloxone 0.4 mg/mL injection 0.02 mg PRN    ondansetron injection 4 mg Q4H PRN    polyethylene glycol packet 17 g BID PRN    potassium chloride SA CR tablet 40 mEq Q6H    prochlorperazine injection Soln 5 mg Q6H PRN    simethicone chewable tablet 80 mg QID PRN    vitamin renal formula (B-complex-vitamin c-folic acid) 1 mg per capsule 1 capsule Daily       Objective:     Vital Signs (Most Recent):  Temp: 98.1 °F  (36.7 °C) (01/18/24 1100)  Pulse: 81 (01/18/24 1100)  Resp: 16 (01/18/24 0754)  BP: (!) 144/88 (01/18/24 1100)  SpO2: 98 % (01/18/24 1100) Vital Signs (24h Range):  Temp:  [97.5 °F (36.4 °C)-98.6 °F (37 °C)] 98.1 °F (36.7 °C)  Pulse:  [66-81] 81  Resp:  [16-20] 16  SpO2:  [90 %-99 %] 98 %  BP: (134-157)/(77-88) 144/88     Weight: 61.7 kg (136 lb 0.4 oz) (01/16/24 3188)  Body mass index is 21.95 kg/m².  Body surface area is 1.7 meters squared.    I/O last 3 completed shifts:  In: 240 [P.O.:240]  Out: -     Physical Exam  Constitutional:       General: She is not in acute distress.  HENT:      Head: Atraumatic.      Nose: Nose normal.      Mouth/Throat:      Mouth: Mucous membranes are moist.   Eyes:      Extraocular Movements: Extraocular movements intact.      Conjunctiva/sclera: Conjunctivae normal.   Cardiovascular:      Rate and Rhythm: Normal rate and regular rhythm.      Pulses: Normal pulses.      Heart sounds: Normal heart sounds.   Pulmonary:      Effort: Pulmonary effort is normal.      Breath sounds: Normal breath sounds.   Abdominal:      General: There is no distension.      Palpations: Abdomen is soft.      Comments: PD catheter intact    Musculoskeletal:         General: No swelling.      Cervical back: Neck supple.   Skin:     General: Skin is warm.   Neurological:      Mental Status: She is alert and oriented to person, place, and time.         Significant Labs:sureBMP:   Recent Labs   Lab 01/17/24  0439 01/18/24  0618    137   K 3.7 3.2*   CO2 23 26   BUN 46.6* 40.5*   CREATININE 9.46* 9.34*   CALCIUM 8.0* 8.5   MG 2.10  --      CBC:   Recent Labs   Lab 01/18/24  0618   WBC 3.69*   RBC 3.20*   HGB 9.6*   HCT 30.2*      MCV 94.4*   MCH 30.0   MCHC 31.8*     Microbiology Results (last 7 days)       Procedure Component Value Units Date/Time    Gram Stain [[id number]] Collected: 01/17/24 1729    Order Status: Completed Specimen: Body Fluid from Peritoneal Updated: 01/18/24 0849     GRAM  "STAIN No WBCs, No bacteria seen    Body Fluid Culture [[id number]] Collected: 01/17/24 1729    Order Status: Completed Specimen: Abdominal Fluid from Peritoneal Fluid Updated: 01/18/24 0647     Body Fluid Culture No Growth At 24 Hours    SUSY Prep [[id number]] Collected: 01/17/24 1729    Order Status: Completed Specimen: Body Fluid from Peritoneal Updated: 01/17/24 1914     SUSY Prep No fungal elements seen          Specimen (24h ago, onward)      None          No results for input(s): "COLORU", "CLARITYU", "SPECGRAV", "PHUR", "PROTEINUA", "GLUCOSEU", "BILIRUBINCON", "BLOODU", "WBCU", "RBCU", "BACTERIA", "MUCUS", "NITRITE", "LEUKOCYTESUR", "UROBILINOGEN", "HYALINECASTS" in the last 168 hours.    Significant Imaging:  X-Ray Pelvis Routine AP [[id number]] Resulted: 01/17/24 1446   Order Status: Completed Updated: 01/17/24 1449   Narrative:     EXAMINATION:  XR PELVIS ROUTINE AP    CLINICAL HISTORY:  PD catheter, ensure appropriate placement in pelvis.;    COMPARISON:  None.    FINDINGS:  No acute displaced fractures or dislocations.    There is narrowing of the inferior medial aspect of both hip joints with some degenerative changes of the sacroiliac joints and lumbosacral spine articular spaces otherwise preserved with smooth articular surfaces    Peritoneal dialysis catheter identified with the tip in the right hemiabdomen    No blastic or lytic lesions.    Soft tissues within normal limits.   Impression:       Degenerative changes.    Peritoneal catheter as above.      Electronically signed by: Rafael Marcano  Date: 01/17/2024  Time: 14:46       Assessment/Plan:     ESRD on CCPD - failed renal transplant   Lapse in dialysis due to machine malfunction   Weakness   Recent peritonitis in December 2023 treated with 14 days of IP Vanc      -No evidence of peritonitis or catheter malfunction.   -Spoke with home therapies nurse, Francy, today. She has spoken to customer service and is trying to help the patient. The issue " she is having is not suitable to getting a new machine. She has been instructed to call customer service when she gets home.       GABRIELA Flores  Nephrology  [facility] - 9th Floor Med Surg

## 2024-01-18 NOTE — NURSING
"Pt refusing CT scan and stated "I do not want any more test done, I want to go home." Education provided and doctor will be notified.   "

## 2024-01-18 NOTE — PT/OT/SLP PROGRESS
Physical Therapy      Patient Name:  Malissa Schwartz   MRN:  [id number]    Patient not seen today secondary to Patient unwilling to participate. Will follow-up as schedule permits.

## 2024-01-18 NOTE — DISCHARGE SUMMARY
[facility] Hospital Medicine Discharge Summary    Admit Date: 1/16/2024  Discharge Date and Time: 1/18/20243:13 PM  Admitting Physician:  Team  Discharging Physician: Seymour Hernandez MD.  Primary Care Physician: Sergio Crabtree MD  Consults: Nephrology    Discharge Diagnoses:  Generalized weakness 2/2 multiple missed peritoneal dialysis sessions at home due to PD machine malfunctioning  History:  HTN, HLD, ESRD on PD, anemia of chronic disease, depression, skin cancer  Hypokalemia     Hospital Course:   Ms. Schwartz is a 73 year old female with a pmh of HTN, HLD, ESRD on PD, anemia of chronic disease, depression, skin cancer who presented to the ED with c/o fatigue x 3 days. She states he does PD at home but has not been able to dialyze in 4 days to her machine malfunctioning.  She denies any chest pain, shortness of breath, fever, nausea, vomiting.  ED vitals on arrival:  Temp 97.9° F, pulse 96, resp 20, /70, SpO2 96% on RA.  Today's ED lab work revealed H&H 9.4/29.7, BUN/CR 41.5/8.5.  CXR showed no acute process.  EKG showed NSR with PACs with aberrant conduction.  Left axis deviation. Nephrology was consulted, she was admitted to hospital medicine for management.  Patient has presented to the hospital with falls and not able to receive dialysis.    She states that fall started after failed dialysis episodes.  She was feeling weak.  At this time she is afebrile and vitals stable.  Denying any abdominal pain nausea vomiting or diarrhea.    She has right kneecap abrasion and also right lower calf abrasion does are clean with no smell or drainage and no surrounding erythema with no suggestion of infection  Nephrology on board for PD needs, per their note, patient has been advised to call customer service regarding machine instructions when she gets home  Per Nephrology, her PD catheter site has to be revised.  Nephrology reached out to Dr. Patel, will plan as outpatient in next  1-2 weeks.  Nephrology cleared patient for discharge home  Underwent peritoneal dialysis last night successfully  She was recently treated for peritonitis however she does not recall if she completed the vancomycin course     Peritoneal fluid culture-negative at 24 hours. Currently pt has no signs of infection like fever abdominal pain or other constitutional symptoms  Mild hypokalemia with potassium of 3.2, ordered KCl 40 mEq p.o. q.6 x2 doses, resume potassium supplementation on discharge  Echo shows normal systolic function with EF 55-60%.  There is diastolic dysfunction.  Right ventricle systolic function is normal, mild aortic valve sclerosis, mild mitral regurg  She is high-risk for falls due to her current comorbidities and especially not being able to receive dialysis for last 5 days.  consulted PT/OT--> patient refused therapy with PT and OT both stating she has home health with therapy   CT head WO contrast has been ordered on admission- patient refused  Patient is cleared from Nephrology for discharge    Pt was seen and examined on the day of discharge  Vitals:  VITAL SIGNS: 24 HRS MIN & MAX LAST   Temp  Min: 97.5 °F (36.4 °C)  Max: 98.6 °F (37 °C) 98.1 °F (36.7 °C)   BP  Min: 134/80  Max: 157/81 (!) 144/88   Pulse  Min: 66  Max: 81  81   Resp  Min: 16  Max: 20 16   SpO2  Min: 90 %  Max: 99 % 98 %       Physical Exam:  General: In no acute distress, afebrile  Chest: Clear to auscultation bilaterally anteriorly  Heart: RRR, +S1, S2, no appreciable murmur  Abdomen: Soft, nontender, BS +, PD catheter in LUQ   MSK: Warm, no lower extremity edema, no clubbing or cyanosis  Skin: Dry, flaky. RLE chronic appearing wound, skin tear to right forearm, wound/abrasion to nose  Neurologic:  Flat affect    Recent Labs   Lab 01/16/24  1556 01/17/24  0439 01/18/24  0618   WBC 3.96* 3.16* 3.69*   RBC 3.18* 3.42* 3.20*   HGB 9.4* 10.1* 9.6*   HCT 29.7* 32.7* 30.2*   MCV 93.4 95.6* 94.4*   MCH 29.6 29.5 30.0   MCHC 31.6*  30.9* 31.8*   RDW 15.1 15.1 14.9    181 226   MPV 9.9 9.8 9.7       Recent Labs   Lab 01/16/24  1556 01/17/24  0439 01/18/24  0618    138 137   K 3.6 3.7 3.2*   CO2 27 23 26   BUN 41.5* 46.6* 40.5*   CREATININE 8.50* 9.46* 9.34*   CALCIUM 8.0* 8.0* 8.5   MG 1.90 2.10  --    ALBUMIN 1.8* 1.7* 1.6*   ALKPHOS 126 118 109   ALT 20 17 15   AST 27 27 18   BILITOT 0.2 0.3 0.2        Microbiology Results (last 7 days)       Procedure Component Value Units Date/Time    Gram Stain [[id number]] Collected: 01/17/24 1729    Order Status: Completed Specimen: Body Fluid from Peritoneal Updated: 01/18/24 0849     GRAM STAIN No WBCs, No bacteria seen    Body Fluid Culture [[id number]] Collected: 01/17/24 1729    Order Status: Completed Specimen: Abdominal Fluid from Peritoneal Fluid Updated: 01/18/24 0647     Body Fluid Culture No Growth At 24 Hours    SUSY Prep [[id number]] Collected: 01/17/24 1729    Order Status: Completed Specimen: Body Fluid from Peritoneal Updated: 01/17/24 1914     SUSY Prep No fungal elements seen             Echo    Left Ventricle: The left ventricle is normal in size. Normal wall   thickness. There is normal systolic function with a visually estimated   ejection fraction of 55 - 60%. There is diastolic dysfunction.    Right Ventricle: Normal right ventricular cavity size. Systolic   function is normal.    Aortic Valve: There is mild aortic valve sclerosis.    Mitral Valve: There is mild mitral annular calcification present.   Moderately calcified subvalvular apparatus. There is mild (1+)   regurgitation.  X-Ray Pelvis Routine AP  Narrative: EXAMINATION:  XR PELVIS ROUTINE AP    CLINICAL HISTORY:  PD catheter, ensure appropriate placement in pelvis.;    COMPARISON:  None.    FINDINGS:  No acute displaced fractures or dislocations.    There is narrowing of the inferior medial aspect of both hip joints with some degenerative changes of the sacroiliac joints and lumbosacral spine articular spaces  otherwise preserved with smooth articular surfaces    Peritoneal dialysis catheter identified with the tip in the right hemiabdomen    No blastic or lytic lesions.    Soft tissues within normal limits.  Impression: Degenerative changes.    Peritoneal catheter as above.    Electronically signed by: Rafael Marcano  Date:    01/17/2024  Time:    14:46         Medication List        CONTINUE taking these medications      aspirin 81 MG EC tablet  Commonly known as: ECOTRIN     calcitRIOL 0.25 MCG Cap  Commonly known as: ROCALTROL     folic acid 1 MG tablet  Commonly known as: FOLVITE     NEPHRO-JENN 0.8 mg Tab  Generic drug: B complex-vitamin C-folic acid     potassium chloride 10 MEQ Tbsr  Commonly known as: KLOR-CON     sodium bicarbonate 650 MG tablet     spironolactone 25 MG tablet  Commonly known as: ALDACTONE            STOP taking these medications      cephALEXin 500 MG capsule  Commonly known as: KEFLEX     cloNIDine 0.3 mg/24 hr td ptwk 0.3 mg/24 hr  Commonly known as: CATAPRES               Explained in detail to the patient about the discharge plan, medications, and follow-up visits. Pt understands and agrees with the treatment plan  Discharge Disposition:  Home with resumption of home health  Discharged Condition: stable  Diet-   Dietary Orders (From admission, onward)       Start     Ordered    01/16/24 2109  Diet Renal On Dialysis  (Diet/Nutrition [facility])  Diet effective now         01/16/24 2110                   Medications Per DC med rec  Activities as tolerated   Contact information for follow-up providers       Aleksandar Patel MD Follow up in 2 week(s).    Specialty: General Surgery  Why: Needs eval of PD catheter malpositioned.  Contact information:  [address]  [address]  [facility] [address]  [phone]               Sergio Crabtree MD. Schedule an appointment as soon as possible for a visit in 2 week(s).    Specialty: Internal Medicine  Why: post discharge  Contact information:  [address]  [facility] [address]  [phone]                       Contact information for after-discharge care       Home Medical Care       [facility] .    Service: Home Health Services  Contact information:  [address]  [facility] [id number] [phone]                                 For further questions contact hospitalist office    Discharge time 34 minutes    For worsening symptoms, chest pain, shortness of breath, increased abdominal pain, high grade fever, stroke or stroke like symptoms, immediately go to the nearest Emergency Room or call 911 as soon as possible.      Seymour Hernandez MD  Department of Hospital Medicine   [facility]   01/18/2024 3:13 PM

## 2024-01-19 ENCOUNTER — PATIENT OUTREACH (OUTPATIENT)
Dept: ADMINISTRATIVE | Facility: CLINIC | Age: 74
End: 2024-01-19
Payer: MEDICARE

## 2024-01-19 ENCOUNTER — PATIENT MESSAGE (OUTPATIENT)
Dept: ADMINISTRATIVE | Facility: CLINIC | Age: 74
End: 2024-01-19
Payer: MEDICARE

## 2024-01-19 NOTE — PROGRESS NOTES
C3 nurse attempted to contact Malissa Schwartz  for a TCC post hospital discharge follow up call. No answer. Left voicemail with callback information. The patient does not have a scheduled HOSFU appointment noted.

## 2024-01-19 NOTE — PROGRESS NOTES
C3 nurse attempted to contact Malissa Schwartz  for a TCC post hospital discharge follow up call. No answer. Left voicemail with callback information. The patient does not have a scheduled HOSFU appointment noted.  Message sent via patient's portal regarding follow up call.

## 2024-01-22 LAB — BACTERIA FLD CULT: NORMAL

## 2024-01-22 NOTE — PROGRESS NOTES
C3 nurse spoke with Malissa Schwartz  for a TCC post hospital discharge follow up call. The patient does not have a scheduled HOSFU appointment with Sergio Crabtree MD  within 5-7 days post hospital discharge date 01/18/2024.    Message sent to PCP staff requesting they contact patient and schedule follow up appointment.

## 2024-05-03 ENCOUNTER — HOSPITAL ENCOUNTER (EMERGENCY)
Facility: HOSPITAL | Age: 74
Discharge: HOME | End: 2024-05-03
Attending: EMERGENCY MEDICINE
Payer: MEDICARE

## 2024-05-03 VITALS
DIASTOLIC BLOOD PRESSURE: 73 MMHG | OXYGEN SATURATION: 100 % | BODY MASS INDEX: 19.97 KG/M2 | WEIGHT: 117 LBS | RESPIRATION RATE: 20 BRPM | HEART RATE: 58 BPM | HEIGHT: 64 IN | TEMPERATURE: 98 F | SYSTOLIC BLOOD PRESSURE: 120 MMHG

## 2024-05-03 DIAGNOSIS — R53.1 WEAKNESS: ICD-10-CM

## 2024-05-03 DIAGNOSIS — I95.9 HYPOTENSION: Primary | ICD-10-CM

## 2024-05-03 DIAGNOSIS — I95.9 HYPOTENSION, UNSPECIFIED HYPOTENSION TYPE: ICD-10-CM

## 2024-05-03 LAB
ALBUMIN SERPL-MCNC: 2.2 G/DL (ref 3.4–4.8)
ALBUMIN/GLOB SERPL: 0.8 RATIO (ref 1.1–2)
ALP SERPL-CCNC: 101 UNIT/L (ref 40–150)
ALT SERPL-CCNC: 20 UNIT/L (ref 0–55)
AST SERPL-CCNC: 28 UNIT/L (ref 5–34)
BASOPHILS # BLD AUTO: 0.06 X10(3)/MCL
BASOPHILS NFR BLD AUTO: 1.2 %
BILIRUB SERPL-MCNC: 0.3 MG/DL
BUN SERPL-MCNC: 35.7 MG/DL (ref 9.8–20.1)
CALCIUM SERPL-MCNC: 8.2 MG/DL (ref 8.4–10.2)
CHLORIDE SERPL-SCNC: 100 MMOL/L (ref 98–107)
CO2 SERPL-SCNC: 29 MMOL/L (ref 23–31)
CREAT SERPL-MCNC: 8.43 MG/DL (ref 0.55–1.02)
EOSINOPHIL # BLD AUTO: 0.1 X10(3)/MCL (ref 0–0.9)
EOSINOPHIL NFR BLD AUTO: 2 %
ERYTHROCYTE [DISTWIDTH] IN BLOOD BY AUTOMATED COUNT: 14.2 % (ref 11.5–17)
GFR SERPLBLD CREATININE-BSD FMLA CKD-EPI: 5 MLS/MIN/1.73/M2
GLOBULIN SER-MCNC: 2.7 GM/DL (ref 2.4–3.5)
GLUCOSE SERPL-MCNC: 86 MG/DL (ref 82–115)
GROUP & RH: NORMAL
HCT VFR BLD AUTO: 32.5 % (ref 37–47)
HGB BLD-MCNC: 10.3 G/DL (ref 12–16)
IMM GRANULOCYTES # BLD AUTO: 0.02 X10(3)/MCL (ref 0–0.04)
IMM GRANULOCYTES NFR BLD AUTO: 0.4 %
INDIRECT COOMBS: NORMAL
LYMPHOCYTES # BLD AUTO: 1.02 X10(3)/MCL (ref 0.6–4.6)
LYMPHOCYTES NFR BLD AUTO: 20.9 %
MCH RBC QN AUTO: 28.5 PG (ref 27–31)
MCHC RBC AUTO-ENTMCNC: 31.7 G/DL (ref 33–36)
MCV RBC AUTO: 90 FL (ref 80–94)
MONOCYTES # BLD AUTO: 0.37 X10(3)/MCL (ref 0.1–1.3)
MONOCYTES NFR BLD AUTO: 7.6 %
NEUTROPHILS # BLD AUTO: 3.32 X10(3)/MCL (ref 2.1–9.2)
NEUTROPHILS NFR BLD AUTO: 67.9 %
NRBC BLD AUTO-RTO: 0 %
PLATELET # BLD AUTO: 194 X10(3)/MCL (ref 130–400)
PMV BLD AUTO: 9.7 FL (ref 7.4–10.4)
POTASSIUM SERPL-SCNC: 3.5 MMOL/L (ref 3.5–5.1)
PROT SERPL-MCNC: 4.9 GM/DL (ref 5.8–7.6)
RBC # BLD AUTO: 3.61 X10(6)/MCL (ref 4.2–5.4)
SODIUM SERPL-SCNC: 141 MMOL/L (ref 136–145)
SPECIMEN OUTDATE: NORMAL
TROPONIN I SERPL-MCNC: <0.01 NG/ML (ref 0–0.04)
WBC # SPEC AUTO: 4.89 X10(3)/MCL (ref 4.5–11.5)

## 2024-05-03 PROCEDURE — 96360 HYDRATION IV INFUSION INIT: CPT

## 2024-05-03 PROCEDURE — 63600175 PHARM REV CODE 636 W HCPCS: Performed by: EMERGENCY MEDICINE

## 2024-05-03 PROCEDURE — 93010 ELECTROCARDIOGRAM REPORT: CPT | Mod: ,,, | Performed by: INTERNAL MEDICINE

## 2024-05-03 PROCEDURE — 96361 HYDRATE IV INFUSION ADD-ON: CPT

## 2024-05-03 PROCEDURE — 93005 ELECTROCARDIOGRAM TRACING: CPT

## 2024-05-03 PROCEDURE — 85025 COMPLETE CBC W/AUTO DIFF WBC: CPT | Performed by: EMERGENCY MEDICINE

## 2024-05-03 PROCEDURE — 25000003 PHARM REV CODE 250: Performed by: EMERGENCY MEDICINE

## 2024-05-03 PROCEDURE — 80053 COMPREHEN METABOLIC PANEL: CPT | Performed by: EMERGENCY MEDICINE

## 2024-05-03 PROCEDURE — 84484 ASSAY OF TROPONIN QUANT: CPT | Performed by: EMERGENCY MEDICINE

## 2024-05-03 PROCEDURE — 99285 EMERGENCY DEPT VISIT HI MDM: CPT | Mod: 25

## 2024-05-03 PROCEDURE — 86850 RBC ANTIBODY SCREEN: CPT | Performed by: EMERGENCY MEDICINE

## 2024-05-03 RX ADMIN — SODIUM CHLORIDE, POTASSIUM CHLORIDE, SODIUM LACTATE AND CALCIUM CHLORIDE 1000 ML: 600; 310; 30; 20 INJECTION, SOLUTION INTRAVENOUS at 01:05

## 2024-05-03 RX ADMIN — SODIUM CHLORIDE 1000 ML: 9 INJECTION, SOLUTION INTRAVENOUS at 10:05

## 2024-05-03 NOTE — ED PROVIDER NOTES
Encounter Date: 5/3/2024    SCRIBE #1 NOTE: I, Rachel Ram, am scribing for, and in the presence of,  Balwinder Weinstein III, MD. I have scribed the following portions of the note - the EKG reading. Other sections scribed: HPI, ROS, PE.       History     Chief Complaint   Patient presents with    Hypotension     Pt. Reports an episode of weakness and a fall, (-) blood thinners, (-) LOC, GCS 15. Contusion/hematoma to R eyebrow. [facility]mh Peritoneal dialysis      74 year old female with a hx of CKD on PD, HTN, HLD, and skin cancer presents to the ED following a fall. The patient reports feeling generally weak and falling, but denies LOC, chest pain, shortness of breath, abdominal pain, diarrhea, headache, neck pain, or dark stool. She does not smoke or drink. She denies any recent medication changes or hospital admissions. She does not take prescribed pain medication.     The history is provided by the patient. No  was used.   Fall  The accident occurred just prior to arrival. The fall occurred while walking. There was no blood loss. Pertinent negatives include no fever, no numbness, no abdominal pain, no nausea, no vomiting, no headaches and no loss of consciousness. She has tried nothing for the symptoms.     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 Glomerulitis with one activated endothelial cell: treated with Solumedrol pulse 375 mg IVBP x 3 doses with short prednisone taper; thymoglobin 100 mg x 6 doses 10/04/2007 minimal histologic changes no acute rejection     Allergy     Anemia of renal disease     CKD (chronic kidney disease), stage IV     CMV (cytomegalovirus) status positive     Colon polyp     benign    Depression     GN (glomerulonephritis)     GN (glomerulonephritis)     unknown type    Herpes  infection, other 2009    on buttocks    Hyperlipidemia     Hypertension     Immunosuppression     Living-donor kidney transplant recipient 01/15/2007    Meningitis     history of viral meningitis    Proteinuria 1/5/2015    Proteinuria     Pulmonary nodules     Renal hypertension     S/P breast augmentation     Skin cancer     Urinary tract infection      Past Surgical History:   Procedure Laterality Date    COLON SURGERY      COLONOSCOPY W/ BIOPSIES      cosemetic surgery      facelift and breast augmentation     HYSTERECTOMY      KIDNEY TRANSPLANT      NEPHRECTOMY Left     9/2016    RENAL BIOPSY      TONSILLECTOMY, ADENOIDECTOMY       Family History   Problem Relation Name Age of Onset    Kidney disease Sister          ESRD    Kidney disease Brother          CKD from HTN    Hypertension Brother      Heart failure Mother      Heart failure Father      Diabetes Neg Hx      Stroke Neg Hx       Social History     Tobacco Use    Smoking status: Former     Current packs/day: 1.00     Average packs/day: 1 pack/day for 15.0 years (15.0 ttl pk-yrs)     Types: Cigarettes    Smokeless tobacco: Never    Tobacco comments:     quit in 2014; smoked for 15 years and at the most smoked 1 ppd   Substance Use Topics    Alcohol use: Yes     Comment: seldom    Drug use: No     Review of Systems   Constitutional:  Negative for chills, fatigue and fever.   HENT:  Negative for congestion and sore throat.    Eyes:  Negative for visual disturbance.   Respiratory:  Negative for cough and shortness of breath.    Cardiovascular:  Negative for chest pain.   Gastrointestinal:  Negative for abdominal pain, diarrhea, nausea and vomiting.   Genitourinary:  Negative for dysuria.   Musculoskeletal:  Negative for myalgias.   Skin:  Negative for rash.   Neurological:  Negative for loss of consciousness, weakness, numbness and headaches.   All other systems reviewed and are negative.      Physical Exam     Initial Vitals [05/03/24 0911]   BP Pulse Resp  Temp SpO2   (!) 93/51 74 18 97 °F (36.1 °C) 96 %      MAP       --         Physical Exam    Nursing note and vitals reviewed.  Constitutional: She appears well-developed and well-nourished.   HENT:   Head: Normocephalic and atraumatic.   Mouth/Throat: Oropharynx is clear and moist. Mucous membranes are dry.   Eyes: Conjunctivae are normal. Pupils are equal, round, and reactive to light.   Neck: Neck supple.   Normal range of motion.  Cardiovascular:  Normal rate, regular rhythm and normal heart sounds.           Pulmonary/Chest: Breath sounds normal. She has no wheezes. She has no rhonchi. She has no rales.   Abdominal: Abdomen is soft. Bowel sounds are normal.   Musculoskeletal:         General: Normal range of motion.      Cervical back: Normal range of motion and neck supple.     Neurological: She is alert and oriented to person, place, and time. GCS score is 15. GCS eye subscore is 4. GCS verbal subscore is 5. GCS motor subscore is 6.   Globally weak    Skin: Skin is warm and dry. Capillary refill takes less than 2 seconds.   Psychiatric: She has a normal mood and affect. Her behavior is normal. Judgment and thought content normal.         ED Course   Procedures  Labs Reviewed   COMPREHENSIVE METABOLIC PANEL - Abnormal; Notable for the following components:       Result Value    Blood Urea Nitrogen 35.7 (*)     Creatinine 8.43 (*)     Calcium Level Total 8.2 (*)     Protein Total 4.9 (*)     Albumin Level 2.2 (*)     Albumin/Globulin Ratio 0.8 (*)     All other components within normal limits   CBC WITH DIFFERENTIAL - Abnormal; Notable for the following components:    RBC 3.61 (*)     Hgb 10.3 (*)     Hct 32.5 (*)     MCHC 31.7 (*)     All other components within normal limits   TROPONIN I - Normal   CBC W/ AUTO DIFFERENTIAL    Narrative:     The following orders were created for panel order CBC auto differential.  Procedure                               Abnormality         Status                     ---------                                -----------         ------                     CBC with Differential[[id number]]       Abnormal            Final result                 Please view results for these tests on the individual orders.   TYPE & SCREEN     EKG Readings: (Independently Interpreted)   Initial Reading: No STEMI. Rhythm: Normal Sinus Rhythm. Heart Rate: 63. Ectopy: No Ectopy. Conduction: Normal. ST Segments: Normal ST Segments. T Waves: Normal. Axis: Normal. Clinical Impression: Normal Sinus Rhythm   Done at 09:31       Imaging Results              CT Head Without Contrast (Final result)  Result time 05/03/24 10:37:02      Final result by Yogesh Wolf MD (05/03/24 10:37:02)                   Impression:      No acute intracranial findings identified.      Electronically signed by: Yogesh Wolf  Date:    05/03/2024  Time:    10:37               Narrative:    EXAMINATION:  CT HEAD WITHOUT CONTRAST    CLINICAL HISTORY:  Head trauma, minor (Age >= 65y);    TECHNIQUE:  Sequential axial images were performed of the brain without contrast.    Dose product length of 916 mGycm. Automated exposure control was utilized to minimize radiation dose.    COMPARISON:  January 1, 2021.    FINDINGS:  There is no intracranial mass effect, midline shift, hydrocephalus or hemorrhage. There is no sulcal effacement or low attenuation changes to suggest recent large vessel territory infarction. Chronic appearing periventricular and subcortical white matter low attenuation changes are present and are consistent with chronic microangiopathic ischemia.  Right occipital and left basal ganglia calcifications are without significant interval change. The ventricular system and sulcal markings prominence is consistent with atrophy. There is no acute extra axial fluid collection.  No acute depressed skull fracture.  Visualized paranasal sinuses are clear without mucosal thickening, polypoidal abnormality or air-fluid levels.  There is similar  chronic loss of pneumatization right mastoid air cells with opacification.                                       Medications   lactated ringers bolus 1,000 mL (0 mLs Intravenous Stopped 5/3/24 1402)   sodium chloride 0.9% bolus 1,000 mL 1,000 mL (0 mLs Intravenous Stopped 5/3/24 1103)     Medical Decision Making  The differential diagnosis includes, but is not limited to, GI bleed, anemia, dehydration     Feels much better after IV fluid patient will be discharged is on multiple hypertensive medications will discontinue the Norvasc she will follow up with her primary care doctor next week CT head without abnormality    Problems Addressed:  Hypotension, unspecified hypotension type: complicated acute illness or injury that poses a threat to life or bodily functions    Amount and/or Complexity of Data Reviewed  Labs: ordered.  Radiology: ordered.  ECG/medicine tests: ordered and independent interpretation performed.     Details:  No STEMI. Rhythm: Normal Sinus Rhythm. Heart Rate: 63. Ectopy: No Ectopy. Conduction: Normal. ST Segments: Normal ST Segments. T Waves: Normal. Axis: Normal. Clinical Impression: Normal Sinus Rhythm   Done at 09:31            Scribe Attestation:   Scribe #1: I performed the above scribed service and the documentation accurately describes the services I performed. I attest to the accuracy of the note.    Attending Attestation:           Physician Attestation for Scribe:  Physician Attestation Statement for Scribe #1: I, Balwinder Weinstein III, MD, reviewed documentation, as scribed by Rachel Ram in my presence, and it is both accurate and complete.                                    Clinical Impression:  Final diagnoses:  [R53.1] Weakness  [I95.9] Hypotension, unspecified hypotension type          ED Disposition Condition    Discharge Stable          ED Prescriptions    None       Follow-up Information       Follow up With Specialties Details Why Contact Info    Sergio Crabtree MD  Internal Medicine In 3 days  [facility] [facility] [address]  [phone]               Balwinder Weinstein III, MD  05/03/24 6725

## 2024-05-06 LAB
OHS QRS DURATION: 88 MS
OHS QTC CALCULATION: 476 MS

## 2024-08-23 ENCOUNTER — HOSPITAL ENCOUNTER (INPATIENT)
Facility: HOSPITAL | Age: 74
LOS: 7 days | Discharge: HOME HEALTH | DRG: 862 | End: 2024-08-30
Attending: STUDENT IN AN ORGANIZED HEALTH CARE EDUCATION/TRAINING PROGRAM | Admitting: INTERNAL MEDICINE
Payer: MEDICARE

## 2024-08-23 DIAGNOSIS — I63.9 STROKE: ICD-10-CM

## 2024-08-23 DIAGNOSIS — L08.9 WOUND INFECTION: Primary | ICD-10-CM

## 2024-08-23 DIAGNOSIS — I12.9 RENAL HYPERTENSION: Chronic | ICD-10-CM

## 2024-08-23 DIAGNOSIS — M79.606 LEG PAIN: ICD-10-CM

## 2024-08-23 DIAGNOSIS — T14.8XXA WOUND INFECTION: Primary | ICD-10-CM

## 2024-08-23 DIAGNOSIS — M79.605 LEFT LEG PAIN: ICD-10-CM

## 2024-08-23 DIAGNOSIS — R07.9 CHEST PAIN: ICD-10-CM

## 2024-08-23 DIAGNOSIS — D64.9 ANEMIA, UNSPECIFIED TYPE: ICD-10-CM

## 2024-08-23 LAB
ABO + RH BLD: NORMAL
ABO + RH BLD: NORMAL
ALBUMIN SERPL-MCNC: 2.1 G/DL (ref 3.4–4.8)
ALBUMIN/GLOB SERPL: 0.5 RATIO (ref 1.1–2)
ALP SERPL-CCNC: 115 UNIT/L (ref 40–150)
ALT SERPL-CCNC: 16 UNIT/L (ref 0–55)
ANION GAP SERPL CALC-SCNC: 13 MEQ/L
AST SERPL-CCNC: 19 UNIT/L (ref 5–34)
BASOPHILS # BLD AUTO: 0.02 X10(3)/MCL
BASOPHILS NFR BLD AUTO: 0.2 %
BILIRUB SERPL-MCNC: 0.4 MG/DL
BLD PROD TYP BPU: NORMAL
BLD PROD TYP BPU: NORMAL
BLOOD UNIT EXPIRATION DATE: NORMAL
BLOOD UNIT EXPIRATION DATE: NORMAL
BLOOD UNIT TYPE CODE: 6200
BLOOD UNIT TYPE CODE: 6200
BUN SERPL-MCNC: 35.7 MG/DL (ref 9.8–20.1)
CALCIUM SERPL-MCNC: 9.3 MG/DL (ref 8.4–10.2)
CHLORIDE SERPL-SCNC: 94 MMOL/L (ref 98–107)
CO2 SERPL-SCNC: 29 MMOL/L (ref 23–31)
CREAT SERPL-MCNC: 7.75 MG/DL (ref 0.55–1.02)
CREAT/UREA NIT SERPL: 5
CROSSMATCH INTERPRETATION: NORMAL
CROSSMATCH INTERPRETATION: NORMAL
DIRECT COOMBS (DAT): NORMAL
DISPENSE STATUS: NORMAL
DISPENSE STATUS: NORMAL
EOSINOPHIL # BLD AUTO: 0.03 X10(3)/MCL (ref 0–0.9)
EOSINOPHIL NFR BLD AUTO: 0.3 %
ERYTHROCYTE [DISTWIDTH] IN BLOOD BY AUTOMATED COUNT: 16.7 % (ref 11.5–17)
FLUAV AG UPPER RESP QL IA.RAPID: NOT DETECTED
FLUBV AG UPPER RESP QL IA.RAPID: NOT DETECTED
FOLATE SERPL-MCNC: 13.9 NG/ML (ref 7–31.4)
GFR SERPLBLD CREATININE-BSD FMLA CKD-EPI: 5 ML/MIN/1.73/M2
GLOBULIN SER-MCNC: 4.2 GM/DL (ref 2.4–3.5)
GLUCOSE SERPL-MCNC: 88 MG/DL (ref 82–115)
GROUP & RH: NORMAL
HCT VFR BLD AUTO: 23.7 % (ref 37–47)
HGB BLD-MCNC: 7.5 G/DL (ref 12–16)
IMM GRANULOCYTES # BLD AUTO: 0.04 X10(3)/MCL (ref 0–0.04)
IMM GRANULOCYTES NFR BLD AUTO: 0.4 %
INDIRECT COOMBS: NORMAL
IRON SATN MFR SERPL: 10 % (ref 20–50)
IRON SERPL-MCNC: 10 UG/DL (ref 50–170)
LACTATE SERPL-SCNC: 2 MMOL/L (ref 0.5–2.2)
LYMPHOCYTES # BLD AUTO: 0.97 X10(3)/MCL (ref 0.6–4.6)
LYMPHOCYTES NFR BLD AUTO: 8.8 %
MCH RBC QN AUTO: 28.3 PG (ref 27–31)
MCHC RBC AUTO-ENTMCNC: 31.6 G/DL (ref 33–36)
MCV RBC AUTO: 89.4 FL (ref 80–94)
MONOCYTES # BLD AUTO: 0.31 X10(3)/MCL (ref 0.1–1.3)
MONOCYTES NFR BLD AUTO: 2.8 %
NEUTROPHILS # BLD AUTO: 9.61 X10(3)/MCL (ref 2.1–9.2)
NEUTROPHILS NFR BLD AUTO: 87.5 %
NRBC BLD AUTO-RTO: 0 %
PLATELET # BLD AUTO: 229 X10(3)/MCL (ref 130–400)
PMV BLD AUTO: 9.4 FL (ref 7.4–10.4)
POTASSIUM SERPL-SCNC: 3.6 MMOL/L (ref 3.5–5.1)
PROT SERPL-MCNC: 6.3 GM/DL (ref 5.8–7.6)
RBC # BLD AUTO: 2.65 X10(6)/MCL (ref 4.2–5.4)
RET# (OHS): 0.05 X10E6/UL (ref 0.02–0.08)
RETICULOCYTE COUNT AUTOMATED (OLG): 2.01 % (ref 1.1–2.1)
SARS-COV-2 RNA RESP QL NAA+PROBE: NOT DETECTED
SODIUM SERPL-SCNC: 136 MMOL/L (ref 136–145)
SPECIMEN OUTDATE: NORMAL
TIBC SERPL-MCNC: 102 UG/DL (ref 250–450)
TIBC SERPL-MCNC: 92 UG/DL (ref 70–310)
TRANSFERRIN SERPL-MCNC: 93 MG/DL (ref 173–360)
UNIT NUMBER: NORMAL
UNIT NUMBER: NORMAL
VIT B12 SERPL-MCNC: 588 PG/ML (ref 213–816)
WBC # BLD AUTO: 10.98 X10(3)/MCL (ref 4.5–11.5)

## 2024-08-23 PROCEDURE — 85025 COMPLETE CBC W/AUTO DIFF WBC: CPT | Performed by: PHYSICIAN ASSISTANT

## 2024-08-23 PROCEDURE — 99285 EMERGENCY DEPT VISIT HI MDM: CPT | Mod: 25

## 2024-08-23 PROCEDURE — P9016 RBC LEUKOCYTES REDUCED: HCPCS | Performed by: STUDENT IN AN ORGANIZED HEALTH CARE EDUCATION/TRAINING PROGRAM

## 2024-08-23 PROCEDURE — 63600175 PHARM REV CODE 636 W HCPCS: Performed by: STUDENT IN AN ORGANIZED HEALTH CARE EDUCATION/TRAINING PROGRAM

## 2024-08-23 PROCEDURE — 86901 BLOOD TYPING SEROLOGIC RH(D): CPT | Performed by: STUDENT IN AN ORGANIZED HEALTH CARE EDUCATION/TRAINING PROGRAM

## 2024-08-23 PROCEDURE — 0240U COVID/FLU A&B PCR: CPT | Performed by: PHYSICIAN ASSISTANT

## 2024-08-23 PROCEDURE — 86880 COOMBS TEST DIRECT: CPT | Performed by: STUDENT IN AN ORGANIZED HEALTH CARE EDUCATION/TRAINING PROGRAM

## 2024-08-23 PROCEDURE — 83605 ASSAY OF LACTIC ACID: CPT | Performed by: PHYSICIAN ASSISTANT

## 2024-08-23 PROCEDURE — 36430 TRANSFUSION BLD/BLD COMPNT: CPT

## 2024-08-23 PROCEDURE — 11000001 HC ACUTE MED/SURG PRIVATE ROOM

## 2024-08-23 PROCEDURE — 30233N1 TRANSFUSION OF NONAUTOLOGOUS RED BLOOD CELLS INTO PERIPHERAL VEIN, PERCUTANEOUS APPROACH: ICD-10-PCS | Performed by: INTERNAL MEDICINE

## 2024-08-23 PROCEDURE — 80053 COMPREHEN METABOLIC PANEL: CPT | Performed by: PHYSICIAN ASSISTANT

## 2024-08-23 PROCEDURE — 85045 AUTOMATED RETICULOCYTE COUNT: CPT | Performed by: STUDENT IN AN ORGANIZED HEALTH CARE EDUCATION/TRAINING PROGRAM

## 2024-08-23 PROCEDURE — 86923 COMPATIBILITY TEST ELECTRIC: CPT | Performed by: STUDENT IN AN ORGANIZED HEALTH CARE EDUCATION/TRAINING PROGRAM

## 2024-08-23 PROCEDURE — 3E1M39Z IRRIGATION OF PERITONEAL CAVITY USING DIALYSATE, PERCUTANEOUS APPROACH: ICD-10-PCS | Performed by: INTERNAL MEDICINE

## 2024-08-23 PROCEDURE — 96374 THER/PROPH/DIAG INJ IV PUSH: CPT

## 2024-08-23 PROCEDURE — 25000003 PHARM REV CODE 250: Performed by: STUDENT IN AN ORGANIZED HEALTH CARE EDUCATION/TRAINING PROGRAM

## 2024-08-23 PROCEDURE — 96375 TX/PRO/DX INJ NEW DRUG ADDON: CPT

## 2024-08-23 PROCEDURE — 25000003 PHARM REV CODE 250: Performed by: PHYSICIAN ASSISTANT

## 2024-08-23 PROCEDURE — 82746 ASSAY OF FOLIC ACID SERUM: CPT | Performed by: STUDENT IN AN ORGANIZED HEALTH CARE EDUCATION/TRAINING PROGRAM

## 2024-08-23 PROCEDURE — 86900 BLOOD TYPING SEROLOGIC ABO: CPT | Performed by: STUDENT IN AN ORGANIZED HEALTH CARE EDUCATION/TRAINING PROGRAM

## 2024-08-23 PROCEDURE — 82607 VITAMIN B-12: CPT | Performed by: STUDENT IN AN ORGANIZED HEALTH CARE EDUCATION/TRAINING PROGRAM

## 2024-08-23 PROCEDURE — 87040 BLOOD CULTURE FOR BACTERIA: CPT | Performed by: PHYSICIAN ASSISTANT

## 2024-08-23 PROCEDURE — 83540 ASSAY OF IRON: CPT | Performed by: STUDENT IN AN ORGANIZED HEALTH CARE EDUCATION/TRAINING PROGRAM

## 2024-08-23 RX ORDER — CALCITRIOL 0.25 UG/1
0.25 CAPSULE ORAL
Status: DISCONTINUED | OUTPATIENT
Start: 2024-08-23 | End: 2024-08-30 | Stop reason: HOSPADM

## 2024-08-23 RX ORDER — ACETAMINOPHEN 500 MG
1000 TABLET ORAL EVERY 6 HOURS PRN
Status: DISCONTINUED | OUTPATIENT
Start: 2024-08-23 | End: 2024-08-23

## 2024-08-23 RX ORDER — ACETAMINOPHEN 500 MG
1000 TABLET ORAL EVERY 6 HOURS PRN
Status: DISCONTINUED | OUTPATIENT
Start: 2024-08-23 | End: 2024-08-30 | Stop reason: HOSPADM

## 2024-08-23 RX ORDER — VANCOMYCIN HCL IN 5 % DEXTROSE 1G/250ML
20 PLASTIC BAG, INJECTION (ML) INTRAVENOUS
Status: COMPLETED | OUTPATIENT
Start: 2024-08-23 | End: 2024-08-23

## 2024-08-23 RX ORDER — ACETAMINOPHEN 325 MG/1
650 TABLET ORAL
Status: COMPLETED | OUTPATIENT
Start: 2024-08-23 | End: 2024-08-23

## 2024-08-23 RX ORDER — FUROSEMIDE 20 MG/1
20 TABLET ORAL DAILY
COMMUNITY

## 2024-08-23 RX ORDER — IBUPROFEN 200 MG
16 TABLET ORAL
Status: DISCONTINUED | OUTPATIENT
Start: 2024-08-23 | End: 2024-08-30 | Stop reason: HOSPADM

## 2024-08-23 RX ORDER — ONDANSETRON HYDROCHLORIDE 2 MG/ML
4 INJECTION, SOLUTION INTRAVENOUS EVERY 4 HOURS PRN
Status: DISCONTINUED | OUTPATIENT
Start: 2024-08-23 | End: 2024-08-30 | Stop reason: HOSPADM

## 2024-08-23 RX ORDER — MORPHINE SULFATE 4 MG/ML
4 INJECTION, SOLUTION INTRAMUSCULAR; INTRAVENOUS
Status: COMPLETED | OUTPATIENT
Start: 2024-08-23 | End: 2024-08-23

## 2024-08-23 RX ORDER — FOLIC ACID 1 MG/1
1 TABLET ORAL DAILY
Status: DISCONTINUED | OUTPATIENT
Start: 2024-08-24 | End: 2024-08-30 | Stop reason: HOSPADM

## 2024-08-23 RX ORDER — AMLODIPINE BESYLATE 5 MG/1
10 TABLET ORAL DAILY
Status: DISCONTINUED | OUTPATIENT
Start: 2024-08-24 | End: 2024-08-30 | Stop reason: HOSPADM

## 2024-08-23 RX ORDER — TALC
6 POWDER (GRAM) TOPICAL NIGHTLY PRN
Status: DISCONTINUED | OUTPATIENT
Start: 2024-08-23 | End: 2024-08-30 | Stop reason: HOSPADM

## 2024-08-23 RX ORDER — ENOXAPARIN SODIUM 100 MG/ML
30 INJECTION SUBCUTANEOUS EVERY 24 HOURS
Status: DISCONTINUED | OUTPATIENT
Start: 2024-08-24 | End: 2024-08-30 | Stop reason: HOSPADM

## 2024-08-23 RX ORDER — CLONIDINE 0.3 MG/24H
1 PATCH, EXTENDED RELEASE TRANSDERMAL
Status: ON HOLD | COMMUNITY
End: 2024-08-26 | Stop reason: HOSPADM

## 2024-08-23 RX ORDER — FUROSEMIDE 20 MG/1
20 TABLET ORAL DAILY
Status: DISCONTINUED | OUTPATIENT
Start: 2024-08-24 | End: 2024-08-30 | Stop reason: HOSPADM

## 2024-08-23 RX ORDER — SODIUM CHLORIDE 0.9 % (FLUSH) 0.9 %
10 SYRINGE (ML) INJECTION
Status: DISCONTINUED | OUTPATIENT
Start: 2024-08-23 | End: 2024-08-30 | Stop reason: HOSPADM

## 2024-08-23 RX ORDER — BISACODYL 10 MG/1
10 SUPPOSITORY RECTAL DAILY PRN
Status: DISCONTINUED | OUTPATIENT
Start: 2024-08-23 | End: 2024-08-30 | Stop reason: HOSPADM

## 2024-08-23 RX ORDER — ONDANSETRON HYDROCHLORIDE 2 MG/ML
4 INJECTION, SOLUTION INTRAVENOUS
Status: COMPLETED | OUTPATIENT
Start: 2024-08-23 | End: 2024-08-23

## 2024-08-23 RX ORDER — GLUCAGON 1 MG
1 KIT INJECTION
Status: DISCONTINUED | OUTPATIENT
Start: 2024-08-23 | End: 2024-08-30 | Stop reason: HOSPADM

## 2024-08-23 RX ORDER — AMOXICILLIN 250 MG
1 CAPSULE ORAL 2 TIMES DAILY PRN
Status: DISCONTINUED | OUTPATIENT
Start: 2024-08-23 | End: 2024-08-30 | Stop reason: HOSPADM

## 2024-08-23 RX ORDER — HYDROCODONE BITARTRATE AND ACETAMINOPHEN 500; 5 MG/1; MG/1
TABLET ORAL
Status: DISCONTINUED | OUTPATIENT
Start: 2024-08-23 | End: 2024-08-30 | Stop reason: HOSPADM

## 2024-08-23 RX ORDER — PROCHLORPERAZINE EDISYLATE 5 MG/ML
5 INJECTION INTRAMUSCULAR; INTRAVENOUS EVERY 6 HOURS PRN
Status: DISCONTINUED | OUTPATIENT
Start: 2024-08-23 | End: 2024-08-30 | Stop reason: HOSPADM

## 2024-08-23 RX ORDER — ALUMINUM HYDROXIDE, MAGNESIUM HYDROXIDE, AND SIMETHICONE 1200; 120; 1200 MG/30ML; MG/30ML; MG/30ML
30 SUSPENSION ORAL 4 TIMES DAILY PRN
Status: DISCONTINUED | OUTPATIENT
Start: 2024-08-23 | End: 2024-08-30 | Stop reason: HOSPADM

## 2024-08-23 RX ORDER — ASPIRIN 81 MG/1
81 TABLET ORAL DAILY
Status: DISCONTINUED | OUTPATIENT
Start: 2024-08-24 | End: 2024-08-30 | Stop reason: HOSPADM

## 2024-08-23 RX ORDER — IBUPROFEN 200 MG
24 TABLET ORAL
Status: DISCONTINUED | OUTPATIENT
Start: 2024-08-23 | End: 2024-08-30 | Stop reason: HOSPADM

## 2024-08-23 RX ORDER — NALOXONE HCL 0.4 MG/ML
0.02 VIAL (ML) INJECTION
Status: DISCONTINUED | OUTPATIENT
Start: 2024-08-23 | End: 2024-08-30 | Stop reason: HOSPADM

## 2024-08-23 RX ORDER — AMLODIPINE BESYLATE 10 MG/1
10 TABLET ORAL DAILY
COMMUNITY

## 2024-08-23 RX ADMIN — ONDANSETRON 4 MG: 2 INJECTION INTRAMUSCULAR; INTRAVENOUS at 04:08

## 2024-08-23 RX ADMIN — ACETAMINOPHEN 650 MG: 325 TABLET, FILM COATED ORAL at 03:08

## 2024-08-23 RX ADMIN — VANCOMYCIN HYDROCHLORIDE 1000 MG: 1 INJECTION, POWDER, LYOPHILIZED, FOR SOLUTION INTRAVENOUS at 06:08

## 2024-08-23 RX ADMIN — MORPHINE SULFATE 4 MG: 4 INJECTION, SOLUTION INTRAMUSCULAR; INTRAVENOUS at 04:08

## 2024-08-23 NOTE — ED PROVIDER NOTES
Encounter Date: 8/23/2024    SCRIBE #1 NOTE: I, Radames Oneil, am scribing for, and in the presence of,  Dr. Bassem Pompa IV, MD. I have scribed the following portions of the note - Other sections scribed: HPI, ROS, and PE..       History     Chief Complaint   Patient presents with    Leg Pain     Presents [facility] with c/o LLE pain since skin cancer removal on Tuesday. Febrile. Wound with drainage.      Malissa Schwartz is a 74 y.o. female patient with a PMHx of CKD, HTN, HLD, kidney transplant recipient, skin cancer who presents to the Emergency Department for evaluation of left leg pain which onset gradually yesterday. Pt states she had recent skin cancer removal on Monday to L lower leg; says she was not given any instructions on care for at home. She also complains of lower abd pain that also began yesterday. Left leg pain is worsened with any movement. Associated symptoms include fever.     Also c/o worsening fatigue, SOB, denies dark stools, blood in stool    The history is provided by the patient. No  was used.     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 Glomerulitis with one activated endothelial cell: treated with Solumedrol pulse 375 mg IVBP x 3 doses with short prednisone taper; thymoglobin 100 mg x 6 doses 10/04/2007 minimal histologic changes no acute rejection     Allergy     Anemia of renal disease     CKD (chronic kidney disease), stage IV      CMV (cytomegalovirus) status positive     Colon polyp     benign    Depression     GN (glomerulonephritis)     GN (glomerulonephritis)     unknown type    Herpes infection, other 2009    on buttocks    Hyperlipidemia     Hypertension     Immunosuppression     Living-donor kidney transplant recipient 01/15/2007    Meningitis     history of viral meningitis    Proteinuria 1/5/2015    Proteinuria     Pulmonary nodules     Renal hypertension     S/P breast augmentation     Skin cancer     Urinary tract infection      Past Surgical History:   Procedure Laterality Date    COLON SURGERY      COLONOSCOPY W/ BIOPSIES      cosemetic surgery      facelift and breast augmentation     HYSTERECTOMY      KIDNEY TRANSPLANT      NEPHRECTOMY Left     9/2016    RENAL BIOPSY      TONSILLECTOMY, ADENOIDECTOMY       Family History   Problem Relation Name Age of Onset    Kidney disease Sister          ESRD    Kidney disease Brother          CKD from HTN    Hypertension Brother      Heart failure Mother      Heart failure Father      Diabetes Neg Hx      Stroke Neg Hx       Social History     Tobacco Use    Smoking status: Former     Current packs/day: 1.00     Average packs/day: 1 pack/day for 15.0 years (15.0 ttl pk-yrs)     Types: Cigarettes    Smokeless tobacco: Never    Tobacco comments:     quit in 2014; smoked for 15 years and at the most smoked 1 ppd   Substance Use Topics    Alcohol use: Yes     Comment: seldom    Drug use: No     Review of Systems   Constitutional:  Positive for fever. Negative for chills.   HENT:  Negative for congestion, rhinorrhea and sore throat.    Eyes:  Negative for visual disturbance.   Respiratory:  Negative for cough and shortness of breath.    Cardiovascular:  Negative for chest pain and leg swelling.   Gastrointestinal:  Positive for abdominal pain. Negative for nausea and vomiting.   Genitourinary:  Negative for dysuria, hematuria, vaginal bleeding and vaginal discharge.   Musculoskeletal:   Negative for joint swelling.        Left lower leg pain   Skin:  Negative for rash.   Neurological:  Negative for weakness.   Psychiatric/Behavioral:  Negative for confusion.        Physical Exam     Initial Vitals [08/23/24 1217]   BP Pulse Resp Temp SpO2   128/73 85 17 (!) 100.8 °F (38.2 °C) 100 %      MAP       --         Physical Exam    Nursing note and vitals reviewed.  Constitutional: She is not diaphoretic. No distress.   Chronically ill appearing     HENT:   Head: Normocephalic and atraumatic.   Neck: Neck supple.   Normal range of motion.  Cardiovascular:  Normal rate and regular rhythm.           No murmur heard.  Pulmonary/Chest: Breath sounds normal. No respiratory distress.   Abdominal: Abdomen is soft. She exhibits no distension. There is no abdominal tenderness.   Musculoskeletal:      Cervical back: Normal range of motion and neck supple.      Comments: Tenderness to palpation to left calf.      Neurological: She is alert and oriented to person, place, and time. She has normal strength. No cranial nerve deficit or sensory deficit.   Skin: Skin is warm. Capillary refill takes less than 2 seconds.   2 surgical wounds to left lower leg, one to left medial calf and other to left anterior calf. Greenish foul smelling drainage from the lateral wound. Some surrounding erythema and ttp       Psychiatric: She has a normal mood and affect.         ED Course   Procedures  Labs Reviewed   COMPREHENSIVE METABOLIC PANEL - Abnormal       Result Value    Sodium 136      Potassium 3.6      Chloride 94 (*)     CO2 29      Glucose 88      Blood Urea Nitrogen 35.7 (*)     Creatinine 7.75 (*)     Calcium 9.3      Protein Total 6.3      Albumin 2.1 (*)     Globulin 4.2 (*)     Albumin/Globulin Ratio 0.5 (*)     Bilirubin Total 0.4            ALT 16      AST 19      eGFR 5      Anion Gap 13.0      BUN/Creatinine Ratio 5     CBC WITH DIFFERENTIAL - Abnormal    WBC 10.98      RBC 2.65 (*)     Hgb 7.5 (*)     Hct 23.7  (*)     MCV 89.4      MCH 28.3      MCHC 31.6 (*)     RDW 16.7      Platelet 229      MPV 9.4      Neut % 87.5      Lymph % 8.8      Mono % 2.8      Eos % 0.3      Basophil % 0.2      Lymph # 0.97      Neut # 9.61 (*)     Mono # 0.31      Eos # 0.03      Baso # 0.02      IG# 0.04      IG% 0.4      NRBC% 0.0     IRON AND TIBC - Abnormal    Iron Binding Capacity Unsaturated 92      Iron Level 10 (*)     Transferrin 93 (*)     Iron Binding Capacity Total 102 (*)     Iron Saturation 10 (*)    COVID/FLU A&B PCR - Normal    Influenza A PCR Not Detected      Influenza B PCR Not Detected      SARS-CoV-2 PCR Not Detected      Narrative:     The Xpert Xpress SARS-CoV-2/FLU/RSV plus is a rapid, multiplexed real-time PCR test intended for the simultaneous qualitative detection and differentiation of SARS-CoV-2, Influenza A, Influenza B, and respiratory syncytial virus (RSV) viral RNA in either nasopharyngeal swab or nasal swab specimens.         LACTIC ACID, PLASMA - Normal    Lactic Acid Level 2.0     RETICULOCYTES - Normal    Retic Cnt Auto 2.01      RET# [id number]     FOLATE - Normal    Folate Level 13.9     VITAMIN B12 - Normal    Vitamin B12 588     BLOOD CULTURE OLG   BLOOD CULTURE OLG   CBC W/ AUTO DIFFERENTIAL    Narrative:     The following orders were created for panel order CBC auto differential.  Procedure                               Abnormality         Status                     ---------                               -----------         ------                     CBC with Differential[[id number]]       Abnormal            Final result                 Please view results for these tests on the individual orders.   URINALYSIS, REFLEX TO URINE CULTURE   TYPE & SCREEN    Group & Rh A POS      Indirect Gilberto GEL NEG      Specimen Outdate 08/26/2024 23:59     DIRECT ANTIGLOBULIN TEST    Direct Gilberto (RADHA) NEG     PREPARE RBC SOFT    UNIT NUMBER [id number]      UNIT ABO/RH A POS      DISPENSE STATUS Selected       Unit Expiration [id number]      Product Code [id number]      Unit Blood Type Code 6200      CROSSMATCH INTERPRETATION Compatible      UNIT NUMBER [id number]      UNIT ABO/RH A POS      DISPENSE STATUS Issued      Unit Expiration [id number]      Product Code P1878I40      Unit Blood Type Code 6200      CROSSMATCH INTERPRETATION Compatible            Imaging Results    None          Medications   0.9%  NaCl infusion (for blood administration) (has no administration in time range)   sodium chloride 0.9% flush 10 mL (has no administration in time range)   melatonin tablet 6 mg (has no administration in time range)   ondansetron injection 4 mg (has no administration in time range)   prochlorperazine injection Soln 5 mg (has no administration in time range)   senna-docusate 8.6-50 mg per tablet 1 tablet (has no administration in time range)   bisacodyL suppository 10 mg (has no administration in time range)   aluminum-magnesium hydroxide-simethicone 200-200-20 mg/5 mL suspension 30 mL (has no administration in time range)   naloxone 0.4 mg/mL injection 0.02 mg (has no administration in time range)   glucose chewable tablet 16 g (has no administration in time range)   glucose chewable tablet 24 g (has no administration in time range)   glucagon (human recombinant) injection 1 mg (has no administration in time range)   enoxaparin injection 30 mg (has no administration in time range)   dextrose 10% bolus 125 mL 125 mL (has no administration in time range)   dextrose 10% bolus 250 mL 250 mL (has no administration in time range)   acetaminophen tablet 1,000 mg (has no administration in time range)   amLODIPine tablet 10 mg (has no administration in time range)   aspirin EC tablet 81 mg (has no administration in time range)   calcitRIOL capsule 0.25 mcg (0.25 mcg Oral Not Given 8/23/24 2015)   folic acid tablet 1 mg (has no administration in time range)   furosemide tablet 20 mg (has no administration in time range)    acetaminophen tablet 650 mg (650 mg Oral Given 8/23/24 1513)   morphine injection 4 mg (4 mg Intravenous Given 8/23/24 1619)   ondansetron injection 4 mg (4 mg Intravenous Given 8/23/24 1618)   vancomycin in dextrose 5 % 1 gram/250 mL IVPB 1,000 mg (0 mg Intravenous Stopped 8/23/24 2027)     Medical Decision Making  75 yo presenting with L leg pain since procedure to remove cancerous lesions with outpatient dermatology  Now having severe RLE pain, fevers  Exam as above, does have some drainage to wounds that appears infectious  Will obtain labs, DVT US, reassess    The differential diagnosis includes, but is not limited to:  Infection, cellulitis, dvt, anemia chornic disease, GI bleed        Problems Addressed:  Anemia, unspecified type: acute illness or injury that poses a threat to life or bodily functions  Left leg pain: acute illness or injury that poses a threat to life or bodily functions  Wound infection: acute illness or injury that poses a threat to life or bodily functions    Amount and/or Complexity of Data Reviewed  Labs: ordered.  Discussion of management or test interpretation with external provider(s): Discussed with hospitalist - will admit     Risk  Prescription drug management.  Decision regarding hospitalization.            Scribe Attestation:   Scribe #1: I performed the above scribed service and the documentation accurately describes the services I performed. I attest to the accuracy of the note.    Attending Attestation:           Physician Attestation for Scribe:  Physician Attestation Statement for Scribe #1: I, Dr. Bassem Pompa IV, MD, reviewed documentation, as scribed by Radames Oneil in my presence, and it is both accurate and complete.             ED Course as of 08/23/24 2059   Fri Aug 23, 2024   1614 75 yo presenting with L leg pain since procedure to remove cancerous lesions with outpatient dermatology  Now having severe RLE pain, fevers  Exam as above, does have some drainage to  wounds that appears mainly serosanginous   Will obtain labs, DVT US, reassess   [AC]   1720 Paged hospital medicine.  [GG]   1736 Discussed with hospital medicine. [GG]      ED Course User Index  [AC] Bassem Pompa IV, MD  [GG] Radames Oneil                             Clinical Impression:  Final diagnoses:  [M79.606] Leg pain  [M79.605] Left leg pain (Primary)  [T14.8XXA, L08.9] Wound infection  [D64.9] Anemia, unspecified type          ED Disposition Condition    Admit Stable                Bassem Pompa IV, MD  08/23/24 2100

## 2024-08-23 NOTE — Clinical Note
Diagnosis: Leg pain [199497]   Future Attending Provider: MIERILLE ANGEL [[id number]]   Admit to which facility:: [facility] [[id number]]   Reason for IP Medical Treatment  (Clinical interventions that can only be accomplished in the IP setting? ) :: IV abx, transfusion   Plans for Post-Acute care--if anticipated (pick the single best option):: A. No post acute care anticipated at this time   Special Needs:: No Special Needs [1]

## 2024-08-23 NOTE — H&P
[facility] Hospital Medicine - H&P Note    Patient Name: Malissa Schwartz  : 1950  MRN: [id number]  PCP: Sergio Crabtree MD  Admitting Physician: RAFY Schultz MD  Admission Class: IP- Inpatient   Code status: Full    Allergies   Codeine, Cefepime, Levofloxacin, and Iodine    Chief Complaint   Left lower extremity weakness    History of Present Illness   74-year-old female whose history includes end-stage renal disease on peritoneal dialysis and hypertension.  Presented to the ED with complaints a nonhealing wound on left lower extremity.  Reports a cancerous lesion was removed on Monday.  Since then the sore has developed purulent drainage and a foul odor.  At the time of my exam patient reports that the primary reason she presented to the ER was for evaluation of left lower extremity weakness and inability to ambulate which started last night.  Apparently she has not mentioned this to any other providers until now.  On physical exam she does have significant left lower extremity weakness compared to the right.  Bilateral upper extremities strong and equal bilaterally.  May have a slight left-sided facial droop.    VS on arrival: T 100.8, P 85, R 17, b/P 128/73, Sats 100% on room air. Initial labs: WBC 10.98, Hgb 7.5, Hct 23.7 (down from 12.8 and 39. , , iron 10, TIBC 102.  Left lower extremity venous ultrasound negative for DVT.  Vancomycin administered in the ED. Obtained a stat CT head which showed no acute intracranial process.    ROS   Except as documented, all other systems reviewed and negative     Past Medical History   ESRD on PD  glomerulonephritis  Hypertension  Dyslipidemia  Anemia of chronic disease and iron deficiency  Hx CMV (cytomegalovirus)  Depression     Past Surgical History   Failed kidney transplant - living donor    Hysterectomy  Tonsillectomy and adenoidectomy  Breast augmentation    Social History   Denies alcohol, tobacco or illicit drug use.   Lives at home with her .    Screening for Social Drivers for health:  Patient screened for food insecurity, housing instability, transportation needs, utility difficulties, and interpersonal safety (select all that apply as identified as concern)  []Housing or Food  []Transportation Needs  []Utility Difficulties  []Interpersonal safety  [x]None      Family History   Reviewed and negative    Home Medications     No current facility-administered medications on file prior to encounter.     Current Outpatient Medications on File Prior to Encounter   Medication Sig Dispense Refill    amLODIPine (NORVASC) 10 MG tablet Take 10 mg by mouth once daily.      aspirin (ECOTRIN) 81 MG EC tablet Take 81 mg by mouth once daily.      B complex-vitamin C-folic acid (NEPHRO-JENN) 0.8 mg Tab Take 0.8 mg by mouth Daily.      calcitRIOL (ROCALTROL) 0.25 MCG Cap Take 0.25 mcg by mouth 3 (three) times a week.      cloNIDine 0.3 mg/24 hr td ptwk (CATAPRES) 0.3 mg/24 hr Place 1 patch onto the skin every 7 days.      folic acid (FOLVITE) 1 MG tablet Take 1 mg by mouth once daily.      furosemide (LASIX) 20 MG tablet Take 20 mg by mouth once daily.      potassium chloride (KLOR-CON) 10 MEQ TbSR Take 10 mEq by mouth once daily.      sodium bicarbonate 650 MG tablet Take 650 mg by mouth 2 (two) times daily. (Patient not taking: Reported on 8/23/2024)      spironolactone (ALDACTONE) 25 MG tablet Take 25 mg by mouth once daily. (Patient not taking: Reported on 8/23/2024)      [DISCONTINUED] losartan (COZAAR) 100 MG tablet TAKE ONE TABLET BY MOUTH ONCE DAILY 90 tablet 3    [DISCONTINUED] metoprolol succinate (TOPROL-XL) 25 MG 24 hr tablet Take 2 tablets (50 mg total) by mouth once daily. (Patient not taking: Reported on 5/12/2022) 60 tablet 3    [DISCONTINUED] mycophenolate (CELLCEPT) 250 mg Cap Take 2 capsules (500 mg total) by mouth 2 (two) times daily. Z94.0 kidney transplant (Patient not taking: Reported on 5/12/2022) 120 capsule 11     "[DISCONTINUED] tacrolimus (PROGRAF) 1 MG Cap Take 3mg every AM and 2mg every PM.  Route: Oral.  Z94.0 Kidney Transplant. (Patient not taking: Reported on 5/12/2022) 150 capsule 11        Physical Exam   Vital Signs  Temp:  [98.4 °F (36.9 °C)-100.8 °F (38.2 °C)]   Pulse:  [76-85]   Resp:  [17-18]   BP: (128-130)/(73-74)   SpO2:  [97 %-100 %]    General: Appears comfortable  HEENT: NC/AT  Neck:  No JVD  Chest: CTABL  CVS: Regular rhythm. Normal S1/S2.  Abdomen: nondistended, normoactive BS, soft and non-tender.  MSK: No obvious deformity or joint swelling  Skin: Warm and dry  Neuro: AAOx3, no focal neurological deficit  Psych: Cooperative    Labs     Recent Labs     08/23/24  1242   WBC 10.98   RBC 2.65*   HGB 7.5*   HCT 23.7*   MCV 89.4   MCH 28.3   MCHC 31.6*   RDW 16.7        Recent Labs     08/23/24  1802   IRON 10*   TIBC 102*   LABIRON 10*   RETICCNTAUTO 2.01   RETABS 0.0482      Recent Labs     08/23/24  1242      K 3.6   CO2 29   BUN 35.7*   CREATININE 7.75*   EGFRNORACEVR 5   GLUCOSE 88   CALCIUM 9.3   ALBUMIN 2.1*   GLOBULIN 4.2*   ALKPHOS 115   ALT 16   AST 19   BILITOT 0.4     No results for input(s): "LACTIC" in the last 72 hours.  No results for input(s): "CPK", "TROPONINI" in the last 72 hours.     Microbiology Results (last 7 days)       Procedure Component Value Units Date/Time    Blood culture #1 **CANNOT BE ORDERED STAT** [[id number]] Collected: 08/23/24 1242    Order Status: Resulted Specimen: Blood Updated: 08/23/24 1357    Blood culture #2 **CANNOT BE ORDERED STAT** [[id number]] Collected: 08/23/24 1242    Order Status: Resulted Specimen: Blood Updated: 08/23/24 1357           Imaging   CT Head Without Contrast  Narrative: EXAMINATION:  CT HEAD WITHOUT CONTRAST    CLINICAL HISTORY:  Head trauma, minor (Age >= 65y);    TECHNIQUE:  Sequential axial images were performed of the brain without contrast.    Dose product length of 916 mGycm. Automated exposure control was utilized to " minimize radiation dose.    COMPARISON:  January 1, 2021.    FINDINGS:  There is no intracranial mass effect, midline shift, hydrocephalus or hemorrhage. There is no sulcal effacement or low attenuation changes to suggest recent large vessel territory infarction. Chronic appearing periventricular and subcortical white matter low attenuation changes are present and are consistent with chronic microangiopathic ischemia.  Right occipital and left basal ganglia calcifications are without significant interval change. The ventricular system and sulcal markings prominence is consistent with atrophy. There is no acute extra axial fluid collection.  No acute depressed skull fracture.  Visualized paranasal sinuses are clear without mucosal thickening, polypoidal abnormality or air-fluid levels.  There is similar chronic loss of pneumatization right mastoid air cells with opacification.  Impression: No acute intracranial findings identified.    Electronically signed by: Yogesh Wolf  Date:    05/03/2024  Time:    10:37    Assessment & Plan     LLE Cellulitis and wound infection   - follow-up on blood cultures  - continue Zosyn  - consult wound care    Left lower extremity weakness  - CT head without contrast negative for acute findings  - echo and carotid ultrasound pending  - MRI brain pending  - NPO for now  - consider neurology consult    ESRD on PD  - nephrology consult    Hypertension - stable  - home meds resumed    Anemia of chronic disease and iron deficiency - worse  - S/P 2 units PRBCs with improved HH  - Ferrlicit 125 mg daily x 3 doses  - PO iron supplement at discharge    VTE Prophylaxis: Tracy Haddad, OMIDP-BC have discussed this patients case with Dr. Schultz who agrees with the diagnosis and treatment plan.

## 2024-08-24 PROBLEM — L08.9 WOUND INFECTION: Status: ACTIVE | Noted: 2024-08-24

## 2024-08-24 PROBLEM — T14.8XXA WOUND INFECTION: Status: ACTIVE | Noted: 2024-08-24

## 2024-08-24 LAB
ALBUMIN SERPL-MCNC: 1.9 G/DL (ref 3.4–4.8)
ALBUMIN/GLOB SERPL: 0.6 RATIO (ref 1.1–2)
ALP SERPL-CCNC: 106 UNIT/L (ref 40–150)
ALT SERPL-CCNC: 14 UNIT/L (ref 0–55)
ANION GAP SERPL CALC-SCNC: 17 MEQ/L
AST SERPL-CCNC: 19 UNIT/L (ref 5–34)
BASOPHILS # BLD AUTO: 0.03 X10(3)/MCL
BASOPHILS NFR BLD AUTO: 0.3 %
BILIRUB SERPL-MCNC: 0.5 MG/DL
BUN SERPL-MCNC: 42.2 MG/DL (ref 9.8–20.1)
CALCIUM SERPL-MCNC: 8.2 MG/DL (ref 8.4–10.2)
CHLORIDE SERPL-SCNC: 96 MMOL/L (ref 98–107)
CO2 SERPL-SCNC: 25 MMOL/L (ref 23–31)
CREAT SERPL-MCNC: 8.47 MG/DL (ref 0.55–1.02)
CREAT/UREA NIT SERPL: 5
EOSINOPHIL # BLD AUTO: 0.09 X10(3)/MCL (ref 0–0.9)
EOSINOPHIL NFR BLD AUTO: 1 %
ERYTHROCYTE [DISTWIDTH] IN BLOOD BY AUTOMATED COUNT: 15.6 % (ref 11.5–17)
GFR SERPLBLD CREATININE-BSD FMLA CKD-EPI: 5 ML/MIN/1.73/M2
GLOBULIN SER-MCNC: 3 GM/DL (ref 2.4–3.5)
GLUCOSE SERPL-MCNC: 59 MG/DL (ref 82–115)
HCT VFR BLD AUTO: 30.9 % (ref 37–47)
HGB BLD-MCNC: 10.3 G/DL (ref 12–16)
IMM GRANULOCYTES # BLD AUTO: 0.05 X10(3)/MCL (ref 0–0.04)
IMM GRANULOCYTES NFR BLD AUTO: 0.5 %
LYMPHOCYTES # BLD AUTO: 0.51 X10(3)/MCL (ref 0.6–4.6)
LYMPHOCYTES NFR BLD AUTO: 5.4 %
MCH RBC QN AUTO: 29.8 PG (ref 27–31)
MCHC RBC AUTO-ENTMCNC: 33.3 G/DL (ref 33–36)
MCV RBC AUTO: 89.3 FL (ref 80–94)
MONOCYTES # BLD AUTO: 0.41 X10(3)/MCL (ref 0.1–1.3)
MONOCYTES NFR BLD AUTO: 4.4 %
MRSA PCR SCRN (OHS): NOT DETECTED
NEUTROPHILS # BLD AUTO: 8.29 X10(3)/MCL (ref 2.1–9.2)
NEUTROPHILS NFR BLD AUTO: 88.4 %
NRBC BLD AUTO-RTO: 0 %
PLATELET # BLD AUTO: 178 X10(3)/MCL (ref 130–400)
PMV BLD AUTO: 9.8 FL (ref 7.4–10.4)
POCT GLUCOSE: 80 MG/DL (ref 70–110)
POTASSIUM SERPL-SCNC: 4.2 MMOL/L (ref 3.5–5.1)
PROT SERPL-MCNC: 4.9 GM/DL (ref 5.8–7.6)
RBC # BLD AUTO: 3.46 X10(6)/MCL (ref 4.2–5.4)
SODIUM SERPL-SCNC: 138 MMOL/L (ref 136–145)
VANCOMYCIN SERPL-MCNC: 17.2 UG/ML (ref 15–20)
WBC # BLD AUTO: 9.38 X10(3)/MCL (ref 4.5–11.5)

## 2024-08-24 PROCEDURE — 85025 COMPLETE CBC W/AUTO DIFF WBC: CPT | Performed by: NURSE PRACTITIONER

## 2024-08-24 PROCEDURE — 80202 ASSAY OF VANCOMYCIN: CPT | Performed by: INTERNAL MEDICINE

## 2024-08-24 PROCEDURE — 82728 ASSAY OF FERRITIN: CPT | Performed by: INTERNAL MEDICINE

## 2024-08-24 PROCEDURE — 80053 COMPREHEN METABOLIC PANEL: CPT | Performed by: NURSE PRACTITIONER

## 2024-08-24 PROCEDURE — 63600175 PHARM REV CODE 636 W HCPCS: Performed by: INTERNAL MEDICINE

## 2024-08-24 PROCEDURE — 87641 MR-STAPH DNA AMP PROBE: CPT | Performed by: NURSE PRACTITIONER

## 2024-08-24 PROCEDURE — 93010 ELECTROCARDIOGRAM REPORT: CPT | Mod: ,,, | Performed by: INTERNAL MEDICINE

## 2024-08-24 PROCEDURE — 92610 EVALUATE SWALLOWING FUNCTION: CPT

## 2024-08-24 PROCEDURE — 21400001 HC TELEMETRY ROOM

## 2024-08-24 PROCEDURE — 93005 ELECTROCARDIOGRAM TRACING: CPT

## 2024-08-24 PROCEDURE — 36415 COLL VENOUS BLD VENIPUNCTURE: CPT | Performed by: NURSE PRACTITIONER

## 2024-08-24 PROCEDURE — 25000003 PHARM REV CODE 250: Performed by: NURSE PRACTITIONER

## 2024-08-24 PROCEDURE — 11000001 HC ACUTE MED/SURG PRIVATE ROOM

## 2024-08-24 PROCEDURE — 63600175 PHARM REV CODE 636 W HCPCS: Performed by: NURSE PRACTITIONER

## 2024-08-24 RX ORDER — HYDRALAZINE HYDROCHLORIDE 20 MG/ML
10 INJECTION INTRAMUSCULAR; INTRAVENOUS EVERY 6 HOURS PRN
Status: DISCONTINUED | OUTPATIENT
Start: 2024-08-24 | End: 2024-08-30 | Stop reason: HOSPADM

## 2024-08-24 RX ORDER — MORPHINE SULFATE 4 MG/ML
2 INJECTION, SOLUTION INTRAMUSCULAR; INTRAVENOUS EVERY 6 HOURS PRN
Status: DISCONTINUED | OUTPATIENT
Start: 2024-08-24 | End: 2024-08-30 | Stop reason: HOSPADM

## 2024-08-24 RX ORDER — MORPHINE SULFATE 4 MG/ML
2 INJECTION, SOLUTION INTRAMUSCULAR; INTRAVENOUS ONCE
Status: DISCONTINUED | OUTPATIENT
Start: 2024-08-24 | End: 2024-08-29

## 2024-08-24 RX ORDER — LABETALOL HYDROCHLORIDE 5 MG/ML
10 INJECTION, SOLUTION INTRAVENOUS
Status: DISCONTINUED | OUTPATIENT
Start: 2024-08-24 | End: 2024-08-24

## 2024-08-24 RX ADMIN — FUROSEMIDE 20 MG: 20 TABLET ORAL at 09:08

## 2024-08-24 RX ADMIN — MORPHINE SULFATE 2 MG: 4 INJECTION, SOLUTION INTRAMUSCULAR; INTRAVENOUS at 11:08

## 2024-08-24 RX ADMIN — ASPIRIN 81 MG: 81 TABLET, COATED ORAL at 09:08

## 2024-08-24 RX ADMIN — FOLIC ACID 1 MG: 1 TABLET ORAL at 09:08

## 2024-08-24 RX ADMIN — SODIUM CHLORIDE 125 MG: 9 INJECTION, SOLUTION INTRAVENOUS at 05:08

## 2024-08-24 RX ADMIN — ENOXAPARIN SODIUM 30 MG: 40 INJECTION SUBCUTANEOUS at 05:08

## 2024-08-24 RX ADMIN — AMLODIPINE BESYLATE 10 MG: 5 TABLET ORAL at 11:08

## 2024-08-24 NOTE — CONSULTS
NEPHROLOGY CONSULT     Consultant Attending:     Dr. Long Stewart    Reason for Consult:     ESRD on PD    Subjective:      History of Present Illness:  Malissa Schwartz is a 74 y.o. well known to our service with a history of end-stage renal disease on peritoneal dialysis.  She is normally followed by Dr. George Mendoza.  She does have a history of a failed renal transplant in 2007 at [facility] in [place], [place].  She presented to the emergency department yesterday with significant left lower extremity pain.  Apparently she had skin cancer removed from his left lower extremity on 08/19/2024.  She is not on any immunosuppressants at present.  She has been fairly successful with her peritoneal dialysis.  I do see that she was transfused on admission for a hemoglobin of 7.5.  Hemoglobin today is 10.3.  Iron studies were done and do show some deficiency.  From a renal standpoint we will resume her nightly CCPD cycler this evening.  We will use her same home prescription.  I will start her on Ferrlecit in light of her iron deficiency.  Continue antibiotics and wound care to this left lower extremity was now represents cellulitis.  Currently vital signs are stable.  Family is at the bedside on exam.  Other pertinent medical history includes hypertension, hyperlipidemia, renal transplantation which failed, skin cancer, pulmonary nodules, glomerular nephritis and anemia secondary to chronic kidney disease.        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 Glomerulitis with one activated endothelial cell: treated with Solumedrol pulse 375 mg IVBP x 3 doses with short prednisone taper; thymoglobin 100 mg x 6 doses 10/04/2007 minimal histologic changes no acute rejection     Allergy     Anemia of renal disease     CKD (chronic kidney disease), stage IV     CMV (cytomegalovirus) status  positive     Colon polyp     benign    Depression     GN (glomerulonephritis)     GN (glomerulonephritis)     unknown type    Herpes infection, other 2009    on buttocks    Hyperlipidemia     Hypertension     Immunosuppression     Living-donor kidney transplant recipient 01/15/2007    Meningitis     history of viral meningitis    Proteinuria 1/5/2015    Proteinuria     Pulmonary nodules     Renal hypertension     S/P breast augmentation     Skin cancer     Urinary tract infection        Past Surgical History:  Past Surgical History:   Procedure Laterality Date    COLON SURGERY      COLONOSCOPY W/ BIOPSIES      cosemetic surgery      facelift and breast augmentation     HYSTERECTOMY      KIDNEY TRANSPLANT      NEPHRECTOMY Left     9/2016    RENAL BIOPSY      TONSILLECTOMY, ADENOIDECTOMY         Allergies:  Review of patient's allergies indicates:   Allergen Reactions    Codeine Palpitations and Rash    Cefepime      Other Reaction(s): Encephalopathy    Renal impairment increased risk for this reaction    Levofloxacin      Other Reaction(s): Encephalopathy    Renal pt who had some encephalopathy while on Levaquin & Cefepime, neurology physicians think they may have been due to the antibiotics (both possible side effects for each)    Iodine Nausea Only       Medications:   In-Hospital Scheduled Medications:   amLODIPine  10 mg Oral Daily    aspirin  81 mg Oral Daily    calcitRIOL  0.25 mcg Oral Once per day on Monday Wednesday Friday    enoxparin  30 mg Subcutaneous Daily    ferric gluconate (FERRLECIT) 125 mg in 0.9% NaCl 100 mL IVPB  125 mg Intravenous Daily    folic acid  1 mg Oral Daily    furosemide  20 mg Oral Daily      In-Hospital PRN Medications:    Current Facility-Administered Medications:     0.9%  NaCl infusion (for blood administration), , Intravenous, Q24H PRN    acetaminophen, 1,000 mg, Oral, Q6H PRN    aluminum-magnesium hydroxide-simethicone, 30 mL, Oral, QID PRN    bisacodyL, 10 mg, Rectal, Daily  PRN    dextrose 10%, 12.5 g, Intravenous, PRN    dextrose 10%, 25 g, Intravenous, PRN    glucagon (human recombinant), 1 mg, Intramuscular, PRN    glucose, 16 g, Oral, PRN    glucose, 24 g, Oral, PRN    hydrALAZINE, 10 mg, Intravenous, Q6H PRN    melatonin, 6 mg, Oral, Nightly PRN    naloxone, 0.02 mg, Intravenous, PRN    ondansetron, 4 mg, Intravenous, Q4H PRN    prochlorperazine, 5 mg, Intravenous, Q6H PRN    senna-docusate 8.6-50 mg, 1 tablet, Oral, BID PRN    sodium chloride 0.9%, 10 mL, Intravenous, PRN    Pharmacy to dose Vancomycin consult, , , Once **AND** vancomycin - pharmacy to dose, , Intravenous, pharmacy to manage frequency   In-Hospital IV Infusion Medications:     Home Medications:  Prior to Admission medications    Medication Sig Start Date End Date Taking? Authorizing Provider   amLODIPine (NORVASC) 10 MG tablet Take 10 mg by mouth once daily.   Yes Provider, Historical   aspirin (ECOTRIN) 81 MG EC tablet Take 81 mg by mouth once daily.   Yes Provider, Historical   B complex-vitamin C-folic acid (NEPHRO-JENN) 0.8 mg Tab Take 0.8 mg by mouth Daily.   Yes Provider, Historical   calcitRIOL (ROCALTROL) 0.25 MCG Cap Take 0.25 mcg by mouth 3 (three) times a week.   Yes Provider, Historical   cloNIDine 0.3 mg/24 hr td ptwk (CATAPRES) 0.3 mg/24 hr Place 1 patch onto the skin every 7 days.   Yes Provider, Historical   folic acid (FOLVITE) 1 MG tablet Take 1 mg by mouth once daily.   Yes Provider, Historical   furosemide (LASIX) 20 MG tablet Take 20 mg by mouth once daily.   Yes Provider, Historical   potassium chloride (KLOR-CON) 10 MEQ TbSR Take 10 mEq by mouth once daily.   Yes Provider, Historical   sodium bicarbonate 650 MG tablet Take 650 mg by mouth 2 (two) times daily.  Patient not taking: Reported on 8/23/2024    Provider, Historical   spironolactone (ALDACTONE) 25 MG tablet Take 25 mg by mouth once daily.  Patient not taking: Reported on 8/23/2024    Provider, Historical   losartan (COZAAR) 100 MG  "tablet TAKE ONE TABLET BY MOUTH ONCE DAILY 20  Salvador Moeller MD   metoprolol succinate (TOPROL-XL) 25 MG 24 hr tablet Take 2 tablets (50 mg total) by mouth once daily.  Patient not taking: Reported on 2022 3/10/17 5/13/22  Linda Nelson MD   mycophenolate (CELLCEPT) 250 mg Cap Take 2 capsules (500 mg total) by mouth 2 (two) times daily. Z94.0 kidney transplant  Patient not taking: Reported on 2022  Nicole Trejo MD   tacrolimus (PROGRAF) 1 MG Cap Take 3mg every AM and 2mg every PM.  Route: Oral.  Z94.0 Kidney Transplant.  Patient not taking: Reported on 2022  Nicole Trejo MD       Family History:  Family History   Problem Relation Name Age of Onset    Kidney disease Sister          ESRD    Kidney disease Brother          CKD from HTN    Hypertension Brother      Heart failure Mother      Heart failure Father      Diabetes Neg Hx      Stroke Neg Hx         Social History:  Social History     Tobacco Use    Smoking status: Former     Current packs/day: 1.00     Average packs/day: 1 pack/day for 15.0 years (15.0 ttl pk-yrs)     Types: Cigarettes    Smokeless tobacco: Never    Tobacco comments:     quit in ; smoked for 15 years and at the most smoked 1 ppd   Substance Use Topics    Alcohol use: Yes     Comment: seldom    Drug use: No       Review of Systems:  Pain to LLE  No SOB/CP or other acute issue         Objective:   Last 24 Hour Vital Signs:  BP  Min: 95/57  Max: 152/90  Temp  Av.4 °F (36.9 °C)  Min: 97.6 °F (36.4 °C)  Max: 100.8 °F (38.2 °C)  Pulse  Av.7  Min: 61  Max: 91  Resp  Av.3  Min: 14  Max: 18  SpO2  Av.2 %  Min: 92 %  Max: 100 %  Height  Av' 4" (162.6 cm)  Min: 5' 4" (162.6 cm)  Max: 5' 4" (162.6 cm)  Weight  Av.1 kg (117 lb)  Min: 53.1 kg (117 lb)  Max: 53.1 kg (117 lb)  I/O last 3 completed shifts:  In: 933 [Blood:933]  Out: -     Physical Examination:    /67   " "Pulse 72   Temp 98.2 °F (36.8 °C) (Oral)   Resp 18   Ht 5' 4" (1.626 m)   Wt 53.1 kg (117 lb)   SpO2 (!) 93%   BMI 20.08 kg/m²     General Appearance:    Alert, cooperative, no distress, appears stated age   Head:    Normocephalic, without obvious abnormality, atraumatic       Ears:    Normal  external ear canals, both ears   Nose:   Nares normal, septum midline, mucosa normal, no drainage    or sinus tenderness   Throat:   Lips, mucosa, and tongue normal; teeth and gums normal   Neck:   Supple, symmetrical, trachea midline, no adenopathy;     thyroid:  no enlargement/tenderness/nodules; no carotid    bruit or JVD   Back:     Symmetric, no curvature, ROM normal, no CVA tenderness   Lungs:     Clear to auscultation bilaterally, respirations unlabored   Chest Wall:    No tenderness or deformity    Heart:    Regular rate and rhythm, S1 and S2 normal, no murmur, rub   or gallop       Abdomen:     Soft, non-tender, bowel sounds active all four quadrants,     no masses, no organomegaly           Extremities:   Dressing to LLE/ Extremities normal, atraumatic, no cyanosis or edema   Pulses:   2+ and symmetric all extremities   Skin:   Skin color, texture, turgor normal, no rashes or lesions       Neurologic:   CNII-XII intact, normal strength, sensation and reflexes     throughout         Laboratory Results:    Trended Lab Data:  Recent Labs   Lab 08/23/24  1242 08/24/24  0332   WBC 10.98 9.38   HGB 7.5* 10.3*   HCT 23.7* 30.9*    178   MCV 89.4 89.3   RDW 16.7 15.6    138   K 3.6 4.2   CL 94* 96*   CO2 29 25   BUN 35.7* 42.2*   ALBUMIN 2.1* 1.9*   BILITOT 0.4 0.5   AST 19 19   ALKPHOS 115 106   ALT 16 14           Microbiology Data:      Other Laboratory Data:      Other Results:      Radiology:  CT Head Without Contrast    Result Date: 8/24/2024  EXAMINATION: CT HEAD WITHOUT CONTRAST CLINICAL HISTORY: Neuro deficit, acute, stroke suspected; TECHNIQUE: Sequential axial images were performed of the brain " without contrast. Dose product length of 1814 mGycm. Automated exposure control was utilized to minimize radiation dose. COMPARISON: May 3, 2024. FINDINGS: There is no intracranial mass effect, midline shift, hydrocephalus or hemorrhage. There is no sulcal effacement or low attenuation changes to suggest recent large vessel territory infarction. Chronic appearing periventricular and subcortical white matter low attenuation changes are present and are consistent with chronic microangiopathic ischemia.  Right occipital lobe remote calcification the ventricular system and sulcal markings prominence is consistent with atrophy. There is no acute extra axial fluid collection. Visualized paranasal sinuses are clear without mucosal thickening, polypoidal abnormality or air-fluid levels. Mastoid air cells aeration is optimal.     1.  No acute intracranial findings identified. 2.  Chronic microvascular ischemia and atrophy. No significant discrepancy with overnight report Electronically signed by: Yogesh Wolf Date:    08/24/2024 Time:    07:16           Assessment/ Plan               A/P--NE 08/24    1---ESRD on PD--Cont Nightly CCPD  2---LLE Cellulitis--Cont Abx--Skin CA removed 8/19  3---HTN--Controlled  4---Failed Renal Transplant (2007--[facility])---Stable  5---Anemia secondary to Fe Def--Transfused on admit--Will give Ferrlecit    IV--SL    ORDERS:  Nightly CCPD  Ferrlecit  CMP in am    Known to Reji    Thanks for this consult!       No data to display                74 y.o.    Needs review if Total Development score is :  Below 13 (12 month old)  Below 14 (13 month old)  Below 15 (14 month old)

## 2024-08-24 NOTE — PROGRESS NOTES
[facility] [address]  Wound Care    Patient Name:  Malissa Schwartz   MRN:  [id number]  Date: 8/24/2024  Diagnosis: Wound infection    History:     Past Medical History:   Diagnosis Date    Acute rejection of kidney transplant 9/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 Glomerulitis with one activated endothelial cell: treated with Solumedrol pulse 375 mg IVBP x 3 doses with short prednisone taper; thymoglobin 100 mg x 6 doses 10/04/2007 minimal histologic changes no acute rejection     Allergy     Anemia of renal disease     CKD (chronic kidney disease), stage IV     CMV (cytomegalovirus) status positive     Colon polyp     benign    Depression     GN (glomerulonephritis)     GN (glomerulonephritis)     unknown type    Herpes infection, other 2009    on buttocks    Hyperlipidemia     Hypertension     Immunosuppression     Living-donor kidney transplant recipient 01/15/2007    Meningitis     history of viral meningitis    Proteinuria 1/5/2015    Proteinuria     Pulmonary nodules     Renal hypertension     S/P breast augmentation     Skin cancer     Urinary tract infection        Social History     Socioeconomic History    Marital status:    Tobacco Use    Smoking status: Former     Current packs/day: 1.00     Average packs/day: 1 pack/day for 15.0 years (15.0 ttl pk-yrs)     Types: Cigarettes    Smokeless tobacco: Never    Tobacco comments:     quit in 2014; smoked for 15 years and at the most smoked 1 ppd   Substance and Sexual Activity    Alcohol use: Yes     Comment: seldom    Drug use: No    Sexual activity: Never   Social History Narrative    Likes to travel and follow [employer] (has season tickets).    Lives with , 2 dogs and 3 cats    Used to work in real estate and banking       Precautions:     Allergies as of 08/23/2024 - Reviewed 08/23/2024   Allergen Reaction Noted    Codeine Palpitations and Rash 01/07/2013    Cefepime  08/23/2024    Levofloxacin   01/03/2021    Iodine Nausea Only 12/09/2022       [facility] Assessment Details/Treatment      08/24/24 0951   WOCN Assessment   Visit Date 08/24/24   Visit Time 0951   Consult Type New   [facility] Speciality Wound   Intervention chart review;assessed;applied;orders   Teaching on-going        Wound 08/24/24 0400 Traumatic Left proximal;lower Calf #1   Date First Assessed/Time First Assessed: 08/24/24 0400   Present on Original Admission: Yes  Primary Wound Type: Traumatic  Side: Left  Orientation: proximal;lower  Location: Calf  Wound Number: #1   Wound Image    Dressing Appearance Intact;Moist drainage   Drainage Amount Moderate   Drainage Characteristics/Odor Yellow;Serous;No odor   Appearance Red;Yellow;Moist   Tissue loss description Full thickness   Black (%), Wound Tissue Color 0 %   Red (%), Wound Tissue Color 10 %   Yellow (%), Wound Tissue Color 90 %   Periwound Area Intact;Dry;Pink   Wound Edges Defined   Wound Length (cm) 3.5 cm   Wound Width (cm) 3.4 cm   Wound Depth (cm) 0.3 cm   Wound Volume (cm^3) 3.57 cm^3   Wound Surface Area (cm^2) 11.9 cm^2   Care Cleansed with:;Soap and water   Dressing Applied;Silver;Calcium alginate;Gauze;Rolled gauze        Wound Traumatic Left medial Leg   No Date First Assessed or Time First Assessed found.   Primary Wound Type: Traumatic  Side: Left  Orientation: medial  Location: Leg   Wound Image    Dressing Appearance Intact;Moist drainage   Drainage Amount None   Drainage Characteristics/Odor No odor;Serous   Appearance Pink;Red;Yellow;Moist   Tissue loss description Full thickness   Black (%), Wound Tissue Color 0 %   Red (%), Wound Tissue Color 50 %   Yellow (%), Wound Tissue Color 50 %   Periwound Area Intact;Dry;Redness   Wound Edges Defined   Wound Length (cm) 4.5 cm   Wound Width (cm) 4.2 cm   Wound Depth (cm) 0.9 cm   Wound Volume (cm^3) 17.01 cm^3   Wound Surface Area (cm^2) 18.9 cm^2   Care Cleansed with:;Soap and water   Dressing Applied;Calcium  alginate;Silver;Gauze;Rolled gauze     WOCN consulted for left leg. Discussed plan of care with nurse Longoria prior to visiting the patient. Treatment recommendations in place. REED mattress ordered with sizewise. Nursing to continue with treatment recommendations and other preventative measures.  at bedside. Educated the patient and family on the importance of maintaining good hygiene, verbalized understanding. Answered all questions to the satisfaction of the patients. Will follow up.   08/24/2024

## 2024-08-24 NOTE — PLAN OF CARE
Problem: Adult Inpatient Plan of Care  Goal: Plan of Care Review  Outcome: Not Progressing  Goal: Patient-Specific Goal (Individualized)  Outcome: Not Progressing  Goal: Absence of Hospital-Acquired Illness or Injury  Outcome: Not Progressing  Goal: Optimal Comfort and Wellbeing  Outcome: Not Progressing  Goal: Readiness for Transition of Care  Outcome: Not Progressing     Problem: Infection  Goal: Absence of Infection Signs and Symptoms  Outcome: Not Progressing     Problem: Wound  Goal: Optimal Coping  Outcome: Not Progressing  Goal: Optimal Functional Ability  Outcome: Not Progressing  Goal: Absence of Infection Signs and Symptoms  Outcome: Not Progressing  Goal: Improved Oral Intake  Outcome: Not Progressing  Goal: Optimal Pain Control and Function  Outcome: Not Progressing  Goal: Skin Health and Integrity  Outcome: Not Progressing  Goal: Optimal Wound Healing  Outcome: Not Progressing     Problem: Stroke, Ischemic (Includes Transient Ischemic Attack)  Goal: Optimal Coping  Outcome: Not Progressing  Goal: Effective Bowel Elimination  Outcome: Not Progressing  Goal: Optimal Cerebral Tissue Perfusion  Outcome: Not Progressing  Goal: Optimal Cognitive Function  Outcome: Not Progressing  Goal: Improved Communication Skills  Outcome: Not Progressing  Goal: Optimal Functional Ability  Outcome: Not Progressing  Goal: Optimal Nutrition Intake  Outcome: Not Progressing  Goal: Effective Oxygenation and Ventilation  Outcome: Not Progressing  Goal: Improved Sensorimotor Function  Outcome: Not Progressing  Goal: Safe and Effective Swallow  Outcome: Not Progressing  Goal: Effective Urinary Elimination  Outcome: Not Progressing

## 2024-08-24 NOTE — PROGRESS NOTES
08/24/24 1800   Peritoneal Dialysis   Exchange Type Cycler   Peritoneal Treatment Status Started   Dianeal Solution Dextrose 1.5% in 6000 mL;Dextrose 1.5% in 2000 mL   Cycler Peritoneal Dialysis   Initial Drain Volume (mL) 409 mL   Effluent Appearance Yellow   Cycler Treatment Comments PD tx intiated. Pt tolerating well

## 2024-08-24 NOTE — NURSING
Nurses Note -- 4 Eyes      8/24/2024   3:16 AM      Skin assessed during: Admit      [] No Altered Skin Integrity Present    []Prevention Measures Documented      [x] Yes- Altered Skin Integrity Present or Discovered   [x] LDA Added if Not in Epic (Describe Wound)   [] New Altered Skin Integrity was Present on Admit and Documented in LDA   [] Wound Image Taken    Wound Care Consulted? Yes    Attending Nurse:  Juliana Mayo LPN    Second RN/Staff Member:   Tenzin Barraza RN

## 2024-08-24 NOTE — PT/OT/SLP EVAL
[facility]  Speech Language Pathology Department  Clinical Swallow Evaluation    Patient Name:  Malissa Schwartz   MRN:  [id number]    Recommendations     General recommendations:  SLP follow up x1 regarding diet tolerance and Speech/Language and Cognitive Evaluation  Solid texture recommendation:  Regular Diet - IDDSI Level 7  Liquid consistency recommendation: Thin liquids - IDDSI Level 0   Medications: per patient preference  Swallow strategies/precautions: small bites/sips and upright for PO intake  Precautions: aspiration    History     Malissa Schwartz is a/n 74 y.o. female admitted for L lower extremity wound and weakness. Prior to SLP evaluation, patient was NPO with no [place] swallow screen completed.    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 Glomerulitis with one activated endothelial cell: treated with Solumedrol pulse 375 mg IVBP x 3 doses with short prednisone taper; thymoglobin 100 mg x 6 doses 10/04/2007 minimal histologic changes no acute rejection     Allergy     Anemia of renal disease     CKD (chronic kidney disease), stage IV     CMV (cytomegalovirus) status positive     Colon polyp     benign    Depression     GN (glomerulonephritis)     GN (glomerulonephritis)     unknown type    Herpes infection, other 2009    on buttocks    Hyperlipidemia     Hypertension     Immunosuppression     Living-donor kidney transplant recipient 01/15/2007    Meningitis     history of viral meningitis    Proteinuria 1/5/2015    Proteinuria     Pulmonary nodules     Renal hypertension     S/P breast augmentation     Skin cancer     Urinary tract infection      Past Surgical History:   Procedure Laterality Date    COLON SURGERY      COLONOSCOPY W/ BIOPSIES      cosemetic surgery      facelift and breast augmentation     HYSTERECTOMY      KIDNEY TRANSPLANT      NEPHRECTOMY Left     9/2016    RENAL  BIOPSY      TONSILLECTOMY, ADENOIDECTOMY       Home diet texture/consistency: Regular and thin liquids  Current method of nutrition: NPO    Patient complaint: to eat/drink    Imaging   Results for orders placed in visit on 03/15/20    X-Ray Chest 1 View    Narrative  STUDY: CHEST X-RAY PORTABLE ONE VIEW    [id number] 3:27 PM 3/15/2020:  CURRENT CLINCIAL HX: ER  X2 DAYS  -SOB, CHEST HEAVINESS    PMH: HTN    TECHNIQUE: 1 VIEW CHEST    TECH: KGR    Comparison: 3/8/2020    Findings:    The heart is mildly enlarged with vascular congestion and there is hazy accentuation to the interstitial markings having the appearance of interstitial/pulmonary edema.  There is also blunting of the left CP angle with obscuration of the left  hemidiaphragm suggesting a small left-sided pleural effusion.  Bony demineralization with moderate DJD and a gentle scoliotic curvature of the spine is noted.    Impression  1.    Mild cardiomegaly, vascular congestion and findings compatible with the presence of interstitial/pulmonary edema and a small left-sided pleural effusion.    Addendum:  n/a    No results found for this or any previous visit.    No results found for this or any previous visit.    Subjective     Patient awake and alert.    Patient goals: to eat/drink   Spiritual/Cultural/Bahai Beliefs/Practices that affect care: no    Pain/Comfort: Pain Rating 1: 0/10    Respiratory Status:  room air    Restraints/positioning devices: none    Objective     ORAL MUSCULATURE  Dentition: own teeth  Secretion Management: adequate  Mucosal Quality: good  Facial Movement: WFL  Buccal Strength & Mobility: WFL  Mandibular Strength & Mobility: WFL  Oral Labial Strength & Mobility: WFL  Lingual Strength & Mobility: WFL  Vocal Quality: adequate  Volitional Swallow: adequate    PO TRIALS  Consistency Fed By Oral Symptoms Pharyngeal Symptoms   Thin liquid by cup Self None None   Puree Self None None   Chewable solid Self None None     Assessment      Patient tolerated all trials with no signs/sx of aspiration. SLP rec: regular solids, thin liquids, meds per patient preference. SLP to follow up for diet tolerance and speech-language/cog evaluation.       Goals     Multidisciplinary Problems       SLP Goals       Not on file                  Education     Patient and spouse were provided with verbal education regarding POC.  Understanding was verbalized.    Plan     SLP Follow-Up:  Yes   Patient to be seen:      Plan of Care expires:     Plan of Care reviewed with:  patient, spouse     Time Tracking     SLP Treatment Date:   08/24/24  Speech Start Time:  0815  Speech Stop Time:  0830     Speech Total Time (min):  15 min    Billable minutes:  Swallow and Oral Function Evaluation, 15 minutes     08/24/2024

## 2024-08-24 NOTE — PROGRESS NOTES
[facility] Hospital Medicine Progress Note        Chief Complaint: Inpatient Follow-up for     HPI: 74-year-old female whose history includes end-stage renal disease on peritoneal dialysis and hypertension.  Presented to the ED with complaints a nonhealing wound on left lower extremity.  Reports a cancerous lesion was removed on Monday.  Since then the sore has developed purulent drainage and a foul odor.  At the time of my exam patient reports that the primary reason she presented to the ER was for evaluation of left lower extremity weakness and inability to ambulate which started last night.  Apparently she has not mentioned this to any other providers until now.  On physical exam she does have significant left lower extremity weakness compared to the right.  Bilateral upper extremities strong and equal bilaterally.  May have a slight left-sided facial droop.     VS on arrival: T 100.8, P 85, R 17, b/P 128/73, Sats 100% on room air. Initial labs: WBC 10.98, Hgb 7.5, Hct 23.7 (down from 12.8 and 39. , , iron 10, TIBC 102.  Left lower extremity venous ultrasound negative for DVT.  Vancomycin administered in the ED. Obtained a stat CT head which showed no acute intracranial process.    Interval Hx:     8/24/24 dr ortiz - post resection of cancerous lesions over LLE now with superimposed cellulitis on vancomycin . Pt requesting better pain control . Seen by wound care nurse, pics in media . Came in w limited ROM to lle so stroke work up ordered on admit as well.  Case was discussed with patient's nurse and  on the floor.    Objective/physical exam:  General: In no acute distress, afebrile  Chest: Clear to auscultation bilaterally  Heart: RRR, +S1, S2, no appreciable murmur  Abdomen: Soft, nontender, BS +  MSK: Warm, no lower extremity edema, no clubbing or cyanosis. LLE wounds x 2 w some erythema noted .   Neurologic: Alert and oriented x4, Cranial nerve II-XII  intact, Strength 5/5 in all 4 extremities        Labs         VITAL SIGNS: 24 HRS MIN & MAX LAST   Temp  Min: 97.6 °F (36.4 °C)  Max: 100.8 °F (38.2 °C) 98.4 °F (36.9 °C)   BP  Min: 95/57  Max: 152/90 123/72   Pulse  Min: 61  Max: 91  65   Resp  Min: 14  Max: 18 18   SpO2  Min: 92 %  Max: 100 % 97 %     I have reviewed the following labs:  Recent Labs   Lab 08/23/24  1242 08/24/24  0332   WBC 10.98 9.38   RBC 2.65* 3.46*   HGB 7.5* 10.3*   HCT 23.7* 30.9*   MCV 89.4 89.3   MCH 28.3 29.8   MCHC 31.6* 33.3   RDW 16.7 15.6    178   MPV 9.4 9.8     Recent Labs   Lab 08/23/24  1242 08/24/24  0332    138   K 3.6 4.2   CL 94* 96*   CO2 29 25   BUN 35.7* 42.2*   CREATININE 7.75* 8.47*   CALCIUM 9.3 8.2*   ALBUMIN 2.1* 1.9*   ALKPHOS 115 106   ALT 16 14   AST 19 19   BILITOT 0.4 0.5     Microbiology Results (last 7 days)       Procedure Component Value Units Date/Time    Blood culture #1 **CANNOT BE ORDERED STAT** [[id number]] Collected: 08/23/24 1242    Order Status: Resulted Specimen: Blood Updated: 08/23/24 1357    Blood culture #2 **CANNOT BE ORDERED STAT** [[id number]] Collected: 08/23/24 1242    Order Status: Resulted Specimen: Blood Updated: 08/23/24 1357             See below for Radiology    Assessment/Plan:  LLE Cellulitis /post surgical wound infection   - follow-up on blood cultures  - continue vanco/get mrsa pcr  -wound care following      Left lower extremity weakness  - CT head without contrast negative for acute findings  - echo and carotid ultrasound pending  - MRI brain pending  - NPO for now  - consider neurology consult if mri positive but most likely 2ry to wounds/ wound infection     ESRD on PD  - nephrology consulted     Hypertension - stable  - home meds resumed     Acute anemia on Anemia of chronic disease and iron deficiency  - S/P 2 units PRBCs with improved HH  - Ferrlicit 125 mg daily x 3 doses  - PO iron supplement at discharge    VTE prophylaxis: lovenox     Patient condition:   Stable    Anticipated discharge and Disposition:   tbd      All diagnosis and differential diagnosis have been reviewed; assessment and plan has been documented; I have personally reviewed the labs and test results that are presently available; I have reviewed the patients medication list; I have reviewed the consulting providers response and recommendations. I have reviewed or attempted to review medical records based upon their availability    All of the patient's questions have been  addressed and answered. Patient's is agreeable to the above stated plan. I will continue to monitor closely and make adjustments to medical management as needed.    Portions of this note dictated using EMR integrated voice recognition software, and may be subject to voice recognition errors not corrected at proofreading. Please contact writer for clarification if needed.   _____________________________________________________________________    Malnutrition Status:    Scheduled Med:   amLODIPine  10 mg Oral Daily    aspirin  81 mg Oral Daily    calcitRIOL  0.25 mcg Oral Once per day on Monday Wednesday Friday    enoxparin  30 mg Subcutaneous Daily    ferric gluconate (FERRLECIT) 125 mg in 0.9% NaCl 100 mL IVPB  125 mg Intravenous Daily    folic acid  1 mg Oral Daily    furosemide  20 mg Oral Daily      Continuous Infusions:     PRN Meds:    Current Facility-Administered Medications:     0.9%  NaCl infusion (for blood administration), , Intravenous, Q24H PRN    acetaminophen, 1,000 mg, Oral, Q6H PRN    aluminum-magnesium hydroxide-simethicone, 30 mL, Oral, QID PRN    bisacodyL, 10 mg, Rectal, Daily PRN    dextrose 10%, 12.5 g, Intravenous, PRN    dextrose 10%, 25 g, Intravenous, PRN    glucagon (human recombinant), 1 mg, Intramuscular, PRN    glucose, 16 g, Oral, PRN    glucose, 24 g, Oral, PRN    hydrALAZINE, 10 mg, Intravenous, Q6H PRN    melatonin, 6 mg, Oral, Nightly PRN    morphine, 2 mg, Intravenous, Q6H PRN    naloxone, 0.02  mg, Intravenous, PRN    ondansetron, 4 mg, Intravenous, Q4H PRN    prochlorperazine, 5 mg, Intravenous, Q6H PRN    senna-docusate 8.6-50 mg, 1 tablet, Oral, BID PRN    sodium chloride 0.9%, 10 mL, Intravenous, PRN    Pharmacy to dose Vancomycin consult, , , Once **AND** vancomycin - pharmacy to dose, , Intravenous, pharmacy to manage frequency     Radiology:  I have personally reviewed the following imaging and agree with the radiologist.     CT Head Without Contrast  Narrative: EXAMINATION:  CT HEAD WITHOUT CONTRAST    CLINICAL HISTORY:  Neuro deficit, acute, stroke suspected;    TECHNIQUE:  Sequential axial images were performed of the brain without contrast.    Dose product length of 1814 mGycm. Automated exposure control was utilized to minimize radiation dose.    COMPARISON:  May 3, 2024.    FINDINGS:  There is no intracranial mass effect, midline shift, hydrocephalus or hemorrhage. There is no sulcal effacement or low attenuation changes to suggest recent large vessel territory infarction. Chronic appearing periventricular and subcortical white matter low attenuation changes are present and are consistent with chronic microangiopathic ischemia.  Right occipital lobe remote calcification the ventricular system and sulcal markings prominence is consistent with atrophy. There is no acute extra axial fluid collection. Visualized paranasal sinuses are clear without mucosal thickening, polypoidal abnormality or air-fluid levels. Mastoid air cells aeration is optimal.  Impression: 1.  No acute intracranial findings identified.    2.  Chronic microvascular ischemia and atrophy.    No significant discrepancy with overnight report    Electronically signed by: Yogesh Wolf  Date:    08/24/2024  Time:    07:16      Maximus Sun MD  Department of Hospital Medicine   [facility]   08/24/2024

## 2024-08-25 LAB
ALBUMIN SERPL-MCNC: 1.7 G/DL (ref 3.4–4.8)
ALBUMIN/GLOB SERPL: 0.6 RATIO (ref 1.1–2)
ALP SERPL-CCNC: 107 UNIT/L (ref 40–150)
ALT SERPL-CCNC: 13 UNIT/L (ref 0–55)
ANION GAP SERPL CALC-SCNC: 15 MEQ/L
APICAL FOUR CHAMBER EJECTION FRACTION: 62 %
APICAL TWO CHAMBER EJECTION FRACTION: 69 %
AST SERPL-CCNC: 17 UNIT/L (ref 5–34)
AV INDEX (PROSTH): 1.08
AV MEAN GRADIENT: 5 MMHG
AV PEAK GRADIENT: 9 MMHG
AV VALVE AREA BY VELOCITY RATIO: 3.56 CM²
AV VALVE AREA: 3.72 CM²
AV VELOCITY RATIO: 1.03
BILIRUB SERPL-MCNC: 0.4 MG/DL
BSA FOR ECHO PROCEDURE: 1.55 M2
BUN SERPL-MCNC: 45.6 MG/DL (ref 9.8–20.1)
CALCIUM SERPL-MCNC: 8 MG/DL (ref 8.4–10.2)
CHLORIDE SERPL-SCNC: 95 MMOL/L (ref 98–107)
CO2 SERPL-SCNC: 26 MMOL/L (ref 23–31)
CREAT SERPL-MCNC: 8.63 MG/DL (ref 0.55–1.02)
CREAT/UREA NIT SERPL: 5
CV ECHO LV RWT: 0.43 CM
DOP CALC AO PEAK VEL: 1.48 M/S
DOP CALC AO VTI: 31.9 CM
DOP CALC LVOT AREA: 3.5 CM2
DOP CALC LVOT DIAMETER: 2.1 CM
DOP CALC LVOT PEAK VEL: 1.52 M/S
DOP CALC LVOT STROKE VOLUME: 118.74 CM3
DOP CALC MV VTI: 27.9 CM
DOP CALCLVOT PEAK VEL VTI: 34.3 CM
E WAVE DECELERATION TIME: 217 MSEC
E/A RATIO: 0.87
E/E' RATIO: 7.47 M/S
ECHO LV POSTERIOR WALL: 0.98 CM (ref 0.6–1.1)
FERRITIN SERPL-MCNC: 1994.77 NG/ML (ref 4.63–204)
FRACTIONAL SHORTENING: 32 % (ref 28–44)
GFR SERPLBLD CREATININE-BSD FMLA CKD-EPI: 4 ML/MIN/1.73/M2
GLOBULIN SER-MCNC: 3 GM/DL (ref 2.4–3.5)
GLUCOSE SERPL-MCNC: 127 MG/DL (ref 82–115)
HR MV ECHO: 68 BPM
INTERVENTRICULAR SEPTUM: 1.1 CM (ref 0.6–1.1)
LEFT ATRIUM AREA SYSTOLIC (APICAL 2 CHAMBER): 19.1 CM2
LEFT ATRIUM AREA SYSTOLIC (APICAL 4 CHAMBER): 21.4 CM2
LEFT ATRIUM SIZE: 3.7 CM
LEFT ATRIUM VOLUME INDEX MOD: 40.4 ML/M2
LEFT ATRIUM VOLUME MOD: 63.1 CM3
LEFT INTERNAL DIMENSION IN SYSTOLE: 3.12 CM (ref 2.1–4)
LEFT VENTRICLE DIASTOLIC VOLUME INDEX: 61.73 ML/M2
LEFT VENTRICLE DIASTOLIC VOLUME: 96.3 ML
LEFT VENTRICLE END DIASTOLIC VOLUME APICAL 2 CHAMBER: 61.8 ML
LEFT VENTRICLE END DIASTOLIC VOLUME APICAL 4 CHAMBER: 52.7 ML
LEFT VENTRICLE END SYSTOLIC VOLUME APICAL 2 CHAMBER: 58.2 ML
LEFT VENTRICLE END SYSTOLIC VOLUME APICAL 4 CHAMBER: 58.8 ML
LEFT VENTRICLE MASS INDEX: 107 G/M2
LEFT VENTRICLE SYSTOLIC VOLUME INDEX: 24.7 ML/M2
LEFT VENTRICLE SYSTOLIC VOLUME: 38.5 ML
LEFT VENTRICULAR INTERNAL DIMENSION IN DIASTOLE: 4.58 CM (ref 3.5–6)
LEFT VENTRICULAR MASS: 166.45 G
LV LATERAL E/E' RATIO: 5.92 M/S
LV SEPTAL E/E' RATIO: 10.14 M/S
LVED V (TEICH): 96.3 ML
LVES V (TEICH): 38.5 ML
LVOT MG: 5 MMHG
LVOT MV: 1.04 CM/S
MV MEAN GRADIENT: 1 MMHG
MV PEAK A VEL: 0.82 M/S
MV PEAK E VEL: 0.71 M/S
MV PEAK GRADIENT: 4 MMHG
MV STENOSIS PRESSURE HALF TIME: 47 MS
MV VALVE AREA BY CONTINUITY EQUATION: 4.26 CM2
MV VALVE AREA P 1/2 METHOD: 4.68 CM2
OHS LV EJECTION FRACTION SIMPSONS BIPLANE MOD: 66 %
OHS QRS DURATION: 84 MS
OHS QRS DURATION: 96 MS
OHS QTC CALCULATION: 434 MS
OHS QTC CALCULATION: 462 MS
PISA TR MAX VEL: 2.8 M/S
POTASSIUM SERPL-SCNC: 3.6 MMOL/L (ref 3.5–5.1)
PROT SERPL-MCNC: 4.7 GM/DL (ref 5.8–7.6)
PV PEAK GRADIENT: 3 MMHG
PV PEAK VELOCITY: 0.93 M/S
RA PRESSURE ESTIMATED: 8 MMHG
RV TB RVSP: 11 MMHG
SODIUM SERPL-SCNC: 136 MMOL/L (ref 136–145)
TDI LATERAL: 0.12 M/S
TDI SEPTAL: 0.07 M/S
TDI: 0.1 M/S
TR MAX PG: 31 MMHG
TRICUSPID ANNULAR PLANE SYSTOLIC EXCURSION: 1.97 CM
TV REST PULMONARY ARTERY PRESSURE: 39 MMHG
VANCOMYCIN SERPL-MCNC: 14.1 UG/ML (ref 15–20)
Z-SCORE OF LEFT VENTRICULAR DIMENSION IN END DIASTOLE: 0.18
Z-SCORE OF LEFT VENTRICULAR DIMENSION IN END SYSTOLE: 0.88

## 2024-08-25 PROCEDURE — 36415 COLL VENOUS BLD VENIPUNCTURE: CPT | Performed by: NURSE PRACTITIONER

## 2024-08-25 PROCEDURE — 25000003 PHARM REV CODE 250: Performed by: INTERNAL MEDICINE

## 2024-08-25 PROCEDURE — 80202 ASSAY OF VANCOMYCIN: CPT | Performed by: INTERNAL MEDICINE

## 2024-08-25 PROCEDURE — 80053 COMPREHEN METABOLIC PANEL: CPT | Performed by: NURSE PRACTITIONER

## 2024-08-25 PROCEDURE — 63600175 PHARM REV CODE 636 W HCPCS: Performed by: INTERNAL MEDICINE

## 2024-08-25 PROCEDURE — 21400001 HC TELEMETRY ROOM

## 2024-08-25 PROCEDURE — 63600175 PHARM REV CODE 636 W HCPCS: Performed by: NURSE PRACTITIONER

## 2024-08-25 PROCEDURE — 93010 ELECTROCARDIOGRAM REPORT: CPT | Mod: ,,, | Performed by: INTERNAL MEDICINE

## 2024-08-25 PROCEDURE — 25000003 PHARM REV CODE 250: Performed by: NURSE PRACTITIONER

## 2024-08-25 PROCEDURE — 93005 ELECTROCARDIOGRAM TRACING: CPT

## 2024-08-25 RX ADMIN — SODIUM CHLORIDE 125 MG: 9 INJECTION, SOLUTION INTRAVENOUS at 05:08

## 2024-08-25 RX ADMIN — ASPIRIN 81 MG: 81 TABLET, COATED ORAL at 09:08

## 2024-08-25 RX ADMIN — DOXYCYCLINE 100 MG: 100 INJECTION, POWDER, LYOPHILIZED, FOR SOLUTION INTRAVENOUS at 12:08

## 2024-08-25 RX ADMIN — MORPHINE SULFATE 2 MG: 4 INJECTION, SOLUTION INTRAMUSCULAR; INTRAVENOUS at 10:08

## 2024-08-25 RX ADMIN — ACETAMINOPHEN 1000 MG: 500 TABLET ORAL at 10:08

## 2024-08-25 RX ADMIN — FOLIC ACID 1 MG: 1 TABLET ORAL at 09:08

## 2024-08-25 RX ADMIN — DOXYCYCLINE 100 MG: 100 INJECTION, POWDER, LYOPHILIZED, FOR SOLUTION INTRAVENOUS at 11:08

## 2024-08-25 RX ADMIN — Medication 6 MG: at 08:08

## 2024-08-25 RX ADMIN — FUROSEMIDE 20 MG: 20 TABLET ORAL at 09:08

## 2024-08-25 RX ADMIN — ENOXAPARIN SODIUM 30 MG: 40 INJECTION SUBCUTANEOUS at 05:08

## 2024-08-25 RX ADMIN — AMLODIPINE BESYLATE 10 MG: 5 TABLET ORAL at 09:08

## 2024-08-25 RX ADMIN — MORPHINE SULFATE 2 MG: 4 INJECTION, SOLUTION INTRAMUSCULAR; INTRAVENOUS at 12:08

## 2024-08-25 RX ADMIN — VANCOMYCIN HYDROCHLORIDE 500 MG: 500 INJECTION, POWDER, LYOPHILIZED, FOR SOLUTION INTRAVENOUS at 09:08

## 2024-08-25 NOTE — PLAN OF CARE
Problem: Adult Inpatient Plan of Care  Goal: Plan of Care Review  Outcome: Progressing  Goal: Patient-Specific Goal (Individualized)  Outcome: Progressing  Goal: Absence of Hospital-Acquired Illness or Injury  Outcome: Progressing  Goal: Optimal Comfort and Wellbeing  Outcome: Progressing  Goal: Readiness for Transition of Care  Outcome: Progressing     Problem: Infection  Goal: Absence of Infection Signs and Symptoms  Outcome: Progressing     Problem: Wound  Goal: Optimal Coping  Outcome: Progressing  Goal: Optimal Functional Ability  Outcome: Progressing  Goal: Absence of Infection Signs and Symptoms  Outcome: Progressing  Goal: Improved Oral Intake  Outcome: Progressing  Goal: Optimal Pain Control and Function  Outcome: Progressing  Goal: Skin Health and Integrity  Outcome: Progressing  Goal: Optimal Wound Healing  Outcome: Progressing     Problem: Stroke, Ischemic (Includes Transient Ischemic Attack)  Goal: Optimal Coping  Outcome: Progressing  Goal: Effective Bowel Elimination  Outcome: Progressing  Goal: Optimal Cerebral Tissue Perfusion  Outcome: Progressing  Goal: Optimal Cognitive Function  Outcome: Progressing  Goal: Improved Communication Skills  Outcome: Progressing  Goal: Optimal Functional Ability  Outcome: Progressing  Goal: Optimal Nutrition Intake  Outcome: Progressing  Goal: Effective Oxygenation and Ventilation  Outcome: Progressing  Goal: Improved Sensorimotor Function  Outcome: Progressing  Goal: Safe and Effective Swallow  Outcome: Progressing  Goal: Effective Urinary Elimination  Outcome: Progressing

## 2024-08-25 NOTE — PROGRESS NOTES
NEPHROLOGY PROGRESS NOTE      Patient Demographics:  Name:  Malissa Schwartz  Age: 74 y.o.  MRN:  [id number]  Admission Date: 8/23/2024      Subjective:      LLE is better  Tired today    CCPD done last HS without issue    Current Facility-Administered Medications   Medication Dose Route Frequency Provider Last Rate Last Admin    0.9%  NaCl infusion (for blood administration)   Intravenous Q24H PRN Bassem Pompa IV, MD        acetaminophen tablet 1,000 mg  1,000 mg Oral Q6H PRN Tracy Patel FNP        aluminum-magnesium hydroxide-simethicone 200-200-20 mg/5 mL suspension 30 mL  30 mL Oral QID PRN Tracy Patel FNP        amLODIPine tablet 10 mg  10 mg Oral Daily Tracy Patel FNP   10 mg at 08/25/24 0905    aspirin EC tablet 81 mg  81 mg Oral Daily Tracy Patel FNP   81 mg at 08/25/24 0905    bisacodyL suppository 10 mg  10 mg Rectal Daily PRN Tracy Patel FNP        calcitRIOL capsule 0.25 mcg  0.25 mcg Oral Once per day on Monday Wednesday Friday Tracy Patel FNP        dextrose 10% bolus 125 mL 125 mL  12.5 g Intravenous PRN Tracy Patel FNP        dextrose 10% bolus 250 mL 250 mL  25 g Intravenous PRN Tracy Patel FNP        enoxaparin injection 30 mg  30 mg Subcutaneous Daily Tracy Patel FNP   30 mg at 08/24/24 1738    ferric gluconate (FERRLECIT) 125 mg in 0.9% NaCl 100 mL IVPB  125 mg Intravenous Daily Tracy Patel FNP   Stopped at 08/24/24 1839    folic acid tablet 1 mg  1 mg Oral Daily Tracy Ptael FNP   1 mg at 08/25/24 0905    furosemide tablet 20 mg  20 mg Oral Daily Tracy Patel FNP   20 mg at 08/25/24 0905    glucagon (human recombinant) injection 1 mg  1 mg Intramuscular PRN Tracy Patel FNP        glucose chewable tablet 16 g  16 g Oral PRN Tracy Patel FNP        glucose chewable tablet 24 g  24 g Oral PRN Molbert, Tracy L, FNP        hydrALAZINE injection 10 mg  10 mg Intravenous Q6H PRN Tracy Patel, FNP         "melatonin tablet 6 mg  6 mg Oral Nightly PRN Tracy Patel, OMIDP        morphine injection 2 mg  2 mg Intravenous Q6H PRN Maximus Sun MD   2 mg at 08/24/24 1153    morphine injection 2 mg  2 mg Intravenous Once Maximus Sun MD        naloxone 0.4 mg/mL injection 0.02 mg  0.02 mg Intravenous PRN Tracy Patel, LETHA        ondansetron injection 4 mg  4 mg Intravenous Q4H PRN Tracy Patel, LETHA        prochlorperazine injection Soln 5 mg  5 mg Intravenous Q6H PRN Tracy Patel, OMIDP        senna-docusate 8.6-50 mg per tablet 1 tablet  1 tablet Oral BID PRN Tracy Patel, LETHA        sodium chloride 0.9% flush 10 mL  10 mL Intravenous PRN Tracy Patel, LETHA        vancomycin (VANCOCIN) 500 mg in D5W 100 mL IVPB (MB+)  500 mg Intravenous Once Maximus Sun  mL/hr at 08/25/24 0918 500 mg at 08/25/24 0918    vancomycin - pharmacy to dose   Intravenous pharmacy to manage frequency Tanja Schultz MD               Review of Systems   Constitutional:  Positive for malaise/fatigue.   HENT: Negative.     Eyes: Negative.    Respiratory: Negative.     Cardiovascular: Negative.    Gastrointestinal: Negative.    Genitourinary: Negative.    Musculoskeletal:         Some LLE pain   Skin: Negative.    Neurological:  Positive for weakness.         Objective:    /71   Pulse 77   Temp 98.8 °F (37.1 °C) (Oral)   Resp 14   Ht 5' 4" (1.626 m)   Wt 53.1 kg (117 lb)   SpO2 (!) 93%   BMI 20.08 kg/m²       Intake/Output Summary (Last 24 hours) at 8/25/2024 0929  Last data filed at 8/25/2024 0837  Gross per 24 hour   Intake 240 ml   Output 288 ml   Net -48 ml             Physical Exam  Vitals reviewed.   Constitutional:       Appearance: Normal appearance.   HENT:      Head: Normocephalic and atraumatic.      Nose: Nose normal.   Eyes:      Extraocular Movements: Extraocular movements intact.   Cardiovascular:      Rate and Rhythm: Normal rate and regular rhythm.      Pulses: Normal pulses.      " Heart sounds: Normal heart sounds.   Pulmonary:      Effort: Pulmonary effort is normal.      Breath sounds: Normal breath sounds.   Abdominal:      General: Bowel sounds are normal.      Palpations: Abdomen is soft.   Musculoskeletal:         General: Normal range of motion.      Cervical back: Normal range of motion.      Comments: Dressing to LLE  Some deconditioning   Skin:     General: Skin is warm and dry.   Neurological:      General: No focal deficit present.      Mental Status: She is alert and oriented to person, place, and time. Mental status is at baseline.   Psychiatric:         Mood and Affect: Mood normal.            Inpatient Diagnostics:  Recent Results (from the past 24 hour(s))   CV Ultrasound Bilateral Doppler Carotid    Collection Time: 08/24/24 10:52 AM   Result Value Ref Range    Right CCA prox sys 87 cm/s    Right CCA prox driscoll 17 cm/s    Right CCA dist sys 93 cm/s    Right CCA dist driscoll 19 cm/s    Right ICA prox sys 55 cm/s    Right ICA prox driscoll 8 cm/s    Right ICA mid sys 66 cm/s    Right ICA mid driscoll 14 cm/s    Right ICA dist sys 62 cm/s    Right ICA dist driscoll 15 cm/s    Right ECA sys 89 cm/s    Right ECA driscoll 1 cm/s    Right vertebral sys 39 cm/s    Right vertebral driscoll 3 cm/s    Right ICA/CCA ratio 0.71     Right Highest ICA 66.00     Right Highest EDV 15.00     Right Highest CCA 93     Left CCA prox sys 87 cm/s    Left CCA prox driscoll 14 cm/s    Left CCA dist sys 67 cm/s    Left CCA dist driscoll 12 cm/s    Left ICA prox sys 73 cm/s    Left ICA prox driscoll 15 cm/s    Left ICA mid sys 62 cm/s    Left ICA mid driscoll 18 cm/s    Left ICA dist sys 58 cm/s    Left ICA dist driscoll 19 cm/s    Left ECA sys 56 cm/s    Left ECA driscoll 6 cm/s    Left vertebral sys 61 cm/s    Left vertebral driscoll 11 cm/s    Left ICA/CCA ratio 1.09     Left Highest ICA 73.00     LT Highest EDV 19.00     Left Highest CCA 87    Echo    Collection Time: 08/24/24 12:44 PM   Result Value Ref Range    BSA 1.55 m2    Rooney's Biplane  MOD Ejection Fraction 66 %    A2C EF 69 %    A4C EF 62 %    LVOT stroke volume 118.74 cm3    LVIDd 4.58 3.5 - 6.0 cm    LV Systolic Volume 38.50 mL    LV Systolic Volume Index 24.7 mL/m2    LVIDs 3.12 2.1 - 4.0 cm    LV ESV A2C 58.20 mL    LV Diastolic Volume 96.30 mL    LV ESV A4C 58.80 mL    LV Diastolic Volume Index 61.73 mL/m2    LV EDV A2C 61.254827380403376 mL    LV EDV A4C 52.70 mL    Left Ventricular End Systolic Volume by Teichholz Method 38.50 mL    Left Ventricular End Diastolic Volume by Teichholz Method 96.30 mL    IVS 1.10 0.6 - 1.1 cm    LVOT diameter 2.10 cm    LVOT area 3.5 cm2    FS 32 28 - 44 %    Left Ventricle Relative Wall Thickness 0.43 cm    Posterior Wall 0.98 0.6 - 1.1 cm    LV mass 166.45 g    LV Mass Index 107 g/m2    MV Peak E Carlin 0.71 m/s    TDI LATERAL 0.12 m/s    TDI SEPTAL 0.07 m/s    E/E' ratio 7.47 m/s    MV Peak A Carlin 0.82 m/s    TR Max Carlin 2.8 m/s    E/A ratio 0.87     E wave deceleration time 217.00 msec    LV SEPTAL E/E' RATIO 10.14 m/s    LV LATERAL E/E' RATIO 5.92 m/s    LVOT peak carlin 1.52 m/s    Left Ventricular Outflow Tract Mean Velocity 1.04 cm/s    Left Ventricular Outflow Tract Mean Gradient 5.00 mmHg    TAPSE 1.97 cm    LA size 3.70 cm    LA volume (mod) 63.10 cm3    LA Volume Index (Mod) 40.4 mL/m2    AV mean gradient 5 mmHg    AV peak gradient 9 mmHg    Ao peak carlin 1.48 m/s    Ao VTI 31.90 cm    LVOT peak VTI 34.30 cm    AV valve area 3.72 cm²    AV Velocity Ratio 1.03     AV index (prosthetic) 1.08     ROMULO by Velocity Ratio 3.56 cm²    MV mean gradient 1 mmHg    MV peak gradient 4 mmHg    MV stenosis pressure 1/2 time 47.00 ms    MV valve area p 1/2 method 4.68 cm2    MV valve area by continuity eq 4.26 cm2    MV VTI 27.9 cm    Triscuspid Valve Regurgitation Peak Gradient 31 mmHg    PV PEAK VELOCITY 0.93 m/s    PV peak gradient 3 mmHg    Mean e' 0.10 m/s    ZLVIDS 0.88     ZLVIDD 0.18     LA area A4C 21.40 cm2    LA area A2C 19.10 cm2    Mitral Valve Heart Rate 68  bpm    TV resting pulmonary artery pressure 39 mmHg    RV TB RVSP 11 mmHg    Est. RA pres 8 mmHg   MRSA PCR    Collection Time: 08/24/24  8:37 PM   Result Value Ref Range    MRSA PCR Screen Not Detected Not Detected   Comprehensive Metabolic Panel    Collection Time: 08/25/24  3:44 AM   Result Value Ref Range    Sodium 136 136 - 145 mmol/L    Potassium 3.6 3.5 - 5.1 mmol/L    Chloride 95 (L) 98 - 107 mmol/L    CO2 26 23 - 31 mmol/L    Glucose 127 (H) 82 - 115 mg/dL    Blood Urea Nitrogen 45.6 (H) 9.8 - 20.1 mg/dL    Creatinine 8.63 (H) 0.55 - 1.02 mg/dL    Calcium 8.0 (L) 8.4 - 10.2 mg/dL    Protein Total 4.7 (L) 5.8 - 7.6 gm/dL    Albumin 1.7 (L) 3.4 - 4.8 g/dL    Globulin 3.0 2.4 - 3.5 gm/dL    Albumin/Globulin Ratio 0.6 (L) 1.1 - 2.0 ratio    Bilirubin Total 0.4 <=1.5 mg/dL     40 - 150 unit/L    ALT 13 0 - 55 unit/L    AST 17 5 - 34 unit/L    eGFR 4 mL/min/1.73/m2    Anion Gap 15.0 mEq/L    BUN/Creatinine Ratio 5    Vancomycin, Random    Collection Time: 08/25/24  3:44 AM   Result Value Ref Range    Vancomycin Random 14.1 (L) 15.0 - 20.0 ug/ml   EKG 12-lead    Collection Time: 08/25/24  7:27 AM   Result Value Ref Range    QRS Duration 96 ms    [facility] QTC Calculation 462 ms     A/P--NE 08/25    1---ESRD on PD--Cont Nightly CCPD  2---LLE Cellulitis--Cont Abx--Skin CA removed 8/19  3---HTN--Controlled  4---Failed Renal Transplant (2007--[facility])---Stable  5---Anemia secondary to Fe Def--Transfused on admit--Will give Ferrlecit     IV--SL     ORDERS:  Nightly CCPD  CBC/ CMP in am  PT Eval     Known to Reji Burch DNP, ANP-C    8/25/2024

## 2024-08-25 NOTE — NURSING
08/25/24 0837   Cycler Peritoneal Dialysis   Cycler PD Total UF (mL) 288 mL   Cycler Treatment Comments ccpd completed without issue. pt taken off cycler and cap placed.

## 2024-08-25 NOTE — NURSING
Nurses Note -- 4 Eyes      8/24/2024   7:54 PM      Skin assessed during: Q Shift Change      [] No Altered Skin Integrity Present    []Prevention Measures Documented      [x] Yes- Altered Skin Integrity Present or Discovered   [] LDA Added if Not in Epic (Describe Wound)   [] New Altered Skin Integrity was Present on Admit and Documented in LDA   [x] Wound Image Taken    Wound Care Consulted? Yes    Attending Nurse:  Justina Gillette RN    Second RN/Staff Member:   ALAINA Longoria

## 2024-08-25 NOTE — PROGRESS NOTES
[facility] Hospital Medicine Progress Note        Chief Complaint: Inpatient Follow-up for     HPI: 74-year-old female whose history includes end-stage renal disease on peritoneal dialysis and hypertension.  Presented to the ED with complaints a nonhealing wound on left lower extremity.  Reports a cancerous lesion was removed on Monday.  Since then the sore has developed purulent drainage and a foul odor.  At the time of my exam patient reports that the primary reason she presented to the ER was for evaluation of left lower extremity weakness and inability to ambulate which started last night.  Apparently she has not mentioned this to any other providers until now.  On physical exam she does have significant left lower extremity weakness compared to the right.  Bilateral upper extremities strong and equal bilaterally.  May have a slight left-sided facial droop.     VS on arrival: T 100.8, P 85, R 17, b/P 128/73, Sats 100% on room air. Initial labs: WBC 10.98, Hgb 7.5, Hct 23.7 (down from 12.8 and 39. , , iron 10, TIBC 102.  Left lower extremity venous ultrasound negative for DVT.  Vancomycin administered in the ED. Obtained a stat CT head which showed no acute intracranial process.    Interval Hx:     8/24/24 dr ortiz - post resection of cancerous lesions over LLE now with superimposed cellulitis on vancomycin . Pt requesting better pain control . Seen by wound care nurse, pics in media . Came in w limited ROM to lle so stroke work up ordered on admit as well.    8/25/24 dr ortiz - no new issues . Mrsa pcr negative . Will dc vanco and start pt on doxy        Case was discussed with patient's nurse and  on the floor.    Objective/physical exam:  General: In no acute distress, afebrile  Chest: Clear to auscultation bilaterally  Heart: RRR, +S1, S2, no appreciable murmur  Abdomen: Soft, nontender, BS +  MSK: Warm, no lower extremity edema, no clubbing or cyanosis. LLE  wounds x 2 w some erythema noted .   Neurologic: Alert and oriented x4, Cranial nerve II-XII intact, Strength 5/5 in all 4 extremities        Labs         VITAL SIGNS: 24 HRS MIN & MAX LAST   Temp  Min: 98.2 °F (36.8 °C)  Max: 99.1 °F (37.3 °C) 98.8 °F (37.1 °C)   BP  Min: 115/69  Max: 129/71 129/71   Pulse  Min: 65  Max: 77  77   Resp  Min: 14  Max: 18 14   SpO2  Min: 93 %  Max: 97 % (!) 93 %     I have reviewed the following labs:  Recent Labs   Lab 08/23/24  1242 08/24/24  0332   WBC 10.98 9.38   RBC 2.65* 3.46*   HGB 7.5* 10.3*   HCT 23.7* 30.9*   MCV 89.4 89.3   MCH 28.3 29.8   MCHC 31.6* 33.3   RDW 16.7 15.6    178   MPV 9.4 9.8     Recent Labs   Lab 08/23/24  1242 08/24/24  0332 08/25/24  0344    138 136   K 3.6 4.2 3.6   CL 94* 96* 95*   CO2 29 25 26   BUN 35.7* 42.2* 45.6*   CREATININE 7.75* 8.47* 8.63*   CALCIUM 9.3 8.2* 8.0*   ALBUMIN 2.1* 1.9* 1.7*   ALKPHOS 115 106 107   ALT 16 14 13   AST 19 19 17   BILITOT 0.4 0.5 0.4     Microbiology Results (last 7 days)       Procedure Component Value Units Date/Time    Blood culture #1 **CANNOT BE ORDERED STAT** [[id number]]  (Normal) Collected: 08/23/24 1242    Order Status: Completed Specimen: Blood Updated: 08/24/24 1800     Blood Culture No Growth At 24 Hours    Blood culture #2 **CANNOT BE ORDERED STAT** [[id number]]  (Normal) Collected: 08/23/24 1242    Order Status: Completed Specimen: Blood Updated: 08/24/24 1800     Blood Culture No Growth At 24 Hours             See below for Radiology    Assessment/Plan:  LLE Cellulitis /post surgical wound infection   - follow-up on blood cultures  - dc vanco/mrsa pcr negative / started doxy iv on 8-25-24  -wound care following      Left lower extremity weakness  - CT head without contrast negative for acute findings  - echo with moderate mitral regurgitation/Pef/ no DD  -carotid ultrasound w right internal carotid artery was patent with no evidence of stenosis.   The left internal carotid artery was  patent with less than 50% stenosis.   Bilateral vertebral arteries were patent with antegrade flow.   - MRI brain negative   - NPO for now       ESRD on PD  - nephrology consulted     Hypertension   - stable       Acute anemia on Anemia of chronic disease and iron deficiency  - S/P 2 units PRBCs with improved HH  - Ferrlicit 125 mg daily x 3 doses  - PO iron supplement at discharge      Plan - started doxy iv since mrsa pcr negative, vanco dc. Continue wound care . Will talk to pt about Home health with wound care for discharge .     VTE prophylaxis: lovenox     Patient condition:  Stable    Anticipated discharge and Disposition:   tbd      All diagnosis and differential diagnosis have been reviewed; assessment and plan has been documented; I have personally reviewed the labs and test results that are presently available; I have reviewed the patients medication list; I have reviewed the consulting providers response and recommendations. I have reviewed or attempted to review medical records based upon their availability    All of the patient's questions have been  addressed and answered. Patient's is agreeable to the above stated plan. I will continue to monitor closely and make adjustments to medical management as needed.    Portions of this note dictated using EMR integrated voice recognition software, and may be subject to voice recognition errors not corrected at proofreading. Please contact writer for clarification if needed.   _____________________________________________________________________    Malnutrition Status:    Scheduled Med:   amLODIPine  10 mg Oral Daily    aspirin  81 mg Oral Daily    calcitRIOL  0.25 mcg Oral Once per day on Monday Wednesday Friday    enoxparin  30 mg Subcutaneous Daily    ferric gluconate (FERRLECIT) 125 mg in 0.9% NaCl 100 mL IVPB  125 mg Intravenous Daily    folic acid  1 mg Oral Daily    furosemide  20 mg Oral Daily    morphine  2 mg Intravenous Once      Continuous  Infusions:     PRN Meds:    Current Facility-Administered Medications:     0.9%  NaCl infusion (for blood administration), , Intravenous, Q24H PRN    acetaminophen, 1,000 mg, Oral, Q6H PRN    aluminum-magnesium hydroxide-simethicone, 30 mL, Oral, QID PRN    bisacodyL, 10 mg, Rectal, Daily PRN    dextrose 10%, 12.5 g, Intravenous, PRN    dextrose 10%, 25 g, Intravenous, PRN    glucagon (human recombinant), 1 mg, Intramuscular, PRN    glucose, 16 g, Oral, PRN    glucose, 24 g, Oral, PRN    hydrALAZINE, 10 mg, Intravenous, Q6H PRN    melatonin, 6 mg, Oral, Nightly PRN    morphine, 2 mg, Intravenous, Q6H PRN    naloxone, 0.02 mg, Intravenous, PRN    ondansetron, 4 mg, Intravenous, Q4H PRN    prochlorperazine, 5 mg, Intravenous, Q6H PRN    senna-docusate 8.6-50 mg, 1 tablet, Oral, BID PRN    sodium chloride 0.9%, 10 mL, Intravenous, PRN    Pharmacy to dose Vancomycin consult, , , Once **AND** vancomycin - pharmacy to dose, , Intravenous, pharmacy to manage frequency     Radiology:  I have personally reviewed the following imaging and agree with the radiologist.     MRI Brain Without Contrast  Narrative: EXAMINATION:  MRI BRAIN WITHOUT CONTRAST    CLINICAL HISTORY:  Stroke, follow up;Transient ischemic attack (TIA);    TECHNIQUE:  Multiplanar MRI sequences were performed of the brain without contrast.    COMPARISON:  CT brain August 24, 2024    FINDINGS:  Bilateral cerebral periventricular and subcortical white matter variable size T2-FLAIR hyperintense signals are consistent with marked chronic microangiopathic ischemia. Involutional changes contribute to cerebellar and cerebral cortical volume loss.  Gradient echo sequences demonstrate no evidence of de phasing artifact to suggest hemorrhagic byproducts. No evidence of diffusion restriction or ADC map signal drop out to suggest acute infarct. The sella and suprasellar areas are unremarkable.    The cerebellar tonsils are normally positioned. There is no acute  intracranial hemorrhage, hydrocephalus, midline shift or mass effect. No acute extra axial fluid collections identified. The mastoid air cells are clear.  Impression: 1.  No acute intracranial findings identified    2.  Chronic microangiopathic ischemia and atrophy.    Electronically signed by: Yogesh Wolf  Date:    08/25/2024  Time:    11:04  Echo    Left Ventricle: The left ventricle is normal in size. Normal wall   thickness. There is normal systolic function with a visually estimated   ejection fraction of 60 - 65%.    Right Ventricle: Normal right ventricular cavity size. Systolic   function is normal. TAPSE is 1.97 cm.    Left Atrium: Agitated saline study of the atrial septum is negative,   suggesting absence of intracardiac shunt at the atrial level.    Aortic Valve: The aortic valve is a trileaflet valve.    Mitral Valve: There is no stenosis. The mean pressure gradient across   the mitral valve is 1 mmHg at a heart rate of 68 bpm. There is mild   regurgitation.    Tricuspid Valve: There is mild to moderate regurgitation.    Pulmonic Valve: There is mild regurgitation.    Pulmonary Artery: The estimated pulmonary artery systolic pressure is   39 mmHg.    IVC/SVC: Intermediate venous pressure at 8 mmHg.    Pericardium: Left pleural effusion.      Maximus Sun MD  Department of Hospital Medicine   [facility]   08/25/2024

## 2024-08-25 NOTE — PT/OT/SLP PROGRESS
[facility]  Speech Language Pathology Department  Diet Tolerance Follow-up    Patient Name:  Malissa Schwartz   MRN:  [id number]  Admitting Diagnosis:     Recommendations     General recommendations:  Speech/Language and Cognitive Evaluation  Solid texture recommendation:  Regular Diet - IDDSI Level 7  Liquid consistency recommendation: Thin liquids - IDDSI Level 0   Medications: per patient preference  Swallow strategies/precautions: small bites/sips and upright for PO intake  Precautions: aspiration    Diet Tolerance     Nursing reports no difficulty regarding diet tolerance, but reduced intake.    Patient Education     No learner present.    Plan     SLP Follow-Up:  Yes    Patient to be seen:      Plan of Care expires:     Plan of Care reviewed with:  patient, spouse     Time Tracking     Not billable        08/25/2024

## 2024-08-25 NOTE — PROGRESS NOTES
Pharmacokinetic Assessment Follow Up: IV Vancomycin    Vancomycin serum concentration assessment and plan:    Based on the two levels obtained after the first dose, will plan to continue pulse dosing based on serum Vancomycin levels. Plan to give 500 mg x 1 dose today. The next serum Vancomycin level is scheduled to be drawn on 8/27 with morning labs.    Drug levels (last 3 results):  Recent Labs   Lab Result Units 08/24/24  0332 08/25/24  0344   Vancomycin Random ug/ml 17.2 14.1*       Vancomycin Administrations:  vancomycin given in the last 96 hours                     vancomycin in dextrose 5 % 1 gram/250 mL IVPB 1,000 mg (mg) 1,000 mg New Bag 08/23/24 6200                    Pharmacy will continue to follow and monitor vancomycin.    Please contact pharmacy at extension [phone] for questions regarding this assessment.    Thank you for the consult,   Momo Harman       Patient brief summary:  Malissa Schwartz is a 74 y.o. female initiated on antimicrobial therapy with IV Vancomycin for treatment of skin & soft tissue infection    Drug Allergies:   Review of patient's allergies indicates:   Allergen Reactions    Codeine Palpitations and Rash    Cefepime      Other Reaction(s): Encephalopathy    Renal impairment increased risk for this reaction    Levofloxacin      Other Reaction(s): Encephalopathy    Renal pt who had some encephalopathy while on Levaquin & Cefepime, neurology physicians think they may have been due to the antibiotics (both possible side effects for each)    Iodine Nausea Only       Actual Body Weight:  Wt Readings from Last 1 Encounters:   08/23/24 53.1 kg (117 lb)       Renal Function:   Estimated Creatinine Clearance: 4.7 mL/min (A) (based on SCr of 8.63 mg/dL (H)).,     Dialysis Method (if applicable):  peritoneal dialysis    CBC (last 72 hours):  Recent Labs   Lab Result Units 08/23/24  1242 08/24/24  0332   WBC x10(3)/mcL 10.98 9.38   Hgb g/dL 7.5* 10.3*   Hct % 23.7* 30.9*   Platelet x10(3)/mcL  229 178   Mono % % 2.8 4.4   Eos % % 0.3 1.0   Basophil % % 0.2 0.3       Metabolic Panel (last 72 hours):  Recent Labs   Lab Result Units 08/23/24  1242 08/24/24  0332 08/25/24  0344   Sodium mmol/L 136 138 136   Potassium mmol/L 3.6 4.2 3.6   Chloride mmol/L 94* 96* 95*   CO2 mmol/L 29 25 26   Glucose mg/dL 88 59* 127*   Blood Urea Nitrogen mg/dL 35.7* 42.2* 45.6*   Creatinine mg/dL 7.75* 8.47* 8.63*   Albumin g/dL 2.1* 1.9* 1.7*   Bilirubin Total mg/dL 0.4 0.5 0.4   ALP unit/L 115 106 107   AST unit/L 19 19 17   ALT unit/L 16 14 13       Microbiologic Results:  Microbiology Results (last 7 days)       Procedure Component Value Units Date/Time    Blood culture #1 **CANNOT BE ORDERED STAT** [[id number]]  (Normal) Collected: 08/23/24 1242    Order Status: Completed Specimen: Blood Updated: 08/24/24 1800     Blood Culture No Growth At 24 Hours    Blood culture #2 **CANNOT BE ORDERED STAT** [[id number]]  (Normal) Collected: 08/23/24 1242    Order Status: Completed Specimen: Blood Updated: 08/24/24 1800     Blood Culture No Growth At 24 Hours

## 2024-08-25 NOTE — NURSING
"   08/25/24 1456   Cycler Peritoneal Dialysis   Cycler Treatment Comments No dressing to PD cath upon arrival to bedside, pt states "I took it off myself". Site cleaned and dressing applied per P&P. CCPD set-up at bedside per P&P.CCPD initiated. Cycler alarm free @ this time. Reminded pt not to remove dressing and to call primary nurse if she had questions regarding this. Pt states understanding.       "

## 2024-08-26 PROBLEM — S81.802A OPEN WOUND OF LEFT LOWER LEG: Status: ACTIVE | Noted: 2024-08-26

## 2024-08-26 PROBLEM — M79.605 LEFT LEG PAIN: Status: ACTIVE | Noted: 2024-08-26

## 2024-08-26 PROBLEM — L89.622 PRESSURE ULCER OF LEFT HEEL, STAGE 2: Status: ACTIVE | Noted: 2024-08-26

## 2024-08-26 PROBLEM — R07.9 CHEST PAIN: Status: ACTIVE | Noted: 2024-08-26

## 2024-08-26 PROBLEM — D64.9 SYMPTOMATIC ANEMIA: Status: ACTIVE | Noted: 2024-08-26

## 2024-08-26 PROBLEM — M79.605 LEFT LEG PAIN: Status: RESOLVED | Noted: 2024-08-26 | Resolved: 2024-08-26

## 2024-08-26 PROBLEM — D64.9 ANEMIA: Status: ACTIVE | Noted: 2024-08-26

## 2024-08-26 PROBLEM — D64.9 SYMPTOMATIC ANEMIA: Status: RESOLVED | Noted: 2024-08-26 | Resolved: 2024-08-26

## 2024-08-26 PROBLEM — M79.606 LEG PAIN: Status: ACTIVE | Noted: 2024-08-26

## 2024-08-26 LAB
ALBUMIN SERPL-MCNC: 1.5 G/DL (ref 3.4–4.8)
ALBUMIN/GLOB SERPL: 0.5 RATIO (ref 1.1–2)
ALP SERPL-CCNC: 132 UNIT/L (ref 40–150)
ALT SERPL-CCNC: 12 UNIT/L (ref 0–55)
ANION GAP SERPL CALC-SCNC: 18 MEQ/L
AST SERPL-CCNC: 18 UNIT/L (ref 5–34)
BASOPHILS # BLD AUTO: 0.02 X10(3)/MCL
BASOPHILS NFR BLD AUTO: 0.4 %
BILIRUB SERPL-MCNC: 0.3 MG/DL
BUN SERPL-MCNC: 54.8 MG/DL (ref 9.8–20.1)
CALCIUM SERPL-MCNC: 8 MG/DL (ref 8.4–10.2)
CHLORIDE SERPL-SCNC: 94 MMOL/L (ref 98–107)
CO2 SERPL-SCNC: 25 MMOL/L (ref 23–31)
CREAT SERPL-MCNC: 9.46 MG/DL (ref 0.55–1.02)
CREAT/UREA NIT SERPL: 6
EOSINOPHIL # BLD AUTO: 0.15 X10(3)/MCL (ref 0–0.9)
EOSINOPHIL NFR BLD AUTO: 2.8 %
ERYTHROCYTE [DISTWIDTH] IN BLOOD BY AUTOMATED COUNT: 15.3 % (ref 11.5–17)
GFR SERPLBLD CREATININE-BSD FMLA CKD-EPI: 4 ML/MIN/1.73/M2
GLOBULIN SER-MCNC: 3 GM/DL (ref 2.4–3.5)
GLUCOSE SERPL-MCNC: 79 MG/DL (ref 82–115)
HCT VFR BLD AUTO: 30.3 % (ref 37–47)
HGB BLD-MCNC: 10.1 G/DL (ref 12–16)
IMM GRANULOCYTES # BLD AUTO: 0.05 X10(3)/MCL (ref 0–0.04)
IMM GRANULOCYTES NFR BLD AUTO: 0.9 %
LYMPHOCYTES # BLD AUTO: 0.8 X10(3)/MCL (ref 0.6–4.6)
LYMPHOCYTES NFR BLD AUTO: 15.1 %
MCH RBC QN AUTO: 29.3 PG (ref 27–31)
MCHC RBC AUTO-ENTMCNC: 33.3 G/DL (ref 33–36)
MCV RBC AUTO: 87.8 FL (ref 80–94)
MONOCYTES # BLD AUTO: 0.45 X10(3)/MCL (ref 0.1–1.3)
MONOCYTES NFR BLD AUTO: 8.5 %
NEUTROPHILS # BLD AUTO: 3.84 X10(3)/MCL (ref 2.1–9.2)
NEUTROPHILS NFR BLD AUTO: 72.3 %
NRBC BLD AUTO-RTO: 0 %
PLATELET # BLD AUTO: 199 X10(3)/MCL (ref 130–400)
PMV BLD AUTO: 10.1 FL (ref 7.4–10.4)
POTASSIUM SERPL-SCNC: 4 MMOL/L (ref 3.5–5.1)
PROT SERPL-MCNC: 4.5 GM/DL (ref 5.8–7.6)
RBC # BLD AUTO: 3.45 X10(6)/MCL (ref 4.2–5.4)
SODIUM SERPL-SCNC: 137 MMOL/L (ref 136–145)
WBC # BLD AUTO: 5.31 X10(3)/MCL (ref 4.5–11.5)

## 2024-08-26 PROCEDURE — 36415 COLL VENOUS BLD VENIPUNCTURE: CPT | Performed by: NURSE PRACTITIONER

## 2024-08-26 PROCEDURE — 97162 PT EVAL MOD COMPLEX 30 MIN: CPT

## 2024-08-26 PROCEDURE — 90945 DIALYSIS ONE EVALUATION: CPT

## 2024-08-26 PROCEDURE — 85025 COMPLETE CBC W/AUTO DIFF WBC: CPT | Performed by: NURSE PRACTITIONER

## 2024-08-26 PROCEDURE — 25000003 PHARM REV CODE 250: Performed by: NURSE PRACTITIONER

## 2024-08-26 PROCEDURE — 97166 OT EVAL MOD COMPLEX 45 MIN: CPT

## 2024-08-26 PROCEDURE — 80053 COMPREHEN METABOLIC PANEL: CPT | Performed by: NURSE PRACTITIONER

## 2024-08-26 PROCEDURE — 99223 1ST HOSP IP/OBS HIGH 75: CPT | Mod: FS,,, | Performed by: EMERGENCY MEDICINE

## 2024-08-26 PROCEDURE — 21400001 HC TELEMETRY ROOM

## 2024-08-26 PROCEDURE — 25000003 PHARM REV CODE 250: Performed by: INTERNAL MEDICINE

## 2024-08-26 PROCEDURE — 63600175 PHARM REV CODE 636 W HCPCS: Performed by: NURSE PRACTITIONER

## 2024-08-26 RX ORDER — DOXYCYCLINE HYCLATE 100 MG
100 TABLET ORAL EVERY 12 HOURS
Qty: 14 TABLET | Refills: 0 | Status: SHIPPED | OUTPATIENT
Start: 2024-08-26 | End: 2024-09-02

## 2024-08-26 RX ORDER — HYDROMORPHONE HYDROCHLORIDE 2 MG/1
2 TABLET ORAL EVERY 8 HOURS PRN
Qty: 15 TABLET | Refills: 0 | Status: SHIPPED | OUTPATIENT
Start: 2024-08-26 | End: 2024-08-31

## 2024-08-26 RX ORDER — DOXYCYCLINE HYCLATE 100 MG
100 TABLET ORAL EVERY 12 HOURS
Status: DISCONTINUED | OUTPATIENT
Start: 2024-08-26 | End: 2024-08-30 | Stop reason: HOSPADM

## 2024-08-26 RX ADMIN — FOLIC ACID 1 MG: 1 TABLET ORAL at 08:08

## 2024-08-26 RX ADMIN — ENOXAPARIN SODIUM 30 MG: 40 INJECTION SUBCUTANEOUS at 05:08

## 2024-08-26 RX ADMIN — SODIUM CHLORIDE 125 MG: 9 INJECTION, SOLUTION INTRAVENOUS at 05:08

## 2024-08-26 RX ADMIN — CALCITRIOL CAPSULES 0.25 MCG 0.25 MCG: 0.25 CAPSULE ORAL at 09:08

## 2024-08-26 RX ADMIN — ASPIRIN 81 MG: 81 TABLET, COATED ORAL at 08:08

## 2024-08-26 RX ADMIN — DOXYCYCLINE HYCLATE 100 MG: 100 TABLET, COATED ORAL at 09:08

## 2024-08-26 RX ADMIN — AMLODIPINE BESYLATE 10 MG: 5 TABLET ORAL at 08:08

## 2024-08-26 RX ADMIN — DOXYCYCLINE HYCLATE 100 MG: 100 TABLET, COATED ORAL at 08:08

## 2024-08-26 RX ADMIN — Medication 6 MG: at 08:08

## 2024-08-26 RX ADMIN — FUROSEMIDE 20 MG: 20 TABLET ORAL at 08:08

## 2024-08-26 NOTE — NURSING
08/26/24 0837   Cycler Peritoneal Dialysis   Initial Drain Volume (mL) 147 mL   Effluent Appearance Cloudy   Number of Cycles 4   Fill Volume (mL) 1500 mL   Total Volume (mL) 6000 mL   Net Positive (mL) -672 mL   Cycler Treatment Comments Tx ended without issues, New caps applied.

## 2024-08-26 NOTE — PT/OT/SLP EVAL
Physical Therapy Evaluation    Patient Name:  Malissa Schwartz   MRN:  [id number]    Recommendations:     Discharge therapy intensity: Moderate Intensity Therapy   Discharge Equipment Recommendations: to be determined by next level of care   Barriers to discharge: Impaired mobility and Ongoing medical needs    Assessment:     Malissa Schwartz is a 74 y.o. female admitted with a medical diagnosis of LLE cellulitis/post surgical wound infection, ESRD on PD.  She presents with the following impairments/functional limitations: gait instability, weakness, impaired endurance, impaired balance, pain, impaired functional mobility, impaired self care skills, decreased lower extremity function.    Pt's mobility limited by LLE pain and generalized weakness. Pt required Mod A x2 for bed mobility and transfer. Pt unable to stand upright or take steps today due to left leg pain. Pt lives with her spouse and was independent at baseline. Pt has a walker and cane at home which she would use PRN. Pt's spouse reported that he will not be able to assist pt with functional mobility upon discharge. Recommending moderate intensity therapy at this time due to patient's current mobility and to decrease caregiver burden.         Rehab Prognosis: Good; patient would benefit from acute skilled PT services to address these deficits and reach maximum level of function.    Recent Surgery: * No surgery found *      Plan:     During this hospitalization, patient would benefit from acute PT services 5 x/week to address the identified rehab impairments via gait training, therapeutic activities, therapeutic exercises and progress toward the following goals:    Plan of Care Expires:  09/26/24    Subjective     Chief Complaint: weakness  Patient/Family Comments/goals: return  home  Pain/Comfort:  Pain Rating 1: 8/10  Location - Side 1: Left  Location - Orientation 1: distal  Location 1: leg  Pain Addressed 1: Reposition    Patients cultural, spiritual,  Yarsanism conflicts given the current situation: no    Living Environment:  Pt lives with her  in a [facility] with no HAIDER.   Prior to admission, patients level of function was Independent.  Equipment used at home: walker, rolling, cane, straight as needed.  DME owned (not currently used):  none .  Upon discharge, patient will have assistance from  and friend ( is unable to provide physical assistance).    Objective:     Communicated with RN prior to session.  Patient found up in chair with peripheral IV, telemetry, pulse ox (continuous) (dialysis fistula)  upon PT entry to room.    General Precautions: Standard, fall  Orthopedic Precautions:N/A   Braces: N/A  Respiratory Status: Room air  Blood Pressure: 157/81 sitting after c/o of dizziness      Exams:  Cognitive Exam:  Patient is oriented to Person, Place, and Situation  RLE ROM: WFL  RLE Strength: 3+/5 grossly   LLE ROM: WFL at hip and knee  LLE Strength: unable to assess secondary to pain  Skin integrity:  dressing to distal LLE      Functional Mobility:  Bed Mobility:     Supine to Sit: moderate assistance and of 2 persons  Sit to Supine: moderate assistance and of 2 persons  Transfers:     Sit to partial Stand:  moderate assistance and of 2 persons with rolling walker  Able to clear hip, but unable to achieve full upright stand due to weakness and pain      AM-PAC 6 CLICK MOBILITY  Total Score:        Treatment & Education:      Patient and spouse were provided with verbal education education regarding PT role/goals/POC, fall prevention, safety awareness, and discharge/DME recommendations.  Understanding was verbalized, however additional teaching warranted.     Patient left HOB elevated with all lines intact, call button in reach, bed alarm on, RN notified, and spouse present.    GOALS:   Multidisciplinary Problems       Physical Therapy Goals          Problem: Physical Therapy    Goal Priority Disciplines Outcome Goal Variances Interventions    Physical Therapy Goal     PT, PT/OT Progressing     Description: Goals to be met by: 2024     Patient will increase functional independence with mobility by performin. Supine to sit with Stand-by Assistance  2. Sit to supine with Stand-by Assistance  3. Sit to stand transfer with Stand-by Assistance  4. Gait  x 50 feet with Minimal Assistance using Rolling Walker.                          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 Glomerulitis with one activated endothelial cell: treated with Solumedrol pulse 375 mg IVBP x 3 doses with short prednisone taper; thymoglobin 100 mg x 6 doses 10/04/2007 minimal histologic changes no acute rejection     Allergy     Anemia of renal disease     CKD (chronic kidney disease), stage IV     CMV (cytomegalovirus) status positive     Colon polyp     benign    Depression     GN (glomerulonephritis)     GN (glomerulonephritis)     unknown type    Herpes infection, other 2009    on buttocks    Hyperlipidemia     Hypertension     Immunosuppression     Living-donor kidney transplant recipient 01/15/2007    Meningitis     history of viral meningitis    Proteinuria 2015    Proteinuria     Pulmonary nodules     Renal hypertension     S/P breast augmentation     Skin cancer     Urinary tract infection        Past Surgical History:   Procedure Laterality Date    COLON SURGERY      COLONOSCOPY W/ BIOPSIES      cosemetic surgery      facelift and breast augmentation     HYSTERECTOMY      KIDNEY TRANSPLANT      NEPHRECTOMY Left     2016    RENAL BIOPSY      TONSILLECTOMY, ADENOIDECTOMY         Time Tracking:     PT Received On: 24  PT Start Time: 952     PT Stop Time:   PT Total Time (min): 20 min     Billable Minutes: Evaluation Mod      2024

## 2024-08-26 NOTE — SUBJECTIVE & OBJECTIVE
Scheduled Meds:   amLODIPine  10 mg Oral Daily    aspirin  81 mg Oral Daily    calcitRIOL  0.25 mcg Oral Once per day on Monday Wednesday Friday    doxycycline  100 mg Oral Q12H    enoxparin  30 mg Subcutaneous Daily    ferric gluconate (FERRLECIT) 125 mg in 0.9% NaCl 100 mL IVPB  125 mg Intravenous Daily    folic acid  1 mg Oral Daily    furosemide  20 mg Oral Daily    morphine  2 mg Intravenous Once     Continuous Infusions:  PRN Meds:  Current Facility-Administered Medications:     0.9%  NaCl infusion (for blood administration), , Intravenous, Q24H PRN    acetaminophen, 1,000 mg, Oral, Q6H PRN    aluminum-magnesium hydroxide-simethicone, 30 mL, Oral, QID PRN    bisacodyL, 10 mg, Rectal, Daily PRN    dextrose 10%, 12.5 g, Intravenous, PRN    dextrose 10%, 25 g, Intravenous, PRN    glucagon (human recombinant), 1 mg, Intramuscular, PRN    glucose, 16 g, Oral, PRN    glucose, 24 g, Oral, PRN    hydrALAZINE, 10 mg, Intravenous, Q6H PRN    melatonin, 6 mg, Oral, Nightly PRN    morphine, 2 mg, Intravenous, Q6H PRN    naloxone, 0.02 mg, Intravenous, PRN    ondansetron, 4 mg, Intravenous, Q4H PRN    prochlorperazine, 5 mg, Intravenous, Q6H PRN    senna-docusate 8.6-50 mg, 1 tablet, Oral, BID PRN    sodium chloride 0.9%, 10 mL, Intravenous, PRN    Review of patient's allergies indicates:   Allergen Reactions    Codeine Palpitations and Rash    Cefepime      Other Reaction(s): Encephalopathy    Renal impairment increased risk for this reaction    Levofloxacin      Other Reaction(s): Encephalopathy    Renal pt who had some encephalopathy while on Levaquin & Cefepime, neurology physicians think they may have been due to the antibiotics (both possible side effects for each)    Iodine Nausea Only        Past Medical History:   Diagnosis Date    Acute rejection of kidney transplant 9/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 Glomerulitis with one activated endothelial cell: treated with Solumedrol pulse 375 mg  IVBP x 3 doses with short prednisone taper; thymoglobin 100 mg x 6 doses 10/04/2007 minimal histologic changes no acute rejection     Allergy     Anemia of renal disease     CKD (chronic kidney disease), stage IV     CMV (cytomegalovirus) status positive     Colon polyp     benign    Depression     GN (glomerulonephritis)     GN (glomerulonephritis)     unknown type    Herpes infection, other 2009    on buttocks    Hyperlipidemia     Hypertension     Immunosuppression     Living-donor kidney transplant recipient 01/15/2007    Meningitis     history of viral meningitis    Proteinuria 1/5/2015    Proteinuria     Pulmonary nodules     Renal hypertension     S/P breast augmentation     Skin cancer     Urinary tract infection      Past Surgical History:   Procedure Laterality Date    COLON SURGERY      COLONOSCOPY W/ BIOPSIES      cosemetic surgery      facelift and breast augmentation     HYSTERECTOMY      KIDNEY TRANSPLANT      NEPHRECTOMY Left     9/2016    RENAL BIOPSY      TONSILLECTOMY, ADENOIDECTOMY         Family History       Problem Relation (Age of Onset)    Heart failure Mother, Father    Hypertension Brother    Kidney disease Sister, Brother          Tobacco Use    Smoking status: Former     Current packs/day: 1.00     Average packs/day: 1 pack/day for 15.0 years (15.0 ttl pk-yrs)     Types: Cigarettes    Smokeless tobacco: Never    Tobacco comments:     quit in 2014; smoked for 15 years and at the most smoked 1 ppd   Substance and Sexual Activity    Alcohol use: Yes     Comment: seldom    Drug use: No    Sexual activity: Never     Review of Systems   Constitutional:  Positive for activity change. Negative for chills, diaphoresis and fever.   Skin:  Positive for wound.   Neurological:  Positive for weakness.       Objective:     Vital Signs (Most Recent):  Temp: 98.8 °F (37.1 °C) (08/26/24 0815)  Pulse: 76 (08/26/24 0815)  Resp: 18 (08/26/24 1121)  BP: 130/72 (08/26/24 0859)  SpO2: 95 % (08/26/24 0815) Vital  Signs (24h Range):  Temp:  [98.4 °F (36.9 °C)-98.9 °F (37.2 °C)] 98.8 °F (37.1 °C)  Pulse:  [72-78] 76  Resp:  [12-20] 18  SpO2:  [93 %-95 %] 95 %  BP: (121-130)/(70-76) 130/72     Weight: 53.1 kg (117 lb)  Body mass index is 20.08 kg/m².     Physical Exam  Vitals reviewed.   Constitutional:       General: She is awake. She is not in acute distress.     Appearance: She is well-developed and normal weight. She is ill-appearing (chronic).   HENT:      Head: Normocephalic and atraumatic.      Nose: Nose normal.   Cardiovascular:      Rate and Rhythm: Normal rate and regular rhythm.      Pulses: Normal pulses.   Pulmonary:      Effort: Pulmonary effort is normal. No respiratory distress.   Musculoskeletal:        Legs:         Feet:    Skin:     General: Skin is warm and dry.      Capillary Refill: Capillary refill takes less than 2 seconds.   Neurological:      General: No focal deficit present.      Mental Status: She is alert and oriented to person, place, and time.   Psychiatric:         Behavior: Behavior is agitated. Behavior is cooperative.       Left anterior lower leg: 3.4 x 3.2 x 0.8 cm       Left medial lower leg: 4.5 x 3 x 1.4 cm       Left lateral knee: 2 x 2 cm       Left heel - intact fluid filled blister; surrounding blanching erythema              Laboratory:    BMP:   Recent Labs   Lab 08/26/24 0458      K 4.0   CL 94*   CO2 25   BUN 54.8*   CREATININE 9.46*   CALCIUM 8.0*     CBC:   Recent Labs   Lab 08/26/24 0458   WBC 5.31   RBC 3.45*   HGB 10.1*   HCT 30.3*      MCV 87.8   MCH 29.3   MCHC 33.3     CMP:   Recent Labs   Lab 08/26/24 0458   CALCIUM 8.0*   ALBUMIN 1.5*      K 4.0   CO2 25   CL 94*   BUN 54.8*   CREATININE 9.46*   ALKPHOS 132   ALT 12   AST 18   BILITOT 0.3       LFTs:   Recent Labs   Lab 08/26/24 0458   ALT 12   AST 18   ALKPHOS 132   BILITOT 0.3   ALBUMIN 1.5*       Microbiology Results (last 7 days)       Procedure Component Value Units Date/Time    Blood  culture #1 **CANNOT BE ORDERED STAT** [[id number]]  (Normal) Collected: 08/23/24 1242    Order Status: Completed Specimen: Blood Updated: 08/25/24 1800     Blood Culture No Growth At 48 Hours    Blood culture #2 **CANNOT BE ORDERED STAT** [[id number]]  (Normal) Collected: 08/23/24 1242    Order Status: Completed Specimen: Blood Updated: 08/25/24 1800     Blood Culture No Growth At 48 Hours              Diagnostic Results:  I have reviewed all pertinent imaging results/findings within the past 24 hours.

## 2024-08-26 NOTE — CONSULTS
[facility] - [facility] Tele[facility]  Wound Care  Consult Note    Patient Name: Malissa Schwartz  MRN: [id number]  Admission Date: 8/23/2024  Hospital Length of Stay: 3 days  Attending Physician: Maximus Sun MD  Primary Care Provider: Sergio Crabtree MD     Inpatient consult to Wound Care Physician  Consult performed by: Amarilis Oneil FNP  Consult ordered by: Denisha Romero MD        Subjective:     History of Present Illness:  Wound medicine consult    The patient is a 74 year old WF with PMHx of HTN, HLD, failed kidney transplant, ESRD on PD, with non-healing postoperative wound to left leg. Reports she underwent resection of left leg lesions, squamous cell carcinoma, on 8/19/24 and 8/20/24 with Dr. Brown and since started to have purulent drainage, surrounding erythema, swelling, and pain.   She came in to [facility] ED on 8/23/24 for evaluation of left leg wounds and fever.   Patient reports her  sees Dr. Brown regularly and during a visit for him she states the  Noted nodules on her left leg and recommended biopsy. She reports the flesh colored nodules had been present for only a few months.   Inpatient wound nurse saw patient on 8/24/24 and wound care orders were put into place; wound medicine has been consulted for evaluation of this wound as well.   8/26/24 - initial evaluation completed today. Met patient in room 943 with  at bedside; answers all questions appropriately, seemingly agitated during visit. Able to provide brief history of current illness. Verbal consent gained for assessment and treatment of wounds. Patient also has eschar covered wounds to left knee, reports she sustained from a fall on her patio, unsure of timeframe. No acute distress. Verbalizes plans to go home today with home health and requesting wound care provider that will see her at her home.     Scheduled Meds:   amLODIPine  10 mg Oral Daily    aspirin  81 mg Oral Daily    calcitRIOL  0.25  mcg Oral Once per day on Monday Wednesday Friday    doxycycline  100 mg Oral Q12H    enoxparin  30 mg Subcutaneous Daily    ferric gluconate (FERRLECIT) 125 mg in 0.9% NaCl 100 mL IVPB  125 mg Intravenous Daily    folic acid  1 mg Oral Daily    furosemide  20 mg Oral Daily    morphine  2 mg Intravenous Once     Continuous Infusions:  PRN Meds:  Current Facility-Administered Medications:     0.9%  NaCl infusion (for blood administration), , Intravenous, Q24H PRN    acetaminophen, 1,000 mg, Oral, Q6H PRN    aluminum-magnesium hydroxide-simethicone, 30 mL, Oral, QID PRN    bisacodyL, 10 mg, Rectal, Daily PRN    dextrose 10%, 12.5 g, Intravenous, PRN    dextrose 10%, 25 g, Intravenous, PRN    glucagon (human recombinant), 1 mg, Intramuscular, PRN    glucose, 16 g, Oral, PRN    glucose, 24 g, Oral, PRN    hydrALAZINE, 10 mg, Intravenous, Q6H PRN    melatonin, 6 mg, Oral, Nightly PRN    morphine, 2 mg, Intravenous, Q6H PRN    naloxone, 0.02 mg, Intravenous, PRN    ondansetron, 4 mg, Intravenous, Q4H PRN    prochlorperazine, 5 mg, Intravenous, Q6H PRN    senna-docusate 8.6-50 mg, 1 tablet, Oral, BID PRN    sodium chloride 0.9%, 10 mL, Intravenous, PRN    Review of patient's allergies indicates:   Allergen Reactions    Codeine Palpitations and Rash    Cefepime      Other Reaction(s): Encephalopathy    Renal impairment increased risk for this reaction    Levofloxacin      Other Reaction(s): Encephalopathy    Renal pt who had some encephalopathy while on Levaquin & Cefepime, neurology physicians think they may have been due to the antibiotics (both possible side effects for each)    Iodine Nausea Only        Past Medical History:   Diagnosis Date    Acute rejection of kidney transplant 9/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 Glomerulitis with one activated endothelial cell: treated with Solumedrol pulse 375 mg IVBP x 3 doses with short prednisone taper; thymoglobin 100 mg x 6 doses 10/04/2007 minimal  histologic changes no acute rejection     Allergy     Anemia of renal disease     CKD (chronic kidney disease), stage IV     CMV (cytomegalovirus) status positive     Colon polyp     benign    Depression     GN (glomerulonephritis)     GN (glomerulonephritis)     unknown type    Herpes infection, other 2009    on buttocks    Hyperlipidemia     Hypertension     Immunosuppression     Living-donor kidney transplant recipient 01/15/2007    Meningitis     history of viral meningitis    Proteinuria 1/5/2015    Proteinuria     Pulmonary nodules     Renal hypertension     S/P breast augmentation     Skin cancer     Urinary tract infection      Past Surgical History:   Procedure Laterality Date    COLON SURGERY      COLONOSCOPY W/ BIOPSIES      cosemetic surgery      facelift and breast augmentation     HYSTERECTOMY      KIDNEY TRANSPLANT      NEPHRECTOMY Left     9/2016    RENAL BIOPSY      TONSILLECTOMY, ADENOIDECTOMY         Family History       Problem Relation (Age of Onset)    Heart failure Mother, Father    Hypertension Brother    Kidney disease Sister, Brother          Tobacco Use    Smoking status: Former     Current packs/day: 1.00     Average packs/day: 1 pack/day for 15.0 years (15.0 ttl pk-yrs)     Types: Cigarettes    Smokeless tobacco: Never    Tobacco comments:     quit in 2014; smoked for 15 years and at the most smoked 1 ppd   Substance and Sexual Activity    Alcohol use: Yes     Comment: seldom    Drug use: No    Sexual activity: Never     Review of Systems   Constitutional:  Positive for activity change. Negative for chills, diaphoresis and fever.   Skin:  Positive for wound.   Neurological:  Positive for weakness.       Objective:     Vital Signs (Most Recent):  Temp: 98.8 °F (37.1 °C) (08/26/24 0815)  Pulse: 76 (08/26/24 0815)  Resp: 18 (08/26/24 1121)  BP: 130/72 (08/26/24 0859)  SpO2: 95 % (08/26/24 0815) Vital Signs (24h Range):  Temp:  [98.4 °F (36.9 °C)-98.9 °F (37.2 °C)] 98.8 °F (37.1 °C)  Pulse:   [72-78] 76  Resp:  [12-20] 18  SpO2:  [93 %-95 %] 95 %  BP: (121-130)/(70-76) 130/72     Weight: 53.1 kg (117 lb)  Body mass index is 20.08 kg/m².     Physical Exam  Vitals reviewed.   Constitutional:       General: She is awake. She is not in acute distress.     Appearance: She is well-developed and normal weight. She is ill-appearing (chronic).   HENT:      Head: Normocephalic and atraumatic.      Nose: Nose normal.   Cardiovascular:      Rate and Rhythm: Normal rate and regular rhythm.      Pulses: Normal pulses.   Pulmonary:      Effort: Pulmonary effort is normal. No respiratory distress.   Musculoskeletal:        Legs:         Feet:    Skin:     General: Skin is warm and dry.      Capillary Refill: Capillary refill takes less than 2 seconds.   Neurological:      General: No focal deficit present.      Mental Status: She is alert and oriented to person, place, and time.   Psychiatric:         Behavior: Behavior is agitated. Behavior is cooperative.       Left anterior lower leg: 3.4 x 3.2 x 0.8 cm       Left medial lower leg: 4.5 x 3 x 1.4 cm       Left lateral knee: 2 x 2 cm       Left heel - intact fluid filled blister; surrounding blanching erythema              Laboratory:    BMP:   Recent Labs   Lab 08/26/24  0458      K 4.0   CL 94*   CO2 25   BUN 54.8*   CREATININE 9.46*   CALCIUM 8.0*     CBC:   Recent Labs   Lab 08/26/24  0458   WBC 5.31   RBC 3.45*   HGB 10.1*   HCT 30.3*      MCV 87.8   MCH 29.3   MCHC 33.3     CMP:   Recent Labs   Lab 08/26/24  0458   CALCIUM 8.0*   ALBUMIN 1.5*      K 4.0   CO2 25   CL 94*   BUN 54.8*   CREATININE 9.46*   ALKPHOS 132   ALT 12   AST 18   BILITOT 0.3       LFTs:   Recent Labs   Lab 08/26/24  0458   ALT 12   AST 18   ALKPHOS 132   BILITOT 0.3   ALBUMIN 1.5*       Microbiology Results (last 7 days)       Procedure Component Value Units Date/Time    Blood culture #1 **CANNOT BE ORDERED STAT** [[id number]]  (Normal) Collected: 08/23/24 1242    Order  Status: Completed Specimen: Blood Updated: 08/25/24 1800     Blood Culture No Growth At 48 Hours    Blood culture #2 **CANNOT BE ORDERED STAT** [[id number]]  (Normal) Collected: 08/23/24 1242    Order Status: Completed Specimen: Blood Updated: 08/25/24 1800     Blood Culture No Growth At 48 Hours              Diagnostic Results:  I have reviewed all pertinent imaging results/findings within the past 24 hours.          Physical Exam  Assessment/Plan:     Open wounds X2 to LLE s/p resection of cancerous lesion with Dr. Brown with complications of cellulitis/post surgical wound infection requiring hospitalization. Blood cultures NGTD; no wound culture  Stage 2 pressure ulcer to left heel; intact fluid filled blister: Hospital acquired.   ESRD on PD  Anemia of chronic disease and iron deficiency anemia  Deconditioning, frequent falls  Hypoalbuminemia - 1.5 on 8/26/24            PLAN:    Chart reviewed, patient examined and wounds assessed.  Opinion: The patient came in with LLE wound pain, swelling and redness that has since improved. The wounds are currently without purulent drainage or odor and no evidence of chao-wound cellulitis. Continue current plan for completion of antibiotics per HM.   Continue local wound care. Follow up with Dr. Brown.  Anticipate these wounds will be difficult to heal due to poor nutrition and chronic disease.   Patient reports she will be going home today with home health. Discussed wound care follow up at clinic; she would prefer home wound care. Discussed with CM.   She also had eschar covering to her left knee in two places, this was removed and has open wound to left lateral knee that is hypergranular; no signs of infection. Local wound care initiated.   Stage 2 pressure ulcer to left heel that is hospital acquired. States weakness and pain to left leg prohibited her from moving it. Is able to move leg to today and reports less pain. Heel boot ordered.   Wound care orders: Left  lateral knee wound and LLE wounds X 2: cleanse with Vashe apply Aquacel Ag to wound bed and cover with ABD pad and wrap with Kerlix Daily and PRN. Left heel: paint with betadine and bulky wrap with ABD pad and Kerlix Daily.   Monitor for signs & symptoms of deterioration  Offloading of sacrum/buttocks/heels at all times: REED mattress, turning q 2 hrs; use of wedges and heel offloading devices to be used at all times while in bed; ( LILLI Simons). This needs to be reinforced by every staff nurse caring for patient on every shift of every day. May still use PT/OT services as long as use of geomat/ROHO on wheelchair seat and small pillow to lower back as well as ongoing heel offloading devices in place  Nutrition: Recommend aggressive nutritional support, protein supplementation along with vitamin and mineral supplements  and domo to support wound healing if ok from renal standpoint  Will try to follow weekly while admitted, but every nurse assigned to patient on every shift of every day needs to address daily wound care dressing changes and offloading modalities including using heel offloading devices, wedges, REED mattress etc  Discussed with patient and family today          The time spent including preparing to see the patient, obtaining patient history and assessment, evaluation of the plan of care, patient/caregiver counseling and education, orders, documentation, coordination of care, and other professional medical management activities for today's encounter was 75 minutes           Thank you for your consult. I will follow-up with patient. Please contact us if you have any additional questions.    LETHA Matute  Wound Care  [facility] - [facility] Tele[facility]

## 2024-08-26 NOTE — PLAN OF CARE
Problem: Physical Therapy  Goal: Physical Therapy Goal  Description: Goals to be met by: 2024     Patient will increase functional independence with mobility by performin. Supine to sit with Stand-by Assistance  2. Sit to supine with Stand-by Assistance  3. Sit to stand transfer with Stand-by Assistance  4. Gait  x 50 feet with Minimal Assistance using Rolling Walker.     Outcome: Progressing

## 2024-08-26 NOTE — PLAN OF CARE
08/26/24 0953   Discharge Assessment   Assessment Type Discharge Planning Assessment   Confirmed/corrected address, phone number and insurance Yes   Confirmed Demographics Correct on Facesheet   Source of Information patient   Communicated REINA with patient/caregiver Date not available/Unable to determine   Reason For Admission cellulitis LLE   People in Home spouse  (Pt lives with her Brayden in a single story home and no steps to enter the home)   Do you expect to return to your current living situation? Yes   Do you have help at home or someone to help you manage your care at home? Yes   Who are your caregiver(s) and their phone number(s)? family   Prior to hospitilization cognitive status: Unable to Assess   Current cognitive status: Alert/Oriented   Walking or Climbing Stairs Difficulty yes   Walking or Climbing Stairs ambulation difficulty, requires equipment   Mobility Management RW   Dressing/Bathing Difficulty no   Equipment Currently Used at Home walker, rolling   Readmission within 30 days? No   Patient currently being followed by outpatient case management? No   Do you currently have service(s) that help you manage your care at home? No   Do you take prescription medications? Yes   Do you have prescription coverage? Yes   Coverage medicare   Who is going to help you get home at discharge? pt's , Brayden   How do you get to doctors appointments? car, drives self   Are you on dialysis? Yes   Dialysis Name and Scheduled days Peritoneal dialysis   Discharge Plan A Home Health   Discharge Plan B Home Health   DME Needed Upon Discharge  none   Discharge Plan discussed with: Patient;Spouse/sig other   Name(s) and Number(s) Wilbert--[phone]   Transition of Care Barriers None   Housing Stability   In the last 12 months, was there a time when you were not able to pay the mortgage or rent on time? N   Transportation Needs   Has the lack of transportation kept you from medical appointments,  meetings, work or from getting things needed for daily living? No   Food Insecurity   Within the past 12 months, you worried that your food would run out before you got the money to buy more. Never true   OTHER   Name(s) of People in Home Brayden,      Pt's PCP is Dr Sergio Crabtree. Pt's  is her dgtr, Amy ([phone]) and her , Brayden ([phone]). Pt had [facility] in the past. Pt uses [facility] in [place]. Pt does drive.   Rec consult for HH services. FOC obtained. Referral sent to [facility] thru care port. Informed Shauna ov the referral. Shauna will get wound care involved as well. Pt will dc today

## 2024-08-26 NOTE — CARE UPDATE
[id number] Pt's , Brayden asked if a rep from [employer] come speak with him and his wife, Tahmina. Called camelia with [employer] and he will contact pt's  to schedule an appt.

## 2024-08-26 NOTE — PROGRESS NOTES
Nephrology consult follow up note    HPI:      Malissa Schwartz is a 74 y.o. female well known to our service with a history of end-stage renal disease on peritoneal dialysis.  She is normally followed by Dr. George Mendoza.  She does have a history of a failed renal transplant in 2007 at [facility] in [place], [place].  She presented to the emergency department with significant left lower extremity pain.  Apparently she had skin cancer removed from his left lower extremity on 08/19/2024.  She is not on any immunosuppressants at present.  She has been fairly successful with her peritoneal dialysis. Other pertinent medical history includes hypertension, hyperlipidemia, renal transplantation which failed, skin cancer, pulmonary nodules, glomerular nephritis and anemia secondary to chronic kidney disease.  Nephrology following for ESRD management.    Interval history:     No acute events overnight.  Patient tolerated her peritoneal dialysis overnight without problem.  PD was positive 600 mL.  No chest pain, shortness of breath, abdominal pain, nausea, vomiting, or lower extremity edema.     Review of Systems:       Past medical, family, surgical, and social history reviewed and unchanged from initial consult note.     Objective:       VITAL SIGNS: 24 HR MIN & MAX LAST    Temp  Min: 98.3 °F (36.8 °C)  Max: 98.9 °F (37.2 °C)  98.3 °F (36.8 °C)        BP  Min: 121/70  Max: 133/72  133/72     Pulse  Min: 72  Max: 78  77     Resp  Min: 16  Max: 20  18    SpO2  Min: 93 %  Max: 95 %  (!) 93 %      GEN:  Elderly WF in NAD  HEENT: Conjunctiva anicteric, pupils equal   CV: RRR +S1,S2 without murmur  PULM: CTAB, unlabored  ABD: Soft, NT/ND abdomen with NABS  EXT: No cyanosis or edema  SKIN: Warm and dry  PSYCH: Awake, alert and appropriately conversant.   Dialysis access:  PD catheter            Component Value Date/Time     08/26/2024 0458     08/25/2024 0344     06/20/2018 1402      01/08/2018 0835    K 4.0 08/26/2024 0458    K 3.6 08/25/2024 0344    K 4.8 06/20/2018 1402    K 4.6 01/08/2018 0835    CO2 25 08/26/2024 0458    CO2 26 08/25/2024 0344    CO2 23 06/20/2018 1402    CO2 22 (L) 01/08/2018 0835    BUN 54.8 (H) 08/26/2024 0458    BUN 45.6 (H) 08/25/2024 0344    BUN 30 (H) 06/20/2018 1402    BUN 24 (H) 01/08/2018 0835    CREATININE 9.46 (H) 08/26/2024 0458    CREATININE 8.63 (H) 08/25/2024 0344    CREATININE 1.9 (H) 06/20/2018 1402    CREATININE 1.8 (H) 01/08/2018 0835    CALCIUM 8.0 (L) 08/26/2024 0458    CALCIUM 8.0 (L) 08/25/2024 0344    CALCIUM 9.7 06/20/2018 1402    CALCIUM 9.1 01/08/2018 0835    PHOS 4.4 01/17/2024 0439    PHOS 3.8 06/20/2018 1402            Component Value Date/Time    WBC 5.31 08/26/2024 0458    WBC 9.38 08/24/2024 0332    WBC 4.26 (L) 07/04/2024 0000    WBC 6.90 06/20/2018 1402    WBC 3.37 (L) 01/08/2018 0835    HGB 10.1 (L) 08/26/2024 0458    HGB 10.3 (L) 08/24/2024 0332    HGB 12.8 07/04/2024 0000    HGB 10.5 (L) 06/20/2018 1402    HGB 9.1 (L) 01/08/2018 0835    HCT 30.3 (L) 08/26/2024 0458    HCT 30.9 (L) 08/24/2024 0332    HCT 39.4 07/04/2024 0000    HCT 32.0 (L) 06/05/2020 0815    HCT 31.6 (L) 06/20/2018 1402    HCT 27.3 (L) 01/08/2018 0835     08/26/2024 0458     08/24/2024 0332     06/20/2018 1402     01/08/2018 0835         Imaging reviewed      Assessment / Plan:       Active Hospital Problems    Diagnosis  POA    *Wound infection [T14.8XXA, L08.9]  Yes    Open wound of left lower leg [S81.802A]  Yes    Pressure ulcer of left heel, stage 2 [L89.622]  No    Anemia of renal disease [N18.9, D63.1]  Yes     Chronic      Resolved Hospital Problems    Diagnosis Date Resolved POA    Symptomatic anemia [D64.9] 08/26/2024 Yes    Left leg pain [M79.605] 08/26/2024 Yes       ESRD - on PD nightly, 10 hours/4 cycles/1.5 L per cycle/no last fill  Left lower extremity cellulitis  Hypertension  Failed renal transplant  Anemia chronic  disease    Plan:  We will continue nightly PD with all 1.5% bags.  No significant change from Nephrology standpoint.  She is okay to discharge from Nephrology standpoint whenever ready.    Isaiah Mosquera DO  Nephrology  [facility]  Clinic number: [phone]      Note was created with the assistance of electronic Dictation Services.  Note was reviewed to decrease errors, however, these may still be present.  Please contact me about questions or concerns with the dictation.

## 2024-08-26 NOTE — PT/OT/SLP EVAL
Occupational Therapy  Evaluation    Name: Malissa Schwartz  MRN: [id number]  Admitting Diagnosis: shortness of breath  Recent Surgery: * No surgery found *      Recommendations:     Discharge therapy intensity: Moderate Intensity Therapy   Discharge Equipment Recommendations:  bedside commode  Barriers to discharge:       Assessment:     Malissa Schwartz is a 74 y.o. female with a medical diagnosis of LLE cellulitis/post surgical wound infection, ESRD on PD.  She presents with the following performance deficits affecting function: weakness, impaired endurance, impaired self care skills, impaired functional mobility, gait instability, impaired balance, pain, decreased safety awareness, decreased lower extremity function, decreased upper extremity function.     Pt tolerated OT evaluation fairly well, limited in mobility by LLE pain upon sitting EOB. Pt required Mod A x2 for bed mobility and sit>stand transfer at RW. Pt unable to achieve full stand. Requires Max A for lower body ADLs at this time. Pt reports she has not been out of bed for 7 days and is much weaker than her baseline, however pt adamant on returning home with spouse. Educated pt and spouse on discharge recs for further therapy to maximize functional independence and reduce caregiver burden, as pt's spouse reporting he cannot care for her alone at this time. Recommending moderate intensity therapy upon discharge. Discussed with CM and attending MD.    Rehab Prognosis: Good; patient would benefit from acute skilled OT services to address these deficits and reach maximum level of function.       Plan:     Patient to be seen 5 x/week to address the above listed problems via self-care/home management, therapeutic activities, therapeutic exercises  Plan of Care Expires: 09/26/24  Plan of Care Reviewed with: patient, spouse    Subjective     Chief Complaint: pain in LLE upon sitting EOB  Patient/Family Comments/goals: to go home today    Occupational  Profile:  Living Environment: lives with spouse in [facility] with 0 HAIDER; walk-in shower with chair  Previous level of function: independent  Roles and Routines: wife  Equipment Used at Home: shower chair, walker, rolling, cane, straight  Assistance upon Discharge: spouse, although spouse reports he cannot provide as much physical assistance as pt requires at this time    Pain/Comfort:  Pain Rating 1: other (see comments) (no pain at rest, 8/10 pain in LLE with mobility)  Pain Addressed 1: Reposition, Distraction, Cessation of Activity, Nurse notified    Patients cultural, spiritual, Scientologist conflicts given the current situation: no    Objective:     OT communicated with RN prior to session.      Patient was found HOB elevated with Other (comments) (dialysis fistula), and bed alarm upon OT entry to room.    General Precautions: Standard, fall  Orthopedic Precautions: N/A  Braces: N/A    Vital Signs: Blood Pressure: 157/81 sitting EOB  HR: 81    Bed Mobility:    Patient completed Supine to Sit with moderate assistance x2  Patient completed Sit to Supine with moderate assistance x2    Functional Mobility/Transfers:  Patient completed Sit <> Stand Transfer with moderate assistance x2 with rolling walker   Functional Mobility: unable to fully stand or take steps due to LLE pain    Activities of Daily Living:  Lower Body Dressing: maximal assistance for socks    Functional Cognition:  Orientation: oriented to Person, Place, and Situation  Safety Awareness: Impaired.      Upper Extremity Function:  Right Upper Extremity:   Strength: 3-/5 at shoulder, distal 3+/5    Left Upper Extremity:  Strength: 3-/5 at shoulder, distal 3+/5    Balance:   Static sitting balance: WFL  Dynamic sitting balance:WFL  Static standing balance:mod A x2    Therapeutic Positioning  Risk for acquired pressure injuries is increased due to relative decrease in mobility d/t hospitalization .    OT interventions performed during the course of today's  session:   Education was provided on benefits of and recommendations for therapeutic positioning    Skin assessment: all bony prominences were assessed    Findings: known area of altered skin integrity at LLE (dressing intact to LLE/heel)    OT recommendations for therapeutic positioning throughout hospitalization:   Follow [facility] Pressure Injury Prevention Protocol  Geomat recommended for sacral protection while UIC    Patient Education:  Patient and spouse were provided with verbal education education regarding OT role/goals/POC, fall prevention, safety awareness, Discharge/DME recommendations, and pressure ulcer prevention.  Understanding was verbalized.     Patient left HOB elevated with all lines intact, call button in reach, RN notified, and spouse present. Bed alarm donned.    GOALS:   Multidisciplinary Problems       Occupational Therapy Goals          Problem: Occupational Therapy    Goal Priority Disciplines Outcome Interventions   Occupational Therapy Goal     OT, PT/OT Progressing    Description: Goals to be met by: 9/26/24     Patient will increase functional independence with ADLs by performing:    UE Dressing with Stand-by Assistance.  LE Dressing with Stand-by Assistance.  Grooming while standing at sink with Stand-by Assistance.  Toileting from toilet with Stand-by Assistance for hygiene and clothing management.   Toilet transfer to toilet with Stand-by Assistance.                         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 Glomerulitis with one activated endothelial cell: treated with Solumedrol pulse 375 mg IVBP x 3 doses with short prednisone taper; thymoglobin 100 mg x 6 doses 10/04/2007 minimal histologic changes no acute rejection     Allergy     Anemia of renal disease     CKD (chronic kidney disease), stage IV     CMV (cytomegalovirus) status positive     Colon polyp     benign     Depression     GN (glomerulonephritis)     GN (glomerulonephritis)     unknown type    Herpes infection, other 2009    on buttocks    Hyperlipidemia     Hypertension     Immunosuppression     Living-donor kidney transplant recipient 01/15/2007    Meningitis     history of viral meningitis    Proteinuria 1/5/2015    Proteinuria     Pulmonary nodules     Renal hypertension     S/P breast augmentation     Skin cancer     Urinary tract infection          Past Surgical History:   Procedure Laterality Date    COLON SURGERY      COLONOSCOPY W/ BIOPSIES      cosemetic surgery      facelift and breast augmentation     HYSTERECTOMY      KIDNEY TRANSPLANT      NEPHRECTOMY Left     9/2016    RENAL BIOPSY      TONSILLECTOMY, ADENOIDECTOMY         Time Tracking:     OT Date of Treatment: 08/26/24  OT Start Time: 0951  OT Stop Time: 1011  OT Total Time (min): 20 min    Billable Minutes:Evaluation mod complexity    8/26/2024

## 2024-08-26 NOTE — NURSING
Spoke on the phone with patients daughter Amy Ga ([phone]) at [phone]. Mrs. Ga stated that patient's  has dementia and that the patient has POA of him and he should not be allowed to make decisions regarding the patients care. She also stated that she has POA over the patient, she mentioned concern regarding placement due to patient needing peritoneal dialysis, she stated that the last surgeon who assessed patients AV fistula said it was nonfunctioning and she does not believe the patient would be able to switch to HD with current fistula.

## 2024-08-26 NOTE — PT/OT/SLP PROGRESS
[facility]  Speech Language Pathology Department  Diet Tolerance Follow-up    Patient Name:  Malissa Schwartz   MRN:  [id number]    Recommendations     General recommendations:  Speech/Language and Cognitive Evaluation  Solid texture recommendation:  Regular Diet - IDDSI Level 7  Liquid consistency recommendation: Thin liquids - IDDSI Level 0   Medications: per patient preference  Swallow strategies/precautions: small bites/sips and upright for PO intake  Precautions: aspiration    Diet Tolerance     Nursing reports no difficulty regarding diet tolerance.    Plan     SLP Follow-Up:  Yes      Time Tracking     SLP Treatment Date: 8/26/24

## 2024-08-26 NOTE — NURSING
Nurses Note -- 4 Eyes      8/26/2024   4:47 AM      Skin assessed during: Q Shift Change      [] No Altered Skin Integrity Present    []Prevention Measures Documented      [x] Yes- Altered Skin Integrity Present or Discovered   [] LDA Added if Not in Epic (Describe Wound)   [] New Altered Skin Integrity was Present on Admit and Documented in LDA   [] Wound Image Taken    Wound Care Consulted? Yes    Attending Nurse:  Justina Gillette RN    Second RN/Staff Member:   Eunice Bishop RN

## 2024-08-26 NOTE — CARE UPDATE
[id number] therapy is recommending moderate intensity. Spoke with pt and pt's  re placement. Pt does not want to be placed. Provided pt's  with a sitter list and brochures.

## 2024-08-26 NOTE — HPI
"Wound medicine re-check     The patient is a 74 year old WF with PMHx of HTN, HLD, failed kidney transplant, ESRD on PD, with non-healing postoperative wound to left leg. Reports she underwent resection of left leg lesions for "skin cancer" on 8/19/24 and 8/20/24 with Dr. Brown and since started to have purulent drainage, surrounding erythema, swelling, and pain.   She came in to [facility] ED on 8/23/24 for evaluation of left leg wounds and fever and was admitted; started on IV antibiotics; no cultures noted.   Patient reports her  sees Dr. Brown regularly and during a visit for him she states the  Noted nodules on her left leg and recommended biopsy. She reports the flesh colored nodules had been present for only a few months.   Inpatient wound nurse saw patient on 8/24/24 and wound care orders were put into place; wound medicine has been consulted for evaluation of this wound as well.   Initial wound medicine evaluation completed on 8/26/24; patient noted with 2 large skin cancer resections on distal left lower extremity performed a few days prior to admission. No details or records form Dr. Limon office available for review. Anticipate that these wounds will be longstanding and difficult to heal secondary to her multitude of comorbidities.     8/29/24 - wound re-check today. Met patient in room 943 with  at bedside; answers all questions appropriately, seems depressed during visit; does not raise head or make eye contact does not actively engage in conversation. Her  is more interactive. He complains that wound bandages have not been changed since she was seen by us on Monday. She also does not have heel boot in place and no boots are in her room. She is up in bedside chair with pillow under left lower leg.  Agreeable for wound assessment and treatment.   No acute distress. Plans for discharge home have changed due to immobility related to weakness and pain to LLE; she is awaiting SNF " placement.

## 2024-08-26 NOTE — PLAN OF CARE
Problem: Occupational Therapy  Goal: Occupational Therapy Goal  Description: Goals to be met by: 9/26/24     Patient will increase functional independence with ADLs by performing:    UE Dressing with Stand-by Assistance.  LE Dressing with Stand-by Assistance.  Grooming while standing at sink with Stand-by Assistance.  Toileting from toilet with Stand-by Assistance for hygiene and clothing management.   Toilet transfer to toilet with Stand-by Assistance.    Outcome: Progressing

## 2024-08-27 LAB
LEFT CCA DIST DIAS: 12 CM/S
LEFT CCA DIST SYS: 67 CM/S
LEFT CCA PROX DIAS: 14 CM/S
LEFT CCA PROX SYS: 87 CM/S
LEFT ECA DIAS: 6 CM/S
LEFT ECA SYS: 56 CM/S
LEFT ICA DIST DIAS: 19 CM/S
LEFT ICA DIST SYS: 58 CM/S
LEFT ICA MID DIAS: 18 CM/S
LEFT ICA MID SYS: 62 CM/S
LEFT ICA PROX DIAS: 15 CM/S
LEFT ICA PROX SYS: 73 CM/S
LEFT VERTEBRAL DIAS: 11 CM/S
LEFT VERTEBRAL SYS: 61 CM/S
OHS CV CAROTID RIGHT ICA EDV HIGHEST: 15
OHS CV CAROTID ULTRASOUND LEFT ICA/CCA RATIO: 1.09
OHS CV CAROTID ULTRASOUND RIGHT ICA/CCA RATIO: 0.71
OHS CV PV CAROTID LEFT HIGHEST CCA: 87
OHS CV PV CAROTID LEFT HIGHEST ICA: 73
OHS CV PV CAROTID RIGHT HIGHEST CCA: 93
OHS CV PV CAROTID RIGHT HIGHEST ICA: 66
OHS CV US CAROTID LEFT HIGHEST EDV: 19
RIGHT CCA DIST DIAS: 19 CM/S
RIGHT CCA DIST SYS: 93 CM/S
RIGHT CCA PROX DIAS: 17 CM/S
RIGHT CCA PROX SYS: 87 CM/S
RIGHT ECA DIAS: 1 CM/S
RIGHT ECA SYS: 89 CM/S
RIGHT ICA DIST DIAS: 15 CM/S
RIGHT ICA DIST SYS: 62 CM/S
RIGHT ICA MID DIAS: 14 CM/S
RIGHT ICA MID SYS: 66 CM/S
RIGHT ICA PROX DIAS: 8 CM/S
RIGHT ICA PROX SYS: 55 CM/S
RIGHT VERTEBRAL DIAS: 3 CM/S
RIGHT VERTEBRAL SYS: 39 CM/S

## 2024-08-27 PROCEDURE — 25000003 PHARM REV CODE 250: Performed by: INTERNAL MEDICINE

## 2024-08-27 PROCEDURE — 97116 GAIT TRAINING THERAPY: CPT | Mod: CQ

## 2024-08-27 PROCEDURE — 63600175 PHARM REV CODE 636 W HCPCS: Performed by: NURSE PRACTITIONER

## 2024-08-27 PROCEDURE — 21400001 HC TELEMETRY ROOM

## 2024-08-27 PROCEDURE — 97535 SELF CARE MNGMENT TRAINING: CPT

## 2024-08-27 PROCEDURE — 25000003 PHARM REV CODE 250: Performed by: NURSE PRACTITIONER

## 2024-08-27 PROCEDURE — 90945 DIALYSIS ONE EVALUATION: CPT

## 2024-08-27 RX ADMIN — DOXYCYCLINE HYCLATE 100 MG: 100 TABLET, COATED ORAL at 09:08

## 2024-08-27 RX ADMIN — ENOXAPARIN SODIUM 30 MG: 40 INJECTION SUBCUTANEOUS at 05:08

## 2024-08-27 RX ADMIN — FUROSEMIDE 20 MG: 20 TABLET ORAL at 09:08

## 2024-08-27 RX ADMIN — AMLODIPINE BESYLATE 10 MG: 5 TABLET ORAL at 09:08

## 2024-08-27 RX ADMIN — Medication 6 MG: at 10:08

## 2024-08-27 RX ADMIN — FOLIC ACID 1 MG: 1 TABLET ORAL at 09:08

## 2024-08-27 RX ADMIN — DOXYCYCLINE HYCLATE 100 MG: 100 TABLET, COATED ORAL at 08:08

## 2024-08-27 RX ADMIN — ASPIRIN 81 MG: 81 TABLET, COATED ORAL at 09:08

## 2024-08-27 NOTE — PROGRESS NOTES
[facility] Hospital Medicine Progress Note        Chief Complaint: Inpatient Follow-up for     HPI: 74-year-old female whose history includes end-stage renal disease on peritoneal dialysis and hypertension.  Presented to the ED with complaints a nonhealing wound on left lower extremity.  Reports a cancerous lesion was removed on Monday.  Since then the sore has developed purulent drainage and a foul odor.  At the time of my exam patient reports that the primary reason she presented to the ER was for evaluation of left lower extremity weakness and inability to ambulate which started last night.  Apparently she has not mentioned this to any other providers until now.  On physical exam she does have significant left lower extremity weakness compared to the right.  Bilateral upper extremities strong and equal bilaterally.  May have a slight left-sided facial droop.     VS on arrival: T 100.8, P 85, R 17, b/P 128/73, Sats 100% on room air. Initial labs: WBC 10.98, Hgb 7.5, Hct 23.7 (down from 12.8 and 39. , , iron 10, TIBC 102.  Left lower extremity venous ultrasound negative for DVT.  Vancomycin administered in the ED. Obtained a stat CT head which showed no acute intracranial process.    Interval Hx:     8/24/24 dr ortiz - post resection of cancerous lesions over LLE now with superimposed cellulitis on vancomycin . Pt requesting better pain control . Seen by wound care nurse, pics in media . Came in w limited ROM to lle so stroke work up ordered on admit as well.    8/25/24 dr ortiz - no new issues . Mrsa pcr negative . Will dc vanco and start pt on doxy    8/26/24  dr ortiz - we had plans for discharge home  today. PT was seen by PT w very poor performance . We offered placement rehab/snf placement but pt insisted on going home . List of sitters services provided. Discharge on hold      8/27/24 dr ortiz - well, pt ended up staying for snf placement . Not happy but I think is  needed . Daughter on her way to [place]         Case was discussed with patient's nurse and  on the floor.    Objective/physical exam:  General: In no acute distress, afebrile  Chest: Clear to auscultation bilaterally  Heart: RRR, +S1, S2, no appreciable murmur  Abdomen: Soft, nontender, BS +  MSK: Warm, no lower extremity edema, no clubbing or cyanosis. LLE wounds x 2 w some erythema noted .   Neurologic: Alert and oriented x4, Cranial nerve II-XII intact, Strength 5/5 in all 4 extremities        Labs         VITAL SIGNS: 24 HRS MIN & MAX LAST   Temp  Min: 98.2 °F (36.8 °C)  Max: 98.8 °F (37.1 °C) 98.8 °F (37.1 °C)   BP  Min: 128/79  Max: 145/78 136/79   Pulse  Min: 75  Max: 80  80   Resp  Min: 18  Max: 18 18   SpO2  Min: 93 %  Max: 94 % (!) 94 %     I have reviewed the following labs:  Recent Labs   Lab 08/23/24  1242 08/24/24  0332 08/26/24  0458   WBC 10.98 9.38 5.31   RBC 2.65* 3.46* 3.45*   HGB 7.5* 10.3* 10.1*   HCT 23.7* 30.9* 30.3*   MCV 89.4 89.3 87.8   MCH 28.3 29.8 29.3   MCHC 31.6* 33.3 33.3   RDW 16.7 15.6 15.3    178 199   MPV 9.4 9.8 10.1     Recent Labs   Lab 08/24/24  0332 08/25/24  0344 08/26/24  0458    136 137   K 4.2 3.6 4.0   CL 96* 95* 94*   CO2 25 26 25   BUN 42.2* 45.6* 54.8*   CREATININE 8.47* 8.63* 9.46*   CALCIUM 8.2* 8.0* 8.0*   ALBUMIN 1.9* 1.7* 1.5*   ALKPHOS 106 107 132   ALT 14 13 12   AST 19 17 18   BILITOT 0.5 0.4 0.3     Microbiology Results (last 7 days)       Procedure Component Value Units Date/Time    Blood culture #1 **CANNOT BE ORDERED STAT** [[id number]]  (Normal) Collected: 08/23/24 1242    Order Status: Completed Specimen: Blood Updated: 08/26/24 1800     Blood Culture No Growth At 72 Hours    Blood culture #2 **CANNOT BE ORDERED STAT** [[id number]]  (Normal) Collected: 08/23/24 1242    Order Status: Completed Specimen: Blood Updated: 08/26/24 1800     Blood Culture No Growth At 72 Hours             See below for  Radiology    Assessment/Plan:  LLE Cellulitis /surgical wound infection   - follow-up on blood cultures  - dc vanco/mrsa pcr negative / started doxy iv on 8-25-24  -wound care following      Left lower extremity weakness  - CT head without contrast negative for acute findings  - echo with moderate mitral regurgitation/Pef/ no DD  -carotid ultrasound w right internal carotid artery was patent with no evidence of stenosis.   The left internal carotid artery was patent with less than 50% stenosis.   Bilateral vertebral arteries were patent with antegrade flow.   - MRI brain negative          ESRD on PD  - nephrology consulted     Hypertension   - stable       Acute anemia on Anemia of chronic disease and iron deficiency  - S/P 2 units PRBCs with improved HH  - Ferrlicit 125 mg daily x 3 doses  - PO iron supplement at discharge    Deconditioning - mostly is pain to extremity secondary to extent of resection done by dermatologist , plus infection       Plan -  looking into placement . Continue present management    VTE prophylaxis: lovenox     Patient condition:  Stable    Anticipated discharge and Disposition:   tbd      All diagnosis and differential diagnosis have been reviewed; assessment and plan has been documented; I have personally reviewed the labs and test results that are presently available; I have reviewed the patients medication list; I have reviewed the consulting providers response and recommendations. I have reviewed or attempted to review medical records based upon their availability    All of the patient's questions have been  addressed and answered. Patient's is agreeable to the above stated plan. I will continue to monitor closely and make adjustments to medical management as needed.    Portions of this note dictated using EMR integrated voice recognition software, and may be subject to voice recognition errors not corrected at proofreading. Please contact writer for clarification if  needed.   _____________________________________________________________________    Malnutrition Status:    Scheduled Med:   amLODIPine  10 mg Oral Daily    aspirin  81 mg Oral Daily    calcitRIOL  0.25 mcg Oral Once per day on Monday Wednesday Friday    doxycycline  100 mg Oral Q12H    enoxparin  30 mg Subcutaneous Daily    folic acid  1 mg Oral Daily    furosemide  20 mg Oral Daily    morphine  2 mg Intravenous Once      Continuous Infusions:     PRN Meds:    Current Facility-Administered Medications:     0.9%  NaCl infusion (for blood administration), , Intravenous, Q24H PRN    acetaminophen, 1,000 mg, Oral, Q6H PRN    aluminum-magnesium hydroxide-simethicone, 30 mL, Oral, QID PRN    bisacodyL, 10 mg, Rectal, Daily PRN    dextrose 10%, 12.5 g, Intravenous, PRN    dextrose 10%, 25 g, Intravenous, PRN    glucagon (human recombinant), 1 mg, Intramuscular, PRN    glucose, 16 g, Oral, PRN    glucose, 24 g, Oral, PRN    hydrALAZINE, 10 mg, Intravenous, Q6H PRN    melatonin, 6 mg, Oral, Nightly PRN    morphine, 2 mg, Intravenous, Q6H PRN    naloxone, 0.02 mg, Intravenous, PRN    ondansetron, 4 mg, Intravenous, Q4H PRN    prochlorperazine, 5 mg, Intravenous, Q6H PRN    senna-docusate 8.6-50 mg, 1 tablet, Oral, BID PRN    sodium chloride 0.9%, 10 mL, Intravenous, PRN     Radiology:  I have personally reviewed the following imaging and agree with the radiologist.     MRI Brain Without Contrast  Narrative: EXAMINATION:  MRI BRAIN WITHOUT CONTRAST    CLINICAL HISTORY:  Stroke, follow up;Transient ischemic attack (TIA);    TECHNIQUE:  Multiplanar MRI sequences were performed of the brain without contrast.    COMPARISON:  CT brain August 24, 2024    FINDINGS:  Bilateral cerebral periventricular and subcortical white matter variable size T2-FLAIR hyperintense signals are consistent with marked chronic microangiopathic ischemia. Involutional changes contribute to cerebellar and cerebral cortical volume loss.  Gradient echo  sequences demonstrate no evidence of de phasing artifact to suggest hemorrhagic byproducts. No evidence of diffusion restriction or ADC map signal drop out to suggest acute infarct. The sella and suprasellar areas are unremarkable.    The cerebellar tonsils are normally positioned. There is no acute intracranial hemorrhage, hydrocephalus, midline shift or mass effect. No acute extra axial fluid collections identified. The mastoid air cells are clear.  Impression: 1.  No acute intracranial findings identified    2.  Chronic microangiopathic ischemia and atrophy.    Electronically signed by: Yogesh Wolf  Date:    08/25/2024  Time:    11:04  Echo    Left Ventricle: The left ventricle is normal in size. Normal wall   thickness. There is normal systolic function with a visually estimated   ejection fraction of 60 - 65%.    Right Ventricle: Normal right ventricular cavity size. Systolic   function is normal. TAPSE is 1.97 cm.    Left Atrium: Agitated saline study of the atrial septum is negative,   suggesting absence of intracardiac shunt at the atrial level.    Aortic Valve: The aortic valve is a trileaflet valve.    Mitral Valve: There is no stenosis. The mean pressure gradient across   the mitral valve is 1 mmHg at a heart rate of 68 bpm. There is mild   regurgitation.    Tricuspid Valve: There is mild to moderate regurgitation.    Pulmonic Valve: There is mild regurgitation.    Pulmonary Artery: The estimated pulmonary artery systolic pressure is   39 mmHg.    IVC/SVC: Intermediate venous pressure at 8 mmHg.    Pericardium: Left pleural effusion.      Maximus Sun MD  Department of Hospital Medicine   [facility]   08/27/2024

## 2024-08-27 NOTE — PT/OT/SLP PROGRESS
SLP attempted to visit patient for cognitive-linguistic evaluation; however patient lethargic and refusing at this time. Will continue to follow.

## 2024-08-27 NOTE — PLAN OF CARE
The patient and her POA/Daughter: Amy Ga are requesting (SNF) referrals be sent to: 1. [facility] 2. [facility] 3. [facility] 4. [facility] 5. [facility] 6. [facility]. KENYATTA Childers will send (SNF) referrals as per preference of patient and daughter: Amy Ga.

## 2024-08-27 NOTE — NURSING
Nurses Note -- 4 Eyes      8/26/2024   11:48 PM      Skin assessed during: Q Shift Change      [] No Altered Skin Integrity Present    []Prevention Measures Documented      [x] Yes- Altered Skin Integrity Present or Discovered   [] LDA Added if Not in Epic (Describe Wound)   [] New Altered Skin Integrity was Present on Admit and Documented in LDA   [] Wound Image Taken    Wound Care Consulted? Yes    Attending Nurse:  ANA Gutiérrez    Second RN/Staff Member:   ANA Lewis

## 2024-08-27 NOTE — PLAN OF CARE
Problem: Adult Inpatient Plan of Care  Goal: Plan of Care Review  8/27/2024 1823 by Herberth Vergara LPN  Outcome: Progressing  8/27/2024 1717 by Herberth Vergara LPN  Outcome: Progressing  Goal: Patient-Specific Goal (Individualized)  8/27/2024 1823 by Herberth Vergara LPN  Outcome: Progressing  8/27/2024 1717 by Herberth Vergara LPN  Outcome: Progressing  Goal: Absence of Hospital-Acquired Illness or Injury  8/27/2024 1823 by Herberth Vergara LPN  Outcome: Progressing  8/27/2024 1717 by Herberth Vergara LPN  Outcome: Progressing  Goal: Optimal Comfort and Wellbeing  8/27/2024 1823 by Herberth Vergara LPN  Outcome: Progressing  8/27/2024 1717 by Herberth Vergara LPN  Outcome: Progressing  Goal: Readiness for Transition of Care  8/27/2024 1823 by Herberth Vergara LPN  Outcome: Progressing  8/27/2024 1717 by Herberth Vergara LPN  Outcome: Progressing     Problem: Infection  Goal: Absence of Infection Signs and Symptoms  8/27/2024 1823 by Herberth Vergara LPN  Outcome: Progressing  8/27/2024 1717 by Herberth Vergara LPN  Outcome: Progressing     Problem: Wound  Goal: Optimal Coping  8/27/2024 1823 by Herberth Vergara LPN  Outcome: Progressing  8/27/2024 1717 by Herberth Vergara LPN  Outcome: Progressing  Goal: Optimal Functional Ability  8/27/2024 1823 by Herberth Vergara LPN  Outcome: Progressing  8/27/2024 1717 by Herberth Vergara LPN  Outcome: Progressing  Goal: Absence of Infection Signs and Symptoms  8/27/2024 1823 by Herberth Vergara LPN  Outcome: Progressing  8/27/2024 1717 by Herberth Vergara LPN  Outcome: Progressing  Goal: Improved Oral Intake  8/27/2024 1823 by Herberth Vergara LPN  Outcome: Progressing  8/27/2024 1717 by Herberth Vergara LPN  Outcome: Progressing  Goal: Optimal Pain Control and Function  8/27/2024 1823 by Herberth Vergara LPN  Outcome: Progressing  8/27/2024 1717  by Herberth Vergara LPN  Outcome: Progressing  Goal: Skin Health and Integrity  8/27/2024 1823 by Herberth Vergara LPN  Outcome: Progressing  8/27/2024 1717 by Herberth Vergara LPN  Outcome: Progressing  Goal: Optimal Wound Healing  8/27/2024 1823 by Herberth Vergara LPN  Outcome: Progressing  8/27/2024 1717 by Herberth Vergara LPN  Outcome: Progressing     Problem: Stroke, Ischemic (Includes Transient Ischemic Attack)  Goal: Optimal Coping  8/27/2024 1823 by Herberth Vergara LPN  Outcome: Progressing  8/27/2024 1717 by Herberth Vergara LPN  Outcome: Progressing  Goal: Effective Bowel Elimination  8/27/2024 1823 by Herberth Vergara LPN  Outcome: Progressing  8/27/2024 1717 by Herberth Vergara LPN  Outcome: Progressing  Goal: Optimal Cerebral Tissue Perfusion  8/27/2024 1823 by Herberth Vergara LPN  Outcome: Progressing  8/27/2024 1717 by Herberth Vergara LPN  Outcome: Progressing  Goal: Optimal Cognitive Function  8/27/2024 1823 by Herberth Vergara LPN  Outcome: Progressing  8/27/2024 1717 by Herberth Veragra LPN  Outcome: Progressing  Goal: Improved Communication Skills  8/27/2024 1823 by Herberth Vergara LPN  Outcome: Progressing  8/27/2024 1717 by Herberth Vergara LPN  Outcome: Progressing  Goal: Optimal Functional Ability  8/27/2024 1823 by Herberth Vergara LPN  Outcome: Progressing  8/27/2024 1717 by Herberth Vergara LPN  Outcome: Progressing  Goal: Optimal Nutrition Intake  8/27/2024 1823 by Herberth Vergara LPN  Outcome: Progressing  8/27/2024 1717 by Herberth Vergara LPN  Outcome: Progressing  Goal: Effective Oxygenation and Ventilation  8/27/2024 1823 by Herberth Vergara LPN  Outcome: Progressing  8/27/2024 1717 by Herberth Vergara LPN  Outcome: Progressing  Goal: Improved Sensorimotor Function  8/27/2024 1823 by Herberth Vergara LPN  Outcome: Progressing  8/27/2024 1717 by  Herberth Vergara LPN  Outcome: Progressing  Goal: Safe and Effective Swallow  8/27/2024 1823 by Herberth Vergara LPN  Outcome: Progressing  8/27/2024 1717 by Herberth Vergara LPN  Outcome: Progressing  Goal: Effective Urinary Elimination  8/27/2024 1823 by Herberth Vergara LPN  Outcome: Progressing  8/27/2024 1717 by Herberth Vergara LPN  Outcome: Progressing

## 2024-08-27 NOTE — NURSING
08/27/24 0845   Peritoneal Dialysis   Exchange Type Cycler   Peritoneal Treatment Status Completed   Dianeal Solution Dextrose 1.5% in 6000 mL;Dextrose 1.5% in 2000 mL   Cycler Peritoneal Dialysis   Initial Drain Volume (mL) 920 mL   Effluent Appearance Yellow   Number of Cycles 4   Cycler PD Total UF (mL) 288 mL   Cycler Treatment Comments PD tx complete. 1208mL net removed.     No last fill.

## 2024-08-27 NOTE — PROGRESS NOTES
[facility] Progress [facility]        Chief Complaint: Inpatient Follow-up for     HPI: 74-year-old female whose history includes end-stage renal disease on peritoneal dialysis and hypertension.  Presented to the ED with complaints a nonhealing wound on left lower extremity.  Reports a cancerous lesion was removed on Monday.  Since then the sore has developed purulent drainage and a foul odor.  At the time of my exam patient reports that the primary reason she presented to the ER was for evaluation of left lower extremity weakness and inability to ambulate which started last night.  Apparently she has not mentioned this to any other providers until now.  On physical exam she does have significant left lower extremity weakness compared to the right.  Bilateral upper extremities strong and equal bilaterally.  May have a slight left-sided facial droop.     VS on arrival: T 100.8, P 85, R 17, b/P 128/73, Sats 100% on room air. Initial labs: WBC 10.98, Hgb 7.5, Hct 23.7 (down from 12.8 and 39. , , iron 10, TIBC 102.  Left lower extremity venous ultrasound negative for DVT.  Vancomycin administered in the ED. Obtained a stat CT head which showed no acute intracranial process.    Interval Hx:     8/24/24 dr ortiz - post resection of cancerous lesions over LLE now with superimposed cellulitis on vancomycin . Pt requesting better pain control . Seen by wound care nurse, pics in media . Came in w limited ROM to lle so stroke work up ordered on admit as well.    8/25/24 dr ortiz - no new issues . Mrsa pcr negative . Will dc vanco and start pt on doxy    8/26/24  dr ortiz - we had plans for discharge home  today. PT was seen by PT w very poor performance . We offered placement rehab/snf placement but pt insisted on going home . List of sitters services provided. Discharge on hold        Case was discussed with patient's nurse and  on the  floor.    Objective/physical exam:  General: In no acute distress, afebrile  Chest: Clear to auscultation bilaterally  Heart: RRR, +S1, S2, no appreciable murmur  Abdomen: Soft, nontender, BS +  MSK: Warm, no lower extremity edema, no clubbing or cyanosis. LLE wounds x 2 w some erythema noted .   Neurologic: Alert and oriented x4, Cranial nerve II-XII intact, Strength 5/5 in all 4 extremities        Labs         VITAL SIGNS: 24 HRS MIN & MAX LAST   Temp  Min: 98.2 °F (36.8 °C)  Max: 98.8 °F (37.1 °C) 98.8 °F (37.1 °C)   BP  Min: 128/79  Max: 145/78 136/79   Pulse  Min: 75  Max: 80  80   Resp  Min: 18  Max: 18 18   SpO2  Min: 93 %  Max: 94 % (!) 94 %     I have reviewed the following labs:  Recent Labs   Lab 08/23/24  1242 08/24/24  0332 08/26/24  0458   WBC 10.98 9.38 5.31   RBC 2.65* 3.46* 3.45*   HGB 7.5* 10.3* 10.1*   HCT 23.7* 30.9* 30.3*   MCV 89.4 89.3 87.8   MCH 28.3 29.8 29.3   MCHC 31.6* 33.3 33.3   RDW 16.7 15.6 15.3    178 199   MPV 9.4 9.8 10.1     Recent Labs   Lab 08/24/24  0332 08/25/24  0344 08/26/24  0458    136 137   K 4.2 3.6 4.0   CL 96* 95* 94*   CO2 25 26 25   BUN 42.2* 45.6* 54.8*   CREATININE 8.47* 8.63* 9.46*   CALCIUM 8.2* 8.0* 8.0*   ALBUMIN 1.9* 1.7* 1.5*   ALKPHOS 106 107 132   ALT 14 13 12   AST 19 17 18   BILITOT 0.5 0.4 0.3     Microbiology Results (last 7 days)       Procedure Component Value Units Date/Time    Blood culture #1 **CANNOT BE ORDERED STAT** [[id number]]  (Normal) Collected: 08/23/24 1242    Order Status: Completed Specimen: Blood Updated: 08/26/24 1800     Blood Culture No Growth At 72 Hours    Blood culture #2 **CANNOT BE ORDERED STAT** [[id number]]  (Normal) Collected: 08/23/24 1242    Order Status: Completed Specimen: Blood Updated: 08/26/24 1800     Blood Culture No Growth At 72 Hours             See below for Radiology    Assessment/Plan:  LLE Cellulitis /surgical wound infection   - follow-up on blood cultures  - dc vanco/mrsa pcr negative / started  doxy iv on 8-25-24  -wound care following      Left lower extremity weakness  - CT head without contrast negative for acute findings  - echo with moderate mitral regurgitation/Pef/ no DD  -carotid ultrasound w right internal carotid artery was patent with no evidence of stenosis.   The left internal carotid artery was patent with less than 50% stenosis.   Bilateral vertebral arteries were patent with antegrade flow.   - MRI brain negative          ESRD on PD  - nephrology consulted     Hypertension   - stable       Acute anemia on Anemia of chronic disease and iron deficiency  - S/P 2 units PRBCs with improved HH  - Ferrlicit 125 mg daily x 3 doses  - PO iron supplement at discharge    Deconditioning - mostly is pain to extremity secondary to extent of resection done by dermatologist , plus infection       Plan - discharge on hold . She dont want to go but  insisting .  will talk to pt and  . I am ok w  placement following PT recs . Older couple and  cannot take care of her . Continue present management .    VTE prophylaxis: lovenox     Patient condition:  Stable    Anticipated discharge and Disposition:   tbd      All diagnosis and differential diagnosis have been reviewed; assessment and plan has been documented; I have personally reviewed the labs and test results that are presently available; I have reviewed the patients medication list; I have reviewed the consulting providers response and recommendations. I have reviewed or attempted to review medical records based upon their availability    All of the patient's questions have been  addressed and answered. Patient's is agreeable to the above stated plan. I will continue to monitor closely and make adjustments to medical management as needed.    Portions of this note dictated using EMR integrated voice recognition software, and may be subject to voice recognition errors not corrected at proofreading. Please contact writer for  clarification if needed.   _____________________________________________________________________    Malnutrition Status:    Scheduled Med:   amLODIPine  10 mg Oral Daily    aspirin  81 mg Oral Daily    calcitRIOL  0.25 mcg Oral Once per day on Monday Wednesday Friday    doxycycline  100 mg Oral Q12H    enoxparin  30 mg Subcutaneous Daily    folic acid  1 mg Oral Daily    furosemide  20 mg Oral Daily    morphine  2 mg Intravenous Once      Continuous Infusions:     PRN Meds:    Current Facility-Administered Medications:     0.9%  NaCl infusion (for blood administration), , Intravenous, Q24H PRN    acetaminophen, 1,000 mg, Oral, Q6H PRN    aluminum-magnesium hydroxide-simethicone, 30 mL, Oral, QID PRN    bisacodyL, 10 mg, Rectal, Daily PRN    dextrose 10%, 12.5 g, Intravenous, PRN    dextrose 10%, 25 g, Intravenous, PRN    glucagon (human recombinant), 1 mg, Intramuscular, PRN    glucose, 16 g, Oral, PRN    glucose, 24 g, Oral, PRN    hydrALAZINE, 10 mg, Intravenous, Q6H PRN    melatonin, 6 mg, Oral, Nightly PRN    morphine, 2 mg, Intravenous, Q6H PRN    naloxone, 0.02 mg, Intravenous, PRN    ondansetron, 4 mg, Intravenous, Q4H PRN    prochlorperazine, 5 mg, Intravenous, Q6H PRN    senna-docusate 8.6-50 mg, 1 tablet, Oral, BID PRN    sodium chloride 0.9%, 10 mL, Intravenous, PRN     Radiology:  I have personally reviewed the following imaging and agree with the radiologist.     MRI Brain Without Contrast  Narrative: EXAMINATION:  MRI BRAIN WITHOUT CONTRAST    CLINICAL HISTORY:  Stroke, follow up;Transient ischemic attack (TIA);    TECHNIQUE:  Multiplanar MRI sequences were performed of the brain without contrast.    COMPARISON:  CT brain August 24, 2024    FINDINGS:  Bilateral cerebral periventricular and subcortical white matter variable size T2-FLAIR hyperintense signals are consistent with marked chronic microangiopathic ischemia. Involutional changes contribute to cerebellar and cerebral cortical volume loss.   Gradient echo sequences demonstrate no evidence of de phasing artifact to suggest hemorrhagic byproducts. No evidence of diffusion restriction or ADC map signal drop out to suggest acute infarct. The sella and suprasellar areas are unremarkable.    The cerebellar tonsils are normally positioned. There is no acute intracranial hemorrhage, hydrocephalus, midline shift or mass effect. No acute extra axial fluid collections identified. The mastoid air cells are clear.  Impression: 1.  No acute intracranial findings identified    2.  Chronic microangiopathic ischemia and atrophy.    Electronically signed by: Yogesh Wlof  Date:    08/25/2024  Time:    11:04  Echo    Left Ventricle: The left ventricle is normal in size. Normal wall   thickness. There is normal systolic function with a visually estimated   ejection fraction of 60 - 65%.    Right Ventricle: Normal right ventricular cavity size. Systolic   function is normal. TAPSE is 1.97 cm.    Left Atrium: Agitated saline study of the atrial septum is negative,   suggesting absence of intracardiac shunt at the atrial level.    Aortic Valve: The aortic valve is a trileaflet valve.    Mitral Valve: There is no stenosis. The mean pressure gradient across   the mitral valve is 1 mmHg at a heart rate of 68 bpm. There is mild   regurgitation.    Tricuspid Valve: There is mild to moderate regurgitation.    Pulmonic Valve: There is mild regurgitation.    Pulmonary Artery: The estimated pulmonary artery systolic pressure is   39 mmHg.    IVC/SVC: Intermediate venous pressure at 8 mmHg.    Pericardium: Left pleural effusion.      Maximus Sun MD  Department of Hospital Medicine   [facility]   08/27/2024

## 2024-08-27 NOTE — PT/OT/SLP PROGRESS
Occupational Therapy   Treatment    Name: Malissa Schwartz  MRN: [id number]  Admitting Diagnosis:  Wound infection       Recommendations:     Recommended therapy intensity at discharge: Moderate Intensity Therapy   Discharge Equipment Recommendations:  bedside commode  Barriers to discharge:       Assessment:     Malissa Schwartz is a 74 y.o. female with a medical diagnosis of LLE cellulitis/post surgical wound infection, ESRD on PD.  Performance deficits affecting function are weakness, impaired endurance, impaired self care skills, impaired functional mobility, gait instability, impaired balance, pain, decreased safety awareness, decreased lower extremity function, decreased upper extremity function.     Pt with fair tolerance for mobility. Required frequent verbal and physical cues to maintain erect posture when standing and for sequencing when using RW. Pt unable to ambulate over ~2-3 feet at this time, attempting to sit prematurely and requiring cues for safety to prevent fall. Transfer to chair with MOD A. Remains appropriate for moderate intensity therapy upon dc.    Rehab Prognosis:  Good; patient would benefit from acute skilled OT services to address these deficits and reach maximum level of function.       Plan:     Patient to be seen 5 x/week to address the above listed problems via self-care/home management, therapeutic activities, therapeutic exercises  Plan of Care Expires: 09/26/24  Plan of Care Reviewed with: patient, spouse    Subjective     Pain/Comfort:  Pain Rating 1: other (see comments) (reports no pain at rest but pain when L leg is touched)    Objective:     Communicated with: nursing prior to session.  Patient found HOB elevated with peripheral IV, pulse ox (continuous), telemetry (PD port) upon OT entry to room.    General Precautions: Standard, fall    Orthopedic Precautions:N/A  Braces: N/A  Respiratory Status: Room air     Occupational Performance:     Bed Mobility:    Patient completed  Supine to Sit with moderate assistance and 2 persons     Functional Mobility/Transfers:  Patient completed Sit <> Stand Transfer with moderate assistance and of 2 persons  with  rolling walker   Patient completed Bed <> Chair Transfer using step transitioning to pivot technique with moderate assistance and of 2 persons with rolling walker  Functional Mobility: Performed short, shuffling steps with RW, about 2ft before fatiguing, attempting to sit prematurely.     Activities of Daily Living:  Lower Body Dressing: maximal assistance      Therapeutic Positioning    OT interventions performed during the course of today's session in an effort to prevent and/or reduce acquired pressure injuries:   Education was provided on benefits of and recommendations for therapeutic positioning  Therapeutic positioning was provided at the conclusion of session to offload all bony prominences for the prevention and/or reduction of pressure injuries    [facility] 6 Click ADL: 15    Patient Education:  Patient provided with verbal education education regarding OT role/goals/POC, fall prevention, safety awareness, and Discharge/DME recommendations.  Understanding was verbalized, however additional teaching warranted.      Patient left up in chair with call button in reach and daughter present.    GOALS:   Multidisciplinary Problems       Occupational Therapy Goals          Problem: Occupational Therapy    Goal Priority Disciplines Outcome Interventions   Occupational Therapy Goal     OT, PT/OT Progressing    Description: Goals to be met by: 9/26/24     Patient will increase functional independence with ADLs by performing:    UE Dressing with Stand-by Assistance.  LE Dressing with Stand-by Assistance.  Grooming while standing at sink with Stand-by Assistance.  Toileting from toilet with Stand-by Assistance for hygiene and clothing management.   Toilet transfer to toilet with Stand-by Assistance.                         Time Tracking:     OT  Date of Treatment: 08/27/24  OT Start Time: 1123  OT Stop Time: 1138  OT Total Time (min): 15 min    Billable Minutes:Self Care/Home Management 1 unit    OT/ZULEMA: OT     Number of ZULEMA visits since last OT visit: 1    8/27/2024

## 2024-08-27 NOTE — NURSING
08/27/24 1735   Peritoneal Dialysis   Exchange Type Cycler   Peritoneal Treatment Status Started   Dianeal Solution Dextrose 1.5% in 6000 mL   Cycler Peritoneal Dialysis   Initial Drain Volume (mL) 151 mL   Cycler Treatment Comments Tx initiated. No alarms. PT tolerating well.

## 2024-08-27 NOTE — NURSING
Nurses Note -- 4 Eyes      8/27/2024   6:21 PM      Skin assessed during: Q Shift Change      [x] No Altered Skin Integrity Present    []Prevention Measures Documented      [] Yes- Altered Skin Integrity Present or Discovered   [] LDA Added if Not in Epic (Describe Wound)   [] New Altered Skin Integrity was Present on Admit and Documented in LDA   [] Wound Image Taken    Wound Care Consulted? No    Attending Nurse:  Herberth Simpson RN/Staff Member:   Blanca

## 2024-08-27 NOTE — PLAN OF CARE
08/27/24 0956   Discharge Reassessment   Assessment Type Discharge Planning Reassessment   Did the patient's condition or plan change since previous assessment? No   Discharge Plan discussed with: Patient;Adult children  (POA/Daughter: Amy Ga (Daughter)  [phone] (Mobile))   Name(s) and Number(s) POA/Daughter: Amy Ga (Daughter)  [phone] (Mobile)   Communicated REINA with patient/caregiver Date not available/Unable to determine   Discharge Plan A Skilled Nursing Facility  (FOC: The patient's POA/Daughter: Amy Ga ([phone]) was given achoice list through [employer] for review re: SNF facilities. She will review and determine preferences as she lives in [place] and could be readily accessible to visit.)   Discharge Plan B Skilled Nursing Facility   DME Needed Upon Discharge  none   Transition of Care Barriers None   Why the patient remains in the hospital Requires continued medical care   Post-Acute Status   Post-Acute Authorization Placement   Post-Acute Placement Status Patient List Provided   Coverage Financial Class: Medicare  Payor: MEDICARE - MEDICARE PART A & B -   Hospital Resources/Appts/Education Provided Appointments scheduled and added to AVS   Patient choice form signed by patient/caregiver List with quality metrics by geographic area provided   Discharge Delays None known at this time     The patient's POA/Daughter: Amy Ga ([phone]) was given a choice list through [employer] for review re: SNF facilities. She will speak with her Mom and Step Dad in regards to determining preferences. The (SNF) referrals will be sent to the facilities of choice upon final decision of preference. Will keep the patient and family informed as updates are received.

## 2024-08-27 NOTE — PROGRESS NOTES
Nephrology consult follow up note    HPI:      Malissa Schwartz is a 74 y.o. female well known to our service with a history of end-stage renal disease on peritoneal dialysis.  She is normally followed by Dr. George Mendoza.  She does have a history of a failed renal transplant in 2007 at [facility] in [place], [place].  She presented to the emergency department with significant left lower extremity pain.  Apparently she had skin cancer removed from his left lower extremity on 08/19/2024.  She is not on any immunosuppressants at present.  She has been fairly successful with her peritoneal dialysis. Other pertinent medical history includes hypertension, hyperlipidemia, renal transplantation which failed, skin cancer, pulmonary nodules, glomerular nephritis and anemia secondary to chronic kidney disease.  Nephrology following for ESRD management.    Interval history:     No acute events overnight.  Patient did peritoneal dialysis overnight.  Her machine was alarming quite a bit due to low drain volume.  No chest pain, shortness of breath, abdominal pain, nausea, vomiting, or lower extremity edema.     Review of Systems:       Past medical, family, surgical, and social history reviewed and unchanged from initial consult note.     Objective:       VITAL SIGNS: 24 HR MIN & MAX LAST    Temp  Min: 98.2 °F (36.8 °C)  Max: 98.8 °F (37.1 °C)  98.8 °F (37.1 °C)        BP  Min: 128/79  Max: 145/78  136/79     Pulse  Min: 75  Max: 80  80     Resp  Min: 18  Max: 18  18    SpO2  Min: 93 %  Max: 94 %  (!) 94 %      GEN:  Elderly WF in NAD  HEENT: Conjunctiva anicteric, pupils equal   CV: RRR +S1,S2 without murmur  PULM: CTAB, unlabored  ABD: Soft, NT/ND abdomen with NABS  EXT: No cyanosis or edema  SKIN: Warm and dry  PSYCH: Awake, alert and appropriately conversant.   Dialysis access:  PD catheter            Component Value Date/Time     08/26/2024 0458     08/25/2024 0344     06/20/2018 1402    NA  139 01/08/2018 0835    K 4.0 08/26/2024 0458    K 3.6 08/25/2024 0344    K 4.8 06/20/2018 1402    K 4.6 01/08/2018 0835    CO2 25 08/26/2024 0458    CO2 26 08/25/2024 0344    CO2 23 06/20/2018 1402    CO2 22 (L) 01/08/2018 0835    BUN 54.8 (H) 08/26/2024 0458    BUN 45.6 (H) 08/25/2024 0344    BUN 30 (H) 06/20/2018 1402    BUN 24 (H) 01/08/2018 0835    CREATININE 9.46 (H) 08/26/2024 0458    CREATININE 8.63 (H) 08/25/2024 0344    CREATININE 1.9 (H) 06/20/2018 1402    CREATININE 1.8 (H) 01/08/2018 0835    CALCIUM 8.0 (L) 08/26/2024 0458    CALCIUM 8.0 (L) 08/25/2024 0344    CALCIUM 9.7 06/20/2018 1402    CALCIUM 9.1 01/08/2018 0835    PHOS 4.4 01/17/2024 0439    PHOS 3.8 06/20/2018 1402            Component Value Date/Time    WBC 5.31 08/26/2024 0458    WBC 9.38 08/24/2024 0332    WBC 4.26 (L) 07/04/2024 0000    WBC 6.90 06/20/2018 1402    WBC 3.37 (L) 01/08/2018 0835    HGB 10.1 (L) 08/26/2024 0458    HGB 10.3 (L) 08/24/2024 0332    HGB 12.8 07/04/2024 0000    HGB 10.5 (L) 06/20/2018 1402    HGB 9.1 (L) 01/08/2018 0835    HCT 30.3 (L) 08/26/2024 0458    HCT 30.9 (L) 08/24/2024 0332    HCT 39.4 07/04/2024 0000    HCT 32.0 (L) 06/05/2020 0815    HCT 31.6 (L) 06/20/2018 1402    HCT 27.3 (L) 01/08/2018 0835     08/26/2024 0458     08/24/2024 0332     06/20/2018 1402     01/08/2018 0835         Imaging reviewed      Assessment / Plan:       Active Hospital Problems    Diagnosis  POA    *Wound infection [T14.8XXA, L08.9]  Yes    Open wound of left lower leg [S81.802A]  Yes    Pressure ulcer of left heel, stage 2 [L89.622]  No    Anemia of renal disease [N18.9, D63.1]  Yes     Chronic      Resolved Hospital Problems    Diagnosis Date Resolved POA    Symptomatic anemia [D64.9] 08/26/2024 Yes    Left leg pain [M79.605] 08/26/2024 Yes       ESRD - on PD nightly, 10 hours/4 cycles/1.5 L per cycle/no last fill  Left lower extremity cellulitis  Hypertension  Failed renal transplant  Anemia chronic  disease    Plan:  Continue nightly PD with all 1.5% bags.  Obtain x-ray pelvis to ensure that PD catheter is in correct positioning due to PD alarms overnight.  We will follow.    Isaiah Mosquera DO  Nephrology  [facility]  Clinic number: [phone]      Note was created with the assistance of electronic Dictation Services.  Note was reviewed to decrease errors, however, these may still be present.  Please contact me about questions or concerns with the dictation.

## 2024-08-27 NOTE — PT/OT/SLP PROGRESS
Physical Therapy Treatment    Patient Name:  Malissa Schwartz   MRN:  [id number]    Recommendations:     Discharge therapy intensity: Moderate Intensity Therapy   Discharge Equipment Recommendations: to be determined by next level of care  Barriers to discharge: Decreased caregiver support, Impaired mobility, and Ongoing medical needs    Assessment:     Malissa Schwartz is a 74 y.o. female.  She presents with the following impairments/functional limitations: gait instability, weakness, impaired endurance, impaired balance, pain, impaired functional mobility, impaired self care skills, decreased lower extremity function.    Rehab Prognosis: Good; patient would benefit from acute skilled PT services to address these deficits and reach maximum level of function.    Recent Surgery: * No surgery found *      Plan:     During this hospitalization, patient would benefit from acute PT services 5 x/week to address the identified rehab impairments via gait training, therapeutic activities, therapeutic exercises and progress toward the following goals:    Plan of Care Expires:  09/26/24    Subjective     Chief Complaint: none    Objective:     Communicated with nurse prior to session.  Patient found supine with peripheral IV, telemetry, pulse ox (continuous) (dialysis fistula) upon PT entry to room.     General Precautions: Standard, fall  Orthopedic Precautions: N/A  Braces: N/A  Respiratory Status: Room air  Blood Pressure: 160/78 and meds administered  Skin Integrity: Visible skin intact      Functional Mobility:  Mod assist to get EOB and same for STS  Gait 25 ft x 2 and 35 ft x 1 RW mod assist.   Pt very lethargic but was able to progress with gait.     Education Provided:  Role and goals of PT, transfer training, bed mobility, gait training, balance training, safety awareness, assistive device, strengthening exercises, and importance of participating in PT to return to PLOF.     Patient left up in chair with call button in  reach    GOALS:   Multidisciplinary Problems       Physical Therapy Goals          Problem: Physical Therapy    Goal Priority Disciplines Outcome Goal Variances Interventions   Physical Therapy Goal     PT, PT/OT Progressing     Description: Goals to be met by: 2024     Patient will increase functional independence with mobility by performin. Supine to sit with Stand-by Assistance  2. Sit to supine with Stand-by Assistance  3. Sit to stand transfer with Stand-by Assistance  4. Gait  x 50 feet with Minimal Assistance using Rolling Walker.                          Time Tracking:     Billable Minutes: Gait Training 24    Treatment Type: Treatment  PT/PTA: PTA     Number of PTA visits since last PT visit: 2024

## 2024-08-28 LAB
ALBUMIN SERPL-MCNC: 1.7 G/DL (ref 3.4–4.8)
BACTERIA BLD CULT: NORMAL
BACTERIA BLD CULT: NORMAL
BASOPHILS # BLD AUTO: 0.03 X10(3)/MCL
BASOPHILS NFR BLD AUTO: 0.6 %
BUN SERPL-MCNC: 51 MG/DL (ref 9.8–20.1)
CALCIUM SERPL-MCNC: 9.1 MG/DL (ref 8.4–10.2)
CHLORIDE SERPL-SCNC: 94 MMOL/L (ref 98–107)
CO2 SERPL-SCNC: 26 MMOL/L (ref 23–31)
CREAT SERPL-MCNC: 8.98 MG/DL (ref 0.55–1.02)
EOSINOPHIL # BLD AUTO: 0.13 X10(3)/MCL (ref 0–0.9)
EOSINOPHIL NFR BLD AUTO: 2.6 %
ERYTHROCYTE [DISTWIDTH] IN BLOOD BY AUTOMATED COUNT: 14.9 % (ref 11.5–17)
GFR SERPLBLD CREATININE-BSD FMLA CKD-EPI: 4 ML/MIN/1.73/M2
GLUCOSE SERPL-MCNC: 95 MG/DL (ref 82–115)
HCT VFR BLD AUTO: 32.6 % (ref 37–47)
HGB BLD-MCNC: 11 G/DL (ref 12–16)
IMM GRANULOCYTES # BLD AUTO: 0.09 X10(3)/MCL (ref 0–0.04)
IMM GRANULOCYTES NFR BLD AUTO: 1.8 %
LYMPHOCYTES # BLD AUTO: 0.89 X10(3)/MCL (ref 0.6–4.6)
LYMPHOCYTES NFR BLD AUTO: 17.9 %
MCH RBC QN AUTO: 29.1 PG (ref 27–31)
MCHC RBC AUTO-ENTMCNC: 33.7 G/DL (ref 33–36)
MCV RBC AUTO: 86.2 FL (ref 80–94)
MONOCYTES # BLD AUTO: 0.52 X10(3)/MCL (ref 0.1–1.3)
MONOCYTES NFR BLD AUTO: 10.4 %
NEUTROPHILS # BLD AUTO: 3.32 X10(3)/MCL (ref 2.1–9.2)
NEUTROPHILS NFR BLD AUTO: 66.7 %
NRBC BLD AUTO-RTO: 0 %
PHOSPHATE SERPL-MCNC: 6.3 MG/DL (ref 2.3–4.7)
PLATELET # BLD AUTO: 264 X10(3)/MCL (ref 130–400)
PMV BLD AUTO: 9.2 FL (ref 7.4–10.4)
POTASSIUM SERPL-SCNC: 3.9 MMOL/L (ref 3.5–5.1)
RBC # BLD AUTO: 3.78 X10(6)/MCL (ref 4.2–5.4)
SODIUM SERPL-SCNC: 135 MMOL/L (ref 136–145)
WBC # BLD AUTO: 4.98 X10(3)/MCL (ref 4.5–11.5)

## 2024-08-28 PROCEDURE — 92523 SPEECH SOUND LANG COMPREHEN: CPT

## 2024-08-28 PROCEDURE — 25000003 PHARM REV CODE 250: Performed by: NURSE PRACTITIONER

## 2024-08-28 PROCEDURE — 80069 RENAL FUNCTION PANEL: CPT | Performed by: STUDENT IN AN ORGANIZED HEALTH CARE EDUCATION/TRAINING PROGRAM

## 2024-08-28 PROCEDURE — 25000003 PHARM REV CODE 250: Performed by: INTERNAL MEDICINE

## 2024-08-28 PROCEDURE — 90945 DIALYSIS ONE EVALUATION: CPT

## 2024-08-28 PROCEDURE — 36415 COLL VENOUS BLD VENIPUNCTURE: CPT | Performed by: STUDENT IN AN ORGANIZED HEALTH CARE EDUCATION/TRAINING PROGRAM

## 2024-08-28 PROCEDURE — 63600175 PHARM REV CODE 636 W HCPCS: Performed by: NURSE PRACTITIONER

## 2024-08-28 PROCEDURE — S0179 MEGESTROL 20 MG: HCPCS | Performed by: INTERNAL MEDICINE

## 2024-08-28 PROCEDURE — 21400001 HC TELEMETRY ROOM

## 2024-08-28 PROCEDURE — 97116 GAIT TRAINING THERAPY: CPT | Mod: CQ

## 2024-08-28 PROCEDURE — 85025 COMPLETE CBC W/AUTO DIFF WBC: CPT | Performed by: STUDENT IN AN ORGANIZED HEALTH CARE EDUCATION/TRAINING PROGRAM

## 2024-08-28 RX ORDER — MEGESTROL ACETATE 40 MG/ML
200 SUSPENSION ORAL DAILY
Status: DISCONTINUED | OUTPATIENT
Start: 2024-08-28 | End: 2024-08-30

## 2024-08-28 RX ADMIN — DOXYCYCLINE HYCLATE 100 MG: 100 TABLET, COATED ORAL at 10:08

## 2024-08-28 RX ADMIN — DOXYCYCLINE HYCLATE 100 MG: 100 TABLET, COATED ORAL at 08:08

## 2024-08-28 RX ADMIN — Medication 6 MG: at 08:08

## 2024-08-28 RX ADMIN — AMLODIPINE BESYLATE 10 MG: 5 TABLET ORAL at 10:08

## 2024-08-28 RX ADMIN — ASPIRIN 81 MG: 81 TABLET, COATED ORAL at 10:08

## 2024-08-28 RX ADMIN — CALCITRIOL CAPSULES 0.25 MCG 0.25 MCG: 0.25 CAPSULE ORAL at 01:08

## 2024-08-28 RX ADMIN — FOLIC ACID 1 MG: 1 TABLET ORAL at 10:08

## 2024-08-28 RX ADMIN — FUROSEMIDE 20 MG: 20 TABLET ORAL at 10:08

## 2024-08-28 RX ADMIN — ENOXAPARIN SODIUM 30 MG: 40 INJECTION SUBCUTANEOUS at 06:08

## 2024-08-28 RX ADMIN — MEGESTROL ACETATE 200 MG: 400 SUSPENSION ORAL at 10:08

## 2024-08-28 NOTE — PLAN OF CARE
[facility]      [facility] , [facility] , [facility] :unable to accept , care need exceed current capacity

## 2024-08-28 NOTE — PROGRESS NOTES
Nephrology consult follow up note    HPI:      Malissa Schwartz is a 74 y.o. female well known to our service with a history of end-stage renal disease on peritoneal dialysis.  She is normally followed by Dr. George Mendoza.  She does have a history of a failed renal transplant in 2007 at [facility] in [place], [place].  She presented to the emergency department with significant left lower extremity pain.  Apparently she had skin cancer removed from his left lower extremity on 08/19/2024.  She is not on any immunosuppressants at present.  She has been fairly successful with her peritoneal dialysis. Other pertinent medical history includes hypertension, hyperlipidemia, renal transplantation which failed, skin cancer, pulmonary nodules, glomerular nephritis and anemia secondary to chronic kidney disease.  Nephrology following for ESRD management.    Interval history:     No acute events overnight.  Patient did peritoneal dialysis overnight.  Her machine was alarming quite a bit due to low drain volume.  No chest pain, shortness of breath, abdominal pain, nausea, vomiting, or lower extremity edema.    08/28/2024   Awake alert oriented.  Very poor appetite.  Has been present in the room.  No shortness of breath.  No nausea no vomiting.  Left lower extremity is improving.     Review of Systems:       Past medical, family, surgical, and social history reviewed and unchanged from initial consult note.     Objective:       VITAL SIGNS: 24 HR MIN & MAX LAST    Temp  Min: 97.9 °F (36.6 °C)  Max: 98.5 °F (36.9 °C)  97.9 °F (36.6 °C)        BP  Min: 131/78  Max: 157/81  (!) 152/84     Pulse  Min: 73  Max: 88  73     Resp  Min: 18  Max: 18  18    SpO2  Min: 95 %  Max: 97 %  97 %      GEN:  Moderately developed and nourished.  No acute distress noted.  Awake alert oriented time person place.    HEENT:  Atraumatic normocephalic.  Pupils reactive.  Extraocular movements intact.  No oral lesion.  No JVD.  CV: RRR  without rub or gallop.  PULM: CTAB, unlabored  ABD: Soft, NT/ND abdomen with NABS  EXT: No cyanosis or edema, left lower extremity is dressed.  SKIN: Warm and dry  PSYCH: Awake, alert and appropriately conversant.   Dialysis access:  PD catheter no obvious exit site infection.            Component Value Date/Time     (L) 08/28/2024 0441     08/26/2024 0458     06/20/2018 1402     01/08/2018 0835    K 3.9 08/28/2024 0441    K 4.0 08/26/2024 0458    K 4.8 06/20/2018 1402    K 4.6 01/08/2018 0835    CO2 26 08/28/2024 0441    CO2 25 08/26/2024 0458    CO2 23 06/20/2018 1402    CO2 22 (L) 01/08/2018 0835    BUN 51.0 (H) 08/28/2024 0441    BUN 54.8 (H) 08/26/2024 0458    BUN 30 (H) 06/20/2018 1402    BUN 24 (H) 01/08/2018 0835    CREATININE 8.98 (H) 08/28/2024 0441    CREATININE 9.46 (H) 08/26/2024 0458    CREATININE 1.9 (H) 06/20/2018 1402    CREATININE 1.8 (H) 01/08/2018 0835    CALCIUM 9.1 08/28/2024 0441    CALCIUM 8.0 (L) 08/26/2024 0458    CALCIUM 9.7 06/20/2018 1402    CALCIUM 9.1 01/08/2018 0835    PHOS 6.3 (H) 08/28/2024 0441    PHOS 3.8 06/20/2018 1402            Component Value Date/Time    WBC 4.98 08/28/2024 0441    WBC 5.31 08/26/2024 0458    WBC 4.26 (L) 07/04/2024 0000    WBC 6.90 06/20/2018 1402    WBC 3.37 (L) 01/08/2018 0835    HGB 11.0 (L) 08/28/2024 0441    HGB 10.1 (L) 08/26/2024 0458    HGB 12.8 07/04/2024 0000    HGB 10.5 (L) 06/20/2018 1402    HGB 9.1 (L) 01/08/2018 0835    HCT 32.6 (L) 08/28/2024 0441    HCT 30.3 (L) 08/26/2024 0458    HCT 39.4 07/04/2024 0000    HCT 32.0 (L) 06/05/2020 0815    HCT 31.6 (L) 06/20/2018 1402    HCT 27.3 (L) 01/08/2018 0835     08/28/2024 0441     08/26/2024 0458     06/20/2018 1402     01/08/2018 0835         Imaging reviewed      Assessment / Plan:         ESRD - on PD nightly, 10 hours/4 cycles/1.5 L per cycle/no last fill  Left lower extremity cellulitis  Hypertension  Failed renal transplant  Anemia chronic  disease    Recommendations  Megace for anorexia  Continue other medical management  Discharge whenever appropriate from attending MD standpoint of view

## 2024-08-28 NOTE — PLAN OF CARE
Received an update by [facility] ([facility]: Clinical Liaison) that they are unable to meet her needs and will not be able to accept her for (SNF) services.     Received an update by: KENYATTA Diaz who reports that [facility] is presently reviewing for (SNF) services.

## 2024-08-28 NOTE — NURSING
Nurses Note -- 4 Eyes      8/28/2024   4:12 PM      Skin assessed during: Q Shift Change      [] No Altered Skin Integrity Present    []Prevention Measures Documented      [x] Yes- Altered Skin Integrity Present or Discovered   [] LDA Added if Not in Epic (Describe Wound)   [] New Altered Skin Integrity was Present on Admit and Documented in LDA   [] Wound Image Taken    Wound Care Consulted? Yes    Attending Nurse:  ANA Cortez     Second RN/Staff Member:   ANA Gutiérrez

## 2024-08-28 NOTE — PROGRESS NOTES
[facility] Hospital Medicine Progress Note        Chief Complaint: Inpatient Follow-up for     HPI: 74-year-old female whose history includes end-stage renal disease on peritoneal dialysis and hypertension.  Presented to the ED with complaints a nonhealing wound on left lower extremity.  Reports a cancerous lesion was removed on Monday.  Since then the sore has developed purulent drainage and a foul odor.  At the time of my exam patient reports that the primary reason she presented to the ER was for evaluation of left lower extremity weakness and inability to ambulate which started last night.  Apparently she has not mentioned this to any other providers until now.  On physical exam she does have significant left lower extremity weakness compared to the right.  Bilateral upper extremities strong and equal bilaterally.  May have a slight left-sided facial droop.     VS on arrival: T 100.8, P 85, R 17, b/P 128/73, Sats 100% on room air. Initial labs: WBC 10.98, Hgb 7.5, Hct 23.7 (down from 12.8 and 39. , , iron 10, TIBC 102.  Left lower extremity venous ultrasound negative for DVT.  Vancomycin administered in the ED. Obtained a stat CT head which showed no acute intracranial process.    Interval Hx:     8/24/24 dr ortiz - post resection of cancerous lesions over LLE now with superimposed cellulitis on vancomycin . Pt requesting better pain control . Seen by wound care nurse, pics in media . Came in w limited ROM to lle so stroke work up ordered on admit as well.    8/25/24 dr ortiz - no new issues . Mrsa pcr negative . Will dc vanco and start pt on doxy    8/26/24  dr ortiz - we had plans for discharge home  today. PT was seen by PT w very poor performance . We offered placement rehab/snf placement but pt insisted on going home . List of sitters services provided. Discharge on hold      8/27/24 dr ortiz - well, pt ended up staying for snf placement . Not happy but I think is  "needed . Daughter on her way to [place]       08/28/2024 Dr. Sun-"I want to go home".   at bedside and awaiting POA who is patient's daughter.   attempting to place her at [facility]        Case was discussed with patient's nurse and  on the floor.    Objective/physical exam:  General: In no acute distress, afebrile  Chest: Clear to auscultation bilaterally  Heart: RRR, +S1, S2, no appreciable murmur  Abdomen: Soft, nontender, BS +  MSK: Warm, no lower extremity edema, no clubbing or cyanosis. LLE wounds x 2 w some erythema noted .   Neurologic: Alert and oriented x4, Cranial nerve II-XII intact, Strength 5/5 in all 4 extremities        Labs         VITAL SIGNS: 24 HRS MIN & MAX LAST   Temp  Min: 97.9 °F (36.6 °C)  Max: 99.1 °F (37.3 °C) 99.1 °F (37.3 °C)   BP  Min: 134/89  Max: 157/81 134/89   Pulse  Min: 73  Max: 99  99   Resp  Min: 18  Max: 18 18   SpO2  Min: 93 %  Max: 97 % (!) 93 %     I have reviewed the following labs:  Recent Labs   Lab 08/24/24  0332 08/26/24  0458 08/28/24  0441   WBC 9.38 5.31 4.98   RBC 3.46* 3.45* 3.78*   HGB 10.3* 10.1* 11.0*   HCT 30.9* 30.3* 32.6*   MCV 89.3 87.8 86.2   MCH 29.8 29.3 29.1   MCHC 33.3 33.3 33.7   RDW 15.6 15.3 14.9    199 264   MPV 9.8 10.1 9.2     Recent Labs   Lab 08/24/24  0332 08/25/24  0344 08/26/24 0458 08/28/24  0441    136 137 135*   K 4.2 3.6 4.0 3.9   CL 96* 95* 94* 94*   CO2 25 26 25 26   BUN 42.2* 45.6* 54.8* 51.0*   CREATININE 8.47* 8.63* 9.46* 8.98*   CALCIUM 8.2* 8.0* 8.0* 9.1   ALBUMIN 1.9* 1.7* 1.5* 1.7*   ALKPHOS 106 107 132  --    ALT 14 13 12  --    AST 19 17 18  --    BILITOT 0.5 0.4 0.3  --      Microbiology Results (last 7 days)       Procedure Component Value Units Date/Time    Blood culture #1 **CANNOT BE ORDERED STAT** [[id number]]  (Normal) Collected: 08/23/24 1242    Order Status: Completed Specimen: Blood Updated: 08/27/24 1800     Blood Culture No Growth At 96 Hours    Blood culture #2 " **CANNOT BE ORDERED STAT** [[id number]]  (Normal) Collected: 08/23/24 1242    Order Status: Completed Specimen: Blood Updated: 08/27/24 1800     Blood Culture No Growth At 96 Hours             See below for Radiology    Assessment/Plan:  LLE Cellulitis /surgical wound infection   - blood cultures negative  - dc vanco/mrsa pcr negative / started doxy iv on 8-25-24  -wound care following      Left lower extremity weakness  - CT head without contrast negative for acute findings  - echo with moderate mitral regurgitation/Pef/ no DD  -carotid ultrasound w right internal carotid artery was patent with no evidence of stenosis.   The left internal carotid artery was patent with less than 50% stenosis.   Bilateral vertebral arteries were patent with antegrade flow.   - MRI brain negative          ESRD on PD  - nephrology consulted     Hypertension   - stable       Acute anemia on Anemia of chronic disease and iron deficiency  - S/P 2 units PRBCs with improved HH  - Ferrlicit 125 mg daily x 3 doses  - PO iron supplement at discharge    Deconditioning - mostly is pain to extremity secondary to extent of resection done by dermatologist , plus infection .   consulted for placement since PT evaluated patient and they were concerned about deconditioning as well as elderly couple of living by themselves and  with dementia as well as patient unable to take care of herself      Plan -  looking into placement . Continue present management    VTE prophylaxis: lovenox     Patient condition:  Stable    Anticipated discharge and Disposition:   tbd      All diagnosis and differential diagnosis have been reviewed; assessment and plan has been documented; I have personally reviewed the labs and test results that are presently available; I have reviewed the patients medication list; I have reviewed the consulting providers response and recommendations. I have reviewed or attempted to review medical records based  upon their availability    All of the patient's questions have been  addressed and answered. Patient's is agreeable to the above stated plan. I will continue to monitor closely and make adjustments to medical management as needed.    Portions of this note dictated using EMR integrated voice recognition software, and may be subject to voice recognition errors not corrected at proofreading. Please contact writer for clarification if needed.   _____________________________________________________________________    Malnutrition Status:    Scheduled Med:   amLODIPine  10 mg Oral Daily    aspirin  81 mg Oral Daily    calcitRIOL  0.25 mcg Oral Once per day on Monday Wednesday Friday    doxycycline  100 mg Oral Q12H    enoxparin  30 mg Subcutaneous Daily    folic acid  1 mg Oral Daily    furosemide  20 mg Oral Daily    megestroL  200 mg Oral Daily    morphine  2 mg Intravenous Once      Continuous Infusions:     PRN Meds:    Current Facility-Administered Medications:     0.9%  NaCl infusion (for blood administration), , Intravenous, Q24H PRN    acetaminophen, 1,000 mg, Oral, Q6H PRN    aluminum-magnesium hydroxide-simethicone, 30 mL, Oral, QID PRN    bisacodyL, 10 mg, Rectal, Daily PRN    dextrose 10%, 12.5 g, Intravenous, PRN    dextrose 10%, 25 g, Intravenous, PRN    glucagon (human recombinant), 1 mg, Intramuscular, PRN    glucose, 16 g, Oral, PRN    glucose, 24 g, Oral, PRN    hydrALAZINE, 10 mg, Intravenous, Q6H PRN    melatonin, 6 mg, Oral, Nightly PRN    morphine, 2 mg, Intravenous, Q6H PRN    naloxone, 0.02 mg, Intravenous, PRN    ondansetron, 4 mg, Intravenous, Q4H PRN    prochlorperazine, 5 mg, Intravenous, Q6H PRN    senna-docusate 8.6-50 mg, 1 tablet, Oral, BID PRN    sodium chloride 0.9%, 10 mL, Intravenous, PRN     Radiology:  I have personally reviewed the following imaging and agree with the radiologist.     CV Ultrasound Bilateral Doppler Carotid  The right internal carotid artery was patent with no  evidence of stenosis.     The left internal carotid artery was patent with less than 50% stenosis.   Bilateral vertebral arteries were patent with antegrade flow.   CV Ultrasound doppler venous DVT leg left  Negative for deep and superficial vein thrombosis in  Left  lower   extremity.   X-Ray Pelvis Routine AP  EXAMINATION:  XR PELVIS ROUTINE AP    CLINICAL HISTORY:  PD alarming. Need to endure PD catheter is in correct pelvic position.;    TECHNIQUE:  AP view of the pelvis was performed.    COMPARISON:  01/17/2024    FINDINGS:  Unchanged configuration of peritoneal dialysis catheter which is looped in the right hemiabdomen just above the level of the right iliac crest.    Moderate stool burden.  No acute osseous abnormality.    Electronically signed by: Dominga Huertas  Date:    08/27/2024  Time:    13:35      Maximus Sun MD  Department of Hospital Medicine   [facility]   08/28/2024

## 2024-08-28 NOTE — PLAN OF CARE
Spoke to  POA/Daughter let her  know that [facility] is willing to accept her mother . Amy Ga  (Daughter) is fine with [facility] but she would like something [place] she area that most NH do not accept PD patient.

## 2024-08-28 NOTE — NURSING
08/28/24 0751   Cycler Peritoneal Dialysis   Initial Drain Volume (mL) 151 mL   Effluent Appearance Yellow   Number of Cycles 4   Cycler PD Total UF (mL) 394 mL   Cycler Treatment Comments Tolerated well.  NO issues or complaints. Vitals stable.  No alarms overnight.  Net uf of 394 ml.

## 2024-08-28 NOTE — PT/OT/SLP EVAL
[facility]  Speech Language Pathology Department  Cognitive-Communication Evaluation    Patient Name:  Malissa Schwartz   MRN:  [id number]    Recommendations     General recommendations:  cognitive-linguistic therapy  Communication strategies:  provide increased time to answer    Discharge therapy intensity: Moderate Intensity Therapy  Barriers to safe discharge: safety awareness    History     Malissa Schwartz is a/n 74 y.o. female admitted for L lower extremity wound and weakness.    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 Glomerulitis with one activated endothelial cell: treated with Solumedrol pulse 375 mg IVBP x 3 doses with short prednisone taper; thymoglobin 100 mg x 6 doses 10/04/2007 minimal histologic changes no acute rejection     Allergy     Anemia of renal disease     CKD (chronic kidney disease), stage IV     CMV (cytomegalovirus) status positive     Colon polyp     benign    Depression     GN (glomerulonephritis)     GN (glomerulonephritis)     unknown type    Herpes infection, other 2009    on buttocks    Hyperlipidemia     Hypertension     Immunosuppression     Living-donor kidney transplant recipient 01/15/2007    Meningitis     history of viral meningitis    Proteinuria 1/5/2015    Proteinuria     Pulmonary nodules     Renal hypertension     S/P breast augmentation     Skin cancer     Urinary tract infection      Past Surgical History:   Procedure Laterality Date    COLON SURGERY      COLONOSCOPY W/ BIOPSIES      cosemetic surgery      facelift and breast augmentation     HYSTERECTOMY      KIDNEY TRANSPLANT      NEPHRECTOMY Left     9/2016    RENAL BIOPSY      TONSILLECTOMY, ADENOIDECTOMY       Previous level of Function  Education:high school  Occupation: retired  Lives: with spouse  Handed: Right  Glasses: no  Hearing Aids: no    Imaging   Results for orders placed during  "the hospital encounter of 08/23/24    MRI Brain Without Contrast    Narrative  EXAMINATION:  MRI BRAIN WITHOUT CONTRAST    CLINICAL HISTORY:  Stroke, follow up;Transient ischemic attack (TIA);    TECHNIQUE:  Multiplanar MRI sequences were performed of the brain without contrast.    COMPARISON:  CT brain August 24, 2024    FINDINGS:  Bilateral cerebral periventricular and subcortical white matter variable size T2-FLAIR hyperintense signals are consistent with marked chronic microangiopathic ischemia. Involutional changes contribute to cerebellar and cerebral cortical volume loss.  Gradient echo sequences demonstrate no evidence of de phasing artifact to suggest hemorrhagic byproducts. No evidence of diffusion restriction or ADC map signal drop out to suggest acute infarct. The sella and suprasellar areas are unremarkable.    The cerebellar tonsils are normally positioned. There is no acute intracranial hemorrhage, hydrocephalus, midline shift or mass effect. No acute extra axial fluid collections identified. The mastoid air cells are clear.    Impression  1.  No acute intracranial findings identified    2.  Chronic microangiopathic ischemia and atrophy.      Electronically signed by: Yogesh Wolf  Date:    08/25/2024  Time:    11:04    Subjective     Patient awake, alert, and cooperative.  Patient goals: "I want to go home"   Spiritual/Cultural/Nondenominational Beliefs/Practices that affect care: no    Pain/Comfort: Pain Rating 1: 0/10    Objective     SPEECH PRODUCTION  Phoneme Production: adequate  Voice Quality: adequate  Voice Production: adequate  Speech Rate: appropriate  Loudness: reduced  Respiration: WFL for speech  Resonance: adequate  Prosody: adequate  Speech Intelligibility  Known Context: 75%-90%  Unknown Context: 75%-90%    AUDITORY COMPREHENSION  Following Directions:  1-Step: 100%  2-Step: 100%  Yes/No Questions:  Biographical: 100%  Environmental: 100%  Simple: 90%  Complex: 100%    VERBAL " EXPRESSION  Automatic Speech:  Days of the week: Within Functional Limits  Months of the year: Within Functional Limits  Counting: Within Functional Limits  Alphabet: Within Functional Limits  Phrase Completion: 100%  Confrontation Naming  Body Parts: 100%  Objects: 100%  Wh- Questions:  Object name: 100%  Object function: 100%    COGNITION  Orientation:  Person: yes  Place: yes  Time: yes  Situation: yes   Attention:  Focused: Impaired  Sustained: Impaired  Memory:  Immediate: Impaired  Delayed: Within Functional Limits  Long Term: Within Functional Limits  Problem Solving  Functional simple: Impaired  Organization:  Convergent thinking: Impaired  Divergent thinking: Impaired    Assessment     Pt presents with functional speech and language skills. Reduced volume negatively impacts speech intelligibility however able to increase intensity to WFL when desired. Cognitive linguistic impairments noted warranting skilled SLP intervention.     Goals     Multidisciplinary Problems       SLP Goals          Problem: SLP    Goal Priority Disciplines Outcome   SLP Goal     SLP    Description: LTG: The pt will increase cognitive linguistic skills to increase safety awareness and independence to promote return to PLOF.     STGs:  The pt will maintain attention to task for 5 minutes with no redirection to task.  The pt will recall 100% of targets with minimal cues within 1 minute of presentation.  The pt will complete convergent/divergent thinking tasks with 80% accuracy and minimal cues.   The pt will complete problem solving tasks with 90% accuracy and minimal cues.                        Patient Education     Patient provided with verbal education regarding SLP POC.  Understanding was verbalized, however additional teaching warranted.    Plan     SLP Follow-Up:  Yes   Patient to be seen:  5 x/week   Plan of Care expires:  09/11/24  Plan of Care reviewed with:  patient, spouse      Time Tracking     SLP Treatment Date:    08/28/24  Speech Start Time:  1150  Speech Stop Time:  1215     Speech Total Time (min):  25 min    Billable minutes:  Evaluation of Speech Sound Production with Comprehension and Expression, 25 minutes     08/28/2024

## 2024-08-28 NOTE — NURSING
Nurses Note -- 4 Eyes      8/27/2024   7:35 PM      Skin assessed during: Q Shift Change      [] No Altered Skin Integrity Present    []Prevention Measures Documented      [x] Yes- Altered Skin Integrity Present or Discovered   [] LDA Added if Not in Epic (Describe Wound)   [] New Altered Skin Integrity was Present on Admit and Documented in LDA   [] Wound Image Taken    Wound Care Consulted? Yes    Attending Nurse:  Marla Simpson RN/Staff Member:   [place]

## 2024-08-28 NOTE — PT/OT/SLP PROGRESS
Physical Therapy Treatment    Patient Name:  Malissa Schwartz   MRN:  [id number]    Recommendations:     Discharge therapy intensity: Moderate Intensity Therapy   Discharge Equipment Recommendations: to be determined by next level of care  Barriers to discharge: Decreased caregiver support, Impaired mobility, and Ongoing medical needs    Assessment:     Malissa Schwartz is a 74 y.o. female.  She presents with the following impairments/functional limitations: gait instability, weakness, impaired endurance, impaired balance, pain, impaired functional mobility, impaired self care skills, decreased lower extremity function.    Rehab Prognosis: Good; patient would benefit from acute skilled PT services to address these deficits and reach maximum level of function.    Recent Surgery: * No surgery found *      Plan:     During this hospitalization, patient would benefit from acute PT services 5 x/week to address the identified rehab impairments via gait training, therapeutic activities, therapeutic exercises and progress toward the following goals:    Plan of Care Expires:  09/26/24    Subjective     Chief Complaint:  I'm cold.    Objective:     Communicated with nurse prior to session.  Patient found supine with peripheral IV, telemetry, pulse ox (continuous) (dialysis fistula) upon PT entry to room.     General Precautions: Standard, fall  Orthopedic Precautions: N/A  Braces: N/A  Respiratory Status: Room air  Blood Pressure:   Skin Integrity: Visible skin intact      Functional Mobility:  Mod assist to get EOB and min assist STS  Gait 50 ft and 40 ft RW min assist. Frequent cues to stand erect.  Improve transfers and gait distance.     Education Provided:  Role and goals of PT, transfer training, bed mobility, gait training, balance training, safety awareness, assistive device, strengthening exercises, and importance of participating in PT to return to PLOF.    Patient left up in chair with call button in reach    GOALS:    Multidisciplinary Problems       Physical Therapy Goals          Problem: Physical Therapy    Goal Priority Disciplines Outcome Goal Variances Interventions   Physical Therapy Goal     PT, PT/OT Progressing     Description: Goals to be met by: 2024     Patient will increase functional independence with mobility by performin. Supine to sit with Stand-by Assistance  2. Sit to supine with Stand-by Assistance  3. Sit to stand transfer with Stand-by Assistance  4. Gait  x 50 feet with Minimal Assistance using Rolling Walker.                          Time Tracking:      Billable Minutes: Gait Training 25    Treatment Type: Treatment  PT/PTA: PTA     Number of PTA visits since last PT visit: 2     2024

## 2024-08-29 LAB — PREALB SERPL-MCNC: 14.1 MG/DL (ref 14–37)

## 2024-08-29 PROCEDURE — 63600175 PHARM REV CODE 636 W HCPCS: Performed by: NURSE PRACTITIONER

## 2024-08-29 PROCEDURE — 99233 SBSQ HOSP IP/OBS HIGH 50: CPT | Mod: ,,,

## 2024-08-29 PROCEDURE — S0179 MEGESTROL 20 MG: HCPCS | Performed by: INTERNAL MEDICINE

## 2024-08-29 PROCEDURE — 21400001 HC TELEMETRY ROOM

## 2024-08-29 PROCEDURE — 90945 DIALYSIS ONE EVALUATION: CPT

## 2024-08-29 PROCEDURE — 97116 GAIT TRAINING THERAPY: CPT | Mod: CQ

## 2024-08-29 PROCEDURE — 84134 ASSAY OF PREALBUMIN: CPT

## 2024-08-29 PROCEDURE — 25000003 PHARM REV CODE 250: Performed by: INTERNAL MEDICINE

## 2024-08-29 PROCEDURE — 36415 COLL VENOUS BLD VENIPUNCTURE: CPT

## 2024-08-29 PROCEDURE — 92507 TX SP LANG VOICE COMM INDIV: CPT

## 2024-08-29 PROCEDURE — 97530 THERAPEUTIC ACTIVITIES: CPT | Mod: CQ

## 2024-08-29 PROCEDURE — 25000003 PHARM REV CODE 250: Performed by: NURSE PRACTITIONER

## 2024-08-29 RX ORDER — ESCITALOPRAM OXALATE 10 MG/1
10 TABLET ORAL DAILY
Status: DISCONTINUED | OUTPATIENT
Start: 2024-08-29 | End: 2024-08-30 | Stop reason: HOSPADM

## 2024-08-29 RX ORDER — LANOLIN ALCOHOL/MO/W.PET/CERES
1 CREAM (GRAM) TOPICAL DAILY
Status: DISCONTINUED | OUTPATIENT
Start: 2024-08-29 | End: 2024-08-30 | Stop reason: HOSPADM

## 2024-08-29 RX ADMIN — Medication 6 MG: at 09:08

## 2024-08-29 RX ADMIN — DOXYCYCLINE HYCLATE 100 MG: 100 TABLET, COATED ORAL at 08:08

## 2024-08-29 RX ADMIN — FOLIC ACID 1 MG: 1 TABLET ORAL at 08:08

## 2024-08-29 RX ADMIN — AMLODIPINE BESYLATE 10 MG: 5 TABLET ORAL at 08:08

## 2024-08-29 RX ADMIN — DOXYCYCLINE HYCLATE 100 MG: 100 TABLET, COATED ORAL at 09:08

## 2024-08-29 RX ADMIN — FUROSEMIDE 20 MG: 20 TABLET ORAL at 08:08

## 2024-08-29 RX ADMIN — MEGESTROL ACETATE 200 MG: 400 SUSPENSION ORAL at 08:08

## 2024-08-29 RX ADMIN — ASPIRIN 81 MG: 81 TABLET, COATED ORAL at 08:08

## 2024-08-29 RX ADMIN — ENOXAPARIN SODIUM 30 MG: 40 INJECTION SUBCUTANEOUS at 04:08

## 2024-08-29 NOTE — PROGRESS NOTES
"[facility] - [facility] Medical Telemetry  Wound Care  Progress Note    Patient Name: Malissa Schwartz  MRN: [id number]  Admission Date: 8/23/2024  Hospital Length of Stay: 6 days  Attending Physician: James Oropeza MD  Primary Care Provider: Sergio Crabtree MD     Subjective:     HPI:  Wound medicine re-check     The patient is a 74 year old WF with PMHx of HTN, HLD, failed kidney transplant, ESRD on PD, with non-healing postoperative wound to left leg. Reports she underwent resection of left leg lesions for "skin cancer" on 8/19/24 and 8/20/24 with Dr. Brown and since started to have purulent drainage, surrounding erythema, swelling, and pain.   She came in to [facility] ED on 8/23/24 for evaluation of left leg wounds and fever and was admitted; started on IV antibiotics; no cultures noted.   Patient reports her  sees Dr. Brown regularly and during a visit for him she states the  Noted nodules on her left leg and recommended biopsy. She reports the flesh colored nodules had been present for only a few months.   Inpatient wound nurse saw patient on 8/24/24 and wound care orders were put into place; wound medicine has been consulted for evaluation of this wound as well.   Initial wound medicine evaluation completed on 8/26/24; patient noted with 2 large skin cancer resections on distal left lower extremity performed a few days prior to admission. No details or records form Dr. Limon office available for review. Anticipate that these wounds will be longstanding and difficult to heal secondary to her multitude of comorbidities.     8/29/24 - wound re-check today. Met patient in room 943 with  at bedside; answers all questions appropriately, seems depressed during visit; does not raise head or make eye contact does not actively engage in conversation. Her  is more interactive. He complains that wound bandages have not been changed since she was seen by us on Monday. She " also does not have heel boot in place and no boots are in her room. She is up in bedside chair with pillow under left lower leg.  Agreeable for wound assessment and treatment.   No acute distress. Plans for discharge home have changed due to immobility related to weakness and pain to LLE; she is awaiting SNF placement.     Hospital Course:   No notes on file          Scheduled Meds:   amLODIPine  10 mg Oral Daily    aspirin  81 mg Oral Daily    calcitRIOL  0.25 mcg Oral Once per day on Monday Wednesday Friday    doxycycline  100 mg Oral Q12H    enoxparin  30 mg Subcutaneous Daily    folic acid  1 mg Oral Daily    furosemide  20 mg Oral Daily    megestroL  200 mg Oral Daily    morphine  2 mg Intravenous Once     Continuous Infusions:  PRN Meds:  Current Facility-Administered Medications:     0.9%  NaCl infusion (for blood administration), , Intravenous, Q24H PRN    acetaminophen, 1,000 mg, Oral, Q6H PRN    aluminum-magnesium hydroxide-simethicone, 30 mL, Oral, QID PRN    bisacodyL, 10 mg, Rectal, Daily PRN    dextrose 10%, 12.5 g, Intravenous, PRN    dextrose 10%, 25 g, Intravenous, PRN    glucagon (human recombinant), 1 mg, Intramuscular, PRN    glucose, 16 g, Oral, PRN    glucose, 24 g, Oral, PRN    hydrALAZINE, 10 mg, Intravenous, Q6H PRN    melatonin, 6 mg, Oral, Nightly PRN    morphine, 2 mg, Intravenous, Q6H PRN    naloxone, 0.02 mg, Intravenous, PRN    ondansetron, 4 mg, Intravenous, Q4H PRN    prochlorperazine, 5 mg, Intravenous, Q6H PRN    senna-docusate 8.6-50 mg, 1 tablet, Oral, BID PRN    sodium chloride 0.9%, 10 mL, Intravenous, PRN    Review of Systems   Constitutional:  Positive for activity change. Negative for chills, diaphoresis and fever.   Skin:  Positive for wound.   Neurological:  Positive for weakness.     Objective:     Vital Signs (Most Recent):  Temp: 98.1 °F (36.7 °C) (08/29/24 0700)  Pulse: 94 (08/29/24 0700)  Resp: 18 (08/29/24 0700)  BP: 138/85 (08/29/24 0700)  SpO2: (!) 94 % (08/29/24  0700) Vital Signs (24h Range):  Temp:  [97.7 °F (36.5 °C)-98.4 °F (36.9 °C)] 98.1 °F (36.7 °C)  Pulse:  [80-94] 94  Resp:  [18] 18  SpO2:  [94 %-97 %] 94 %  BP: (128-177)/(60-85) 138/85     Weight: 53.1 kg (117 lb)  Body mass index is 20.08 kg/m².     Physical Exam  Vitals reviewed.   Constitutional:       General: She is awake. She is not in acute distress.     Appearance: She is well-developed and normal weight. She is ill-appearing (chronic).      Comments: Appears older than stated age; thin with diffuse muscle wasting    HENT:      Head: Normocephalic and atraumatic.      Nose: Nose normal.   Cardiovascular:      Rate and Rhythm: Normal rate and regular rhythm.      Pulses: Normal pulses.   Pulmonary:      Effort: Pulmonary effort is normal. No respiratory distress.   Musculoskeletal:      Left lower le+ Pitting Edema present.        Legs:         Feet:    Skin:     General: Skin is warm and dry.      Capillary Refill: Capillary refill takes less than 2 seconds.   Neurological:      General: No focal deficit present.      Mental Status: She is alert and oriented to person, place, and time.   Psychiatric:         Mood and Affect: Mood is depressed.         Behavior: Behavior is cooperative.       Left anterior lower leg: 3 x 3.5 x 0.5 cm       Left medial lower leg: 4.5 x 3.5 x 1 cm       Left heel      Left lateral knee: 0.8 x 1.2 cm              Laboratory:    BMP:   Recent Labs   Lab 24   *   K 3.9   CL 94*   CO2 26   BUN 51.0*   CREATININE 8.98*   CALCIUM 9.1       CBC:   Recent Labs   Lab 24   WBC 4.98   RBC 3.78*   HGB 11.0*   HCT 32.6*      MCV 86.2   MCH 29.1   MCHC 33.7     CMP:   Recent Labs   Lab 24   CALCIUM 8.0* 9.1   ALBUMIN 1.5* 1.7*    135*   K 4.0 3.9   CO2 25 26   CL 94* 94*   BUN 54.8* 51.0*   CREATININE 9.46* 8.98*   ALKPHOS 132  --    ALT 12  --    AST 18  --    BILITOT 0.3  --        LFTs:   Recent Labs   Lab  08/26/24  0458 08/28/24  0441   ALT 12  --    AST 18  --    ALKPHOS 132  --    BILITOT 0.3  --    ALBUMIN 1.5* 1.7*     Microbiology Results (last 7 days)       Procedure Component Value Units Date/Time    Blood culture #1 **CANNOT BE ORDERED STAT** [[id number]]  (Normal) Collected: 08/23/24 1242    Order Status: Completed Specimen: Blood Updated: 08/28/24 1800     Blood Culture No Growth at 5 days    Blood culture #2 **CANNOT BE ORDERED STAT** [[id number]]  (Normal) Collected: 08/23/24 1242    Order Status: Completed Specimen: Blood Updated: 08/28/24 1800     Blood Culture No Growth at 5 days              Diagnostic Results:  I have reviewed all pertinent imaging results/findings within the past 24 hours.    Assessment/Plan:       Open wounds X2 to LLE s/p resection of cancerous lesion with Dr. Brown with complications of cellulitis/post surgical wound infection requiring hospitalization. Blood cultures NGTD; no wound culture  Stage 2 pressure ulcer to left heel; intact fluid filled blister: Hospital acquired.   ESRD on PD  Anemia of chronic disease and iron deficiency anemia  Deconditioning, frequent falls  Hypoalbuminemia - 1.5 on 8/26/24                 PLAN:     Chart reviewed, patient examined and wounds assessed.  Opinion: Wounds re-checked today. LLE remains painful limiting movement and activity. Wounds are without purulent drainage, odor or evidence of LLE cellulitis. Left heel fluid filled blister remains intact. Continue current wound care.   Awaiting SNF placement.   Wound care orders:  LLE wounds X 2: cleanse with Vashe apply Aquacel Ag to wound bed and cover with ABD pad and wrap with Kerlix Daily and PRN. Left lateral knee: cleanse with Vashe, apply mupirocin to wound and cover with foam border daily. Left heel: paint with betadine and bulky wrap with ABD pad and Kerlix Daily.   Monitor for signs & symptoms of deterioration  Offloading of sacrum/buttocks/heels at all times: REED mattress, turning q  2 hrs; use of wedges and heel offloading devices to be used at all times while in bed; ( LILLI Simons). This needs to be reinforced by every staff nurse caring for patient on every shift of every day. May still use PT/OT services as long as use of geomat/ROHO on wheelchair seat and small pillow to lower back as well as ongoing heel offloading devices in place  Nutrition: Recommend aggressive nutritional support, protein supplementation along with vitamin and mineral supplements  and domo to support wound healing if ok from renal standpoint.   Will try to follow weekly while admitted, but every nurse assigned to patient on every shift of every day needs to address daily wound care dressing changes and offloading modalities including using heel offloading devices, wedges, REED mattress etc  Discussed with patient and her  as well as nurse caring for patient today              The time spent including preparing to see the patient, obtaining patient history and assessment, evaluation of the plan of care, patient/caregiver counseling and education, orders, documentation, coordination of care, and other professional medical management activities for today's encounter was 40 minutes         LETHA Matute  Wound Care  [facility] - [facility] Tele[facility]

## 2024-08-29 NOTE — NURSING
Nurses Note -- 4 Eyes      8/29/2024   3:28 AM      Skin assessed during: Q Shift Change      [] No Altered Skin Integrity Present    []Prevention Measures Documented      [x] Yes- Altered Skin Integrity Present or Discovered   [] LDA Added if Not in Epic (Describe Wound)   [] New Altered Skin Integrity was Present on Admit and Documented in LDA   [] Wound Image Taken    Wound Care Consulted? No    Attending Nurse:  Justina Gillette RN    Second RN/Staff Member:   ANA Kevin

## 2024-08-29 NOTE — SUBJECTIVE & OBJECTIVE
"  Subjective:     HPI:  Wound medicine re-check     The patient is a 74 year old WF with PMHx of HTN, HLD, failed kidney transplant, ESRD on PD, with non-healing postoperative wound to left leg. Reports she underwent resection of left leg lesions for "skin cancer" on 8/19/24 and 8/20/24 with Dr. Brown and since started to have purulent drainage, surrounding erythema, swelling, and pain.   She came in to [facility] ED on 8/23/24 for evaluation of left leg wounds and fever and was admitted; started on IV antibiotics; no cultures noted.   Patient reports her  sees Dr. Brown regularly and during a visit for him she states the  Noted nodules on her left leg and recommended biopsy. She reports the flesh colored nodules had been present for only a few months.   Inpatient wound nurse saw patient on 8/24/24 and wound care orders were put into place; wound medicine has been consulted for evaluation of this wound as well.   Initial wound medicine evaluation completed on 8/26/24; patient noted with 2 large skin cancer resections on distal left lower extremity performed a few days prior to admission. No details or records form Dr. Limon office available for review. Anticipate that these wounds will be longstanding and difficult to heal secondary to her multitude of comorbidities.     8/29/24 - wound re-check today. Met patient in room 943 with  at bedside; answers all questions appropriately, seems depressed during visit; does not raise head or make eye contact does not actively engage in conversation. Her  is more interactive. He complains that wound bandages have not been changed since she was seen by us on Monday. She also does not have heel boot in place and no boots are in her room. She is up in bedside chair with pillow under left lower leg.  Agreeable for wound assessment and treatment.   No acute distress. Plans for discharge home have changed due to immobility related to weakness and pain to " ANISH; she is awaiting SNF placement.     Hospital Course:   No notes on file          Scheduled Meds:   amLODIPine  10 mg Oral Daily    aspirin  81 mg Oral Daily    calcitRIOL  0.25 mcg Oral Once per day on Monday Wednesday Friday    doxycycline  100 mg Oral Q12H    enoxparin  30 mg Subcutaneous Daily    folic acid  1 mg Oral Daily    furosemide  20 mg Oral Daily    megestroL  200 mg Oral Daily    morphine  2 mg Intravenous Once     Continuous Infusions:  PRN Meds:  Current Facility-Administered Medications:     0.9%  NaCl infusion (for blood administration), , Intravenous, Q24H PRN    acetaminophen, 1,000 mg, Oral, Q6H PRN    aluminum-magnesium hydroxide-simethicone, 30 mL, Oral, QID PRN    bisacodyL, 10 mg, Rectal, Daily PRN    dextrose 10%, 12.5 g, Intravenous, PRN    dextrose 10%, 25 g, Intravenous, PRN    glucagon (human recombinant), 1 mg, Intramuscular, PRN    glucose, 16 g, Oral, PRN    glucose, 24 g, Oral, PRN    hydrALAZINE, 10 mg, Intravenous, Q6H PRN    melatonin, 6 mg, Oral, Nightly PRN    morphine, 2 mg, Intravenous, Q6H PRN    naloxone, 0.02 mg, Intravenous, PRN    ondansetron, 4 mg, Intravenous, Q4H PRN    prochlorperazine, 5 mg, Intravenous, Q6H PRN    senna-docusate 8.6-50 mg, 1 tablet, Oral, BID PRN    sodium chloride 0.9%, 10 mL, Intravenous, PRN    Review of Systems   Constitutional:  Positive for activity change. Negative for chills, diaphoresis and fever.   Skin:  Positive for wound.   Neurological:  Positive for weakness.     Objective:     Vital Signs (Most Recent):  Temp: 98.1 °F (36.7 °C) (08/29/24 0700)  Pulse: 94 (08/29/24 0700)  Resp: 18 (08/29/24 0700)  BP: 138/85 (08/29/24 0700)  SpO2: (!) 94 % (08/29/24 0700) Vital Signs (24h Range):  Temp:  [97.7 °F (36.5 °C)-98.4 °F (36.9 °C)] 98.1 °F (36.7 °C)  Pulse:  [80-94] 94  Resp:  [18] 18  SpO2:  [94 %-97 %] 94 %  BP: (128-177)/(60-85) 138/85     Weight: 53.1 kg (117 lb)  Body mass index is 20.08 kg/m².     Physical Exam  Vitals reviewed.    Constitutional:       General: She is awake. She is not in acute distress.     Appearance: She is well-developed and normal weight. She is ill-appearing (chronic).      Comments: Appears older than stated age; thin with diffuse muscle wasting    HENT:      Head: Normocephalic and atraumatic.      Nose: Nose normal.   Cardiovascular:      Rate and Rhythm: Normal rate and regular rhythm.      Pulses: Normal pulses.   Pulmonary:      Effort: Pulmonary effort is normal. No respiratory distress.   Musculoskeletal:      Left lower le+ Pitting Edema present.        Legs:         Feet:    Skin:     General: Skin is warm and dry.      Capillary Refill: Capillary refill takes less than 2 seconds.   Neurological:      General: No focal deficit present.      Mental Status: She is alert and oriented to person, place, and time.   Psychiatric:         Mood and Affect: Mood is depressed.         Behavior: Behavior is cooperative.       Left anterior lower leg: 3 x 3.5 x 0.5 cm       Left medial lower leg: 4.5 x 3.5 x 1 cm       Left heel      Left lateral knee: 0.8 x 1.2 cm              Laboratory:    BMP:   Recent Labs   Lab 24  0441   *   K 3.9   CL 94*   CO2 26   BUN 51.0*   CREATININE 8.98*   CALCIUM 9.1       CBC:   Recent Labs   Lab 24  0441   WBC 4.98   RBC 3.78*   HGB 11.0*   HCT 32.6*      MCV 86.2   MCH 29.1   MCHC 33.7     CMP:   Recent Labs   Lab 24  0458 24  0441   CALCIUM 8.0* 9.1   ALBUMIN 1.5* 1.7*    135*   K 4.0 3.9   CO2 25 26   CL 94* 94*   BUN 54.8* 51.0*   CREATININE 9.46* 8.98*   ALKPHOS 132  --    ALT 12  --    AST 18  --    BILITOT 0.3  --        LFTs:   Recent Labs   Lab 24  0458 24  0441   ALT 12  --    AST 18  --    ALKPHOS 132  --    BILITOT 0.3  --    ALBUMIN 1.5* 1.7*     Microbiology Results (last 7 days)       Procedure Component Value Units Date/Time    Blood culture #1 **CANNOT BE ORDERED STAT** [[id number]]  (Normal) Collected: 24  1242    Order Status: Completed Specimen: Blood Updated: 08/28/24 1800     Blood Culture No Growth at 5 days    Blood culture #2 **CANNOT BE ORDERED STAT** [[id number]]  (Normal) Collected: 08/23/24 1242    Order Status: Completed Specimen: Blood Updated: 08/28/24 1800     Blood Culture No Growth at 5 days              Diagnostic Results:  I have reviewed all pertinent imaging results/findings within the past 24 hours.

## 2024-08-29 NOTE — PT/OT/SLP PROGRESS
Physical Therapy Treatment    Patient Name:  Malissa Schwartz   MRN:  [id number]    Recommendations:     Discharge therapy intensity: Moderate Intensity Therapy   Discharge Equipment Recommendations: to be determined by next level of care  Barriers to discharge: Decreased caregiver support, Impaired mobility, and Ongoing medical needs    Assessment:     Malissa Schwartz is a 74 y.o. female.  She presents with the following impairments/functional limitations: gait instability, weakness, impaired endurance, impaired balance, pain, impaired functional mobility, impaired self care skills, decreased lower extremity function.    Rehab Prognosis: Good; patient would benefit from acute skilled PT services to address these deficits and reach maximum level of function.    Recent Surgery: * No surgery found *      Plan:     During this hospitalization, patient would benefit from acute PT services 5 x/week to address the identified rehab impairments via gait training, therapeutic activities, therapeutic exercises and progress toward the following goals:    Plan of Care Expires:  09/26/24    Subjective     Chief Complaint: I'm tired    Objective:     Communicated with nurse prior to session.  Patient found supine with peripheral IV, telemetry, pulse ox (continuous) (dialysis fistula) upon PT entry to room.     General Precautions: Standard, fall  Orthopedic Precautions: N/A  Braces: N/A  Respiratory Status: Room air  Blood Pressure:   Skin Integrity:  kerlix roll with drainage to LLE      Functional Mobility:  Mod assist to get EOB and same for STS  Gait 25 ft RW min/mod assist and patient unable to mobilize as well today.     Education Provided:  Role and goals of PT, transfer training, bed mobility, gait training, balance training, safety awareness, assistive device, strengthening exercises, and importance of participating in PT to return to PLOF.     Patient left up in chair with call button in reach and chair alarm on    GOALS:    Multidisciplinary Problems       Physical Therapy Goals          Problem: Physical Therapy    Goal Priority Disciplines Outcome Goal Variances Interventions   Physical Therapy Goal     PT, PT/OT Progressing     Description: Goals to be met by: 2024     Patient will increase functional independence with mobility by performin. Supine to sit with Stand-by Assistance  2. Sit to supine with Stand-by Assistance  3. Sit to stand transfer with Stand-by Assistance  4. Gait  x 50 feet with Minimal Assistance using Rolling Walker.                          Time Tracking:       Billable Minutes: Gait Training 12 and Therapeutic Activity 12    Treatment Type: Treatment  PT/PTA: PTA     Number of PTA visits since last PT visit: 3     2024

## 2024-08-29 NOTE — PLAN OF CARE
Contacted Cindy with [facility]. Cindy reports patient is still under review and she will contact  with any updates.     1100--- Cindy with [facility] contacted [facility]. [facility] unable to accept; patient needs exceeds facility's care.     Denied by the following due to care needs exceeding facility capacity: [facility], [place], and [facility].  [facility] still reviewing and waiting on response from [place] [place].

## 2024-08-29 NOTE — PROGRESS NOTES
Nephrology Progress Note      HPI:    Malissa Schwartz is a 74 y.o. female well known to our service with a history of end-stage renal disease on peritoneal dialysis.  She is normally followed by Dr. George Mendoza.  She does have a history of a failed renal transplant in 2007 at [facility] in [place], [place].  She presented to the emergency department with significant left lower extremity pain.  Apparently she had skin cancer removed from his left lower extremity on 08/19/2024.  She is not on any immunosuppressants at present.  She has been fairly successful with her peritoneal dialysis. Other pertinent medical history includes hypertension, hyperlipidemia, renal transplantation which failed, skin cancer, pulmonary nodules, glomerular nephritis and anemia secondary to chronic kidney disease.  Nephrology following for ESRD management.       Interval History:    No acute events overnight.  Patient did peritoneal dialysis overnight.  Her machine was alarming quite a bit due to low drain volume.  No chest pain, shortness of breath, abdominal pain, nausea, vomiting, or lower extremity edema.     08/28/2024   Awake alert oriented.  Very poor appetite.  Has been present in the room.  No shortness of breath.  No nausea no vomiting.  Left lower extremity is improving.    8/29/24  Pt resting comfortably. No complaints. No edema. No SOB. Friend in the room. No abd pain    ROS:    Review of Systems   Constitutional:  Negative for fever, malaise/fatigue and weight loss.   Respiratory:  Negative for cough and shortness of breath.    Cardiovascular:  Negative for chest pain, palpitations and leg swelling.   Gastrointestinal:  Negative for abdominal pain, blood in stool, constipation, diarrhea, heartburn, melena, nausea and vomiting.   Musculoskeletal:  Negative for back pain and myalgias.   Skin:  Negative for rash.   Neurological:  Negative for speech change and focal weakness.   All other systems reviewed and  "are negative.      Vital Signs:  /85   Pulse 88   Temp 98.8 °F (37.1 °C) (Oral)   Resp 18   Ht 5' 4" (1.626 m)   Wt 53.1 kg (117 lb)   SpO2 97%   BMI 20.08 kg/m²   Body mass index is 20.08 kg/m².    Physical Exam:    Physical Exam  Vitals and nursing note reviewed.   Constitutional:       Appearance: Normal appearance.   HENT:      Head: Normocephalic and atraumatic.   Eyes:      General: No scleral icterus.     Extraocular Movements: Extraocular movements intact.   Cardiovascular:      Rate and Rhythm: Normal rate and regular rhythm.      Heart sounds: Normal heart sounds.   Pulmonary:      Effort: Pulmonary effort is normal. No respiratory distress.      Breath sounds: Normal breath sounds.   Abdominal:      General: Abdomen is flat. Bowel sounds are normal. There is no distension.      Palpations: Abdomen is soft.      Tenderness: There is no abdominal tenderness.      Comments: PD cath   Musculoskeletal:         General: No swelling or deformity. Normal range of motion.      Right lower leg: No edema.      Left lower leg: No edema.   Skin:     General: Skin is warm and dry.   Neurological:      General: No focal deficit present.      Mental Status: She is alert and oriented to person, place, and time.   Psychiatric:         Mood and Affect: Mood normal.         Behavior: Behavior normal.         Labs:  Recent Results (from the past 48 hour(s))   Renal Function Panel    Collection Time: 08/28/24  4:41 AM   Result Value Ref Range    Sodium 135 (L) 136 - 145 mmol/L    Potassium 3.9 3.5 - 5.1 mmol/L    Chloride 94 (L) 98 - 107 mmol/L    CO2 26 23 - 31 mmol/L    Glucose 95 82 - 115 mg/dL    Blood Urea Nitrogen 51.0 (H) 9.8 - 20.1 mg/dL    Creatinine 8.98 (H) 0.55 - 1.02 mg/dL    Calcium 9.1 8.4 - 10.2 mg/dL    Albumin 1.7 (L) 3.4 - 4.8 g/dL    Phosphorus Level 6.3 (H) 2.3 - 4.7 mg/dL    eGFR 4 mL/min/1.73/m2   CBC with Differential    Collection Time: 08/28/24  4:41 AM   Result Value Ref Range    WBC " 4.98 4.50 - 11.50 x10(3)/mcL    RBC 3.78 (L) 4.20 - 5.40 x10(6)/mcL    Hgb 11.0 (L) 12.0 - 16.0 g/dL    Hct 32.6 (L) 37.0 - 47.0 %    MCV 86.2 80.0 - 94.0 fL    MCH 29.1 27.0 - 31.0 pg    MCHC 33.7 33.0 - 36.0 g/dL    RDW 14.9 11.5 - 17.0 %    Platelet 264 130 - 400 x10(3)/mcL    MPV 9.2 7.4 - 10.4 fL    Neut % 66.7 %    Lymph % 17.9 %    Mono % 10.4 %    Eos % 2.6 %    Basophil % 0.6 %    Lymph # 0.89 0.6 - 4.6 x10(3)/mcL    Neut # 3.32 2.1 - 9.2 x10(3)/mcL    Mono # 0.52 0.1 - 1.3 x10(3)/mcL    Eos # 0.13 0 - 0.9 x10(3)/mcL    Baso # 0.03 <=0.2 x10(3)/mcL    IG# 0.09 (H) 0 - 0.04 x10(3)/mcL    IG% 1.8 %    NRBC% 0.0 %         Assessment/Plan:    ESRD - on PD nightly, 10 hours/4 cycles/1.5 L per cycle/no last fill  Left lower extremity cellulitis/osteomyelitis - wound care following. On doxy per primary team  Hypertension - well controlled   Failed renal transplant   Anemia of chronic disease- stable, Hgb above goal >10 mg/dl     Recommendations:    - continue PD nightly as ordered   - discharge planning per primary team      MERVAT [place]  [facility]     normal...

## 2024-08-29 NOTE — NURSING
"   08/29/24 0735   Peritoneal Dialysis   Exchange Type Cycler   Peritoneal Treatment Status Completed   Cycler Peritoneal Dialysis   Initial Drain Volume (mL) 60 mL   Effluent Appearance Yellow   Number of Cycles 4   Net Positive (mL) 69 mL   Cycler Treatment Comments Arrived bedside noted pd cycler to be "stopped".  bedside, also noted bed alarm to be going off. Pts  stated that he had stopped PD cycler thinking it was the alarm going off when in fact it was bed alarm. Bed alarm reset by this nurse. PD cycler restarted and final drain completed. Educated Pt and pts  on importance of calling for help when assistance is needed with PD cycler.       "

## 2024-08-29 NOTE — NURSING
Nurses Note -- 4 Eyes      8/29/2024   7:19 AM      Skin assessed during: Q Shift Change      [] No Altered Skin Integrity Present    []Prevention Measures Documented      [x] Yes- Altered Skin Integrity Present or Discovered   [] LDA Added if Not in Epic (Describe Wound)   [] New Altered Skin Integrity was Present on Admit and Documented in LDA   [] Wound Image Taken    Wound Care Consulted? Yes    Attending Nurse:  Shaista PEREZ    Second RN/Staff Member:   Justina FOLEY

## 2024-08-29 NOTE — PROGRESS NOTES
[facility] Hospital Medicine Progress Note        Chief Complaint: Inpatient Follow-up for left leg ulcer/cellulitis and weakness     HPI:   74-year-old female whose history includes end-stage renal disease on peritoneal dialysis and hypertension.  Presented to the ED with complaints a nonhealing wound on left lower extremity.  Reports a cancerous lesion was removed on Monday.  Since then the sore has developed purulent drainage and a foul odor.  At the time of my exam patient reports that the primary reason she presented to the ER was for evaluation of left lower extremity weakness and inability to ambulate which started last night.  Apparently she has not mentioned this to any other providers until now.  On physical exam she does have significant left lower extremity weakness compared to the right.  Bilateral upper extremities strong and equal bilaterally.  May have a slight left-sided facial droop.     VS on arrival: T 100.8, P 85, R 17, b/P 128/73, Sats 100% on room air. Initial labs: WBC 10.98, Hgb 7.5, Hct 23.7 (down from 12.8 and 39. , , iron 10, TIBC 102.  Left lower extremity venous ultrasound negative for DVT.  Vancomycin administered in the ED. Obtained a stat CT head which showed no acute intracranial process.    Patient was started on iv antibiotics and wound cultures was done. Local wound care was continued. IV abx was de-escalated and then placed on po doxy. Blood cultures was negative. She was anemic and having iron deficiency. Added Iron supplements. She was continued with HD.     PT was started and she was very weak. CM consulted for SNF placement.     Interval Hx:   Patient today awake and comfortable. Has a low voice and lethargic. Asked her if she was feeling depressed and she said yes. She is ok with starting on anti-depressants. Does not want to talk to the psych team.     She has been afebrile.     Case was discussed with patient's nurse and   on the floor.    Objective/physical exam:  General: In no acute distress, frail  Chest: Clear to auscultation bilaterally  Heart: RRR, +S1, S2, no appreciable murmur  Abdomen: Soft, nontender, BS +  MSK: left leg in bandages   Neurologic: Alert and oriented x4, Cranial nerve II-XII intact, Strength 5/5 in all 4 extremities    VITAL SIGNS: 24 HRS MIN & MAX LAST   Temp  Min: 97.7 °F (36.5 °C)  Max: 98.4 °F (36.9 °C) 98.1 °F (36.7 °C)   BP  Min: 128/83  Max: 177/76 138/85   Pulse  Min: 80  Max: 94  94   Resp  Min: 18  Max: 18 18   SpO2  Min: 94 %  Max: 97 % (!) 94 %     I have reviewed the following labs:  Recent Labs   Lab 08/24/24 0332 08/26/24 0458 08/28/24  0441   WBC 9.38 5.31 4.98   RBC 3.46* 3.45* 3.78*   HGB 10.3* 10.1* 11.0*   HCT 30.9* 30.3* 32.6*   MCV 89.3 87.8 86.2   MCH 29.8 29.3 29.1   MCHC 33.3 33.3 33.7   RDW 15.6 15.3 14.9    199 264   MPV 9.8 10.1 9.2     Recent Labs   Lab 08/24/24  0332 08/25/24 0344 08/26/24  0458 08/28/24  0441    136 137 135*   K 4.2 3.6 4.0 3.9   CL 96* 95* 94* 94*   CO2 25 26 25 26   BUN 42.2* 45.6* 54.8* 51.0*   CREATININE 8.47* 8.63* 9.46* 8.98*   CALCIUM 8.2* 8.0* 8.0* 9.1   ALBUMIN 1.9* 1.7* 1.5* 1.7*   ALKPHOS 106 107 132  --    ALT 14 13 12  --    AST 19 17 18  --    BILITOT 0.5 0.4 0.3  --      Microbiology Results (last 7 days)       Procedure Component Value Units Date/Time    Blood culture #1 **CANNOT BE ORDERED STAT** [[id number]]  (Normal) Collected: 08/23/24 1242    Order Status: Completed Specimen: Blood Updated: 08/28/24 1800     Blood Culture No Growth at 5 days    Blood culture #2 **CANNOT BE ORDERED STAT** [[id number]]  (Normal) Collected: 08/23/24 1242    Order Status: Completed Specimen: Blood Updated: 08/28/24 1800     Blood Culture No Growth at 5 days             See below for Radiology    Assessment/Plan:  Left leg ulcer with cellulitis  Generalized weakness  Depression   ESRD on PD   HTN, benign   Anemic, iron deficiency      Plan:  Patient state she is depressed and ok with being on anti-depressants   Will start on Lexapro 10 mg daily   Continue local wound care   No signs of sepsis  On PO doxy day 3/7    Continue PD per renal team     Current meds reviewed    Continue PT     Labs in am     VTE prophylaxis: Lovenox     Patient condition:  Fair    Anticipated discharge and Disposition:   [facility]      All diagnosis and differential diagnosis have been reviewed; assessment and plan has been documented; I have personally reviewed the labs and test results that are presently available; I have reviewed the patients medication list; I have reviewed the consulting providers response and recommendations. I have reviewed or attempted to review medical records based upon their availability    All of the patient's questions have been  addressed and answered. Patient's is agreeable to the above stated plan. I will continue to monitor closely and make adjustments to medical management as needed.    Portions of this note dictated using EMR integrated voice recognition software, and may be subject to voice recognition errors not corrected at proofreading. Please contact writer for clarification if needed.   _____________________________________________________________________    Malnutrition Status:    Scheduled Med:   amLODIPine  10 mg Oral Daily    aspirin  81 mg Oral Daily    calcitRIOL  0.25 mcg Oral Once per day on Monday Wednesday Friday    doxycycline  100 mg Oral Q12H    enoxparin  30 mg Subcutaneous Daily    EScitalopram oxalate  10 mg Oral Daily    folic acid  1 mg Oral Daily    furosemide  20 mg Oral Daily    megestroL  200 mg Oral Daily    morphine  2 mg Intravenous Once      Continuous Infusions:     PRN Meds:    Current Facility-Administered Medications:     0.9%  NaCl infusion (for blood administration), , Intravenous, Q24H PRN    acetaminophen, 1,000 mg, Oral, Q6H PRN    aluminum-magnesium hydroxide-simethicone, 30 mL, Oral, QID PRN     bisacodyL, 10 mg, Rectal, Daily PRN    dextrose 10%, 12.5 g, Intravenous, PRN    dextrose 10%, 25 g, Intravenous, PRN    glucagon (human recombinant), 1 mg, Intramuscular, PRN    glucose, 16 g, Oral, PRN    glucose, 24 g, Oral, PRN    hydrALAZINE, 10 mg, Intravenous, Q6H PRN    melatonin, 6 mg, Oral, Nightly PRN    morphine, 2 mg, Intravenous, Q6H PRN    naloxone, 0.02 mg, Intravenous, PRN    ondansetron, 4 mg, Intravenous, Q4H PRN    prochlorperazine, 5 mg, Intravenous, Q6H PRN    senna-docusate 8.6-50 mg, 1 tablet, Oral, BID PRN    sodium chloride 0.9%, 10 mL, Intravenous, PRN     Radiology:  I have personally reviewed the following imaging and agree with the radiologist.     CV Ultrasound Bilateral Doppler Carotid  The right internal carotid artery was patent with no evidence of stenosis.     The left internal carotid artery was patent with less than 50% stenosis.   Bilateral vertebral arteries were patent with antegrade flow.   CV Ultrasound doppler venous DVT leg left  Negative for deep and superficial vein thrombosis in  Left  lower   extremity.   X-Ray Pelvis Routine AP  EXAMINATION:  XR PELVIS ROUTINE AP    CLINICAL HISTORY:  PD alarming. Need to endure PD catheter is in correct pelvic position.;    TECHNIQUE:  AP view of the pelvis was performed.    COMPARISON:  01/17/2024    FINDINGS:  Unchanged configuration of peritoneal dialysis catheter which is looped in the right hemiabdomen just above the level of the right iliac crest.    Moderate stool burden.  No acute osseous abnormality.    Electronically signed by: Dominga Huertas  Date:    08/27/2024  Time:    13:35      James Oropeza MD  Department of Hospital Medicine   [facility]   08/29/2024

## 2024-08-30 VITALS
BODY MASS INDEX: 19.97 KG/M2 | HEIGHT: 64 IN | DIASTOLIC BLOOD PRESSURE: 81 MMHG | RESPIRATION RATE: 18 BRPM | SYSTOLIC BLOOD PRESSURE: 124 MMHG | WEIGHT: 117 LBS | TEMPERATURE: 99 F | OXYGEN SATURATION: 97 % | HEART RATE: 101 BPM

## 2024-08-30 LAB
ANION GAP SERPL CALC-SCNC: 14 MEQ/L
BASOPHILS # BLD AUTO: 0.03 X10(3)/MCL
BASOPHILS NFR BLD AUTO: 0.5 %
BUN SERPL-MCNC: 59 MG/DL (ref 9.8–20.1)
CALCIUM SERPL-MCNC: 9.7 MG/DL (ref 8.4–10.2)
CHLORIDE SERPL-SCNC: 98 MMOL/L (ref 98–107)
CO2 SERPL-SCNC: 22 MMOL/L (ref 23–31)
CREAT SERPL-MCNC: 8.85 MG/DL (ref 0.55–1.02)
CREAT/UREA NIT SERPL: 7
EOSINOPHIL # BLD AUTO: 0.18 X10(3)/MCL (ref 0–0.9)
EOSINOPHIL NFR BLD AUTO: 3 %
ERYTHROCYTE [DISTWIDTH] IN BLOOD BY AUTOMATED COUNT: 15.1 % (ref 11.5–17)
GFR SERPLBLD CREATININE-BSD FMLA CKD-EPI: 4 ML/MIN/1.73/M2
GLUCOSE SERPL-MCNC: 85 MG/DL (ref 82–115)
HCT VFR BLD AUTO: 35 % (ref 37–47)
HGB BLD-MCNC: 11.6 G/DL (ref 12–16)
IMM GRANULOCYTES # BLD AUTO: 0.12 X10(3)/MCL (ref 0–0.04)
IMM GRANULOCYTES NFR BLD AUTO: 2 %
LYMPHOCYTES # BLD AUTO: 1.26 X10(3)/MCL (ref 0.6–4.6)
LYMPHOCYTES NFR BLD AUTO: 20.7 %
MCH RBC QN AUTO: 29.1 PG (ref 27–31)
MCHC RBC AUTO-ENTMCNC: 33.1 G/DL (ref 33–36)
MCV RBC AUTO: 87.7 FL (ref 80–94)
MONOCYTES # BLD AUTO: 0.44 X10(3)/MCL (ref 0.1–1.3)
MONOCYTES NFR BLD AUTO: 7.2 %
NEUTROPHILS # BLD AUTO: 4.06 X10(3)/MCL (ref 2.1–9.2)
NEUTROPHILS NFR BLD AUTO: 66.6 %
NRBC BLD AUTO-RTO: 0 %
PLATELET # BLD AUTO: 277 X10(3)/MCL (ref 130–400)
PLATELETS.RETICULATED NFR BLD AUTO: 4.3 % (ref 0.9–11.2)
PMV BLD AUTO: 10.6 FL (ref 7.4–10.4)
POTASSIUM SERPL-SCNC: 3.8 MMOL/L (ref 3.5–5.1)
RBC # BLD AUTO: 3.99 X10(6)/MCL (ref 4.2–5.4)
SODIUM SERPL-SCNC: 134 MMOL/L (ref 136–145)
WBC # BLD AUTO: 6.09 X10(3)/MCL (ref 4.5–11.5)

## 2024-08-30 PROCEDURE — 36415 COLL VENOUS BLD VENIPUNCTURE: CPT | Performed by: PHYSICIAN ASSISTANT

## 2024-08-30 PROCEDURE — 25000003 PHARM REV CODE 250: Performed by: NURSE PRACTITIONER

## 2024-08-30 PROCEDURE — 85025 COMPLETE CBC W/AUTO DIFF WBC: CPT | Performed by: PHYSICIAN ASSISTANT

## 2024-08-30 PROCEDURE — 25000003 PHARM REV CODE 250: Performed by: INTERNAL MEDICINE

## 2024-08-30 PROCEDURE — 80048 BASIC METABOLIC PNL TOTAL CA: CPT | Performed by: PHYSICIAN ASSISTANT

## 2024-08-30 PROCEDURE — S0179 MEGESTROL 20 MG: HCPCS | Performed by: INTERNAL MEDICINE

## 2024-08-30 PROCEDURE — 97530 THERAPEUTIC ACTIVITIES: CPT | Mod: CQ

## 2024-08-30 RX ORDER — MEGESTROL ACETATE 40 MG/ML
400 SUSPENSION ORAL DAILY
Qty: 300 ML | Refills: 0 | Status: SHIPPED | OUTPATIENT
Start: 2024-08-30 | End: 2024-09-29

## 2024-08-30 RX ORDER — FERROUS SULFATE 325(65) MG
325 TABLET, DELAYED RELEASE (ENTERIC COATED) ORAL DAILY
Qty: 30 TABLET | Refills: 0 | Status: SHIPPED | OUTPATIENT
Start: 2024-08-30 | End: 2024-09-29

## 2024-08-30 RX ORDER — ESCITALOPRAM OXALATE 10 MG/1
10 TABLET ORAL DAILY
Qty: 90 TABLET | Refills: 3 | Status: SHIPPED | OUTPATIENT
Start: 2024-08-30 | End: 2025-08-30

## 2024-08-30 RX ORDER — MEGESTROL ACETATE 40 MG/ML
400 SUSPENSION ORAL DAILY
Status: DISCONTINUED | OUTPATIENT
Start: 2024-08-30 | End: 2024-08-30 | Stop reason: HOSPADM

## 2024-08-30 RX ADMIN — FERROUS SULFATE TAB 325 MG (65 MG ELEMENTAL FE) 1 EACH: 325 (65 FE) TAB at 09:08

## 2024-08-30 RX ADMIN — DOXYCYCLINE HYCLATE 100 MG: 100 TABLET, COATED ORAL at 09:08

## 2024-08-30 RX ADMIN — MEGESTROL ACETATE 400 MG: 400 SUSPENSION ORAL at 09:08

## 2024-08-30 RX ADMIN — CALCITRIOL CAPSULES 0.25 MCG 0.25 MCG: 0.25 CAPSULE ORAL at 01:08

## 2024-08-30 RX ADMIN — FUROSEMIDE 20 MG: 20 TABLET ORAL at 09:08

## 2024-08-30 RX ADMIN — ESCITALOPRAM OXALATE 10 MG: 10 TABLET ORAL at 09:08

## 2024-08-30 RX ADMIN — FOLIC ACID 1 MG: 1 TABLET ORAL at 09:08

## 2024-08-30 RX ADMIN — AMLODIPINE BESYLATE 10 MG: 5 TABLET ORAL at 09:08

## 2024-08-30 RX ADMIN — ASPIRIN 81 MG: 81 TABLET, COATED ORAL at 09:08

## 2024-08-30 NOTE — DISCHARGE SUMMARY
[facility] Hospital Medicine Discharge Summary    Admit Date: 8/23/2024  Discharge Date and Time: 8/30/20248:36 AM  Admitting Physician:  Team  Discharging Physician: James Oropeza MD.  Primary Care Physician: Sergio Crabtree MD  Consults: Nephrology    Discharge Diagnoses:  Left leg ulcer with cellulitis  Generalized weakness  Depression   ESRD on PD   HTN, benign   Anemic, iron deficiency     Hospital Course:   74-year-old female whose history includes end-stage renal disease on peritoneal dialysis and hypertension.  Presented to the ED with complaints a nonhealing wound on left lower extremity.  Reports a cancerous lesion was removed on Monday.  Since then the sore has developed purulent drainage and a foul odor.  At the time of my exam patient reports that the primary reason she presented to the ER was for evaluation of left lower extremity weakness and inability to ambulate which started last night.  Apparently she has not mentioned this to any other providers until now.  On physical exam she does have significant left lower extremity weakness compared to the right.  Bilateral upper extremities strong and equal bilaterally.  May have a slight left-sided facial droop.     VS on arrival: T 100.8, P 85, R 17, b/P 128/73, Sats 100% on room air. Initial labs: WBC 10.98, Hgb 7.5, Hct 23.7 (down from 12.8 and 39. , , iron 10, TIBC 102.  Left lower extremity venous ultrasound negative for DVT.  Vancomycin administered in the ED. Obtained a stat CT head which showed no acute intracranial process.     Patient was started on iv antibiotics and wound cultures was done. Local wound care was continued. IV abx was de-escalated and then placed on po doxy. Blood cultures was negative. She was anemic and having iron deficiency. Added Iron supplements. She was continued with PD.      PT was started and she was very weak. CM consulted for SNF placement. Patient was started with  PT and was progressing well. She changed her mind and wanted to go home. Hh with PT and wound care was arranged. She was placed on lexapro 10 mg daily for depression and megace for appetite stimulation. She was discharged home in a stable condition.    Pt was seen and examined on the day of discharge  Vitals:  VITAL SIGNS: 24 HRS MIN & MAX LAST   Temp  Min: 97.5 °F (36.4 °C)  Max: 98.8 °F (37.1 °C) 98.1 °F (36.7 °C)   BP  Min: 133/76  Max: 145/82 (!) 145/82   Pulse  Min: 82  Max: 88  86   Resp  Min: 18  Max: 18 18   SpO2  Min: 96 %  Max: 97 % 96 %       Physical Exam:  Heart RRR  Lungs clear   Abdomen soft and non tender   Neuro: No FND      Procedures Performed: No admission procedures for hospital encounter.     Significant Diagnostic Studies: See Full reports for all details    Recent Labs   Lab 08/26/24  0458 08/28/24 0441 08/30/24  0733   WBC 5.31 4.98 6.09   RBC 3.45* 3.78* 3.99*   HGB 10.1* 11.0* 11.6*   HCT 30.3* 32.6* 35.0*   MCV 87.8 86.2 87.7   MCH 29.3 29.1 29.1   MCHC 33.3 33.7 33.1   RDW 15.3 14.9 15.1    264 277   MPV 10.1 9.2 10.6*       Recent Labs   Lab 08/24/24  0332 08/25/24  0344 08/26/24  0458 08/28/24  0441 08/30/24  0559    136 137 135* 134*   K 4.2 3.6 4.0 3.9 3.8   CL 96* 95* 94* 94* 98   CO2 25 26 25 26 22*   BUN 42.2* 45.6* 54.8* 51.0* 59.0*   CREATININE 8.47* 8.63* 9.46* 8.98* 8.85*   CALCIUM 8.2* 8.0* 8.0* 9.1 9.7   ALBUMIN 1.9* 1.7* 1.5* 1.7*  --    ALKPHOS 106 107 132  --   --    ALT 14 13 12  --   --    AST 19 17 18  --   --    BILITOT 0.5 0.4 0.3  --   --         Microbiology Results (last 7 days)       Procedure Component Value Units Date/Time    Blood culture #1 **CANNOT BE ORDERED STAT** [[id number]]  (Normal) Collected: 08/23/24 1242    Order Status: Completed Specimen: Blood Updated: 08/28/24 1800     Blood Culture No Growth at 5 days    Blood culture #2 **CANNOT BE ORDERED STAT** [[id number]]  (Normal) Collected: 08/23/24 1242    Order Status: Completed Specimen:  Blood Updated: 08/28/24 1800     Blood Culture No Growth at 5 days             CV Ultrasound Bilateral Doppler Carotid  The right internal carotid artery was patent with no evidence of stenosis.     The left internal carotid artery was patent with less than 50% stenosis.   Bilateral vertebral arteries were patent with antegrade flow.   CV Ultrasound doppler venous DVT leg left  Negative for deep and superficial vein thrombosis in  Left  lower   extremity.   X-Ray Pelvis Routine AP  EXAMINATION:  XR PELVIS ROUTINE AP    CLINICAL HISTORY:  PD alarming. Need to endure PD catheter is in correct pelvic position.;    TECHNIQUE:  AP view of the pelvis was performed.    COMPARISON:  01/17/2024    FINDINGS:  Unchanged configuration of peritoneal dialysis catheter which is looped in the right hemiabdomen just above the level of the right iliac crest.    Moderate stool burden.  No acute osseous abnormality.    Electronically signed by: Dominga Huertas  Date:    08/27/2024  Time:    13:35         Medication List        START taking these medications      doxycycline 100 MG tablet  Commonly known as: VIBRA-TABS  Take 1 tablet (100 mg total) by mouth every 12 (twelve) hours. for 7 days     EScitalopram oxalate 10 MG tablet  Commonly known as: LEXAPRO  Take 1 tablet (10 mg total) by mouth once daily.     ferrous sulfate 325 (65 FE) MG EC tablet  Take 1 tablet (325 mg total) by mouth once daily.     HYDROmorphone 2 MG tablet  Commonly known as: DILAUDID  Take 1 tablet (2 mg total) by mouth every 8 (eight) hours as needed for Pain.     megestroL 400 mg/10 mL (10 mL) Susp  Commonly known as: MEGACE  Take 10 mLs (400 mg total) by mouth once daily.            CONTINUE taking these medications      amLODIPine 10 MG tablet  Commonly known as: NORVASC     aspirin 81 MG EC tablet  Commonly known as: ECOTRIN     calcitRIOL 0.25 MCG Cap  Commonly known as: ROCALTROL     folic acid 1 MG tablet  Commonly known as: FOLVITE     furosemide 20 MG  tablet  Commonly known as: LASIX     NEPHRO-JENN 0.8 mg Tab  Generic drug: B complex-vitamin C-folic acid     potassium chloride 10 MEQ Tbsr  Commonly known as: KLOR-CON            STOP taking these medications      cloNIDine 0.3 mg/24 hr td ptwk 0.3 mg/24 hr  Commonly known as: CATAPRES     sodium bicarbonate 650 MG tablet     spironolactone 25 MG tablet  Commonly known as: ALDACTONE               Where to Get Your Medications        These medications were sent to [facility] of [address]  [address] [facility] [address] [address]      Phone: [phone]   doxycycline 100 MG tablet  EScitalopram oxalate 10 MG tablet  ferrous sulfate 325 (65 FE) MG EC tablet  HYDROmorphone 2 MG tablet  megestroL 400 mg/10 mL (10 mL) Susp          Explained in detail to the patient about the discharge plan, medications, and follow-up visits. Pt understands and agrees with the treatment plan  Discharge Disposition: Home with HH   Discharged Condition: stable  Diet-   Dietary Orders (From admission, onward)       Start     Ordered    08/25/24 0726  Dietary nutrition supplements All Meals; [employer] Renal - [employer]  Continuous        Question Answer Comment   Frequency: All Meals    Select PO Supplement: [employer] Renal - [employer]        08/25/24 0727    08/24/24 0911  Diet Renal On Dialysis Cardiac (Low Na/Chol)  Diet effective now        Question:  Diet Modifier:  Answer:  Cardiac (Low Na/Chol)    08/24/24 0912                   Medications Per DC med rec  Activities as tolerated   Follow-up Information       Sergio Crabtree MD Follow up on 9/10/2024.    Specialty: Internal Medicine  Why: Follow up appt September 10, 2024 @9:00AM.  Contact information:  [place] [address]  [phone]               [facility]    Specialty: Home Health Services  Why: Your home health agency will contact you and schedule an appt for  you  Contact information:  [address]  [place] [address]  [phone]                           For further questions contact hospitalist office    Discharge time 33 minutes    For worsening symptoms, chest pain, shortness of breath, increased abdominal pain, high grade fever, stroke or stroke like symptoms, immediately go to the nearest Emergency Room or call 911 as soon as possible.      James Zapien M.D, on 8/30/2024. at 8:36 AM.

## 2024-08-30 NOTE — PHYSICIAN QUERY
Due to the conflicting clinical picture, please clinically validate the osteomyelitis. If validated, please provide additional clinical support for the osteomyelitis.   The condition is not confirmed and/or it has been ruled out

## 2024-08-30 NOTE — NURSING
08/30/24 0816   Cycler Peritoneal Dialysis   Net Positive (mL) 637 mL   Cycler Treatment Comments tx completed overnight. low drain volume alarms noted on cycler alarm log. pt positive 637 ml.

## 2024-08-30 NOTE — PLAN OF CARE
08/30/24 1250   Final Note   Assessment Type Final Discharge Note   Anticipated Discharge Disposition Home-Health   Hospital Resources/Appts/Education Provided Post-Acute resouces added to AVS   Post-Acute Status   Post-Acute Authorization Home Health   Home Health Status Set-up Complete/Auth obtained   Discharge Delays None known at this time     Patient will dc home with home health services. [facility] is the accepting agency. Spouse will provide transportation home. Clinical and AVS sent via careTwist and Sh[employer].

## 2024-08-30 NOTE — NURSING
Nurses Note -- 4 Eyes      8/30/2024   7:53 AM      Skin assessed during: Q Shift Change      [] No Altered Skin Integrity Present    []Prevention Measures Documented      [x] Yes- Altered Skin Integrity Present or Discovered   [x] LDA Added if Not in Epic (Describe Wound)   [] New Altered Skin Integrity was Present on Admit and Documented in LDA   [] Wound Image Taken    Wound Care Consulted? Yes    Attending Nurse:  Yessenia FOLEY     Second RN/Staff Member:   Justina FOLEY           no difficulty bearing weight/no loss of consciousness/no dizziness

## 2024-08-30 NOTE — NURSING
Nurses Note -- 4 Eyes      8/30/2024   12:37 AM      Skin assessed during: Q Shift Change      [] No Altered Skin Integrity Present    []Prevention Measures Documented      [x] Yes- Altered Skin Integrity Present or Discovered   [x] LDA Added if Not in Epic (Describe Wound)   [] New Altered Skin Integrity was Present on Admit and Documented in LDA   [] Wound Image Taken    Wound Care Consulted? Yes    Attending Nurse:  Justina Gillette RN    Second RN/Staff Member:   ANA Patiño

## 2024-08-30 NOTE — PT/OT/SLP PROGRESS
Physical Therapy Treatment    Patient Name:  Malissa Schwartz   MRN:  [id number]    Recommendations:     Discharge therapy intensity: Moderate Intensity Therapy   Discharge Equipment Recommendations: to be determined by next level of care  Barriers to discharge: None    Assessment:     Malissa Schwartz is a 74 y.o. female admitted with a medical diagnosis of LLE cellulitis/post surgical wound infection, ESRD on PD.  She presents with the following impairments/functional limitations: gait instability, weakness, impaired endurance, impaired balance, pain, impaired functional mobility, impaired self care skills, decreased lower extremity function.    Pt. With difficulty placement, pt now d/cing home. Family training was done with patient and spouse.    Rehab Prognosis: Good; patient would benefit from acute skilled PT services to address these deficits and reach maximum level of function.    Recent Surgery: * No surgery found *      Plan:     During this hospitalization, patient would benefit from acute PT services 5 x/week to address the identified rehab impairments via gait training, therapeutic activities, therapeutic exercises and progress toward the following goals:    Plan of Care Expires:  09/26/24    Subjective     Chief Complaint: none stated  Patient/Family Comments/goals: to go home  Pain/Comfort:         Objective:     Communicated with nursing prior to session.  Patient found HOB elevated with pulse ox (continuous), telemetry upon PT entry to room.     General Precautions: Standard, fall  Orthopedic Precautions: N/A  Braces: N/A  Respiratory Status: Room air  Blood Pressure: NT    Functional Mobility:  Bed Mobility:     Supine to Sit: minimum assistance  Transfers:     Sit to Stand:  minimum assistance with rolling walker  Bed to Chair: contact guard assistance with  rolling walker  using  Step Transfer  Toilet Transfer: contact guard assistance with  rolling walker  using  Step Transfer  Gait: 15' x3 w/RW,  CGA    Therapeutic Activities/Exercises:  Pt required Jerry to get EOB & to ambulate around room. Spouse assisted pt from chair to toilet for toilet t/f and back to chair while therapist supervised.   Pt and spouse were given verbal instructions and demonstrations on proper mobility techniques and safety. All was well received by patient and spouse.    Education:  Patient and spouse were provided with verbal education and demonstrations education regarding PT role/goals/POC, fall prevention, safety awareness, and discharge/DME recommendations.  Understanding was verbalized.     Patient left up in chair with all lines intact, call button in reach, nurse notified, and spouse present    GOALS:   Multidisciplinary Problems       Physical Therapy Goals          Problem: Physical Therapy    Goal Priority Disciplines Outcome Goal Variances Interventions   Physical Therapy Goal     PT, PT/OT Progressing     Description: Goals to be met by: 2024     Patient will increase functional independence with mobility by performin. Supine to sit with Stand-by Assistance  2. Sit to supine with Stand-by Assistance  3. Sit to stand transfer with Stand-by Assistance  4. Gait  x 50 feet with Minimal Assistance using Rolling Walker.                          Time Tracking:     PT Received On: 24  PT Start Time: 1114     PT Stop Time: 1132  PT Total Time (min): 18 min     Billable Minutes: Therapeutic Activity 18    Treatment Type: Treatment  PT/PTA: PTA     Number of PTA visits since last PT visit: 4     2024

## 2024-08-30 NOTE — PROGRESS NOTES
[facility] [address]  Wound Care    Patient Name:  Malissa Schwartz   MRN:  [id number]  Date: 8/30/2024  Diagnosis: Wound infection    History:     Past Medical History:   Diagnosis Date    Acute rejection of kidney transplant 9/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 Glomerulitis with one activated endothelial cell: treated with Solumedrol pulse 375 mg IVBP x 3 doses with short prednisone taper; thymoglobin 100 mg x 6 doses 10/04/2007 minimal histologic changes no acute rejection     Allergy     Anemia of renal disease     CKD (chronic kidney disease), stage IV     CMV (cytomegalovirus) status positive     Colon polyp     benign    Depression     GN (glomerulonephritis)     GN (glomerulonephritis)     unknown type    Herpes infection, other 2009    on buttocks    Hyperlipidemia     Hypertension     Immunosuppression     Living-donor kidney transplant recipient 01/15/2007    Meningitis     history of viral meningitis    Proteinuria 1/5/2015    Proteinuria     Pulmonary nodules     Renal hypertension     S/P breast augmentation     Skin cancer     Urinary tract infection        Social History     Socioeconomic History    Marital status:    Tobacco Use    Smoking status: Former     Current packs/day: 1.00     Average packs/day: 1 pack/day for 15.0 years (15.0 ttl pk-yrs)     Types: Cigarettes    Smokeless tobacco: Never    Tobacco comments:     quit in 2014; smoked for 15 years and at the most smoked 1 ppd   Substance and Sexual Activity    Alcohol use: Yes     Comment: seldom    Drug use: No    Sexual activity: Never   Social History Narrative    Likes to travel and follow [employer] (has season tickets).    Lives with , 2 dogs and 3 cats    Used to work in real estate and banking     Social Determinants of Health     Food Insecurity: Unknown (8/26/2024)    Hunger Vital Sign     Worried About Running Out of Food in the Last Year: Never true   Transportation  Needs: No Transportation Needs (8/26/2024)    TRANSPORTATION NEEDS     Transportation : No   Housing Stability: Unknown (8/26/2024)    Housing Stability Vital Sign     Unable to Pay for Housing in the Last Year: No       Precautions:     Allergies as of 08/23/2024 - Reviewed 08/23/2024   Allergen Reaction Noted    Codeine Palpitations and Rash 01/07/2013    Cefepime  08/23/2024    Levofloxacin  01/03/2021    Iodine Nausea Only 12/09/2022       WO Assessment Details/Treatment      08/30/24 0907   WOCN Assessment   Visit Date 08/30/24   Visit Time 0907   Consult Type New   [facility] Speciality Wound   Intervention chart review;assessed;applied   Teaching on-going        Wound 08/24/24 0400 Traumatic Left proximal;lower Calf #1   Date First Assessed/Time First Assessed: 08/24/24 0400   Present on Original Admission: Yes  Primary Wound Type: Traumatic  Side: Left  Orientation: proximal;lower  Location: Calf  Wound Number: #1   Wound Image    Dressing Appearance Intact;Dried drainage   Drainage Amount Small   Drainage Characteristics/Odor Yellow;Serous   Appearance Red;Pink;Yellow;Moist   Tissue loss description Full thickness   Black (%), Wound Tissue Color 0 %   Red (%), Wound Tissue Color 80 %   Yellow (%), Wound Tissue Color 20 %   Periwound Area Intact;Dry   Wound Edges Defined   Wound Length (cm) 3 cm   Wound Width (cm) 3 cm   Wound Depth (cm) 0.4 cm   Wound Volume (cm^3) 3.6 cm^3   Wound Surface Area (cm^2) 9 cm^2   Care Cleansed with:;Antimicrobial agent;Wound cleanser;Other (see comments)  (Vashe)   Dressing Applied;Calcium alginate;Silver;Gauze;Rolled gauze        Wound Traumatic Left medial Leg   No Date First Assessed or Time First Assessed found.   Primary Wound Type: Traumatic  Side: Left  Orientation: medial  Location: Leg   Wound Image    Dressing Appearance Intact;Dried drainage   Drainage Amount Small   Drainage Characteristics/Odor Serous;Yellow   Appearance Pink;Red;Moist   Tissue loss description Full  thickness   Black (%), Wound Tissue Color 0 %   Red (%), Wound Tissue Color 100 %   Yellow (%), Wound Tissue Color 0 %   Periwound Area Intact;Dry   Wound Edges Defined   Wound Length (cm) 3.7 cm   Wound Width (cm) 2.5 cm   Wound Depth (cm) 0.5 cm   Wound Volume (cm^3) 4.625 cm^3   Wound Surface Area (cm^2) 9.25 cm^2   Care Cleansed with:;Antimicrobial agent;Wound cleanser;Other (see comments)  (Vashe)   Dressing Applied;Calcium alginate;Silver;Gauze;Rolled gauze     WOCN follow up for left leg. Discussed plan of care with nurse Casas. Treatment recommendations in place.  at bedside. Reinforced education from previous visit. Removed old dressing, cleansed area and redressed. Patient tolerated well. She is on REED mattress. Possible discharge today. Answered all questions to the satisfaction of the patient and family. Nursing to continue with treatment recommendations and other preventative measures. Will follow up if applicable.   08/30/2024

## 2024-08-30 NOTE — PROGRESS NOTES
Nephrology Progress Note      HPI:    Malissa Schwartz is a 74 y.o. female well known to our service with a history of end-stage renal disease on peritoneal dialysis.  She is normally followed by Dr. George Mendoza.  She does have a history of a failed renal transplant in 2007 at [facility] in [place], [place].  She presented to the emergency department with significant left lower extremity pain.  Apparently she had skin cancer removed from his left lower extremity on 08/19/2024.  She is not on any immunosuppressants at present.  She has been fairly successful with her peritoneal dialysis. Other pertinent medical history includes hypertension, hyperlipidemia, renal transplantation which failed, skin cancer, pulmonary nodules, glomerular nephritis and anemia secondary to chronic kidney disease.  Nephrology following for ESRD management.       Interval History:    No acute events overnight.  Patient did peritoneal dialysis overnight.  Her machine was alarming quite a bit due to low drain volume.  No chest pain, shortness of breath, abdominal pain, nausea, vomiting, or lower extremity edema.     08/28/2024   Awake alert oriented.  Very poor appetite.  Has been present in the room.  No shortness of breath.  No nausea no vomiting.  Left lower extremity is improving.    8/29/24  Pt resting comfortably. No complaints. No edema. No SOB. Friend in the room. No abd pain    08/30/2024   Patient is wide awake alert oriented to time person place and even smiling today.   present in the room.  Tolerating her CCPD.  Her appetite is still not very good.  She has been started on Lexapro yesterday.  Dr. Sibley informed me that patient's daughter called him and was asking for tunneled dialysis catheter and hemodialysis and subsequent patient needs to go to skilled nursing facility.  I discussed that with the patient and her  was present, both of them refused any tunneled dialysis catheter placement and or  "any hemodialysis therapy.  She ready to go home and continue her CCPD.  I did discuss with Dr. Sibley about above , he will try to work for some acute rehab or TCU in the other [place].  If not she will be going home.  ROS:    Review of Systems   Constitutional:  Negative for fever, malaise/fatigue and weight loss.   Respiratory:  Negative for cough and shortness of breath.    Cardiovascular:  Negative for chest pain, palpitations and leg swelling.   Gastrointestinal:  Negative for abdominal pain, blood in stool, constipation, diarrhea, heartburn, melena, nausea and vomiting.   Musculoskeletal:  Negative for back pain and myalgias.   Skin:  Negative for rash.   Neurological:  Negative for speech change and focal weakness.   All other systems reviewed and are negative.      Vital Signs:  BP (!) 145/82   Pulse 86   Temp 98.1 °F (36.7 °C) (Oral)   Resp 18   Ht 5' 4" (1.626 m)   Wt 53.1 kg (117 lb)   SpO2 96%   BMI 20.08 kg/m²   Body mass index is 20.08 kg/m².    Physical Exam:    Physical Exam  Vitals and nursing note reviewed.   Constitutional:       Appearance: Normal appearance.   HENT:      Head: Normocephalic and atraumatic.   Eyes:      General: No scleral icterus.     Extraocular Movements: Extraocular movements intact.   Cardiovascular:      Rate and Rhythm: Normal rate and regular rhythm.      Heart sounds: Normal heart sounds.   Pulmonary:      Effort: Pulmonary effort is normal. No respiratory distress.      Breath sounds: Normal breath sounds.   Abdominal:      General: Abdomen is flat. Bowel sounds are normal. There is no distension.      Palpations: Abdomen is soft.      Tenderness: There is no abdominal tenderness.      Comments: PD cath   Musculoskeletal:         General: No swelling or deformity. Normal range of motion.      Right lower leg: No edema.      Left lower leg: No edema.   Skin:     General: Skin is warm and dry.   Neurological:      General: No focal deficit present.      Mental Status: " She is alert and oriented to person, place, and time.   Psychiatric:         Mood and Affect: Mood normal.         Behavior: Behavior normal.         Labs:  Recent Results (from the past 48 hour(s))   Prealbumin    Collection Time: 08/29/24 11:44 AM   Result Value Ref Range    Prealbumin 14.1 14.0 - 37.0 mg/dL   Basic Metabolic Panel    Collection Time: 08/30/24  5:59 AM   Result Value Ref Range    Sodium 134 (L) 136 - 145 mmol/L    Potassium 3.8 3.5 - 5.1 mmol/L    Chloride 98 98 - 107 mmol/L    CO2 22 (L) 23 - 31 mmol/L    Glucose 85 82 - 115 mg/dL    Blood Urea Nitrogen 59.0 (H) 9.8 - 20.1 mg/dL    Creatinine 8.85 (H) 0.55 - 1.02 mg/dL    BUN/Creatinine Ratio 7     Calcium 9.7 8.4 - 10.2 mg/dL    Anion Gap 14.0 mEq/L    eGFR 4 mL/min/1.73/m2   CBC with Differential    Collection Time: 08/30/24  7:33 AM   Result Value Ref Range    WBC 6.09 4.50 - 11.50 x10(3)/mcL    RBC 3.99 (L) 4.20 - 5.40 x10(6)/mcL    Hgb 11.6 (L) 12.0 - 16.0 g/dL    Hct 35.0 (L) 37.0 - 47.0 %    MCV 87.7 80.0 - 94.0 fL    MCH 29.1 27.0 - 31.0 pg    MCHC 33.1 33.0 - 36.0 g/dL    RDW 15.1 11.5 - 17.0 %    Platelet 277 130 - 400 x10(3)/mcL    MPV 10.6 (H) 7.4 - 10.4 fL    Neut % 66.6 %    Lymph % 20.7 %    Mono % 7.2 %    Eos % 3.0 %    Basophil % 0.5 %    Lymph # 1.26 0.6 - 4.6 x10(3)/mcL    Neut # 4.06 2.1 - 9.2 x10(3)/mcL    Mono # 0.44 0.1 - 1.3 x10(3)/mcL    Eos # 0.18 0 - 0.9 x10(3)/mcL    Baso # 0.03 <=0.2 x10(3)/mcL    IG# 0.12 (H) 0 - 0.04 x10(3)/mcL    IG% 2.0 %    NRBC% 0.0 %    IPF 4.3 0.9 - 11.2 %         Assessment/Plan:    ESRD - on PD nightly, 10 hours/4 cycles/1.5 L per cycle/no last fill  Left lower extremity cellulitis/osteomyelitis - wound care following.   Hypertension - well controlled   Failed renal transplant   Anemia of chronic disease- stable, Hgb above goal >10 mg/dl     Recommendations:  Continue CCPD  Acute rehab or TCU in the other hospital campus.  She can continue her CCPD and he had therapy  If she can not go to  the other [place] then she can go home for home physical therapy and she can take care of herself at home.  Will increase Megace to 400 mg once a day.

## 2024-08-30 NOTE — PLAN OF CARE
Spoke with patient and spouse at bedside regarding dc disp. Informed both on the multiple SNF denials we have received. Offered alternative options like inpatient rehab or TCU. Patient declined further placement and is requesting to be discharged home. Asked about her help at home, she informed CM she has a caregiver 7pm-7am every night. The spouse confirmed he will be able to assist with care during the day. Home health has been arranged with [facility], clinical updates sent via [facility].

## 2024-08-30 NOTE — PLAN OF CARE
[facility], [facility], [place], [facility] , [facility], [facility] , and [facility] All Denied the Pt can't meet needs ,

## 2024-09-04 ENCOUNTER — PATIENT MESSAGE (OUTPATIENT)
Dept: ADMINISTRATIVE | Facility: CLINIC | Age: 74
End: 2024-09-04
Payer: MEDICARE

## 2024-09-04 ENCOUNTER — PATIENT OUTREACH (OUTPATIENT)
Dept: ADMINISTRATIVE | Facility: CLINIC | Age: 74
End: 2024-09-04
Payer: MEDICARE

## 2024-09-04 NOTE — PROGRESS NOTES
C3 nurse attempted to contact Malissa Schwartz  for a TCC post hospital discharge follow up call. No answer. Left voicemail with callback information. The patient has a scheduled HOSFU appointment with Sergio Crabtree MD  on 09/10/2024 @ 9 am.   Message sent via patient's portal regarding follow up call.

## 2024-09-05 NOTE — PROGRESS NOTES
C3 nurse attempted to contact Malissa Schwartz  for a TCC post hospital discharge follow up call. The patient is unable to conduct the call @ this time. The patient requested a callback.    The patient has a scheduled HOSFU appointment with Sergio Crabtree MD  on 09/10/2024 @ 9 am      Lizbeth Art(PA)

## 2024-09-06 NOTE — PROGRESS NOTES
C3 nurse attempted to contact Malissa Schwartz  for a TCC post hospital discharge follow up call. No answer. Left voicemail with callback information. The patient has a scheduled HOSFU appointment with Sergio Crabtree MD on 09/10/2024 @ 9 am

## 2024-09-10 ENCOUNTER — LAB REQUISITION (OUTPATIENT)
Dept: LAB | Facility: HOSPITAL | Age: 74
End: 2024-09-10
Payer: MEDICARE

## 2024-09-10 PROCEDURE — 87070 CULTURE OTHR SPECIMN AEROBIC: CPT | Performed by: INTERNAL MEDICINE

## 2024-09-13 LAB — BACTERIA WND CULT: NO GROWTH

## 2024-09-27 ENCOUNTER — DOCUMENT SCAN (OUTPATIENT)
Dept: HOME HEALTH SERVICES | Facility: HOSPITAL | Age: 74
End: 2024-09-27
Payer: MEDICARE

## 2024-10-05 ENCOUNTER — HOSPITAL ENCOUNTER (INPATIENT)
Facility: HOSPITAL | Age: 74
LOS: 4 days | Discharge: HOME HEALTH | DRG: 919 | End: 2024-10-09
Attending: STUDENT IN AN ORGANIZED HEALTH CARE EDUCATION/TRAINING PROGRAM | Admitting: INTERNAL MEDICINE
Payer: MEDICARE

## 2024-10-05 DIAGNOSIS — Z99.2 PERITONEAL DIALYSIS CATHETER IN PLACE: ICD-10-CM

## 2024-10-05 DIAGNOSIS — K65.9 BACTERIAL PERITONITIS: Primary | ICD-10-CM

## 2024-10-05 DIAGNOSIS — I12.9 RENAL HYPERTENSION: Chronic | ICD-10-CM

## 2024-10-05 DIAGNOSIS — R53.83 FATIGUE: ICD-10-CM

## 2024-10-05 LAB
ALBUMIN SERPL-MCNC: 1.6 G/DL (ref 3.4–4.8)
ALBUMIN/GLOB SERPL: 0.4 RATIO (ref 1.1–2)
ALP SERPL-CCNC: 201 UNIT/L (ref 40–150)
ALT SERPL-CCNC: 23 UNIT/L (ref 0–55)
ANION GAP SERPL CALC-SCNC: 11 MEQ/L
APTT PPP: 34.6 SECONDS (ref 23.2–33.7)
AST SERPL-CCNC: 29 UNIT/L (ref 5–34)
BASOPHILS # BLD AUTO: 0.03 X10(3)/MCL
BASOPHILS NFR BLD AUTO: 0.3 %
BILIRUB SERPL-MCNC: 0.3 MG/DL
BUN SERPL-MCNC: 27.4 MG/DL (ref 9.8–20.1)
CALCIUM SERPL-MCNC: 8 MG/DL (ref 8.4–10.2)
CHLORIDE SERPL-SCNC: 94 MMOL/L (ref 98–107)
CLARITY BODY FLUID (OLG): NORMAL
CO2 SERPL-SCNC: 29 MMOL/L (ref 23–31)
COLOR BODY FLUID (OLG): COLORLESS
CREAT SERPL-MCNC: 5.4 MG/DL (ref 0.55–1.02)
CREAT/UREA NIT SERPL: 5
EOSINOPHIL # BLD AUTO: 0.06 X10(3)/MCL (ref 0–0.9)
EOSINOPHIL NFR BLD AUTO: 0.6 %
EOSINOPHIL NFR FLD MANUAL: 7 %
ERYTHROCYTE [DISTWIDTH] IN BLOOD BY AUTOMATED COUNT: 15 % (ref 11.5–17)
GFR SERPLBLD CREATININE-BSD FMLA CKD-EPI: 8 ML/MIN/1.73/M2
GLOBULIN SER-MCNC: 4.2 GM/DL (ref 2.4–3.5)
GLUCOSE SERPL-MCNC: 89 MG/DL (ref 82–115)
GRAM STN SPEC: NORMAL
GRAM STN SPEC: NORMAL
HCT VFR BLD AUTO: 32.8 % (ref 37–47)
HGB BLD-MCNC: 10.3 G/DL (ref 12–16)
IMM GRANULOCYTES # BLD AUTO: 0.04 X10(3)/MCL (ref 0–0.04)
IMM GRANULOCYTES NFR BLD AUTO: 0.4 %
INR PPP: 1
LYMPHOCYTES # BLD AUTO: 1.14 X10(3)/MCL (ref 0.6–4.6)
LYMPHOCYTES NFR BLD AUTO: 12.2 %
LYMPHOCYTES NFR FLD MANUAL: 8 %
MAGNESIUM SERPL-MCNC: 1.9 MG/DL (ref 1.6–2.6)
MCH RBC QN AUTO: 28.1 PG (ref 27–31)
MCHC RBC AUTO-ENTMCNC: 31.4 G/DL (ref 33–36)
MCV RBC AUTO: 89.6 FL (ref 80–94)
MONOCYTE MAN % BF (OLG): 5 %
MONOCYTES # BLD AUTO: 0.5 X10(3)/MCL (ref 0.1–1.3)
MONOCYTES NFR BLD AUTO: 5.4 %
NEUTROPHILS # BLD AUTO: 7.55 X10(3)/MCL (ref 2.1–9.2)
NEUTROPHILS MAN % BF (OLG): 80 %
NEUTROPHILS NFR BLD AUTO: 81.1 %
NRBC BLD AUTO-RTO: 0 %
PLATELET # BLD AUTO: 279 X10(3)/MCL (ref 130–400)
PMV BLD AUTO: 9.3 FL (ref 7.4–10.4)
POTASSIUM SERPL-SCNC: 3.3 MMOL/L (ref 3.5–5.1)
PROT SERPL-MCNC: 5.8 GM/DL (ref 5.8–7.6)
PROTHROMBIN TIME: 12.9 SECONDS (ref 12.5–14.5)
RBC # BLD AUTO: 3.66 X10(6)/MCL (ref 4.2–5.4)
SODIUM SERPL-SCNC: 134 MMOL/L (ref 136–145)
TROPONIN I SERPL-MCNC: 0.02 NG/ML (ref 0–0.04)
WBC # BLD AUTO: 9.32 X10(3)/MCL (ref 4.5–11.5)
WBC # FLD AUTO: 4019 /UL

## 2024-10-05 PROCEDURE — 93010 ELECTROCARDIOGRAM REPORT: CPT | Mod: 76,,, | Performed by: INTERNAL MEDICINE

## 2024-10-05 PROCEDURE — 63600175 PHARM REV CODE 636 W HCPCS: Performed by: STUDENT IN AN ORGANIZED HEALTH CARE EDUCATION/TRAINING PROGRAM

## 2024-10-05 PROCEDURE — 25000003 PHARM REV CODE 250: Performed by: STUDENT IN AN ORGANIZED HEALTH CARE EDUCATION/TRAINING PROGRAM

## 2024-10-05 PROCEDURE — 83735 ASSAY OF MAGNESIUM: CPT | Performed by: STUDENT IN AN ORGANIZED HEALTH CARE EDUCATION/TRAINING PROGRAM

## 2024-10-05 PROCEDURE — 93005 ELECTROCARDIOGRAM TRACING: CPT

## 2024-10-05 PROCEDURE — 87184 SC STD DISK METHOD PER PLATE: CPT | Performed by: STUDENT IN AN ORGANIZED HEALTH CARE EDUCATION/TRAINING PROGRAM

## 2024-10-05 PROCEDURE — 25000003 PHARM REV CODE 250: Performed by: NURSE PRACTITIONER

## 2024-10-05 PROCEDURE — 25000003 PHARM REV CODE 250: Performed by: INTERNAL MEDICINE

## 2024-10-05 PROCEDURE — 87205 SMEAR GRAM STAIN: CPT | Performed by: STUDENT IN AN ORGANIZED HEALTH CARE EDUCATION/TRAINING PROGRAM

## 2024-10-05 PROCEDURE — 87077 CULTURE AEROBIC IDENTIFY: CPT | Performed by: STUDENT IN AN ORGANIZED HEALTH CARE EDUCATION/TRAINING PROGRAM

## 2024-10-05 PROCEDURE — 85025 COMPLETE CBC W/AUTO DIFF WBC: CPT | Performed by: STUDENT IN AN ORGANIZED HEALTH CARE EDUCATION/TRAINING PROGRAM

## 2024-10-05 PROCEDURE — 63600175 PHARM REV CODE 636 W HCPCS: Performed by: PHYSICIAN ASSISTANT

## 2024-10-05 PROCEDURE — 80053 COMPREHEN METABOLIC PANEL: CPT | Performed by: STUDENT IN AN ORGANIZED HEALTH CARE EDUCATION/TRAINING PROGRAM

## 2024-10-05 PROCEDURE — 84484 ASSAY OF TROPONIN QUANT: CPT | Performed by: STUDENT IN AN ORGANIZED HEALTH CARE EDUCATION/TRAINING PROGRAM

## 2024-10-05 PROCEDURE — 89051 BODY FLUID CELL COUNT: CPT | Performed by: STUDENT IN AN ORGANIZED HEALTH CARE EDUCATION/TRAINING PROGRAM

## 2024-10-05 PROCEDURE — 25000003 PHARM REV CODE 250: Performed by: PHYSICIAN ASSISTANT

## 2024-10-05 PROCEDURE — 99285 EMERGENCY DEPT VISIT HI MDM: CPT | Mod: 25

## 2024-10-05 PROCEDURE — 11000001 HC ACUTE MED/SURG PRIVATE ROOM

## 2024-10-05 PROCEDURE — 87070 CULTURE OTHR SPECIMN AEROBIC: CPT | Performed by: STUDENT IN AN ORGANIZED HEALTH CARE EDUCATION/TRAINING PROGRAM

## 2024-10-05 PROCEDURE — 93010 ELECTROCARDIOGRAM REPORT: CPT | Mod: ,,, | Performed by: INTERNAL MEDICINE

## 2024-10-05 PROCEDURE — 85610 PROTHROMBIN TIME: CPT | Performed by: STUDENT IN AN ORGANIZED HEALTH CARE EDUCATION/TRAINING PROGRAM

## 2024-10-05 PROCEDURE — 85730 THROMBOPLASTIN TIME PARTIAL: CPT | Performed by: STUDENT IN AN ORGANIZED HEALTH CARE EDUCATION/TRAINING PROGRAM

## 2024-10-05 RX ORDER — ONDANSETRON HYDROCHLORIDE 2 MG/ML
4 INJECTION, SOLUTION INTRAVENOUS EVERY 8 HOURS PRN
Status: DISCONTINUED | OUTPATIENT
Start: 2024-10-05 | End: 2024-10-05

## 2024-10-05 RX ORDER — ASPIRIN 81 MG/1
81 TABLET ORAL DAILY
Status: DISCONTINUED | OUTPATIENT
Start: 2024-10-06 | End: 2024-10-09 | Stop reason: HOSPADM

## 2024-10-05 RX ORDER — ESCITALOPRAM OXALATE 10 MG/1
10 TABLET ORAL DAILY
Status: DISCONTINUED | OUTPATIENT
Start: 2024-10-06 | End: 2024-10-09 | Stop reason: HOSPADM

## 2024-10-05 RX ORDER — DICYCLOMINE HYDROCHLORIDE 10 MG/1
10 CAPSULE ORAL EVERY 6 HOURS PRN
Status: DISCONTINUED | OUTPATIENT
Start: 2024-10-05 | End: 2024-10-05

## 2024-10-05 RX ORDER — TALC
9 POWDER (GRAM) TOPICAL NIGHTLY PRN
Status: DISCONTINUED | OUTPATIENT
Start: 2024-10-05 | End: 2024-10-09 | Stop reason: HOSPADM

## 2024-10-05 RX ORDER — ZOLPIDEM TARTRATE 10 MG/1
10 TABLET ORAL NIGHTLY
COMMUNITY
Start: 2024-09-17

## 2024-10-05 RX ORDER — ACETAMINOPHEN 325 MG/1
650 TABLET ORAL EVERY 4 HOURS PRN
Status: DISCONTINUED | OUTPATIENT
Start: 2024-10-05 | End: 2024-10-05

## 2024-10-05 RX ORDER — POTASSIUM CHLORIDE 20 MEQ/1
20 TABLET, EXTENDED RELEASE ORAL ONCE
Status: COMPLETED | OUTPATIENT
Start: 2024-10-05 | End: 2024-10-05

## 2024-10-05 RX ORDER — ACETAMINOPHEN 500 MG
1000 TABLET ORAL EVERY 6 HOURS PRN
Status: DISCONTINUED | OUTPATIENT
Start: 2024-10-05 | End: 2024-10-09 | Stop reason: HOSPADM

## 2024-10-05 RX ORDER — AMLODIPINE BESYLATE 5 MG/1
10 TABLET ORAL DAILY
Status: DISCONTINUED | OUTPATIENT
Start: 2024-10-06 | End: 2024-10-09 | Stop reason: HOSPADM

## 2024-10-05 RX ORDER — FOLIC ACID 1 MG/1
1 TABLET ORAL DAILY
Status: DISCONTINUED | OUTPATIENT
Start: 2024-10-06 | End: 2024-10-09 | Stop reason: HOSPADM

## 2024-10-05 RX ORDER — ACETAMINOPHEN 325 MG/1
650 TABLET ORAL EVERY 8 HOURS PRN
Status: DISCONTINUED | OUTPATIENT
Start: 2024-10-05 | End: 2024-10-05

## 2024-10-05 RX ORDER — HEPARIN SODIUM 5000 [USP'U]/ML
5000 INJECTION, SOLUTION INTRAVENOUS; SUBCUTANEOUS EVERY 12 HOURS
Status: DISCONTINUED | OUTPATIENT
Start: 2024-10-05 | End: 2024-10-09 | Stop reason: HOSPADM

## 2024-10-05 RX ORDER — IBUPROFEN 200 MG
24 TABLET ORAL
Status: DISCONTINUED | OUTPATIENT
Start: 2024-10-05 | End: 2024-10-09 | Stop reason: HOSPADM

## 2024-10-05 RX ORDER — TRAMADOL HYDROCHLORIDE 50 MG/1
50 TABLET ORAL EVERY 8 HOURS PRN
Status: DISCONTINUED | OUTPATIENT
Start: 2024-10-05 | End: 2024-10-09 | Stop reason: HOSPADM

## 2024-10-05 RX ORDER — IBUPROFEN 200 MG
16 TABLET ORAL
Status: DISCONTINUED | OUTPATIENT
Start: 2024-10-05 | End: 2024-10-09 | Stop reason: HOSPADM

## 2024-10-05 RX ORDER — POTASSIUM CHLORIDE 750 MG/1
10 TABLET, EXTENDED RELEASE ORAL DAILY
Status: DISCONTINUED | OUTPATIENT
Start: 2024-10-05 | End: 2024-10-05

## 2024-10-05 RX ORDER — POTASSIUM CHLORIDE 750 MG/1
10 TABLET, EXTENDED RELEASE ORAL DAILY
Status: DISCONTINUED | OUTPATIENT
Start: 2024-10-06 | End: 2024-10-05

## 2024-10-05 RX ORDER — GLUCAGON 1 MG
1 KIT INJECTION
Status: DISCONTINUED | OUTPATIENT
Start: 2024-10-05 | End: 2024-10-09 | Stop reason: HOSPADM

## 2024-10-05 RX ORDER — FUROSEMIDE 20 MG/1
20 TABLET ORAL DAILY
Status: DISCONTINUED | OUTPATIENT
Start: 2024-10-06 | End: 2024-10-09 | Stop reason: HOSPADM

## 2024-10-05 RX ORDER — ONDANSETRON HYDROCHLORIDE 2 MG/ML
4 INJECTION, SOLUTION INTRAVENOUS EVERY 6 HOURS PRN
Status: DISCONTINUED | OUTPATIENT
Start: 2024-10-05 | End: 2024-10-09 | Stop reason: HOSPADM

## 2024-10-05 RX ORDER — ZOLPIDEM TARTRATE 5 MG/1
5 TABLET ORAL ONCE
Status: COMPLETED | OUTPATIENT
Start: 2024-10-05 | End: 2024-10-05

## 2024-10-05 RX ADMIN — CEFTAZIDIME 1 G: 1 INJECTION, POWDER, FOR SOLUTION INTRAMUSCULAR; INTRAVENOUS at 02:10

## 2024-10-05 RX ADMIN — VANCOMYCIN HYDROCHLORIDE 1250 MG: 1.25 INJECTION, POWDER, LYOPHILIZED, FOR SOLUTION INTRAVENOUS at 04:10

## 2024-10-05 RX ADMIN — TRAMADOL HYDROCHLORIDE 50 MG: 50 TABLET, COATED ORAL at 05:10

## 2024-10-05 RX ADMIN — ACETAMINOPHEN 650 MG: 325 TABLET ORAL at 05:10

## 2024-10-05 RX ADMIN — ZOLPIDEM TARTRATE 5 MG: 5 TABLET ORAL at 10:10

## 2024-10-05 RX ADMIN — POTASSIUM CHLORIDE 20 MEQ: 1500 TABLET, EXTENDED RELEASE ORAL at 06:10

## 2024-10-05 RX ADMIN — HEPARIN SODIUM 5000 UNITS: 5000 INJECTION, SOLUTION INTRAVENOUS; SUBCUTANEOUS at 08:10

## 2024-10-05 NOTE — Clinical Note
Diagnosis: Bacterial peritonitis [[id number]]   Future Attending Provider: MACIE MEDRANO [[id number]]   Admit to which facility:: [facility] [[id number]]   Reason for IP Medical Treatment  (Clinical interventions that can only be accomplished in the IP setting? ) :: IV abx, nephrology consult

## 2024-10-05 NOTE — ED PROVIDER NOTES
Encounter Date: 10/5/2024    SCRIBE #1 NOTE: I, Yovani Walker, am scribing for, and in the presence of,  Dr. Guillen. I have scribed the following portions of the note - Other sections scribed: HPI, ROS, Physical Exam, MDM, Attending.       History     Chief Complaint   Patient presents with    Fatigue     Generalized weakness since yesterday, lower abd pain, LBM 2 days ago, on home dialysis     73 y/o female with history of HTN, HLD and ESRD on peritoneal dialysis presents to ED for fatigue and generalized weakness onset yesterday.  Pt also complains of lower abdominal pain and states her last BM was 3 days ago.  She states her symptoms are similar to previous episodes in which she had peritonitis.  She states she has been keeping up with her PD at home daily.  She was given 400 units of iron by her nurse a couple days ago.  Pt is anuric at baseline.  She denies focal weakness, fever, chills, chest pain, cough or SOB.  PCP is Dr. Crabtree and nephrologist is Dr. Mendoza.      The history is provided by the patient.     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 Glomerulitis with one activated endothelial cell: treated with Solumedrol pulse 375 mg IVBP x 3 doses with short prednisone taper; thymoglobin 100 mg x 6 doses 10/04/2007 minimal histologic changes no acute rejection     Allergy     Anemia of renal disease     CKD (chronic kidney disease), stage IV     CMV  (cytomegalovirus) status positive     Colon polyp     benign    Depression     GN (glomerulonephritis)     GN (glomerulonephritis)     unknown type    Herpes infection, other 2009    on buttocks    Hyperlipidemia     Hypertension     Immunosuppression     Living-donor kidney transplant recipient 01/15/2007    Meningitis     history of viral meningitis    Proteinuria 1/5/2015    Proteinuria     Pulmonary nodules     Renal hypertension     S/P breast augmentation     Skin cancer     Urinary tract infection      Past Surgical History:   Procedure Laterality Date    COLON SURGERY      COLONOSCOPY W/ BIOPSIES      cosemetic surgery      facelift and breast augmentation     HYSTERECTOMY      KIDNEY TRANSPLANT      NEPHRECTOMY Left     9/2016    RENAL BIOPSY      TONSILLECTOMY, ADENOIDECTOMY       Family History   Problem Relation Name Age of Onset    Kidney disease Sister          ESRD    Kidney disease Brother          CKD from HTN    Hypertension Brother      Heart failure Mother      Heart failure Father      Diabetes Neg Hx      Stroke Neg Hx       Social History     Tobacco Use    Smoking status: Former     Current packs/day: 1.00     Average packs/day: 1 pack/day for 15.0 years (15.0 ttl pk-yrs)     Types: Cigarettes    Smokeless tobacco: Never    Tobacco comments:     quit in 2014; smoked for 15 years and at the most smoked 1 ppd   Substance Use Topics    Alcohol use: Yes     Comment: seldom    Drug use: No     Review of Systems   Constitutional:  Positive for fatigue. Negative for chills and fever.   Respiratory:  Negative for cough and shortness of breath.    Cardiovascular:  Negative for chest pain.   Gastrointestinal:  Positive for abdominal pain.   Neurological:  Negative for weakness.       Physical Exam     Initial Vitals [10/05/24 0936]   BP Pulse Resp Temp SpO2   114/67 75 18 98.3 °F (36.8 °C) 97 %      MAP       --         Physical Exam    Nursing note and vitals reviewed.  Constitutional: She appears  well-developed and well-nourished. She is not diaphoretic. No distress.   HENT:   Head: Normocephalic and atraumatic.   Right Ear: External ear normal.   Left Ear: External ear normal.   Nose: Nose normal.   Eyes: Conjunctivae and EOM are normal. Pupils are equal, round, and reactive to light. Right eye exhibits no discharge. Left eye exhibits no discharge.   Cardiovascular:  Normal rate, regular rhythm, normal heart sounds and intact distal pulses.     Exam reveals no gallop and no friction rub.       No murmur heard.  Pulmonary/Chest: Breath sounds normal. No respiratory distress. She has no wheezes. She has no rhonchi. She has no rales. She exhibits no tenderness.   Abdominal: Abdomen is soft. Bowel sounds are normal. She exhibits no distension and no mass. There is abdominal tenderness (diffuse).   PD catheter to LUQ (clean and dry) There is guarding (voluntary). There is no rebound.   Musculoskeletal:         General: No edema. Normal range of motion.     Neurological: She is alert and oriented to person, place, and time. No cranial nerve deficit or sensory deficit.   Skin: Skin is warm and dry. Capillary refill takes less than 2 seconds. No erythema. There is pallor.         ED Course   Procedures  Labs Reviewed   COMPREHENSIVE METABOLIC PANEL - Abnormal       Result Value    Sodium 134 (*)     Potassium 3.3 (*)     Chloride 94 (*)     CO2 29      Glucose 89      Blood Urea Nitrogen 27.4 (*)     Creatinine 5.40 (*)     Calcium 8.0 (*)     Protein Total 5.8      Albumin 1.6 (*)     Globulin 4.2 (*)     Albumin/Globulin Ratio 0.4 (*)     Bilirubin Total 0.3       (*)     ALT 23      AST 29      eGFR 8      Anion Gap 11.0      BUN/Creatinine Ratio 5     APTT - Abnormal    PTT 34.6 (*)    CBC WITH DIFFERENTIAL - Abnormal    WBC 9.32      RBC 3.66 (*)     Hgb 10.3 (*)     Hct 32.8 (*)     MCV 89.6      MCH 28.1      MCHC 31.4 (*)     RDW 15.0      Platelet 279      MPV 9.3      Neut % 81.1      Lymph % 12.2       Mono % 5.4      Eos % 0.6      Basophil % 0.3      Lymph # 1.14      Neut # 7.55      Mono # 0.50      Eos # 0.06      Baso # 0.03      IG# 0.04      IG% 0.4      NRBC% 0.0     MAGNESIUM - Normal    Magnesium Level 1.90     PROTIME-INR - Normal    PT 12.9      INR 1.0     TROPONIN I - Normal    Troponin-I 0.017     GRAM STAIN [facility]    GRAM STAIN Many WBC observed      GRAM STAIN No bacteria seen     BODY FLUID CULTURE [facility]   CBC W/ AUTO DIFFERENTIAL    Narrative:     The following orders were created for panel order CBC auto differential.  Procedure                               Abnormality         Status                     ---------                               -----------         ------                     CBC with Differential[[id number]]       Abnormal            Final result                 Please view results for these tests on the individual orders.   CELL COUNT, BODY FLUID    Color BF Colorless      Clarity BF Cloudy      WBC BF 4,019     DIFFERENTIAL, BODY FLUID    Neutrophils % BF 80      Lymphocyte % BF 8      Monocyte % BF 5      Eosinophil % BF 7            Imaging Results              CT Abdomen Pelvis  Without Contrast (Final result)  Result time 10/05/24 11:45:19      Final result by Vadim Mtz MD (10/05/24 11:45:19)                   Impression:      1. Cirrhosis with moderate volume ascites in a peritoneal dialysis catheter in place.  2. Minimal left lower lobe atelectasis.  3. Diverticulosis without diverticulitis.      Electronically signed by: Vadim Mtz MD  Date:    10/05/2024  Time:    11:45               Narrative:    EXAMINATION:  CT ABDOMEN PELVIS WITHOUT CONTRAST    CLINICAL HISTORY:  Abdominal abscess/infection suspected;Abdominal pain, acute, nonlocalized;    TECHNIQUE:  Axial images of the abdomen and pelvis were obtained without IV contrast administration.  Coronal and sagittal reconstructions were provided.  Three dimensional and MIP images were obtained and evaluated.  Total  DLP was 550 mGy-cm. Dose lowering technique and automated exposure control were utilized for this exam.    COMPARISON:  CT of the abdomen and pelvis 09/27/2023.    FINDINGS:  There is minimal left lower lobe atelectasis.  The right lung base is clear.  The heart is normal in size.    The liver contour is nodular consistent with cirrhosis.  There is a moderate volume of ascites.  There is a peritoneal dialysis catheter in place.  The gallbladder is normal.  There are calcified granulomas throughout the spleen.  The pancreas and adrenal glands are normal.  There are simple cyst in the atrophic right kidney.  The left kidney is absent.    The stomach and small bowel are decompressed.  There is colonic diverticulosis without evidence of diverticulitis.  There is no pelvic or retroperitoneal lymphadenopathy.  The aorta is nonaneurysmal.  There is no lytic or blastic osseous lesion.                                       X-Ray Chest AP Portable (Final result)  Result time 10/05/24 10:52:17      Final result by Vadim Mtz MD (10/05/24 10:52:17)                   Impression:      Mild interstitial pulmonary edema.      Electronically signed by: Vadim Mtz MD  Date:    10/05/2024  Time:    10:52               Narrative:    EXAMINATION:  Single view chest radiograph.    CLINICAL HISTORY:  weakness;    TECHNIQUE:  Single view of the chest.    COMPARISON:  Chest radiograph 01/16/2024.    FINDINGS:  There is mild interstitial edema.  There is no pneumothorax.  The cardiac silhouette is normal in size.  There is no acute osseous abnormality.                                       Medications   vancomycin - pharmacy to dose (has no administration in time range)   vancomycin 1,250 mg in D5W 250 mL IVPB (admixture device) (has no administration in time range)   acetaminophen tablet 650 mg (has no administration in time range)   acetaminophen tablet 650 mg (has no administration in time range)   glucose chewable tablet 16 g (has no  administration in time range)   glucose chewable tablet 24 g (has no administration in time range)   glucagon (human recombinant) injection 1 mg (has no administration in time range)   dextrose 10% bolus 125 mL 125 mL (has no administration in time range)   dextrose 10% bolus 250 mL 250 mL (has no administration in time range)   ondansetron injection 4 mg (has no administration in time range)   ceftAZIDime (FORTAZ) 1 g in D5W 50 mL IVPB (has no administration in time range)   amLODIPine tablet 10 mg (has no administration in time range)   aspirin EC tablet 81 mg (has no administration in time range)   folic acid tablet 1 mg (has no administration in time range)   furosemide tablet 20 mg (has no administration in time range)   potassium chloride CR tablet 10 mEq (has no administration in time range)   EScitalopram oxalate tablet 10 mg (has no administration in time range)   heparin (porcine) injection 5,000 Units (has no administration in time range)   ceftAZIDime (FORTAZ) 1 g in D5W 50 mL IVPB (0 g Intravenous Stopped 10/5/24 1518)     Medical Decision Making  Differential diagnoses include, but are not limited to:  Bacterial peritonitis, appendicitis, biliary disease, diverticulitis,  AAA, ACS, mesenteric ischemia, intraabdominal abcess, retroperitoneal abcess, gastritis, gastroenteritis, hepatitis, hernia, pancreatitis, inflammatory bowel disease, PUD, SBP, nephrolithiasis, DKA,  consitpation, GERD, IBS    Patient was awake alert complains of mainly feeling diffusely weak diffuse abdominal pain.  Indeed her abdomen is soft but diffusely tender.  Distally from her dialysis PD catheter shows over 4000 WBCs 80% neutrophil persistent.  Concern for bacterial peritonitis given her history PD.  Consulted with Nephrology will follow in consult.  Discussed with pharmacy as well as hospitalist will admit on IV antibiotics for further evaluation management.  Patient was comfortable with plan.  Patient was admitted.          Amount and/or Complexity of Data Reviewed  External Data Reviewed: labs and notes.  Labs: ordered. Decision-making details documented in ED Course.  Radiology: ordered and independent interpretation performed. Decision-making details documented in ED Course.  ECG/medicine tests: ordered and independent interpretation performed. Decision-making details documented in ED Course.    Risk  Decision regarding hospitalization.            Scribe Attestation:   Scribe #1: I performed the above scribed service and the documentation accurately describes the services I performed. I attest to the accuracy of the note.    Attending Attestation:           Physician Attestation for Scribe:  Physician Attestation Statement for Scribe #1: I, reviewed documentation, as scribed by Yovani Walker in my presence, and it is both accurate and complete.             ED Course as of 10/05/24 1557   Sat Oct 05, 2024   0952 EKG done at 9:39 a.m. shows sinus rhythm rate of 75 .  Left axis deviation.  No obvious ST elevation or depression.  High voltage QRS complexes. [MM]   0956 Chart review reveals discharge summary from 08/30/2024 from this hospital.  History of ESRD on peritoneal dialysis, history of hypertension.  He was admitted for nonhealing wound on left lower extremity.  She was having foul drainage at that time. [MM]   1055 CBC auto differential(!)  No leukocytosis.  Mildly improved anemia when compared to prior from 2 weeks ago [MM]   1107 Troponin I: 0.017 [MM]   1107 INR: 1.0 [MM]   1107 Magnesium : 1.90 [MM]   1107 Comprehensive metabolic panel(!)  No significant electrolyte abnormality. [MM]   1107 Comprehensive metabolic panel(!)  No significant electrolyte abnormality.  Elevated BUN and creatinine patient was history of ESRD on hemodialysis.  Alkaline phosphatase 201 [MM]   1316 Neutrophils % BF: 80  Concerning for bacterial peritonitis [MM]   1556 X-Ray Chest AP Portable  Negative for acute [MM]      ED Course User  Index  [MM] Massimo Guillen MD                           Clinical Impression:  Final diagnoses:  [K65.9] Bacterial peritonitis (Primary)  [Z99.2] Peritoneal dialysis catheter in place          ED Disposition Condition    Admit Stable                Massimo Guileln MD  10/05/24 2082

## 2024-10-05 NOTE — PROGRESS NOTES
Pharmacist Renal Dose Adjustment Note    Malissa Schwartz is a 74 y.o. female being treated with the medication ceftazidime    Patient Data:    Vital Signs (Most Recent):  Temp: 98.3 °F (36.8 °C) (10/05/24 0936)  Pulse: 76 (10/05/24 1202)  Resp: 20 (10/05/24 0951)  BP: 121/68 (10/05/24 1202)  SpO2: 98 % (10/05/24 1202) Vital Signs (72h Range):  Temp:  [98.3 °F (36.8 °C)]   Pulse:  [75-76]   Resp:  [18-20]   BP: (114-135)/(67-81)   SpO2:  [97 %-100 %]      Recent Labs   Lab 10/05/24  1033   CREATININE 5.40*     Serum creatinine: 5.4 mg/dL (H) 10/05/24 1033  Estimated creatinine clearance: 7.1 mL/min (A)    Medication: ceftazidime dose: 1 g x 1 in ED - ceftazidime 1 g will be continued q24h per pharmacy to dose consult by ALISIA Henderson for PD patient.      Pharmacist's Name: Radha Gonzalez  Pharmacist's Extension: [phone]

## 2024-10-05 NOTE — PLAN OF CARE
Problem: Adult Inpatient Plan of Care  Goal: Plan of Care Review  Reactivated  Goal: Patient-Specific Goal (Individualized)  Reactivated  Goal: Absence of Hospital-Acquired Illness or Injury  Reactivated  Goal: Optimal Comfort and Wellbeing  Reactivated  Goal: Readiness for Transition of Care  Reactivated     Problem: Wound  Goal: Optimal Coping  Reactivated  Goal: Optimal Functional Ability  Reactivated  Goal: Absence of Infection Signs and Symptoms  Reactivated  Goal: Improved Oral Intake  Reactivated  Goal: Optimal Pain Control and Function  Reactivated  Goal: Skin Health and Integrity  Reactivated  Goal: Optimal Wound Healing  Reactivated     Problem: Skin Injury Risk Increased  Goal: Skin Health and Integrity  Reactivated     Problem: Pain Acute  Goal: Optimal Pain Control and Function  Reactivated     Problem: Fall Injury Risk  Goal: Absence of Fall and Fall-Related Injury  Reactivated     Problem: Infection  Goal: Absence of Infection Signs and Symptoms  Reactivated

## 2024-10-05 NOTE — H&P
[facility]  Hospital Medicine History & Physical Examination       Patient Name: Malissa Schwartz  MRN: [id number]  Patient Class: IP- Inpatient   Admission Date: 10/05/2024   Admitting Service: Hospital Medicine   Length of Stay: 0  Attending Physician: Michelle Garay MD  Primary Care Provider: Sergio Crabtree MD  Face-to-Face encounter date: 10/05/2024  Code Status: Full code   Chief Complaint: Fatigue (Generalized weakness since yesterday, lower abd pain, LBM 2 days ago, on home dialysis)      Present at Bedside: Significant other   Patient information was obtained from patient, patient's family, past medical records and ER records.      HISTORY OF PRESENT ILLNESS:   Malissa Schwartz is a 74 y.o. female with a past medical history of hypertension, hyperlipidemia, ESRD on PD, anemia of renal disease, glomerulonephritis s/p failed kidney transplant, cytomegalovirus, pulmonary nodules, skin cancer, and depression who presented to [facility] on 10/5/2024 with c/o generalized weakness.  Patient reported generalized weakness began yesterday, 10/04/2024 in addition to abdominal pain.  Patient reported her last bowel movement was 3 days ago and that pain feels similar to previous episodes peritonitis.  Patient reported compliance with daily PD with last run last night.  Patient denied fever, chills, nausea, vomiting, diarrhea, chest pain, rectal bleeding, and melena.  Initial vital signs in ED were /67, pulse 75, respirations 18, temperature 36.8° C, and SpO2 97% on room air.  Labs revealed WBC 9.32, RBC 3.66, hemoglobin 10.3, hematocrit 32.8, sodium 134, potassium 3.3, chloride 94, BUN 27.4, creatinine 5.40, , and troponin 0.017. Chest x-ray revealed mild interstitial pulmonary edema. CT abdomen and pelvis without contrast revealed cirrhosis with moderate volume ascites in a peritoneal dialysis catheter in place, minimal left lower lobe atelectasis, and diverticulosis without  diverticulitis.  EKG revealed normal sinus rhythm with heart rate of 75 beats per minute and left axis deviation. Patient was given Vancomycin 1,250 mg and Ceftazidime 1 g in ED. Nephrology was consulted. Patient was admitted to hospital medicine service for further medical management.     PAST MEDICAL HISTORY:   Hypertension   Hyperlipidemia   ESRD on PD   Anemia of renal disease   Glomerular nephritis s/p failed kidney transplant   Cytomegalovirus   Pulmonary nodules   Skin cancer   Depression    PAST SURGICAL HISTORY:     Past Surgical History:   Procedure Laterality Date    COLON SURGERY      COLONOSCOPY W/ BIOPSIES      cosemetic surgery      facelift and breast augmentation     HYSTERECTOMY      KIDNEY TRANSPLANT      NEPHRECTOMY Left     9/2016    RENAL BIOPSY      TONSILLECTOMY, ADENOIDECTOMY         FAMILY HISTORY:   Reviewed and negative    SOCIAL HISTORY:   Denied tobacco, alcohol, and illict drug use     Screening for Social Drivers for health:  Patient screened for food insecurity, housing instability, transportation needs, utility difficulties, and interpersonal safety (select all that apply as identified as concern)  []Housing or Food  []Transportation Needs  []Utility Difficulties  []Interpersonal safety  [x]None    ALLERGIES:   Codeine, Cefepime, Levofloxacin, and Iodine    HOME MEDICATIONS:     Prior to Admission medications    Medication Sig Start Date End Date Taking? Authorizing Provider   amLODIPine (NORVASC) 10 MG tablet Take 10 mg by mouth once daily.    Provider, Historical   aspirin (ECOTRIN) 81 MG EC tablet Take 81 mg by mouth once daily.    Provider, Historical   B complex-vitamin C-folic acid (NEPHRO-JENN) 0.8 mg Tab Take 0.8 mg by mouth Daily.    Provider, Historical   calcitRIOL (ROCALTROL) 0.25 MCG Cap Take 0.25 mcg by mouth 3 (three) times a week.    Provider, Historical   EScitalopram oxalate (LEXAPRO) 10 MG tablet Take 1 tablet (10 mg total) by mouth once daily. 8/30/24 8/30/25   James Oropeza MD   folic acid (FOLVITE) 1 MG tablet Take 1 mg by mouth once daily.    Provider, Historical   furosemide (LASIX) 20 MG tablet Take 20 mg by mouth once daily.    Provider, Historical   megestroL (MEGACE) 400 mg/10 mL (10 mL) Susp Take 10 mLs (400 mg total) by mouth once daily. 8/30/24 9/29/24  James Oropeza MD   potassium chloride (KLOR-CON) 10 MEQ TbSR Take 10 mEq by mouth once daily.    Provider, Historical   losartan (COZAAR) 100 MG tablet TAKE ONE TABLET BY MOUTH ONCE DAILY 1/28/20 5/13/22  Salvador Moeller MD   metoprolol succinate (TOPROL-XL) 25 MG 24 hr tablet Take 2 tablets (50 mg total) by mouth once daily.  Patient not taking: Reported on 5/12/2022 3/10/17 5/13/22  Linda Nelson MD   mycophenolate (CELLCEPT) 250 mg Cap Take 2 capsules (500 mg total) by mouth 2 (two) times daily. Z94.0 kidney transplant  Patient not taking: Reported on 5/12/2022 7/25/19 5/13/22  Nicole Trejo MD   tacrolimus (PROGRAF) 1 MG Cap Take 3mg every AM and 2mg every PM.  Route: Oral.  Z94.0 Kidney Transplant.  Patient not taking: Reported on 5/12/2022 7/26/19 5/13/22  Nicole Trejo MD     ________________________________________________________________________  INPATIENT LIST OF MEDICATIONS     Current Facility-Administered Medications:     acetaminophen tablet 650 mg, 650 mg, Oral, Q8H PRN, Nikhil Henderson PA-C    acetaminophen tablet 650 mg, 650 mg, Oral, Q4H PRN, Nkihil Henderson PA-C    ceftAZIDime (FORTAZ) 1 g in D5W 50 mL IVPB, 1 g, Intravenous, ED 1 Time, Massimo Guillen MD    dextrose 10% bolus 125 mL 125 mL, 12.5 g, Intravenous, PRN, Nikhil Henderson PA-C    dextrose 10% bolus 250 mL 250 mL, 25 g, Intravenous, PRN, Nikhil Henderson PA-C    glucagon (human recombinant) injection 1 mg, 1 mg, Intramuscular, PRN, Nikhil Henderson PA-C    glucose chewable tablet 16 g, 16 g, Oral, PRN, Nikhil Henderson PA-C    glucose chewable tablet 24 g, 24 g, Oral, PRN,  Nikhil Henderson PA-C    ondansetron injection 4 mg, 4 mg, Intravenous, Q6H PRN, Nikhil Henderson PA-C    vancomycin - pharmacy to dose, , Intravenous, pharmacy to manage frequency, Massimo Guillen MD    vancomycin 1,250 mg in D5W 250 mL IVPB (admixture device), 1,250 mg, Intravenous, ED 1 Time, Massimo Guillen MD    Current Outpatient Medications:     amLODIPine (NORVASC) 10 MG tablet, Take 10 mg by mouth once daily., Disp: , Rfl:     aspirin (ECOTRIN) 81 MG EC tablet, Take 81 mg by mouth once daily., Disp: , Rfl:     B complex-vitamin C-folic acid (NEPHRO-JENN) 0.8 mg Tab, Take 0.8 mg by mouth Daily., Disp: , Rfl:     calcitRIOL (ROCALTROL) 0.25 MCG Cap, Take 0.25 mcg by mouth 3 (three) times a week., Disp: , Rfl:     EScitalopram oxalate (LEXAPRO) 10 MG tablet, Take 1 tablet (10 mg total) by mouth once daily., Disp: 90 tablet, Rfl: 3    folic acid (FOLVITE) 1 MG tablet, Take 1 mg by mouth once daily., Disp: , Rfl:     furosemide (LASIX) 20 MG tablet, Take 20 mg by mouth once daily., Disp: , Rfl:     megestroL (MEGACE) 400 mg/10 mL (10 mL) Susp, Take 10 mLs (400 mg total) by mouth once daily., Disp: 300 mL, Rfl: 0    potassium chloride (KLOR-CON) 10 MEQ TbSR, Take 10 mEq by mouth once daily., Disp: , Rfl:     Scheduled Meds:   cefTAZidime IV (PEDS and ADULTS)  1 g Intravenous ED 1 Time    vancomycin 1,250 mg in D5W 250 mL IVPB (admixture device)  1,250 mg Intravenous ED 1 Time     Continuous Infusions:  PRN Meds:.  Current Facility-Administered Medications:     acetaminophen, 650 mg, Oral, Q8H PRN    acetaminophen, 650 mg, Oral, Q4H PRN    dextrose 10%, 12.5 g, Intravenous, PRN    dextrose 10%, 25 g, Intravenous, PRN    glucagon (human recombinant), 1 mg, Intramuscular, PRN    glucose, 16 g, Oral, PRN    glucose, 24 g, Oral, PRN    ondansetron, 4 mg, Intravenous, Q6H PRN    vancomycin - pharmacy to dose, , Intravenous, pharmacy to manage frequency      REVIEW OF SYSTEMS:   Except as documented, all other  systems reviewed and negative.    PHYSICAL EXAM:     VITAL SIGNS: 24 HRS MIN & MAX LAST   Temp  Min: 98.3 °F (36.8 °C)  Max: 98.3 °F (36.8 °C) 98.3 °F (36.8 °C)   BP  Min: 114/67  Max: 135/81 121/68   Pulse  Min: 75  Max: 76  76   Resp  Min: 18  Max: 20 20   SpO2  Min: 97 %  Max: 100 % 98 %       General appearance: Female in no apparent distress.  HENT: Atraumatic head.   Eyes: Normal extraocular movements.   Neck: Supple.   Lungs: Clear to auscultation bilaterally.   Heart: Regular rate and rhythm.  Abdomen: Soft, bilateral lower abdominal tenderness to palpation. PD cath in place   Extremities: No cyanosis, clubbing, or deformities.   Skin: No Rash.   Neuro: Awake, alert, and oriented. Psych/mental status:  Responds appropriately to questions.     LABS AND IMAGING:     Recent Labs   Lab 10/05/24  1033   WBC 9.32   RBC 3.66*   HGB 10.3*   HCT 32.8*   MCV 89.6   MCH 28.1   MCHC 31.4*   RDW 15.0      MPV 9.3       Recent Labs   Lab 10/05/24  1033   *   K 3.3*   CL 94*   CO2 29   BUN 27.4*   CREATININE 5.40*   CALCIUM 8.0*   MG 1.90   ALBUMIN 1.6*   ALKPHOS 201*   ALT 23   AST 29   BILITOT 0.3       Microbiology Results (last 7 days)       Procedure Component Value Units Date/Time    Gram Stain [[id number]] Collected: 10/05/24 1059    Order Status: Completed Specimen: Peritoneal Fluid Updated: 10/05/24 1345     GRAM STAIN Many WBC observed      No bacteria seen    Body Fluid Culture [[id number]] Collected: 10/05/24 1059    Order Status: Resulted Specimen: Peritoneal Fluid Updated: 10/05/24 1107             CT Abdomen Pelvis  Without Contrast  Narrative: EXAMINATION:  CT ABDOMEN PELVIS WITHOUT CONTRAST    CLINICAL HISTORY:  Abdominal abscess/infection suspected;Abdominal pain, acute, nonlocalized;    TECHNIQUE:  Axial images of the abdomen and pelvis were obtained without IV contrast administration.  Coronal and sagittal reconstructions were provided.  Three dimensional and MIP images were obtained and  evaluated.  Total DLP was 550 mGy-cm. Dose lowering technique and automated exposure control were utilized for this exam.    COMPARISON:  CT of the abdomen and pelvis 09/27/2023.    FINDINGS:  There is minimal left lower lobe atelectasis.  The right lung base is clear.  The heart is normal in size.    The liver contour is nodular consistent with cirrhosis.  There is a moderate volume of ascites.  There is a peritoneal dialysis catheter in place.  The gallbladder is normal.  There are calcified granulomas throughout the spleen.  The pancreas and adrenal glands are normal.  There are simple cyst in the atrophic right kidney.  The left kidney is absent.    The stomach and small bowel are decompressed.  There is colonic diverticulosis without evidence of diverticulitis.  There is no pelvic or retroperitoneal lymphadenopathy.  The aorta is nonaneurysmal.  There is no lytic or blastic osseous lesion.  Impression: 1. Cirrhosis with moderate volume ascites in a peritoneal dialysis catheter in place.  2. Minimal left lower lobe atelectasis.  3. Diverticulosis without diverticulitis.    Electronically signed by: Vadim Mtz MD  Date:    10/05/2024  Time:    11:45  X-Ray Chest AP Portable  Narrative: EXAMINATION:  Single view chest radiograph.    CLINICAL HISTORY:  weakness;    TECHNIQUE:  Single view of the chest.    COMPARISON:  Chest radiograph 01/16/2024.    FINDINGS:  There is mild interstitial edema.  There is no pneumothorax.  The cardiac silhouette is normal in size.  There is no acute osseous abnormality.  Impression: Mild interstitial pulmonary edema.    Electronically signed by: Vadim Mtz MD  Date:    10/05/2024  Time:    10:52        ASSESSMENT & PLAN:   Assessment:   SBP   ESRD on PD   Normocytic anemia, stable   Hyponatremia, mild   Hypokalemia, mild  History of hypertension, hyperlipidemia, ESRD on PD, anemia of renal disease, glomerulonephritis s/p failed kidney transplant, cytomegalovirus, pulmonary nodules,  skin cancer, and depression       Plan:  IV Vancomycin and Ceftazidime   Body fluid culture pending, follow up results   Nephrology consulted, appreciate recommendations   Close H&H monitoring   Close electrolyte monitoring   Resume home medications as deemed appropriate once medication reconciliation is updated  Labs in AM    VTE Prophylaxis: Heparin 5000 units BID     Discharge Planning and Disposition: TBD    I, Nikhil Henderson PA-C have reviewed and discussed the case with Michelle Garay MD  Please see the attending MD's addendum for further assessment and plan.    Nikhil Henderson PA-C  Department of Hospital Medicine   [facility]   10/05/2024    This note was created with the assistance of [employer] voice recognition software. There may be transcription errors as a result of using this technology, however minimal. Effort has been made to assure accuracy of transcription, but any obvious errors or omissions should be clarified with the author of the document.    _______________________________________________________________________________  MD Addendum:  I, Dr.Praneet Tanisha MD  , assumed care of this patient today   For the patient encounter, I performed the substantive portion of the visit, I reviewed the NP/PA documentation, treatment plan, and medical decision making.  I had face to face time with this patient     74-year-old female presented to ED for evaluation of 2 day  history of lower abdominal pain, generalized weakness.  She has ESRD on PD.  Reported SBP 1 year ago.  Currently not on any immunosuppressants.  Denied fevers, chills at home    Hemodynamically stable on room air.      Peritoneal fluid was obtained in the ED showed cloudy fluid with WBC 4000 cells with neutrophils 80% consistent with peritonitis    CBC- WBC 9.3 K, hemoglobin 10.3, platelets 279 K,CMP sodium 134, K 3.3, BUN 27.4, creatinine 5.4    Exam  General: NAD   Abdomen:  Soft,+pd cath,  tenderness  to palpation more in lower quadrants, voluntary guarding  Lungs:  Clear to auscultation   Heart: Regular rate and rhythm   Ext: wounds to LE , refer to image       MDM  Spontaneous bacterial peritonitis  ESRD on PD    Continue broad-spectrum IV antibiotics, follow cultures.  Patient wants to continue PD , need repeat peritoneal fluid studies  Nephrology consulted, follow their recommendations on dialysis.   Consider ID consultation based on clinical course  Supportive care  Resume home meds  Wound care    Case discussed with patient's nurse     Goals of care discuss code status: Full code        10/05/2024

## 2024-10-05 NOTE — PROGRESS NOTES
Pharmacokinetic Initial Assessment: IV Vancomycin    Assessment/Plan:    Initiate intravenous vancomycin with loading dose of 1250 mg once with subsequent doses when random concentrations are less than 20 mcg/mL  Desired empiric serum trough concentration is 10 to 20 mcg/mL  Draw vancomycin random level on 10/08/24 at 0430.  Pharmacy will continue to follow and monitor vancomycin.      Please contact pharmacy at extension [phone] with any questions regarding this assessment.     Thank you for the consult,   Radha Braswellaux       Patient brief summary:  Malissa Schwartz is a 74 y.o. female initiated on antimicrobial therapy with IV Vancomycin for treatment of suspected intra-abdominal infection    Drug Allergies:   Review of patient's allergies indicates:   Allergen Reactions    Codeine Palpitations and Rash    Cefepime      Other Reaction(s): Encephalopathy    Renal impairment increased risk for this reaction    Levofloxacin      Other Reaction(s): Encephalopathy    Renal pt who had some encephalopathy while on Levaquin & Cefepime, neurology physicians think they may have been due to the antibiotics (both possible side effects for each)    Iodine Nausea Only       Actual Body Weight:   59 kg    Renal Function:   Estimated Creatinine Clearance: 7.1 mL/min (A) (based on SCr of 5.4 mg/dL (H)).,     Dialysis Method (if applicable):  peritoneal dialysis    CBC (last 72 hours):  Recent Labs   Lab Result Units 10/05/24  1033   WBC x10(3)/mcL 9.32   Hgb g/dL 10.3*   Hct % 32.8*   Platelet x10(3)/mcL 279   Mono % % 5.4   Eos % % 0.6   Basophil % % 0.3       Metabolic Panel (last 72 hours):  Recent Labs   Lab Result Units 10/05/24  1033   Sodium mmol/L 134*   Potassium mmol/L 3.3*   Chloride mmol/L 94*   CO2 mmol/L 29   Glucose mg/dL 89   Blood Urea Nitrogen mg/dL 27.4*   Creatinine mg/dL 5.40*   Albumin g/dL 1.6*   Bilirubin Total mg/dL 0.3   ALP unit/L 201*   AST unit/L 29   ALT unit/L 23   Magnesium Level mg/dL 1.90       Drug  "levels (last 3 results):  No results for input(s): "VANCOMYCINRA", "VANCORANDOM", "VANCOMYCINPE", "VANCOPEAK", "VANCOMYCINTR", "VANCOTROUGH" in the last 72 hours.    Microbiologic Results:  Microbiology Results (last 7 days)       Procedure Component Value Units Date/Time    Body Fluid Culture [[id number]] Collected: 10/05/24 1059    Order Status: Sent Specimen: Peritoneal Fluid Updated: 10/05/24 1107    Gram Stain [[id number]] Collected: 10/05/24 1059    Order Status: Sent Specimen: Peritoneal Fluid Updated: 10/05/24 1106            "

## 2024-10-06 LAB
ALBUMIN SERPL-MCNC: 1.3 G/DL (ref 3.4–4.8)
ALBUMIN/GLOB SERPL: 0.4 RATIO (ref 1.1–2)
ALP SERPL-CCNC: 194 UNIT/L (ref 40–150)
ALT SERPL-CCNC: 27 UNIT/L (ref 0–55)
ANION GAP SERPL CALC-SCNC: 10 MEQ/L
AST SERPL-CCNC: 39 UNIT/L (ref 5–34)
BASOPHILS # BLD AUTO: 0.03 X10(3)/MCL
BASOPHILS NFR BLD AUTO: 0.5 %
BILIRUB SERPL-MCNC: 0.3 MG/DL
BUN SERPL-MCNC: 36.2 MG/DL (ref 9.8–20.1)
CALCIUM SERPL-MCNC: 7.2 MG/DL (ref 8.4–10.2)
CHLORIDE SERPL-SCNC: 96 MMOL/L (ref 98–107)
CO2 SERPL-SCNC: 28 MMOL/L (ref 23–31)
CREAT SERPL-MCNC: 6.65 MG/DL (ref 0.55–1.02)
CREAT/UREA NIT SERPL: 5
EOSINOPHIL # BLD AUTO: 0.16 X10(3)/MCL (ref 0–0.9)
EOSINOPHIL NFR BLD AUTO: 2.4 %
ERYTHROCYTE [DISTWIDTH] IN BLOOD BY AUTOMATED COUNT: 14.9 % (ref 11.5–17)
FERRITIN SERPL-MCNC: 3837.15 NG/ML (ref 4.63–204)
GFR SERPLBLD CREATININE-BSD FMLA CKD-EPI: 6 ML/MIN/1.73/M2
GLOBULIN SER-MCNC: 3 GM/DL (ref 2.4–3.5)
GLUCOSE SERPL-MCNC: 75 MG/DL (ref 82–115)
HCT VFR BLD AUTO: 27.7 % (ref 37–47)
HGB BLD-MCNC: 8.9 G/DL (ref 12–16)
IMM GRANULOCYTES # BLD AUTO: 0.02 X10(3)/MCL (ref 0–0.04)
IMM GRANULOCYTES NFR BLD AUTO: 0.3 %
IRON SATN MFR SERPL: ABNORMAL %
IRON SERPL-MCNC: 45 UG/DL (ref 50–170)
LYMPHOCYTES # BLD AUTO: 0.98 X10(3)/MCL (ref 0.6–4.6)
LYMPHOCYTES NFR BLD AUTO: 14.8 %
MCH RBC QN AUTO: 28.7 PG (ref 27–31)
MCHC RBC AUTO-ENTMCNC: 32.1 G/DL (ref 33–36)
MCV RBC AUTO: 89.4 FL (ref 80–94)
MONOCYTES # BLD AUTO: 0.47 X10(3)/MCL (ref 0.1–1.3)
MONOCYTES NFR BLD AUTO: 7.1 %
NEUTROPHILS # BLD AUTO: 4.97 X10(3)/MCL (ref 2.1–9.2)
NEUTROPHILS NFR BLD AUTO: 74.9 %
NRBC BLD AUTO-RTO: 0 %
OHS QRS DURATION: 88 MS
OHS QRS DURATION: 88 MS
OHS QTC CALCULATION: 471 MS
OHS QTC CALCULATION: 492 MS
PLATELET # BLD AUTO: 256 X10(3)/MCL (ref 130–400)
PMV BLD AUTO: 9.2 FL (ref 7.4–10.4)
POTASSIUM SERPL-SCNC: 4 MMOL/L (ref 3.5–5.1)
PROT SERPL-MCNC: 4.3 GM/DL (ref 5.8–7.6)
RBC # BLD AUTO: 3.1 X10(6)/MCL (ref 4.2–5.4)
SODIUM SERPL-SCNC: 134 MMOL/L (ref 136–145)
TIBC SERPL-MCNC: <25 UG/DL (ref 70–310)
TIBC SERPL-MCNC: ABNORMAL UG/DL
TRANSFERRIN SERPL-MCNC: 50 MG/DL (ref 173–360)
WBC # BLD AUTO: 6.63 X10(3)/MCL (ref 4.5–11.5)

## 2024-10-06 PROCEDURE — 85025 COMPLETE CBC W/AUTO DIFF WBC: CPT | Performed by: PHYSICIAN ASSISTANT

## 2024-10-06 PROCEDURE — 36415 COLL VENOUS BLD VENIPUNCTURE: CPT | Performed by: PHYSICIAN ASSISTANT

## 2024-10-06 PROCEDURE — 11000001 HC ACUTE MED/SURG PRIVATE ROOM

## 2024-10-06 PROCEDURE — 90945 DIALYSIS ONE EVALUATION: CPT

## 2024-10-06 PROCEDURE — 63600175 PHARM REV CODE 636 W HCPCS: Performed by: PHYSICIAN ASSISTANT

## 2024-10-06 PROCEDURE — 25000003 PHARM REV CODE 250: Performed by: INTERNAL MEDICINE

## 2024-10-06 PROCEDURE — 80053 COMPREHEN METABOLIC PANEL: CPT | Performed by: PHYSICIAN ASSISTANT

## 2024-10-06 PROCEDURE — 82728 ASSAY OF FERRITIN: CPT | Performed by: NURSE PRACTITIONER

## 2024-10-06 PROCEDURE — 83540 ASSAY OF IRON: CPT | Performed by: NURSE PRACTITIONER

## 2024-10-06 PROCEDURE — 25000003 PHARM REV CODE 250: Performed by: PHYSICIAN ASSISTANT

## 2024-10-06 PROCEDURE — 83550 IRON BINDING TEST: CPT | Performed by: NURSE PRACTITIONER

## 2024-10-06 RX ORDER — ZOLPIDEM TARTRATE 5 MG/1
10 TABLET ORAL NIGHTLY
Status: DISCONTINUED | OUTPATIENT
Start: 2024-10-06 | End: 2024-10-09 | Stop reason: HOSPADM

## 2024-10-06 RX ADMIN — FOLIC ACID 1 MG: 1 TABLET ORAL at 08:10

## 2024-10-06 RX ADMIN — ZOLPIDEM TARTRATE 10 MG: 5 TABLET, COATED ORAL at 08:10

## 2024-10-06 RX ADMIN — CEFTAZIDIME 1 G: 1 INJECTION, POWDER, FOR SOLUTION INTRAMUSCULAR; INTRAVENOUS at 02:10

## 2024-10-06 RX ADMIN — AMLODIPINE BESYLATE 10 MG: 5 TABLET ORAL at 08:10

## 2024-10-06 RX ADMIN — FUROSEMIDE 20 MG: 20 TABLET ORAL at 08:10

## 2024-10-06 RX ADMIN — HEPARIN SODIUM 5000 UNITS: 5000 INJECTION, SOLUTION INTRAVENOUS; SUBCUTANEOUS at 08:10

## 2024-10-06 RX ADMIN — ASPIRIN 81 MG: 81 TABLET, COATED ORAL at 08:10

## 2024-10-06 NOTE — PROGRESS NOTES
NEPHROLOGY CONSULT     Consultant Attending:     Dr. Long Stewart    Reason for Consult:     ESRD on PD    Subjective:      History of Present Illness:  Malissa Schwartz is a 74 y.o. white female well known to our service with a history of end-stage renal disease on peritoneal dialysis.  She is normally followed in the office by Dr. Mendoza.  She presented to the emergency department yesterday with fatigue and generalized weakness that has started the day prior.  She also had some lower abdominal pain.  She has had previous peritonitis and felt that this may be the same thing.  Fluid culture was drawn from her peritoneal dialysis catheter and is positive, suggesting peritonitis.  She was started empirically on Fortaz.  Clinically she feels much improved today.  Peritoneal dialysis was not done on the night of 10/05/2024 to give her abdomen a break.  I will resume her home prescription of CCPD tonight.  Currently vital signs are stable.  She is afebrile.  Would continue empiric antibiotics.  Instillation of antibiotics in the peritoneal space to be decided by Dr. Mendoza tomorrow.  No family is at the bedside on exam.  Other pertinent medical history includes hypertension, hyperlipidemia, renal transplant with subsequent rejection, skin cancer and anemia secondary to chronic kidney disease.        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 Glomerulitis with one activated endothelial cell: treated with Solumedrol pulse 375 mg IVBP x 3 doses with short prednisone taper; thymoglobin 100 mg x 6 doses 10/04/2007 minimal histologic changes no acute rejection     Allergy     Anemia of renal disease     CKD (chronic kidney disease), stage IV     CMV (cytomegalovirus) status positive     Colon polyp     benign    Depression     GN (glomerulonephritis)     GN (glomerulonephritis)     unknown type    Herpes  infection, other 2009    on buttocks    Hyperlipidemia     Hypertension     Immunosuppression     Living-donor kidney transplant recipient 01/15/2007    Meningitis     history of viral meningitis    Proteinuria 1/5/2015    Proteinuria     Pulmonary nodules     Renal hypertension     S/P breast augmentation     Skin cancer     Urinary tract infection        Past Surgical History:  Past Surgical History:   Procedure Laterality Date    COLON SURGERY      COLONOSCOPY W/ BIOPSIES      cosemetic surgery      facelift and breast augmentation     HYSTERECTOMY      KIDNEY TRANSPLANT      NEPHRECTOMY Left     9/2016    RENAL BIOPSY      TONSILLECTOMY, ADENOIDECTOMY         Allergies:  Review of patient's allergies indicates:   Allergen Reactions    Codeine Palpitations and Rash    Cefepime      Other Reaction(s): Encephalopathy    Renal impairment increased risk for this reaction    Levofloxacin      Other Reaction(s): Encephalopathy    Renal pt who had some encephalopathy while on Levaquin & Cefepime, neurology physicians think they may have been due to the antibiotics (both possible side effects for each)    Iodine Nausea Only       Medications:   In-Hospital Scheduled Medications:   amLODIPine  10 mg Oral Daily    aspirin  81 mg Oral Daily    cefTAZidime IV (PEDS and ADULTS)  1 g Intravenous Q24H    EScitalopram oxalate  10 mg Oral Daily    folic acid  1 mg Oral Daily    furosemide  20 mg Oral Daily    heparin (porcine)  5,000 Units Subcutaneous Q12H      In-Hospital PRN Medications:    Current Facility-Administered Medications:     acetaminophen, 1,000 mg, Oral, Q6H PRN    dextrose 10%, 12.5 g, Intravenous, PRN    dextrose 10%, 25 g, Intravenous, PRN    glucagon (human recombinant), 1 mg, Intramuscular, PRN    glucose, 16 g, Oral, PRN    glucose, 24 g, Oral, PRN    melatonin, 9 mg, Oral, Nightly PRN    ondansetron, 4 mg, Intravenous, Q6H PRN    traMADoL, 50 mg, Oral, Q8H PRN    vancomycin - pharmacy to dose, ,  Intravenous, pharmacy to manage frequency   In-Hospital IV Infusion Medications:     Home Medications:  Prior to Admission medications    Medication Sig Start Date End Date Taking? Authorizing Provider   zolpidem (AMBIEN) 10 mg Tab Take 10 mg by mouth every evening. 9/17/24  Yes Provider, Historical   amLODIPine (NORVASC) 10 MG tablet Take 10 mg by mouth once daily.    Provider, Historical   aspirin (ECOTRIN) 81 MG EC tablet Take 81 mg by mouth once daily.    Provider, Historical   B complex-vitamin C-folic acid (NEPHRO-JENN) 0.8 mg Tab Take 0.8 mg by mouth Daily.    Provider, Historical   calcitRIOL (ROCALTROL) 0.25 MCG Cap Take 0.25 mcg by mouth 3 (three) times a week.    Provider, Historical   EScitalopram oxalate (LEXAPRO) 10 MG tablet Take 1 tablet (10 mg total) by mouth once daily. 8/30/24 8/30/25  James Oropeza MD   folic acid (FOLVITE) 1 MG tablet Take 1 mg by mouth once daily.    Provider, Historical   furosemide (LASIX) 20 MG tablet Take 20 mg by mouth once daily.    Provider, Historical   megestroL (MEGACE) 400 mg/10 mL (10 mL) Susp Take 10 mLs (400 mg total) by mouth once daily. 8/30/24 9/29/24  James Oropeza MD   potassium chloride (KLOR-CON) 10 MEQ TbSR Take 10 mEq by mouth once daily.    Provider, Historical   losartan (COZAAR) 100 MG tablet TAKE ONE TABLET BY MOUTH ONCE DAILY 1/28/20 5/13/22  Salvador Moeller MD   metoprolol succinate (TOPROL-XL) 25 MG 24 hr tablet Take 2 tablets (50 mg total) by mouth once daily.  Patient not taking: Reported on 5/12/2022 3/10/17 5/13/22  Linda Nelson MD   mycophenolate (CELLCEPT) 250 mg Cap Take 2 capsules (500 mg total) by mouth 2 (two) times daily. Z94.0 kidney transplant  Patient not taking: Reported on 5/12/2022 7/25/19 5/13/22  Nicole Trejo MD   tacrolimus (PROGRAF) 1 MG Cap Take 3mg every AM and 2mg every PM.  Route: Oral.  Z94.0 Kidney Transplant.  Patient not taking: Reported on 5/12/2022 7/26/19 5/13/22  Coit,  "Nicole Chau MD       Family History:  Family History   Problem Relation Name Age of Onset    Kidney disease Sister          ESRD    Kidney disease Brother          CKD from HTN    Hypertension Brother      Heart failure Mother      Heart failure Father      Diabetes Neg Hx      Stroke Neg Hx         Social History:  Social History     Tobacco Use    Smoking status: Former     Current packs/day: 1.00     Average packs/day: 1 pack/day for 15.0 years (15.0 ttl pk-yrs)     Types: Cigarettes    Smokeless tobacco: Never    Tobacco comments:     quit in ; smoked for 15 years and at the most smoked 1 ppd   Substance Use Topics    Alcohol use: Yes     Comment: seldom    Drug use: No       ROS  Some abdominal pain--Much improved  No Fever/ chill  No CP/SOB           Objective:   Last 24 Hour Vital Signs:  BP  Min: 104/68  Max: 134/85  Temp  Av.1 °F (36.7 °C)  Min: 98 °F (36.7 °C)  Max: 98.2 °F (36.8 °C)  Pulse  Av.1  Min: 74  Max: 80  Resp  Av  Min: 18  Max: 18  SpO2  Av.1 %  Min: 93 %  Max: 98 %  Height  Av' 2" (157.5 cm)  Min: 5' 2" (157.5 cm)  Max: 5' 2" (157.5 cm)  Weight  Av kg (130 lb 1.1 oz)  Min: 59 kg (130 lb 1.1 oz)  Max: 59 kg (130 lb 1.1 oz)  No intake/output data recorded.    Physical Examination:    /69   Pulse 75   Temp 98.5 °F (36.9 °C) (Oral)   Resp 18   Ht 5' 2" (1.575 m)   Wt 59 kg (130 lb 1.1 oz)   SpO2 (!) 94%   BMI 23.79 kg/m²     General Appearance:    Alert, cooperative, no distress, appears stated age   Head:    Normocephalic, without obvious abnormality, atraumatic   Eyes:    PERRL, conjunctiva/corneas clear, EOM's intact, fundi     benign, both eyes   Ears:    Normal TM's and external ear canals, both ears   Nose:   Nares normal, septum midline, mucosa normal, no drainage    or sinus tenderness   Throat:   Lips, mucosa, and tongue normal; teeth and gums normal   Neck:   Supple, symmetrical, trachea midline, no adenopathy;     thyroid:  no " enlargement/tenderness/nodules; no carotid    bruit or JVD   Back:     Symmetric, no curvature, ROM normal, no CVA tenderness   Lungs:     Clear to auscultation bilaterally, respirations unlabored   Chest Wall:    No tenderness or deformity    Heart:    Regular rate and rhythm, S1 and S2 normal, no murmur, rub   or gallop       Abdomen:     Soft, mild tenderness, bowel sounds active all four quadrants,     no masses, no organomegaly           Extremities:   Extremities normal, atraumatic, no cyanosis or edema   Pulses:   2+ and symmetric all extremities   Skin:   Skin color, texture, turgor normal, no rashes or lesions   Lymph nodes:   Cervical, supraclavicular, and axillary nodes normal   Neurologic:   CNII-XII intact, normal strength, sensation and reflexes     throughout         Laboratory Results:    Trended Lab Data:  Recent Labs   Lab 10/05/24  1033 10/06/24  0356   WBC 9.32 6.63   HGB 10.3* 8.9*   HCT 32.8* 27.7*    256   MCV 89.6 89.4   RDW 15.0 14.9   * 134*   K 3.3* 4.0   CL 94* 96*   CO2 29 28   BUN 27.4* 36.2*   ALBUMIN 1.6* 1.3*   BILITOT 0.3 0.3   AST 29 39*   ALKPHOS 201* 194*   ALT 23 27           Microbiology Data:      Other Laboratory Data:      Other Results:      Radiology:  CT Abdomen Pelvis  Without Contrast    Result Date: 10/5/2024  EXAMINATION: CT ABDOMEN PELVIS WITHOUT CONTRAST CLINICAL HISTORY: Abdominal abscess/infection suspected;Abdominal pain, acute, nonlocalized; TECHNIQUE: Axial images of the abdomen and pelvis were obtained without IV contrast administration.  Coronal and sagittal reconstructions were provided.  Three dimensional and MIP images were obtained and evaluated.  Total DLP was 550 mGy-cm. Dose lowering technique and automated exposure control were utilized for this exam. COMPARISON: CT of the abdomen and pelvis 09/27/2023. FINDINGS: There is minimal left lower lobe atelectasis.  The right lung base is clear.  The heart is normal in size. The liver contour is  nodular consistent with cirrhosis.  There is a moderate volume of ascites.  There is a peritoneal dialysis catheter in place.  The gallbladder is normal.  There are calcified granulomas throughout the spleen.  The pancreas and adrenal glands are normal.  There are simple cyst in the atrophic right kidney.  The left kidney is absent. The stomach and small bowel are decompressed.  There is colonic diverticulosis without evidence of diverticulitis.  There is no pelvic or retroperitoneal lymphadenopathy.  The aorta is nonaneurysmal.  There is no lytic or blastic osseous lesion.     1. Cirrhosis with moderate volume ascites in a peritoneal dialysis catheter in place. 2. Minimal left lower lobe atelectasis. 3. Diverticulosis without diverticulitis. Electronically signed by: Vadim Mtz MD Date:    10/05/2024 Time:    11:45    X-Ray Chest AP Portable    Result Date: 10/5/2024  EXAMINATION: Single view chest radiograph. CLINICAL HISTORY: weakness; TECHNIQUE: Single view of the chest. COMPARISON: Chest radiograph 01/16/2024. FINDINGS: There is mild interstitial edema.  There is no pneumothorax.  The cardiac silhouette is normal in size.  There is no acute osseous abnormality.     Mild interstitial pulmonary edema. Electronically signed by: Vadim Mtz MD Date:    10/05/2024 Time:    10:52           Assessment/ Plan               A/P--NE 10/06    1---ESRD on PD---Resume CCPD this evening  2---SBP---Cont Fortaz/ Fluid Cx POSITIVE ---Abd pain is much improved  3---HTN--Controlled  4---Anemia secondary to CKD--Check Fe Studies/ Follow H&H  5---Hypokalemia--Resolved/ Follow Mg    IV--SL    ORDERS:  CCPD tonight--Home Rx  Fe Studies  CBC/CMP/Mg/Phos in am    Known to Reji    Thanks for this consult!    Seen by me

## 2024-10-06 NOTE — PROGRESS NOTES
[facility] - 5th Floor Med Surg  Wound Care    Patient Name:  Malissa Schwartz   MRN:  [id number]  Date: 10/6/2024  Diagnosis: <principal problem not specified>    History:     Past Medical History:   Diagnosis Date    Acute rejection of kidney transplant 9/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 Glomerulitis with one activated endothelial cell: treated with Solumedrol pulse 375 mg IVBP x 3 doses with short prednisone taper; thymoglobin 100 mg x 6 doses 10/04/2007 minimal histologic changes no acute rejection     Allergy     Anemia of renal disease     CKD (chronic kidney disease), stage IV     CMV (cytomegalovirus) status positive     Colon polyp     benign    Depression     GN (glomerulonephritis)     GN (glomerulonephritis)     unknown type    Herpes infection, other 2009    on buttocks    Hyperlipidemia     Hypertension     Immunosuppression     Living-donor kidney transplant recipient 01/15/2007    Meningitis     history of viral meningitis    Proteinuria 1/5/2015    Proteinuria     Pulmonary nodules     Renal hypertension     S/P breast augmentation     Skin cancer     Urinary tract infection        Social History     Socioeconomic History    Marital status:    Tobacco Use    Smoking status: Former     Current packs/day: 1.00     Average packs/day: 1 pack/day for 15.0 years (15.0 ttl pk-yrs)     Types: Cigarettes    Smokeless tobacco: Never    Tobacco comments:     quit in 2014; smoked for 15 years and at the most smoked 1 ppd   Substance and Sexual Activity    Alcohol use: Yes     Comment: seldom    Drug use: No    Sexual activity: Never   Social History Narrative    Likes to travel and follow [email or web address] (has season tickets).    Lives with , 2 dogs and 3 cats    Used to work in real estate and banking     Social Drivers of Health     Financial Resource Strain: Low Risk  (10/5/2024)    Overall Financial Resource Strain (CARDIA)     Difficulty of Paying Living Expenses:  Not hard at all   Food Insecurity: No Food Insecurity (10/5/2024)    Hunger Vital Sign     Worried About Running Out of Food in the Last Year: Never true     Ran Out of Food in the Last Year: Never true   Transportation Needs: No Transportation Needs (10/5/2024)    TRANSPORTATION NEEDS     Transportation : No   Stress: No Stress Concern Present (10/5/2024)    [employer] [place] [employer] - Occupational Stress Questionnaire     Feeling of Stress : Not at all   Housing Stability: Low Risk  (10/5/2024)    Housing Stability Vital Sign     Unable to Pay for Housing in the Last Year: No     Homeless in the Last Year: No       Precautions:     Allergies as of 10/05/2024 - Reviewed 10/05/2024   Allergen Reaction Noted    Codeine Palpitations and Rash 01/07/2013    Cefepime  08/23/2024    Levofloxacin  01/03/2021    Iodine Nausea Only 12/09/2022       WO Assessment Details/Treatment        10/06/24 1332        Wound 10/05/24 1715 Skin Tear Left Palm #1   Date First Assessed/Time First Assessed: 10/05/24 1715   Present on Original Admission: Yes  Primary Wound Type: Skin Tear  Side: Left  Location: Palm  Wound Number: #1   Dressing Appearance Open to air   Drainage Amount None   Appearance Pink;Fibrin   Care Cleansed with:;Soap and water        Wound 10/05/24 1716 Skin Tear Left anterior Leg #2   Date First Assessed/Time First Assessed: 10/05/24 1716   Present on Original Admission: Yes  Primary Wound Type: Skin Tear  Side: Left  Orientation: anterior  Location: Leg  Wound Number: #2   Wound Image    Dressing Appearance Dry;Intact;Clean   Drainage Amount None   Appearance Fibrin   Dressing Removed        Wound 10/05/24 1717 Skin Tear Left anterior Foot #3   Date First Assessed/Time First Assessed: 10/05/24 1717   Primary Wound Type: Skin Tear  Side: Left  Orientation: anterior  Location: Foot  Wound Number: #3   Wound Image    Dressing Appearance Intact;Moist drainage   Drainage Amount Scant   Drainage  Characteristics/Odor Serosanguineous   Appearance Pink;Red;Ecchymotic   Black (%), Wound Tissue Color 0 %   Red (%), Wound Tissue Color 100 %   Yellow (%), Wound Tissue Color 0 %   Wound Edges Jagged   Care Cleansed with:;Soap and water   Dressing Changed;Foam        Wound 10/05/24 1717 Pressure Injury Left Heel #4   Date First Assessed/Time First Assessed: 10/05/24 1717   Present on Original Admission: Yes  Primary Wound Type: Pressure Injury  Side: Left  Location: Heel  Wound Number: #4   Wound Image    Pressure Injury Stage DTPI   Dressing Appearance Open to air   Drainage Amount None   Periwound Area Dry   Wound Edges Undefined   Care Cleansed with:;Soap and water   Dressing Removed        Wound 10/05/24 1718 Pressure Injury Right Heel #5   Date First Assessed/Time First Assessed: 10/05/24 1718   Present on Original Admission: Yes  Primary Wound Type: Pressure Injury  Side: Right  Location: Heel  Wound Number: #5   Wound Image    Pressure Injury Stage U   Dressing Appearance Open to air   Drainage Amount None   Appearance Black   Black (%), Wound Tissue Color 100 %   Red (%), Wound Tissue Color 0 %   Yellow (%), Wound Tissue Color 0 %   Periwound Area Dry   Wound Edges Irregular   Care Cleansed with:;Soap and water   Dressing Removed        Wound 10/05/24 1718 Skin Tear Left proximal Calf   Date First Assessed/Time First Assessed: 10/05/24 1718   Present on Original Admission: Yes  Primary Wound Type: Skin Tear  Side: Left  Orientation: proximal  Location: Calf   Wound Image    Dressing Appearance Open to air   Drainage Amount None   Appearance Intact  (healed)   Dressing Removed         WOCN assessment of multiple wounds present on admission. Left hand, L shin, L heel, L posterior calf - cleanse with soap and water daily, leave ZULEMA. Right heel - cleanse with soap and water, paint with betadine BID. Left foot - cleanse with soap and water, cover with foam dressing, q2days and PRN with soilage. Sacrum - pink but  blanchable, change sacral foam daily. Pt educated on proper hygiene and wound care techniques.  at bedside, all questions answered. Continue preventative measures, no briefs. Plan of care discussed with nurseIsaiah.     Recommendations made to primary team. Orders placed.     10/06/2024

## 2024-10-06 NOTE — PROGRESS NOTES
[facility] Hospital Medicine Progress Note        Chief Complaint: Inpatient Follow-up     HPI:   74 y.o. female with a past medical history of hypertension, hyperlipidemia, ESRD on PD, anemia of renal disease, glomerulonephritis s/p failed kidney transplant, cytomegalovirus, pulmonary nodules, skin cancer, and depression who presented to [facility] on 10/5/2024 with c/o generalized weakness.  Patient reported generalized weakness began yesterday, 10/04/2024 in addition to abdominal pain.  Patient reported her last bowel movement was 3 days ago and that pain feels similar to previous episodes peritonitis.  Patient reported compliance with daily PD with last run last night.  Patient denied fever, chills, nausea, vomiting, diarrhea, chest pain, rectal bleeding, and melena.  Initial vital signs in ED were /67, pulse 75, respirations 18, temperature 36.8° C, and SpO2 97% on room air.  Labs revealed WBC 9.32, RBC 3.66, hemoglobin 10.3, hematocrit 32.8, sodium 134, potassium 3.3, chloride 94, BUN 27.4, creatinine 5.40, , and troponin 0.017. Chest x-ray revealed mild interstitial pulmonary edema. CT abdomen and pelvis without contrast revealed cirrhosis with moderate volume ascites in a peritoneal dialysis catheter in place, minimal left lower lobe atelectasis, and diverticulosis without diverticulitis.  EKG revealed normal sinus rhythm with heart rate of 75 beats per minute and left axis deviation. Patient was given Vancomycin 1,250 mg and Ceftazidime 1 g in ED.      Peritoneal fluid was obtained in the ED showed cloudy fluid with WBC 4000 cells with neutrophils 80% consistent with spontaneous bacterial peritonitis.    Nephrology was consulted.    Interval Hx:   No acute events reported overnight.    Seen at bedside this morning, patient reported improving abdominal pain     Patient hemodynamically stable on room air    Labs this morning showed BUN to creatinine 36/6.65, hemoglobin  8.9    Objective/physical exam:  General: In no acute distress, afebrile  Chest:  Unlabored breathing  Heart:  Normal  Abdomen: Soft, mildly tender to palpation lower quadrants  MSK: Warm  Neurologic: Alert and responding appropriately    VITAL SIGNS: 24 HRS MIN & MAX LAST   Temp  Min: 98 °F (36.7 °C)  Max: 98.3 °F (36.8 °C) 98.2 °F (36.8 °C)   BP  Min: 104/68  Max: 135/81 120/72   Pulse  Min: 74  Max: 80  80   Resp  Min: 18  Max: 20 18   SpO2  Min: 93 %  Max: 100 % (!) 93 %     I have reviewed the following labs:  Recent Labs   Lab 10/05/24  1033 10/06/24  0356   WBC 9.32 6.63   RBC 3.66* 3.10*   HGB 10.3* 8.9*   HCT 32.8* 27.7*   MCV 89.6 89.4   MCH 28.1 28.7   MCHC 31.4* 32.1*   RDW 15.0 14.9    256   MPV 9.3 9.2     Recent Labs   Lab 10/05/24  1033 10/06/24  0356   * 134*   K 3.3* 4.0   CL 94* 96*   CO2 29 28   BUN 27.4* 36.2*   CREATININE 5.40* 6.65*   CALCIUM 8.0* 7.2*   MG 1.90  --    ALBUMIN 1.6* 1.3*   ALKPHOS 201* 194*   ALT 23 27   AST 29 39*   BILITOT 0.3 0.3     Microbiology Results (last 7 days)       Procedure Component Value Units Date/Time    Gram Stain [[id number]] Collected: 10/05/24 1059    Order Status: Completed Specimen: Peritoneal Fluid Updated: 10/05/24 1345     GRAM STAIN Many WBC observed      No bacteria seen    Body Fluid Culture [[id number]] Collected: 10/05/24 1059    Order Status: Resulted Specimen: Peritoneal Fluid Updated: 10/05/24 1107             See below for Radiology    Assessment/Plan:  SBP   ESRD on PD  Normocytic anemia, stable   Hyponatremia, mild     History of hypertension, hyperlipidemia, ESRD on PD, anemia of renal disease, glomerulonephritis s/p failed kidney transplant, cytomegalovirus, pulmonary nodules, skin cancer, and depression       Continue IV ceftazidime, vancomycin  Monitor peritoneal fluid cultures, titrate antibiotics per culture data  Case discussed with the Nephrology NP this morning  Dialysis per Nephrology team   May need repeat peritoneal  fluid cultures once patient receives at least 24-48 hours of IV antibiotic therapy to ensure clearance   Consider ID consultation based on clinical course  Supportive care  Resume zolpidem 10 mg nightly per patient request  Wound care  Case was discussed with patient's nurse   Monitor patient's oxygen needs, volume status, metabolic panel    VTE prophylaxis:     Patient condition:  Fair    Anticipated discharge and Disposition:   TBD          Portions of this note dictated using EMR integrated voice recognition software, and may be subject to voice recognition errors not corrected at proofreading. Please contact writer for clarification if needed.   _____________________________________________________________________    Malnutrition Status:    Scheduled Med:   amLODIPine  10 mg Oral Daily    aspirin  81 mg Oral Daily    cefTAZidime IV (PEDS and ADULTS)  1 g Intravenous Q24H    EScitalopram oxalate  10 mg Oral Daily    folic acid  1 mg Oral Daily    furosemide  20 mg Oral Daily    heparin (porcine)  5,000 Units Subcutaneous Q12H      Continuous Infusions:     PRN Meds:    Current Facility-Administered Medications:     acetaminophen, 1,000 mg, Oral, Q6H PRN    dextrose 10%, 12.5 g, Intravenous, PRN    dextrose 10%, 25 g, Intravenous, PRN    glucagon (human recombinant), 1 mg, Intramuscular, PRN    glucose, 16 g, Oral, PRN    glucose, 24 g, Oral, PRN    melatonin, 9 mg, Oral, Nightly PRN    ondansetron, 4 mg, Intravenous, Q6H PRN    traMADoL, 50 mg, Oral, Q8H PRN    vancomycin - pharmacy to dose, , Intravenous, pharmacy to manage frequency     Radiology:  I have personally reviewed the following imaging and agree with the radiologist.     CT Abdomen Pelvis  Without Contrast  Narrative: EXAMINATION:  CT ABDOMEN PELVIS WITHOUT CONTRAST    CLINICAL HISTORY:  Abdominal abscess/infection suspected;Abdominal pain, acute, nonlocalized;    TECHNIQUE:  Axial images of the abdomen and pelvis were obtained without IV contrast  administration.  Coronal and sagittal reconstructions were provided.  Three dimensional and MIP images were obtained and evaluated.  Total DLP was 550 mGy-cm. Dose lowering technique and automated exposure control were utilized for this exam.    COMPARISON:  CT of the abdomen and pelvis 09/27/2023.    FINDINGS:  There is minimal left lower lobe atelectasis.  The right lung base is clear.  The heart is normal in size.    The liver contour is nodular consistent with cirrhosis.  There is a moderate volume of ascites.  There is a peritoneal dialysis catheter in place.  The gallbladder is normal.  There are calcified granulomas throughout the spleen.  The pancreas and adrenal glands are normal.  There are simple cyst in the atrophic right kidney.  The left kidney is absent.    The stomach and small bowel are decompressed.  There is colonic diverticulosis without evidence of diverticulitis.  There is no pelvic or retroperitoneal lymphadenopathy.  The aorta is nonaneurysmal.  There is no lytic or blastic osseous lesion.  Impression: 1. Cirrhosis with moderate volume ascites in a peritoneal dialysis catheter in place.  2. Minimal left lower lobe atelectasis.  3. Diverticulosis without diverticulitis.    Electronically signed by: Vadim Mtz MD  Date:    10/05/2024  Time:    11:45  X-Ray Chest AP Portable  Narrative: EXAMINATION:  Single view chest radiograph.    CLINICAL HISTORY:  weakness;    TECHNIQUE:  Single view of the chest.    COMPARISON:  Chest radiograph 01/16/2024.    FINDINGS:  There is mild interstitial edema.  There is no pneumothorax.  The cardiac silhouette is normal in size.  There is no acute osseous abnormality.  Impression: Mild interstitial pulmonary edema.    Electronically signed by: Vadim Mtz MD  Date:    10/05/2024  Time:    10:52      Michelle Garay MD  Department of Hospital Medicine   [facility]   10/06/2024

## 2024-10-06 NOTE — PLAN OF CARE
Problem: Adult Inpatient Plan of Care  Goal: Plan of Care Review  Outcome: Progressing  Goal: Patient-Specific Goal (Individualized)  Outcome: Progressing  Goal: Absence of Hospital-Acquired Illness or Injury  Outcome: Progressing  Goal: Optimal Comfort and Wellbeing  Outcome: Progressing  Goal: Readiness for Transition of Care  Outcome: Progressing     Problem: Wound  Goal: Optimal Coping  Outcome: Progressing  Goal: Optimal Functional Ability  Outcome: Progressing  Goal: Absence of Infection Signs and Symptoms  Outcome: Progressing  Goal: Improved Oral Intake  Outcome: Progressing  Goal: Optimal Pain Control and Function  Outcome: Progressing  Goal: Skin Health and Integrity  Outcome: Progressing  Goal: Optimal Wound Healing  Outcome: Progressing     Problem: Skin Injury Risk Increased  Goal: Skin Health and Integrity  Outcome: Progressing     Problem: Pain Acute  Goal: Optimal Pain Control and Function  Outcome: Progressing     Problem: Fall Injury Risk  Goal: Absence of Fall and Fall-Related Injury  Outcome: Progressing     Problem: Infection  Goal: Absence of Infection Signs and Symptoms  Outcome: Progressing

## 2024-10-07 PROBLEM — E44.0 MODERATE MALNUTRITION: Status: ACTIVE | Noted: 2024-10-07

## 2024-10-07 LAB
ALBUMIN SERPL-MCNC: 1.2 G/DL (ref 3.4–4.8)
ALBUMIN/GLOB SERPL: 0.4 RATIO (ref 1.1–2)
ALP SERPL-CCNC: 212 UNIT/L (ref 40–150)
ALT SERPL-CCNC: 26 UNIT/L (ref 0–55)
ANION GAP SERPL CALC-SCNC: 10 MEQ/L
AST SERPL-CCNC: 41 UNIT/L (ref 5–34)
BASOPHILS # BLD AUTO: 0.03 X10(3)/MCL
BASOPHILS NFR BLD AUTO: 0.4 %
BILIRUB SERPL-MCNC: 0.3 MG/DL
BUN SERPL-MCNC: 36.2 MG/DL (ref 9.8–20.1)
CALCIUM SERPL-MCNC: 7.4 MG/DL (ref 8.4–10.2)
CHLORIDE SERPL-SCNC: 96 MMOL/L (ref 98–107)
CLARITY BODY FLUID (OLG): NORMAL
CO2 SERPL-SCNC: 26 MMOL/L (ref 23–31)
COLOR BODY FLUID (OLG): COLORLESS
CREAT SERPL-MCNC: 6.4 MG/DL (ref 0.55–1.02)
CREAT/UREA NIT SERPL: 6
EOSINOPHIL # BLD AUTO: 0.18 X10(3)/MCL (ref 0–0.9)
EOSINOPHIL NFR BLD AUTO: 2.6 %
EOSINOPHIL NFR FLD MANUAL: 1 %
ERYTHROCYTE [DISTWIDTH] IN BLOOD BY AUTOMATED COUNT: 14.8 % (ref 11.5–17)
GFR SERPLBLD CREATININE-BSD FMLA CKD-EPI: 6 ML/MIN/1.73/M2
GLOBULIN SER-MCNC: 3.3 GM/DL (ref 2.4–3.5)
GLUCOSE SERPL-MCNC: 98 MG/DL (ref 82–115)
HCT VFR BLD AUTO: 28.6 % (ref 37–47)
HGB BLD-MCNC: 9.4 G/DL (ref 12–16)
IMM GRANULOCYTES # BLD AUTO: 0.05 X10(3)/MCL (ref 0–0.04)
IMM GRANULOCYTES NFR BLD AUTO: 0.7 %
LYMPHOCYTES # BLD AUTO: 0.86 X10(3)/MCL (ref 0.6–4.6)
LYMPHOCYTES NFR BLD AUTO: 12.4 %
LYMPHOCYTES NFR FLD MANUAL: 5 %
MAGNESIUM SERPL-MCNC: 2 MG/DL (ref 1.6–2.6)
MCH RBC QN AUTO: 28.2 PG (ref 27–31)
MCHC RBC AUTO-ENTMCNC: 32.9 G/DL (ref 33–36)
MCV RBC AUTO: 85.9 FL (ref 80–94)
MONOCYTE MAN % BF (OLG): 5 %
MONOCYTES # BLD AUTO: 0.45 X10(3)/MCL (ref 0.1–1.3)
MONOCYTES NFR BLD AUTO: 6.5 %
NEUTROPHILS # BLD AUTO: 5.36 X10(3)/MCL (ref 2.1–9.2)
NEUTROPHILS MAN % BF (OLG): 89 %
NEUTROPHILS NFR BLD AUTO: 77.4 %
NRBC BLD AUTO-RTO: 0 %
PHOSPHATE SERPL-MCNC: 4.7 MG/DL (ref 2.3–4.7)
PLATELET # BLD AUTO: 267 X10(3)/MCL (ref 130–400)
PMV BLD AUTO: 9.3 FL (ref 7.4–10.4)
POTASSIUM SERPL-SCNC: 4 MMOL/L (ref 3.5–5.1)
PROT SERPL-MCNC: 4.5 GM/DL (ref 5.8–7.6)
RBC # BLD AUTO: 3.33 X10(6)/MCL (ref 4.2–5.4)
SODIUM SERPL-SCNC: 132 MMOL/L (ref 136–145)
WBC # BLD AUTO: 6.93 X10(3)/MCL (ref 4.5–11.5)
WBC # FLD AUTO: 1391 /UL

## 2024-10-07 PROCEDURE — 11000001 HC ACUTE MED/SURG PRIVATE ROOM

## 2024-10-07 PROCEDURE — 85025 COMPLETE CBC W/AUTO DIFF WBC: CPT | Performed by: NURSE PRACTITIONER

## 2024-10-07 PROCEDURE — 63600175 PHARM REV CODE 636 W HCPCS: Performed by: PHYSICIAN ASSISTANT

## 2024-10-07 PROCEDURE — 90945 DIALYSIS ONE EVALUATION: CPT

## 2024-10-07 PROCEDURE — 84100 ASSAY OF PHOSPHORUS: CPT | Performed by: NURSE PRACTITIONER

## 2024-10-07 PROCEDURE — 80053 COMPREHEN METABOLIC PANEL: CPT | Performed by: NURSE PRACTITIONER

## 2024-10-07 PROCEDURE — 36415 COLL VENOUS BLD VENIPUNCTURE: CPT | Performed by: NURSE PRACTITIONER

## 2024-10-07 PROCEDURE — 83735 ASSAY OF MAGNESIUM: CPT | Performed by: NURSE PRACTITIONER

## 2024-10-07 PROCEDURE — 25000003 PHARM REV CODE 250: Performed by: INTERNAL MEDICINE

## 2024-10-07 PROCEDURE — 25000003 PHARM REV CODE 250: Performed by: NURSE PRACTITIONER

## 2024-10-07 PROCEDURE — 3E1M39Z IRRIGATION OF PERITONEAL CAVITY USING DIALYSATE, PERCUTANEOUS APPROACH: ICD-10-PCS | Performed by: STUDENT IN AN ORGANIZED HEALTH CARE EDUCATION/TRAINING PROGRAM

## 2024-10-07 PROCEDURE — 89051 BODY FLUID CELL COUNT: CPT | Performed by: INTERNAL MEDICINE

## 2024-10-07 PROCEDURE — 25000003 PHARM REV CODE 250: Performed by: PHYSICIAN ASSISTANT

## 2024-10-07 RX ADMIN — FUROSEMIDE 20 MG: 20 TABLET ORAL at 08:10

## 2024-10-07 RX ADMIN — HEPARIN SODIUM 5000 UNITS: 5000 INJECTION, SOLUTION INTRAVENOUS; SUBCUTANEOUS at 08:10

## 2024-10-07 RX ADMIN — FOLIC ACID 1 MG: 1 TABLET ORAL at 08:10

## 2024-10-07 RX ADMIN — CEFTAZIDIME 1 G: 1 INJECTION, POWDER, FOR SOLUTION INTRAMUSCULAR; INTRAVENOUS at 03:10

## 2024-10-07 RX ADMIN — AMLODIPINE BESYLATE 10 MG: 5 TABLET ORAL at 08:10

## 2024-10-07 RX ADMIN — ZOLPIDEM TARTRATE 10 MG: 5 TABLET, COATED ORAL at 08:10

## 2024-10-07 RX ADMIN — ASPIRIN 81 MG: 81 TABLET, COATED ORAL at 08:10

## 2024-10-07 RX ADMIN — ACETAMINOPHEN 1000 MG: 500 TABLET ORAL at 09:10

## 2024-10-07 RX ADMIN — TRAMADOL HYDROCHLORIDE 50 MG: 50 TABLET, COATED ORAL at 09:10

## 2024-10-07 NOTE — NURSING
10/06/24 1919   Cycler Peritoneal Dialysis   Cycler Treatment Comments Dressing changed. Cycler set up at bedside, pt placed on PD. Cycler alarm free @ this time.

## 2024-10-07 NOTE — CONSULTS
Inpatient Nutrition Assessment    Admit Date: 10/5/2024   Total duration of encounter: 2 days   Patient Age: 74 y.o.    Nutrition Recommendation/Prescription     Continue Renal diet as tolerated  Change Boost to Chocolate Novasource Renal supplement.   Monitor intake, wounds, wts, labs    Communication of Recommendations: reviewed with patient    Nutrition Assessment     Malnutrition Assessment/Nutrition-Focused Physical Exam    Malnutrition Context: acute illness or injury (10/07/24 1251)  Malnutrition Level: moderate (10/07/24 1251)  Energy Intake (Malnutrition): less than 75% for greater than 7 days (10/07/24 1251)  Weight Loss (Malnutrition):  (does not meet criteria) (10/07/24 1251)  Subcutaneous Fat (Malnutrition): mild depletion (10/07/24 1251)           Muscle Mass (Malnutrition): mild depletion (10/07/24 1251)                          Fluid Accumulation (Malnutrition):  (does not meet criteria) (10/07/24 1251)        A minimum of two characteristics is recommended for diagnosis of either severe or non-severe malnutrition.    Chart Review    Reason Seen: continuous nutrition monitoring and malnutrition screening tool (MST)    Malnutrition Screening Tool Results   Have you recently lost weight without trying?: No  Have you been eating poorly because of a decreased appetite?: No   MST Score: 0   Diagnosis:  SBP   ESRD on PD  Normocytic anemia, stable   Hyponatremia, mild     Relevant Medical History:  hypertension, hyperlipidemia, ESRD on PD, anemia of renal disease, glomerulonephritis s/p failed kidney transplant, cytomegalovirus, pulmonary nodules, skin cancer, and depression     Scheduled Medications:  amLODIPine, 10 mg, Daily  aspirin, 81 mg, Daily  cefTAZidime IV (PEDS and ADULTS), 1 g, Q24H  EScitalopram oxalate, 10 mg, Daily  folic acid, 1 mg, Daily  furosemide, 20 mg, Daily  heparin (porcine), 5,000 Units, Q12H  zolpidem, 10 mg, QHS    Continuous Infusions:   PRN Medications:  acetaminophen, 1,000 mg,  "Q6H PRN  dextrose 10%, 12.5 g, PRN  dextrose 10%, 25 g, PRN  glucagon (human recombinant), 1 mg, PRN  glucose, 16 g, PRN  glucose, 24 g, PRN  melatonin, 9 mg, Nightly PRN  ondansetron, 4 mg, Q6H PRN  traMADoL, 50 mg, Q8H PRN  vancomycin - pharmacy to dose, , pharmacy to manage frequency    Calorie Containing IV Medications: no significant kcals from medications at this time    Recent Labs   Lab 10/05/24  1033 10/06/24  0356 10/07/24  0514   * 134* 132*   K 3.3* 4.0 4.0   CALCIUM 8.0* 7.2* 7.4*   PHOS  --   --  4.7   MG 1.90  --  2.00   CL 94* 96* 96*   CO2 29 28 26   BUN 27.4* 36.2* 36.2*   CREATININE 5.40* 6.65* 6.40*   EGFRNORACEVR 8 6 6   GLUCOSE 89 75* 98   BILITOT 0.3 0.3 0.3   ALKPHOS 201* 194* 212*   ALT 23 27 26   AST 29 39* 41*   ALBUMIN 1.6* 1.3* 1.2*   WBC 9.32 6.63 6.93   HGB 10.3* 8.9* 9.4*   HCT 32.8* 27.7* 28.6*     Nutrition Orders:  Diet Adult Regular  Dietary nutrition supplements All Meals; [facility] Renal - [employer]    Appetite/Oral Intake: poor/25-50% of meals  Factors Affecting Nutritional Intake: abdominal pain and decreased appetite  Social Needs Impacting Access to Food: none identified  Food/Mu-ism/Cultural Preferences: none reported  Food Allergies: no known food allergies  Last Bowel Movement: 10/06/24  Wound(s):     Wound 10/05/24 1715 Skin Tear Left Palm #1-Tissue loss description: Partial thickness       Wound 10/05/24 1717 Skin Tear Left anterior Foot #3-Tissue loss description: Full thickness noted    Comments    10/7/24: Pt stated has no appetite. Stated pain in abdomen is improving. Tolerating some foods. Multiple wounds noted. +peritonitis noted. On antibiotics. Had strawberry supplement at bedside-does not like that flavor. Would like chocolate.     Anthropometrics    Height: 5' 2.01" (157.5 cm),    Last Weight: 59 kg (130 lb 1.1 oz) (10/05/24 8079), Weight Method: Standard Scale  BMI (Calculated): 23.8  BMI Classification: normal (BMI 18.5-24.9)     Ideal Body Weight " (IBW), Female: 110.05 lb     % Ideal Body Weight, Female (lb): 118.25 %                             Usual Weight Provided By: EMR weight history    Wt Readings from Last 5 Encounters:   10/05/24 59 kg (130 lb 1.1 oz)   08/23/24 53.1 kg (117 lb)   05/03/24 53.1 kg (117 lb)   01/16/24 61.7 kg (136 lb 0.4 oz)   01/08/24 59 kg (130 lb)     Weight Change(s) Since Admission: wt has been stable.   Wt Readings from Last 1 Encounters:   10/05/24 1649 59 kg (130 lb 1.1 oz)   10/05/24 0936 59 kg (130 lb)   Admit Weight: 59 kg (130 lb) (10/05/24 0936), Weight Method: Standard Scale    Estimated Needs    Weight Used For Calorie Calculations: 59 kg (130 lb 1.1 oz)  Energy Calorie Requirements (kcal): 1453 kcal (MSJxSF1.4)     Weight Used For Protein Calculations: 59 kg (130 lb 1.1 oz)  Protein Requirements: 76 gm pro (kgx1.3)  Fluid Requirements (mL): 1000mL + urine output  (renal fluid requirements)        Enteral Nutrition     Patient not receiving enteral nutrition at this time.    Parenteral Nutrition     Patient not receiving parenteral nutrition support at this time.    Evaluation of Received Nutrient Intake    Calories: not meeting estimated needs  Protein: not meeting estimated needs    Patient Education     Not applicable.    Nutrition Diagnosis     PES: Increased nutrient needs (calories/protein) related to increased protein energy demand for wound healing as evidenced by multiple nonhealing wounds. (new)     PES: Moderate acute disease or injury related malnutrition related to acute illness as evidenced by less than 75% needs met for greater than 7 days, mild fat depletion, and mild muscle depletion. (new)    Nutrition Interventions     Intervention(s): general/healthful diet, commercial beverage, and collaboration with other providers    Goal: Meet greater than 80% of nutritional needs by follow-up. (new)  Goal: Maintain weight throughout hospitalization. (new)    Nutrition Goals & Monitoring     Dietitian will  monitor: food and beverage intake, weight change, electrolyte/renal panel, gastrointestinal profile, and wound healing  Discharge planning: continue Renal diet with Novasource oral supplements  Nutrition Risk/Follow-Up: moderate (follow-up in 3-5 days)   Please consult if re-assessment needed sooner.

## 2024-10-07 NOTE — PROGRESS NOTES
Nephrology follow up progress note    HPI:      Malissa Schwartz is a 74 y.o. female is known to me for her ESRD and she is on CCPD and has been admitted to the hospital with complaints of generalized weakness and low abdominal pain and is found to have acute peritonitis for which patient is receiving IV vancomycin being monitored by pharmacy and Fortaz 1 g every 24 hours.  Patient is anxious to go home.  She reports that she is eating some food.  No nausea.  Does not like the hospital bed.  No chest pains.  Still some abdominal discomfort.  She had good CCPD done last night.    Interval history:          Review of Systems:       Past medical, family, surgical, and social history reviewed and unchanged from initial consult note.     Objective:       VITAL SIGNS: 24 HR MIN & MAX LAST    Temp  Min: 97.7 °F (36.5 °C)  Max: 98.9 °F (37.2 °C)  97.7 °F (36.5 °C)        BP  Min: 111/67  Max: 136/76  119/73     Pulse  Min: 71  Max: 76  74     Resp  Min: 18  Max: 18  18    SpO2  Min: 93 %  Max: 99 %  99 %      GEN:  Moderately developed and nourished.  Awake alert oriented to time person place.  No acute distress noted.  HEENT:  Atraumatic normocephalic.  Pupils reactive.  Extraocular movements intact.  No oral lesion.  Neck supple.  No JVD.  CV: RRR without rub or gallop.  PULM: CTAB, unlabored  ABD: Soft, NT/ND abdomen with NABS.  Minimum discomfort on palpation.  EXT: No cyanosis or edema  SKIN: Warm and dry  PSYCH: Awake, alert, and appropriately conversant  Vascular access:  PD catheter noted.  Exit site is clean.          Component Value Date/Time     (L) 10/07/2024 0514     (L) 10/06/2024 0356     06/20/2018 1402     01/08/2018 0835    K 4.0 10/07/2024 0514    K 4.0 10/06/2024 0356    K 4.8 06/20/2018 1402    K 4.6 01/08/2018 0835    CO2 26 10/07/2024 0514    CO2 28 10/06/2024 0356    CO2 23 06/20/2018 1402    CO2 22 (L) 01/08/2018 0835    BUN 36.2 (H) 10/07/2024 0514    BUN 36.2 (H) 10/06/2024  0356    BUN 30 (H) 06/20/2018 1402    BUN 24 (H) 01/08/2018 0835    CREATININE 6.40 (H) 10/07/2024 0514    CREATININE 6.65 (H) 10/06/2024 0356    CREATININE 1.9 (H) 06/20/2018 1402    CREATININE 1.8 (H) 01/08/2018 0835    CALCIUM 7.4 (L) 10/07/2024 0514    CALCIUM 7.2 (L) 10/06/2024 0356    CALCIUM 9.7 06/20/2018 1402    CALCIUM 9.1 01/08/2018 0835    PHOS 4.7 10/07/2024 0514    PHOS 3.8 06/20/2018 1402            Component Value Date/Time    WBC 6.93 10/07/2024 0514    WBC 6.63 10/06/2024 0356    WBC 4.26 (L) 07/04/2024 0000    WBC 6.90 06/20/2018 1402    WBC 3.37 (L) 01/08/2018 0835    HGB 9.4 (L) 10/07/2024 0514    HGB 8.9 (L) 10/06/2024 0356    HGB 9.6 (L) 09/20/2024 0000    HGB 11.8 (L) 08/31/2024 0000    HGB 10.5 (L) 06/20/2018 1402    HGB 9.1 (L) 01/08/2018 0835    HCT 28.6 (L) 10/07/2024 0514    HCT 27.7 (L) 10/06/2024 0356    HCT 39.4 07/04/2024 0000    HCT 32.0 (L) 06/05/2020 0815    HCT 31.6 (L) 06/20/2018 1402    HCT 27.3 (L) 01/08/2018 0835     10/07/2024 0514     10/06/2024 0356     06/20/2018 1402     01/08/2018 0835       Imaging reviewed      Assessment / Plan:   ESRD on CCPD  Peritonitis on vancomycin and Fortaz  Hypertension  Anemia  Hypokalemia    Recommendations  Continue antibiotics till final culture sensitivity come back and then decide on further antibiotic therapy.  Change diet to regular diet  Add some protein supplementation to the diet  Will do PD fluid cell count on a daily basis

## 2024-10-07 NOTE — NURSING
10/07/24 0816   Peritoneal Dialysis   Exchange Type Cycler   Peritoneal Treatment Status Completed   Dianeal Solution Dextrose 1.5% in 6000 mL   Cycler Peritoneal Dialysis   Initial Drain Volume (mL) 1359 mL   Effluent Appearance Cloudy   Number of Cycles 4   Fill Volume (mL) 2000 mL   Total Volume (mL) 9500 mL   Cycler PD Total UF (mL) 28 mL   Cycler Treatment Comments tolerated well, pd catheter clamped, capped, and secured, pd effluent cloudy with fibrin, 28 ml net uf

## 2024-10-07 NOTE — PLAN OF CARE
10/07/24 1520   Discharge Assessment   Assessment Type Discharge Planning Assessment   Confirmed/corrected address, phone number and insurance Yes   Confirmed Demographics Correct on Facesheet   Source of Information patient   Communicated REINA with patient/caregiver Date not available/Unable to determine   Reason For Admission fatigue   People in Home spouse   Do you expect to return to your current living situation? Yes   Do you have help at home or someone to help you manage your care at home? Yes   Who are your caregiver(s) and their phone number(s)? spouse Jessica Schwartz   Prior to hospitilization cognitive status: Alert/Oriented   Current cognitive status: Alert/Oriented   Walking or Climbing Stairs Difficulty no   Dressing/Bathing Difficulty no   Home Accessibility wheelchair accessible   Home Layout Able to live on 1st floor   Equipment Currently Used at Home walker, rolling  (does not use all the time)   Readmission within 30 days? No   Patient currently being followed by outpatient case management? No   Do you currently have service(s) that help you manage your care at home? No   Do you take prescription medications? Yes   Do you have prescription coverage? Yes   Coverage medicare   Do you have any problems affording any of your prescribed medications? No   Is the patient taking medications as prescribed? yes   Who is going to help you get home at discharge? echo Reyes   How do you get to doctors appointments? family or friend will provide;car, drives self   Are you on dialysis? Yes   Dialysis Name and Scheduled days PD at home   Do you take coumadin? No   Discharge Plan A Home Health  (current [facility])   Discharge Plan B Home;Home with family   DME Needed Upon Discharge  none   Discharge Plan discussed with: Patient   Transition of Care Barriers None   OTHER   Name(s) of People in Home jessica

## 2024-10-07 NOTE — NURSING
10/07/24 1852   Peritoneal Dialysis   Exchange Type Cycler   Peritoneal Treatment Status Started   Dianeal Solution Dextrose 1.5% in 6000 mL   Cycler Peritoneal Dialysis   Initial Drain Volume (mL) 1969 mL   Cycler Treatment Comments treatment initiated, cell count sent to lab

## 2024-10-08 LAB
BACTERIA FLD CULT: ABNORMAL
CLARITY BODY FLUID (OLG): NORMAL
COLOR BODY FLUID (OLG): NORMAL
LYMPHOCYTES NFR FLD MANUAL: 9 %
NEUTROPHILS MAN % BF (OLG): 91 %
VANCOMYCIN SERPL-MCNC: 18.6 UG/ML (ref 15–20)
WBC # FLD AUTO: 576 /UL

## 2024-10-08 PROCEDURE — 63600175 PHARM REV CODE 636 W HCPCS: Performed by: PHYSICIAN ASSISTANT

## 2024-10-08 PROCEDURE — 36415 COLL VENOUS BLD VENIPUNCTURE: CPT | Performed by: STUDENT IN AN ORGANIZED HEALTH CARE EDUCATION/TRAINING PROGRAM

## 2024-10-08 PROCEDURE — 25000003 PHARM REV CODE 250: Performed by: PHYSICIAN ASSISTANT

## 2024-10-08 PROCEDURE — 80202 ASSAY OF VANCOMYCIN: CPT | Performed by: STUDENT IN AN ORGANIZED HEALTH CARE EDUCATION/TRAINING PROGRAM

## 2024-10-08 PROCEDURE — 63600175 PHARM REV CODE 636 W HCPCS: Performed by: STUDENT IN AN ORGANIZED HEALTH CARE EDUCATION/TRAINING PROGRAM

## 2024-10-08 PROCEDURE — 25000003 PHARM REV CODE 250: Performed by: STUDENT IN AN ORGANIZED HEALTH CARE EDUCATION/TRAINING PROGRAM

## 2024-10-08 PROCEDURE — 11000001 HC ACUTE MED/SURG PRIVATE ROOM

## 2024-10-08 PROCEDURE — 89051 BODY FLUID CELL COUNT: CPT | Performed by: INTERNAL MEDICINE

## 2024-10-08 PROCEDURE — 90945 DIALYSIS ONE EVALUATION: CPT

## 2024-10-08 PROCEDURE — 25000003 PHARM REV CODE 250: Performed by: INTERNAL MEDICINE

## 2024-10-08 RX ADMIN — HEPARIN SODIUM 5000 UNITS: 5000 INJECTION, SOLUTION INTRAVENOUS; SUBCUTANEOUS at 09:10

## 2024-10-08 RX ADMIN — ASPIRIN 81 MG: 81 TABLET, COATED ORAL at 09:10

## 2024-10-08 RX ADMIN — AMLODIPINE BESYLATE 10 MG: 5 TABLET ORAL at 09:10

## 2024-10-08 RX ADMIN — ESCITALOPRAM OXALATE 10 MG: 10 TABLET ORAL at 09:10

## 2024-10-08 RX ADMIN — ZOLPIDEM TARTRATE 10 MG: 5 TABLET, COATED ORAL at 08:10

## 2024-10-08 RX ADMIN — TRAMADOL HYDROCHLORIDE 50 MG: 50 TABLET, COATED ORAL at 09:10

## 2024-10-08 RX ADMIN — FOLIC ACID 1 MG: 1 TABLET ORAL at 09:10

## 2024-10-08 RX ADMIN — HEPARIN SODIUM 5000 UNITS: 5000 INJECTION, SOLUTION INTRAVENOUS; SUBCUTANEOUS at 08:10

## 2024-10-08 RX ADMIN — FUROSEMIDE 20 MG: 20 TABLET ORAL at 09:10

## 2024-10-08 RX ADMIN — DEXTROSE MONOHYDRATE 1 G: 5 INJECTION INTRAVENOUS at 03:10

## 2024-10-08 NOTE — NURSING
10/08/24 0929   Peritoneal Dialysis   Exchange Type Cycler   Peritoneal Treatment Status Completed   Dianeal Solution Dextrose 1.5% in 6000 mL;Dextrose 2.5% in 6000 mL   Cycler Peritoneal Dialysis   Effluent Appearance Cloudy   Fill Volume (mL) 2000 mL   Total Volume (mL) 9500 mL   Cycler PD Total UF (mL) 1491 mL   Cycler Treatment Comments Tx completed. Sterile cap in place. NAD

## 2024-10-08 NOTE — PROGRESS NOTES
Nephrology follow up progress note    HPI:      Malissa Schwartz is a 74 y.o. female is known to me for her ESRD and she is on CCPD and has been admitted to the hospital with complaints of generalized weakness and low abdominal pain and is found to have acute peritonitis for which patient is receiving IV vancomycin being monitored by pharmacy and Fortaz 1 g every 24 hours.  Patient is anxious to go home.  She reports that she is eating some food.  No nausea.  Does not like the hospital bed.  No chest pains.  Still some abdominal discomfort.  She had good CCPD done last night.    Interval history:     10/08/2024  No acute events overnight.  Tolerated PD without problem.  She started to develop abdominal pain again this morning.  1.4 L UF with PD last night.     Review of Systems:       Past medical, family, surgical, and social history reviewed and unchanged from initial consult note.     Objective:       VITAL SIGNS: 24 HR MIN & MAX LAST    Temp  Min: 97.7 °F (36.5 °C)  Max: 98.2 °F (36.8 °C)  98 °F (36.7 °C)        BP  Min: 110/68  Max: 132/81  129/75     Pulse  Min: 76  Max: 89  81     Resp  Min: 18  Max: 18  18    SpO2  Min: 94 %  Max: 97 %  97 %      GEN:  Chronically ill-appearing WF  CV: RRR without rub or gallop.  PULM: CTAB, unlabored  ABD:  Nondistended abdomen with normoactive bowel sounds.  Tenderness to palpitation and percussion.  EXT: No cyanosis or edema  SKIN: Warm and dry  PSYCH: Awake, alert, and appropriately conversant  Vascular access:  PD catheter noted.  Exit site is clean.          Component Value Date/Time     (L) 10/07/2024 0514     (L) 10/06/2024 0356     06/20/2018 1402     01/08/2018 0835    K 4.0 10/07/2024 0514    K 4.0 10/06/2024 0356    K 4.8 06/20/2018 1402    K 4.6 01/08/2018 0835    CO2 26 10/07/2024 0514    CO2 28 10/06/2024 0356    CO2 23 06/20/2018 1402    CO2 22 (L) 01/08/2018 0835    BUN 36.2 (H) 10/07/2024 0514    BUN 36.2 (H) 10/06/2024 0356    BUN 30  (H) 06/20/2018 1402    BUN 24 (H) 01/08/2018 0835    CREATININE 6.40 (H) 10/07/2024 0514    CREATININE 6.65 (H) 10/06/2024 0356    CREATININE 1.9 (H) 06/20/2018 1402    CREATININE 1.8 (H) 01/08/2018 0835    CALCIUM 7.4 (L) 10/07/2024 0514    CALCIUM 7.2 (L) 10/06/2024 0356    CALCIUM 9.7 06/20/2018 1402    CALCIUM 9.1 01/08/2018 0835    PHOS 4.7 10/07/2024 0514    PHOS 3.8 06/20/2018 1402            Component Value Date/Time    WBC 6.93 10/07/2024 0514    WBC 6.63 10/06/2024 0356    WBC 4.26 (L) 07/04/2024 0000    WBC 6.90 06/20/2018 1402    WBC 3.37 (L) 01/08/2018 0835    HGB 9.4 (L) 10/07/2024 0514    HGB 8.9 (L) 10/06/2024 0356    HGB 9.6 (L) 09/20/2024 0000    HGB 11.8 (L) 08/31/2024 0000    HGB 10.5 (L) 06/20/2018 1402    HGB 9.1 (L) 01/08/2018 0835    HCT 28.6 (L) 10/07/2024 0514    HCT 27.7 (L) 10/06/2024 0356    HCT 39.4 07/04/2024 0000    HCT 32.0 (L) 06/05/2020 0815    HCT 31.6 (L) 06/20/2018 1402    HCT 27.3 (L) 01/08/2018 0835     10/07/2024 0514     10/06/2024 0356     06/20/2018 1402     01/08/2018 0835       Imaging reviewed      Assessment / Plan:   ESRD on CCPD  Peritonitis on vancomycin and Fortaz  Hypertension  Anemia  Hypokalemia    Recommendations  Patient continues to have peritonitis.  Cell count last night had decreased from 4000 at admission to 1300.  This is still very elevated.  We will repeat cell counts again tonight with her PD.  If remaining elevated she will need to consider switching from peritoneal dialysis to hemodialysis.  I discussed this in detail.  She is at high risk for worsening peritonitis, bacteremia, or peritoneal encapsulating sclerosis if she continues to have peritonitis.

## 2024-10-08 NOTE — PROGRESS NOTES
[facility] Hospital Medicine Progress Note        Chief Complaint: Inpatient Follow-up     HPI:   74 y.o. female with a past medical history of hypertension, hyperlipidemia, ESRD on PD, anemia of renal disease, glomerulonephritis s/p failed kidney transplant, cytomegalovirus, pulmonary nodules, skin cancer, and depression who presented to [facility] on 10/5/2024 with c/o generalized weakness.  Patient reported generalized weakness began yesterday, 10/04/2024 in addition to abdominal pain.  Patient reported her last bowel movement was 3 days ago and that pain feels similar to previous episodes peritonitis.  Patient reported compliance with daily PD with last run last night.  Patient denied fever, chills, nausea, vomiting, diarrhea, chest pain, rectal bleeding, and melena.  Initial vital signs in ED were /67, pulse 75, respirations 18, temperature 36.8° C, and SpO2 97% on room air.  Labs revealed WBC 9.32, RBC 3.66, hemoglobin 10.3, hematocrit 32.8, sodium 134, potassium 3.3, chloride 94, BUN 27.4, creatinine 5.40, , and troponin 0.017. Chest x-ray revealed mild interstitial pulmonary edema. CT abdomen and pelvis without contrast revealed cirrhosis with moderate volume ascites in a peritoneal dialysis catheter in place, minimal left lower lobe atelectasis, and diverticulosis without diverticulitis.  EKG revealed normal sinus rhythm with heart rate of 75 beats per minute and left axis deviation. Patient was given Vancomycin 1,250 mg and Ceftazidime 1 g in ED.      Peritoneal fluid was obtained in the ED showed cloudy fluid with WBC 4000 cells with neutrophils 80% consistent with spontaneous bacterial peritonitis.    Nephrology was consulted.    Interval Hx:   Patient seen and examined bedside.  No acute overnight events.  Reporting improvement in abdominal pain and less tenderness.  Hemodynamically stable    Objective/physical exam:  General: In no acute distress, afebrile  Chest:   Unlabored breathing  Heart:  Normal  Abdomen: Soft, mildly tender to palpation lower quadrants  MSK: Warm  Neurologic: Alert and responding appropriately    VITAL SIGNS: 24 HRS MIN & MAX LAST   Temp  Min: 97.7 °F (36.5 °C)  Max: 98.2 °F (36.8 °C) 98 °F (36.7 °C)   BP  Min: 110/68  Max: 136/76 110/68   Pulse  Min: 71  Max: 85  85   Resp  Min: 18  Max: 18 18   SpO2  Min: 94 %  Max: 99 % (!) 94 %     I have reviewed the following labs:  Recent Labs   Lab 10/05/24  1033 10/06/24  0356 10/07/24  0514   WBC 9.32 6.63 6.93   RBC 3.66* 3.10* 3.33*   HGB 10.3* 8.9* 9.4*   HCT 32.8* 27.7* 28.6*   MCV 89.6 89.4 85.9   MCH 28.1 28.7 28.2   MCHC 31.4* 32.1* 32.9*   RDW 15.0 14.9 14.8    256 267   MPV 9.3 9.2 9.3     Recent Labs   Lab 10/05/24  1033 10/06/24  0356 10/07/24  0514   * 134* 132*   K 3.3* 4.0 4.0   CL 94* 96* 96*   CO2 29 28 26   BUN 27.4* 36.2* 36.2*   CREATININE 5.40* 6.65* 6.40*   CALCIUM 8.0* 7.2* 7.4*   MG 1.90  --  2.00   ALBUMIN 1.6* 1.3* 1.2*   ALKPHOS 201* 194* 212*   ALT 23 27 26   AST 29 39* 41*   BILITOT 0.3 0.3 0.3     Microbiology Results (last 7 days)       Procedure Component Value Units Date/Time    Body Fluid Culture [[id number]]  (Abnormal) Collected: 10/05/24 1059    Order Status: Completed Specimen: Peritoneal Fluid Updated: 10/07/24 0846     Body Fluid Culture Few Staphylococcus aureus    Gram Stain [[id number]] Collected: 10/05/24 1059    Order Status: Completed Specimen: Peritoneal Fluid Updated: 10/05/24 1345     GRAM STAIN Many WBC observed      No bacteria seen             See below for Radiology    Assessment/Plan:  SBP   ESRD on PD  Normocytic anemia, stable   Hyponatremia, mild     History of hypertension, hyperlipidemia, ESRD on PD, anemia of renal disease, glomerulonephritis s/p failed kidney transplant, cytomegalovirus, pulmonary nodules, skin cancer, and depression       Continue IV ceftazidime, vancomycin  Monitor peritoneal fluid cultures, titrate antibiotics per  culture data  Awaiting final sensitivities  Cultures preliminary growing staph aureus  Dialysis per Nephrology team   May need repeat peritoneal fluid cultures once patient receives at least 24-48 hours of IV antibiotic therapy to ensure clearance   Consider ID consultation based on clinical course  Supportive care  Resume zolpidem 10 mg nightly per patient request  Wound care  Case was discussed with patient's nurse   Monitor patient's oxygen needs, volume status, metabolic panel    VTE prophylaxis:     Patient condition:  Fair    Anticipated discharge and Disposition:   TBD          Portions of this note dictated using EMR integrated voice recognition software, and may be subject to voice recognition errors not corrected at proofreading. Please contact writer for clarification if needed.   _____________________________________________________________________    Malnutrition Status:    Scheduled Med:   amLODIPine  10 mg Oral Daily    aspirin  81 mg Oral Daily    cefTAZidime IV (PEDS and ADULTS)  1 g Intravenous Q24H    EScitalopram oxalate  10 mg Oral Daily    folic acid  1 mg Oral Daily    furosemide  20 mg Oral Daily    heparin (porcine)  5,000 Units Subcutaneous Q12H    zolpidem  10 mg Oral QHS      Continuous Infusions:     PRN Meds:    Current Facility-Administered Medications:     acetaminophen, 1,000 mg, Oral, Q6H PRN    dextrose 10%, 12.5 g, Intravenous, PRN    dextrose 10%, 25 g, Intravenous, PRN    glucagon (human recombinant), 1 mg, Intramuscular, PRN    glucose, 16 g, Oral, PRN    glucose, 24 g, Oral, PRN    melatonin, 9 mg, Oral, Nightly PRN    ondansetron, 4 mg, Intravenous, Q6H PRN    traMADoL, 50 mg, Oral, Q8H PRN    vancomycin - pharmacy to dose, , Intravenous, pharmacy to manage frequency     Radiology:  I have personally reviewed the following imaging and agree with the radiologist.     CT Abdomen Pelvis  Without Contrast  Narrative: EXAMINATION:  CT ABDOMEN PELVIS WITHOUT CONTRAST    CLINICAL  HISTORY:  Abdominal abscess/infection suspected;Abdominal pain, acute, nonlocalized;    TECHNIQUE:  Axial images of the abdomen and pelvis were obtained without IV contrast administration.  Coronal and sagittal reconstructions were provided.  Three dimensional and MIP images were obtained and evaluated.  Total DLP was 550 mGy-cm. Dose lowering technique and automated exposure control were utilized for this exam.    COMPARISON:  CT of the abdomen and pelvis 09/27/2023.    FINDINGS:  There is minimal left lower lobe atelectasis.  The right lung base is clear.  The heart is normal in size.    The liver contour is nodular consistent with cirrhosis.  There is a moderate volume of ascites.  There is a peritoneal dialysis catheter in place.  The gallbladder is normal.  There are calcified granulomas throughout the spleen.  The pancreas and adrenal glands are normal.  There are simple cyst in the atrophic right kidney.  The left kidney is absent.    The stomach and small bowel are decompressed.  There is colonic diverticulosis without evidence of diverticulitis.  There is no pelvic or retroperitoneal lymphadenopathy.  The aorta is nonaneurysmal.  There is no lytic or blastic osseous lesion.  Impression: 1. Cirrhosis with moderate volume ascites in a peritoneal dialysis catheter in place.  2. Minimal left lower lobe atelectasis.  3. Diverticulosis without diverticulitis.    Electronically signed by: Vadim Mtz MD  Date:    10/05/2024  Time:    11:45  X-Ray Chest AP Portable  Narrative: EXAMINATION:  Single view chest radiograph.    CLINICAL HISTORY:  weakness;    TECHNIQUE:  Single view of the chest.    COMPARISON:  Chest radiograph 01/16/2024.    FINDINGS:  There is mild interstitial edema.  There is no pneumothorax.  The cardiac silhouette is normal in size.  There is no acute osseous abnormality.  Impression: Mild interstitial pulmonary edema.    Electronically signed by: Vadim Mtz  MD  Date:    10/05/2024  Time:    10:52      Catie Shepard DO  Department of Hospital Medicine  [facility]  10/07/2024

## 2024-10-08 NOTE — PROGRESS NOTES
[facility]  Hospital Medicine Progress Note        Chief Complaint: Inpatient Follow-up     HPI:   74 y.o. female with a past medical history of hypertension, hyperlipidemia, ESRD on PD, anemia of renal disease, glomerulonephritis s/p failed kidney transplant, cytomegalovirus, pulmonary nodules, skin cancer, and depression who presented to [facility] on 10/5/2024 with c/o generalized weakness.  Patient reported generalized weakness began yesterday, 10/04/2024 in addition to abdominal pain.  Patient reported her last bowel movement was 3 days ago and that pain feels similar to previous episodes peritonitis.  Patient reported compliance with daily PD with last run last night.  Patient denied fever, chills, nausea, vomiting, diarrhea, chest pain, rectal bleeding, and melena.  Initial vital signs in ED were /67, pulse 75, respirations 18, temperature 36.8° C, and SpO2 97% on room air.  Labs revealed WBC 9.32, RBC 3.66, hemoglobin 10.3, hematocrit 32.8, sodium 134, potassium 3.3, chloride 94, BUN 27.4, creatinine 5.40, , and troponin 0.017. Chest x-ray revealed mild interstitial pulmonary edema. CT abdomen and pelvis without contrast revealed cirrhosis with moderate volume ascites in a peritoneal dialysis catheter in place, minimal left lower lobe atelectasis, and diverticulosis without diverticulitis.  EKG revealed normal sinus rhythm with heart rate of 75 beats per minute and left axis deviation. Patient was given Vancomycin 1,250 mg and Ceftazidime 1 g in ED.      Peritoneal fluid was obtained in the ED showed cloudy fluid with WBC 4000 cells with neutrophils 80% consistent with spontaneous bacterial peritonitis.    Nephrology was consulted.    Interval Hx:   Patient seen and examined by bedside.  No acute overnight events.  No issues with the PD overnight family by bedside.    Objective/physical exam:  General: In no acute distress, afebrile  Chest:  Unlabored breathing  Heart:   Normal  Abdomen: Soft, mildly tender to palpation lower quadrants  MSK: Warm  Neurologic: Alert and responding appropriately    VITAL SIGNS: 24 HRS MIN & MAX LAST   Temp  Min: 97.7 °F (36.5 °C)  Max: 98.1 °F (36.7 °C) 97.9 °F (36.6 °C)   BP  Min: 110/68  Max: 132/81 121/75   Pulse  Min: 81  Max: 89  87   Resp  Min: 18  Max: 18 18   SpO2  Min: 94 %  Max: 99 % 95 %     I have reviewed the following labs:  Recent Labs   Lab 10/05/24  1033 10/06/24  0356 10/07/24  0514   WBC 9.32 6.63 6.93   RBC 3.66* 3.10* 3.33*   HGB 10.3* 8.9* 9.4*   HCT 32.8* 27.7* 28.6*   MCV 89.6 89.4 85.9   MCH 28.1 28.7 28.2   MCHC 31.4* 32.1* 32.9*   RDW 15.0 14.9 14.8    256 267   MPV 9.3 9.2 9.3     Recent Labs   Lab 10/05/24  1033 10/06/24  0356 10/07/24  0514   * 134* 132*   K 3.3* 4.0 4.0   CL 94* 96* 96*   CO2 29 28 26   BUN 27.4* 36.2* 36.2*   CREATININE 5.40* 6.65* 6.40*   CALCIUM 8.0* 7.2* 7.4*   MG 1.90  --  2.00   ALBUMIN 1.6* 1.3* 1.2*   ALKPHOS 201* 194* 212*   ALT 23 27 26   AST 29 39* 41*   BILITOT 0.3 0.3 0.3     Microbiology Results (last 7 days)       Procedure Component Value Units Date/Time    Body Fluid Culture [[id number]]  (Abnormal)  (Susceptibility) Collected: 10/05/24 1059    Order Status: Completed Specimen: Peritoneal Fluid Updated: 10/08/24 1049     Body Fluid Culture Few Methicillin Sensitive Staphylococcus aureus    Gram Stain [[id number]] Collected: 10/05/24 1059    Order Status: Completed Specimen: Peritoneal Fluid Updated: 10/05/24 1345     GRAM STAIN Many WBC observed      No bacteria seen             See below for Radiology    Assessment/Plan:  SBP   ESRD on PD  Normocytic anemia, stable   Hyponatremia, mild     History of hypertension, hyperlipidemia, ESRD on PD, anemia of renal disease, glomerulonephritis s/p failed kidney transplant, cytomegalovirus, pulmonary nodules, skin cancer, and depression       Monitor peritoneal fluid cultures, titrate antibiotics per culture data  Awaiting final  sensitivities  Cultures growing MSSA, changed to ANCEF today, day 1  Stop vancomycin and Fortaz  Dialysis per Nephrology team  Daily fluid analysis   Cell count decreased from 4000 admission to 1300.  However continues to remain elevated   Nephrology following, remaining elevated she will need consider switching from peritoneal dialysis to hemodialysis  Consider ID consultation based on clinical course  Supportive care  Resume zolpidem 10 mg nightly per patient request  Wound care  Case was discussed with patient's nurse   Monitor patient's oxygen needs, volume status, metabolic panel    VTE prophylaxis:     Patient condition:  Fair    Anticipated discharge and Disposition:   TBD          Portions of this note dictated using EMR integrated voice recognition software, and may be subject to voice recognition errors not corrected at proofreading. Please contact writer for clarification if needed.   _____________________________________________________________________    Malnutrition Status:    Scheduled Med:   amLODIPine  10 mg Oral Daily    aspirin  81 mg Oral Daily    ceFAZolin (Ancef) IV (PEDS and ADULTS)  1 g Intravenous Q24H    EScitalopram oxalate  10 mg Oral Daily    folic acid  1 mg Oral Daily    furosemide  20 mg Oral Daily    heparin (porcine)  5,000 Units Subcutaneous Q12H    zolpidem  10 mg Oral QHS      Continuous Infusions:     PRN Meds:    Current Facility-Administered Medications:     acetaminophen, 1,000 mg, Oral, Q6H PRN    dextrose 10%, 12.5 g, Intravenous, PRN    dextrose 10%, 25 g, Intravenous, PRN    glucagon (human recombinant), 1 mg, Intramuscular, PRN    glucose, 16 g, Oral, PRN    glucose, 24 g, Oral, PRN    melatonin, 9 mg, Oral, Nightly PRN    ondansetron, 4 mg, Intravenous, Q6H PRN    traMADoL, 50 mg, Oral, Q8H PRN     Radiology:  I have personally reviewed the following imaging and agree with the radiologist.     CT Abdomen Pelvis  Without Contrast  Narrative: EXAMINATION:  CT ABDOMEN  PELVIS WITHOUT CONTRAST    CLINICAL HISTORY:  Abdominal abscess/infection suspected;Abdominal pain, acute, nonlocalized;    TECHNIQUE:  Axial images of the abdomen and pelvis were obtained without IV contrast administration.  Coronal and sagittal reconstructions were provided.  Three dimensional and MIP images were obtained and evaluated.  Total DLP was 550 mGy-cm. Dose lowering technique and automated exposure control were utilized for this exam.    COMPARISON:  CT of the abdomen and pelvis 09/27/2023.    FINDINGS:  There is minimal left lower lobe atelectasis.  The right lung base is clear.  The heart is normal in size.    The liver contour is nodular consistent with cirrhosis.  There is a moderate volume of ascites.  There is a peritoneal dialysis catheter in place.  The gallbladder is normal.  There are calcified granulomas throughout the spleen.  The pancreas and adrenal glands are normal.  There are simple cyst in the atrophic right kidney.  The left kidney is absent.    The stomach and small bowel are decompressed.  There is colonic diverticulosis without evidence of diverticulitis.  There is no pelvic or retroperitoneal lymphadenopathy.  The aorta is nonaneurysmal.  There is no lytic or blastic osseous lesion.  Impression: 1. Cirrhosis with moderate volume ascites in a peritoneal dialysis catheter in place.  2. Minimal left lower lobe atelectasis.  3. Diverticulosis without diverticulitis.    Electronically signed by: Vadim Mtz MD  Date:    10/05/2024  Time:    11:45  X-Ray Chest AP Portable  Narrative: EXAMINATION:  Single view chest radiograph.    CLINICAL HISTORY:  weakness;    TECHNIQUE:  Single view of the chest.    COMPARISON:  Chest radiograph 01/16/2024.    FINDINGS:  There is mild interstitial edema.  There is no pneumothorax.  The cardiac silhouette is normal in size.  There is no acute osseous abnormality.  Impression: Mild interstitial pulmonary edema.    Electronically signed by: Vadim Mtz  MD  Date:    10/05/2024  Time:    10:52      Catie Shepard DO  Department of Hospital Medicine  [facility]  10/08/2024

## 2024-10-08 NOTE — PROGRESS NOTES
Pharmacist Renal Dose Adjustment Note    Malissa Schwartz is a 74 y.o. female being treated with the medication cefazolin    Patient Data:    Vital Signs (Most Recent):  Temp: 98.1 °F (36.7 °C) (10/08/24 1100)  Pulse: 82 (10/08/24 1100)  Resp: 18 (10/08/24 1100)  BP: 131/77 (10/08/24 1100)  SpO2: 99 % (10/08/24 1100) Vital Signs (72h Range):  Temp:  [97.7 °F (36.5 °C)-98.9 °F (37.2 °C)]   Pulse:  [71-89]   Resp:  [18]   BP: (104-136)/(67-85)   SpO2:  [93 %-99 %]      Recent Labs   Lab 10/05/24  1033 10/06/24  0356 10/07/24  0514   CREATININE 5.40* 6.65* 6.40*     Serum creatinine: 6.4 mg/dL (H) 10/07/24 0514  Estimated creatinine clearance: 6 mL/min (A)    Medication:cefazolin dose: 2gm frequency q8h will be changed to medication:cefazolin dose:1gm frequency:q24h  For PD    Pharmacist's Name: Paula Cordero  Pharmacist's Extension: [id number]

## 2024-10-09 VITALS
HEIGHT: 62 IN | TEMPERATURE: 98 F | RESPIRATION RATE: 18 BRPM | OXYGEN SATURATION: 96 % | DIASTOLIC BLOOD PRESSURE: 76 MMHG | HEART RATE: 82 BPM | SYSTOLIC BLOOD PRESSURE: 125 MMHG | BODY MASS INDEX: 23.93 KG/M2 | WEIGHT: 130.06 LBS

## 2024-10-09 LAB
ALBUMIN SERPL-MCNC: 1.1 G/DL (ref 3.4–4.8)
BASOPHILS # BLD AUTO: 0.03 X10(3)/MCL
BASOPHILS NFR BLD AUTO: 0.6 %
BUN SERPL-MCNC: 35.2 MG/DL (ref 9.8–20.1)
CALCIUM SERPL-MCNC: 7.8 MG/DL (ref 8.4–10.2)
CHLORIDE SERPL-SCNC: 96 MMOL/L (ref 98–107)
CO2 SERPL-SCNC: 27 MMOL/L (ref 23–31)
CREAT SERPL-MCNC: 6.02 MG/DL (ref 0.55–1.02)
EOSINOPHIL # BLD AUTO: 0.18 X10(3)/MCL (ref 0–0.9)
EOSINOPHIL NFR BLD AUTO: 3.8 %
ERYTHROCYTE [DISTWIDTH] IN BLOOD BY AUTOMATED COUNT: 15.3 % (ref 11.5–17)
GFR SERPLBLD CREATININE-BSD FMLA CKD-EPI: 7 ML/MIN/1.73/M2
GLUCOSE SERPL-MCNC: 103 MG/DL (ref 82–115)
HCT VFR BLD AUTO: 29.3 % (ref 37–47)
HGB BLD-MCNC: 9.3 G/DL (ref 12–16)
IMM GRANULOCYTES # BLD AUTO: 0.04 X10(3)/MCL (ref 0–0.04)
IMM GRANULOCYTES NFR BLD AUTO: 0.8 %
LYMPHOCYTES # BLD AUTO: 0.8 X10(3)/MCL (ref 0.6–4.6)
LYMPHOCYTES NFR BLD AUTO: 16.9 %
MCH RBC QN AUTO: 28.4 PG (ref 27–31)
MCHC RBC AUTO-ENTMCNC: 31.7 G/DL (ref 33–36)
MCV RBC AUTO: 89.6 FL (ref 80–94)
MONOCYTES # BLD AUTO: 0.4 X10(3)/MCL (ref 0.1–1.3)
MONOCYTES NFR BLD AUTO: 8.5 %
NEUTROPHILS # BLD AUTO: 3.28 X10(3)/MCL (ref 2.1–9.2)
NEUTROPHILS NFR BLD AUTO: 69.4 %
NRBC BLD AUTO-RTO: 0 %
PHOSPHATE SERPL-MCNC: 4.5 MG/DL (ref 2.3–4.7)
PLATELET # BLD AUTO: 301 X10(3)/MCL (ref 130–400)
PMV BLD AUTO: 9.3 FL (ref 7.4–10.4)
POTASSIUM SERPL-SCNC: 3.5 MMOL/L (ref 3.5–5.1)
RBC # BLD AUTO: 3.27 X10(6)/MCL (ref 4.2–5.4)
SODIUM SERPL-SCNC: 132 MMOL/L (ref 136–145)
WBC # BLD AUTO: 4.73 X10(3)/MCL (ref 4.5–11.5)

## 2024-10-09 PROCEDURE — 63600175 PHARM REV CODE 636 W HCPCS: Performed by: PHYSICIAN ASSISTANT

## 2024-10-09 PROCEDURE — 25000003 PHARM REV CODE 250: Performed by: STUDENT IN AN ORGANIZED HEALTH CARE EDUCATION/TRAINING PROGRAM

## 2024-10-09 PROCEDURE — 80069 RENAL FUNCTION PANEL: CPT | Performed by: STUDENT IN AN ORGANIZED HEALTH CARE EDUCATION/TRAINING PROGRAM

## 2024-10-09 PROCEDURE — 85025 COMPLETE CBC W/AUTO DIFF WBC: CPT | Performed by: STUDENT IN AN ORGANIZED HEALTH CARE EDUCATION/TRAINING PROGRAM

## 2024-10-09 PROCEDURE — 25000003 PHARM REV CODE 250: Performed by: PHYSICIAN ASSISTANT

## 2024-10-09 PROCEDURE — 63600175 PHARM REV CODE 636 W HCPCS: Performed by: STUDENT IN AN ORGANIZED HEALTH CARE EDUCATION/TRAINING PROGRAM

## 2024-10-09 PROCEDURE — 36415 COLL VENOUS BLD VENIPUNCTURE: CPT | Performed by: STUDENT IN AN ORGANIZED HEALTH CARE EDUCATION/TRAINING PROGRAM

## 2024-10-09 RX ORDER — TRAMADOL HYDROCHLORIDE 50 MG/1
50 TABLET ORAL EVERY 8 HOURS PRN
Qty: 15 TABLET | Refills: 0 | Status: SHIPPED | OUTPATIENT
Start: 2024-10-09 | End: 2024-10-14

## 2024-10-09 RX ADMIN — ASPIRIN 81 MG: 81 TABLET, COATED ORAL at 09:10

## 2024-10-09 RX ADMIN — HEPARIN SODIUM 5000 UNITS: 5000 INJECTION, SOLUTION INTRAVENOUS; SUBCUTANEOUS at 09:10

## 2024-10-09 RX ADMIN — AMLODIPINE BESYLATE 10 MG: 5 TABLET ORAL at 09:10

## 2024-10-09 RX ADMIN — FUROSEMIDE 20 MG: 20 TABLET ORAL at 09:10

## 2024-10-09 RX ADMIN — DEXTROSE MONOHYDRATE 1 G: 5 INJECTION INTRAVENOUS at 02:10

## 2024-10-09 RX ADMIN — FOLIC ACID 1 MG: 1 TABLET ORAL at 09:10

## 2024-10-09 RX ADMIN — ESCITALOPRAM OXALATE 10 MG: 10 TABLET ORAL at 09:10

## 2024-10-09 NOTE — DISCHARGE SUMMARY
[facility] Hospital Medicine Discharge Summary    Admit Date: 10/5/2024  Discharge Date and Time: 10/9/667045:41 PM  Admitting Physician:  Team  Discharging Physician: Denisha Romero MD.  Primary Care Physician: Sergio Crabtree MD  Consults: {consultation: [id number]}    Discharge Diagnoses:  ***    Hospital Course:   ***  Pt was seen and examined on the day of discharge  Vitals:  VITAL SIGNS: 24 HRS MIN & MAX LAST   Temp  Min: 97.9 °F (36.6 °C)  Max: 98.6 °F (37 °C) 98.2 °F (36.8 °C)   BP  Min: 119/72  Max: 125/76 125/76   Pulse  Min: 77  Max: 87  82   Resp  Min: 18  Max: 18 18   SpO2  Min: 95 %  Max: 96 % 96 %       Physical Exam:  ***    Procedures Performed: No admission procedures for hospital encounter.     Significant Diagnostic Studies: See Full reports for all details    Recent Labs   Lab 10/06/24  0356 10/07/24  0514 10/09/24  0528   WBC 6.63 6.93 4.73   RBC 3.10* 3.33* 3.27*   HGB 8.9* 9.4* 9.3*   HCT 27.7* 28.6* 29.3*   MCV 89.4 85.9 89.6   MCH 28.7 28.2 28.4   MCHC 32.1* 32.9* 31.7*   RDW 14.9 14.8 15.3    267 301   MPV 9.2 9.3 9.3       Recent Labs   Lab 10/05/24  1033 10/06/24  0356 10/07/24  0514 10/09/24  0528   * 134* 132* 132*   K 3.3* 4.0 4.0 3.5   CL 94* 96* 96* 96*   CO2 29 28 26 27   BUN 27.4* 36.2* 36.2* 35.2*   CREATININE 5.40* 6.65* 6.40* 6.02*   CALCIUM 8.0* 7.2* 7.4* 7.8*   MG 1.90  --  2.00  --    ALBUMIN 1.6* 1.3* 1.2* 1.1*   ALKPHOS 201* 194* 212*  --    ALT 23 27 26  --    AST 29 39* 41*  --    BILITOT 0.3 0.3 0.3  --         Microbiology Results (last 7 days)       Procedure Component Value Units Date/Time    Body Fluid Culture [[id number]]  (Abnormal)  (Susceptibility) Collected: 10/05/24 1059    Order Status: Completed Specimen: Peritoneal Fluid Updated: 10/08/24 1049     Body Fluid Culture Few Methicillin Sensitive Staphylococcus aureus    Gram Stain [[id number]] Collected: 10/05/24 1059    Order Status: Completed Specimen: Peritoneal  Fluid Updated: 10/05/24 1345     GRAM STAIN Many WBC observed      No bacteria seen             CT Abdomen Pelvis  Without Contrast  Narrative: EXAMINATION:  CT ABDOMEN PELVIS WITHOUT CONTRAST    CLINICAL HISTORY:  Abdominal abscess/infection suspected;Abdominal pain, acute, nonlocalized;    TECHNIQUE:  Axial images of the abdomen and pelvis were obtained without IV contrast administration.  Coronal and sagittal reconstructions were provided.  Three dimensional and MIP images were obtained and evaluated.  Total DLP was 550 mGy-cm. Dose lowering technique and automated exposure control were utilized for this exam.    COMPARISON:  CT of the abdomen and pelvis 09/27/2023.    FINDINGS:  There is minimal left lower lobe atelectasis.  The right lung base is clear.  The heart is normal in size.    The liver contour is nodular consistent with cirrhosis.  There is a moderate volume of ascites.  There is a peritoneal dialysis catheter in place.  The gallbladder is normal.  There are calcified granulomas throughout the spleen.  The pancreas and adrenal glands are normal.  There are simple cyst in the atrophic right kidney.  The left kidney is absent.    The stomach and small bowel are decompressed.  There is colonic diverticulosis without evidence of diverticulitis.  There is no pelvic or retroperitoneal lymphadenopathy.  The aorta is nonaneurysmal.  There is no lytic or blastic osseous lesion.  Impression: 1. Cirrhosis with moderate volume ascites in a peritoneal dialysis catheter in place.  2. Minimal left lower lobe atelectasis.  3. Diverticulosis without diverticulitis.    Electronically signed by: Vadim Mtz MD  Date:    10/05/2024  Time:    11:45  X-Ray Chest AP Portable  Narrative: EXAMINATION:  Single view chest radiograph.    CLINICAL HISTORY:  weakness;    TECHNIQUE:  Single view of the chest.    COMPARISON:  Chest radiograph 01/16/2024.    FINDINGS:  There is mild interstitial edema.  There is no pneumothorax.  The  cardiac silhouette is normal in size.  There is no acute osseous abnormality.  Impression: Mild interstitial pulmonary edema.    Electronically signed by: Vadim Mtz MD  Date:    10/05/2024  Time:    10:52         Medication List        START taking these medications      D5W PgBk 50 mL with ceFAZolin 1 gram SolR  Cefazolin intraperitoneally times 10 more days     traMADoL 50 mg tablet  Commonly known as: ULTRAM  Take 1 tablet (50 mg total) by mouth every 8 (eight) hours as needed for Pain.            CONTINUE taking these medications      amLODIPine 10 MG tablet  Commonly known as: NORVASC     aspirin 81 MG EC tablet  Commonly known as: ECOTRIN     calcitRIOL 0.25 MCG Cap  Commonly known as: ROCALTROL     EScitalopram oxalate 10 MG tablet  Commonly known as: LEXAPRO  Take 1 tablet (10 mg total) by mouth once daily.     folic acid 1 MG tablet  Commonly known as: FOLVITE     furosemide 20 MG tablet  Commonly known as: LASIX     megestroL 400 mg/10 mL (10 mL) Susp  Commonly known as: MEGACE  Take 10 mLs (400 mg total) by mouth once daily.     NEPHRO-JENN 0.8 mg Tab  Generic drug: B complex-vitamin C-folic acid     potassium chloride 10 MEQ Tbsr  Commonly known as: KLOR-CON     zolpidem 10 mg Tab  Commonly known as: AMBIEN               Where to Get Your Medications        These medications were sent to [facility] [place] [address]  [address] [facility] [facility] [address] [place] [address]      Phone: [phone]   traMADoL 50 mg tablet       Information about where to get these medications is not yet available    Ask your nurse or doctor about these medications  D5W PgBk 50 mL with ceFAZolin 1 gram SolR          Explained in detail to the patient about the discharge plan, medications, and follow-up visits. Pt understands and agrees with the treatment plan  Discharge Disposition: Home-Health Care [facility]   Discharged Condition: stable  Diet-   Dietary Orders (From admission, onward)        Start     Ordered    10/07/24 1150  Dietary nutrition supplements All Meals; Novasource Renal - Cafe Mocha  Continuous        Question Answer Comment   Frequency: All Meals    Select PO Supplement: Novasource Renal - Cafe Mocha        10/07/24 1150    10/07/24 1056  Diet Adult Regular  Diet effective now         10/07/24 1055                   Medications Per DC med rec  Activities as tolerated   Follow-up Information       [facility] Follow up.    Specialties: Home Health Services, Home Therapy Services, Home Living Aide Services  Why: Home Health will contact you to set up a home visit  Contact information:  [address] [facility] [address]  [phone]             Sergio Crabtree MD Follow up in 1 week(s).    Specialty: Internal Medicine  Contact information:  [address] [facility] [address]  [phone]               George Mendoza MD Follow up in 1 week(s).    Specialty: Nephrology  Contact information:  [address] [place] [address]  [phone]                           For further questions contact hospitalist office    Discharge time 33 minutes    For worsening symptoms, chest pain, shortness of breath, increased abdominal pain, high grade fever, stroke or stroke like symptoms, immediately go to the nearest Emergency Room or call 911 as soon as possible.      Denisha Ashley M.D, on 10/9/2024. at 12:41 PM.          (From admission, onward)       Start     Ordered    10/07/24 1150  Dietary nutrition supplements All Meals; Novasource Renal - Cafe Mocha  Continuous        Question Answer Comment   Frequency: All Meals    Select PO Supplement: Novasource Renal - Cafe Mocha        10/07/24 1150    10/07/24 1056  Diet Adult Regular  Diet effective now         10/07/24 1055                   Medications Per DC med rec  Activities as tolerated   Follow-up Information       [facility] Follow up.    Specialties: Home Health Services, Home Therapy Services, Home Living Aide Services  Why: Home Health will contact you to set up a home visit  Contact information:  [address] [facility] [address]  [phone]             Sergio Crabtree MD Follow up in 1 week(s).    Specialty: Internal Medicine  Contact information:  [address] [facility] [address]  [phone]               George Mendoza MD Follow up in 1 week(s).    Specialty: Nephrology  Contact information:  [address] [place] [address]  [phone]                           For further questions contact hospitalist office    Discharge time 33 minutes    For worsening symptoms, chest pain, shortness of breath, increased abdominal pain, high grade fever, stroke or stroke like symptoms, immediately go to the nearest Emergency Room or call 911 as soon as possible.      Denisha Ashley M.D, on 10/9/2024. at 12:41 PM.

## 2024-10-09 NOTE — PLAN OF CARE
10/09/24 1110   Final Note   Assessment Type Final Discharge Note   Anticipated Discharge Disposition Home-Health  (resume [facility])   Post-Acute Status   Post-Acute Authorization Home Health   Home Health Status Set-up Complete/Auth obtained   Coverage Medicare   Discharge Delays None known at this time

## 2024-10-09 NOTE — PLAN OF CARE
10/09/24 1350   Final Note   Assessment Type Final Discharge Note   Anticipated Discharge Disposition Home-Health  (resume [facility])   Post-Acute Status   Post-Acute Authorization Home Health   Home Health Status Set-up Complete/Auth obtained   Coverage medicare   Discharge Delays None known at this time

## 2024-10-09 NOTE — NURSING
10/09/24 0912   Peritoneal Dialysis   Exchange Type Cycler   Peritoneal Treatment Status Completed   Dianeal Solution Dextrose 1.5% in 6000 mL;Dextrose 2.5% in 6000 mL   Cycler Peritoneal Dialysis   Initial Drain Volume (mL) 1037 mL   Effluent Appearance Cloudy   Number of Cycles 4   Fill Volume (mL) 2000 mL   Total Volume (mL) 9500 mL   Cycler PD Total UF (mL) 1346 mL

## 2024-10-09 NOTE — PROGRESS NOTES
Nephrology follow up progress note    HPI:      Malissa Schwartz is a 74 y.o. female is known to me for her ESRD and she is on CCPD and has been admitted to the hospital with complaints of generalized weakness and low abdominal pain and is found to have acute peritonitis for which patient is receiving IV vancomycin being monitored by pharmacy and Fortaz 1 g every 24 hours.  Patient is anxious to go home.  She reports that she is eating some food.  No nausea.  Does not like the hospital bed.  No chest pains.  Still some abdominal discomfort.  She had good CCPD done last night.    Interval history:     10/08/2024  No acute events overnight.  Tolerated PD without problem.  She started to develop abdominal pain again this morning.  1.4 L UF with PD last night.     10/09/2024   Feeling better.  No more abdominal pains.  Tolerating CCPD.  PD fluid cell count is coming down.  Patient's caregiver and her  present in the room.    Review of Systems:       Past medical, family, surgical, and social history reviewed and unchanged from initial consult note.     Objective:       VITAL SIGNS: 24 HR MIN & MAX LAST    Temp  Min: 97.9 °F (36.6 °C)  Max: 98.6 °F (37 °C)  98.6 °F (37 °C)        BP  Min: 119/72  Max: 131/77  119/72     Pulse  Min: 77  Max: 87  77     Resp  Min: 18  Max: 18  18    SpO2  Min: 95 %  Max: 99 %  96 %      GEN:  Chronically ill-appearing WF  CV: RRR without rub or gallop.  PULM: CTAB, unlabored  ABD:  Nondistended abdomen with normoactive bowel sounds.    EXT: No cyanosis or edema  SKIN: Warm and dry  PSYCH: Awake, alert, and appropriately conversant  Vascular access:  PD catheter noted.  Exit site is clean.          Component Value Date/Time     (L) 10/09/2024 0528     (L) 10/07/2024 0514     06/20/2018 1402     01/08/2018 0835    K 3.5 10/09/2024 0528    K 4.0 10/07/2024 0514    K 4.8 06/20/2018 1402    K 4.6 01/08/2018 0835    CO2 27 10/09/2024 0528    CO2 26 10/07/2024 0514     CO2 23 06/20/2018 1402    CO2 22 (L) 01/08/2018 0835    BUN 35.2 (H) 10/09/2024 0528    BUN 36.2 (H) 10/07/2024 0514    BUN 30 (H) 06/20/2018 1402    BUN 24 (H) 01/08/2018 0835    CREATININE 6.02 (H) 10/09/2024 0528    CREATININE 6.40 (H) 10/07/2024 0514    CREATININE 1.9 (H) 06/20/2018 1402    CREATININE 1.8 (H) 01/08/2018 0835    CALCIUM 7.8 (L) 10/09/2024 0528    CALCIUM 7.4 (L) 10/07/2024 0514    CALCIUM 9.7 06/20/2018 1402    CALCIUM 9.1 01/08/2018 0835    PHOS 4.5 10/09/2024 0528    PHOS 3.8 06/20/2018 1402            Component Value Date/Time    WBC 4.73 10/09/2024 0528    WBC 6.93 10/07/2024 0514    WBC 4.26 (L) 07/04/2024 0000    WBC 6.90 06/20/2018 1402    WBC 3.37 (L) 01/08/2018 0835    HGB 9.3 (L) 10/09/2024 0528    HGB 9.4 (L) 10/07/2024 0514    HGB 9.6 (L) 09/20/2024 0000    HGB 11.8 (L) 08/31/2024 0000    HGB 10.5 (L) 06/20/2018 1402    HGB 9.1 (L) 01/08/2018 0835    HCT 29.3 (L) 10/09/2024 0528    HCT 28.6 (L) 10/07/2024 0514    HCT 39.4 07/04/2024 0000    HCT 32.0 (L) 06/05/2020 0815    HCT 31.6 (L) 06/20/2018 1402    HCT 27.3 (L) 01/08/2018 0835     10/09/2024 0528     10/07/2024 0514     06/20/2018 1402     01/08/2018 0835       Imaging reviewed      Assessment / Plan:   ESRD on CCPD  Peritonitis on cefazolin  Hypertension  Anemia  Hypokalemia    Recommendations  I discussed with Ms. Sheikh RN at home hemodialysis unit, she will be able to arrange to give patient cefazolin 1 g intraperitoneal daily.  I advised her to give her for 10 more days.  So from the renal standpoint of view she should be able to go home today.  I did inform the patient and she will also get in touch with the home hemodialysis nurse.

## 2024-10-09 NOTE — NURSING
Outpatient Cefazolin is set up with Home Health care services, confirmed with Care Management. Today's dose of Ancef 1 gm administered. Proceed with patient's discharge.

## 2024-10-10 ENCOUNTER — PATIENT OUTREACH (OUTPATIENT)
Dept: ADMINISTRATIVE | Facility: CLINIC | Age: 74
End: 2024-10-10
Payer: MEDICARE

## 2024-10-10 NOTE — PROGRESS NOTES
C3 nurse spoke with Malissa Schwartz  for a TCC post hospital discharge follow up call. The patient has a scheduled HOS appointment with Sergio Crabtree MD (Internal Medicine) on 10/15/2024 at 1:20 pm

## 2024-10-17 NOTE — PHYSICIAN QUERY
Please clarify if there is any clinical correlation between PD catheter and SBP.   . Are the conditions:  Due to or associated with each other

## 2024-10-26 ENCOUNTER — HOSPITAL ENCOUNTER (EMERGENCY)
Facility: HOSPITAL | Age: 74
Discharge: HOME | End: 2024-10-26
Attending: EMERGENCY MEDICINE
Payer: MEDICARE

## 2024-10-26 VITALS
HEART RATE: 68 BPM | TEMPERATURE: 98 F | RESPIRATION RATE: 16 BRPM | BODY MASS INDEX: 19.99 KG/M2 | DIASTOLIC BLOOD PRESSURE: 67 MMHG | WEIGHT: 120 LBS | OXYGEN SATURATION: 98 % | SYSTOLIC BLOOD PRESSURE: 113 MMHG | HEIGHT: 65 IN

## 2024-10-26 DIAGNOSIS — T07.XXXA ABRASIONS OF MULTIPLE SITES: ICD-10-CM

## 2024-10-26 DIAGNOSIS — S00.93XA CONTUSION OF HEAD, UNSPECIFIED PART OF HEAD, INITIAL ENCOUNTER: Primary | ICD-10-CM

## 2024-10-26 DIAGNOSIS — T07.XXXA MULTIPLE CONTUSIONS: ICD-10-CM

## 2024-10-26 DIAGNOSIS — S20.212A RIB CONTUSION, LEFT, INITIAL ENCOUNTER: ICD-10-CM

## 2024-10-26 PROCEDURE — 99284 EMERGENCY DEPT VISIT MOD MDM: CPT | Mod: 25

## 2024-10-26 PROCEDURE — 25000003 PHARM REV CODE 250: Performed by: EMERGENCY MEDICINE

## 2024-10-26 RX ORDER — HYDROCODONE BITARTRATE AND ACETAMINOPHEN 5; 325 MG/1; MG/1
1 TABLET ORAL
Status: COMPLETED | OUTPATIENT
Start: 2024-10-26 | End: 2024-10-26

## 2024-10-26 RX ORDER — HYDROCODONE BITARTRATE AND ACETAMINOPHEN 7.5; 325 MG/1; MG/1
1 TABLET ORAL EVERY 6 HOURS PRN
Qty: 20 TABLET | Refills: 0 | Status: SHIPPED | OUTPATIENT
Start: 2024-10-26 | End: 2024-10-31

## 2024-10-26 RX ADMIN — HYDROCODONE BITARTRATE AND ACETAMINOPHEN 1 TABLET: 5; 325 TABLET ORAL at 12:10

## 2024-11-07 DIAGNOSIS — R10.12 ABDOMINAL PAIN, LEFT UPPER QUADRANT: Primary | ICD-10-CM

## 2024-11-07 DIAGNOSIS — R11.2 NAUSEA WITH VOMITING: ICD-10-CM

## 2024-11-18 ENCOUNTER — HOSPITAL ENCOUNTER (EMERGENCY)
Facility: HOSPITAL | Age: 74
Discharge: LEFT BEFORE DISCHARGE | End: 2024-11-18
Payer: MEDICARE

## 2024-11-18 VITALS
RESPIRATION RATE: 16 BRPM | OXYGEN SATURATION: 95 % | DIASTOLIC BLOOD PRESSURE: 73 MMHG | SYSTOLIC BLOOD PRESSURE: 113 MMHG | TEMPERATURE: 97 F | HEART RATE: 74 BPM

## 2024-11-18 PROCEDURE — 99281 EMR DPT VST MAYX REQ PHY/QHP: CPT

## 2024-11-18 NOTE — FIRST PROVIDER EVALUATION
Medical screening examination initiated.  I have conducted a focused provider triage encounter, findings are as follows:    Brief history of present illness:  74 year old female sent to ER by Dr. Mendoza for  for nausea and vomiting. Recent drop in hgb. Patient denies blood in stool or vomit    Vitals:    11/18/24 1357   BP: 113/73   Pulse: 74   Resp: 16   Temp: 97.4 °F (36.3 °C)   TempSrc: Oral   SpO2: 95%       Pertinent physical exam:  Awake and alert, NAD    Brief workup plan:  labs, ua    Preliminary workup initiated; this workup will be continued and followed by the physician or advanced practice provider that is assigned to the patient when roomed.

## 2024-11-21 ENCOUNTER — LAB REQUISITION (OUTPATIENT)
Dept: LAB | Facility: HOSPITAL | Age: 74
End: 2024-11-21
Payer: MEDICARE

## 2024-11-21 DIAGNOSIS — D63.1 ANEMIA IN CHRONIC KIDNEY DISEASE (CODE): ICD-10-CM

## 2024-11-21 DIAGNOSIS — K92.1 MELENA: ICD-10-CM

## 2024-11-21 DIAGNOSIS — N18.6 END STAGE RENAL DISEASE: ICD-10-CM

## 2024-11-21 LAB
HCT VFR BLD AUTO: 29.8 % (ref 37–47)
HGB BLD-MCNC: 9.6 G/DL (ref 12–16)

## 2024-11-21 PROCEDURE — 85014 HEMATOCRIT: CPT | Performed by: INTERNAL MEDICINE

## 2024-12-02 ENCOUNTER — ANESTHESIA EVENT (OUTPATIENT)
Dept: SURGERY | Facility: HOSPITAL | Age: 74
DRG: 492 | End: 2024-12-02
Payer: MEDICARE

## 2024-12-02 ENCOUNTER — HOSPITAL ENCOUNTER (INPATIENT)
Facility: HOSPITAL | Age: 74
LOS: 1 days | Discharge: HOME HEALTH | DRG: 492 | End: 2024-12-03
Attending: EMERGENCY MEDICINE | Admitting: SURGERY
Payer: MEDICARE

## 2024-12-02 ENCOUNTER — ANESTHESIA (OUTPATIENT)
Dept: SURGERY | Facility: HOSPITAL | Age: 74
DRG: 492 | End: 2024-12-02
Payer: MEDICARE

## 2024-12-02 DIAGNOSIS — N18.6 CKD (CHRONIC KIDNEY DISEASE) REQUIRING CHRONIC DIALYSIS: ICD-10-CM

## 2024-12-02 DIAGNOSIS — S02.2XXA CLOSED FRACTURE OF NASAL BONE, INITIAL ENCOUNTER: ICD-10-CM

## 2024-12-02 DIAGNOSIS — R52 PAIN: ICD-10-CM

## 2024-12-02 DIAGNOSIS — Z01.818 PRE-OP EVALUATION: ICD-10-CM
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
## 2024-12-02 DIAGNOSIS — Z99.2 CKD (CHRONIC KIDNEY DISEASE) REQUIRING CHRONIC DIALYSIS: ICD-10-CM

## 2024-12-02 DIAGNOSIS — S09.90XA INJURY OF HEAD, INITIAL ENCOUNTER: ICD-10-CM

## 2024-12-02 DIAGNOSIS — T14.90XA TRAUMA: ICD-10-CM

## 2024-12-02 DIAGNOSIS — S42.351A CLOSED DISPLACED COMMINUTED FRACTURE OF SHAFT OF RIGHT HUMERUS, INITIAL ENCOUNTER: Primary | ICD-10-CM

## 2024-12-02 DIAGNOSIS — S42.301A FRACTURE OF HUMERAL SHAFT, RIGHT, CLOSED: ICD-10-CM

## 2024-12-02 PROBLEM — S42.309A HUMERUS FRACTURE: Status: ACTIVE | Noted: 2024-12-02

## 2024-12-02 LAB
ALBUMIN SERPL-MCNC: 1.9 G/DL (ref 3.4–4.8)
ALBUMIN/GLOB SERPL: 0.6 RATIO (ref 1.1–2)
ALP SERPL-CCNC: 182 UNIT/L (ref 40–150)
ALT SERPL-CCNC: 21 UNIT/L (ref 0–55)
ANION GAP SERPL CALC-SCNC: 12 MEQ/L
APTT PPP: 25.8 SECONDS (ref 23.2–33.7)
AST SERPL-CCNC: 33 UNIT/L (ref 5–34)
BASOPHILS # BLD AUTO: 0.04 X10(3)/MCL
BASOPHILS NFR BLD AUTO: 0.5 %
BILIRUB SERPL-MCNC: 0.3 MG/DL
BUN SERPL-MCNC: 45.4 MG/DL (ref 9.8–20.1)
CALCIUM SERPL-MCNC: 8.2 MG/DL (ref 8.4–10.2)
CHLORIDE SERPL-SCNC: 98 MMOL/L (ref 98–107)
CO2 SERPL-SCNC: 30 MMOL/L (ref 23–31)
CREAT SERPL-MCNC: 9.95 MG/DL (ref 0.55–1.02)
CREAT/UREA NIT SERPL: 5
EOSINOPHIL # BLD AUTO: 0.02 X10(3)/MCL (ref 0–0.9)
EOSINOPHIL NFR BLD AUTO: 0.3 %
ERYTHROCYTE [DISTWIDTH] IN BLOOD BY AUTOMATED COUNT: 14.6 % (ref 11.5–17)
GFR SERPLBLD CREATININE-BSD FMLA CKD-EPI: 4 ML/MIN/1.73/M2
GLOBULIN SER-MCNC: 3.2 GM/DL (ref 2.4–3.5)
GLUCOSE SERPL-MCNC: 117 MG/DL (ref 82–115)
GROUP & RH: NORMAL
HCT VFR BLD AUTO: 30.7 % (ref 37–47)
HGB BLD-MCNC: 9.6 G/DL (ref 12–16)
IMM GRANULOCYTES # BLD AUTO: 0.05 X10(3)/MCL (ref 0–0.04)
IMM GRANULOCYTES NFR BLD AUTO: 0.7 %
INDIRECT COOMBS: NORMAL
INR PPP: 0.9
LACTATE SERPL-SCNC: 1.2 MMOL/L (ref 0.5–2.2)
LYMPHOCYTES # BLD AUTO: 0.61 X10(3)/MCL (ref 0.6–4.6)
LYMPHOCYTES NFR BLD AUTO: 8 %
MCH RBC QN AUTO: 27.7 PG (ref 27–31)
MCHC RBC AUTO-ENTMCNC: 31.3 G/DL (ref 33–36)
MCV RBC AUTO: 88.7 FL (ref 80–94)
MONOCYTES # BLD AUTO: 0.48 X10(3)/MCL (ref 0.1–1.3)
MONOCYTES NFR BLD AUTO: 6.3 %
NEUTROPHILS # BLD AUTO: 6.39 X10(3)/MCL (ref 2.1–9.2)
NEUTROPHILS NFR BLD AUTO: 84.2 %
NRBC BLD AUTO-RTO: 0 %
OHS QRS DURATION: 84 MS
OHS QTC CALCULATION: 479 MS
PLATELET # BLD AUTO: 295 X10(3)/MCL (ref 130–400)
PMV BLD AUTO: 9.2 FL (ref 7.4–10.4)
POTASSIUM SERPL-SCNC: 3.3 MMOL/L (ref 3.5–5.1)
PROT SERPL-MCNC: 5.1 GM/DL (ref 5.8–7.6)
PROTHROMBIN TIME: 12.2 SECONDS (ref 12.5–14.5)
RBC # BLD AUTO: 3.46 X10(6)/MCL (ref 4.2–5.4)
SODIUM SERPL-SCNC: 140 MMOL/L (ref 136–145)
SPECIMEN OUTDATE: NORMAL
WBC # BLD AUTO: 7.59 X10(3)/MCL (ref 4.5–11.5)

## 2024-12-02 PROCEDURE — 5A1D70Z PERFORMANCE OF URINARY FILTRATION, INTERMITTENT, LESS THAN 6 HOURS PER DAY: ICD-10-PCS | Performed by: SURGERY

## 2024-12-02 PROCEDURE — 90945 DIALYSIS ONE EVALUATION: CPT

## 2024-12-02 PROCEDURE — C1713 ANCHOR/SCREW BN/BN,TIS/BN: HCPCS | Performed by: ORTHOPAEDIC SURGERY

## 2024-12-02 PROCEDURE — 63600175 PHARM REV CODE 636 W HCPCS: Performed by: ANESTHESIOLOGY

## 2024-12-02 PROCEDURE — 63600175 PHARM REV CODE 636 W HCPCS

## 2024-12-02 PROCEDURE — 63600175 PHARM REV CODE 636 W HCPCS: Performed by: NURSE ANESTHETIST, CERTIFIED REGISTERED

## 2024-12-02 PROCEDURE — 37000008 HC ANESTHESIA 1ST 15 MINUTES: Performed by: ORTHOPAEDIC SURGERY

## 2024-12-02 PROCEDURE — 83605 ASSAY OF LACTIC ACID: CPT

## 2024-12-02 PROCEDURE — 80053 COMPREHEN METABOLIC PANEL: CPT | Performed by: EMERGENCY MEDICINE

## 2024-12-02 PROCEDURE — 63600175 PHARM REV CODE 636 W HCPCS: Performed by: STUDENT IN AN ORGANIZED HEALTH CARE EDUCATION/TRAINING PROGRAM

## 2024-12-02 PROCEDURE — 27201423 OPTIME MED/SURG SUP & DEVICES STERILE SUPPLY: Performed by: ORTHOPAEDIC SURGERY

## 2024-12-02 PROCEDURE — 25000003 PHARM REV CODE 250

## 2024-12-02 PROCEDURE — 96375 TX/PRO/DX INJ NEW DRUG ADDON: CPT

## 2024-12-02 PROCEDURE — 71000015 HC POSTOP RECOV 1ST HR: Performed by: ORTHOPAEDIC SURGERY

## 2024-12-02 PROCEDURE — 93005 ELECTROCARDIOGRAM TRACING: CPT

## 2024-12-02 PROCEDURE — 0PSF06Z REPOSITION RIGHT HUMERAL SHAFT WITH INTRAMEDULLARY INTERNAL FIXATION DEVICE, OPEN APPROACH: ICD-10-PCS | Performed by: ORTHOPAEDIC SURGERY

## 2024-12-02 PROCEDURE — 71000016 HC POSTOP RECOV ADDL HR: Performed by: ORTHOPAEDIC SURGERY

## 2024-12-02 PROCEDURE — 85025 COMPLETE CBC W/AUTO DIFF WBC: CPT | Performed by: EMERGENCY MEDICINE

## 2024-12-02 PROCEDURE — 37000009 HC ANESTHESIA EA ADD 15 MINS: Performed by: ORTHOPAEDIC SURGERY

## 2024-12-02 PROCEDURE — D9220A PRA ANESTHESIA: Mod: CRNA,,, | Performed by: NURSE ANESTHETIST, CERTIFIED REGISTERED

## 2024-12-02 PROCEDURE — 24516 TX HUMRL SHAFT FX IMED IMPLT: CPT | Mod: RT,,, | Performed by: ORTHOPAEDIC SURGERY

## 2024-12-02 PROCEDURE — 99223 1ST HOSP IP/OBS HIGH 75: CPT | Mod: 57,,, | Performed by: ORTHOPAEDIC SURGERY

## 2024-12-02 PROCEDURE — 86850 RBC ANTIBODY SCREEN: CPT | Performed by: ORTHOPAEDIC SURGERY

## 2024-12-02 PROCEDURE — 24516 TX HUMRL SHAFT FX IMED IMPLT: CPT | Mod: AS,RT,, | Performed by: PHYSICIAN ASSISTANT

## 2024-12-02 PROCEDURE — 11000001 HC ACUTE MED/SURG PRIVATE ROOM

## 2024-12-02 PROCEDURE — 36000711: Performed by: ORTHOPAEDIC SURGERY

## 2024-12-02 PROCEDURE — 63600175 PHARM REV CODE 636 W HCPCS: Performed by: EMERGENCY MEDICINE

## 2024-12-02 PROCEDURE — 63600175 PHARM REV CODE 636 W HCPCS: Performed by: ORTHOPAEDIC SURGERY

## 2024-12-02 PROCEDURE — 36000710: Performed by: ORTHOPAEDIC SURGERY

## 2024-12-02 PROCEDURE — 96374 THER/PROPH/DIAG INJ IV PUSH: CPT

## 2024-12-02 PROCEDURE — D9220A PRA ANESTHESIA: Mod: ANES,,, | Performed by: ANESTHESIOLOGY

## 2024-12-02 PROCEDURE — 71000033 HC RECOVERY, INTIAL HOUR: Performed by: ORTHOPAEDIC SURGERY

## 2024-12-02 PROCEDURE — 85610 PROTHROMBIN TIME: CPT | Performed by: EMERGENCY MEDICINE

## 2024-12-02 PROCEDURE — 25000003 PHARM REV CODE 250: Performed by: NURSE ANESTHETIST, CERTIFIED REGISTERED

## 2024-12-02 PROCEDURE — 99285 EMERGENCY DEPT VISIT HI MDM: CPT | Mod: 25

## 2024-12-02 PROCEDURE — 99223 1ST HOSP IP/OBS HIGH 75: CPT | Mod: AI,,, | Performed by: SURGERY

## 2024-12-02 PROCEDURE — 63600175 PHARM REV CODE 636 W HCPCS: Mod: JZ,JG | Performed by: PHYSICIAN ASSISTANT

## 2024-12-02 PROCEDURE — 85730 THROMBOPLASTIN TIME PARTIAL: CPT | Performed by: EMERGENCY MEDICINE

## 2024-12-02 DEVICE — IMPLANTABLE DEVICE: Type: IMPLANTABLE DEVICE | Site: HUMERUS | Status: FUNCTIONAL

## 2024-12-02 DEVICE — SCREW STRDRV T25 TI LOK 4X28MM: Type: IMPLANTABLE DEVICE | Site: HUMERUS | Status: FUNCTIONAL

## 2024-12-02 DEVICE — SCREW STRDRV REC T25 4X26 TTNM: Type: IMPLANTABLE DEVICE | Site: HUMERUS | Status: FUNCTIONAL

## 2024-12-02 RX ORDER — AMLODIPINE BESYLATE 5 MG/1
5 TABLET ORAL DAILY
Status: DISCONTINUED | OUTPATIENT
Start: 2024-12-03 | End: 2024-12-03 | Stop reason: HOSPADM

## 2024-12-02 RX ORDER — LIDOCAINE HYDROCHLORIDE 10 MG/ML
1 INJECTION, SOLUTION EPIDURAL; INFILTRATION; INTRACAUDAL; PERINEURAL ONCE
Status: DISCONTINUED | OUTPATIENT
Start: 2024-12-02 | End: 2024-12-02 | Stop reason: HOSPADM

## 2024-12-02 RX ORDER — PROPOFOL 10 MG/ML
VIAL (ML) INTRAVENOUS
Status: DISCONTINUED | OUTPATIENT
Start: 2024-12-02 | End: 2024-12-02

## 2024-12-02 RX ORDER — DOCUSATE SODIUM 100 MG/1
100 CAPSULE, LIQUID FILLED ORAL 2 TIMES DAILY
Status: DISCONTINUED | OUTPATIENT
Start: 2024-12-02 | End: 2024-12-03 | Stop reason: HOSPADM

## 2024-12-02 RX ORDER — ONDANSETRON HYDROCHLORIDE 2 MG/ML
INJECTION, SOLUTION INTRAMUSCULAR; INTRAVENOUS
Status: DISCONTINUED | OUTPATIENT
Start: 2024-12-02 | End: 2024-12-02

## 2024-12-02 RX ORDER — ONDANSETRON HYDROCHLORIDE 2 MG/ML
4 INJECTION, SOLUTION INTRAVENOUS ONCE
Status: COMPLETED | OUTPATIENT
Start: 2024-12-02 | End: 2024-12-02

## 2024-12-02 RX ORDER — MORPHINE SULFATE 4 MG/ML
4 INJECTION, SOLUTION INTRAMUSCULAR; INTRAVENOUS EVERY 6 HOURS PRN
Status: DISCONTINUED | OUTPATIENT
Start: 2024-12-02 | End: 2024-12-03 | Stop reason: HOSPADM

## 2024-12-02 RX ORDER — SODIUM CHLORIDE, SODIUM LACTATE, POTASSIUM CHLORIDE, CALCIUM CHLORIDE 600; 310; 30; 20 MG/100ML; MG/100ML; MG/100ML; MG/100ML
INJECTION, SOLUTION INTRAVENOUS CONTINUOUS
Status: DISCONTINUED | OUTPATIENT
Start: 2024-12-02 | End: 2024-12-02

## 2024-12-02 RX ORDER — HYDROMORPHONE HYDROCHLORIDE 2 MG/ML
0.4 INJECTION, SOLUTION INTRAMUSCULAR; INTRAVENOUS; SUBCUTANEOUS EVERY 5 MIN PRN
Status: DISCONTINUED | OUTPATIENT
Start: 2024-12-02 | End: 2024-12-02 | Stop reason: HOSPADM

## 2024-12-02 RX ORDER — DEXAMETHASONE SODIUM PHOSPHATE 4 MG/ML
INJECTION, SOLUTION INTRA-ARTICULAR; INTRALESIONAL; INTRAMUSCULAR; INTRAVENOUS; SOFT TISSUE
Status: DISCONTINUED | OUTPATIENT
Start: 2024-12-02 | End: 2024-12-02

## 2024-12-02 RX ORDER — DEXMEDETOMIDINE HYDROCHLORIDE 100 UG/ML
INJECTION, SOLUTION INTRAVENOUS
Status: DISCONTINUED | OUTPATIENT
Start: 2024-12-02 | End: 2024-12-02

## 2024-12-02 RX ORDER — MIDAZOLAM HYDROCHLORIDE 2 MG/2ML
INJECTION, SOLUTION INTRAMUSCULAR; INTRAVENOUS
Status: COMPLETED
Start: 2024-12-02 | End: 2024-12-02

## 2024-12-02 RX ORDER — VANCOMYCIN HYDROCHLORIDE 1 G/20ML
INJECTION, POWDER, LYOPHILIZED, FOR SOLUTION INTRAVENOUS
Status: DISCONTINUED | OUTPATIENT
Start: 2024-12-02 | End: 2024-12-02 | Stop reason: HOSPADM

## 2024-12-02 RX ORDER — TALC
6 POWDER (GRAM) TOPICAL NIGHTLY PRN
Status: DISCONTINUED | OUTPATIENT
Start: 2024-12-02 | End: 2024-12-03 | Stop reason: HOSPADM

## 2024-12-02 RX ORDER — LIDOCAINE HYDROCHLORIDE 20 MG/ML
INJECTION, SOLUTION EPIDURAL; INFILTRATION; INTRACAUDAL; PERINEURAL
Status: DISCONTINUED | OUTPATIENT
Start: 2024-12-02 | End: 2024-12-02

## 2024-12-02 RX ORDER — ROCURONIUM BROMIDE 10 MG/ML
INJECTION, SOLUTION INTRAVENOUS
Status: DISCONTINUED | OUTPATIENT
Start: 2024-12-02 | End: 2024-12-02

## 2024-12-02 RX ORDER — HEPARIN SODIUM 5000 [USP'U]/ML
5000 INJECTION, SOLUTION INTRAVENOUS; SUBCUTANEOUS EVERY 8 HOURS
Status: DISCONTINUED | OUTPATIENT
Start: 2024-12-02 | End: 2024-12-03 | Stop reason: HOSPADM

## 2024-12-02 RX ORDER — ROPIVACAINE HYDROCHLORIDE 5 MG/ML
40 INJECTION, SOLUTION EPIDURAL; INFILTRATION; PERINEURAL ONCE
Status: DISCONTINUED | OUTPATIENT
Start: 2024-12-02 | End: 2024-12-02 | Stop reason: HOSPADM

## 2024-12-02 RX ORDER — PHENYLEPHRINE HCL IN 0.9% NACL 1 MG/10 ML
SYRINGE (ML) INTRAVENOUS
Status: DISCONTINUED | OUTPATIENT
Start: 2024-12-02 | End: 2024-12-02

## 2024-12-02 RX ORDER — SODIUM CHLORIDE 9 MG/ML
INJECTION, SOLUTION INTRAVENOUS CONTINUOUS
Status: DISCONTINUED | OUTPATIENT
Start: 2024-12-02 | End: 2024-12-02

## 2024-12-02 RX ORDER — POLYETHYLENE GLYCOL 3350 17 G/17G
17 POWDER, FOR SOLUTION ORAL 2 TIMES DAILY
Status: DISCONTINUED | OUTPATIENT
Start: 2024-12-02 | End: 2024-12-03 | Stop reason: HOSPADM

## 2024-12-02 RX ORDER — ROPIVACAINE HYDROCHLORIDE 5 MG/ML
INJECTION, SOLUTION EPIDURAL; INFILTRATION; PERINEURAL
Status: DISPENSED
Start: 2024-12-02 | End: 2024-12-03

## 2024-12-02 RX ORDER — ACETAMINOPHEN 325 MG/1
650 TABLET ORAL EVERY 4 HOURS
Status: DISCONTINUED | OUTPATIENT
Start: 2024-12-02 | End: 2024-12-03 | Stop reason: HOSPADM

## 2024-12-02 RX ORDER — GABAPENTIN 100 MG/1
100 CAPSULE ORAL 3 TIMES DAILY
Status: DISCONTINUED | OUTPATIENT
Start: 2024-12-02 | End: 2024-12-03 | Stop reason: HOSPADM

## 2024-12-02 RX ORDER — MIDAZOLAM HYDROCHLORIDE 2 MG/2ML
2 INJECTION, SOLUTION INTRAMUSCULAR; INTRAVENOUS ONCE AS NEEDED
Status: COMPLETED | OUTPATIENT
Start: 2024-12-02 | End: 2024-12-02

## 2024-12-02 RX ORDER — CEFAZOLIN SODIUM 1 G/3ML
INJECTION, POWDER, FOR SOLUTION INTRAMUSCULAR; INTRAVENOUS
Status: DISCONTINUED | OUTPATIENT
Start: 2024-12-02 | End: 2024-12-02

## 2024-12-02 RX ORDER — OXYCODONE HYDROCHLORIDE 10 MG/1
10 TABLET ORAL EVERY 4 HOURS PRN
Status: DISCONTINUED | OUTPATIENT
Start: 2024-12-02 | End: 2024-12-03 | Stop reason: HOSPADM

## 2024-12-02 RX ORDER — ONDANSETRON HYDROCHLORIDE 2 MG/ML
4 INJECTION, SOLUTION INTRAVENOUS ONCE AS NEEDED
Status: DISCONTINUED | OUTPATIENT
Start: 2024-12-02 | End: 2024-12-02 | Stop reason: HOSPADM

## 2024-12-02 RX ORDER — CLINDAMYCIN PHOSPHATE 900 MG/50ML
900 INJECTION, SOLUTION INTRAVENOUS
Status: COMPLETED | OUTPATIENT
Start: 2024-12-02 | End: 2024-12-03

## 2024-12-02 RX ORDER — OXYCODONE HYDROCHLORIDE 5 MG/1
5 TABLET ORAL EVERY 6 HOURS PRN
Status: DISCONTINUED | OUTPATIENT
Start: 2024-12-02 | End: 2024-12-02 | Stop reason: SDUPTHER

## 2024-12-02 RX ORDER — FENTANYL CITRATE 50 UG/ML
50 INJECTION, SOLUTION INTRAMUSCULAR; INTRAVENOUS
Status: COMPLETED | OUTPATIENT
Start: 2024-12-02 | End: 2024-12-02

## 2024-12-02 RX ORDER — ADHESIVE BANDAGE
30 BANDAGE TOPICAL DAILY PRN
Status: DISCONTINUED | OUTPATIENT
Start: 2024-12-02 | End: 2024-12-03 | Stop reason: HOSPADM

## 2024-12-02 RX ORDER — MORPHINE SULFATE 4 MG/ML
4 INJECTION, SOLUTION INTRAMUSCULAR; INTRAVENOUS
Status: COMPLETED | OUTPATIENT
Start: 2024-12-02 | End: 2024-12-02

## 2024-12-02 RX ORDER — PROCHLORPERAZINE EDISYLATE 5 MG/ML
5 INJECTION INTRAMUSCULAR; INTRAVENOUS EVERY 30 MIN PRN
Status: DISCONTINUED | OUTPATIENT
Start: 2024-12-02 | End: 2024-12-02 | Stop reason: HOSPADM

## 2024-12-02 RX ORDER — GLUCAGON 1 MG
1 KIT INJECTION
Status: DISCONTINUED | OUTPATIENT
Start: 2024-12-02 | End: 2024-12-02 | Stop reason: HOSPADM

## 2024-12-02 RX ORDER — ONDANSETRON HYDROCHLORIDE 2 MG/ML
4 INJECTION, SOLUTION INTRAVENOUS EVERY 6 HOURS PRN
Status: DISCONTINUED | OUTPATIENT
Start: 2024-12-02 | End: 2024-12-03 | Stop reason: HOSPADM

## 2024-12-02 RX ORDER — METHOCARBAMOL 500 MG/1
500 TABLET, FILM COATED ORAL EVERY 8 HOURS
Status: DISCONTINUED | OUTPATIENT
Start: 2024-12-02 | End: 2024-12-03 | Stop reason: HOSPADM

## 2024-12-02 RX ORDER — ACETAMINOPHEN 10 MG/ML
INJECTION, SOLUTION INTRAVENOUS
Status: DISCONTINUED | OUTPATIENT
Start: 2024-12-02 | End: 2024-12-02

## 2024-12-02 RX ORDER — MORPHINE SULFATE 4 MG/ML
2 INJECTION, SOLUTION INTRAMUSCULAR; INTRAVENOUS EVERY 4 HOURS PRN
Status: DISCONTINUED | OUTPATIENT
Start: 2024-12-02 | End: 2024-12-02

## 2024-12-02 RX ORDER — OXYCODONE HYDROCHLORIDE 5 MG/1
5 TABLET ORAL EVERY 6 HOURS PRN
Status: DISCONTINUED | OUTPATIENT
Start: 2024-12-02 | End: 2024-12-03 | Stop reason: HOSPADM

## 2024-12-02 RX ORDER — FENTANYL CITRATE 50 UG/ML
INJECTION, SOLUTION INTRAMUSCULAR; INTRAVENOUS
Status: DISCONTINUED | OUTPATIENT
Start: 2024-12-02 | End: 2024-12-02

## 2024-12-02 RX ADMIN — SODIUM CHLORIDE, POTASSIUM CHLORIDE, SODIUM LACTATE AND CALCIUM CHLORIDE: 600; 310; 30; 20 INJECTION, SOLUTION INTRAVENOUS at 11:12

## 2024-12-02 RX ADMIN — FENTANYL CITRATE 50 MCG: 50 INJECTION, SOLUTION INTRAMUSCULAR; INTRAVENOUS at 01:12

## 2024-12-02 RX ADMIN — ACETAMINOPHEN 1000 MG: 10 INJECTION, SOLUTION INTRAVENOUS at 01:12

## 2024-12-02 RX ADMIN — SODIUM CHLORIDE, SODIUM GLUCONATE, SODIUM ACETATE, POTASSIUM CHLORIDE AND MAGNESIUM CHLORIDE: 526; 502; 368; 37; 30 INJECTION, SOLUTION INTRAVENOUS at 01:12

## 2024-12-02 RX ADMIN — DEXMEDETOMIDINE 10 MCG: 200 INJECTION, SOLUTION INTRAVENOUS at 02:12

## 2024-12-02 RX ADMIN — HYDROMORPHONE HYDROCHLORIDE 0.4 MG: 2 INJECTION, SOLUTION INTRAMUSCULAR; INTRAVENOUS; SUBCUTANEOUS at 03:12

## 2024-12-02 RX ADMIN — Medication 200 MCG: at 01:12

## 2024-12-02 RX ADMIN — HEPARIN SODIUM 5000 UNITS: 5000 INJECTION, SOLUTION INTRAVENOUS; SUBCUTANEOUS at 10:12

## 2024-12-02 RX ADMIN — DOCUSATE SODIUM 100 MG: 100 CAPSULE, LIQUID FILLED ORAL at 10:12

## 2024-12-02 RX ADMIN — SUGAMMADEX 400 MG: 100 INJECTION, SOLUTION INTRAVENOUS at 02:12

## 2024-12-02 RX ADMIN — ONDANSETRON 4 MG: 2 INJECTION INTRAMUSCULAR; INTRAVENOUS at 01:12

## 2024-12-02 RX ADMIN — METHOCARBAMOL 500 MG: 500 TABLET ORAL at 10:12

## 2024-12-02 RX ADMIN — ACETAMINOPHEN 650 MG: 325 TABLET, FILM COATED ORAL at 06:12

## 2024-12-02 RX ADMIN — GABAPENTIN 100 MG: 100 CAPSULE ORAL at 10:12

## 2024-12-02 RX ADMIN — FENTANYL CITRATE 50 MCG: 50 INJECTION, SOLUTION INTRAMUSCULAR; INTRAVENOUS at 06:12

## 2024-12-02 RX ADMIN — MORPHINE SULFATE 4 MG: 4 INJECTION INTRAVENOUS at 08:12

## 2024-12-02 RX ADMIN — CLINDAMYCIN PHOSPHATE 900 MG: 900 INJECTION, SOLUTION INTRAVENOUS at 10:12

## 2024-12-02 RX ADMIN — PROPOFOL 120 MG: 10 INJECTION, EMULSION INTRAVENOUS at 01:12

## 2024-12-02 RX ADMIN — HYDROMORPHONE HYDROCHLORIDE 0.4 MG: 2 INJECTION, SOLUTION INTRAMUSCULAR; INTRAVENOUS; SUBCUTANEOUS at 02:12

## 2024-12-02 RX ADMIN — ACETAMINOPHEN 650 MG: 325 TABLET, FILM COATED ORAL at 10:12

## 2024-12-02 RX ADMIN — MIDAZOLAM HYDROCHLORIDE 1 MG: 2 INJECTION, SOLUTION INTRAMUSCULAR; INTRAVENOUS at 12:12

## 2024-12-02 RX ADMIN — CEFAZOLIN 2 G: 330 INJECTION, POWDER, FOR SOLUTION INTRAMUSCULAR; INTRAVENOUS at 01:12

## 2024-12-02 RX ADMIN — LIDOCAINE HYDROCHLORIDE 65 MG: 20 INJECTION, SOLUTION INTRAVENOUS at 01:12

## 2024-12-02 RX ADMIN — ONDANSETRON 4 MG: 2 INJECTION INTRAMUSCULAR; INTRAVENOUS at 08:12

## 2024-12-02 RX ADMIN — HYDROMORPHONE HYDROCHLORIDE 0.4 MG: 2 INJECTION, SOLUTION INTRAMUSCULAR; INTRAVENOUS; SUBCUTANEOUS at 04:12

## 2024-12-02 RX ADMIN — ROCURONIUM BROMIDE 60 MG: 10 SOLUTION INTRAVENOUS at 01:12

## 2024-12-02 RX ADMIN — DEXAMETHASONE SODIUM PHOSPHATE 8 MG: 4 INJECTION, SOLUTION INTRA-ARTICULAR; INTRALESIONAL; INTRAMUSCULAR; INTRAVENOUS; SOFT TISSUE at 01:12

## 2024-12-02 RX ADMIN — MIDAZOLAM HYDROCHLORIDE 1 MG: 1 INJECTION, SOLUTION INTRAMUSCULAR; INTRAVENOUS at 12:12

## 2024-12-02 NOTE — OP NOTE
OPERATIVE REPORT    Patient: Malissa Schwartz   : 1950    MRN: [id number]  Date: 2024      Surgeon:Samuel Dow DO  Assistant: Jaswinder Jones was essential, part of the procedure including deep hardware placement and deep closure.  No senior assistant was availible  Preoperative Diagnosis:Right humeral shaft fracture  Postoperative Diagnosis: Same  Procedure:    1) Treatment of right humeral shaft fracture with intramedullary implant - 85112  Anesthesiologist: Tri  OR Staff: Circulator: rBit Gillette RN; Judi Cisneros RN  Scrub Person: Janice Lombardi RN; Alok Briggs ST  Implants:   Implant Name Type Inv. Item Serial No.  Lot No. LRB No. Used Action   8.5 mm TI multiloc humeral nail    [employer] [id number] Right 1 Implanted   SCREW MULITLOC TI 4.5X38MM - [id number]  SCREW MULITLOC TI 4.5X38MM  SYNTHES  Right 2 Implanted   SCREW STRDRV REC T25 4X26 TTNM - [id number]  SCREW STRDRV REC T25 4X26 TTNM  SYNTHES  Right 1 Implanted   SCREW STRDRV T25 TI DOMINGA 4X28MM - [id number]  SCREW STRDRV T25 TI DOMINGA 4X28MM  [employer]ES  Right 1 Implanted     EBL:50cc  Complications: None  Disposition: To PACU, stable    Indications: Malissa Schwartz is a 74 y.o. female presenting with the aforementioned injuries/findings. The procedure is indicated to best obtain and maintain stability and alignment about the humerus.  The patient is awake and alert. After thorough discussion of the risks, benefits, expected outcomes, and alternatives to surgical intervention (including nonoperative management), the patient agreed to proceed with surgical treatment.  Specific risks discussed included, but were not limited to: superficial or deep infection, wound healing complications, DVT/PE, significant bleeding requiring transfusion, damage to named anatomic structures in the immediate area including named neurovascular structures, malunion/nonunion, implant failure, injury to the radial and/or ulnar  nerve with significant motor or sensory dysfunction, and general risks of anesthesia.  The patient voiced understanding and written as well as verbal consent was obtained by myself prior to the procedure.    Patient has preoperative radial nerve palsy.  End-stage renal disease leads to increased risk of complications including infection and nonunion    Procedure Note:  The patient was brought back to the OR and placed supine on the OR table. After successful induction of anesthesia by anesthesia staff, the patient was positioned in the supine position and all bony prominences were padded appropriately.  The surgical field was then provisionally cleansed and then prepped and draped in the usual sterile fashion.    At this time a time-out was performed, with the correct patient, site, and procedure identified.  The universal time out as well as sign your site protocols were followed.  Preoperative antibiotics were verified as administered.     I made a small anterolateral deltoid splitting approach and attention to hemostasis was paid using electrocautery.  The approach easily led to the proximal humerus without difficulty, and the starting point for the humeral nail was obtained with a simple split in the rotator cuff; of note, a pre-existing rotator cuff tear was not seen but very thin and friable.  The starting point was verified on biplanar imaging and the opening reamer utilized with soft tissue protection.  The ball tip guide wire was passed into the distal humerus through the fracture and also verified on C-arm.  We measured the length and then the humerus was not reamed.  I then made a small incision over the radial nerve crossing the posterior aspect of the humerus and made sure it was free of the fracture The definitive nail was placed and appropriate screws were placed through the nail on each side of the fracture.    Final C-arm images were obtained and saved to the [employer] system.      The incisions were  then irrigated using copious sterile saline and then closed in layered fashion, including the rotator cuff.  The surgical sites were infiltrated using local anesthetic, and the arm was sterilely cleansed and dressed.    The patient was then subsequently removed from anesthetic and transferred to to PACU in a stable condition.    All sponge and needle counts were correct at the end of the case.  I was present and participated in all aspects of the procedure.    Prognosis:  The patient will be kept WB for walker use only 8 weeks, ROMAT. RUE on the ipsilateral extremity.  DVT prophylaxis is not indicated for this patient and procedure.  The patient is at risk of pain and stiffness of the arm, and we will allow immediate shoulder and elbow ROM to minimize this.  Patient may need return to the OR for excisional debridement or washout if infection/skin necrosis is observed.    This note/OR report was created with the assistance of  voice recognition software or phone  dictation.  There may be transcription errors as a result of using this technology however minimal. Effort has been made to assure accuracy of transcription but any obvious errors or omissions should be clarified with the author of the document.       Samuel Dow,   Orthopedic Trauma Surgery

## 2024-12-02 NOTE — CONSULTS
[facility]  Orthopedic Trauma  Consult Note    Patient Name: Malissa Schwartz  MRN: [id number]  Admission Date: 12/2/2024  Hospital Length of Stay: 0 days  Attending Provider: Leah Hedrick MD  Primary Care Provider: Sergio Crabtree MD        Inpatient consult to Orthopedic Surgery  Consult performed by: Samuel Dow DO  Consult ordered by: Leah Hedrick MD        Subjective:         Chief Complaint:   Chief Complaint   Patient presents with    Fall     Trip and fall turning her florentino lights,hit head. Laceration to nose, deformity to LUE. Takes daily baby aspirin. Denies LOC, GCS 15. Given 100mcg of fentanyl and 4mg of zofran        HPI:  Patient is a 74-year-old female with a right humeral shaft fracture completely comminuted.  History of dialysis she completes at home last dialysis was yesterday evening.  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 Glomerulitis with one activated endothelial cell: treated with Solumedrol pulse 375 mg IVBP x 3 doses with short prednisone taper; thymoglobin 100 mg x 6 doses 10/04/2007 minimal histologic changes no acute rejection     Allergy     Anemia of renal disease     CKD (chronic kidney disease), stage IV     CMV (cytomegalovirus) status positive     Colon polyp     benign    Depression     GN (glomerulonephritis)     GN (glomerulonephritis)     unknown type    Herpes infection, other 2009    on buttocks    Hyperlipidemia     Hypertension     Immunosuppression     Living-donor kidney transplant recipient 01/15/2007    Meningitis     history of viral meningitis    Proteinuria 1/5/2015    Proteinuria     Pulmonary nodules     Renal hypertension     S/P breast augmentation     Skin cancer     Urinary tract infection        Past Surgical History:    Procedure Laterality Date    COLON SURGERY      COLONOSCOPY W/ BIOPSIES      cosemetic surgery      facelift and breast augmentation     HYSTERECTOMY      KIDNEY TRANSPLANT      NEPHRECTOMY Left     9/2016    RENAL BIOPSY      TONSILLECTOMY, ADENOIDECTOMY         Review of patient's allergies indicates:   Allergen Reactions    Codeine Palpitations and Rash    Cefepime      Other Reaction(s): Encephalopathy    Renal impairment increased risk for this reaction    Levofloxacin      Other Reaction(s): Encephalopathy    Renal pt who had some encephalopathy while on Levaquin & Cefepime, neurology physicians think they may have been due to the antibiotics (both possible side effects for each)    Iodine Nausea Only       No current facility-administered medications for this encounter.     Current Outpatient Medications   Medication Sig    amLODIPine (NORVASC) 10 MG tablet Take 10 mg by mouth once daily.    aspirin (ECOTRIN) 81 MG EC tablet Take 81 mg by mouth once daily.    B complex-vitamin C-folic acid (NEPHRO-JENN) 0.8 mg Tab Take 0.8 mg by mouth Daily.    calcitRIOL (ROCALTROL) 0.25 MCG Cap Take 0.25 mcg by mouth 3 (three) times a week.    D5W PgBk 50 mL with ceFAZolin 1 gram SolR Cefazolin intraperitoneally times 10 more days    EScitalopram oxalate (LEXAPRO) 10 MG tablet Take 1 tablet (10 mg total) by mouth once daily. (Patient not taking: Reported on 10/10/2024)    folic acid (FOLVITE) 1 MG tablet Take 1 mg by mouth once daily.    furosemide (LASIX) 20 MG tablet Take 20 mg by mouth once daily.    megestroL (MEGACE) 400 mg/10 mL (10 mL) Susp Take 10 mLs (400 mg total) by mouth once daily. (Patient not taking: Reported on 10/10/2024)    potassium chloride (KLOR-CON) 10 MEQ TbSR Take 10 mEq by mouth once daily.    zolpidem (AMBIEN) 10 mg Tab Take 10 mg by mouth every evening.     Family History       Problem Relation (Age of Onset)    Heart failure Mother, Father    Hypertension Brother    Kidney disease Sister,  "Brother          Tobacco Use    Smoking status: Former     Current packs/day: 1.00     Average packs/day: 1 pack/day for 15.0 years (15.0 ttl pk-yrs)     Types: Cigarettes    Smokeless tobacco: Never    Tobacco comments:     quit in 2014; smoked for 15 years and at the most smoked 1 ppd   Substance and Sexual Activity    Alcohol use: Yes     Comment: seldom    Drug use: No    Sexual activity: Never       ROS:  Constitutional: Denies fever chills  Eyes: No change in vision  ENT: No ringing or current infections  CV: No chest pain  Resp: No labored breathing  MSK: Pain evident at site of injury located in HPI,   Integ: No signs of abrasions or lacerations  Neuro: No numbness or tingling  Lymphatic: No swelling outside the area of injury   Objective:     Vital Signs (Most Recent):  Temp: 96.8 °F (36 °C) (12/02/24 0609)  Pulse: 77 (12/02/24 0802)  Resp: 19 (12/02/24 0802)  BP: (!) 149/82 (12/02/24 0802)  SpO2: 100 % (12/02/24 0802) Vital Signs (24h Range):  Temp:  [96.8 °F (36 °C)] 96.8 °F (36 °C)  Pulse:  [74-77] 77  Resp:  [14-20] 19  SpO2:  [97 %-100 %] 100 %  BP: (147-152)/(76-90) 149/82     Weight: 59 kg (130 lb)  Height: 5' 2" (157.5 cm)  Body mass index is 23.78 kg/m².    No intake or output data in the 24 hours ending 12/02/24 0904    Ortho/SPM Exam  General the patient is alert and oriented x3 no acute distress nontoxic-appearing appropriate affect.    Constitutional: Vital signs are examined and stable.  Resp: No signs of labored breathing              RUE: -Skin:  Closed injury           -MSK: STR 5/5 AIN/PIN/Median//Ulnar motor 5/5 ,, wrist extension radial nerve 3/5           -Neuro:  Sensation grossly intact.  Decreased sensation to the superficial radial nerve           -Lymphatic: No signs of  lymphadenopathy,            -CV:  Capillary refill is less than 2 seconds. Radial and ulnar pulses 2/4. Compartments soft and compressible      Significant Labs:  I have reviewed all labs in relation to " Orthopedics  Recent Lab Results       None            Significant Imaging: I have reviewed all pertinent imaging results/findings.  X-Ray Humerus 2 View Right    Result Date: 12/2/2024  EXAMINATION: XR HUMERUS 2 VIEW RIGHT CLINICAL HISTORY: trauma; TECHNIQUE: Two views of the right humerus. COMPARISON: None FINDINGS: Spiral fracture of the mid humerus with approximately 45 degrees of angulation.     As above.  Mildly comminuted spiral fracture of the proximal humerus. Electronically signed by: Herb Padilla Date:    12/02/2024 Time:    07:44    X-Ray Pelvis Routine AP    Result Date: 12/2/2024  EXAMINATION: XR PELVIS ROUTINE AP CLINICAL HISTORY: trauma; TECHNIQUE: AP view of the pelvis. COMPARISON: Radiography 08/27/2024 FINDINGS: Suboptimal evaluation due to underpenetration.  No defined acute fracture.  Hips and symphysis are aligned.     No acute osseous process appreciated. Electronically signed by: Isaac Palomino Date:    12/02/2024 Time:    07:44    X-Ray Knee Complete 4 or More Views Left    Result Date: 12/2/2024  EXAMINATION: XR KNEE COMP 4 OR MORE VIEWS LEFT CLINICAL HISTORY: Injury, unspecified, initial encounter TECHNIQUE: AP, lateral, and oblique views of the left knee. COMPARISON: None FINDINGS: No acute fracture identified.  Joint alignments are maintained.  No significant knee effusion.     No acute osseous process appreciated. Electronically signed by: Isaac Palomino Date:    12/02/2024 Time:    07:42    X-Ray Shoulder Complete 2 View Right    Result Date: 12/2/2024  EXAMINATION: XR SHOULDER COMPLETE 2 OR MORE VIEWS RIGHT CLINICAL HISTORY: Injury, unspecified, initial encounter TECHNIQUE: Two or three views of the right shoulder. COMPARISON: None FINDINGS: Angulated spiral fracture of the proximal humeral shaft.  No acute fracture appreciated at the shoulder.  Humeral head remains aligned with the glenoid.     Proximal humeral shaft fracture. Electronically signed by: Isaac Palomino  Date:    12/02/2024 Time:    07:41    X-Ray Chest AP Portable    Result Date: 12/2/2024  EXAMINATION: XR CHEST AP PORTABLE CLINICAL HISTORY: Injury, unspecified, initial encounter TECHNIQUE: Single view of the chest COMPARISON: 10/26/2024 FINDINGS: No focal opacification, pleural effusion, or pneumothorax. The cardiomediastinal silhouette is unchanged. No acute osseous abnormality.     No acute cardiopulmonary process. Electronically signed by: Herb Padilla Date:    12/02/2024 Time:    07:34    CT Cervical Spine Without Contrast    Result Date: 12/2/2024  EXAMINATION: CT CERVICAL SPINE WITHOUT CONTRAST CLINICAL HISTORY: Neck trauma (Age >= 65y); TECHNIQUE: CT of the cervical spine Without contrast. Sagittal and coronal reconstructions were performed on the source images. Automatic exposure control was utilized to reduce the patient's radiation dose. DLP = 1227 COMPARISON: 10/26/2024 FINDINGS: There is no acute fracture, subluxation, or dislocation. Limited detail regarding cervical discs, but there is no finding seen to suggest acute disc herniation.  Chronic loss of disc space throughout the mid to lower cervical spine is unchanged.  Moderate to severe neural foraminal narrowing C3 through C7.  The lateral masses are symmetric about the dens. There is normal lordotic curvature of the cervical spine. The prevertebral soft tissues are normal. There is no lymphadenopathy.     1. No acute cervical spine abnormality identified. 2. Ligament, spinal cord and/or vascular abnormalities cannot be excluded on the basis of this examination. Stable moderate to severe degenerative changes. Electronically signed by: Herb Padilla Date:    12/02/2024 Time:    07:32    CT Head Without Contrast    Result Date: 12/2/2024  EXAMINATION: CT HEAD WITHOUT CONTRAST CLINICAL HISTORY: Head trauma, minor (Age >= 65y); TECHNIQUE: Axial images of the head were obtained without IV contrast administration.  Coronal and sagittal reconstructions  were provided.  Three dimensional and MIP images were obtained and evaluated.  Total DLP was 1227 mGy-cm. Dose lowering technique and automated exposure control were utilized for this exam. COMPARISON: MRI of the brain 08/25/2024. FINDINGS: There is normal brain formation.  There is normal gray-white matter differentiation.  There is a small area of cortical calcification in the right occipital lobe most consistent with laminar necrosis.  This is unchanged from the prior exam.  There is no cytotoxic or vasogenic edema.  There is no hemorrhage, hydrocephalus, or midline shift.  There is no intra or extra-axial fluid collection.  There is no herniation. The calvarium is intact.  There is no fracture.  The bilateral orbits are normal.  There are nondisplaced fractures of the bilateral nasal bones.     1. No acute intracranial abnormality. 2. Nondisplaced fractures of the bilateral nasal bones. Electronically signed by: Vadim Mtz MD Date:    12/02/2024 Time:    07:20       Assessment/Plan:     Active Diagnoses:    Diagnosis Date Noted POA    PRINCIPAL PROBLEM:  Fracture of humeral shaft, right, closed [S42.301A] 12/02/2024 Yes      Problems Resolved During this Admission:       Independent Radiology ordered by other provider:  Two views right humerus skeletally mature individual shows a severely comminuted humeral shaft fracture      Pt has acute injury with risk of severe bodily function with their injury right arm.     -Risks included with this type of injury loss of range of motion neurovascular injury    Patient is a 74-year-old female fall onto the right arm.  She does have a history of end-stage renal disease and gives herself dialysis at home which was last completed last evening.  She has painful range of motion of the right arm due to the comminuted humeral shaft fracture.  She does have weakness in the radial nerve distribution.  She understands risks and benefits procedure understands operative versus  nonoperative management.  Patient like to proceed with operative fixation at this time    I explained that surgery and the nature of their condition are not without risks. These include, but are not limited to, bleeding, infection, neurovascular compromise, malunion, nonunion, hardware complications, wound complications, scarring, cosmetic defects, need for later and/or repeated surgeries, avascular necrosis, bone death due to initial trauma, pain, loss of ROM, loss of function, PTOA, deformity, stance/gait and/or functional abnormalities, thromboembolic complications, compartment syndrome, loss of limb, loss of life, anesthetic complications, and other imponderables. I explained that these can occur despite the adequacy of treatments rendered, and that their risks are heightened given the nature of their condition.  I have also discussed the importance not using nicotine products due to the increased risk of infection surgical wound healing complications and nonunion of the fracture.  They verbalized understanding.  No guarantees were made.  They would like to continue with surgery at this time. If appropriate family was involved with surgical discussion.             This note/OR report was created with the assistance of  voice recognition software or phone  dictation.  There may be transcription errors as a result of using this technology however minimal. Effort has been made to assure accuracy of transcription but any obvious errors or omissions should be clarified with the author of the document.       Samuel Dow, DO   Orthopedic Trauma Surgery  [facility] - Emergency Dept

## 2024-12-02 NOTE — TRANSFER OF CARE
"Anesthesia Transfer of Care Note    Patient: Malissa Schwartz    Procedure(s) Performed: Procedure(s) (LRB):  INSERTION, INTRAMEDULLARY MONTSERRAT, HUMERUS (Right)    Patient location: PACU    Anesthesia Type: general    Transport from OR: Transported from OR on 6-10 L/min O2 by face mask with adequate spontaneous ventilation    Post pain: adequate analgesia    Post assessment: no apparent anesthetic complications    Post vital signs: stable    Level of consciousness: sedated    Nausea/Vomiting: no nausea/vomiting    Complications: none    Transfer of care protocol was followed      Last vitals: Visit Vitals  /65 (BP Location: Left arm, Patient Position: Lying)   Pulse 72   Temp 36.5 °C (97.7 °F) (Oral)   Resp 12   Ht 5' 2" (1.575 m)   Wt 59 kg (130 lb)   SpO2 98%   BMI 23.78 kg/m²     " Chief Complaint   Patient presents with    Other     connective tisse disease     1. Have you been to the ER, urgent care clinic since your last visit? Hospitalized since your last visit? Yes Where: [facility] ED    2. Have you seen or consulted any other health care providers outside of the [address] [place] [address] since your last visit? Include any pap smears or colon screening.  Yes Where: PCP, neurologist

## 2024-12-02 NOTE — ANESTHESIA POSTPROCEDURE EVALUATION
Anesthesia Post Evaluation    Patient: Malissa Schwartz    Procedure(s) Performed: Procedure(s) (LRB):  INSERTION, INTRAMEDULLARY MONTSERRAT, HUMERUS (Right)    Final Anesthesia Type: general      Patient location during evaluation: PACU  Patient participation: Yes- Able to Participate  Level of consciousness: oriented and sedated  Post-procedure vital signs: reviewed and stable  Pain management: adequate  Airway patency: patent    PONV status at discharge: No PONV  Anesthetic complications: no      Cardiovascular status: stable and hemodynamically stable  Respiratory status: spontaneous ventilation and unassisted  Hydration status: euvolemic  Follow-up not needed.  Comments: [facility]              Vitals Value Taken Time   /77 12/02/24 1612   Temp 36.5 12/02/24 1613   Pulse 74 12/02/24 1613   Resp 16 12/02/24 1613   SpO2 95 % 12/02/24 1613   Vitals shown include unfiled device data.      No case tracking events are documented in the log.      Pain/Alfredo Score: Pain Rating Prior to Med Admin: 7 (12/2/2024  3:25 PM)  Alfredo Score: 10 (12/2/2024  3:50 PM)

## 2024-12-02 NOTE — H&P
Trauma Surgery   History and Physical Note    Patient Name: Malissa Schwartz  YOB: 1950  Date: 12/02/2024 11:00 AM  Date of Admission: 12/2/2024  HD#0  POD#Day of Surgery    PRESENTING HISTORY   Chief Complaint/Reason for Admission: Fracture of humeral shaft, right, closed    History of Present Illness:  Patient is a 74 y.o. female with a PMH of HTN, HLD, CKD, ESRD on peritoneal HD who tripped this morning on her outside steps. Patient denies any lightheadedness or dizziness prior to her fall, states she was outside turning on [employer] lights and tripped on the steps and fell. Imaging shows nondisplaced bilateral nasal bone fractures and right spinal fracture of the mid humerus. No blood thinners, no LOC. Skin tear noted to left knee, abrasion to right knee. Bruising to bilateral arms.     Review of Systems:  12 point ROS negative except as stated in HPI    PAST HISTORY:   Past medical history:  Past Medical History:   Diagnosis Date    Acute rejection of kidney transplant 9/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 Glomerulitis with one activated endothelial cell: treated with Solumedrol pulse 375 mg IVBP x 3 doses with short prednisone taper; thymoglobin 100 mg x 6 doses 10/04/2007 minimal histologic changes no acute rejection     Allergy     Anemia of renal disease     CKD (chronic kidney disease), stage IV     CMV (cytomegalovirus) status positive     Colon polyp     benign    Depression     GN (glomerulonephritis)     GN (glomerulonephritis)     unknown type    Herpes infection, other 2009    on buttocks    Hyperlipidemia     Hypertension     Immunosuppression     Living-donor kidney transplant recipient 01/15/2007    Meningitis     history of viral meningitis    Proteinuria 1/5/2015    Proteinuria     Pulmonary nodules     Renal hypertension     S/P breast augmentation     Skin cancer     Urinary tract infection        Past surgical history:  Past Surgical History:    Procedure Laterality Date    COLON SURGERY      COLONOSCOPY W/ BIOPSIES      cosemetic surgery      facelift and breast augmentation     HYSTERECTOMY      KIDNEY TRANSPLANT      NEPHRECTOMY Left     9/2016    RENAL BIOPSY      TONSILLECTOMY, ADENOIDECTOMY         Family history:  Family History   Problem Relation Name Age of Onset    Kidney disease Sister          ESRD    Kidney disease Brother          CKD from HTN    Hypertension Brother      Heart failure Mother      Heart failure Father      Diabetes Neg Hx      Stroke Neg Hx         Social history:  Social History     Socioeconomic History    Marital status:    Tobacco Use    Smoking status: Former     Current packs/day: 1.00     Average packs/day: 1 pack/day for 15.0 years (15.0 ttl pk-yrs)     Types: Cigarettes    Smokeless tobacco: Never    Tobacco comments:     quit in 2014; smoked for 15 years and at the most smoked 1 ppd   Substance and Sexual Activity    Alcohol use: Yes     Comment: seldom    Drug use: No    Sexual activity: Never   Social History Narrative    Likes to travel and follow [employer] (has season tickets).    Lives with , 2 dogs and 3 cats    Used to work in real estate and banking     Social Drivers of Health     Financial Resource Strain: Low Risk  (10/5/2024)    Overall Financial Resource Strain (CARDIA)     Difficulty of Paying Living Expenses: Not hard at all   Food Insecurity: No Food Insecurity (10/5/2024)    Hunger Vital Sign     Worried About Running Out of Food in the Last Year: Never true     Ran Out of Food in the Last Year: Never true   Transportation Needs: No Transportation Needs (10/5/2024)    TRANSPORTATION NEEDS     Transportation : No   Stress: No Stress Concern Present (10/5/2024)    [employer] - Occupational Stress Questionnaire     Feeling of Stress : Not at all   Housing Stability: Low Risk  (10/5/2024)    Housing Stability Vital Sign     Unable to Pay for Housing in the  Last Year: No     Homeless in the Last Year: No     Social History     Tobacco Use   Smoking Status Former    Current packs/day: 1.00    Average packs/day: 1 pack/day for 15.0 years (15.0 ttl pk-yrs)    Types: Cigarettes   Smokeless Tobacco Never   Tobacco Comments    quit in 2014; smoked for 15 years and at the most smoked 1 ppd      Social History     Substance and Sexual Activity   Alcohol Use Yes    Comment: seldom        MEDICATIONS & ALLERGIES:   Allergies:   Review of patient's allergies indicates:   Allergen Reactions    Codeine Palpitations and Rash    Cefepime      Other Reaction(s): Encephalopathy    Renal impairment increased risk for this reaction    Levofloxacin      Other Reaction(s): Encephalopathy    Renal pt who had some encephalopathy while on Levaquin & Cefepime, neurology physicians think they may have been due to the antibiotics (both possible side effects for each)    Iodine Nausea Only     Home Meds:   Current Outpatient Medications   Medication Instructions    amLODIPine (NORVASC) 10 mg, Daily    aspirin (ECOTRIN) 81 mg, Daily    B complex-vitamin C-folic acid (NEPHRO-JENN) 0.8 mg Tab 0.8 mg, Daily    calcitRIOL (ROCALTROL) 0.25 mcg, Three times weekly    D5W PgBk 50 mL with ceFAZolin 1 gram SolR Cefazolin intraperitoneally times 10 more days    EScitalopram oxalate (LEXAPRO) 10 mg, Oral, Daily    folic acid (FOLVITE) 1 mg, Daily    furosemide (LASIX) 20 mg, Daily    megestroL (MEGACE) 400 mg, Oral, Daily    potassium chloride (KLOR-CON) 10 MEQ TbSR 10 mEq, Daily    zolpidem (AMBIEN) 10 mg, Nightly      No current facility-administered medications on file prior to encounter.     Current Outpatient Medications on File Prior to Encounter   Medication Sig Dispense Refill    amLODIPine (NORVASC) 10 MG tablet Take 10 mg by mouth once daily.      aspirin (ECOTRIN) 81 MG EC tablet Take 81 mg by mouth once daily.      B complex-vitamin C-folic acid (NEPHRO-JENN) 0.8 mg Tab Take 0.8 mg by mouth  Daily.      calcitRIOL (ROCALTROL) 0.25 MCG Cap Take 0.25 mcg by mouth 3 (three) times a week.      D5W PgBk 50 mL with ceFAZolin 1 gram SolR Cefazolin intraperitoneally times 10 more days      EScitalopram oxalate (LEXAPRO) 10 MG tablet Take 1 tablet (10 mg total) by mouth once daily. (Patient not taking: Reported on 10/10/2024) 90 tablet 3    folic acid (FOLVITE) 1 MG tablet Take 1 mg by mouth once daily.      furosemide (LASIX) 20 MG tablet Take 20 mg by mouth once daily.      megestroL (MEGACE) 400 mg/10 mL (10 mL) Susp Take 10 mLs (400 mg total) by mouth once daily. (Patient not taking: Reported on 10/10/2024) 300 mL 0    potassium chloride (KLOR-CON) 10 MEQ TbSR Take 10 mEq by mouth once daily.      zolpidem (AMBIEN) 10 mg Tab Take 10 mg by mouth every evening.      [DISCONTINUED] losartan (COZAAR) 100 MG tablet TAKE ONE TABLET BY MOUTH ONCE DAILY 90 tablet 3    [DISCONTINUED] metoprolol succinate (TOPROL-XL) 25 MG 24 hr tablet Take 2 tablets (50 mg total) by mouth once daily. (Patient not taking: Reported on 5/12/2022) 60 tablet 3    [DISCONTINUED] mycophenolate (CELLCEPT) 250 mg Cap Take 2 capsules (500 mg total) by mouth 2 (two) times daily. Z94.0 kidney transplant (Patient not taking: Reported on 5/12/2022) 120 capsule 11    [DISCONTINUED] tacrolimus (PROGRAF) 1 MG Cap Take 3mg every AM and 2mg every PM.  Route: Oral.  Z94.0 Kidney Transplant. (Patient not taking: Reported on 5/12/2022) 150 capsule 11      No current facility-administered medications on file prior to encounter.     Current Outpatient Medications on File Prior to Encounter   Medication Sig    amLODIPine (NORVASC) 10 MG tablet Take 10 mg by mouth once daily.    aspirin (ECOTRIN) 81 MG EC tablet Take 81 mg by mouth once daily.    B complex-vitamin C-folic acid (NEPHRO-JENN) 0.8 mg Tab Take 0.8 mg by mouth Daily.    calcitRIOL (ROCALTROL) 0.25 MCG Cap Take 0.25 mcg by mouth 3 (three) times a week.    D5W PgBk 50 mL with ceFAZolin 1 gram SolR  Cefazolin intraperitoneally times 10 more days    EScitalopram oxalate (LEXAPRO) 10 MG tablet Take 1 tablet (10 mg total) by mouth once daily. (Patient not taking: Reported on 10/10/2024)    folic acid (FOLVITE) 1 MG tablet Take 1 mg by mouth once daily.    furosemide (LASIX) 20 MG tablet Take 20 mg by mouth once daily.    megestroL (MEGACE) 400 mg/10 mL (10 mL) Susp Take 10 mLs (400 mg total) by mouth once daily. (Patient not taking: Reported on 10/10/2024)    potassium chloride (KLOR-CON) 10 MEQ TbSR Take 10 mEq by mouth once daily.    zolpidem (AMBIEN) 10 mg Tab Take 10 mg by mouth every evening.    [DISCONTINUED] losartan (COZAAR) 100 MG tablet TAKE ONE TABLET BY MOUTH ONCE DAILY    [DISCONTINUED] metoprolol succinate (TOPROL-XL) 25 MG 24 hr tablet Take 2 tablets (50 mg total) by mouth once daily. (Patient not taking: Reported on 5/12/2022)    [DISCONTINUED] mycophenolate (CELLCEPT) 250 mg Cap Take 2 capsules (500 mg total) by mouth 2 (two) times daily. Z94.0 kidney transplant (Patient not taking: Reported on 5/12/2022)    [DISCONTINUED] tacrolimus (PROGRAF) 1 MG Cap Take 3mg every AM and 2mg every PM.  Route: Oral.  Z94.0 Kidney Transplant. (Patient not taking: Reported on 5/12/2022)     Scheduled Meds:   acetaminophen  650 mg Oral Q4H    docusate sodium  100 mg Oral BID    gabapentin  100 mg Oral TID    heparin (porcine)  5,000 Units Subcutaneous Q8H    methocarbamoL  500 mg Oral Q8H    polyethylene glycol  17 g Oral BID     Continuous Infusions:   lactated ringers   Intravenous Continuous         PRN Meds:  Current Facility-Administered Medications:     magnesium hydroxide 400 mg/5 ml, 30 mL, Oral, Daily PRN    melatonin, 6 mg, Oral, Nightly PRN    morphine, 2 mg, Intravenous, Q4H PRN    oxyCODONE, 5 mg, Oral, Q6H PRN    OBJECTIVE:   Vital Signs:  VITAL SIGNS: 24 HR MIN & MAX LAST   Temp  Min: 96.8 °F (36 °C)  Max: 96.8 °F (36 °C)  96.8 °F (36 °C)   BP  Min: 147/90  Max: 152/76  (!) 149/82    Pulse  Min: 74   "Max: 77  77    Resp  Min: 14  Max: 20  19    SpO2  Min: 97 %  Max: 100 %  100 %      HT: 5' 2" (157.5 cm)  WT: 59 kg (130 lb)  BMI: 23.8   Intake/output: No intake/output data recorded.     Lines/drains/airway:       Peripheral IV - Single Lumen 12/02/24 18 G Left Hand (Active)   Number of days: 0            Hemodialysis AV Fistula Left upper arm (Active)   Number of days:        Physical Exam:  General:  Well developed, well nourished, no acute distress  HEENT:  Normocephalic, abrasion to nose  CV:  RR  Resp/chest: NWOB  GI:  Abdomen soft, non-tender, non-distended  :  Deferred  MSK:  No muscle atrophy, cyanosis, peripheral edema, moving all extremities spontaneously, RUE deformity, decreased movement d/t injury/pain  Neuro: GCS 15. CNII-XII grossly intact, alert and oriented to person, place, and time. Strength and motor function grossly intact to all extremities, sensation intact to all extremities.  Skin/Wounds:  warm, dry, nose abrasion, skin tear left knee, abrasion right knee, bruising to BUE    Labs:  Troponin:  No results for input(s): "TROPONINI" in the last 72 hours.  CBC:  Recent Labs     12/02/24  0907   WBC 7.59   RBC 3.46*   HGB 9.6*   HCT 30.7*      MCV 88.7   MCH 27.7   MCHC 31.3*     CMP:  Recent Labs     12/02/24  0907   CALCIUM 8.2*   ALBUMIN 1.9*      K 3.3*   CO2 30   CL 98   BUN 45.4*   CREATININE 9.95*   ALKPHOS 182*   ALT 21   AST 33   BILITOT 0.3     Lactic Acid:  Recent Labs     12/02/24  1036   LACTATE 1.2     ETOH:  No results for input(s): "ETHANOL" in the last 72 hours.   Urine Drug Screen:  No results for input(s): "COCAINE", "OPIATE", "BARBITURATE", "AMPHETAMINE", "FENTANYL", "CANNABINOIDS", "MDMA" in the last 72 hours.    Invalid input(s): "BENZODIAZEPINE", "PHENCYCLIDINE"   ABG  No results for input(s): "PH", "PO2", "PCO2", "HCO3", "BE" in the last 168 hours.      Diagnostic Results:  X-Ray Elbow Complete Right   Final Result      No acute osseous process identified. "         Electronically signed by: Isaac Palomino   Date:    12/02/2024   Time:    09:04      X-Ray Chest AP Portable   Final Result      No acute cardiopulmonary process.         Electronically signed by: Herb Padilla   Date:    12/02/2024   Time:    07:34      X-Ray Humerus 2 View Right   Final Result      As above.  Mildly comminuted spiral fracture of the proximal humerus.         Electronically signed by: Herb Padilla   Date:    12/02/2024   Time:    07:44      X-Ray Pelvis Routine AP   Final Result      No acute osseous process appreciated.         Electronically signed by: Isaac Palomino   Date:    12/02/2024   Time:    07:44      X-Ray Knee Complete 4 or More Views Left   Final Result      No acute osseous process appreciated.         Electronically signed by: Isaac Palomino   Date:    12/02/2024   Time:    07:42      CT Cervical Spine Without Contrast   Final Result      1. No acute cervical spine abnormality identified.      2. Ligament, spinal cord and/or vascular abnormalities cannot be excluded on the basis of this examination.      Stable moderate to severe degenerative changes.         Electronically signed by: Herb Padilla   Date:    12/02/2024   Time:    07:32      X-Ray Shoulder Complete 2 View Right   Final Result      Proximal humeral shaft fracture.         Electronically signed by: Isaac Palomino   Date:    12/02/2024   Time:    07:41      CT Head Without Contrast   Final Result      1. No acute intracranial abnormality.   2. Nondisplaced fractures of the bilateral nasal bones.         Electronically signed by: Vadim Mtz MD   Date:    12/02/2024   Time:    07:20          ASSESSMENT & PLAN:    74 y.o. female who tripped and fell    Fall  -MMPC  -Bowel regimen  -mIVF  -NPO  -Daily labs  -IS  -PT/OT when able  -heparin for DVT prophylaxis    Right humerus fracture  -admit to floor  -Ortho consulted  -OR w/Ortho 12/2  -NPO  -MMPC  -Neurovascular checks q4  -splint/sling    Bilateral nasal bone  fractures  -Consulted Facial trauma/Plastics, awaiting recs  -[facility]    Leanna Hagan P  Trauma Surgery

## 2024-12-02 NOTE — ANESTHESIA PREPROCEDURE EVALUATION
12/02/2024  Malissa Schwartz is a 74 y.o., female, who presents for the following:    Procedure: INSERTION, INTRAMEDULLARY MONTSERRAT, HUMERUS (Right) - supine vacular backward c arm synthes  shoulder scope drape   Anesthesia type: General   Diagnosis: Closed displaced comminuted fracture of shaft of right humerus, initial encounter [S42.351A]   Pre-op diagnosis: Closed displaced comminuted fracture of shaft of right humerus, initial encounter [S42.446A]   Location: [facility] OR 06 / [facility] OR   Surgeons: Samuel Dow,        History of Present Illness:  Patient is a 74 y.o. female with a PMH of HTN, HLD, CKD, ESRD on peritoneal HD who tripped this morning on her outside steps. Patient denies any lightheadedness or dizziness prior to her fall, states she was outside turning on [place] lights and tripped on the steps and fell. Imaging shows nondisplaced bilateral nasal bone fractures and right spinal fracture of the mid humerus. No blood thinners, no LOC. Skin tear noted to left knee, abrasion to right knee. Bruising to bilateral arms.      Past Medical History:   Diagnosis Date    Acute rejection of kidney transplant 9/10/07 9/10/2007     09/10/2007 mild acute rejection; +C4D. Glomerulitis with one activated endothelial cell: treated with Solumedrol pulse 375 mg IVBP x 3 doses with short prednisone taper; thymoglobin 100 mg x 6 doses 10/04/2007 minimal histologic changes no acute rejection     Allergy      Anemia of renal disease      CKD (chronic kidney disease), stage IV      CMV (cytomegalovirus) status positive      Colon polyp       benign    Depression      GN (glomerulonephritis)      GN (glomerulonephritis)       unknown type    Herpes infection, other 2009     on buttocks    Hyperlipidemia      Hypertension      Immunosuppression      Living-donor kidney transplant recipient 01/15/2007    Meningitis        history of viral meningitis    Proteinuria 1/5/2015    Proteinuria      Pulmonary nodules      Renal hypertension      S/P breast augmentation      Skin cancer      Urinary tract infection            ECHO (1/2024) :  EF 55-60%      Pre-op Assessment    I have reviewed the Patient Summary Reports.     I have reviewed the Nursing Notes. I have reviewed the NPO Status.   I have reviewed the Medications.     Review of Systems  Anesthesia Hx:  No problems with previous Anesthesia             Denies Family Hx of Anesthesia complications.    Denies Personal Hx of Anesthesia complications.                    Social:  Former Smoker       Cardiovascular:     Hypertension           hyperlipidemia                               Pulmonary:  Pulmonary Normal                       Renal/:  Chronic Renal Disease   CKD 4, on PD, s/p rejection of transplanted donor kidney             Psych:    depression                Physical Exam  General: Alert and Oriented    Airway:  Mallampati: II   Mouth Opening: Normal  TM Distance: Normal  Tongue: Normal  Neck ROM: Normal ROM    Chest/Lungs:  Normal Respiratory Rate    Heart:  Rate: Normal  Rhythm: Regular Rhythm        Anesthesia Plan  Type of Anesthesia, risks & benefits discussed:    Anesthesia Type: Gen ETT  Intra-op Monitoring Plan: Standard ASA Monitors  Post Op Pain Control Plan: IV/PO Opioids PRN, multimodal analgesia and peripheral nerve block  Induction:  IV  Airway Plan: Direct  Informed Consent: Informed consent signed with the Patient and all parties understand the risks and agree with anesthesia plan.  All questions answered. Patient consented to blood products? No  ASA Score: 3  Day of Surgery Review of History & Physical: H&P Update referred to the surgeon/provider.  Anesthesia Plan Notes: Premedication with Midazolam  Technique: GETA   Supraclavicular Block for post-operative analgesia, per surgeon request  PONV Prophylaxis   PACU Postop   * unable to place SC Blk, see  Quick Notes      Ready For Surgery From Anesthesia Perspective.     .

## 2024-12-02 NOTE — ED PROVIDER NOTES
Encounter Date: 12/2/2024    SCRIBE #1 NOTE: I, Blas Thornton, am scribing for, and in the presence of,  Leah Hedrick MD. I have scribed the following portions of the note - Other sections scribed: HPI, ROS, PE.       History     Chief Complaint   Patient presents with    Fall     Trip and fall turning her florentino lights,hit head. Laceration to nose, deformity to LUE. Takes daily baby aspirin. Denies LOC, GCS 15. Given 100mcg of fentanyl and 4mg of zofran     Patient is a 74 y.o. female with a PMHx of CKD (stage 4), kidney transplant 2016, on peritoneal dialysis at home (dialyzed last night on schedule), HLD, HTN, and anemia presenting to the ED for a fall prior to arrival. Patient reports she tripped and fell on a step outside her house and hit her face/head. She is not on anticoagulation therapy. No LOC. She endorses pain in her face, right arm, and left knee and adds that she was not ambulatory after the fall. She is on aspirin and denies having any loose teeth.  She was given 100 mcg of fentanyl en route with not much relief.     Nephrologist is Dr. Mendoza    The history is provided by the patient. No  was used.     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 Glomerulitis with one activated endothelial cell: treated with Solumedrol pulse 375 mg IVBP x 3 doses with short prednisone taper; thymoglobin 100 mg x 6 doses 10/04/2007 minimal histologic  changes no acute rejection     Allergy     Anemia of renal disease     CKD (chronic kidney disease), stage IV     CMV (cytomegalovirus) status positive     Colon polyp     benign    Depression     GN (glomerulonephritis)     GN (glomerulonephritis)     unknown type    Herpes infection, other 2009    on buttocks    Hyperlipidemia     Hypertension     Immunosuppression     Living-donor kidney transplant recipient 01/15/2007    Meningitis     history of viral meningitis    Proteinuria 1/5/2015    Proteinuria     Pulmonary nodules     Renal hypertension     S/P breast augmentation     Skin cancer     Urinary tract infection      Past Surgical History:   Procedure Laterality Date    COLON SURGERY      COLONOSCOPY W/ BIOPSIES      cosemetic surgery      facelift and breast augmentation     HYSTERECTOMY      KIDNEY TRANSPLANT      NEPHRECTOMY Left     9/2016    RENAL BIOPSY      TONSILLECTOMY, ADENOIDECTOMY       Family History   Problem Relation Name Age of Onset    Kidney disease Sister          ESRD    Kidney disease Brother          CKD from HTN    Hypertension Brother      Heart failure Mother      Heart failure Father      Diabetes Neg Hx      Stroke Neg Hx       Social History     Tobacco Use    Smoking status: Former     Current packs/day: 1.00     Average packs/day: 1 pack/day for 15.0 years (15.0 ttl pk-yrs)     Types: Cigarettes    Smokeless tobacco: Never    Tobacco comments:     quit in 2014; smoked for 15 years and at the most smoked 1 ppd   Substance Use Topics    Alcohol use: Yes     Comment: seldom    Drug use: No     Review of Systems   Constitutional:  Negative for fever.   HENT:  Negative for sore throat.         Head pain   Eyes:  Negative for visual disturbance.   Respiratory:  Negative for cough and shortness of breath.    Cardiovascular:  Negative for chest pain.   Gastrointestinal:  Negative for abdominal pain, constipation, diarrhea, nausea and vomiting.   Genitourinary:  Negative for dysuria,  hematuria and pelvic pain.   Musculoskeletal:  Positive for arthralgias. Negative for back pain and neck pain.        Arm and knee pain   Skin:  Negative for rash.   Neurological:  Negative for syncope, weakness, numbness and headaches.   All other systems reviewed and are negative.      Physical Exam     Initial Vitals [12/02/24 0609]   BP Pulse Resp Temp SpO2   (!) 152/76 74 14 96.8 °F (36 °C) 97 %      MAP       --         Physical Exam    Nursing note and vitals reviewed.  Constitutional: She appears well-developed and well-nourished.   HENT:   Head: Normocephalic. Mouth/Throat: Oropharynx is clear and moist.   Superficial abrasion to nasal bridge with bleeding controlled, no septal hematoma   Eyes: Conjunctivae and EOM are normal. Pupils are equal, round, and reactive to light.   Neck: Neck supple.   Cardiovascular:  Normal rate, regular rhythm and intact distal pulses.           Pulses:       Radial pulses are 2+ on the right side and 2+ on the left side.        Dorsalis pedis pulses are 2+ on the right side and 2+ on the left side.   Pulmonary/Chest: Breath sounds normal. No respiratory distress. She exhibits no tenderness.   Abdominal: Abdomen is soft. Bowel sounds are normal. There is no abdominal tenderness.   Dialysis catheter in lower abdomen   Musculoskeletal:      Cervical back: Neck supple.      Lumbar back: Normal range of motion.      Comments: Skin tear to left knee, tenderness to left knee with no deformities or crepitus, able to flex/extend the knees. Obvious deformity with tenderness to right shoulder/arm, able to wiggle fingers. Sensation intact x4 extremities. Pelvis stable  No C/T/L spine TTP, step-off or deformity       Neurological: She is alert and oriented to person, place, and time. She has normal strength. No cranial nerve deficit or sensory deficit. GCS score is 15. GCS eye subscore is 4. GCS verbal subscore is 5. GCS motor subscore is 6.   Skin: Skin is warm, dry and intact. Capillary  refill takes less than 2 seconds.   Psychiatric: She has a normal mood and affect.         ED Course   Procedures  Labs Reviewed   COMPREHENSIVE METABOLIC PANEL - Abnormal       Result Value    Sodium 140      Potassium 3.3 (*)     Chloride 98      CO2 30      Glucose 117 (*)     Blood Urea Nitrogen 45.4 (*)     Creatinine 9.95 (*)     Calcium 8.2 (*)     Protein Total 5.1 (*)     Albumin 1.9 (*)     Globulin 3.2      Albumin/Globulin Ratio 0.6 (*)     Bilirubin Total 0.3       (*)     ALT 21      AST 33      eGFR 4      Anion Gap 12.0      BUN/Creatinine Ratio 5     PROTIME-INR - Abnormal    PT 12.2 (*)     INR 0.9     CBC WITH DIFFERENTIAL - Abnormal    WBC 7.59      RBC 3.46 (*)     Hgb 9.6 (*)     Hct 30.7 (*)     MCV 88.7      MCH 27.7      MCHC 31.3 (*)     RDW 14.6      Platelet 295      MPV 9.2      Neut % 84.2      Lymph % 8.0      Mono % 6.3      Eos % 0.3      Basophil % 0.5      Lymph # 0.61      Neut # 6.39      Mono # 0.48      Eos # 0.02      Baso # 0.04      IG# 0.05 (*)     IG% 0.7      NRBC% 0.0     APTT - Normal    PTT 25.8     LACTIC ACID, PLASMA - Normal    Lactic Acid Level 1.2     CBC W/ AUTO DIFFERENTIAL    Narrative:     The following orders were created for panel order CBC Auto Differential.  Procedure                               Abnormality         Status                     ---------                               -----------         ------                     CBC with Differential[[id number]]       Abnormal            Final result                 Please view results for these tests on the individual orders.   TYPE & SCREEN    Group & Rh A POS      Indirect Gilberto GEL NEG      Specimen Outdate 12/05/2024 23:59          ECG Results              EKG 12-lead (Final result)        Collection Time Result Time QRS Duration OHS QTC Calculation    12/02/24 12:29:00 12/02/24 13:17:36 84 479                     Final result by Interface, Lab In [facility] (12/02/24 13:17:42)                    Narrative:    Test Reason : Z01.818,    Vent. Rate :  88 BPM     Atrial Rate :  88 BPM     P-R Int : 136 ms          QRS Dur :  84 ms      QT Int : 396 ms       P-R-T Axes :  39 -60  78 degrees    QTcB Int : 479 ms    Sinus rhythm with occasional Premature ventricular complexes  Left anterior fascicular block  Minimal voltage criteria for LVH, may be normal variant ( R in aVL )  Abnormal ECG  When compared with ECG of 05-Oct-2024 16:26,  Premature ventricular complexes are now Present  Nonspecific T wave abnormality no longer evident in Anterior leads  Confirmed by Jami Timmons ([id number]) on 12/2/2024 1:17:34 PM    Referred By: AAAREFERRAL SELF           Confirmed By: Jami Timmons                                  Imaging Results              CT Maxillofacial Without Contrast (Final result)  Result time 12/02/24 15:35:07      Final result by Andrew Varela MD (12/02/24 15:35:07)                   Impression:      Comminuted slightly depressed fracture of the nasal bone which appears to be acute      Electronically signed by: Dariel Varela  Date:    12/02/2024  Time:    15:35               Narrative:    EXAMINATION:  CT MAXILLOFACIAL WITHOUT CONTRAST    CLINICAL HISTORY:  Facial trauma, blunt;    TECHNIQUE:  Low dose axial images, sagittal and coronal reformations were obtained through the face.  Contrast was not administered. Automatic exposure control is utilized to reduce patient radiation exposure.    COMPARISON:  10/26/2024    FINDINGS:  The mandible is intact.  The maxilla is intact.    The zygoma is intact bilaterally.    The medial and lateral pterygoids are intact.    The orbits are intact.  No orbital fracture is seen.    There is a comminuted slightly depressed fracture of the nasal bone.    The paranasal sinuses appear normal..    No periorbital soft tissue swelling is seen.  No facial soft tissue swelling is seen.    The frontal bone is intact.                                       X-Ray  Elbow Complete Right (Final result)  Result time 12/02/24 09:04:05      Final result by Isaac Palomino MD (12/02/24 09:04:05)                   Impression:      No acute osseous process identified.      Electronically signed by: Isaac Palomino  Date:    12/02/2024  Time:    09:04               Narrative:    EXAMINATION:  XR ELBOW COMPLETE 3 VIEW RIGHT    CLINICAL HISTORY:  . Injury, unspecified, initial encounter    TECHNIQUE:  AP, lateral, and oblique views of the right elbow    COMPARISON:  None    FINDINGS:  No acute fracture identified.  Joint alignments are maintained.                                       X-Ray Chest AP Portable (Final result)  Result time 12/02/24 07:34:34      Final result by Herb Padilla MD (12/02/24 07:34:34)                   Impression:      No acute cardiopulmonary process.      Electronically signed by: Herb Padilla  Date:    12/02/2024  Time:    07:34               Narrative:    EXAMINATION:  XR CHEST AP PORTABLE    CLINICAL HISTORY:  Injury, unspecified, initial encounter    TECHNIQUE:  Single view of the chest    COMPARISON:  10/26/2024    FINDINGS:  No focal opacification, pleural effusion, or pneumothorax.    The cardiomediastinal silhouette is unchanged.    No acute osseous abnormality.                                       X-Ray Humerus 2 View Right (Final result)  Result time 12/02/24 07:44:49      Final result by Herb Padilla MD (12/02/24 07:44:49)                   Impression:      As above.  Mildly comminuted spiral fracture of the proximal humerus.      Electronically signed by: Herb Padilla  Date:    12/02/2024  Time:    07:44               Narrative:    EXAMINATION:  XR HUMERUS 2 VIEW RIGHT    CLINICAL HISTORY:  trauma;    TECHNIQUE:  Two views of the right humerus.    COMPARISON:  None    FINDINGS:  Spiral fracture of the mid humerus with approximately 45 degrees of angulation.                                       X-Ray Pelvis Routine AP (Final result)   Result time 12/02/24 07:44:16      Final result by Isaac Palomino MD (12/02/24 07:44:16)                   Impression:      No acute osseous process appreciated.      Electronically signed by: Isaac Palomino  Date:    12/02/2024  Time:    07:44               Narrative:    EXAMINATION:  XR PELVIS ROUTINE AP    CLINICAL HISTORY:  trauma;    TECHNIQUE:  AP view of the pelvis.    COMPARISON:  Radiography 08/27/2024    FINDINGS:  Suboptimal evaluation due to underpenetration.  No defined acute fracture.  Hips and symphysis are aligned.                                       X-Ray Knee Complete 4 or More Views Left (Final result)  Result time 12/02/24 07:42:44      Final result by Isaac Palomino MD (12/02/24 07:42:44)                   Impression:      No acute osseous process appreciated.      Electronically signed by: Isaac Palomino  Date:    12/02/2024  Time:    07:42               Narrative:    EXAMINATION:  XR KNEE COMP 4 OR MORE VIEWS LEFT    CLINICAL HISTORY:  Injury, unspecified, initial encounter    TECHNIQUE:  AP, lateral, and oblique views of the left knee.    COMPARISON:  None    FINDINGS:  No acute fracture identified.  Joint alignments are maintained.  No significant knee effusion.                                       CT Cervical Spine Without Contrast (Final result)  Result time 12/02/24 07:32:06      Final result by Herb Padilla MD (12/02/24 07:32:06)                   Impression:      1. No acute cervical spine abnormality identified.    2. Ligament, spinal cord and/or vascular abnormalities cannot be excluded on the basis of this examination.    Stable moderate to severe degenerative changes.      Electronically signed by: Herb Padilla  Date:    12/02/2024  Time:    07:32               Narrative:    EXAMINATION:  CT CERVICAL SPINE WITHOUT CONTRAST    CLINICAL HISTORY:  Neck trauma (Age >= 65y);    TECHNIQUE:  CT of the cervical spine Without contrast. Sagittal and coronal reconstructions  were performed on the source images.    Automatic exposure control was utilized to reduce the patient's radiation dose.    DLP = 1227    COMPARISON:  10/26/2024    FINDINGS:  There is no acute fracture, subluxation, or dislocation. Limited detail regarding cervical discs, but there is no finding seen to suggest acute disc herniation.  Chronic loss of disc space throughout the mid to lower cervical spine is unchanged.  Moderate to severe neural foraminal narrowing C3 through C7.  The lateral masses are symmetric about the dens. There is normal lordotic curvature of the cervical spine.    The prevertebral soft tissues are normal. There is no lymphadenopathy.                                       X-Ray Shoulder Complete 2 View Right (Final result)  Result time 12/02/24 07:41:32      Final result by Isaac Palomino MD (12/02/24 07:41:32)                   Impression:      Proximal humeral shaft fracture.      Electronically signed by: Isaac Palomino  Date:    12/02/2024  Time:    07:41               Narrative:    EXAMINATION:  XR SHOULDER COMPLETE 2 OR MORE VIEWS RIGHT    CLINICAL HISTORY:  Injury, unspecified, initial encounter    TECHNIQUE:  Two or three views of the right shoulder.    COMPARISON:  None    FINDINGS:  Angulated spiral fracture of the proximal humeral shaft.  No acute fracture appreciated at the shoulder.  Humeral head remains aligned with the glenoid.                                       CT Head Without Contrast (Final result)  Result time 12/02/24 07:20:55      Final result by Vadim Mtz MD (12/02/24 07:20:55)                   Impression:      1. No acute intracranial abnormality.  2. Nondisplaced fractures of the bilateral nasal bones.      Electronically signed by: Vadim Mtz MD  Date:    12/02/2024  Time:    07:20               Narrative:    EXAMINATION:  CT HEAD WITHOUT CONTRAST    CLINICAL HISTORY:  Head trauma, minor (Age >= 65y);    TECHNIQUE:  Axial images of the head were obtained  without IV contrast administration.  Coronal and sagittal reconstructions were provided.  Three dimensional and MIP images were obtained and evaluated.  Total DLP was 1227 mGy-cm. Dose lowering technique and automated exposure control were utilized for this exam.    COMPARISON:  MRI of the brain 08/25/2024.    FINDINGS:  There is normal brain formation.  There is normal gray-white matter differentiation.  There is a small area of cortical calcification in the right occipital lobe most consistent with laminar necrosis.  This is unchanged from the prior exam.  There is no cytotoxic or vasogenic edema.  There is no hemorrhage, hydrocephalus, or midline shift.  There is no intra or extra-axial fluid collection.  There is no herniation.    The calvarium is intact.  There is no fracture.  The bilateral orbits are normal.  There are nondisplaced fractures of the bilateral nasal bones.                                    X-Rays:   Independently Interpreted Readings:   Chest X-Ray: No infiltrates.  No acute abnormalities.   Head CT: No hemorrhage.   Other Readings:  Xr right shoulder, humerus, elbow - comminuted, displaced midshaft humerus fracture, no dislocation  Pelvis xray without acute fracture or pelvic ring disruption on my review        Medications   lactated ringers infusion ( Intravenous New Bag 12/2/24 1119)   acetaminophen tablet 650 mg (650 mg Oral Not Given 12/2/24 1030)   methocarbamoL tablet 500 mg (has no administration in time range)   gabapentin capsule 100 mg (has no administration in time range)   melatonin tablet 6 mg (has no administration in time range)   polyethylene glycol packet 17 g (17 g Oral Not Given 12/2/24 1030)   docusate sodium capsule 100 mg (100 mg Oral Not Given 12/2/24 1030)   magnesium hydroxide 400 mg/5 ml suspension 2,400 mg (has no administration in time range)   heparin (porcine) injection 5,000 Units (has no administration in time range)   LIDOcaine (PF) 10 mg/ml (1%) injection 10  mg (has no administration in time range)   ROPIvacaine 0.5% (PF) injection 40 mL (40 mLs Perineural Not Given 12/2/24 1300)   0.9% NaCl infusion (has no administration in time range)   HYDROmorphone (PF) injection 0.4 mg (0.4 mg Intravenous Given 12/2/24 1615)   ondansetron injection 4 mg (has no administration in time range)   glucagon (human recombinant) injection 1 mg (has no administration in time range)   prochlorperazine injection Soln 5 mg (has no administration in time range)   dextrose 10% bolus 125 mL 125 mL (has no administration in time range)   dextrose 10% bolus 250 mL 250 mL (has no administration in time range)   oxyCODONE immediate release tablet 5 mg (has no administration in time range)   oxyCODONE immediate release tablet 10 mg (has no administration in time range)   morphine injection 4 mg (has no administration in time range)   ondansetron injection 4 mg (has no administration in time range)   clindamycin in D5W 900 mg/50 mL IVPB 900 mg (has no administration in time range)   fentaNYL injection 50 mcg (50 mcg Intravenous Given 12/2/24 0649)   morphine injection 4 mg (4 mg Intravenous Given 12/2/24 0802)   ondansetron injection 4 mg (4 mg Intravenous Given 12/2/24 0802)   midazolam (PF) (VERSED) 1 mg/mL injection 2 mg (1 mg Intravenous Given 12/2/24 1243)     Medical Decision Making  74-year-old female presents after a mechanical fall for right shoulder pain, left knee pain.  She did hit her face with resultant abrasion and contusions to the nasal bridge.  No loss of consciousness, she is not on anticoagulation therapy.  GCS is 15, she is hemodynamically stable at this time, NVI.  There is an obvious deformity to her right arm concerning for fracture.  X-rays confirm mid shaft humerus fracture.  CT of the head and cervical spine also ordered.  Orthopedic surgery will be consulted.  Labs added. Pain control as needed      Differential diagnosis includes but is not limited to fracture, laceration,  contusion, head injury, and others.     Problems Addressed:  CKD (chronic kidney disease) requiring chronic dialysis: chronic illness or injury  Closed displaced comminuted fracture of shaft of right humerus, initial encounter: acute illness or injury  Closed fracture of nasal bone, initial encounter: acute illness or injury  Fall, initial encounter: acute illness or injury  Injury of head, initial encounter: acute illness or injury    Amount and/or Complexity of Data Reviewed  Labs: ordered. Decision-making details documented in ED Course.  Radiology: ordered and independent interpretation performed. Decision-making details documented in ED Course.    Risk  Decision regarding hospitalization.            Scribe Attestation:   Scribe #1: I performed the above scribed service and the documentation accurately describes the services I performed. I attest to the accuracy of the note.    Attending Attestation:           Physician Attestation for Scribe:  Physician Attestation Statement for Scribe #1: I, Leah Hedrick MD, reviewed documentation, as scribed by Blas Thornton in my presence, and it is both accurate and complete.             ED Course as of 12/02/24 1649   Mon Dec 02, 2024   0812 Discussed imaging with Dr. Acevedo, on call for orthopedic surgery. He recommends adding elbow xray which has been ordered.  [RB]   0855 Dr. Acevedo has reviewed imaging, recommends NPO, will try to get to OR today. Trauma surgery to admit. Still awaiting labs for surgical clearance. OK to hold off on splint [RB]      ED Course User Index  [RB] Leah Hedrick MD                           Clinical Impression:  Final diagnoses:  [R52] Pain  [T14.90XA] Trauma  [W19.XXXA] Fall, initial encounter  [S09.90XA] Injury of head, initial encounter  [S02.2XXA] Closed fracture of nasal bone, initial encounter  [S42.351A] Closed displaced comminuted fracture of shaft of right humerus, initial encounter (Primary)  [N18.6, Z99.2] CKD (chronic  kidney disease) requiring chronic dialysis          ED Disposition Condition    Admit Stable                Leah Hedrick MD  12/02/24 3542

## 2024-12-02 NOTE — NURSING
Admission documentation completed by the admit nurse. Home med rec not complete - family will bring meds from home. Pt did elect for meds to bed with [facility]. 4 Eyes and standing weight to be completed by the admitting floor nurse.

## 2024-12-03 VITALS
DIASTOLIC BLOOD PRESSURE: 71 MMHG | SYSTOLIC BLOOD PRESSURE: 116 MMHG | RESPIRATION RATE: 18 BRPM | HEIGHT: 62 IN | BODY MASS INDEX: 23.92 KG/M2 | HEART RATE: 80 BPM | TEMPERATURE: 98 F | OXYGEN SATURATION: 98 % | WEIGHT: 130 LBS

## 2024-12-03 LAB
ALBUMIN SERPL-MCNC: 1.7 G/DL (ref 3.4–4.8)
ALBUMIN/GLOB SERPL: 0.5 RATIO (ref 1.1–2)
ALP SERPL-CCNC: 146 UNIT/L (ref 40–150)
ALT SERPL-CCNC: 13 UNIT/L (ref 0–55)
ANION GAP SERPL CALC-SCNC: 15 MEQ/L
AST SERPL-CCNC: 25 UNIT/L (ref 5–34)
BASOPHILS # BLD AUTO: 0.01 X10(3)/MCL
BASOPHILS NFR BLD AUTO: 0.2 %
BILIRUB SERPL-MCNC: 0.3 MG/DL
BUN SERPL-MCNC: 42.8 MG/DL (ref 9.8–20.1)
CALCIUM SERPL-MCNC: 8.3 MG/DL (ref 8.4–10.2)
CHLORIDE SERPL-SCNC: 98 MMOL/L (ref 98–107)
CO2 SERPL-SCNC: 25 MMOL/L (ref 23–31)
CREAT SERPL-MCNC: 9.6 MG/DL (ref 0.55–1.02)
CREAT/UREA NIT SERPL: 4
EOSINOPHIL # BLD AUTO: 0 X10(3)/MCL (ref 0–0.9)
EOSINOPHIL NFR BLD AUTO: 0 %
ERYTHROCYTE [DISTWIDTH] IN BLOOD BY AUTOMATED COUNT: 14.7 % (ref 11.5–17)
GFR SERPLBLD CREATININE-BSD FMLA CKD-EPI: 4 ML/MIN/1.73/M2
GLOBULIN SER-MCNC: 3.2 GM/DL (ref 2.4–3.5)
GLUCOSE SERPL-MCNC: 148 MG/DL (ref 82–115)
HCT VFR BLD AUTO: 26.7 % (ref 37–47)
HGB BLD-MCNC: 8.3 G/DL (ref 12–16)
IMM GRANULOCYTES # BLD AUTO: 0.01 X10(3)/MCL (ref 0–0.04)
IMM GRANULOCYTES NFR BLD AUTO: 0.2 %
LYMPHOCYTES # BLD AUTO: 0.67 X10(3)/MCL (ref 0.6–4.6)
LYMPHOCYTES NFR BLD AUTO: 14.7 %
MAGNESIUM SERPL-MCNC: 2.1 MG/DL (ref 1.6–2.6)
MCH RBC QN AUTO: 28 PG (ref 27–31)
MCHC RBC AUTO-ENTMCNC: 31.1 G/DL (ref 33–36)
MCV RBC AUTO: 90.2 FL (ref 80–94)
MONOCYTES # BLD AUTO: 0.35 X10(3)/MCL (ref 0.1–1.3)
MONOCYTES NFR BLD AUTO: 7.7 %
NEUTROPHILS # BLD AUTO: 3.53 X10(3)/MCL (ref 2.1–9.2)
NEUTROPHILS NFR BLD AUTO: 77.2 %
NRBC BLD AUTO-RTO: 0 %
PHOSPHATE SERPL-MCNC: 7.6 MG/DL (ref 2.3–4.7)
PLATELET # BLD AUTO: 271 X10(3)/MCL (ref 130–400)
PMV BLD AUTO: 9.8 FL (ref 7.4–10.4)
POTASSIUM SERPL-SCNC: 4 MMOL/L (ref 3.5–5.1)
PROT SERPL-MCNC: 4.9 GM/DL (ref 5.8–7.6)
RBC # BLD AUTO: 2.96 X10(6)/MCL (ref 4.2–5.4)
SODIUM SERPL-SCNC: 138 MMOL/L (ref 136–145)
WBC # BLD AUTO: 4.57 X10(3)/MCL (ref 4.5–11.5)

## 2024-12-03 PROCEDURE — S0179 MEGESTROL 20 MG: HCPCS | Performed by: NURSE PRACTITIONER

## 2024-12-03 PROCEDURE — 97162 PT EVAL MOD COMPLEX 30 MIN: CPT

## 2024-12-03 PROCEDURE — 83735 ASSAY OF MAGNESIUM: CPT

## 2024-12-03 PROCEDURE — 94799 UNLISTED PULMONARY SVC/PX: CPT

## 2024-12-03 PROCEDURE — 25000003 PHARM REV CODE 250

## 2024-12-03 PROCEDURE — 97166 OT EVAL MOD COMPLEX 45 MIN: CPT

## 2024-12-03 PROCEDURE — 85025 COMPLETE CBC W/AUTO DIFF WBC: CPT

## 2024-12-03 PROCEDURE — 25000003 PHARM REV CODE 250: Performed by: NURSE PRACTITIONER

## 2024-12-03 PROCEDURE — 84100 ASSAY OF PHOSPHORUS: CPT

## 2024-12-03 PROCEDURE — 80053 COMPREHEN METABOLIC PANEL: CPT

## 2024-12-03 PROCEDURE — 99223 1ST HOSP IP/OBS HIGH 75: CPT | Mod: GC,,, | Performed by: SURGERY

## 2024-12-03 PROCEDURE — 36415 COLL VENOUS BLD VENIPUNCTURE: CPT

## 2024-12-03 PROCEDURE — 99233 SBSQ HOSP IP/OBS HIGH 50: CPT | Mod: ,,, | Performed by: SURGERY

## 2024-12-03 PROCEDURE — 25000003 PHARM REV CODE 250: Performed by: PHYSICIAN ASSISTANT

## 2024-12-03 PROCEDURE — 63600175 PHARM REV CODE 636 W HCPCS

## 2024-12-03 PROCEDURE — 63600175 PHARM REV CODE 636 W HCPCS: Mod: JZ,JG | Performed by: PHYSICIAN ASSISTANT

## 2024-12-03 RX ORDER — FOLIC ACID 1 MG/1
1 TABLET ORAL DAILY
Status: DISCONTINUED | OUTPATIENT
Start: 2024-12-03 | End: 2024-12-03 | Stop reason: HOSPADM

## 2024-12-03 RX ORDER — METHOCARBAMOL 500 MG/1
500 TABLET, FILM COATED ORAL EVERY 8 HOURS
Qty: 30 TABLET | Refills: 0 | Status: SHIPPED | OUTPATIENT
Start: 2024-12-03 | End: 2024-12-13

## 2024-12-03 RX ORDER — CALCITRIOL 0.25 UG/1
0.25 CAPSULE ORAL
Status: DISCONTINUED | OUTPATIENT
Start: 2024-12-03 | End: 2024-12-03 | Stop reason: HOSPADM

## 2024-12-03 RX ORDER — FUROSEMIDE 20 MG/1
20 TABLET ORAL DAILY
Status: DISCONTINUED | OUTPATIENT
Start: 2024-12-03 | End: 2024-12-03 | Stop reason: HOSPADM

## 2024-12-03 RX ORDER — OXYCODONE HYDROCHLORIDE 5 MG/1
5 TABLET ORAL EVERY 8 HOURS PRN
Qty: 12 TABLET | Refills: 0 | Status: SHIPPED | OUTPATIENT
Start: 2024-12-03

## 2024-12-03 RX ORDER — MEGESTROL ACETATE 40 MG/ML
400 SUSPENSION ORAL DAILY
Status: DISCONTINUED | OUTPATIENT
Start: 2024-12-03 | End: 2024-12-03 | Stop reason: HOSPADM

## 2024-12-03 RX ADMIN — METHOCARBAMOL 500 MG: 500 TABLET ORAL at 05:12

## 2024-12-03 RX ADMIN — POLYETHYLENE GLYCOL 3350 17 G: 17 POWDER, FOR SOLUTION ORAL at 08:12

## 2024-12-03 RX ADMIN — FUROSEMIDE 20 MG: 20 TABLET ORAL at 08:12

## 2024-12-03 RX ADMIN — OXYCODONE HYDROCHLORIDE 5 MG: 5 TABLET ORAL at 09:12

## 2024-12-03 RX ADMIN — Medication 1 CAPSULE: at 08:12

## 2024-12-03 RX ADMIN — GABAPENTIN 100 MG: 100 CAPSULE ORAL at 08:12

## 2024-12-03 RX ADMIN — CLINDAMYCIN PHOSPHATE 900 MG: 900 INJECTION, SOLUTION INTRAVENOUS at 12:12

## 2024-12-03 RX ADMIN — HEPARIN SODIUM 5000 UNITS: 5000 INJECTION, SOLUTION INTRAVENOUS; SUBCUTANEOUS at 05:12

## 2024-12-03 RX ADMIN — CALCITRIOL CAPSULES 0.25 MCG 0.25 MCG: 0.25 CAPSULE ORAL at 08:12

## 2024-12-03 RX ADMIN — AMLODIPINE BESYLATE 5 MG: 5 TABLET ORAL at 08:12

## 2024-12-03 RX ADMIN — CLINDAMYCIN PHOSPHATE 900 MG: 900 INJECTION, SOLUTION INTRAVENOUS at 05:12

## 2024-12-03 RX ADMIN — FOLIC ACID 1 MG: 1 TABLET ORAL at 08:12

## 2024-12-03 RX ADMIN — MEGESTROL ACETATE 400 MG: 400 SUSPENSION ORAL at 09:12

## 2024-12-03 RX ADMIN — ACETAMINOPHEN 650 MG: 325 TABLET, FILM COATED ORAL at 05:12

## 2024-12-03 NOTE — PT/OT/SLP EVAL
Physical Therapy Evaluation    Patient Name:  Malissa Schwartz   MRN:  [id number]    Recommendations:     Discharge therapy intensity: Low Intensity Therapy   Discharge Equipment Recommendations: none   Barriers to discharge: None    Assessment:     Malissa Schwartz is a 74 y.o. female admitted with a medical diagnosis of right humerus fracture. S/p IM implant right humerus. Prior to admit, patient lived with spouse in a SLH with 2 steps to enter. At baseline, she is independent. She has caregivers 12 hours/day, 7 days/week. She presents with the following impairments/functional limitations: weakness, impaired endurance, impaired self care skills, impaired functional mobility, gait instability, impaired balance, decreased upper extremity function, decreased safety awareness, pain. She required MIN A for bed mobility. Ambulated 25 ft with RW & CGA. Patient to benefit from skilled PT to address deficits, improve functional mobility, and progress towards functional independence. Recommending low intensity therapy at discharge. Progress as tolerated.    Rehab Prognosis: Good; patient would benefit from acute skilled PT services to address these deficits and reach maximum level of function.    Recent Surgery: Procedure(s) (LRB):  INSERTION, INTRAMEDULLARY MONTSERRAT, HUMERUS (Right) 1 Day Post-Op    Plan:     During this hospitalization, patient would benefit from acute PT services 5 x/week to address the identified rehab impairments via gait training, therapeutic activities, therapeutic exercises, neuromuscular re-education and progress toward the following goals:    Plan of Care Expires:  01/03/25    Subjective     Chief Complaint: pain  Patient/Family Comments/goals: none  Pain/Comfort:  Pain Rating 1: 5/10  Location - Side 1: Right  Location 1: arm  Pain Addressed 1: Reposition, Distraction  Pain Rating Post-Intervention 1: 5/10    Patients cultural, spiritual, Jehovah's witness conflicts given the current situation: no    Living  Environment:  Prior to admit, patient lived with spouse in a SLH with 2 steps to enter. At baseline, she is independent. She has caregivers 12 hours/day, 7 days/week. Equipment used at home: cane, straight, walker, rolling, rollator.  DME owned (not currently used): none.  Upon discharge, patient will have assistance from caregivers.    Objective:     Communicated with nurse prior to session.  Patient found supine with telemetry (Peritoneal dialysis catheter)  upon PT entry to room.    General Precautions: Standard, fall  Orthopedic Precautions:RUE partial weight bearing (OK to use RW, ROMAT)   Braces: Shoulder abduction brace  Respiratory Status: Room air    Exams:  Cognitive Exam:  Patient is oriented to Person, Place, Time, and Situation  Sensation: -       Intact  RLE ROM: WFL  RLE Strength: WFL  LLE ROM: WFL  LLE Strength: WFL  Skin integrity: Visible skin intact      Functional Mobility:  Bed Mobility:  Supine to Sit: minimum assistance  Transfers:  Sit to Stand:  contact guard assistance with rolling walker  Gait: Ambulated 25 ft with RW & CGA.  Balance: fair      AM-PAC 6 CLICK MOBILITY  Total Score:17     Education Provided:  Role and goals of PT, transfer training, bed mobility, gait training, balance training, safety awareness, assistive device, strengthening exercises, and importance of participating in PT to return to PLOF.    Patient left up in chair with all lines intact, call button in reach, family present, and educated on calling for assistance when ready to return to bed .    GOALS:   Multidisciplinary Problems       Physical Therapy Goals          Problem: Physical Therapy    Goal Priority Disciplines Outcome Interventions   Physical Therapy Goal     PT, PT/OT Progressing    Description: Goals to be met by: 1/3/25     Patient will increase functional independence with mobility by performin. Supine to sit with Set-up [place]  2. Sit to supine with Set-up [place]  3. Sit to stand  transfer with Supervision  4. Gait  x 200 feet with Supervision using Rolling Walker.   5. 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 Glomerulitis with one activated endothelial cell: treated with Solumedrol pulse 375 mg IVBP x 3 doses with short prednisone taper; thymoglobin 100 mg x 6 doses 10/04/2007 minimal histologic changes no acute rejection     Allergy     Anemia of renal disease     CKD (chronic kidney disease), stage IV     CMV (cytomegalovirus) status positive     Colon polyp     benign    Depression     GN (glomerulonephritis)     GN (glomerulonephritis)     unknown type    Herpes infection, other 2009    on buttocks    Hyperlipidemia     Hypertension     Immunosuppression     Living-donor kidney transplant recipient 01/15/2007    Meningitis     history of viral meningitis    Proteinuria 1/5/2015    Proteinuria     Pulmonary nodules     Renal hypertension     S/P breast augmentation     Skin cancer     Urinary tract infection        Past Surgical History:   Procedure Laterality Date    COLON SURGERY      COLONOSCOPY W/ BIOPSIES      cosemetic surgery      facelift and breast augmentation     HYSTERECTOMY      INTRAMEDULLARY RODDING OF HUMERUS Right 12/2/2024    Procedure: INSERTION, INTRAMEDULLARY MONTSERRAT, HUMERUS;  Surgeon: Samuel Dow DO;  Location: [facility];  Service: Orthopedics;  Laterality: Right;  supine vacular backward c arm synthes  shoulder scope drape    KIDNEY TRANSPLANT      NEPHRECTOMY Left     9/2016    RENAL BIOPSY      TONSILLECTOMY, ADENOIDECTOMY         Time Tracking:     PT Received On: 12/03/24  PT Start Time: 0815     PT Stop Time: 0835  PT Total Time (min): 20 min     Billable Minutes: Evaluation 20 minutes      12/03/2024

## 2024-12-03 NOTE — DISCHARGE SUMMARY
[facility] Neuro  Trauma  Discharge Summary      Patient Name: Malissa Schwartz  MRN: [id number]  Admission Date: 12/2/2024  Hospital Length of Stay: 1 days  Discharge Date and Time:  12/03/2024 10:40 AM  Attending Physician: Aleksandar Patel MD   Discharging Provider: LETHA Ruth  Primary Care Provider: Sergio Crabtree MD    HPI:   No notes on file    Procedure(s) (LRB):  INSERTION, INTRAMEDULLARY MONTSERRAT, HUMERUS (Right)      Indwelling Lines/Drains at time of discharge:   Lines/Drains/Airways       Drain  Duration                  Hemodialysis AV Fistula Left upper arm -- days                  Hospital Course: 74-year-old female who tripped and fell on found to have a right humerus fracture.  Underwent intramedullary nailing.  Has been cleared by therapy and already has home health set up.  She was had a nasal bone fracture and it was nonoperative.  She will need to follow up with Orthopedics and facial trauma.  She was saw nephrology while she was here for chronic kidney disease on peritoneal dialysis for which she will need to continue.  She was not require home VTE prophylaxis.  She was should also follow up with the primary care provider.  No indication to follow up with us in trauma clinic unless a need arises.    Goals of Care Treatment Preferences:  Code Status: Full Code      Consults:   Consults (From admission, onward)          Status Ordering Provider     Inpatient consult to Social Work/Case Management  Once        Provider:  (Not yet assigned)    Ordered ERNESTINA SNIDER     Inpatient consult to Nephrology  Once        Provider:  George Mendoza MD    Acknowledged TERESSA STEPHENS     Inpatient consult to Plastic Surgery  Once        Provider:  Wilberto Rodríguez MD    Completed JESSICA THAKKAR     Inpatient consult to Orthopedic Surgery  Once        Provider:  Daniel Acevedo Jr., MD    Completed JOSHUA LOUIS            Significant Diagnostic Studies:  N/A    Pending Diagnostic Studies:       None          Final Active Diagnoses:    Diagnosis Date Noted POA    PRINCIPAL PROBLEM:  Fracture of humeral shaft, right, closed [S42.301A] 12/02/2024 Yes    Closed fracture of nasal bone [S02.2XXA] 12/02/2024 Yes    Fall [W19.XXXA] 12/02/2024 Yes    Stage 3 chronic kidney disease [N18.30]  Yes      Problems Resolved During this Admission:      Discharged Condition: good    Disposition: Home or Self Care    Follow Up:   Contact information for follow-up providers       Wilberto Rodríguez MD Follow up.    Specialty: Plastic Surgery  Contact information:  [address]  [address]  [facility] [address]  [phone]               Samuel Dow DO .    Specialty: Orthopedic Surgery  Contact information:  [address]  [place] [address]  [phone]                       Contact information for after-discharge care       Home Medical Care       [facility] .    Service: Home Health Services  Contact information:  [address]  [place] [address]  [phone]                                 Patient Instructions:   No discharge procedures on file.  Medications:  Reconciled Home Medications:      Medication List        START taking these medications      methocarbamoL 500 MG Tab  Commonly known as: ROBAXIN  Take 1 tablet (500 mg total) by mouth every 8 (eight) hours. for 10 days     oxyCODONE 5 MG immediate release tablet  Commonly known as: ROXICODONE  Take 1 tablet (5 mg total) by mouth every 8 (eight) hours as needed for Pain.            CONTINUE taking these medications      amLODIPine 10 MG tablet  Commonly known as: NORVASC  Take 10 mg by mouth once daily.     aspirin 81 MG EC tablet  Commonly known as: ECOTRIN  Take 81 mg by mouth once daily.     calcitRIOL 0.25 MCG Cap  Commonly known as: ROCALTROL  Take 0.25 mcg by mouth 3 (three) times a week.     D5W PgBk 50 mL with ceFAZolin 1 gram SolR  Cefazolin intraperitoneally times 10  more days     folic acid 1 MG tablet  Commonly known as: FOLVITE  Take 1 mg by mouth once daily.     furosemide 20 MG tablet  Commonly known as: LASIX  Take 20 mg by mouth once daily.     megestroL 400 mg/10 mL (10 mL) Susp  Commonly known as: MEGACE  Take 10 mLs (400 mg total) by mouth once daily.     NEPHRO-JENN 0.8 mg Tab  Generic drug: B complex-vitamin C-folic acid  Take 0.8 mg by mouth Daily.     potassium chloride 10 MEQ Tbsr  Commonly known as: KLOR-CON  Take 10 mEq by mouth once daily.     zolpidem 10 mg Tab  Commonly known as: AMBIEN  Take 10 mg by mouth every evening.            ASK your doctor about these medications      EScitalopram oxalate 10 MG tablet  Commonly known as: LEXAPRO  Take 1 tablet (10 mg total) by mouth once daily.            Time spent on the discharge of patient: 40 minutes (pre-rounds, MD rounds, MDR, discharge coordination)    Compa Veras, LETHA  Trauma  [facility]

## 2024-12-03 NOTE — NURSING
Pt left with hospital transport via w/c at 1330.  at bedside. Discharge education provided to  and patient. Wound care instructions provided, sling education on when and how to remove demonstrated with verbal teachback by pt and demonstration teachback by spouse. Meds delivered at bedside. Educated on how and when to take if needed. Pt AAOX4. No signs of distress. Pt pleasant and in good spirits.

## 2024-12-03 NOTE — PROGRESS NOTES
Trauma Surgery   Daily Progress Note     HD#1  POD#1 Day Post-Op    Subjective  Drowsy but arousable this morning.  Hemoglobin and white blood cell count appropriate.  Creatinine as expected.  Awaiting final facial trauma recommendations.  Unable to complete tertiary this morning due to drowsiness.  Home medications adjusted as appropriate.     Scheduled Meds:   acetaminophen  650 mg Oral Q4H    amLODIPine  5 mg Oral Daily    calcitRIOL  0.25 mcg Oral Once per day on Monday Wednesday Friday    clindamycin IV (PEDS and ADULTS)  900 mg Intravenous Q8H    docusate sodium  100 mg Oral BID    folic acid  1 mg Oral Daily    furosemide  20 mg Oral Daily    gabapentin  100 mg Oral TID    heparin (porcine)  5,000 Units Subcutaneous Q8H    megestroL  400 mg Oral Daily    methocarbamoL  500 mg Oral Q8H    polyethylene glycol  17 g Oral BID    vitamin renal formula (B-complex-vitamin c-folic acid)  1 capsule Oral Daily       Continuous Infusions:    PRN Meds:  Current Facility-Administered Medications:     magnesium hydroxide 400 mg/5 ml, 30 mL, Oral, Daily PRN    melatonin, 6 mg, Oral, Nightly PRN    morphine, 4 mg, Intravenous, Q6H PRN    ondansetron, 4 mg, Intravenous, Q6H PRN    oxyCODONE, 5 mg, Oral, Q6H PRN    oxyCODONE, 10 mg, Oral, Q4H PRN     Objective  Temp:  [97.6 °F (36.4 °C)-98.7 °F (37.1 °C)] 98.7 °F (37.1 °C)  Pulse:  [] 73  Resp:  [12-29] 18  SpO2:  [92 %-100 %] 99 %  BP: (109-154)/(64-91) 123/76     Gen: NAD, AAOx3  HEENT: EOMI, NCAT  CV: RR  Resp: no shortness of breath, normal WOB   Abd: soft, non-distended, NT  Ext:  Full ROM. No deformity. Dressings c/d/I.      Labs  Recent Labs     12/02/24  0907 12/03/24  0509   WBC 7.59 4.57   HGB 9.6* 8.3*   HCT 30.7* 26.7*    271   INR 0.9  --      Recent Labs     12/02/24  0907 12/03/24  0509    138   K 3.3* 4.0   CL 98 98   CO2 30 25   BUN 45.4* 42.8*   CREATININE 9.95* 9.60*   CALCIUM 8.2* 8.3*   MG  --  2.10   PHOS  --  7.6*   ALBUMIN 1.9* 1.7*  "  BILITOT 0.3 0.3   AST 33 25   ALKPHOS 182* 146   ALT 21 13     No results for input(s): "POCTGLUCOSE" in the last 72 hours.     Imaging  X-Ray Humerus 2 View Right    Result Date: 12/2/2024  As above. Electronically signed by: Iván Davis Date:    12/02/2024 Time:    16:58    CT Maxillofacial Without Contrast    Result Date: 12/2/2024  Comminuted slightly depressed fracture of the nasal bone which appears to be acute Electronically signed by: Dariel Varela Date:    12/02/2024 Time:    15:35    X-Ray Elbow Complete Right    Result Date: 12/2/2024  No acute osseous process identified. Electronically signed by: Isaac Palomino Date:    12/02/2024 Time:    09:04    X-Ray Humerus 2 View Right    Result Date: 12/2/2024  As above.  Mildly comminuted spiral fracture of the proximal humerus. Electronically signed by: Herb Padilla Date:    12/02/2024 Time:    07:44    X-Ray Pelvis Routine AP    Result Date: 12/2/2024  No acute osseous process appreciated. Electronically signed by: Isaac Palomino Date:    12/02/2024 Time:    07:44    X-Ray Knee Complete 4 or More Views Left    Result Date: 12/2/2024  No acute osseous process appreciated. Electronically signed by: Isaac Palomino Date:    12/02/2024 Time:    07:42    X-Ray Shoulder Complete 2 View Right    Result Date: 12/2/2024  Proximal humeral shaft fracture. Electronically signed by: Isaac Palomino Date:    12/02/2024 Time:    07:41    X-Ray Chest AP Portable    Result Date: 12/2/2024  No acute cardiopulmonary process. Electronically signed by: Herb Padilla Date:    12/02/2024 Time:    07:34    CT Cervical Spine Without Contrast    Result Date: 12/2/2024  1. No acute cervical spine abnormality identified. 2. Ligament, spinal cord and/or vascular abnormalities cannot be excluded on the basis of this examination. Stable moderate to severe degenerative changes. Electronically signed by: Herb Padilla Date:    12/02/2024 Time:    07:32    CT Head Without Contrast    Result " Date: 12/2/2024  1. No acute intracranial abnormality. 2. Nondisplaced fractures of the bilateral nasal bones. Electronically signed by: Vadim Mtz MD Date:    12/02/2024 Time:    07:20         Assessment/Plan  Consults:   Consults: Orthopedic surgery, Plastic Surgery (face), Nephrology, Physical therapy, and Occupational therapy   Therapy:  Physical Therapy  Occupational Therapy Weight bearing status:   RUE: partial weight bearing  LUE: WBAT  RLE: WBAT  LLE: WBAT Precautions:  Precautions: Fall   Seizure prophylaxis:  Not indicated. VTE prophylaxis:     Heparin  GI prophylaxis:  Not indicated. Tolerating ordered diet.   Outpatient follow up:  Outpatient follow up: PCP and Orthopedic surgery Disposition:  Disposition: Pending progress with therapy  Pending clinical progress       R humerus fx  S/p IMN by Ortho  Partial WB with walker  Dressings per Ortho  Outpatient Ortho FU    [place]hal[place] nasal bone fracture  FU final recs of Dr. Rodríguez  Suspect can restart diet today if non-op  Will need full tertiary 12/4    CKD on PD  FU renal recs  Can have renal diet once restarted  Heparin considering CKD rather than Lovenox  Daily labs for now    Compa Veras  Trauma Surgery   c - [phone]

## 2024-12-03 NOTE — PROGRESS NOTES
12/02/24 2006   Peritoneal Dialysis   Exchange Type Cycler   Peritoneal Treatment Status Started   Dianeal Solution Dextrose 1.5% in 6000 mL;Dextrose 2.5% in 6000 mL   Cycler Peritoneal Dialysis   Initial Drain Volume (mL) 1856 mL   Effluent Appearance Clear   Number of Cycles 4   Fill Volume (mL) 2400 mL   Total Volume (mL) 39146 mL   Average Dwell Time (specify hr/min) 1:50   Cycler Treatment Comments CCPD initiated per P&P

## 2024-12-03 NOTE — PLAN OF CARE
Spoke with pt, Brayden Schwartz Spouse [phone] and caregiver Evita who are at bedside. Evita is at pts home to assist from 7 am to 7 pm 7 days a week. Pt has [facility]. Order for home health has been sent.   Pts PCP Sergio Crabtree  Medicare A&B  Has cane and walker  2 steps into the home  Pt does home dialysis  Anticipate dc back home today  no additional needs. [facility] aware of dc today

## 2024-12-03 NOTE — PROGRESS NOTES
[facility] - Ortho Neuro  Orthopedics  Progress Note    Patient Name: Malissa Schwartz  MRN: [id number]  Admission Date: 12/2/2024  Hospital Length of Stay: 1 days  Attending Provider: Aleksandar Patel MD  Primary Care Provider: Sergio Crabtree MD    Subjective:     Principal Problem:Fracture of humeral shaft, right, closed    Principal Orthopedic Problem: 1 Day Post-Op   IMN R humerus    Interval History: Seen this am. States pain controlled overnight. Discussed ROM and use of arm for ADLs. Has care at home during th day. Therapy to see today for evaluation.     Review of patient's allergies indicates:   Allergen Reactions    Codeine Palpitations and Rash    Cefepime      Other Reaction(s): Encephalopathy    Renal impairment increased risk for this reaction    Levofloxacin      Other Reaction(s): Encephalopathy    Renal pt who had some encephalopathy while on Levaquin & Cefepime, neurology physicians think they may have been due to the antibiotics (both possible side effects for each)    Iodine Nausea Only       Current Facility-Administered Medications   Medication    acetaminophen tablet 650 mg    amLODIPine tablet 5 mg    calcitRIOL capsule 0.25 mcg    clindamycin in D5W 900 mg/50 mL IVPB 900 mg    docusate sodium capsule 100 mg    folic acid tablet 1 mg    furosemide tablet 20 mg    gabapentin capsule 100 mg    heparin (porcine) injection 5,000 Units    magnesium hydroxide 400 mg/5 ml suspension 2,400 mg    megestroL 400 mg/10 mL (10 mL) suspension 400 mg    melatonin tablet 6 mg    methocarbamoL tablet 500 mg    morphine injection 4 mg    ondansetron injection 4 mg    oxyCODONE immediate release tablet 5 mg    oxyCODONE immediate release tablet Tab 10 mg    polyethylene glycol packet 17 g    vitamin renal formula (B-complex-vitamin c-folic acid) 1 mg per capsule 1 capsule     Objective:     Vital Signs (Most Recent):  Temp: 98.3 °F (36.8 °C) (12/03/24 0734)  Pulse: 70 (12/03/24 0734)  Resp: 18  "(12/03/24 0927)  BP: 116/73 (12/03/24 0857)  SpO2: 98 % (12/03/24 0734) Vital Signs (24h Range):  Temp:  [97.6 °F (36.4 °C)-98.7 °F (37.1 °C)] 98.3 °F (36.8 °C)  Pulse:  [] 70  Resp:  [12-29] 18  SpO2:  [92 %-100 %] 98 %  BP: (109-154)/(64-91) 116/73     Weight: 59 kg (130 lb)  Height: 5' 2" (157.5 cm)  Body mass index is 23.78 kg/m².      Intake/Output Summary (Last 24 hours) at 12/3/2024 1003  Last data filed at 12/3/2024 0845  Gross per 24 hour   Intake 1100 ml   Output 788 ml   Net 312 ml       Physical Exam:   General the patient is alert and in no acute distress;  nontoxic-appearing appropriate affect.    Constitutional: Vital signs are examined and stable.  Resp: No signs of labored breathing           RUE: -Skin: Dressing with some strikethrough, abduction sling.            -MSK: +AIN/PIN/Median/Radial/Ulnar motor,           -Neuro:  Sensation intact to light touch throughout           -CV:  Capillary refill is less than 2 seconds. + radial pulse. Compartments soft and compressible      Diagnostic Findings:     Significant Labs: CBC:   Recent Labs   Lab 12/02/24  0907 12/03/24  0509   WBC 7.59 4.57   HGB 9.6* 8.3*   HCT 30.7* 26.7*    271     All pertinent labs within the past 24 hours have been reviewed.    Significant Imaging: I have reviewed all pertinent imaging results/findings.     Assessment/Plan:     Active Diagnoses:    Diagnosis Date Noted POA    PRINCIPAL PROBLEM:  Fracture of humeral shaft, right, closed [S42.301A] 12/02/2024 Yes    Closed fracture of nasal bone [S02.2XXA] 12/02/2024 Yes    Fall [W19.XXXA] 12/02/2024 Yes    Stage 3 chronic kidney disease [N18.30]  Yes      Problems Resolved During this Admission:   75 YO F POD #1 IMN R humerus fracture    Diet: As tolerated  Pain: multimodal PRN  DVT: OK from ortho standpoint, none indicated on DC  ABX: periop ancef  PT/OT: Eval and Treat  Activity: WBAT for Walker, otherwise OK for ADLs, ROM  Dry dressing changes begin tomorrow; No " creams/ointments   Needs follow up in Dr Dow's office in 3 weeks    The above findings, diagnostics, and treatment plan were discussed with Dr Dow who is in agreement with the plan of care except as stated in additional documentation.      LETHA Villatoro   Orthopedic Trauma Surgery  [facility]

## 2024-12-03 NOTE — PLAN OF CARE
Problem: Physical Therapy  Goal: Physical Therapy Goal  Description: Goals to be met by: 1/3/25     Patient will increase functional independence with mobility by performin. Supine to sit with Set-up [place]  2. Sit to supine with Set-up [place]  3. Sit to stand transfer with Supervision  4. Gait  x 200 feet with Supervision using Rolling Walker.   5. Ascend/descend 2 stairs with bilateral Handrails & Supervision    Outcome: Progressing

## 2024-12-03 NOTE — DISCHARGE INSTRUCTIONS
SLING-MUST WEAR UNTIL CLEARED BY ORTHO  -Wear when out of bed  -Can remove for Range of Motion exercises and when in bed.    DRESSING CHANGES/WOUND CARE:  -Bacitracin to facial wounds three times a day  -AVOID trauma to face for 8 Weeks  -Begin daily dry dressing changes 12/4/24. May cover with soft dressing or large band aid. Keep clean and dry. No showers until Post op day 7 (12/9/24). Do not submerge. Do not apply ointments or creams.    NOTIFY PROVIDER IF:  -signs/symptoms of infection: fever >100.4, chills, warmth/redness/swelling, pain to site.   -severe uncontrolled pain  -dizziness/headache/ confusion/change in mental status

## 2024-12-03 NOTE — PLAN OF CARE
Problem: Occupational Therapy  Goal: Occupational Therapy Goal  Description: LTG: Pt will perform basic ADLs and ADL transfers with Modified independence using LRAD by discharge.    STG: to be met by 12/31/24:    Pt will complete grooming standing at sink with LRAD with SBA.  Pt will complete UB dressing with SBA.  Pt will complete LB dressing with SBA using LRAD.  Pt will complete toileting with SBA using LRAD.  Pt will complete functional mobility to/from toilet and toilet transfer with SBA using LRAD.   Outcome: Progressing

## 2024-12-03 NOTE — PLAN OF CARE
12/03/24 1125   Final Note   Assessment Type Final Discharge Note   Anticipated Discharge Disposition Home-Health   Hospital Resources/Appts/Education Provided Post-Acute resouces added to AVS   Post-Acute Status   Post-Acute Authorization Home Health   Home Health Status Set-up Complete/Auth obtained   Discharge Delays None known at this time     Pt to d/c home with family and sitters. HH arranged with [facility] by CRYSTAL Renteria.     Rhiannon Finch LCSW

## 2024-12-03 NOTE — HOSPITAL COURSE
74-year-old female who tripped and fell on found to have a right humerus fracture.  Underwent intramedullary nailing.  Has been cleared by therapy and already has home health set up.  She was had a nasal bone fracture and it was nonoperative.  She will need to follow up with Orthopedics and facial trauma.  She was saw nephrology while she was here for chronic kidney disease on peritoneal dialysis for which she will need to continue.  She was not require home VTE prophylaxis.  She was should also follow up with the primary care provider.  No indication to follow up with us in trauma clinic unless a need arises.

## 2024-12-03 NOTE — PROGRESS NOTES
12/03/24 0845   Peritoneal Dialysis   Exchange Type Cycler   Peritoneal Treatment Status Completed   Dianeal Solution Dextrose 1.5% in 6000 mL;Dextrose 2.5% in 6000 mL   Cycler Peritoneal Dialysis   Initial Drain Volume (mL) 2846 mL   Effluent Appearance Clear   Number of Cycles 4   Fill Volume (mL) 2400 mL   Total Volume (mL) 24595 mL   Average Dwell Time (specify hr/min) 1:39   Cycler PD Total UF (mL) 788 mL   Cycler Treatment Comments new end caps applied, tolerated well, denies SOB and no distress noted, spouse at bedside and therapy working with patient

## 2024-12-03 NOTE — CONSULTS
Nephrology Initial Consult Note    Patient Name: Malissa Schwartz  Age: 74 y.o.  : 1950  MRN: [id number]  Admission Date: 2024    Reason for Consult:      ESRD on peritoneal dialysis  Self, Aaareferral    HPI:     Malissa Schwartz is a 74 y.o. female who presented after a fall.  Past medical history significant for ESRD on peritoneal dialysis nightly, hypertension, and hyperlipidemia.  Patient tripped outside of her home and developed pain.  She presented to emergency department for evaluation.  Imaging showed nondisplaced bilateral nasal bone fractures, and right humerus fracture.  She underwent surgery on 2024.  Postoperatively she is doing well.  They are planning to discharge her home 2024.  Nephrology consulted for ESRD management.  She denies chest pain, shortness of breath, nausea, vomiting, or lower extremity edema.        Current Facility-Administered Medications   Medication Dose Route Frequency Provider Last Rate Last Admin    acetaminophen tablet 650 mg  650 mg Oral Q4H Bentley, Leanna A, FNP   650 mg at 24 0520    amLODIPine tablet 5 mg  5 mg Oral Daily Patricia Sam NP   5 mg at 24 0857    calcitRIOL capsule 0.25 mcg  0.25 mcg Oral Once per day on  Compa Veras, FNP   0.25 mcg at 24 0858    docusate sodium capsule 100 mg  100 mg Oral BID Bentley, Leanna A, FNP   100 mg at 24 2226    folic acid tablet 1 mg  1 mg Oral Daily Compa Veras, FNP   1 mg at 24 0857    furosemide tablet 20 mg  20 mg Oral Daily Compa Veras, FNP   20 mg at 24 0857    gabapentin capsule 100 mg  100 mg Oral TID Bentley, Leanna A, FNP   100 mg at 24 0856    heparin (porcine) injection 5,000 Units  5,000 Units Subcutaneous Q8H Bentley, Leanna A, FNP   5,000 Units at 24 0520    magnesium hydroxide 400 mg/5 ml suspension 2,400 mg  30 mL Oral Daily PRN Bentley, Leanna A, FNP        megestroL 400 mg/10 mL (10 mL)  suspension 400 mg  400 mg Oral Daily Compa Veras, FNP   400 mg at 12/03/24 0907    melatonin tablet 6 mg  6 mg Oral Nightly PRN Hagan, Leanna A, FNP        methocarbamoL tablet 500 mg  500 mg Oral Q8H Hagan, Leanna A, FNP   500 mg at 12/03/24 0520    morphine injection 4 mg  4 mg Intravenous Q6H PRN RobertAmy saldivarndra B, PA-C        ondansetron injection 4 mg  4 mg Intravenous Q6H PRN Robert, Amarilis B, PA-C        oxyCODONE immediate release tablet 5 mg  5 mg Oral Q6H PRN Robert, Maarilis B, PA-C   5 mg at 12/03/24 0927    oxyCODONE immediate release tablet Tab 10 mg  10 mg Oral Q4H PRN Robert, Amarilis B, PA-C        polyethylene glycol packet 17 g  17 g Oral BID Hagan, Leanna A, FNP   17 g at 12/03/24 0857    vitamin renal formula (B-complex-vitamin c-folic acid) 1 mg per capsule 1 capsule  1 capsule Oral Daily Compa Veras, FNP   1 capsule at 12/03/24 0857       Sergio Crabtree MD    Past Medical History:   Diagnosis Date    Acute rejection of kidney transplant 9/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 Glomerulitis with one activated endothelial cell: treated with Solumedrol pulse 375 mg IVBP x 3 doses with short prednisone taper; thymoglobin 100 mg x 6 doses 10/04/2007 minimal histologic changes no acute rejection     Allergy     Anemia of renal disease     CKD (chronic kidney disease), stage IV     CMV (cytomegalovirus) status positive     Colon polyp     benign    Depression     GN (glomerulonephritis)     GN (glomerulonephritis)     unknown type    Herpes infection, other 2009    on buttocks    Hyperlipidemia     Hypertension     Immunosuppression     Living-donor kidney transplant recipient 01/15/2007    Meningitis     history of viral meningitis    Proteinuria 1/5/2015    Proteinuria     Pulmonary nodules     Renal hypertension     S/P breast augmentation     Skin cancer     Urinary tract infection       Past Surgical History:   Procedure Laterality  Date    COLON SURGERY      COLONOSCOPY W/ BIOPSIES      cosemetic surgery      facelift and breast augmentation     HYSTERECTOMY      INTRAMEDULLARY RODDING OF HUMERUS Right 12/2/2024    Procedure: INSERTION, INTRAMEDULLARY MONTSERRAT, HUMERUS;  Surgeon: aSmuel Dow DO;  Location: [facility];  Service: Orthopedics;  Laterality: Right;  supine vacular backward c arm synthes  shoulder scope drape    KIDNEY TRANSPLANT      NEPHRECTOMY Left     9/2016    RENAL BIOPSY      TONSILLECTOMY, ADENOIDECTOMY        Family History   Problem Relation Name Age of Onset    Kidney disease Sister          ESRD    Kidney disease Brother          CKD from HTN    Hypertension Brother      Heart failure Mother      Heart failure Father      Diabetes Neg Hx      Stroke Neg Hx       Social History     Tobacco Use    Smoking status: Former     Current packs/day: 1.00     Average packs/day: 1 pack/day for 15.0 years (15.0 ttl pk-yrs)     Types: Cigarettes    Smokeless tobacco: Never    Tobacco comments:     quit in 2014; smoked for 15 years and at the most smoked 1 ppd   Substance Use Topics    Alcohol use: Yes     Comment: seldom     Medications Prior to Admission   Medication Sig Dispense Refill Last Dose/Taking    amLODIPine (NORVASC) 10 MG tablet Take 10 mg by mouth once daily.       aspirin (ECOTRIN) 81 MG EC tablet Take 81 mg by mouth once daily.       B complex-vitamin C-folic acid (NEPHRO-JENN) 0.8 mg Tab Take 0.8 mg by mouth Daily.       calcitRIOL (ROCALTROL) 0.25 MCG Cap Take 0.25 mcg by mouth 3 (three) times a week.       D5W PgBk 50 mL with ceFAZolin 1 gram SolR Cefazolin intraperitoneally times 10 more days       EScitalopram oxalate (LEXAPRO) 10 MG tablet Take 1 tablet (10 mg total) by mouth once daily. (Patient not taking: Reported on 10/10/2024) 90 tablet 3     folic acid (FOLVITE) 1 MG tablet Take 1 mg by mouth once daily.       furosemide (LASIX) 20 MG tablet Take 20 mg by mouth once daily.       megestroL (MEGACE) 400 mg/10 mL  (10 mL) Susp Take 10 mLs (400 mg total) by mouth once daily. (Patient not taking: Reported on 10/10/2024) 300 mL 0     potassium chloride (KLOR-CON) 10 MEQ TbSR Take 10 mEq by mouth once daily.       zolpidem (AMBIEN) 10 mg Tab Take 10 mg by mouth every evening.        Review of patient's allergies indicates:   Allergen Reactions    Codeine Palpitations and Rash    Cefepime      Other Reaction(s): Encephalopathy    Renal impairment increased risk for this reaction    Levofloxacin      Other Reaction(s): Encephalopathy    Renal pt who had some encephalopathy while on Levaquin & Cefepime, neurology physicians think they may have been due to the antibiotics (both possible side effects for each)    Iodine Nausea Only            Review of Systems:     Comprehensive 10pt ROS negative except as noted per history.      Objective:       VITAL SIGNS: 24 HR MIN & MAX LAST    Temp  Min: 97.6 °F (36.4 °C)  Max: 98.7 °F (37.1 °C)  97.8 °F (36.6 °C)        BP  Min: 109/76  Max: 143/84  116/71     Pulse  Min: 64  Max: 105  80     Resp  Min: 12  Max: 29  18    SpO2  Min: 92 %  Max: 100 %  98 %      GEN: Chronically ill appearing WF in NAD  HEENT:  Facial bruising  CV: RRR +S1,S2 without murmur  PULM: CTAB, unlabored  ABD: Soft, NT/ND abdomen with NABS  EXT: No cyanosis or edema  SKIN: Warm and dry  PSYCH: Awake, alert and appropriately conversant.   Dialysis access:  PD catheter            Component Value Date/Time     12/03/2024 0509     12/02/2024 0907     06/20/2018 1402     01/08/2018 0835    K 4.0 12/03/2024 0509    K 3.3 (L) 12/02/2024 0907    K 4.8 06/20/2018 1402    K 4.6 01/08/2018 0835    CO2 25 12/03/2024 0509    CO2 30 12/02/2024 0907    CO2 23 06/20/2018 1402    CO2 22 (L) 01/08/2018 0835    BUN 42.8 (H) 12/03/2024 0509    BUN 45.4 (H) 12/02/2024 0907    BUN 30 (H) 06/20/2018 1402    BUN 24 (H) 01/08/2018 0835    CREATININE 9.60 (H) 12/03/2024 0509    CREATININE 9.95 (H) 12/02/2024 0907     CREATININE 1.9 (H) 06/20/2018 1402    CREATININE 1.8 (H) 01/08/2018 0835    CALCIUM 8.3 (L) 12/03/2024 0509    CALCIUM 8.2 (L) 12/02/2024 0907    CALCIUM 9.7 06/20/2018 1402    CALCIUM 9.1 01/08/2018 0835    PHOS 7.6 (H) 12/03/2024 0509    PHOS 3.8 06/20/2018 1402            Component Value Date/Time    WBC 4.57 12/03/2024 0509    WBC 7.59 12/02/2024 0907    WBC 4.26 (L) 07/04/2024 0000    WBC 6.90 06/20/2018 1402    WBC 3.37 (L) 01/08/2018 0835    HGB 8.3 (L) 12/03/2024 0509    HGB 9.6 (L) 12/02/2024 0907    HGB 10.2 (L) 11/26/2024 0000    HGB 12.7 10/10/2024 0000    HGB 10.5 (L) 06/20/2018 1402    HGB 9.1 (L) 01/08/2018 0835    HCT 26.7 (L) 12/03/2024 0509    HCT 30.7 (L) 12/02/2024 0907    HCT 31.8 (L) 11/26/2024 0000    HCT 37.7 10/10/2024 0000    HCT 32.0 (L) 06/05/2020 0815    HCT 31.6 (L) 06/20/2018 1402    HCT 27.3 (L) 01/08/2018 0835     12/03/2024 0509     12/02/2024 0907     06/20/2018 1402     01/08/2018 0835             CT Maxillofacial Without Contrast   Final Result      Comminuted slightly depressed fracture of the nasal bone which appears to be acute         Electronically signed by: Dariel Varela   Date:    12/02/2024   Time:    15:35      SURG FL Surgery Fluoro Usage   Final Result      X-Ray Humerus 2 View Right   Final Result      As above.         Electronically signed by: Iván Davis   Date:    12/02/2024   Time:    16:58      X-Ray Elbow Complete Right   Final Result      No acute osseous process identified.         Electronically signed by: Isaac Palomino   Date:    12/02/2024   Time:    09:04      X-Ray Chest AP Portable   Final Result      No acute cardiopulmonary process.         Electronically signed by: Herb Padilla   Date:    12/02/2024   Time:    07:34      X-Ray Humerus 2 View Right   Final Result      As above.  Mildly comminuted spiral fracture of the proximal humerus.         Electronically signed by: Herb Padilla   Date:    12/02/2024    Time:    07:44      X-Ray Pelvis Routine AP   Final Result      No acute osseous process appreciated.         Electronically signed by: Isaac Palomino   Date:    12/02/2024   Time:    07:44      X-Ray Knee Complete 4 or More Views Left   Final Result      No acute osseous process appreciated.         Electronically signed by: Isaac Palomino   Date:    12/02/2024   Time:    07:42      CT Cervical Spine Without Contrast   Final Result      1. No acute cervical spine abnormality identified.      2. Ligament, spinal cord and/or vascular abnormalities cannot be excluded on the basis of this examination.      Stable moderate to severe degenerative changes.         Electronically signed by: Herb Padilla   Date:    12/02/2024   Time:    07:32      X-Ray Shoulder Complete 2 View Right   Final Result      Proximal humeral shaft fracture.         Electronically signed by: Isaac Palomino   Date:    12/02/2024   Time:    07:41      CT Head Without Contrast   Final Result      1. No acute intracranial abnormality.   2. Nondisplaced fractures of the bilateral nasal bones.         Electronically signed by: Vadim Mtz MD   Date:    12/02/2024   Time:    07:20          Assessment / Plan:       Active Hospital Problems    Diagnosis  POA    *Fracture of humeral shaft, right, closed [S42.301A]  Yes    Closed fracture of nasal bone [S02.2XXA]  Yes    Fall [W19.XXXA]  Yes    Stage 3 chronic kidney disease [N18.30]  Yes      Resolved Hospital Problems   No resolved problems to display.       ESRD - on peritoneal dialysis  Fall  Right humerus fracture  Bilateral nasal bone fractures    Plan:  Patient did peritoneal dialysis overnight, and tolerated well.  Plan is to discharge her home today.  She states she has friends that can help her with her peritoneal dialysis at home.  I did advise her to ensure that if someone dialysis helping her with her peritoneal dialysis that they maintain good sterile technique.  From a Nephrology standpoint  she is okay to discharge.    Isaiah Mosquera DO  Nephrology  [facility]  Clinic number: [phone]      Note was created with the assistance of electronic Dictation Services.  Note was reviewed to decrease errors, however, these may still be present.  Please contact me about questions or concerns with the dictation.

## 2024-12-03 NOTE — CONSULTS
[facility] - Ortho Neuro  Plastic Surgery  Consult Note    Patient Name: Malissa Schwartz  MRN: [id number]  Code Status: Full Code  Admission Date: 12/2/2024  Hospital Length of Stay: 1 days  Attending Physician: Aleksandar Patel MD  Primary Care Provider: Sergio Crabtree MD    Inpatient consult to Plastic Surgery  Consult performed by: Gary Stewart MD  Consult ordered by: Leanna Hagan FNP  Reason for consult: Closed nasal bone fracture  Assessment/Recommendations: Bacitracin to facial wounds  No contour deformity in nasal fracture, abrasion on nose, no septal hematoma  Avoid trauma to face for next 8 weeks  Can follow-up if needed in PRS clinic        Subjective:     Chief Complaint/Reason for Admission: humeral shaft fracture, right    History of Present Illness: 73 yo F w/ pmh of htn, hld, ckd, esrd on pertoneal hd. Tripped and fell on outside steps at her home on 12/2/2024. Reports hitting her face/head. Initial CT head showed non displaced bilateral nasal bone fractures. CT maxilla facial, Denies anticoagulation. Denied LOC.        No current facility-administered medications on file prior to encounter.     Current Outpatient Medications on File Prior to Encounter   Medication Sig    amLODIPine (NORVASC) 10 MG tablet Take 10 mg by mouth once daily.    aspirin (ECOTRIN) 81 MG EC tablet Take 81 mg by mouth once daily.    B complex-vitamin C-folic acid (NEPHRO-JENN) 0.8 mg Tab Take 0.8 mg by mouth Daily.    calcitRIOL (ROCALTROL) 0.25 MCG Cap Take 0.25 mcg by mouth 3 (three) times a week.    D5W PgBk 50 mL with ceFAZolin 1 gram SolR Cefazolin intraperitoneally times 10 more days    EScitalopram oxalate (LEXAPRO) 10 MG tablet Take 1 tablet (10 mg total) by mouth once daily. (Patient not taking: Reported on 10/10/2024)    folic acid (FOLVITE) 1 MG tablet Take 1 mg by mouth once daily.    furosemide (LASIX) 20 MG tablet Take 20 mg by mouth once daily.    megestroL (MEGACE) 400 mg/10 mL (10  mL) Susp Take 10 mLs (400 mg total) by mouth once daily. (Patient not taking: Reported on 10/10/2024)    potassium chloride (KLOR-CON) 10 MEQ TbSR Take 10 mEq by mouth once daily.    zolpidem (AMBIEN) 10 mg Tab Take 10 mg by mouth every evening.    [DISCONTINUED] losartan (COZAAR) 100 MG tablet TAKE ONE TABLET BY MOUTH ONCE DAILY    [DISCONTINUED] metoprolol succinate (TOPROL-XL) 25 MG 24 hr tablet Take 2 tablets (50 mg total) by mouth once daily. (Patient not taking: Reported on 5/12/2022)    [DISCONTINUED] mycophenolate (CELLCEPT) 250 mg Cap Take 2 capsules (500 mg total) by mouth 2 (two) times daily. Z94.0 kidney transplant (Patient not taking: Reported on 5/12/2022)    [DISCONTINUED] tacrolimus (PROGRAF) 1 MG Cap Take 3mg every AM and 2mg every PM.  Route: Oral.  Z94.0 Kidney Transplant. (Patient not taking: Reported on 5/12/2022)       Review of patient's allergies indicates:   Allergen Reactions    Codeine Palpitations and Rash    Cefepime      Other Reaction(s): Encephalopathy    Renal impairment increased risk for this reaction    Levofloxacin      Other Reaction(s): Encephalopathy    Renal pt who had some encephalopathy while on Levaquin & Cefepime, neurology physicians think they may have been due to the antibiotics (both possible side effects for each)    Iodine Nausea Only       Past Medical History:   Diagnosis Date    Acute rejection of kidney transplant 9/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 Glomerulitis with one activated endothelial cell: treated with Solumedrol pulse 375 mg IVBP x 3 doses with short prednisone taper; thymoglobin 100 mg x 6 doses 10/04/2007 minimal histologic changes no acute rejection     Allergy     Anemia of renal disease     CKD (chronic kidney disease), stage IV     CMV (cytomegalovirus) status positive     Colon polyp     benign    Depression     GN (glomerulonephritis)     GN (glomerulonephritis)     unknown type    Herpes infection, other 2009    on  buttocks    Hyperlipidemia     Hypertension     Immunosuppression     Living-donor kidney transplant recipient 01/15/2007    Meningitis     history of viral meningitis    Proteinuria 1/5/2015    Proteinuria     Pulmonary nodules     Renal hypertension     S/P breast augmentation     Skin cancer     Urinary tract infection      Past Surgical History:   Procedure Laterality Date    COLON SURGERY      COLONOSCOPY W/ BIOPSIES      cosemetic surgery      facelift and breast augmentation     HYSTERECTOMY      KIDNEY TRANSPLANT      NEPHRECTOMY Left     9/2016    RENAL BIOPSY      TONSILLECTOMY, ADENOIDECTOMY       Family History       Problem Relation (Age of Onset)    Heart failure Mother, Father    Hypertension Brother    Kidney disease Sister, Brother          Tobacco Use    Smoking status: Former     Current packs/day: 1.00     Average packs/day: 1 pack/day for 15.0 years (15.0 ttl pk-yrs)     Types: Cigarettes    Smokeless tobacco: Never    Tobacco comments:     quit in 2014; smoked for 15 years and at the most smoked 1 ppd   Substance and Sexual Activity    Alcohol use: Yes     Comment: seldom    Drug use: No    Sexual activity: Never     Objective:     Vital Signs (Most Recent):  Temp: 98.7 °F (37.1 °C) (12/03/24 0520)  Pulse: 73 (12/03/24 0455)  Resp: 18 (12/03/24 0455)  BP: 123/76 (12/03/24 0455)  SpO2: 99 % (12/03/24 0455) Vital Signs (24h Range):  Temp:  [97.6 °F (36.4 °C)-98.7 °F (37.1 °C)] 98.7 °F (37.1 °C)  Pulse:  [] 73  Resp:  [12-29] 18  SpO2:  [92 %-100 %] 99 %  BP: (109-154)/(64-91) 123/76     Weight: 59 kg (130 lb)  Body mass index is 23.78 kg/m².      Intake/Output Summary (Last 24 hours) at 12/3/2024 0707  Last data filed at 12/2/2024 1417  Gross per 24 hour   Intake 1100 ml   Output --   Net 1100 ml       Physical Exam  Vitals reviewed.   Constitutional:       General: She is sleeping. She is not in acute distress.  HENT:      Nose: Signs of injury and nasal tenderness present. No nasal  deformity, septal deviation or rhinorrhea.      Right Nostril: No septal hematoma.      Comments: No contour deformity. Bruising and abrasions noted to nasal area.   Pulmonary:      Effort: Pulmonary effort is normal.         Significant Labs:  CBC:   Recent Labs   Lab 12/03/24  0509   WBC 4.57   RBC 2.96*   HGB 8.3*   HCT 26.7*      MCV 90.2   MCH 28.0   MCHC 31.1*     CMP:   Recent Labs   Lab 12/03/24  0509   CALCIUM 8.3*   ALBUMIN 1.7*      K 4.0   CO2 25   CL 98   BUN 42.8*   CREATININE 9.60*   ALKPHOS 146   ALT 13   AST 25   BILITOT 0.3     Coagulation:   Recent Labs   Lab 12/02/24  0907   INR 0.9   APTT 25.8       Significant Diagnostics:  CT: I have reviewed all pertinent results/findings within the past 24 hours. Comminuted slightly depressed fracture of the nasal bone which appears to be acute    Assessment/Plan:     Nasal bone fracture, closed    -Bacitracin to facial wounds tid  -No contour deformity in nasal fracture, abrasion on nose, no septal hematoma  -Avoid trauma to face for next 8 weeks  -Can follow-up if needed in PRS clinic      Active Diagnoses:    Diagnosis Date Noted POA    PRINCIPAL PROBLEM:  Fracture of humeral shaft, right, closed [S42.301A] 12/02/2024 Yes    Closed fracture of nasal bone [S02.2XXA] 12/02/2024 Yes    Fall [W19.XXXA] 12/02/2024 Yes    Stage 3 chronic kidney disease [N18.30]  Yes      Problems Resolved During this Admission:       Thank you for your consult. We will sign off.       Gary Stewart M.D.   [facility] General Surgery - PGY1

## 2024-12-03 NOTE — PT/OT/SLP EVAL
Occupational Therapy  Evaluation    Name: Malissa Schwartz  MRN: [id number]  Admitting Diagnosis: Fall: R humerus fx s/p IM implant, nasal bone fx  Hx: CKD4, peritoneal dialysis   Recent Surgery: Procedure(s) (LRB):  INSERTION, INTRAMEDULLARY MONTSERRAT, HUMERUS (Right) 1 Day Post-Op    Recommendations:     Discharge therapy intensity: Low Intensity Therapy   Discharge Equipment Recommendations:  none  Barriers to discharge:  None    Assessment:     Malissa Schwratz is a 74 y.o. female with a medical diagnosis of Fall: R humerus fx s/p IM implant, nasal bone fx. Pt reported prior to admit, she was IND c ADLs and ambulating c a rollator. She presents with the following performance deficits affecting function: weakness, impaired endurance, orthopedic precautions, impaired self care skills, decreased upper extremity function, impaired functional mobility, gait instability, impaired balance, pain. Pt required Min A for bed mobility, Max A for to doff/don brace and socks, and Min A for toilet t/f. Pt educated on UE HEP. Pt reported she has a caregivers from 7-7 daily. Recommend low intensity therapy at d/c.     Rehab Prognosis: Good; patient would benefit from acute skilled OT services to address these deficits and reach maximum level of function.       Plan:     Patient to be seen 6 x/week to address the above listed problems via self-care/home management, therapeutic activities, therapeutic exercises  Plan of Care Expires: 12/31/24  Plan of Care Reviewed with: patient    Subjective     Chief Complaint: Pain in RUE   Patient/Family Comments/goals: To go home     Occupational Profile:  Living Environment: Pt lives in a [facility] c her . Reported she has a walk in shower c a shower chair.   Previous level of function: IND c ADLs and ambulating c a rollator   Roles and Routines: Mother, spouse   Equipment Used at Home: cane, straight, walker, rolling, rollator  Assistance upon Discharge: Pt's spouse, caregivers 7-7 daily      Pain/Comfort:  Pain Rating 1: 5/10  Location - Side 1: Right  Location 1: arm  Pain Addressed 1: Reposition, Distraction    Patients cultural, spiritual, Cheondoism conflicts given the current situation: no    Objective:     OT communicated with RN prior to session.      Patient was found supine with telemetry (Peritoneal dialysis catheter) upon OT entry to room.    General Precautions: Standard, fall  Orthopedic Precautions: RUE non weight bearing (OK to use RW, ROMAT)  Braces: Shoulder abduction brace      Bed Mobility:    Patient completed Supine to Sit with minimum assistance    Functional Mobility/Transfers:  Patient completed Sit <> Stand Transfer with minimum assistance  with  rolling walker   Patient completed Toilet Transfer Step Transfer technique with minimum assistance with  rolling walker  Functional Mobility: Pt ambulated to bathroom using RW c Min A; 1 LOB requiring Min A to correct     Activities of Daily Living:  Upper Body Dressing: maximal assistance Doff/don abduction brace   Lower Body Dressing: maximal assistance      AMPAC 6 Click ADL:  AMPAC Total Score: 16    Functional Cognition:  Intact    Visual Perceptual Skills:  Intact    Upper Extremity Function:  Right Upper Extremity:   Range of Motion: WFL AROM digits, wrist, FA, AAROM elbow and shoulder     Left Upper Extremity:  WFL      Therapeutic Positioning  Risk for acquired pressure injuries is decreased due to ability to get to BSC/toilet with assist.    OT interventions performed during the course of today's session:   Education was provided on benefits of and recommendations for therapeutic positioning    Skin assessment: all bony prominences were assessed    Findings: no redness or breakdown noted    OT recommendations for therapeutic positioning throughout hospitalization:   Follow [facility] Pressure Injury Prevention Protocol      Patient Education:  Patient provided with verbal education education regarding OT role/goals/POC, post op  precautions, fall prevention, and home exercise program .  Understanding was verbalized.     Patient left up in chair with all lines intact and call button in reach.    GOALS:   Multidisciplinary Problems       Occupational Therapy Goals          Problem: Occupational Therapy    Goal Priority Disciplines Outcome Interventions   Occupational Therapy Goal     OT, PT/OT Progressing    Description: LTG: Pt will perform basic ADLs and ADL transfers with Modified independence using LRAD by discharge.    STG: to be met by 12/31/24:    Pt will complete grooming standing at sink with LRAD with SBA.  Pt will complete UB dressing with SBA.  Pt will complete LB dressing with SBA using LRAD.  Pt will complete toileting with SBA using LRAD.  Pt will complete functional mobility to/from toilet and toilet transfer with SBA using LRAD.                        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 Glomerulitis with one activated endothelial cell: treated with Solumedrol pulse 375 mg IVBP x 3 doses with short prednisone taper; thymoglobin 100 mg x 6 doses 10/04/2007 minimal histologic changes no acute rejection     Allergy     Anemia of renal disease     CKD (chronic kidney disease), stage IV     CMV (cytomegalovirus) status positive     Colon polyp     benign    Depression     GN (glomerulonephritis)     GN (glomerulonephritis)     unknown type    Herpes infection, other 2009    on buttocks    Hyperlipidemia     Hypertension     Immunosuppression     Living-donor kidney transplant recipient 01/15/2007    Meningitis     history of viral meningitis    Proteinuria 1/5/2015    Proteinuria     Pulmonary nodules     Renal hypertension     S/P breast augmentation     Skin cancer     Urinary tract infection          Past Surgical History:   Procedure Laterality Date    COLON SURGERY      COLONOSCOPY W/ BIOPSIES      cosemetic  surgery      facelift and breast augmentation     HYSTERECTOMY      INTRAMEDULLARY RODDING OF HUMERUS Right 12/2/2024    Procedure: INSERTION, INTRAMEDULLARY MONTSERRAT, HUMERUS;  Surgeon: Samuel Dow DO;  Location: [facility];  Service: Orthopedics;  Laterality: Right;  supine vacular backward c arm synthes  shoulder scope drape    KIDNEY TRANSPLANT      NEPHRECTOMY Left     9/2016    RENAL BIOPSY      TONSILLECTOMY, ADENOIDECTOMY         Time Tracking:     OT Date of Treatment: 12/03/24  OT Start Time: 0823  OT Stop Time: 0841  OT Total Time (min): 18 min    Billable Minutes:Evaluation Moderate complexity     12/3/2024

## 2024-12-04 ENCOUNTER — PATIENT OUTREACH (OUTPATIENT)
Dept: ADMINISTRATIVE | Facility: CLINIC | Age: 74
End: 2024-12-04
Payer: MEDICARE

## 2024-12-04 NOTE — PROGRESS NOTES
C3 nurse spoke with Malissa Schwartz  for a TCC post hospital discharge follow up call. The patient has a scheduled HOS appointment with Amarilis Jones PA-C (Orthopedic Surgery) on 12/23/2024 @8am

## 2024-12-05 ENCOUNTER — TELEPHONE (OUTPATIENT)
Dept: ORTHOPEDICS | Facility: CLINIC | Age: 74
End: 2024-12-05
Payer: MEDICARE

## 2024-12-05 NOTE — TELEPHONE ENCOUNTER
Received call form Nilda with patient's home health, she reports patient is having an increase in bleeding that is saturating bandages. Denies fever, chills, or warmth to the area. Images sent a reviewed by provider. Informed her apply dry dressing and continue to monitor for 24 hours. If bleeding does not improve, fever, chills, or fatigue occur report to ER for further evaluation. A clear understanding was voiced.

## 2024-12-05 NOTE — TELEPHONE ENCOUNTER
Received call form patients caregiver. They reports concern for increase swelling to patient hand. Patient is not properly elevating or icing. Unable to provide office with images.  Educated patient how to properly ice and elevate. Informed her if swelling does not improve with elevation she is to report to ER.

## 2024-12-09 ENCOUNTER — HOSPITAL ENCOUNTER (EMERGENCY)
Facility: HOSPITAL | Age: 74
Discharge: HOME | End: 2024-12-09
Attending: EMERGENCY MEDICINE
Payer: MEDICARE

## 2024-12-09 VITALS
HEIGHT: 62 IN | BODY MASS INDEX: 25.76 KG/M2 | OXYGEN SATURATION: 100 % | RESPIRATION RATE: 16 BRPM | WEIGHT: 140 LBS | HEART RATE: 90 BPM | DIASTOLIC BLOOD PRESSURE: 76 MMHG | SYSTOLIC BLOOD PRESSURE: 145 MMHG | TEMPERATURE: 99 F

## 2024-12-09 DIAGNOSIS — M79.601 RIGHT ARM PAIN: ICD-10-CM

## 2024-12-09 DIAGNOSIS — R60.9 EDEMA, UNSPECIFIED TYPE: ICD-10-CM

## 2024-12-09 DIAGNOSIS — M79.89 LEG SWELLING: ICD-10-CM

## 2024-12-09 DIAGNOSIS — M25.559 HIP PAIN: Primary | ICD-10-CM

## 2024-12-09 LAB
ALBUMIN SERPL-MCNC: 1.6 G/DL (ref 3.4–4.8)
ALBUMIN/GLOB SERPL: 0.4 RATIO (ref 1.1–2)
ALP SERPL-CCNC: 190 UNIT/L (ref 40–150)
ALT SERPL-CCNC: <5 UNIT/L (ref 0–55)
ANION GAP SERPL CALC-SCNC: 10 MEQ/L
AST SERPL-CCNC: 26 UNIT/L (ref 5–34)
BASOPHILS # BLD AUTO: 0.04 X10(3)/MCL
BASOPHILS NFR BLD AUTO: 0.7 %
BILIRUB SERPL-MCNC: 0.3 MG/DL
BNP BLD-MCNC: 192.3 PG/ML
BUN SERPL-MCNC: 54.5 MG/DL (ref 9.8–20.1)
CALCIUM SERPL-MCNC: 8.4 MG/DL (ref 8.4–10.2)
CHLORIDE SERPL-SCNC: 98 MMOL/L (ref 98–107)
CO2 SERPL-SCNC: 30 MMOL/L (ref 23–31)
CREAT SERPL-MCNC: 8.56 MG/DL (ref 0.55–1.02)
CREAT/UREA NIT SERPL: 6
EOSINOPHIL # BLD AUTO: 0.16 X10(3)/MCL (ref 0–0.9)
EOSINOPHIL NFR BLD AUTO: 2.9 %
ERYTHROCYTE [DISTWIDTH] IN BLOOD BY AUTOMATED COUNT: 15.3 % (ref 11.5–17)
GFR SERPLBLD CREATININE-BSD FMLA CKD-EPI: 4 ML/MIN/1.73/M2
GLOBULIN SER-MCNC: 3.9 GM/DL (ref 2.4–3.5)
GLUCOSE SERPL-MCNC: 90 MG/DL (ref 82–115)
HCT VFR BLD AUTO: 27.2 % (ref 37–47)
HGB BLD-MCNC: 8.5 G/DL (ref 12–16)
IMM GRANULOCYTES # BLD AUTO: 0.09 X10(3)/MCL (ref 0–0.04)
IMM GRANULOCYTES NFR BLD AUTO: 1.6 %
LYMPHOCYTES # BLD AUTO: 1.15 X10(3)/MCL (ref 0.6–4.6)
LYMPHOCYTES NFR BLD AUTO: 20.8 %
MAGNESIUM SERPL-MCNC: 2.3 MG/DL (ref 1.6–2.6)
MCH RBC QN AUTO: 28.4 PG (ref 27–31)
MCHC RBC AUTO-ENTMCNC: 31.3 G/DL (ref 33–36)
MCV RBC AUTO: 91 FL (ref 80–94)
MONOCYTES # BLD AUTO: 0.49 X10(3)/MCL (ref 0.1–1.3)
MONOCYTES NFR BLD AUTO: 8.9 %
NEUTROPHILS # BLD AUTO: 3.6 X10(3)/MCL (ref 2.1–9.2)
NEUTROPHILS NFR BLD AUTO: 65.1 %
NRBC BLD AUTO-RTO: 0 %
PLATELET # BLD AUTO: 305 X10(3)/MCL (ref 130–400)
PMV BLD AUTO: 9 FL (ref 7.4–10.4)
POTASSIUM SERPL-SCNC: 3.5 MMOL/L (ref 3.5–5.1)
PROT SERPL-MCNC: 5.5 GM/DL (ref 5.8–7.6)
RBC # BLD AUTO: 2.99 X10(6)/MCL (ref 4.2–5.4)
SODIUM SERPL-SCNC: 138 MMOL/L (ref 136–145)
WBC # BLD AUTO: 5.53 X10(3)/MCL (ref 4.5–11.5)

## 2024-12-09 PROCEDURE — 99285 EMERGENCY DEPT VISIT HI MDM: CPT | Mod: 25

## 2024-12-09 PROCEDURE — 80053 COMPREHEN METABOLIC PANEL: CPT | Performed by: NURSE PRACTITIONER

## 2024-12-09 PROCEDURE — 83735 ASSAY OF MAGNESIUM: CPT | Performed by: NURSE PRACTITIONER

## 2024-12-09 PROCEDURE — 83880 ASSAY OF NATRIURETIC PEPTIDE: CPT | Performed by: NURSE PRACTITIONER

## 2024-12-09 PROCEDURE — 25000003 PHARM REV CODE 250

## 2024-12-09 PROCEDURE — 85025 COMPLETE CBC W/AUTO DIFF WBC: CPT | Performed by: NURSE PRACTITIONER

## 2024-12-09 RX ORDER — FUROSEMIDE 40 MG/1
40 TABLET ORAL DAILY
Qty: 3 TABLET | Refills: 0 | Status: SHIPPED | OUTPATIENT
Start: 2024-12-09 | End: 2024-12-12

## 2024-12-09 RX ORDER — OXYCODONE HYDROCHLORIDE 5 MG/1
5 TABLET ORAL
Status: COMPLETED | OUTPATIENT
Start: 2024-12-09 | End: 2024-12-09

## 2024-12-09 RX ORDER — FUROSEMIDE 40 MG/1
40 TABLET ORAL
Status: COMPLETED | OUTPATIENT
Start: 2024-12-09 | End: 2024-12-09

## 2024-12-09 RX ADMIN — OXYCODONE HYDROCHLORIDE 5 MG: 5 TABLET ORAL at 05:12

## 2024-12-09 RX ADMIN — FUROSEMIDE 40 MG: 40 TABLET ORAL at 05:12

## 2024-12-09 NOTE — ED PROVIDER NOTES
Encounter Date: 12/9/2024       History     Chief Complaint   Patient presents with    Leg Swelling     Pt endorses R arm pain and swelling as well as leg swelling. Pt had a fall a week ago and fractured her arm. Pt states that the pain is increasing. GCS 15     See MDM    The history is provided by the patient. No  was used.     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 Glomerulitis with one activated endothelial cell: treated with Solumedrol pulse 375 mg IVBP x 3 doses with short prednisone taper; thymoglobin 100 mg x 6 doses 10/04/2007 minimal histologic changes no acute rejection     Allergy     Anemia of renal disease     CKD (chronic kidney disease), stage IV     CMV (cytomegalovirus) status positive     Colon polyp     benign    Depression     GN (glomerulonephritis)     GN (glomerulonephritis)     unknown type    Herpes infection, other 2009    on buttocks    Hyperlipidemia     Hypertension     Immunosuppression     Living-donor kidney transplant recipient 01/15/2007    Meningitis     history of viral meningitis    Proteinuria 1/5/2015    Proteinuria     Pulmonary nodules     Renal hypertension     S/P breast augmentation     Skin cancer     Urinary tract infection      Past Surgical History:   Procedure Laterality Date    COLON SURGERY      COLONOSCOPY W/ BIOPSIES      cosemetic surgery      facelift and breast augmentation     HYSTERECTOMY       INTRAMEDULLARY RODDING OF HUMERUS Right 12/2/2024    Procedure: INSERTION, INTRAMEDULLARY MONTSERRAT, HUMERUS;  Surgeon: Samuel Dow DO;  Location: [facility] OR;  Service: Orthopedics;  Laterality: Right;  supine vacular backward c arm synthes  shoulder scope drape    KIDNEY TRANSPLANT      NEPHRECTOMY Left     9/2016    RENAL BIOPSY      TONSILLECTOMY, ADENOIDECTOMY       Family History   Problem Relation Name Age of Onset    Kidney disease Sister          ESRD    Kidney disease Brother          CKD from HTN    Hypertension Brother      Heart failure Mother      Heart failure Father      Diabetes Neg Hx      Stroke Neg Hx       Social History     Tobacco Use    Smoking status: Former     Current packs/day: 1.00     Average packs/day: 1 pack/day for 15.0 years (15.0 ttl pk-yrs)     Types: Cigarettes    Smokeless tobacco: Never    Tobacco comments:     quit in 2014; smoked for 15 years and at the most smoked 1 ppd   Substance Use Topics    Alcohol use: Yes     Comment: seldom    Drug use: No     Review of Systems   Constitutional:         Right arm pain and swelling.  Bilateral lower extremity swelling and pain   All other systems reviewed and are negative.      Physical Exam     Initial Vitals [12/09/24 1006]   BP Pulse Resp Temp SpO2   131/72 77 18 98.5 °F (36.9 °C) 99 %      MAP       --         Physical Exam    Nursing note and vitals reviewed.  Constitutional: She appears well-developed and well-nourished.   HENT:   Head: Normocephalic.   Eyes: EOM are normal.   Neck:   Normal range of motion.  Cardiovascular:  Normal rate, regular rhythm and intact distal pulses.           Pulmonary/Chest: Breath sounds normal.   Abdominal: Abdomen is soft. Bowel sounds are normal. There is no abdominal tenderness.   Musculoskeletal:      Right shoulder: Swelling present. Decreased range of motion.      Right upper arm: Swelling present.      Right forearm: Swelling present.      Right wrist: Swelling present. Decreased range of  motion.      Right hand: Swelling present. Decreased range of motion.      Cervical back: Normal range of motion.      Right hip: Decreased range of motion.      Left hip: Decreased range of motion.      Right knee: Decreased range of motion.      Left knee: Decreased range of motion.      Right lower leg: Tenderness present. 2+ Edema present.      Left lower leg: Tenderness present. 2+ Edema present.      Right ankle: Swelling present.      Left ankle: Swelling present.      Right foot: Swelling present.      Left foot: Swelling present.      Comments: Surgical scars noted to right upper extremity.  Surgical sites are clean dry and intact.  Right upper extremity swelling noted.  Patient with decreased ROM of the RUE due to pain and swelling.  Abrasion noted to left knee from fall that occurred the week ago.  Nonadherent dressing in place.    Bilateral lower extremity edema.  Patient with decreased ROM of bilateral hips and knees due to pain.  Skin lesions/wounds and dark brawny skin noted on BL LE.  Mild TTP to bilateral dorsal hips.    Patient able to ambulate with the nurses.     Neurological: She is alert and oriented to person, place, and time.   Skin: Skin is warm and dry.   Psychiatric: She has a normal mood and affect.         ED Course   Procedures  Labs Reviewed   CBC WITH DIFFERENTIAL - Abnormal       Result Value    WBC 5.53      RBC 2.99 (*)     Hgb 8.5 (*)     Hct 27.2 (*)     MCV 91.0      MCH 28.4      MCHC 31.3 (*)     RDW 15.3      Platelet 305      MPV 9.0      Neut % 65.1      Lymph % 20.8      Mono % 8.9      Eos % 2.9      Basophil % 0.7      Lymph # 1.15      Neut # 3.60      Mono # 0.49      Eos # 0.16      Baso # 0.04      IG# 0.09 (*)     IG% 1.6      NRBC% 0.0     COMPREHENSIVE METABOLIC PANEL - Abnormal    Sodium 138      Potassium 3.5      Chloride 98      CO2 30      Glucose 90      Blood Urea Nitrogen 54.5 (*)     Creatinine 8.56 (*)     Calcium 8.4      Protein Total 5.5 (*)      Albumin 1.6 (*)     Globulin 3.9 (*)     Albumin/Globulin Ratio 0.4 (*)     Bilirubin Total 0.3       (*)     ALT <5      AST 26      eGFR 4      Anion Gap 10.0      BUN/Creatinine Ratio 6     B-TYPE NATRIURETIC PEPTIDE - Abnormal    Natriuretic Peptide 192.3 (*)    MAGNESIUM - Normal    Magnesium Level 2.30            Imaging Results              X-Ray Hips Bilateral 2 View Incl AP Pelvis (Final result)  Result time 12/09/24 18:18:49      Final result by Iván Davis MD (12/09/24 18:18:49)                   Impression:      No acute findings.      Electronically signed by: Iván Davis  Date:    12/09/2024  Time:    18:18               Narrative:    EXAMINATION:  XR HIPS BILATERAL 2 VIEW INCL AP PELVIS    CLINICAL HISTORY:  Pain in unspecified hip    COMPARISON:  None    FINDINGS:  Frontal and frogleg lateral views of the pelvis/hips.  There is no fracture or dislocation.  Mild degenerative changes of the hips without significant joint space narrowing.                                       X-Ray Humerus 2 View Right (Final result)  Result time 12/09/24 15:06:18      Final result by Violet Gillette MD (12/09/24 15:06:18)                   Impression:      Stable alignment following internal fixation of the right humerus.      Electronically signed by: Violet Gillette  Date:    12/09/2024  Time:    15:06               Narrative:    EXAMINATION:  XR HUMERUS 2 VIEW RIGHT    CLINICAL HISTORY:  Pain in right arm    COMPARISON:  X-rays dated 12/02/2024    FINDINGS:  There is stable alignment following internal fixation of the right humerus.  There is no new fracture identified.  Skin staples are in place.                                       Medications   oxyCODONE immediate release tablet 5 mg (5 mg Oral Given 12/9/24 1720)   furosemide tablet 40 mg (40 mg Oral Given 12/9/24 1731)     Medical Decision Making  The patient is a 74 y.o. female with a pertinent PMHX of HTN, HLD, CKD on PD, skin cancer,  "glomerulonephritis, anemia who presents to the Emergency Department with no one with a chief complaint of bilateral lower extremity/hip pain, right upper extremity pain and swelling. Symptoms began approximately 1 week ago and have been worsening since onset. The bilateral hip pain is currently rated as a 6/10 in severity and described as "I do not know just painful" with no radiation. Associated symptoms include decreased ability to walk. Symptoms are aggravated with movement and alleviated with staying still. The patient denies numbness, tingling, trauma. They report taking narcotic pain medication yesterday prior to arrival with from relief of symptoms. No other reported symptoms at this time.    Pertinent physical exam findings include decreased right upper extremity swelling and decreased ROM due to pain; bilateral lower extremity swelling, and decreased ROM of the hip and knees due to pain at bilateral hips.  RUE surgical site scars are clean dry and intact.  Vital signs stable with the exception of blood pressure which is noted to be mildly elevated to 145/82. (patient states that she did not take any of her medications today).    CBC showing H&H of 8.5 and 27.2, no signs of acute infection.  CMP showing BUN of 54.5 and creatinine of 8.56, alk-phos 190. Mg WNL.  .3.  Right upper extremity ultrasound negative for DVT.  Right humerus XR showing stable alignment following internal fixation of the right humerus.  No new fractures identified.  X-ray hips bilateral including AP pelvis showing no fracture or dislocations.    Patient able to ambulate with nurses without issue prior to dc.    Laboratory and imaging findings discussed with patient.  Encouraged patient to ambulate as tolerated, use compression stockings and elevate her legs and arm as able.  Patient verbalized understanding and agreement of plan.  Discharge instructions and return precautions provided.  All questions answered.      Amount and/or " Complexity of Data Reviewed  External Data Reviewed: labs.     Details: Labs today compared to previous  Labs: ordered. Decision-making details documented in ED Course.  Radiology: ordered. Decision-making details documented in ED Course.  Discussion of management or test interpretation with external provider(s): Spoke with  about patient.  He recommended a dose of 40 Lasix while in the ER, and discharging home with 3 days 40 mg Lasix to help with swelling.    Spoke with Dr Dow about pt presenting to ER.    Risk  Prescription drug management.      Additional MDM:   Differential Diagnosis:   Other: The following diagnoses were also considered and will be evaluated: Dependent swelling, Deconditioning and DVT.                                   Clinical Impression:  Final diagnoses:  [M79.601] Right arm pain  [M25.559] Hip pain (Primary)  [M79.89] Leg swelling  [R60.9] Edema, unspecified type          ED Disposition Condition    Discharge Stable          ED Prescriptions       Medication Sig Dispense Start Date End Date Auth. Provider    furosemide (LASIX) 40 MG tablet Take 1 tablet (40 mg total) by mouth once daily. for 3 days 3 tablet 12/9/2024 12/12/2024 Zoe Izaguirre PA-C          Follow-up Information       Follow up With Specialties Details Why Contact Info    Sergio Crabtree MD Internal Medicine Call in 1 week As needed [address] [facility] [id number] [phone]      Samuel Dow,  Orthopedic Surgery Call in 1 week As needed [address] [facility] [address]  [facility] [address]  [phone]               Zoe Izaguirre PA-C  12/09/24 2323

## 2024-12-09 NOTE — FIRST PROVIDER EVALUATION
"Medical screening examination initiated.  I have conducted a focused provider triage encounter, findings are as follows:    Brief history of present illness:  73 y/o female presents with worsening swelling and pain to right arm. She states she broke her humerus about a week ago and it is getting worse. She doesn't have sling on. Also legs swollen.     Vitals:    12/09/24 1006   BP: 131/72   Pulse: 77   Resp: 18   Temp: 98.5 °F (36.9 °C)   TempSrc: Oral   SpO2: 99%   Weight: 63.5 kg (140 lb)   Height: 5' 2" (1.575 m)       Pertinent physical exam:  alert, nonlabored, right arm swollen, bilateral LE swelling noted     Brief workup plan:  labs, imaging    Preliminary workup initiated; this workup will be continued and followed by the physician or advanced practice provider that is assigned to the patient when roomed.  "

## 2024-12-10 NOTE — DISCHARGE INSTRUCTIONS
Take Lasix 40mg once a day for the next 3 days instead of your Lasix 20mg.  After the 3 days you may restart your normal dose of Lasix 20 mg.  Follow up with your primary care provider as needed.  Keep your follow-up appointment with Dr. Dow.  Wear compression tights on your legs to help with the swelling.  Elevate your leg and arm when able to help and swelling.  Walk with your walker as able.  If you experience any new or worsening symptoms return to the emergency department.

## 2024-12-16 ENCOUNTER — TELEPHONE (OUTPATIENT)
Facility: CLINIC | Age: 74
End: 2024-12-16
Payer: MEDICARE

## 2024-12-16 NOTE — TELEPHONE ENCOUNTER
I called to check on patient at this time. Patient has an upcoming post op appointment with Dr. Dow. Patient reports she has worked with PT and they noted some bilateral lower leg swelling. I ask patient if she has been sitting a lot and keeping legs in dependent position, she does not confirm or deny this. I educate patient on elevating legs. She states PT is taking care of it. I also see that patient has a nasal bone fracture. Patient states she has not followed up with Dr. Rodríguez as the fracture was not displaced and is not causing her pain. Patient has no needs at this time.

## 2024-12-23 ENCOUNTER — OFFICE VISIT (OUTPATIENT)
Dept: ORTHOPEDICS | Facility: CLINIC | Age: 74
End: 2024-12-23
Payer: MEDICARE

## 2024-12-23 ENCOUNTER — HOSPITAL ENCOUNTER (OUTPATIENT)
Dept: RADIOLOGY | Facility: CLINIC | Age: 74
Discharge: HOME | End: 2024-12-23
Attending: ORTHOPAEDIC SURGERY
Payer: MEDICARE

## 2024-12-23 VITALS
DIASTOLIC BLOOD PRESSURE: 81 MMHG | SYSTOLIC BLOOD PRESSURE: 138 MMHG | BODY MASS INDEX: 25.55 KG/M2 | WEIGHT: 138.88 LBS | HEART RATE: 86 BPM | HEIGHT: 62 IN

## 2024-12-23 DIAGNOSIS — S42.301D CLOSED FRACTURE OF SHAFT OF RIGHT HUMERUS WITH ROUTINE HEALING, UNSPECIFIED FRACTURE MORPHOLOGY, SUBSEQUENT ENCOUNTER: Primary | ICD-10-CM

## 2024-12-23 DIAGNOSIS — S42.301D CLOSED FRACTURE OF SHAFT OF RIGHT HUMERUS WITH ROUTINE HEALING, UNSPECIFIED FRACTURE MORPHOLOGY, SUBSEQUENT ENCOUNTER: ICD-10-CM

## 2024-12-23 PROCEDURE — 99024 POSTOP FOLLOW-UP VISIT: CPT | Mod: POP,,, | Performed by: ORTHOPAEDIC SURGERY

## 2024-12-23 PROCEDURE — 73060 X-RAY EXAM OF HUMERUS: CPT | Mod: RT,,, | Performed by: ORTHOPAEDIC SURGERY

## 2024-12-23 NOTE — PROGRESS NOTES
"Subjective:       Patient ID: Malissa Schwartz is a 74 y.o. female.  Chief Complaint   Patient presents with    Right Upper Arm - Post-op Evaluation     3 week f/u from IMN right humeral shaft fx. Not wearing ABD sling. Reports aching pain with ROM.        HPI:  History of Present Illness    HPI:  Ms. Schwartz presents for a follow-up visit regarding an arm injury. She reports her arm is feeling significantly better but still experiences pain with certain movements. The injury was caused by broken glass, resulting in a fracture that required treatment. She received three stitches in three separate locations. She is currently able to move her arm slightly, as evidenced by her ability to hold it up when requested. She reports being almost out of pain medication but states she used minimal amounts. When asked about managing pain through [facility], she affirms she will be able to manage.    IMAGING:  Ms. Schwartz underwent X-rays which revealed evidence of a significant injury, described as "pretty nasty," with the presence of broken glass.    MEDICATIONS:  Ms. Schwartz reports minimal usage of pain medication and is almost out of their current supply.      ROS:  Musculoskeletal: +joint pain          ROS:  Constitutional: Denies fever chills  Eyes: No change in vision  ENT: No ringing or current infections  CV: No chest pain  Resp: No labored breathing  MSK: Pain evident at site of injury located in HPI,   Integ: No signs of abrasions or lacerations  Neuro: No numbness or tingling  Lymphatic: No swelling outside the area of injury     Current Outpatient Medications on File Prior to Visit   Medication Sig Dispense Refill    amLODIPine (NORVASC) 10 MG tablet Take 10 mg by mouth once daily.      calcitRIOL (ROCALTROL) 0.25 MCG Cap Take 0.25 mcg by mouth 3 (three) times a week.      D5W PgBk 50 mL with ceFAZolin 1 gram SolR Cefazolin intraperitoneally times 10 more days      folic acid (FOLVITE) 1 MG tablet Take 1 mg by mouth once " "daily.      furosemide (LASIX) 20 MG tablet Take 20 mg by mouth once daily.      zolpidem (AMBIEN) 10 mg Tab Take 10 mg by mouth every evening.      aspirin (ECOTRIN) 81 MG EC tablet Take 81 mg by mouth once daily. (Patient not taking: Reported on 12/23/2024)      B complex-vitamin C-folic acid (NEPHRO-JENN) 0.8 mg Tab Take 0.8 mg by mouth Daily. (Patient not taking: Reported on 12/4/2024)      EScitalopram oxalate (LEXAPRO) 10 MG tablet Take 1 tablet (10 mg total) by mouth once daily. (Patient not taking: Reported on 12/23/2024) 90 tablet 3    megestroL (MEGACE) 400 mg/10 mL (10 mL) Susp Take 10 mLs (400 mg total) by mouth once daily. 300 mL 0    oxyCODONE (ROXICODONE) 5 MG immediate release tablet Take 1 tablet (5 mg total) by mouth every 8 (eight) hours as needed for Pain. (Patient not taking: Reported on 12/23/2024) 12 tablet 0    potassium chloride (KLOR-CON) 10 MEQ TbSR Take 10 mEq by mouth once daily. (Patient not taking: Reported on 12/23/2024)      [DISCONTINUED] losartan (COZAAR) 100 MG tablet TAKE ONE TABLET BY MOUTH ONCE DAILY 90 tablet 3    [DISCONTINUED] metoprolol succinate (TOPROL-XL) 25 MG 24 hr tablet Take 2 tablets (50 mg total) by mouth once daily. (Patient not taking: Reported on 5/12/2022) 60 tablet 3    [DISCONTINUED] mycophenolate (CELLCEPT) 250 mg Cap Take 2 capsules (500 mg total) by mouth 2 (two) times daily. Z94.0 kidney transplant (Patient not taking: Reported on 5/12/2022) 120 capsule 11    [DISCONTINUED] tacrolimus (PROGRAF) 1 MG Cap Take 3mg every AM and 2mg every PM.  Route: Oral.  Z94.0 Kidney Transplant. (Patient not taking: Reported on 5/12/2022) 150 capsule 11     No current facility-administered medications on file prior to visit.          Objective:      /81   Pulse 86   Ht 5' 2" (1.575 m)   Wt 63 kg (138 lb 14.2 oz)   BMI 25.40 kg/m²   General the patient is alert and oriented x3 no acute distress nontoxic-appearing appropriate affect.    Constitutional: Vital signs " "are examined and stable.  Resp: No signs of labored breathing    RUE: -Skin:  Incisions without signs of infection.  Staples removed without complication         -MSK: STR 5/5 AIN/PIN/Median/Radial/Ulnar motor,           -Neuro:  Sensation intact to light touch C5-T1 dermatomes           -Lymphatic: No signs of  lymphadenopathy,            -CV:  Capillary refill is less than 2 seconds. Radial and ulnar pulses 2/4. Compartments soft and compressible             =  Body mass index is 25.4 kg/m².  Ideal body weight: 50.1 kg (110 lb 7.2 oz)  Adjusted ideal body weight: 55.3 kg (121 lb 13.2 oz)  No results found for: "HGBA1C"  Hgb   Date Value Ref Range Status   12/09/2024 8.5 (L) 12.0 - 16.0 g/dL Final   12/03/2024 8.3 (L) 12.0 - 16.0 g/dL Final     Hemoglobin   Date Value Ref Range Status   12/18/2024 9 (L) 12.0 - 16.0 g/dL Final   11/26/2024 10.2 (L) 12.0 - 16.0 g/dL Final   06/20/2018 10.5 (L) 12.0 - 16.0 g/dL Final   01/08/2018 9.1 (L) 12.0 - 16.0 g/dL Final     Vitamin D   Date Value Ref Range Status   04/26/2020 39.5 6.6 - 49.9 ng/mL Final     Vit D, 25-Hydroxy   Date Value Ref Range Status   11/09/2016 41 30 - 96 ng/mL Final     Comment:     Vitamin D deficiency.........<10 ng/mL                              Vitamin D insufficiency......10-29 ng/mL       Vitamin D sufficiency........> or equal to 30 ng/mL  Vitamin D toxicity............>100 ng/mL     05/20/2016 57 30 - 96 ng/mL Final     Comment:     Vitamin D deficiency.........<10 ng/mL                              Vitamin D insufficiency......10-29 ng/mL       Vitamin D sufficiency........> or equal to 30 ng/mL  Vitamin D toxicity............>100 ng/mL       WBC   Date Value Ref Range Status   12/09/2024 5.53 4.50 - 11.50 x10(3)/mcL Final   12/03/2024 4.57 4.50 - 11.50 x10(3)/mcL Final   07/04/2024 4.26 (L) 4.80 - 10.80 1000/mcL Final     Comment:       WBC degradation seen, may affect WBC count   06/20/2018 6.90 3.90 - 12.70 K/uL Final   01/08/2018 3.37 (L) " "3.90 - 12.70 K/uL Final       Radiology:  Two views right humerus skeletally mature individual shows intact hardware anatomic reduction without signs of displacement        Assessment:         1. Closed fracture of shaft of right humerus with routine healing, unspecified fracture morphology, subsequent encounter  X-Ray Humerus 2 View Right              Plan:         No follow-ups on file.    Malissa Edwards" was seen today for post-op evaluation.    Diagnoses and all orders for this visit:    Closed fracture of shaft of right humerus with routine healing, unspecified fracture morphology, subsequent encounter  -     X-Ray Humerus 2 View Right; Future      Assessment & Plan    PLAN SUMMARY:  - Referred to physical therapy for arm rehabilitation  - Prescribed pain medication refill  - Advised to start light arm movements for daily activities  - Prohibited from using weights for arm strengthening  - Follow up in 6 to 8 weeks  - Instructed to avoid wearing sling except in public    FOLLOW UP:  - Follow up in 6 to 8 weeks.    RETURN TO ACTIVITY:  - Advised to start moving arm for daily activities like brushing teeth and lifting light objects like a cup of coffee. Instructed not to wear sling unless in public to warn others of injury. Prohibited from strengthening arm with weights.    MEDICATIONS PRESCRIBED:  - Prescribed pain medication refill to last through Christmas.    REFERRALS:  - Referred to physical therapy for arm rehabilitation and movement improvement.        Patient is doing well.  Radial nerve is intact.  Tolerates range of motion of the right shoulder.  No sign of infection.  She understands risks and benefits with the procedure.  Follow up 6-8 weeks physical therapy written for the patient        This note/OR report was created with the assistance of  voice recognition software or phone  dictation.  There may be transcription errors as a result of using this technology however minimal. Effort has been made to " assure accuracy of transcription but any obvious errors or omissions should be clarified with the author of the document.     This note was generated with the assistance of ambient listening technology. Verbal consent was obtained by the patient and accompanying visitor(s) for the recording of patient appointment to facilitate this note. I attest to having reviewed and edited the generated note for accuracy, though some syntax or spelling errors may persist. Please contact the author of this note for any clarification.       Samuel Dow DO  Orthopedic Trauma Surgery  12/23/2024      No future appointments.

## 2025-01-02 DIAGNOSIS — S42.301D CLOSED FRACTURE OF SHAFT OF RIGHT HUMERUS WITH ROUTINE HEALING, UNSPECIFIED FRACTURE MORPHOLOGY, SUBSEQUENT ENCOUNTER: Primary | ICD-10-CM

## 2025-01-16 ENCOUNTER — DOCUMENT SCAN (OUTPATIENT)
Dept: HOME HEALTH SERVICES | Facility: HOSPITAL | Age: 75
End: 2025-01-16
Payer: MEDICARE

## 2025-01-16 NOTE — TELEPHONE ENCOUNTER
Patient calling stating she is having pain to fracture site. Denies new injury since last visit. Denies swelling or signs of infection. Patient requesting refill at this time. Recommend she continue to monitor pain level. As well as incorporate ice and elevation for additional relief . Reports to ED if uncontrolled pain. Patient voiced a clear understanding.

## 2025-01-22 ENCOUNTER — HOSPITAL ENCOUNTER (INPATIENT)
Facility: HOSPITAL | Age: 75
LOS: 12 days | Discharge: NURSING, REHAB OR LONG-TERM CARE | DRG: 871 | End: 2025-02-03
Attending: STUDENT IN AN ORGANIZED HEALTH CARE EDUCATION/TRAINING PROGRAM | Admitting: INTERNAL MEDICINE
Payer: MEDICARE

## 2025-01-22 DIAGNOSIS — M79.606 LEG PAIN: ICD-10-CM

## 2025-01-22 DIAGNOSIS — R79.89 ELEVATED TROPONIN: ICD-10-CM

## 2025-01-22 DIAGNOSIS — R07.9 CHEST PAIN: ICD-10-CM

## 2025-01-22 DIAGNOSIS — R53.1 WEAKNESS: ICD-10-CM

## 2025-01-22 DIAGNOSIS — N18.6 ESRD (END STAGE RENAL DISEASE): ICD-10-CM

## 2025-01-22 DIAGNOSIS — Z99.2 PERITONEAL DIALYSIS CATHETER IN PLACE: ICD-10-CM

## 2025-01-22 DIAGNOSIS — N18.9 ANEMIA OF RENAL DISEASE: Chronic | ICD-10-CM

## 2025-01-22 DIAGNOSIS — D63.1 ANEMIA OF RENAL DISEASE: Chronic | ICD-10-CM

## 2025-01-22 DIAGNOSIS — R53.83 FATIGUE, UNSPECIFIED TYPE: Primary | ICD-10-CM

## 2025-01-22 LAB
ADV 40+41 DNA STL QL NAA+NON-PROBE: NOT DETECTED
ALBUMIN FLD-MCNC: <0.4 GM/DL
ALBUMIN SERPL-MCNC: 1.6 G/DL (ref 3.4–4.8)
ALBUMIN/GLOB SERPL: 0.4 RATIO (ref 1.1–2)
ALP SERPL-CCNC: 208 UNIT/L (ref 40–150)
ALT SERPL-CCNC: 18 UNIT/L (ref 0–55)
ANION GAP SERPL CALC-SCNC: 19 MEQ/L
APICAL FOUR CHAMBER EJECTION FRACTION: 58 %
APICAL TWO CHAMBER EJECTION FRACTION: 63 %
APTT PPP: 31.4 SECONDS (ref 23.2–33.7)
APTT PPP: 32.4 SECONDS (ref 23.2–33.7)
AST SERPL-CCNC: 26 UNIT/L (ref 5–34)
ASTRO TYP 1-8 RNA STL QL NAA+NON-PROBE: NOT DETECTED
AV INDEX (PROSTH): 0.67
AV MEAN GRADIENT: 4 MMHG
AV PEAK GRADIENT: 8 MMHG
AV VALVE AREA BY VELOCITY RATIO: 1.8 CM²
AV VALVE AREA: 2.1 CM²
AV VELOCITY RATIO: 0.57
BASOPHILS # BLD AUTO: 0.03 X10(3)/MCL
BASOPHILS NFR BLD AUTO: 0.2 %
BILIRUB SERPL-MCNC: 0.4 MG/DL
BNP BLD-MCNC: 407.1 PG/ML
BSA FOR ECHO PROCEDURE: 1.65 M2
BUN SERPL-MCNC: 81.3 MG/DL (ref 9.8–20.1)
C CAYETANENSIS DNA STL QL NAA+NON-PROBE: NOT DETECTED
C COLI+JEJ+UPSA DNA STL QL NAA+NON-PROBE: NOT DETECTED
CALCIUM SERPL-MCNC: 8.5 MG/DL (ref 8.4–10.2)
CHLORIDE SERPL-SCNC: 96 MMOL/L (ref 98–107)
CLARITY BODY FLUID (OLG): CLEAR
CO2 SERPL-SCNC: 21 MMOL/L (ref 23–31)
COLOR BODY FLUID (OLG): COLORLESS
CREAT SERPL-MCNC: 10.88 MG/DL (ref 0.55–1.02)
CREAT/UREA NIT SERPL: 7
CRYPTOSP DNA STL QL NAA+NON-PROBE: NOT DETECTED
CV ECHO LV RWT: 0.44 CM
DOP CALC AO PEAK VEL: 1.4 M/S
DOP CALC AO VTI: 23.1 CM
DOP CALC LVOT AREA: 3.1 CM2
DOP CALC LVOT DIAMETER: 2 CM
DOP CALC LVOT PEAK VEL: 0.8 M/S
DOP CALC LVOT STROKE VOLUME: 48.7 CM3
DOP CALC MV VTI: 31.7 CM
DOP CALCLVOT PEAK VEL VTI: 15.5 CM
E HISTOLYT DNA STL QL NAA+NON-PROBE: NOT DETECTED
E WAVE DECELERATION TIME: 161 MSEC
E/A RATIO: 0.79
E/E' RATIO: 10 M/S
EAEC PAA PLAS AGGR+AATA ST NAA+NON-PRB: DETECTED
EC STX1+STX2 GENES STL QL NAA+NON-PROBE: NOT DETECTED
ECHO LV POSTERIOR WALL: 1 CM (ref 0.6–1.1)
EOSINOPHIL # BLD AUTO: 0.02 X10(3)/MCL (ref 0–0.9)
EOSINOPHIL NFR BLD AUTO: 0.2 %
EPEC EAE GENE STL QL NAA+NON-PROBE: NOT DETECTED
ERYTHROCYTE [DISTWIDTH] IN BLOOD BY AUTOMATED COUNT: 14.7 % (ref 11.5–17)
ETEC LTA+ST1A+ST1B TOX ST NAA+NON-PROBE: NOT DETECTED
FLUAV AG UPPER RESP QL IA.RAPID: NOT DETECTED
FLUBV AG UPPER RESP QL IA.RAPID: NOT DETECTED
FRACTIONAL SHORTENING: 31.1 % (ref 28–44)
G LAMBLIA DNA STL QL NAA+NON-PROBE: NOT DETECTED
GFR SERPLBLD CREATININE-BSD FMLA CKD-EPI: 3 ML/MIN/1.73/M2
GLOBULIN SER-MCNC: 3.7 GM/DL (ref 2.4–3.5)
GLUCOSE SERPL-MCNC: 79 MG/DL (ref 82–115)
GRAM STN SPEC: NORMAL
GRAM STN SPEC: NORMAL
GROUP & RH: NORMAL
HCT VFR BLD AUTO: 30.3 % (ref 37–47)
HGB BLD-MCNC: 9.4 G/DL (ref 12–16)
HR MV ECHO: 82 BPM
IMM GRANULOCYTES # BLD AUTO: 0.07 X10(3)/MCL (ref 0–0.04)
IMM GRANULOCYTES NFR BLD AUTO: 0.6 %
INDIRECT COOMBS: NORMAL
INR PPP: 1.4
INTERVENTRICULAR SEPTUM: 1.2 CM (ref 0.6–1.1)
LEFT ATRIUM AREA SYSTOLIC (APICAL 2 CHAMBER): 16.8 CM2
LEFT ATRIUM AREA SYSTOLIC (APICAL 4 CHAMBER): 21.2 CM2
LEFT ATRIUM SIZE: 3.6 CM
LEFT ATRIUM VOLUME INDEX MOD: 33 ML/M2
LEFT ATRIUM VOLUME MOD: 54 ML
LEFT INTERNAL DIMENSION IN SYSTOLE: 3.1 CM (ref 2.1–4)
LEFT VENTRICLE DIASTOLIC VOLUME INDEX: 56.69 ML/M2
LEFT VENTRICLE DIASTOLIC VOLUME: 92.4 ML
LEFT VENTRICLE END DIASTOLIC VOLUME APICAL 2 CHAMBER: 69.1 ML
LEFT VENTRICLE END DIASTOLIC VOLUME APICAL 4 CHAMBER: 82.3 ML
LEFT VENTRICLE END SYSTOLIC VOLUME APICAL 2 CHAMBER: 41.3 ML
LEFT VENTRICLE END SYSTOLIC VOLUME APICAL 4 CHAMBER: 65.6 ML
LEFT VENTRICLE MASS INDEX: 107.4 G/M2
LEFT VENTRICLE SYSTOLIC VOLUME INDEX: 23.3 ML/M2
LEFT VENTRICLE SYSTOLIC VOLUME: 37.9 ML
LEFT VENTRICULAR INTERNAL DIMENSION IN DIASTOLE: 4.5 CM (ref 3.5–6)
LEFT VENTRICULAR MASS: 175 G
LV LATERAL E/E' RATIO: 7.8 M/S
LV SEPTAL E/E' RATIO: 12.6 M/S
LVED V (TEICH): 92.4 ML
LVES V (TEICH): 37.9 ML
LVOT MG: 1 MMHG
LVOT MV: 0.55 CM/S
LYMPHOCYTES # BLD AUTO: 1.57 X10(3)/MCL (ref 0.6–4.6)
LYMPHOCYTES NFR BLD AUTO: 12.5 %
LYMPHOCYTES NFR FLD MANUAL: 36 %
MACROPHAGES MAN COUNT BF (OLG): 10
MCH RBC QN AUTO: 26.4 PG (ref 27–31)
MCHC RBC AUTO-ENTMCNC: 31 G/DL (ref 33–36)
MCV RBC AUTO: 85.1 FL (ref 80–94)
MESOTHELIAL CELLS MAN COUNT BF (OLG): 1
MONOCYTE MAN % BF (OLG): 30 %
MONOCYTES # BLD AUTO: 0.63 X10(3)/MCL (ref 0.1–1.3)
MONOCYTES NFR BLD AUTO: 5 %
MV MEAN GRADIENT: 4 MMHG
MV PEAK A VEL: 1.28 M/S
MV PEAK E VEL: 1.01 M/S
MV PEAK GRADIENT: 7 MMHG
MV STENOSIS PRESSURE HALF TIME: 71 MS
MV VALVE AREA BY CONTINUITY EQUATION: 1.54 CM2
MV VALVE AREA P 1/2 METHOD: 3.1 CM2
NEUTROPHILS # BLD AUTO: 10.21 X10(3)/MCL (ref 2.1–9.2)
NEUTROPHILS MAN % BF (OLG): 34 %
NEUTROPHILS NFR BLD AUTO: 81.5 %
NOROVIRUS GI+II RNA STL QL NAA+NON-PROBE: NOT DETECTED
NRBC BLD AUTO-RTO: 0 %
OHS LV EJECTION FRACTION SIMPSONS BIPLANE MOD: 61 %
OHS QRS DURATION: 92 MS
OHS QTC CALCULATION: 479 MS
P SHIGELLOIDES DNA STL QL NAA+NON-PROBE: NOT DETECTED
PISA TR MAX VEL: 3 M/S
PLATELET # BLD AUTO: 319 X10(3)/MCL (ref 130–400)
PMV BLD AUTO: 9.4 FL (ref 7.4–10.4)
POCT GLUCOSE: 107 MG/DL (ref 70–110)
POTASSIUM SERPL-SCNC: 3.9 MMOL/L (ref 3.5–5.1)
PROT SERPL-MCNC: 5.3 GM/DL (ref 5.8–7.6)
PROTHROMBIN TIME: 16.9 SECONDS (ref 12.5–14.5)
PV PEAK GRADIENT: 3 MMHG
PV PEAK VELOCITY: 0.9 M/S
RA PRESSURE ESTIMATED: 8 MMHG
RBC # BLD AUTO: 3.56 X10(6)/MCL (ref 4.2–5.4)
RV TB RVSP: 11 MMHG
RVA RNA STL QL NAA+NON-PROBE: NOT DETECTED
S ENT+BONG DNA STL QL NAA+NON-PROBE: NOT DETECTED
SAPO I+II+IV+V RNA STL QL NAA+NON-PROBE: NOT DETECTED
SARS-COV-2 RNA RESP QL NAA+PROBE: NOT DETECTED
SHIGELLA SP+EIEC IPAH ST NAA+NON-PROBE: NOT DETECTED
SODIUM SERPL-SCNC: 136 MMOL/L (ref 136–145)
SPECIMEN OUTDATE: NORMAL
TDI LATERAL: 0.13 M/S
TDI SEPTAL: 0.08 M/S
TDI: 0.11 M/S
TNC BODY FLUID (OLG): 13 /UL
TR MAX PG: 36 MMHG
TRICUSPID ANNULAR PLANE SYSTOLIC EXCURSION: 2.12 CM
TROPONIN I SERPL-MCNC: 1.22 NG/ML (ref 0–0.04)
TROPONIN I SERPL-MCNC: 1.25 NG/ML (ref 0–0.04)
TV REST PULMONARY ARTERY PRESSURE: 44 MMHG
V CHOL+PARA+VUL DNA STL QL NAA+NON-PROBE: NOT DETECTED
V CHOLERAE DNA STL QL NAA+NON-PROBE: NOT DETECTED
WBC # BLD AUTO: 12.53 X10(3)/MCL (ref 4.5–11.5)
Y ENTEROCOL DNA STL QL NAA+NON-PROBE: NOT DETECTED
Z-SCORE OF LEFT VENTRICULAR DIMENSION IN END DIASTOLE: -0.22
Z-SCORE OF LEFT VENTRICULAR DIMENSION IN END SYSTOLE: 0.66

## 2025-01-22 PROCEDURE — 85730 THROMBOPLASTIN TIME PARTIAL: CPT | Performed by: STUDENT IN AN ORGANIZED HEALTH CARE EDUCATION/TRAINING PROGRAM

## 2025-01-22 PROCEDURE — 86901 BLOOD TYPING SEROLOGIC RH(D): CPT | Performed by: STUDENT IN AN ORGANIZED HEALTH CARE EDUCATION/TRAINING PROGRAM

## 2025-01-22 PROCEDURE — 63600175 PHARM REV CODE 636 W HCPCS: Performed by: STUDENT IN AN ORGANIZED HEALTH CARE EDUCATION/TRAINING PROGRAM

## 2025-01-22 PROCEDURE — 25000003 PHARM REV CODE 250: Performed by: INTERNAL MEDICINE

## 2025-01-22 PROCEDURE — 11000001 HC ACUTE MED/SURG PRIVATE ROOM

## 2025-01-22 PROCEDURE — 93010 ELECTROCARDIOGRAM REPORT: CPT | Mod: ,,, | Performed by: STUDENT IN AN ORGANIZED HEALTH CARE EDUCATION/TRAINING PROGRAM

## 2025-01-22 PROCEDURE — 99285 EMERGENCY DEPT VISIT HI MDM: CPT | Mod: 25

## 2025-01-22 PROCEDURE — 87045 FECES CULTURE AEROBIC BACT: CPT | Performed by: INTERNAL MEDICINE

## 2025-01-22 PROCEDURE — 84484 ASSAY OF TROPONIN QUANT: CPT

## 2025-01-22 PROCEDURE — 87205 SMEAR GRAM STAIN: CPT | Performed by: INTERNAL MEDICINE

## 2025-01-22 PROCEDURE — 80053 COMPREHEN METABOLIC PANEL: CPT | Performed by: STUDENT IN AN ORGANIZED HEALTH CARE EDUCATION/TRAINING PROGRAM

## 2025-01-22 PROCEDURE — 85025 COMPLETE CBC W/AUTO DIFF WBC: CPT | Performed by: STUDENT IN AN ORGANIZED HEALTH CARE EDUCATION/TRAINING PROGRAM

## 2025-01-22 PROCEDURE — 87070 CULTURE OTHR SPECIMN AEROBIC: CPT | Performed by: INTERNAL MEDICINE

## 2025-01-22 PROCEDURE — 83880 ASSAY OF NATRIURETIC PEPTIDE: CPT | Performed by: STUDENT IN AN ORGANIZED HEALTH CARE EDUCATION/TRAINING PROGRAM

## 2025-01-22 PROCEDURE — 90945 DIALYSIS ONE EVALUATION: CPT

## 2025-01-22 PROCEDURE — 85610 PROTHROMBIN TIME: CPT | Performed by: STUDENT IN AN ORGANIZED HEALTH CARE EDUCATION/TRAINING PROGRAM

## 2025-01-22 PROCEDURE — 82042 OTHER SOURCE ALBUMIN QUAN EA: CPT | Performed by: INTERNAL MEDICINE

## 2025-01-22 PROCEDURE — 84484 ASSAY OF TROPONIN QUANT: CPT | Performed by: STUDENT IN AN ORGANIZED HEALTH CARE EDUCATION/TRAINING PROGRAM

## 2025-01-22 PROCEDURE — 93005 ELECTROCARDIOGRAM TRACING: CPT

## 2025-01-22 PROCEDURE — 85730 THROMBOPLASTIN TIME PARTIAL: CPT | Performed by: INTERNAL MEDICINE

## 2025-01-22 PROCEDURE — 3E1M39Z IRRIGATION OF PERITONEAL CAVITY USING DIALYSATE, PERCUTANEOUS APPROACH: ICD-10-PCS | Performed by: INTERNAL MEDICINE

## 2025-01-22 PROCEDURE — S5010 5% DEXTROSE AND 0.45% SALINE: HCPCS | Performed by: INTERNAL MEDICINE

## 2025-01-22 PROCEDURE — 87507 IADNA-DNA/RNA PROBE TQ 12-25: CPT | Performed by: INTERNAL MEDICINE

## 2025-01-22 PROCEDURE — 89051 BODY FLUID CELL COUNT: CPT | Performed by: INTERNAL MEDICINE

## 2025-01-22 PROCEDURE — 0240U COVID/FLU A&B PCR: CPT | Performed by: STUDENT IN AN ORGANIZED HEALTH CARE EDUCATION/TRAINING PROGRAM

## 2025-01-22 RX ORDER — CLINDAMYCIN HYDROCHLORIDE 150 MG/1
450 CAPSULE ORAL EVERY 8 HOURS
Status: DISPENSED | OUTPATIENT
Start: 2025-01-22 | End: 2025-01-31

## 2025-01-22 RX ORDER — ALUMINUM HYDROXIDE, MAGNESIUM HYDROXIDE, AND SIMETHICONE 1200; 120; 1200 MG/30ML; MG/30ML; MG/30ML
30 SUSPENSION ORAL 4 TIMES DAILY PRN
Status: DISCONTINUED | OUTPATIENT
Start: 2025-01-22 | End: 2025-02-03 | Stop reason: HOSPADM

## 2025-01-22 RX ORDER — ACETAMINOPHEN 500 MG
1000 TABLET ORAL EVERY 6 HOURS PRN
Status: DISCONTINUED | OUTPATIENT
Start: 2025-01-22 | End: 2025-02-03 | Stop reason: HOSPADM

## 2025-01-22 RX ORDER — METHOCARBAMOL 500 MG/1
500 TABLET, FILM COATED ORAL 4 TIMES DAILY
Status: ON HOLD | COMMUNITY
End: 2025-02-11 | Stop reason: HOSPADM

## 2025-01-22 RX ORDER — POLYETHYLENE GLYCOL 3350 17 G/17G
17 POWDER, FOR SOLUTION ORAL 2 TIMES DAILY PRN
Status: DISCONTINUED | OUTPATIENT
Start: 2025-01-22 | End: 2025-02-03 | Stop reason: HOSPADM

## 2025-01-22 RX ORDER — IBUPROFEN 200 MG
24 TABLET ORAL
Status: DISCONTINUED | OUTPATIENT
Start: 2025-01-22 | End: 2025-02-03 | Stop reason: HOSPADM

## 2025-01-22 RX ORDER — TRAMADOL HYDROCHLORIDE 50 MG/1
50 TABLET ORAL 3 TIMES DAILY PRN
Status: ON HOLD | COMMUNITY
End: 2025-02-11 | Stop reason: HOSPADM

## 2025-01-22 RX ORDER — SODIUM CHLORIDE 0.9 % (FLUSH) 0.9 %
10 SYRINGE (ML) INJECTION
Status: DISCONTINUED | OUTPATIENT
Start: 2025-01-22 | End: 2025-02-03 | Stop reason: HOSPADM

## 2025-01-22 RX ORDER — GLUCAGON 1 MG
1 KIT INJECTION
Status: DISCONTINUED | OUTPATIENT
Start: 2025-01-22 | End: 2025-02-03 | Stop reason: HOSPADM

## 2025-01-22 RX ORDER — ENOXAPARIN SODIUM 100 MG/ML
1 INJECTION SUBCUTANEOUS
Status: DISCONTINUED | OUTPATIENT
Start: 2025-01-22 | End: 2025-01-22

## 2025-01-22 RX ORDER — CALCITRIOL 0.25 UG/1
0.25 CAPSULE ORAL
Status: DISCONTINUED | OUTPATIENT
Start: 2025-01-22 | End: 2025-02-03 | Stop reason: HOSPADM

## 2025-01-22 RX ORDER — AMLODIPINE BESYLATE 5 MG/1
5 TABLET ORAL DAILY
Status: DISCONTINUED | OUTPATIENT
Start: 2025-01-23 | End: 2025-02-01

## 2025-01-22 RX ORDER — ASPIRIN 81 MG/1
81 TABLET ORAL DAILY
Status: DISCONTINUED | OUTPATIENT
Start: 2025-01-22 | End: 2025-02-03 | Stop reason: HOSPADM

## 2025-01-22 RX ORDER — TALC
6 POWDER (GRAM) TOPICAL NIGHTLY PRN
Status: DISCONTINUED | OUTPATIENT
Start: 2025-01-22 | End: 2025-02-03 | Stop reason: HOSPADM

## 2025-01-22 RX ORDER — DEXTROSE MONOHYDRATE AND SODIUM CHLORIDE 5; .45 G/100ML; G/100ML
INJECTION, SOLUTION INTRAVENOUS CONTINUOUS
Status: ACTIVE | OUTPATIENT
Start: 2025-01-22 | End: 2025-01-23

## 2025-01-22 RX ORDER — PANTOPRAZOLE SODIUM 40 MG/1
40 TABLET, DELAYED RELEASE ORAL DAILY
Status: ON HOLD | COMMUNITY
End: 2025-02-11 | Stop reason: HOSPADM

## 2025-01-22 RX ORDER — IBUPROFEN 200 MG
16 TABLET ORAL
Status: DISCONTINUED | OUTPATIENT
Start: 2025-01-22 | End: 2025-02-03 | Stop reason: HOSPADM

## 2025-01-22 RX ORDER — BISACODYL 10 MG/1
10 SUPPOSITORY RECTAL DAILY PRN
Status: DISCONTINUED | OUTPATIENT
Start: 2025-01-22 | End: 2025-02-03 | Stop reason: HOSPADM

## 2025-01-22 RX ORDER — HEPARIN SODIUM,PORCINE/D5W 25000/250
0-40 INTRAVENOUS SOLUTION INTRAVENOUS CONTINUOUS
Status: DISCONTINUED | OUTPATIENT
Start: 2025-01-22 | End: 2025-01-23

## 2025-01-22 RX ORDER — ONDANSETRON HYDROCHLORIDE 2 MG/ML
4 INJECTION, SOLUTION INTRAVENOUS EVERY 4 HOURS PRN
Status: DISCONTINUED | OUTPATIENT
Start: 2025-01-22 | End: 2025-02-03 | Stop reason: HOSPADM

## 2025-01-22 RX ADMIN — DEXTROSE MONOHYDRATE AND SODIUM CHLORIDE: 5; .45 INJECTION, SOLUTION INTRAVENOUS at 05:01

## 2025-01-22 RX ADMIN — CLINDAMYCIN HYDROCHLORIDE 450 MG: 150 CAPSULE ORAL at 10:01

## 2025-01-22 RX ADMIN — HEPARIN SODIUM 12 UNITS/KG/HR: 10000 INJECTION, SOLUTION INTRAVENOUS at 01:01

## 2025-01-22 RX ADMIN — CALCITRIOL CAPSULES 0.25 MCG 0.25 MCG: 0.25 CAPSULE ORAL at 03:01

## 2025-01-22 RX ADMIN — ASPIRIN 81 MG: 81 TABLET, COATED ORAL at 03:01

## 2025-01-22 RX ADMIN — CLINDAMYCIN HYDROCHLORIDE 450 MG: 150 CAPSULE ORAL at 03:01

## 2025-01-22 NOTE — ED PROVIDER NOTES
Encounter Date: 1/22/2025       History     Chief Complaint   Patient presents with    Fatigue     Presents via [facility] with c/o generalized weakness onset yesterday. Also reports black stool xfew weeks. Denies blood thinners. Does home dialysis, did not run yesterday.     Patient is a 74-year-old female with a past medical history of ESRD on peritoneal dialysis, status post failed kidney transplant, hypertension (nephrologist Dr. Mendoza) presents to the emergency department for generalized weakness, fatigue.  Was not able to have peritoneal dialysis yesterday.  Denies any fever.  Currently complains of bilateral leg pain.  Family also reports dark stools.                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 Glomerulitis with one activated endothelial cell: treated with Solumedrol pulse 375 mg IVBP x 3 doses with short prednisone taper; thymoglobin 100 mg x 6 doses 10/04/2007 minimal histologic changes no acute rejection     Allergy     Anemia of renal disease     CKD (chronic kidney disease), stage IV     CMV (cytomegalovirus) status positive     Colon polyp     benign    Depression     GN (glomerulonephritis)     GN (glomerulonephritis)     unknown type    Herpes infection, other 2009    on buttocks    Hyperlipidemia     Hypertension     Immunosuppression     Living-donor kidney transplant recipient 01/15/2007    Meningitis     history of viral meningitis    Proteinuria  1/5/2015    Proteinuria     Pulmonary nodules     Renal hypertension     S/P breast augmentation     Skin cancer     Urinary tract infection      Past Surgical History:   Procedure Laterality Date    COLON SURGERY      COLONOSCOPY W/ BIOPSIES      cosemetic surgery      facelift and breast augmentation     HYSTERECTOMY      INTRAMEDULLARY RODDING OF HUMERUS Right 12/2/2024    Procedure: INSERTION, INTRAMEDULLARY MONTSERRAT, HUMERUS;  Surgeon: Samuel Dow ODO;  Location: [facility] OR;  Service: Orthopedics;  Laterality: Right;  supine vacular backward c arm synthes  shoulder scope drape    KIDNEY TRANSPLANT      NEPHRECTOMY Left     9/2016    RENAL BIOPSY      TONSILLECTOMY, ADENOIDECTOMY       Family History   Problem Relation Name Age of Onset    Kidney disease Sister          ESRD    Kidney disease Brother          CKD from HTN    Hypertension Brother      Heart failure Mother      Heart failure Father      Diabetes Neg Hx      Stroke Neg Hx       Social History     Tobacco Use    Smoking status: Former     Current packs/day: 1.00     Average packs/day: 1 pack/day for 15.0 years (15.0 ttl pk-yrs)     Types: Cigarettes    Smokeless tobacco: Never    Tobacco comments:     quit in 2014; smoked for 15 years and at the most smoked 1 ppd   Substance Use Topics    Alcohol use: Yes     Comment: seldom    Drug use: No     Review of Systems   Constitutional:  Negative for fever.   HENT:  Negative for sore throat.    Eyes:  Negative for visual disturbance.   Respiratory:  Negative for shortness of breath.    Cardiovascular:  Negative for chest pain.   Gastrointestinal:  Negative for abdominal pain.   Genitourinary:  Negative for dysuria.   Musculoskeletal:  Negative for joint swelling.   Skin:  Negative for rash.   Neurological:  Positive for weakness.   Psychiatric/Behavioral:  Negative for confusion.        Physical Exam     Initial Vitals [01/22/25 1048]   BP Pulse Resp Temp SpO2   115/67 82 19 97.5 °F (36.4 °C) 96 %      MAP        --         Physical Exam    Nursing note and vitals reviewed.  Constitutional: She appears well-developed and well-nourished.   HENT:   Head: Normocephalic and atraumatic.   Eyes: EOM are normal. Pupils are equal, round, and reactive to light.   Neck:   Normal range of motion.  Cardiovascular:  Normal rate, regular rhythm, normal heart sounds and intact distal pulses.           No murmur heard.  Pulmonary/Chest: Breath sounds normal. No respiratory distress. She has no wheezes. She has no rales.   Abdominal: Abdomen is soft. She exhibits no distension. There is no abdominal tenderness.   Peritoneal dialysis catheter in place. There is no rebound.   Genitourinary:    Genitourinary Comments: Rectal exam: Tylor FOLEY present.  Brown stool.  Guaiac negative.  No external hemorrhoids.  No evidence of bleed at this time.     Musculoskeletal:         General: No tenderness or edema. Normal range of motion.      Cervical back: Normal range of motion.     Neurological: She is alert. She has normal strength. No cranial nerve deficit. GCS score is 15. GCS eye subscore is 4. GCS verbal subscore is 5. GCS motor subscore is 6.   Skin: Skin is warm and dry. Capillary refill takes less than 2 seconds. No rash noted. No erythema.   Skin tear to left anterior leg.  Bilateral wounds to heels.   Psychiatric: She has a normal mood and affect.         ED Course   Critical Care    Date/Time: 1/22/2025 12:15 PM    Performed by: Uziel Newell MD  Authorized by: Uziel Newell MD  Direct patient critical care time: 12 minutes  Additional history critical care time: 8 minutes  Ordering / reviewing critical care time: 6 minutes  Documentation critical care time: 5 minutes  Consulting other physicians critical care time: 5 minutes  Total critical care time (exclusive of procedural time) : 36 minutes  Critical care time was exclusive of separately billable procedures and treating other patients and teaching time.  Critical care was necessary  to treat or prevent imminent or life-threatening deterioration of the following conditions: cardiac failure, circulatory failure and metabolic crisis.  Critical care was time spent personally by me on the following activities: development of treatment plan with patient or surrogate, discussions with consultants, interpretation of cardiac output measurements, evaluation of patient's response to treatment, examination of patient, obtaining history from patient or surrogate, ordering and performing treatments and interventions, ordering and review of laboratory studies, ordering and review of radiographic studies, pulse oximetry, re-evaluation of patient's condition and review of old charts.        Labs Reviewed   COMPREHENSIVE METABOLIC PANEL - Abnormal       Result Value    Sodium 136      Potassium 3.9      Chloride 96 (*)     CO2 21 (*)     Glucose 79 (*)     Blood Urea Nitrogen 81.3 (*)     Creatinine 10.88 (*)     Calcium 8.5      Protein Total 5.3 (*)     Albumin 1.6 (*)     Globulin 3.7 (*)     Albumin/Globulin Ratio 0.4 (*)     Bilirubin Total 0.4       (*)     ALT 18      AST 26      eGFR 3      Anion Gap 19.0      BUN/Creatinine Ratio 7     B-TYPE NATRIURETIC PEPTIDE - Abnormal    Natriuretic Peptide 407.1 (*)    TROPONIN I - Abnormal    Troponin-I 1.219 (*)    PROTIME-INR - Abnormal    PT 16.9 (*)     INR 1.4 (*)    CBC WITH DIFFERENTIAL - Abnormal    WBC 12.53 (*)     RBC 3.56 (*)     Hgb 9.4 (*)     Hct 30.3 (*)     MCV 85.1      MCH 26.4 (*)     MCHC 31.0 (*)     RDW 14.7      Platelet 319      MPV 9.4      Neut % 81.5      Lymph % 12.5      Mono % 5.0      Eos % 0.2      Basophil % 0.2      Imm Grans % 0.6      Neut # 10.21 (*)     Lymph # 1.57      Mono # 0.63      Eos # 0.02      Baso # 0.03      Imm Gran # 0.07 (*)     NRBC% 0.0     GI PANEL - Abnormal    CAMPYLOBACTER Not Detected      PLESIOMONAS SHIGELLOIDES Not Detected      SALMONELLA Not Detected      Vibrio sp. Not Detected      VIBRIO  CHOLERAE Not Detected      YERSINIA ENTEROCOLITICA Not Detected      ENTEROAGGREGATIVE E. COLI (EAEC) Detected (*)     ENTEROPATHOGENIC E. COLI (EPEC) Not Detected      Enterotoxigenic E. coli (ETEC) Not Detected      SHIGA-LIKE TOXIN-PRODUCING E. COLI (STEC) Not Detected      Shigella/Enteroinvasive E. coli (EIEC) Not Detected      CRYPTOSPORIDIUM Not Detected      Cyclospora cayetanensis Not Detected      Entamoeba histolytica Not Detected      Giardia lamblia Not Detected      Adenovirus F 40/41 Not Detected      Astrovirus Not Detected      Norovirus GI/GII Not Detected      Rotavirus A Not Detected      Sapovirus Not Detected      Narrative:     The [employer] GI Panel is a multiplexed nucleic acid test intended for use with the Syndiant®, MideoMe® 2.0, or [employer] Hands Systems for the simultaneous qualitative detection and identification of nucleic acids from multiple bacteria, viruses, and parasites directly from stool samples in Vick[employer] Jasw[employer] transport medium obtained from individuals with signs and/or symptoms of gastrointestinal infection.   TROPONIN I - Abnormal    Troponin-I 1.251 (*)    TROPONIN I - Abnormal    Troponin-I 0.983 (*)    COMPREHENSIVE METABOLIC PANEL - Abnormal    Sodium 133 (*)     Potassium 3.4 (*)     Chloride 94 (*)     CO2 20 (*)     Glucose 170 (*)     Blood Urea Nitrogen 73.0 (*)     Creatinine 9.84 (*)     Calcium 8.7      Protein Total 5.9      Albumin 1.5 (*)     Globulin 4.4 (*)     Albumin/Globulin Ratio 0.3 (*)     Bilirubin Total 0.3       (*)     ALT 19      AST 37 (*)     eGFR 4      Anion Gap 19.0      BUN/Creatinine Ratio 7     MAGNESIUM - Abnormal    Magnesium Level 2.70 (*)    APTT - Abnormal    PTT 63.3 (*)    CBC WITH DIFFERENTIAL - Abnormal    WBC 10.97      RBC 3.05 (*)     Hgb 8.1 (*)     Hct 25.4 (*)     MCV 83.3      MCH 26.6 (*)     MCHC 31.9 (*)     RDW 14.6      Platelet 307      MPV 10.1      Neut % 83.8      Lymph % 8.8      Mono % 5.2       Eos % 1.3      Basophil % 0.4      Imm Grans % 0.5      Neut # 9.20      Lymph # 0.97      Mono # 0.57      Eos # 0.14      Baso # 0.04      Imm Gran # 0.05 (*)     NRBC% 0.0     APTT - Abnormal    PTT 34.5 (*)    FERRITIN - Abnormal    Ferritin Level 4,204.43 (*)    IRON AND TIBC - Abnormal    Iron Binding Capacity Unsaturated 44 (*)     Iron Level 36 (*)     Transferrin 50 (*)     Iron Binding Capacity Total 80 (*)     Iron Saturation 45     POCT GLUCOSE - Abnormal    POCT Glucose 162 (*)    COVID/FLU A&B PCR - Normal    Influenza A PCR Not Detected      Influenza B PCR Not Detected      SARS-CoV-2 PCR Not Detected      Narrative:     The Xpert Xpress SARS-CoV-2/FLU/RSV plus is a rapid, multiplexed real-time PCR test intended for the simultaneous qualitative detection and differentiation of SARS-CoV-2, Influenza A, Influenza B, and respiratory syncytial virus (RSV) viral RNA in either nasopharyngeal swab or nasal swab specimens.         APTT - Normal    PTT 31.4     APTT - Normal    PTT 32.4     GRAM STAIN [facility]    GRAM STAIN Rare WBC observed      GRAM STAIN No bacteria seen     CBC W/ AUTO DIFFERENTIAL    Narrative:     The following orders were created for panel order CBC auto differential.  Procedure                               Abnormality         Status                     ---------                               -----------         ------                     CBC with Differential[[id number]]       Abnormal            Final result                 Please view results for these tests on the individual orders.   ALBUMIN, BODY FLUID    Albumin Level Body Fluid <0.4      Narrative:     This test was developed, and its performance characteristics determined by [facility]. It has not been cleared or approved by the US Food and Drug Administration.   CELL COUNT, BODY FLUID    Color BF Colorless      Clarity BF Clear      Total Nucleated Cells BF 13     DIFFERENTIAL, BODY FLUID    Neutrophils %  BF 34      Lymphocyte % BF 36      Monocyte % BF 30      Macrophages BF 10      Mesothelials BF 1     CBC W/ AUTO DIFFERENTIAL    Narrative:     The following orders were created for panel order CBC auto differential.  Procedure                               Abnormality         Status                     ---------                               -----------         ------                     CBC with Differential[[id number]]       Abnormal            Final result                 Please view results for these tests on the individual orders.   TYPE & SCREEN    Group & Rh A POS      Indirect Gilberto GEL NEG      Specimen Outdate 01/25/2025 23:59     POCT GLUCOSE    POCT Glucose 107       EKG Readings: (Independently Interpreted)   Sinus rhythm with PACs.  Incomplete right bundle-branch block.  No STEMI.     ECG Results              EKG 12-lead (Final result)        Collection Time Result Time QRS Duration O[facility] QTC Calculation    01/22/25 10:40:10 01/22/25 11:26:00 92 479                     Final result by Interface, Lab In [facility] (01/22/25 11:26:05)                   Narrative:    Test Reason : R53.1,    Vent. Rate :  82 BPM     Atrial Rate :  82 BPM     P-R Int : 158 ms          QRS Dur :  92 ms      QT Int : 410 ms       P-R-T Axes :  54 -47  74 degrees    QTcB Int : 479 ms    Sinus rhythm with Premature atrial complexes  Incomplete right bundle branch block  Left anterior fascicular block  Abnormal ECG  Confirmed by Vadmi Iyer ([id number]) on 1/22/2025 11:25:56 AM    Referred By:            Confirmed By: Vadim Iyer                                  Imaging Results              X-Ray Chest AP Portable (Final result)  Result time 01/22/25 13:03:52      Final result by Marilou Laughlin MD (01/22/25 13:03:52)                   Impression:      No acute abnormality.      Electronically signed by: Marilou Laughlin MD  Date:    01/22/2025  Time:    13:03               Narrative:    EXAMINATION:  XR CHEST AP  PORTABLE    CLINICAL HISTORY:  Weakness    TECHNIQUE:  PA and lateral views of the chest were performed.    COMPARISON:  December 2, 2024    FINDINGS:  The lungs are clear, with normal appearance of pulmonary vasculature and no pleural effusion or pneumothorax.    The cardiac silhouette is normal in size. The hilar and mediastinal contours are unremarkable.    Bones are intact.                                       Medications   sodium chloride 0.9% flush 10 mL (has no administration in time range)   melatonin tablet 6 mg (6 mg Oral Given 1/23/25 0308)   ondansetron injection 4 mg (has no administration in time range)   polyethylene glycol packet 17 g (has no administration in time range)   bisacodyL suppository 10 mg (has no administration in time range)   acetaminophen tablet 1,000 mg (has no administration in time range)   aluminum-magnesium hydroxide-simethicone 200-200-20 mg/5 mL suspension 30 mL (30 mLs Oral Given 1/25/25 1615)   glucose chewable tablet 16 g (has no administration in time range)   glucose chewable tablet 24 g (has no administration in time range)   dextrose 50% injection 12.5 g (has no administration in time range)   dextrose 50% injection 25 g (has no administration in time range)   glucagon (human recombinant) injection 1 mg (has no administration in time range)   aspirin EC tablet 81 mg (81 mg Oral Given 1/26/25 0809)   amLODIPine tablet 5 mg (5 mg Oral Given 1/26/25 0809)   calcitRIOL capsule 0.25 mcg (0.25 mcg Oral Given 1/24/25 0900)   clindamycin capsule 450 mg (450 mg Oral Given 1/26/25 0808)   dextrose 5 % and 0.45 % NaCl infusion (0 mL/hr Intravenous Stopped 1/23/25 0900)   vitamin renal formula (B-complex-vitamin c-folic acid) 1 mg per capsule 1 capsule (1 capsule Oral Given 1/26/25 0809)   EScitalopram oxalate tablet 10 mg (10 mg Oral Given 1/26/25 0809)   megestroL 400 mg/10 mL (10 mL) suspension 400 mg (400 mg Oral Given 1/26/25 0808)   pantoprazole EC tablet 40 mg (40 mg Oral  Given 1/26/25 0809)   potassium chloride CR tablet 10 mEq (10 mEq Oral Given 1/26/25 0809)   folic acid tablet 1 mg (1 mg Oral Given 1/26/25 0809)   heparin 25,000 units in dextrose 5% (100 units/ml) IV bolus from bag LOW INTENSITY nomogram - LAF (3,310 Units Intravenous Bolus from Bag 1/22/25 1321)   epoetin jak-epbx injection 10,000 Units (10,000 Units Subcutaneous Given 1/23/25 0934)   potassium bicarbonate disintegrating tablet 50 mEq (50 mEq Oral Given 1/23/25 1101)   potassium chloride CR tablet 10 mEq (10 mEq Oral Given 1/25/25 0945)     Medical Decision Making  Judging by the patient's chief complaint and pertinent history, the patient has the following possible differential diagnoses, including but not limited to the following.  Some of these are deemed to be lower likelihood and some more likely based on my physical exam and history combined with possible lab work and/or imaging studies.   Please see the pertinent studies, and refer to the HPI.  Some of these diagnoses will take further evaluation to fully rule out, perhaps as an outpatient and the patient was encouraged to follow up when discharged for more comprehensive evaluation.    Generalized weakness: anemia, dehydration, electrolyte derangement, hypoglycemia, infection, intoxication, respiratory failure, toxic ingestion, ACS, thyroid disease, medications, psychiatric, autoimmune, rhabdomyolysis, myositis, peripheral neuropathy       Patient is a 74-year-old female presents to the emergency department for generalized weakness, fatigue.  Was not able to complete peritoneal dialysis yesterday.  Has a history of ESRD on peritoneal dialysis.  Peritoneal fluid studies obtained.  Labs as noted.  EKG without ST elevation however troponin markedly elevated.  Discussed with cardiology who recommends placing patient on heparin drip.  Discussed with hospital medicine who will admit the patient.  Discussed with Nephrology who will dialyze the patient.  All  results discussed with the patient and family.  Answered all questions at this time.  Verbalized understanding and agreed to plan.      Problems Addressed:  Elevated troponin: acute illness or injury that poses a threat to life or bodily functions  ESRD (end stage renal disease): acute illness or injury that poses a threat to life or bodily functions  Fatigue, unspecified type: acute illness or injury  Leg pain: acute illness or injury that poses a threat to life or bodily functions    Amount and/or Complexity of Data Reviewed  External Data Reviewed: notes.  Labs: ordered. Decision-making details documented in ED Course.  Radiology: ordered and independent interpretation performed.  ECG/medicine tests: ordered and independent interpretation performed.    Risk  Parenteral controlled substances.  Decision regarding hospitalization.               ED Course as of 01/26/25 0901   Wed Jan 22, 2025   1053 Sinus rhythm with a PACs.  Incomplete right bundle-branch block.  Rate of 82.  No STEMI.  Time of 10 40 [RP]   1234 Troponin I(!): 1.219 [RP]   1241 ·  Left Ventricle: The left ventricle is normal in size. Normal wall thickness. There is normal systolic function with a visually estimated ejection fraction of 55 - 60%. There is diastolic dysfunction.  ·  Right Ventricle: Normal right ventricular cavity size. Systolic function is normal.  ·  Aortic Valve: There is mild aortic valve sclerosis.  ·  Mitral Valve: There is mild mitral annular calcification present. Moderately calcified subvalvular apparatus. There is mild (1+) regurgitation.   [RP]      ED Course User Index  [RP] Uziel Newell MD                           Clinical Impression:  Final diagnoses:  [R53.1] Weakness  [R53.83] Fatigue, unspecified type (Primary)  [N18.6] ESRD (end stage renal disease)  [Z99.2] Peritoneal dialysis catheter in place  [R79.89] Elevated troponin  [M79.606] Leg pain          ED Disposition Condition    Admit Stable                 Uziel Newell MD  01/26/25 0903

## 2025-01-22 NOTE — Clinical Note
The skin was dry, was intact, was without bleeding and was without hematoma. Site covered with CHG dressing.

## 2025-01-22 NOTE — CONSULTS
Inpatient consult to Cardiology  Consult performed by: Maira Bro FNP  Consult ordered by: Uziel Newell MD  Reason for consult: NSTEMI          [facility] *    Cardiology  Consult Note    Patient Name: Malissa Schwartz  MRN: [id number]  Admission Date: 1/22/2025  Hospital Length of Stay: 0 days  Code Status: Prior   Attending Provider: Uziel Newell MD   Consulting Provider: LETHA Sterling  Primary Care Physician: Sergio Crabtree MD  Principal Problem:<principal problem not specified>    Patient information was obtained from patient, past medical records, ER records, and primary team.     Subjective:     Chief Complaint/Reason for Consult: NSTEMI    HPI: Ms. Schwartz is a 73 y/o female with a history of HTN, HLD, ESRD on PD, who is formerly known to [facility], Dr. Hess (Last seen in 2021). She presented to the ER on 1.22.25 with complaints of fatigue and generalized weakness. She reports not feeling well yesterday, and she was unable to do Peritoneal Dialysis. She reports she is also having some pain in her legs.  She reports some black stools at home. Significant labs include WBC 12.53, H&H 9.4/30.3, INR 1.4, Chloride 96, CO2 21, BUN/Creat 81.3/10.88, Glucose 79, , Albumin 1.8, .1, Troponin 1.219. Flu and COID negative. CXR unremarkable. [facility] has been consulted for NSTEMI.     PMH: HTN, HLD, ESRD on PD, Depression, Anemia, Colon Polyp,Glomerulonephritis, Pulmonary nodules, Skin Cancer, Renal Hypertension  PSH: Kidney Transplant, Colonoscopy, Hysterectomy, IM Albin of Humerus, Nephrectomy, Renal biopsy, Tonsillectomy, Adenoidectomy    Family History: Father - Heart Failure; Mother - Heart Failure; Brother - HTN, Kidney Disease; Sister - Kidney Disease   Social History: Former Smoker (Quit in 2014). Reports occasional alcohol use. Denies illicit drug use.     Previous Cardiac Diagnostics:   Arterial US (12.9.24):  Negative for deep and superficial vein thrombosis in the left  upper extremity.     ECHO (8.24.24):  Left Ventricle: The left ventricle is normal in size. Normal wall thickness. There is normal systolic function with a visually estimated ejection fraction of 60 - 65%. Right Ventricle: Normal right ventricular cavity size. Systolic function is normal. TAPSE is 1.97 cm. Left Atrium: Agitated saline study of the atrial septum is negative, suggesting absence of intracardiac shunt at the atrial level. Aortic Valve: The aortic valve is a trileaflet valve. Mitral Valve: There is no stenosis. The mean pressure gradient across the mitral valve is 1 mmHg at a heart rate of 68 bpm. There is mild regurgitation. Tricuspid Valve: There is mild to moderate regurgitation. Pulmonic Valve: There is mild regurgitation. Pulmonary Artery: The estimated pulmonary artery systolic pressure is 39 mmHg. IVC/SVC: Intermediate venous pressure at 8 mmHg. Pericardium: Left pleural effusion.    Carotid US (8.24.24):  The right internal carotid artery was patent with no evidence of stenosis. The left internal carotid artery was patent with less than 50% stenosis. Bilateral vertebral arteries were patent with antegrade flow.     Lower Ext VENOUS US (8.23.24):  Negative for deep and superficial vein thrombosis in  Left  lower extremity.     PET (5.26.21):  This is an abnormal perfusion study. This scan is suggestive of low to moderate risk for future cardiovascular events. Small partially reversible perfusion abnormality of moderate intensity in the apical segment. Small reversible perfusion abnormality of mild intensity in the apical anterior segment. Small fixed perfusion abnormality of mild intensity in the apical inferior segment. The left ventricular cavity is noted to be normal on the stress studies. The stress left ventricular ejection fraction was calculated to be 71% and left ventricular global function is normal. The rest left ventricular cavity is noted to be normal. The rest left ventricular  ejection fraction was calculated to be 65% and rest left ventricular global function is normal. When compared to the resting ejection fraction (65%), the stress ejection fraction (71%) has increased. The study quality is average.      Calcium Score (5.26.21):  491    ECHO (1.2.21):  Normal left ventricular size and systolic function. The left ventricular ejection fraction is estimated at approximately 60%. The right heart appears enlarged. Moderate to Severe Tricuspid insuffiencey. No significant pericardial effusion note.d pulmonary artery pressures estimated at 53 mmHg.      Review of patient's allergies indicates:   Allergen Reactions    Codeine Palpitations and Rash    Cefepime      Other Reaction(s): Encephalopathy    Renal impairment increased risk for this reaction    Levofloxacin      Other Reaction(s): Encephalopathy    Renal pt who had some encephalopathy while on Levaquin & Cefepime, neurology physicians think they may have been due to the antibiotics (both possible side effects for each)    Iodine Nausea Only     No current facility-administered medications on file prior to encounter.     Current Outpatient Medications on File Prior to Encounter   Medication Sig    amLODIPine (NORVASC) 10 MG tablet Take 10 mg by mouth once daily.    aspirin (ECOTRIN) 81 MG EC tablet Take 81 mg by mouth once daily. (Patient not taking: Reported on 12/23/2024)    B complex-vitamin C-folic acid (NEPHRO-JENN) 0.8 mg Tab Take 0.8 mg by mouth Daily. (Patient not taking: Reported on 12/4/2024)    calcitRIOL (ROCALTROL) 0.25 MCG Cap Take 0.25 mcg by mouth 3 (three) times a week.    D5W PgBk 50 mL with ceFAZolin 1 gram SolR Cefazolin intraperitoneally times 10 more days    EScitalopram oxalate (LEXAPRO) 10 MG tablet Take 1 tablet (10 mg total) by mouth once daily. (Patient not taking: Reported on 12/23/2024)    folic acid (FOLVITE) 1 MG tablet Take 1 mg by mouth once daily.    furosemide (LASIX) 20 MG tablet Take 20 mg by mouth  once daily.    megestroL (MEGACE) 400 mg/10 mL (10 mL) Susp Take 10 mLs (400 mg total) by mouth once daily.    oxyCODONE (ROXICODONE) 5 MG immediate release tablet Take 1 tablet (5 mg total) by mouth every 8 (eight) hours as needed for Pain. (Patient not taking: Reported on 12/23/2024)    potassium chloride (KLOR-CON) 10 MEQ TbSR Take 10 mEq by mouth once daily. (Patient not taking: Reported on 12/23/2024)    zolpidem (AMBIEN) 10 mg Tab Take 10 mg by mouth every evening.    [DISCONTINUED] losartan (COZAAR) 100 MG tablet TAKE ONE TABLET BY MOUTH ONCE DAILY    [DISCONTINUED] metoprolol succinate (TOPROL-XL) 25 MG 24 hr tablet Take 2 tablets (50 mg total) by mouth once daily. (Patient not taking: Reported on 5/12/2022)    [DISCONTINUED] mycophenolate (CELLCEPT) 250 mg Cap Take 2 capsules (500 mg total) by mouth 2 (two) times daily. Z94.0 kidney transplant (Patient not taking: Reported on 5/12/2022)    [DISCONTINUED] tacrolimus (PROGRAF) 1 MG Cap Take 3mg every AM and 2mg every PM.  Route: Oral.  Z94.0 Kidney Transplant. (Patient not taking: Reported on 5/12/2022)           Review of Systems   Constitutional:  Positive for fatigue.   Respiratory:  Negative for chest tightness and shortness of breath.    Cardiovascular:  Positive for leg swelling. Negative for chest pain and palpitations.   Musculoskeletal:         Bilateral Leg Pain    All other systems reviewed and are negative.    Objective:     Vital Signs (Most Recent):  Temp: 97.5 °F (36.4 °C) (01/22/25 1048)  Pulse: 82 (01/22/25 1048)  Resp: 19 (01/22/25 1048)  BP: 115/67 (01/22/25 1048)  SpO2: 96 % (01/22/25 1048) Vital Signs (24h Range):  Temp:  [97.5 °F (36.4 °C)] 97.5 °F (36.4 °C)  Pulse:  [82] 82  Resp:  [19] 19  SpO2:  [96 %] 96 %  BP: (115)/(67) 115/67   Weight: 62.6 kg (138 lb)  Body mass index is 25.24 kg/m².  SpO2: 96 %     No intake or output data in the 24 hours ending 01/22/25 1256  Lines/Drains/Airways       Peripheral Intravenous Line  Duration                   Hemodialysis AV Fistula Left upper arm -- days         Peripheral IV - Single Lumen 01/22/25 1100 20 G Anterior;Distal;Left Forearm <1 day         Peripheral IV - Single Lumen 01/22/25 1100 20 G Left Antecubital <1 day                  Significant Labs:   Chemistries:   Recent Labs   Lab 01/22/25  1141      K 3.9   CL 96*   CO2 21*   BUN 81.3*   CREATININE 10.88*   CALCIUM 8.5   BILITOT 0.4   ALKPHOS 208*   ALT 18   AST 26   GLUCOSE 79*   TROPONINI 1.219*        CBC/Anemia Labs: Coags:    Recent Labs   Lab 01/22/25  1141   WBC 12.53*   HGB 9.4*   HCT 30.3*      MCV 85.1   RDW 14.7    Recent Labs   Lab 01/22/25  1141   INR 1.4*   APTT 31.4        Significant Imaging:  Imaging Results              X-Ray Chest AP Portable (In process)                   EKG:       Telemetry:  SR    Physical Exam  Vitals and nursing note reviewed.   HENT:      Head: Normocephalic.      Nose: Nose normal.      Mouth/Throat:      Mouth: Mucous membranes are moist.   Eyes:      Extraocular Movements: Extraocular movements intact.   Cardiovascular:      Rate and Rhythm: Normal rate and regular rhythm.      Pulses: Normal pulses.      Heart sounds: Murmur heard.   Pulmonary:      Effort: Pulmonary effort is normal.   Abdominal:      Palpations: Abdomen is soft.   Musculoskeletal:         General: Swelling present.      Cervical back: Normal range of motion.      Right lower leg: Edema present.      Left lower leg: Edema present.   Skin:     General: Skin is warm.      Findings: Erythema present.      Comments: 2 indurated sores on Left Lower Extremity    Neurological:      Mental Status: She is alert and oriented to person, place, and time.   Psychiatric:         Mood and Affect: Mood normal.         Behavior: Behavior normal.       Home Medications:   No current facility-administered medications on file prior to encounter.     Current Outpatient Medications on File Prior to Encounter   Medication Sig Dispense Refill     amLODIPine (NORVASC) 10 MG tablet Take 10 mg by mouth once daily.      aspirin (ECOTRIN) 81 MG EC tablet Take 81 mg by mouth once daily. (Patient not taking: Reported on 12/23/2024)      B complex-vitamin C-folic acid (NEPHRO-JENN) 0.8 mg Tab Take 0.8 mg by mouth Daily. (Patient not taking: Reported on 12/4/2024)      calcitRIOL (ROCALTROL) 0.25 MCG Cap Take 0.25 mcg by mouth 3 (three) times a week.      D5W PgBk 50 mL with ceFAZolin 1 gram SolR Cefazolin intraperitoneally times 10 more days      EScitalopram oxalate (LEXAPRO) 10 MG tablet Take 1 tablet (10 mg total) by mouth once daily. (Patient not taking: Reported on 12/23/2024) 90 tablet 3    folic acid (FOLVITE) 1 MG tablet Take 1 mg by mouth once daily.      furosemide (LASIX) 20 MG tablet Take 20 mg by mouth once daily.      megestroL (MEGACE) 400 mg/10 mL (10 mL) Susp Take 10 mLs (400 mg total) by mouth once daily. 300 mL 0    oxyCODONE (ROXICODONE) 5 MG immediate release tablet Take 1 tablet (5 mg total) by mouth every 8 (eight) hours as needed for Pain. (Patient not taking: Reported on 12/23/2024) 12 tablet 0    potassium chloride (KLOR-CON) 10 MEQ TbSR Take 10 mEq by mouth once daily. (Patient not taking: Reported on 12/23/2024)      zolpidem (AMBIEN) 10 mg Tab Take 10 mg by mouth every evening.      [DISCONTINUED] losartan (COZAAR) 100 MG tablet TAKE ONE TABLET BY MOUTH ONCE DAILY 90 tablet 3    [DISCONTINUED] metoprolol succinate (TOPROL-XL) 25 MG 24 hr tablet Take 2 tablets (50 mg total) by mouth once daily. (Patient not taking: Reported on 5/12/2022) 60 tablet 3    [DISCONTINUED] mycophenolate (CELLCEPT) 250 mg Cap Take 2 capsules (500 mg total) by mouth 2 (two) times daily. Z94.0 kidney transplant (Patient not taking: Reported on 5/12/2022) 120 capsule 11    [DISCONTINUED] tacrolimus (PROGRAF) 1 MG Cap Take 3mg every AM and 2mg every PM.  Route: Oral.  Z94.0 Kidney Transplant. (Patient not taking: Reported on 5/12/2022) 150 capsule 11     Current  Schedule Inpatient Medications:   heparin (PORCINE)  60 Units/kg (Adjusted) Intravenous Once     Continuous Infusions:   heparin (porcine) in D5W  0-40 Units/kg/hr (Adjusted) Intravenous Continuous         Assessment:   NSTEMI, Unspecified     - Troponin 1.219  Elevated BNP    - .1  Leukocytosis   HTN  HLD  ESRD     - on PD    - History of Kidney transplant with failure  Anemia of Chronic Disease   Depression  Colon Polyp  Glomerulonephritis  Pulmonary Nodules  Skin Cancer  Renal Hypertension  No Known history of GI Bleed     *Iodine Allergy*    Plan:   ECHO Today   Trend Troponin until peak   Start Heparin gtt  Bilateral Lower Ext Venous US given recent humerus surgery and leg swelling with redness  Resume Home Medications when able   Keep Mag > 2 and Potassium > 4  Labs in AM: CBC, BMP, and Mag      Thank you for your consult.     LETHA Sterling  Cardiology  [facility]      ...........................................................................................................................      I agree with the findings of the complexity of problems addressed and take responsibility for the plan's risks and complications. I approved the plan documented by Maira Bro NP.      Assessment:   NSTEMI, Unspecified     - Troponin 1.219  Elevated BNP    - .1  Leukocytosis   HTN  HLD  ESRD     - on PD    - History of Kidney transplant with failure  Anemia of Chronic Disease   Depression  Colon Polyp  Glomerulonephritis  Pulmonary Nodules  Skin Cancer  Renal Hypertension  No Known history of GI Bleed     *Iodine Allergy*    Plan:   ECHO Today   Trend Troponin until peak   Start Heparin gtt  Bilateral Lower Ext Venous US given recent humerus surgery and leg swelling with redness  Resume Home Medications when able   Keep Mag > 2 and Potassium > 4  Labs in AM: CBC, BMP, and Mag          Michael Ontiveros MD, FACC, FACP,  FAHA, [facility]  Interventional Cardiologist  [facility]   THE [facility]

## 2025-01-22 NOTE — H&P
[facility]  Emergency Dept  [facility] MEDICINE - H&P ADMISSION NOTE      Patient Name: Malissa Schwartz  MRN: [id number]  Patient Class: IP- Inpatient   Admission Date: 1/22/2025   Admitting Physician:  Service   Attending Physician: Marco A Newell MD  Primary Care Provider: Sergio Crabtree MD  Face-to-Face encounter date: 01/22/2025        CHIEF COMPLAINT     Chief Complaint   Patient presents with    Fatigue     Presents via [facility] with c/o generalized weakness onset yesterday. Also reports black stool xfew weeks. Denies blood thinners. Does home dialysis, did not run yesterday.       HISTORY OF PRESENTING ILLNESS   74 year old F with Pmh of SBP, ESRD on PD, HTN, recent humerus fx and IMN, HTN, anemia of chronic disease, HLD, glomerular nephritis and failed kidney transplant.   Comes in to ER with complaints of generalized weakness and lethargy since yesterday morning.   reports that she normally as sitters however due to the weather she didn't have sitter services.  The sitters normally do her PD and medications,  did not do any of this for her as he is not familiar.   She reports not getting PD yesterday.  She is denying any CP, SOB, cough congestion, Abd pain, N/V, or dysuria.  She does report some diarrhea since yesterday didn't quantify.  No fevers or chills.    She also reported didn't eat or drink much since she didn't have access to it.   In the ER she was found to have troponin 1.2, wbc 12.5k, cr 10.8, bun 81, cxr negative for acute process.         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 Glomerulitis with one activated endothelial cell: treated with Solumedrol pulse 375 mg IVBP x 3 doses with short prednisone taper; thymoglobin 100 mg x 6 doses 10/04/2007 minimal histologic changes no acute rejection     Allergy     Anemia of renal disease     CKD  (chronic kidney disease), stage IV     CMV (cytomegalovirus) status positive     Colon polyp     benign    Depression     GN (glomerulonephritis)     GN (glomerulonephritis)     unknown type    Herpes infection, other 2009    on buttocks    Hyperlipidemia     Hypertension     Immunosuppression     Living-donor kidney transplant recipient 01/15/2007    Meningitis     history of viral meningitis    Proteinuria 1/5/2015    Proteinuria     Pulmonary nodules     Renal hypertension     S/P breast augmentation     Skin cancer     Urinary tract infection        PAST SURGICAL HISTORY     Past Surgical History:   Procedure Laterality Date    COLON SURGERY      COLONOSCOPY W/ BIOPSIES      cosemetic surgery      facelift and breast augmentation     HYSTERECTOMY      INTRAMEDULLARY RODDING OF HUMERUS Right 12/2/2024    Procedure: INSERTION, INTRAMEDULLARY MONTSERRAT, HUMERUS;  Surgeon: Samuel Dow DO;  Location: [facility];  Service: Orthopedics;  Laterality: Right;  supine vacular backward c arm synthes  shoulder scope drape    KIDNEY TRANSPLANT      NEPHRECTOMY Left     9/2016    RENAL BIOPSY      TONSILLECTOMY, ADENOIDECTOMY         FAMILY HISTORY   Reviewed and noncontributory to this case    SOCIAL HISTORY     Social History     Socioeconomic History    Marital status:    Tobacco Use    Smoking status: Former     Current packs/day: 1.00     Average packs/day: 1 pack/day for 15.0 years (15.0 ttl pk-yrs)     Types: Cigarettes    Smokeless tobacco: Never    Tobacco comments:     quit in 2014; smoked for 15 years and at the most smoked 1 ppd   Substance and Sexual Activity    Alcohol use: Yes     Comment: seldom    Drug use: No    Sexual activity: Never   Social History Narrative    Likes to travel and follow [employer] (has season tickets).    Lives with , 2 dogs and 3 cats    Used to work in real estate and banking     Social Drivers of Health     Financial Resource Strain: Low Risk  (12/3/2024)    Overall Financial  Resource Strain (CARDIA)     Difficulty of Paying Living Expenses: Not hard at all   Food Insecurity: No Food Insecurity (12/3/2024)    Hunger Vital Sign     Worried About Running Out of Food in the Last Year: Never true     Ran Out of Food in the Last Year: Never true   Transportation Needs: No Transportation Needs (12/3/2024)    TRANSPORTATION NEEDS     Transportation : No   Physical Activity: Inactive (12/3/2024)    Exercise Vital Sign     Days of Exercise per Week: 0 days     Minutes of Exercise per Session: 0 min   Stress: No Stress Concern Present (12/3/2024)    [employer] - Occupational Stress Questionnaire     Feeling of Stress : Only a little   Housing Stability: Low Risk  (12/3/2024)    Housing Stability Vital Sign     Unable to Pay for Housing in the Last Year: No     Homeless in the Last Year: No     Screening for Social Drivers for health:  Patient screened for food insecurity, housing instability, transportation needs, utility difficulties, and interpersonal safety (select all that apply as identified as concern)  []Housing or Food  []Transportation Needs  []Utility Difficulties  []Interpersonal safety  [x]None      HOME MEDICATIONS     Prior to Admission medications    Medication Sig Start Date End Date Taking? Authorizing Provider   amLODIPine (NORVASC) 10 MG tablet Take 10 mg by mouth once daily.    Provider, Historical   aspirin (ECOTRIN) 81 MG EC tablet Take 81 mg by mouth once daily.  Patient not taking: Reported on 12/23/2024    Provider, Historical   B complex-vitamin C-folic acid (NEPHRO-JENN) 0.8 mg Tab Take 0.8 mg by mouth Daily.  Patient not taking: Reported on 12/4/2024    Provider, Historical   calcitRIOL (ROCALTROL) 0.25 MCG Cap Take 0.25 mcg by mouth 3 (three) times a week.    Provider, Historical   D5W PgBk 50 mL with ceFAZolin 1 gram SolR Cefazolin intraperitoneally times 10 more days 10/9/24   Denisha Romero MD   EScitalopram oxalate (LEXAPRO) 10 MG  tablet Take 1 tablet (10 mg total) by mouth once daily.  Patient not taking: Reported on 12/23/2024 8/30/24 8/30/25  James Oropeza MD   folic acid (FOLVITE) 1 MG tablet Take 1 mg by mouth once daily.    Provider, Historical   furosemide (LASIX) 20 MG tablet Take 20 mg by mouth once daily.    Provider, Historical   megestroL (MEGACE) 400 mg/10 mL (10 mL) Susp Take 10 mLs (400 mg total) by mouth once daily. 8/30/24 12/4/24  James Oropeza MD   oxyCODONE (ROXICODONE) 5 MG immediate release tablet Take 1 tablet (5 mg total) by mouth every 8 (eight) hours as needed for Pain.  Patient not taking: Reported on 12/23/2024 12/3/24   Compa Veras FNP   potassium chloride (KLOR-CON) 10 MEQ TbSR Take 10 mEq by mouth once daily.  Patient not taking: Reported on 12/23/2024    Provider, Historical   zolpidem (AMBIEN) 10 mg Tab Take 10 mg by mouth every evening. 9/17/24   Provider, Historical   losartan (COZAAR) 100 MG tablet TAKE ONE TABLET BY MOUTH ONCE DAILY 1/28/20 5/13/22  Salvador Moeller MD   metoprolol succinate (TOPROL-XL) 25 MG 24 hr tablet Take 2 tablets (50 mg total) by mouth once daily.  Patient not taking: Reported on 5/12/2022 3/10/17 5/13/22  Linda Nelson MD   mycophenolate (CELLCEPT) 250 mg Cap Take 2 capsules (500 mg total) by mouth 2 (two) times daily. Z94.0 kidney transplant  Patient not taking: Reported on 5/12/2022 7/25/19 5/13/22  Nicole Trejo MD   tacrolimus (PROGRAF) 1 MG Cap Take 3mg every AM and 2mg every PM.  Route: Oral.  Z94.0 Kidney Transplant.  Patient not taking: Reported on 5/12/2022 7/26/19 5/13/22  Nicole Trejo MD       ALLERGIES   Codeine, Cefepime, Levofloxacin, and Iodine          REVIEW OF SYSTEMS   Except as documented above, all other systems reviewed and negative    PHYSICAL EXAM     Vitals:    01/22/25 1048   BP: 115/67   Pulse: 82   Resp: 19   Temp: 97.5 °F (36.4 °C)      General:  In no acute distress, resting comfortably,  generalized weakness  Head and neck:  Atraumatic, normocephalic, moist mucous membranes, supple neck  Chest:  Clear to auscultation bilaterally  Heart:  S1, S2, no appreciable murmur  Abdomen:  Soft, nontender, BS +, PD catheter without discharge or infections signs  MSK:  Warm, no lower extremity edema, no clubbing or cyanosis  Neuro:  Alert and oriented x4, moving all extremities with good strength  Integumentary:  skin growth on the ankle area of l leg which patient reports as skin cancer, some erythema present to both legs   Psychiatry:  Appropriate mood and affect          ASSESSMENT AND PLAN   NSTEMI  ESRD on PD  Possible leg cellulitis  R heel deep tissue injury  Leukocytosis    History of: as in HPI      Cardiology following.  Starting heparin drip.  Q6 hour troponin trend.  Asa 81.   Nephrology consult for PD tonight  Obtain abd fluid for SBP testing.  Cell count, culture.   US venous legs negative.  Add clindamycin  Gi panel and stool culture  Foam heel boots to lift off bed  Give her one liter d5 1/2ns at 60 per hour.     DVT prophylaxis:    __________________________________________________________________  LABS/MICRO/MEDS/DIAGNOSTICS       LABS  Recent Labs     01/22/25  1141      K 3.9   CO2 21*   BUN 81.3*   CREATININE 10.88*   GLUCOSE 79*   CALCIUM 8.5   ALKPHOS 208*   AST 26   ALT 18   ALBUMIN 1.6*     Recent Labs     01/22/25  1141   WBC 12.53*   RBC 3.56*   HCT 30.3*   MCV 85.1          MICROBIOLOGY  Microbiology Results (last 7 days)       Procedure Component Value Units Date/Time    Stool Culture [[id number]]     Order Status: Sent Specimen: Stool     Body Fluid Culture [[id number]]     Order Status: Sent Specimen: Body Fluid from Peritoneal Fluid     Gram Stain [[id number]]     Order Status: Sent Specimen: Body Fluid from Peritoneal Fluid             MEDICATIONS   [START ON 1/23/2025] amLODIPine  5 mg Oral Daily    aspirin  81 mg Oral Daily    calcitRIOL  0.25 mcg Oral Once per day  on Monday Wednesday Friday    clindamycin  450 mg Oral Q8H      INFUSIONS   heparin (porcine) in D5W  0-40 Units/kg/hr (Adjusted) Intravenous Continuous 6.6 mL/hr at 01/22/25 1321 12 Units/kg/hr at 01/22/25 1321       DIAGNOSTIC TESTS  X-Ray Chest AP Portable   Final Result      No acute abnormality.         Electronically signed by: Marilou Laughlin MD   Date:    01/22/2025   Time:    13:03             Patient information was obtained from patient, patient's family, past medical records and ER records.   All diagnosis and differential diagnosis have been reviewed; assessment and plan has been documented. I have personally reviewed the labs and test results that are presently available; I have reviewed the patients medication list. I have reviewed the consulting providers response and recommendations. I have reviewed or attempted to review medical records based upon their availability.  All of the patient's questions have been addressed and answered. Patient's is agreeable to the above stated plan. I will continue to monitor closely and make adjustments to medical management as needed.  This note was created using [employer] voice recognition software that occasionally misinterpreted phrases or words.  Please contact me if any questions may rise regarding documentation to clarify verbiage.        Marco A Newell MD   Internal Medicine  Department of Hospital Medicine [facility] - Emergency Dept

## 2025-01-22 NOTE — NURSING
Arrived bedside to obtain PD fluid samples. Pt asleep but easily aroused. POC discussed with patient. Son transfer set applied to current line. Fluid samples obtained from cath. Cap applied and secured. Dressing changed to PD cath.

## 2025-01-23 LAB
ALBUMIN SERPL-MCNC: 1.5 G/DL (ref 3.4–4.8)
ALBUMIN/GLOB SERPL: 0.3 RATIO (ref 1.1–2)
ALP SERPL-CCNC: 182 UNIT/L (ref 40–150)
ALT SERPL-CCNC: 19 UNIT/L (ref 0–55)
ANION GAP SERPL CALC-SCNC: 19 MEQ/L
APTT PPP: 34.5 SECONDS (ref 23.2–33.7)
APTT PPP: 63.3 SECONDS (ref 23.2–33.7)
AST SERPL-CCNC: 37 UNIT/L (ref 5–34)
BASOPHILS # BLD AUTO: 0.04 X10(3)/MCL
BASOPHILS NFR BLD AUTO: 0.4 %
BILIRUB SERPL-MCNC: 0.3 MG/DL
BUN SERPL-MCNC: 73 MG/DL (ref 9.8–20.1)
CALCIUM SERPL-MCNC: 8.7 MG/DL (ref 8.4–10.2)
CHLORIDE SERPL-SCNC: 94 MMOL/L (ref 98–107)
CO2 SERPL-SCNC: 20 MMOL/L (ref 23–31)
CREAT SERPL-MCNC: 9.84 MG/DL (ref 0.55–1.02)
CREAT/UREA NIT SERPL: 7
EOSINOPHIL # BLD AUTO: 0.14 X10(3)/MCL (ref 0–0.9)
EOSINOPHIL NFR BLD AUTO: 1.3 %
ERYTHROCYTE [DISTWIDTH] IN BLOOD BY AUTOMATED COUNT: 14.6 % (ref 11.5–17)
FERRITIN SERPL-MCNC: 4204.43 NG/ML (ref 4.63–204)
GFR SERPLBLD CREATININE-BSD FMLA CKD-EPI: 4 ML/MIN/1.73/M2
GLOBULIN SER-MCNC: 4.4 GM/DL (ref 2.4–3.5)
GLUCOSE SERPL-MCNC: 170 MG/DL (ref 82–115)
HBV SURFACE AG SERPL QL IA: NONREACTIVE
HCT VFR BLD AUTO: 25.4 % (ref 37–47)
HGB BLD-MCNC: 8.1 G/DL (ref 12–16)
IMM GRANULOCYTES # BLD AUTO: 0.05 X10(3)/MCL (ref 0–0.04)
IMM GRANULOCYTES NFR BLD AUTO: 0.5 %
IRON SATN MFR SERPL: 45 % (ref 20–50)
IRON SERPL-MCNC: 36 UG/DL (ref 50–170)
LYMPHOCYTES # BLD AUTO: 0.97 X10(3)/MCL (ref 0.6–4.6)
LYMPHOCYTES NFR BLD AUTO: 8.8 %
MAGNESIUM SERPL-MCNC: 2.7 MG/DL (ref 1.6–2.6)
MCH RBC QN AUTO: 26.6 PG (ref 27–31)
MCHC RBC AUTO-ENTMCNC: 31.9 G/DL (ref 33–36)
MCV RBC AUTO: 83.3 FL (ref 80–94)
MONOCYTES # BLD AUTO: 0.57 X10(3)/MCL (ref 0.1–1.3)
MONOCYTES NFR BLD AUTO: 5.2 %
NEUTROPHILS # BLD AUTO: 9.2 X10(3)/MCL (ref 2.1–9.2)
NEUTROPHILS NFR BLD AUTO: 83.8 %
NRBC BLD AUTO-RTO: 0 %
PLATELET # BLD AUTO: 307 X10(3)/MCL (ref 130–400)
PMV BLD AUTO: 10.1 FL (ref 7.4–10.4)
POCT GLUCOSE: 110 MG/DL (ref 70–110)
POCT GLUCOSE: 162 MG/DL (ref 70–110)
POTASSIUM SERPL-SCNC: 3.4 MMOL/L (ref 3.5–5.1)
PROT SERPL-MCNC: 5.9 GM/DL (ref 5.8–7.6)
RBC # BLD AUTO: 3.05 X10(6)/MCL (ref 4.2–5.4)
SODIUM SERPL-SCNC: 133 MMOL/L (ref 136–145)
TIBC SERPL-MCNC: 44 UG/DL (ref 70–310)
TIBC SERPL-MCNC: 80 UG/DL (ref 250–450)
TRANSFERRIN SERPL-MCNC: 50 MG/DL (ref 173–360)
TROPONIN I SERPL-MCNC: 0.98 NG/ML (ref 0–0.04)
WBC # BLD AUTO: 10.97 X10(3)/MCL (ref 4.5–11.5)

## 2025-01-23 PROCEDURE — 25000003 PHARM REV CODE 250

## 2025-01-23 PROCEDURE — 63600175 PHARM REV CODE 636 W HCPCS: Mod: JZ,TB | Performed by: INTERNAL MEDICINE

## 2025-01-23 PROCEDURE — 11000001 HC ACUTE MED/SURG PRIVATE ROOM

## 2025-01-23 PROCEDURE — 82728 ASSAY OF FERRITIN: CPT | Performed by: INTERNAL MEDICINE

## 2025-01-23 PROCEDURE — 90945 DIALYSIS ONE EVALUATION: CPT

## 2025-01-23 PROCEDURE — 85025 COMPLETE CBC W/AUTO DIFF WBC: CPT | Performed by: STUDENT IN AN ORGANIZED HEALTH CARE EDUCATION/TRAINING PROGRAM

## 2025-01-23 PROCEDURE — 80053 COMPREHEN METABOLIC PANEL: CPT

## 2025-01-23 PROCEDURE — S0179 MEGESTROL 20 MG: HCPCS | Performed by: INTERNAL MEDICINE

## 2025-01-23 PROCEDURE — 83550 IRON BINDING TEST: CPT | Performed by: INTERNAL MEDICINE

## 2025-01-23 PROCEDURE — 85730 THROMBOPLASTIN TIME PARTIAL: CPT | Performed by: INTERNAL MEDICINE

## 2025-01-23 PROCEDURE — 25000003 PHARM REV CODE 250: Performed by: INTERNAL MEDICINE

## 2025-01-23 PROCEDURE — 21400001 HC TELEMETRY ROOM

## 2025-01-23 PROCEDURE — 83735 ASSAY OF MAGNESIUM: CPT

## 2025-01-23 PROCEDURE — 84484 ASSAY OF TROPONIN QUANT: CPT

## 2025-01-23 PROCEDURE — 87340 HEPATITIS B SURFACE AG IA: CPT | Performed by: INTERNAL MEDICINE

## 2025-01-23 RX ORDER — ESCITALOPRAM OXALATE 10 MG/1
10 TABLET ORAL DAILY
Status: DISCONTINUED | OUTPATIENT
Start: 2025-01-23 | End: 2025-02-01

## 2025-01-23 RX ORDER — MEGESTROL ACETATE 40 MG/ML
400 SUSPENSION ORAL DAILY
Status: DISCONTINUED | OUTPATIENT
Start: 2025-01-23 | End: 2025-02-03 | Stop reason: HOSPADM

## 2025-01-23 RX ORDER — POTASSIUM CHLORIDE 750 MG/1
10 TABLET, EXTENDED RELEASE ORAL DAILY
Status: DISCONTINUED | OUTPATIENT
Start: 2025-01-23 | End: 2025-02-03 | Stop reason: HOSPADM

## 2025-01-23 RX ORDER — PANTOPRAZOLE SODIUM 40 MG/1
40 TABLET, DELAYED RELEASE ORAL DAILY
Status: DISCONTINUED | OUTPATIENT
Start: 2025-01-23 | End: 2025-02-03 | Stop reason: HOSPADM

## 2025-01-23 RX ORDER — FOLIC ACID 1 MG/1
1 TABLET ORAL DAILY
Status: DISCONTINUED | OUTPATIENT
Start: 2025-01-24 | End: 2025-02-03 | Stop reason: HOSPADM

## 2025-01-23 RX ADMIN — PANTOPRAZOLE SODIUM 40 MG: 40 TABLET, DELAYED RELEASE ORAL at 09:01

## 2025-01-23 RX ADMIN — CLINDAMYCIN HYDROCHLORIDE 450 MG: 150 CAPSULE ORAL at 10:01

## 2025-01-23 RX ADMIN — MELATONIN 6 MG: at 03:01

## 2025-01-23 RX ADMIN — CLINDAMYCIN HYDROCHLORIDE 450 MG: 150 CAPSULE ORAL at 01:01

## 2025-01-23 RX ADMIN — AMLODIPINE BESYLATE 5 MG: 5 TABLET ORAL at 09:01

## 2025-01-23 RX ADMIN — ESCITALOPRAM OXALATE 10 MG: 10 TABLET ORAL at 09:01

## 2025-01-23 RX ADMIN — ASPIRIN 81 MG: 81 TABLET, COATED ORAL at 09:01

## 2025-01-23 RX ADMIN — POTASSIUM BICARBONATE 50 MEQ: 978 TABLET, EFFERVESCENT ORAL at 11:01

## 2025-01-23 RX ADMIN — Medication 1 CAPSULE: at 09:01

## 2025-01-23 RX ADMIN — POTASSIUM CHLORIDE 10 MEQ: 750 TABLET, FILM COATED, EXTENDED RELEASE ORAL at 09:01

## 2025-01-23 RX ADMIN — CLINDAMYCIN HYDROCHLORIDE 450 MG: 150 CAPSULE ORAL at 06:01

## 2025-01-23 RX ADMIN — EPOETIN ALFA-EPBX 10000 UNITS: 10000 INJECTION, SOLUTION INTRAVENOUS; SUBCUTANEOUS at 09:01

## 2025-01-23 RX ADMIN — MEGESTROL ACETATE 400 MG: 400 SUSPENSION ORAL at 09:01

## 2025-01-23 NOTE — NURSING
01/22/25 1809   Vitals   Pulse 80   Resp 20   SpO2 99 %   Cycler Peritoneal Dialysis   Initial Drain Volume (mL) 134 mL   Effluent Appearance Clear   Number of Cycles 4   Fill Volume (mL) 2400 mL   Total Volume (mL) 939558 mL   Dwell Time (specify hr/min) 1:58   Cycler Treatment Comments CCPD set up bedside per MD order. Tx intiated at this time.

## 2025-01-23 NOTE — NURSING
01/23/25 0720   Vitals   Pulse 69   Resp 19   SpO2 96 %   Cycler Peritoneal Dialysis   Initial Drain Volume (mL) 134 mL   Effluent Appearance Clear   Number of Cycles 4   Fill Volume (mL) 2400 mL   Total Volume (mL) 61205 mL   Cycler PD Total UF (mL) 343 mL   Cycler Treatment Comments Nightly PD treatment completed without difficulty. Line capped and secured.

## 2025-01-23 NOTE — PROGRESS NOTES
"    [facility] *    Cardiology  Progress Note    Patient Name: Malissa Schwartz  MRN: [id number]  Admission Date: 1/22/2025  Hospital Length of Stay: 1 days  Code Status: Full Code   Attending Provider: Marco A Newell MD   Consulting Provider: LETHA Sterling  Primary Care Physician: Sergio Crabtree MD  Principal Problem:<principal problem not specified>    Patient information was obtained from patient, past medical records, ER records, and primary team.     Subjective:     Chief Complaint/Reason for Consult: NSTEMI    HPI: Ms. Schwartz is a 75 y/o female with a history of HTN, HLD, ESRD on PD, who is formerly known to [facility], Dr. Hess (Last seen in 2021). She presented to the ER on 1.22.25 with complaints of fatigue and generalized weakness. She reports not feeling well yesterday, and she was unable to do Peritoneal Dialysis. She reports she is also having some pain in her legs.  She reports some black stools at home. Significant labs include WBC 12.53, H&H 9.4/30.3, INR 1.4, Chloride 96, CO2 21, BUN/Creat 81.3/10.88, Glucose 79, , Albumin 1.8, .1, Troponin 1.219. Flu and COID negative. CXR unremarkable. [facility] has been consulted for NSTEMI.     1.23.25: NAD. VSS. No complaints of CP/SOB/Palps. "I feel okay" Na 133, K 3.4, BUN/Creat 73.0/9.84, Glucose 170, Mag 2.7,     PMH: HTN, HLD, ESRD on PD, Depression, Anemia, Colon Polyp,Glomerulonephritis, Pulmonary nodules, Skin Cancer, Renal Hypertension  PSH: Kidney Transplant, Colonoscopy, Hysterectomy, IM Albin of Humerus, Nephrectomy, Renal biopsy, Tonsillectomy, Adenoidectomy    Family History: Father - Heart Failure; Mother - Heart Failure; Brother - HTN, Kidney Disease; Sister - Kidney Disease   Social History: Former Smoker (Quit in 2014). Reports occasional alcohol use. Denies illicit drug use.     Previous Cardiac Diagnostics:   ECHO (1.22.25):  Left Ventricle: The left ventricle is normal in size. Normal wall thickness. There is " normal systolic function with a visually estimated ejection fraction of 60 - 65%. Right Ventricle: Right ventricular enlargement. Systolic function is normal. Left Atrium: Left atrium is mildly dilated. Right Atrium: Right atrium is dilated.  Aortic Valve: The aortic valve is a trileaflet valve. There is aortic valve sclerosis. Mitral Valve: There is bileaflet sclerosis. There is mitral annular calcification present. There is mild stenosis. The mean pressure gradient across the mitral valve is 4 mmHg at a heart rate of 82 bpm. There is mild to moderate regurgitation. Tricuspid Valve: There is mild to moderate regurgitation. IVC/SVC: Intermediate venous pressure at 8 mmHg.    Venous US (1.22.25):  Negative for deep and superficial vein thrombosis in bilateral lower extremities      Arterial US (12.9.24):  Negative for deep and superficial vein thrombosis in the left upper extremity.     ECHO (8.24.24):  Left Ventricle: The left ventricle is normal in size. Normal wall thickness. There is normal systolic function with a visually estimated ejection fraction of 60 - 65%. Right Ventricle: Normal right ventricular cavity size. Systolic function is normal. TAPSE is 1.97 cm. Left Atrium: Agitated saline study of the atrial septum is negative, suggesting absence of intracardiac shunt at the atrial level. Aortic Valve: The aortic valve is a trileaflet valve. Mitral Valve: There is no stenosis. The mean pressure gradient across the mitral valve is 1 mmHg at a heart rate of 68 bpm. There is mild regurgitation. Tricuspid Valve: There is mild to moderate regurgitation. Pulmonic Valve: There is mild regurgitation. Pulmonary Artery: The estimated pulmonary artery systolic pressure is 39 mmHg. IVC/SVC: Intermediate venous pressure at 8 mmHg. Pericardium: Left pleural effusion.    Carotid US (8.24.24):  The right internal carotid artery was patent with no evidence of stenosis. The left internal carotid artery was patent with less  than 50% stenosis. Bilateral vertebral arteries were patent with antegrade flow.     Lower Ext VENOUS US (8.23.24):  Negative for deep and superficial vein thrombosis in  Left  lower extremity.     PET (5.26.21):  This is an abnormal perfusion study. This scan is suggestive of low to moderate risk for future cardiovascular events. Small partially reversible perfusion abnormality of moderate intensity in the apical segment. Small reversible perfusion abnormality of mild intensity in the apical anterior segment. Small fixed perfusion abnormality of mild intensity in the apical inferior segment. The left ventricular cavity is noted to be normal on the stress studies. The stress left ventricular ejection fraction was calculated to be 71% and left ventricular global function is normal. The rest left ventricular cavity is noted to be normal. The rest left ventricular ejection fraction was calculated to be 65% and rest left ventricular global function is normal. When compared to the resting ejection fraction (65%), the stress ejection fraction (71%) has increased. The study quality is average.      Calcium Score (5.26.21):  491    ECHO (1.2.21):  Normal left ventricular size and systolic function. The left ventricular ejection fraction is estimated at approximately 60%. The right heart appears enlarged. Moderate to Severe Tricuspid insuffiencey. No significant pericardial effusion note.d pulmonary artery pressures estimated at 53 mmHg.      Review of patient's allergies indicates:   Allergen Reactions    Codeine Palpitations and Rash    Cefepime      Other Reaction(s): Encephalopathy    Renal impairment increased risk for this reaction    Levofloxacin      Other Reaction(s): Encephalopathy    Renal pt who had some encephalopathy while on Levaquin & Cefepime, neurology physicians think they may have been due to the antibiotics (both possible side effects for each)    Iodine Nausea Only     No current facility-administered  medications on file prior to encounter.     Current Outpatient Medications on File Prior to Encounter   Medication Sig    amLODIPine (NORVASC) 10 MG tablet Take 10 mg by mouth once daily.    B complex-vitamin C-folic acid (NEPHRO-JENN) 0.8 mg Tab Take 0.8 mg by mouth Daily.    calcitRIOL (ROCALTROL) 0.25 MCG Cap Take 0.25 mcg by mouth 3 (three) times a week.    methocarbamoL (ROBAXIN) 500 MG Tab Take 500 mg by mouth 4 (four) times daily.    pantoprazole (PROTONIX) 40 MG tablet Take 40 mg by mouth once daily.    potassium chloride (KLOR-CON) 10 MEQ TbSR Take 10 mEq by mouth once daily.    traMADoL (ULTRAM) 50 mg tablet Take 50 mg by mouth 3 (three) times daily as needed for Pain.    aspirin (ECOTRIN) 81 MG EC tablet Take 81 mg by mouth once daily.    D5W PgBk 50 mL with ceFAZolin 1 gram SolR Cefazolin intraperitoneally times 10 more days    EScitalopram oxalate (LEXAPRO) 10 MG tablet Take 1 tablet (10 mg total) by mouth once daily. (Patient not taking: Reported on 12/23/2024)    folic acid (FOLVITE) 1 MG tablet Take 1 mg by mouth once daily.    furosemide (LASIX) 20 MG tablet Take 20 mg by mouth once daily.    megestroL (MEGACE) 400 mg/10 mL (10 mL) Susp Take 10 mLs (400 mg total) by mouth once daily.    oxyCODONE (ROXICODONE) 5 MG immediate release tablet Take 1 tablet (5 mg total) by mouth every 8 (eight) hours as needed for Pain. (Patient not taking: Reported on 12/23/2024)    zolpidem (AMBIEN) 10 mg Tab Take 10 mg by mouth every evening.    [DISCONTINUED] losartan (COZAAR) 100 MG tablet TAKE ONE TABLET BY MOUTH ONCE DAILY    [DISCONTINUED] metoprolol succinate (TOPROL-XL) 25 MG 24 hr tablet Take 2 tablets (50 mg total) by mouth once daily. (Patient not taking: Reported on 5/12/2022)    [DISCONTINUED] mycophenolate (CELLCEPT) 250 mg Cap Take 2 capsules (500 mg total) by mouth 2 (two) times daily. Z94.0 kidney transplant (Patient not taking: Reported on 5/12/2022)    [DISCONTINUED] tacrolimus (PROGRAF) 1 MG Cap  Take 3mg every AM and 2mg every PM.  Route: Oral.  Z94.0 Kidney Transplant. (Patient not taking: Reported on 5/12/2022)           Review of Systems   Constitutional:  Positive for fatigue.   Respiratory:  Negative for chest tightness and shortness of breath.    Cardiovascular:  Negative for chest pain, palpitations and leg swelling.   Musculoskeletal:         Bilateral Leg Pain    All other systems reviewed and are negative.    Objective:     Vital Signs (Most Recent):  Temp: 97.6 °F (36.4 °C) (01/23/25 0539)  Pulse: 69 (01/23/25 0739)  Resp: 12 (01/23/25 0739)  BP: 129/75 (01/23/25 0739)  SpO2: 100 % (01/23/25 0739) Vital Signs (24h Range):  Temp:  [97.5 °F (36.4 °C)-97.6 °F (36.4 °C)] 97.6 °F (36.4 °C)  Pulse:  [68-87] 69  Resp:  [12-20] 12  SpO2:  [95 %-100 %] 100 %  BP: (115-137)/(64-75) 129/75   Weight: 62.6 kg (138 lb)  Body mass index is 25.24 kg/m².  SpO2: 100 %       Intake/Output Summary (Last 24 hours) at 1/23/2025 0909  Last data filed at 1/23/2025 0720  Gross per 24 hour   Intake --   Output 343 ml   Net -343 ml     Lines/Drains/Airways       Peripheral Intravenous Line  Duration                  Hemodialysis AV Fistula Left upper arm -- days         Peripheral IV - Single Lumen 01/22/25 1100 20 G Anterior;Distal;Left Forearm <1 day         Peripheral IV - Single Lumen 01/22/25 1100 20 G Left Antecubital <1 day                  Significant Labs:   Chemistries:   Recent Labs   Lab 01/22/25  1141 01/22/25  2051 01/23/25  0139     --  133*   K 3.9  --  3.4*   CL 96*  --  94*   CO2 21*  --  20*   BUN 81.3*  --  73.0*   CREATININE 10.88*  --  9.84*   CALCIUM 8.5  --  8.7   BILITOT 0.4  --  0.3   ALKPHOS 208*  --  182*   ALT 18  --  19   AST 26  --  37*   GLUCOSE 79*  --  170*   MG  --   --  2.70*   TROPONINI 1.219* 1.251* 0.983*        CBC/Anemia Labs: [facility]:    Recent Labs   Lab 01/22/25  1141 01/23/25  0403   WBC 12.53* 10.97   HGB 9.4* 8.1*   HCT 30.3* 25.4*    307   MCV 85.1 83.3   RDW 14.7  14.6    Recent Labs   Lab 01/22/25  1141 01/22/25  1913 01/23/25  0139   INR 1.4*  --   --    APTT 31.4 32.4 63.3*        Significant Imaging:  Imaging Results              X-Ray Chest AP Portable (Final result)  Result time 01/22/25 13:03:52      Final result by Marilou Laughlin MD (01/22/25 13:03:52)                   Impression:      No acute abnormality.      Electronically signed by: Marilou Laughlin MD  Date:    01/22/2025  Time:    13:03               Narrative:    EXAMINATION:  XR CHEST AP PORTABLE    CLINICAL HISTORY:  Weakness    TECHNIQUE:  PA and lateral views of the chest were performed.    COMPARISON:  December 2, 2024    FINDINGS:  The lungs are clear, with normal appearance of pulmonary vasculature and no pleural effusion or pneumothorax.    The cardiac silhouette is normal in size. The hilar and mediastinal contours are unremarkable.    Bones are intact.                                    EKG:       Telemetry:  SR    Physical Exam  Vitals and nursing note reviewed.   HENT:      Head: Normocephalic.      Nose: Nose normal.      Mouth/Throat:      Mouth: Mucous membranes are moist.   Eyes:      Extraocular Movements: Extraocular movements intact.   Cardiovascular:      Rate and Rhythm: Normal rate and regular rhythm.      Pulses: Normal pulses.      Heart sounds: Murmur heard.   Pulmonary:      Effort: Pulmonary effort is normal.   Abdominal:      Palpations: Abdomen is soft.   Musculoskeletal:         General: Swelling present.      Cervical back: Normal range of motion.      Right lower leg: Edema present.      Left lower leg: Edema present.   Skin:     General: Skin is warm.      Findings: Erythema present.      Comments: 2 indurated sores on Left Lower Extremity    Neurological:      Mental Status: She is alert and oriented to person, place, and time.   Psychiatric:         Mood and Affect: Mood normal.         Behavior: Behavior normal.       Home Medications:   No current  facility-administered medications on file prior to encounter.     Current Outpatient Medications on File Prior to Encounter   Medication Sig Dispense Refill    amLODIPine (NORVASC) 10 MG tablet Take 10 mg by mouth once daily.      B complex-vitamin C-folic acid (NEPHRO-JENN) 0.8 mg Tab Take 0.8 mg by mouth Daily.      calcitRIOL (ROCALTROL) 0.25 MCG Cap Take 0.25 mcg by mouth 3 (three) times a week.      methocarbamoL (ROBAXIN) 500 MG Tab Take 500 mg by mouth 4 (four) times daily.      pantoprazole (PROTONIX) 40 MG tablet Take 40 mg by mouth once daily.      potassium chloride (KLOR-CON) 10 MEQ TbSR Take 10 mEq by mouth once daily.      traMADoL (ULTRAM) 50 mg tablet Take 50 mg by mouth 3 (three) times daily as needed for Pain.      aspirin (ECOTRIN) 81 MG EC tablet Take 81 mg by mouth once daily.      D5W PgBk 50 mL with ceFAZolin 1 gram SolR Cefazolin intraperitoneally times 10 more days      EScitalopram oxalate (LEXAPRO) 10 MG tablet Take 1 tablet (10 mg total) by mouth once daily. (Patient not taking: Reported on 12/23/2024) 90 tablet 3    folic acid (FOLVITE) 1 MG tablet Take 1 mg by mouth once daily.      furosemide (LASIX) 20 MG tablet Take 20 mg by mouth once daily.      megestroL (MEGACE) 400 mg/10 mL (10 mL) Susp Take 10 mLs (400 mg total) by mouth once daily. 300 mL 0    oxyCODONE (ROXICODONE) 5 MG immediate release tablet Take 1 tablet (5 mg total) by mouth every 8 (eight) hours as needed for Pain. (Patient not taking: Reported on 12/23/2024) 12 tablet 0    zolpidem (AMBIEN) 10 mg Tab Take 10 mg by mouth every evening.      [DISCONTINUED] losartan (COZAAR) 100 MG tablet TAKE ONE TABLET BY MOUTH ONCE DAILY 90 tablet 3    [DISCONTINUED] metoprolol succinate (TOPROL-XL) 25 MG 24 hr tablet Take 2 tablets (50 mg total) by mouth once daily. (Patient not taking: Reported on 5/12/2022) 60 tablet 3    [DISCONTINUED] mycophenolate (CELLCEPT) 250 mg Cap Take 2 capsules (500 mg total) by mouth 2 (two) times daily.  Z94.0 kidney transplant (Patient not taking: Reported on 5/12/2022) 120 capsule 11    [DISCONTINUED] tacrolimus (PROGRAF) 1 MG Cap Take 3mg every AM and 2mg every PM.  Route: Oral.  Z94.0 Kidney Transplant. (Patient not taking: Reported on 5/12/2022) 150 capsule 11     Current Schedule Inpatient Medications:   amLODIPine  5 mg Oral Daily    aspirin  81 mg Oral Daily    calcitRIOL  0.25 mcg Oral Once per day on Monday Wednesday Friday    clindamycin  450 mg Oral Q8H    epoetin jak-ebpx (RETACRIT) injection  10,000 Units Subcutaneous Once    EScitalopram oxalate  10 mg Oral Daily    megestroL  400 mg Oral Daily    pantoprazole  40 mg Oral Daily    potassium chloride  10 mEq Oral Daily    vitamin renal formula (B-complex-vitamin c-folic acid)  1 capsule Oral Daily     Continuous Infusions:   heparin (porcine) in D5W  0-40 Units/kg/hr (Adjusted) Intravenous Continuous 9.4 mL/hr at 01/23/25 0226 17 Units/kg/hr at 01/23/25 0226     Assessment:   NSTEMI Type II in the setting of ERSD and missed PD    - Troponin 1.219  Elevated BNP    - .1  Leukocytosis - Resolved   Electrolyte Derangements    - Hyponatremia    - Hypokalemia   Blood Culture positive    - Enteroaggregative E. coli   VHD    - Mild to moderate MR/TR  HTN  HLD  ESRD     - on PD    - History of Kidney transplant with failure  Anemia of Chronic Disease - Worsening   Depression  Colon Polyp  Glomerulonephritis  Pulmonary Nodules  Skin Cancer  Renal Hypertension  No Known history of GI Bleed     *Iodine Allergy*    Plan:   ECHO and Venous US Reviewed   Discontinue Heparin gtt  Resume Home Medications when able   Keep Mag > 2 and Potassium > 4; Defer to primary Team   Outpatient Stress Test (PET)  Follow-up with Cardiology in 1-2 weeks    We will sign off at this time.     LETHA Sterling  Cardiology  [facility]    .........................................................................................................................    I  agree with the findings of the complexity of problems addressed and take responsibility for the plan's risks and complications. I approved the plan documented by Maira Bro NP.        Assessment:   NSTEMI Type II in the setting of ERSD and missed PD    - Troponin 1.219  Elevated BNP    - .1  Leukocytosis - Resolved   Electrolyte Derangements    - Hyponatremia    - Hypokalemia   Blood Culture positive    - Enteroaggregative E. coli   VHD    - Mild to moderate MR/TR  HTN  HLD  ESRD     - on PD    - History of Kidney transplant with failure  Anemia of Chronic Disease - Worsening   Depression  Colon Polyp  Glomerulonephritis  Pulmonary Nodules  Skin Cancer  Renal Hypertension  No Known history of GI Bleed     *Iodine Allergy*    Plan:   ECHO and Venous US Reviewed   Discontinue Heparin gtt  Resume Home Medications when able   Keep Mag > 2 and Potassium > 4; Defer to primary Team   Outpatient Stress Test (PET)  Follow-up with Cardiology in 1-2 weeks.      Michael Ontiveros MD, FACC, FACP,  FAHA, [facility]  Interventional Cardiologist  [facility]

## 2025-01-23 NOTE — NURSING
[facility] tx information:        01/23/25 1757   Peritoneal Dialysis   Exchange Type Cycler   Peritoneal Treatment Status Started   Cycler Peritoneal Dialysis   Cycler Treatment Comments [facility] tx started tonight per P and P. NAD/VSS. Pt doesn't have any questions or concerns at this time. Supplies left at bedside.
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
## 2025-01-23 NOTE — PLAN OF CARE
01/23/25 1445   Discharge Assessment   Assessment Type Discharge Planning Assessment   Confirmed/corrected address, phone number and insurance Yes   Confirmed Demographics Correct on Facesheet   Source of Information family   Communicated REINA with patient/caregiver Date not available/Unable to determine   Reason For Admission Fatigue   People in Home spouse   Do you expect to return to your current living situation? Yes   Do you have help at home or someone to help you manage your care at home? Yes   Who are your caregiver(s) and their phone number(s)? Brayden Kirkland   Prior to hospitilization cognitive status: Unable to Assess   Current cognitive status: Unable to Assess   Walking or Climbing Stairs Difficulty yes   Walking or Climbing Stairs ambulation difficulty, requires equipment   Mobility Management cane, rollator, wc   Dressing/Bathing Difficulty yes   Dressing/Bathing bathing difficulty, requires equipment;bathing difficulty, assistance 1 person;dressing difficulty, assistance 1 person   Dressing/Bathing Management Shower chair, family/sitter assist   Home Accessibility wheelchair accessible   Home Layout Able to live on 1st floor   Equipment Currently Used at Home shower chair;rollator;wheelchair;cane, quad   Do you currently have service(s) that help you manage your care at home? Yes   How Many hours does patient receive services 84   Name and Contact number of agency Private sitter 7days a week/12hr shifts   Is the pt/caregiver preference to resume services with current agency Yes   Do you have prescription coverage? Yes   Coverage [employer]   Who is going to help you get home at discharge? Family/sitter   How do you get to doctors appointments? family or friend will provide   Are you on dialysis? Yes   Dialysis Name and Scheduled days Peritoneal dialysis nightly 7pm-7am. Dr. Mendoza is her nephrologist   Do you take coumadin? No   Discharge Plan A Home with family   Discharge Plan B Skilled  Nursing Facility   DME Needed Upon Discharge  none   Discharge Plan discussed with: Adult children   Transition of Care Barriers None   OTHER   Name(s) of People in Home Brayden Schwartz     Brown assessment completed over the phone with patient's daughter/POA. Patient lives with spouse and was recently discharged from [facility]. Patient has a private sitter available 7days a week for 12 hrs a day. Per Amy patient is waiting to be scheduled for outpatient physical therapy with [facility].     PCP Sergio Holley MD  Pharmacy [facility]

## 2025-01-23 NOTE — CONSULTS
Nephrology Initial Consult Note    Patient Name: Malissa Schwartz  Age: 74 y.o.  : 1950  MRN: [id number]  Admission Date: 2025    Reason for Consult:      ESRD    HPI:     Malissa Schwartz is a 74 y.o. female is very well known to me for her ESRD and on CCPD and she has been doing very good at home.  Admitted to the hospital with complaints of generalized weakness of 1 day duration and also reports some black stools for the last few weeks.  No chest pains.  No abdominal pains.  No cough congestion no fever or chills.  No dysuria hematuria hesitancy frequency urgency.  Some nausea reported.  But no vomiting.  No hematemesis hemoptysis  reported.  No fever or chills.  Lives with her  at home.  Her significant other history include hypertension, anemia of chronic kidney disease, hyperlipidemia, failed living related kidney transplant which lasted many years.  She has been very compliant to her dialysis regimen.  Also has some depression problem.      Current Facility-Administered Medications   Medication Dose Route Frequency Provider Last Rate Last Admin    acetaminophen tablet 1,000 mg  1,000 mg Oral Q6H PRN Lisa Stone FNP        aluminum-magnesium hydroxide-simethicone 200-200-20 mg/5 mL suspension 30 mL  30 mL Oral QID PRN Lisa Stone FNP        amLODIPine tablet 5 mg  5 mg Oral Daily Marco A Newell MD        aspirin EC tablet 81 mg  81 mg Oral Daily Marco A Newell MD   81 mg at 25 1520    bisacodyL suppository 10 mg  10 mg Rectal Daily PRN Lisa Stone FNP        calcitRIOL capsule 0.25 mcg  0.25 mcg Oral Once per day on  Marco A Newell MD   0.25 mcg at 25 1520    clindamycin capsule 450 mg  450 mg Oral Q8H Marco A Newell MD   450 mg at 25 0642    dextrose 5 % and 0.45 % NaCl infusion   Intravenous Continuous Marco A eNwell MD 60 mL/hr at 25 1708 New Bag at 25 1708    dextrose 50% injection 12.5 g  12.5 g Intravenous PRN Lisa Stone,  OMIDP        dextrose 50% injection 25 g  25 g Intravenous PRN Lisa Stone, LETHA        EScitalopram oxalate tablet 10 mg  10 mg Oral Daily George Mendoza MD        glucagon (human recombinant) injection 1 mg  1 mg Intramuscular PRN Lisa Stone, LETHA        glucose chewable tablet 16 g  16 g Oral PRN Lisa Stone, LETHA        glucose chewable tablet 24 g  24 g Oral PRN Lisa Stone, LETHA        heparin 25,000 units in dextrose 5% (100 units/ml) IV bolus from bag LOW INTENSITY nomogram - LAF  60 Units/kg (Adjusted) Intravenous PRN Uziel Newell MD   3,310 Units at 01/22/25 2007    heparin 25,000 units in dextrose 5% (100 units/ml) IV bolus from bag LOW INTENSITY nomogram - LAF  30 Units/kg (Adjusted) Intravenous PRN Uziel Newell MD   1,650 Units at 01/23/25 0228    heparin 25,000 units in dextrose 5% 250 mL (100 units/mL) infusion LOW INTENSITY nomogram - LAF  0-40 Units/kg/hr (Adjusted) Intravenous Continuous Uziel Newell MD 9.4 mL/hr at 01/23/25 0226 17 Units/kg/hr at 01/23/25 0226    megestroL 400 mg/10 mL (10 mL) suspension 400 mg  400 mg Oral Daily George Mendoza MD        melatonin tablet 6 mg  6 mg Oral Nightly PRN Lisa Stone FNP   6 mg at 01/23/25 0308    ondansetron injection 4 mg  4 mg Intravenous Q4H PRN Lisa Stone FNP        pantoprazole EC tablet 40 mg  40 mg Oral Daily George Mendoza MD        polyethylene glycol packet 17 g  17 g Oral BID PRN Lisa Stone, LETHA        potassium chloride CR tablet 10 mEq  10 mEq Oral Daily George Mendoza MD        sodium chloride 0.9% flush 10 mL  10 mL Intravenous PRN Lisa Stone, LETHA        vitamin renal formula (B-complex-vitamin c-folic acid) 1 mg per capsule 1 capsule  1 capsule Oral Daily George Mendoza MD         Current Outpatient Medications   Medication Sig Dispense Refill    amLODIPine (NORVASC) 10 MG tablet Take 10 mg by mouth once daily.      B complex-vitamin C-folic acid (NEPHRO-JENN) 0.8 mg Tab Take 0.8  mg by mouth Daily.      calcitRIOL (ROCALTROL) 0.25 MCG Cap Take 0.25 mcg by mouth 3 (three) times a week.      methocarbamoL (ROBAXIN) 500 MG Tab Take 500 mg by mouth 4 (four) times daily.      pantoprazole (PROTONIX) 40 MG tablet Take 40 mg by mouth once daily.      potassium chloride (KLOR-CON) 10 MEQ TbSR Take 10 mEq by mouth once daily.      traMADoL (ULTRAM) 50 mg tablet Take 50 mg by mouth 3 (three) times daily as needed for Pain.      aspirin (ECOTRIN) 81 MG EC tablet Take 81 mg by mouth once daily.      D5W PgBk 50 mL with ceFAZolin 1 gram SolR Cefazolin intraperitoneally times 10 more days      EScitalopram oxalate (LEXAPRO) 10 MG tablet Take 1 tablet (10 mg total) by mouth once daily. (Patient not taking: Reported on 12/23/2024) 90 tablet 3    folic acid (FOLVITE) 1 MG tablet Take 1 mg by mouth once daily.      furosemide (LASIX) 20 MG tablet Take 20 mg by mouth once daily.      megestroL (MEGACE) 400 mg/10 mL (10 mL) Susp Take 10 mLs (400 mg total) by mouth once daily. 300 mL 0    oxyCODONE (ROXICODONE) 5 MG immediate release tablet Take 1 tablet (5 mg total) by mouth every 8 (eight) hours as needed for Pain. (Patient not taking: Reported on 12/23/2024) 12 tablet 0    zolpidem (AMBIEN) 10 mg Tab Take 10 mg by mouth every evening.         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 Glomerulitis with one activated endothelial cell: treated with Solumedrol pulse 375 mg IVBP x 3 doses with short prednisone taper; thymoglobin 100 mg x 6 doses 10/04/2007 minimal histologic changes no acute rejection     Allergy     Anemia of renal disease     CKD (chronic kidney disease), stage IV     CMV (cytomegalovirus) status positive     Colon polyp     benign    Depression     GN (glomerulonephritis)     GN (glomerulonephritis)     unknown type    Herpes infection, other 2009    on buttocks     Hyperlipidemia     Hypertension     Immunosuppression     Living-donor kidney transplant recipient 01/15/2007    Meningitis     history of viral meningitis    Proteinuria 1/5/2015    Proteinuria     Pulmonary nodules     Renal hypertension     S/P breast augmentation     Skin cancer     Urinary tract infection       Past Surgical History:   Procedure Laterality Date    COLON SURGERY      COLONOSCOPY W/ BIOPSIES      cosemetic surgery      facelift and breast augmentation     HYSTERECTOMY      INTRAMEDULLARY RODDING OF HUMERUS Right 12/2/2024    Procedure: INSERTION, INTRAMEDULLARY MONTSERRAT, HUMERUS;  Surgeon: Samuel Dow ODO;  Location: [facility] OR;  Service: Orthopedics;  Laterality: Right;  supine vacular backward c arm synthes  shoulder scope drape    KIDNEY TRANSPLANT      NEPHRECTOMY Left     9/2016    RENAL BIOPSY      TONSILLECTOMY, ADENOIDECTOMY        Family History   Problem Relation Name Age of Onset    Kidney disease Sister          ESRD    Kidney disease Brother          CKD from HTN    Hypertension Brother      Heart failure Mother      Heart failure Father      Diabetes Neg Hx      Stroke Neg Hx       Social History     Tobacco Use    Smoking status: Former     Current packs/day: 1.00     Average packs/day: 1 pack/day for 15.0 years (15.0 ttl pk-yrs)     Types: Cigarettes    Smokeless tobacco: Never    Tobacco comments:     quit in 2014; smoked for 15 years and at the most smoked 1 ppd   Substance Use Topics    Alcohol use: Yes     Comment: seldom     (Not in a hospital admission)    Review of patient's allergies indicates:   Allergen Reactions    Codeine Palpitations and Rash    Cefepime      Other Reaction(s): Encephalopathy    Renal impairment increased risk for this reaction    Levofloxacin      Other Reaction(s): Encephalopathy    Renal pt who had some encephalopathy while on Levaquin & Cefepime, neurology physicians think they may have been due to the antibiotics (both possible side effects for each)     Iodine Nausea Only            Review of Systems:     All 12 point review of system done negative except 1 history of present illness    Objective:       VITAL SIGNS: 24 HR MIN & MAX LAST    Temp  Min: 97.5 °F (36.4 °C)  Max: 97.6 °F (36.4 °C)  97.6 °F (36.4 °C)        BP  Min: 115/67  Max: 137/71  129/75     Pulse  Min: 68  Max: 87  69     Resp  Min: 12  Max: 20  12    SpO2  Min: 95 %  Max: 100 %  100 %      GEN:  Very frail looking female in no respiratory distress.  Awake alert oriented to time person place.  HEENT:  Atraumatic normocephalic.  Pupils reactive.  Extraocular movements intact.  No oral lesion.  Neck supple.  No JVD.  CV: RRR without rub or gallop.  PULM: CTAB, unlabored  ABD: Soft, NT/ND abdomen with NABS, PD catheter noted.  No obvious exit site infection noted.  EXT: No cyanosis or edema, there is skin growth on the left lower extremity just above the ankle.  SKIN: Warm and dry  PSYCH: Awake, alert, and appropriately conversant  Vascular access:  PD catheter.          Component Value Date/Time     (L) 01/23/2025 0139     01/22/2025 1141     06/20/2018 1402     01/08/2018 0835    K 3.4 (L) 01/23/2025 0139    K 3.9 01/22/2025 1141    K 4.8 06/20/2018 1402    K 4.6 01/08/2018 0835    CO2 20 (L) 01/23/2025 0139    CO2 21 (L) 01/22/2025 1141    CO2 23 06/20/2018 1402    CO2 22 (L) 01/08/2018 0835    BUN 73.0 (H) 01/23/2025 0139    BUN 81.3 (H) 01/22/2025 1141    BUN 30 (H) 06/20/2018 1402    BUN 24 (H) 01/08/2018 0835    CREATININE 9.84 (H) 01/23/2025 0139    CREATININE 10.88 (H) 01/22/2025 1141    CREATININE 1.9 (H) 06/20/2018 1402    CREATININE 1.8 (H) 01/08/2018 0835    CALCIUM 8.7 01/23/2025 0139    CALCIUM 8.5 01/22/2025 1141    CALCIUM 9.7 06/20/2018 1402    CALCIUM 9.1 01/08/2018 0835    PHOS 7.6 (H) 12/03/2024 0509    PHOS 3.8 06/20/2018 1402            Component Value Date/Time    WBC 10.97 01/23/2025 0403    WBC 12.53 (H) 01/22/2025 1141    WBC 4.26 (L) 07/04/2024  0000    WBC 6.90 06/20/2018 1402    WBC 3.37 (L) 01/08/2018 0835    HGB 8.1 (L) 01/23/2025 0403    HGB 9.4 (L) 01/22/2025 1141    HGB 9 (L) 12/18/2024 0000    HGB 10.2 (L) 11/26/2024 0000    HGB 10.5 (L) 06/20/2018 1402    HGB 9.1 (L) 01/08/2018 0835    HCT 25.4 (L) 01/23/2025 0403    HCT 30.3 (L) 01/22/2025 1141    HCT 29.8 (L) 12/18/2024 0000    HCT 31.8 (L) 11/26/2024 0000    HCT 32.0 (L) 06/05/2020 0815    HCT 31.6 (L) 06/20/2018 1402    HCT 27.3 (L) 01/08/2018 0835     01/23/2025 0403     01/22/2025 1141     06/20/2018 1402     01/08/2018 0835         X-Ray Chest AP Portable   Final Result      No acute abnormality.         Electronically signed by: Marilou Laughlin MD   Date:    01/22/2025   Time:    13:03          Assessment / Plan:   ESRD.  Will continue nightly CCPD  Elevated troponins  Weakness unknown etiology  Anemia of chronic kidney disease  Hypertension  History of depression      Recommendations  Will continue nightly CCPD  Resume her Lexapro  Nurses reported that patient's occult blood was negative in the stools.  Will give her a dose Retacrit for anemia of chronic kidney disease  Other workup and management is in progress      Thank you for this consultation

## 2025-01-24 LAB
ALBUMIN SERPL-MCNC: 1.7 G/DL (ref 3.4–4.8)
ALBUMIN/GLOB SERPL: 0.4 RATIO (ref 1.1–2)
ALP SERPL-CCNC: 191 UNIT/L (ref 40–150)
ALT SERPL-CCNC: 22 UNIT/L (ref 0–55)
ANION GAP SERPL CALC-SCNC: 16 MEQ/L
AST SERPL-CCNC: 36 UNIT/L (ref 5–34)
BACTERIA STL CULT: NORMAL
BILIRUB SERPL-MCNC: 0.3 MG/DL
BUN SERPL-MCNC: 55.4 MG/DL (ref 9.8–20.1)
CALCIUM SERPL-MCNC: 8.4 MG/DL (ref 8.4–10.2)
CHLORIDE SERPL-SCNC: 98 MMOL/L (ref 98–107)
CO2 SERPL-SCNC: 20 MMOL/L (ref 23–31)
CREAT SERPL-MCNC: 7.96 MG/DL (ref 0.55–1.02)
CREAT/UREA NIT SERPL: 7
GFR SERPLBLD CREATININE-BSD FMLA CKD-EPI: 5 ML/MIN/1.73/M2
GLOBULIN SER-MCNC: 3.8 GM/DL (ref 2.4–3.5)
GLUCOSE SERPL-MCNC: 108 MG/DL (ref 82–115)
POCT GLUCOSE: 125 MG/DL (ref 70–110)
POTASSIUM SERPL-SCNC: 3.6 MMOL/L (ref 3.5–5.1)
PROT SERPL-MCNC: 5.5 GM/DL (ref 5.8–7.6)
SODIUM SERPL-SCNC: 134 MMOL/L (ref 136–145)

## 2025-01-24 PROCEDURE — 25000003 PHARM REV CODE 250: Performed by: INTERNAL MEDICINE

## 2025-01-24 PROCEDURE — 97535 SELF CARE MNGMENT TRAINING: CPT

## 2025-01-24 PROCEDURE — 97166 OT EVAL MOD COMPLEX 45 MIN: CPT

## 2025-01-24 PROCEDURE — 97162 PT EVAL MOD COMPLEX 30 MIN: CPT

## 2025-01-24 PROCEDURE — 36415 COLL VENOUS BLD VENIPUNCTURE: CPT | Performed by: INTERNAL MEDICINE

## 2025-01-24 PROCEDURE — 90945 DIALYSIS ONE EVALUATION: CPT

## 2025-01-24 PROCEDURE — 21400001 HC TELEMETRY ROOM

## 2025-01-24 PROCEDURE — S0179 MEGESTROL 20 MG: HCPCS | Performed by: INTERNAL MEDICINE

## 2025-01-24 PROCEDURE — 80053 COMPREHEN METABOLIC PANEL: CPT | Performed by: INTERNAL MEDICINE

## 2025-01-24 RX ADMIN — Medication 1 CAPSULE: at 08:01

## 2025-01-24 RX ADMIN — AMLODIPINE BESYLATE 5 MG: 5 TABLET ORAL at 08:01

## 2025-01-24 RX ADMIN — CLINDAMYCIN HYDROCHLORIDE 450 MG: 150 CAPSULE ORAL at 11:01

## 2025-01-24 RX ADMIN — ASPIRIN 81 MG: 81 TABLET, COATED ORAL at 08:01

## 2025-01-24 RX ADMIN — CLINDAMYCIN HYDROCHLORIDE 450 MG: 150 CAPSULE ORAL at 06:01

## 2025-01-24 RX ADMIN — ESCITALOPRAM OXALATE 10 MG: 10 TABLET ORAL at 08:01

## 2025-01-24 RX ADMIN — POTASSIUM CHLORIDE 10 MEQ: 750 TABLET, FILM COATED, EXTENDED RELEASE ORAL at 08:01

## 2025-01-24 RX ADMIN — MEGESTROL ACETATE 400 MG: 400 SUSPENSION ORAL at 08:01

## 2025-01-24 RX ADMIN — FOLIC ACID 1 MG: 1 TABLET ORAL at 08:01

## 2025-01-24 RX ADMIN — CLINDAMYCIN HYDROCHLORIDE 450 MG: 150 CAPSULE ORAL at 02:01

## 2025-01-24 RX ADMIN — PANTOPRAZOLE SODIUM 40 MG: 40 TABLET, DELAYED RELEASE ORAL at 08:01

## 2025-01-24 RX ADMIN — CALCITRIOL CAPSULES 0.25 MCG 0.25 MCG: 0.25 CAPSULE ORAL at 09:01

## 2025-01-24 NOTE — NURSING
CCPD treatment started per p&p. 2059 ml clear yellow effluent on initial drain. No complaints voiced.

## 2025-01-24 NOTE — PT/OT/SLP EVAL
Physical Therapy Evaluation    Patient Name:  Malissa Schwartz   MRN:  [id number]    Recommendations:     Discharge therapy intensity: Moderate Intensity Therapy   Discharge Equipment Recommendations: none   Barriers to discharge: Ongoing medical needs    Assessment:     Malissa Schwartz is a 74 y.o. female admitted with a medical diagnosis of ESRD. Prior to admit, patient lived with spouse in a [facility]H with 2 steps to enter.  She has caregivers 12 hours/day, 7 days/week. She ambulates with a RW at baseline. Patient with right humerus fracture on 12/3. She presents with the following impairments/functional limitations: weakness, impaired endurance, impaired self care skills, impaired functional mobility, gait instability, impaired balance, decreased safety awareness, pain. She required MOD A for bed mobility. MIN A for sit<>stand. Ambulated 35 ft with RW & CGA. Slowed pace. Limited by bilateral foot pain. Patient will benefit from continued PT services while in hospital to improve functional mobility & return to PLOF.  Recommending MOD intensity therapy at discharge. Progress as tolerated.     Rehab Prognosis: Good; patient would benefit from acute skilled PT services to address these deficits and reach maximum level of function.    Recent Surgery: * No surgery found *      Plan:     During this hospitalization, patient would benefit from acute PT services 5 x/week to address the identified rehab impairments via gait training, therapeutic activities, therapeutic exercises, neuromuscular re-education and progress toward the following goals:    Plan of Care Expires:  02/24/25    Subjective     Chief Complaint: pain  Patient/Family Comments/goals: none  Pain/Comfort:  Pain Rating 1: 5/10  Location - Side 1: Bilateral  Location 1: foot  Pain Addressed 1: Reposition, Distraction  Pain Rating Post-Intervention 1: 5/10    Patients cultural, spiritual, Sabianist conflicts given the current situation: no    Living  Environment:  Prior to admit, patient lived with spouse in a SLH with 2 steps to enter.  She has caregivers 12 hours/day, 7 days/week. She ambulates with a RW at baseline. Equipment used at home: shower chair, rollator, wheelchair, cane, quad.  DME owned (not currently used): none.  Upon discharge, patient will have assistance from spouse.    Objective:     Communicated with nurse prior to session.  Patient found supine with telemetry, peripheral IV  upon PT entry to room.    General Precautions: Standard, fall  Orthopedic Precautions:RUE non weight bearing (Can use RW and perform ADL's)   Braces: N/A  Respiratory Status: Room air    Exams:  Cognitive Exam:  Patient is oriented to Person, Place, Time, and Situation  Sensation: -       Intact  RLE ROM: WFL  RLE Strength: -4/5 grossly  LLE ROM: WFL  LLE Strength: -4/5 grossly  Skin integrity: Visible skin intact      Functional Mobility:  Bed Mobility:  Supine to Sit: moderate assistance  Transfers:  Sit to Stand:  minimum assistance with rolling walker  Gait: 35 ft with RW & CGA. Slowed pace. Limited by bilateral foot pain.  Balance: fair      AM-PAC 6 CLICK MOBILITY  Total Score:15     Education Provided:  Role and goals of PT, transfer training, bed mobility, gait training, balance training, safety awareness, assistive device, strengthening exercises, and importance of participating in PT to return to PLOF.    Patient left up in chair with all lines intact, call button in reach, spouse present, and educated on calling for assistance when ready to return to bed .    GOALS:   Multidisciplinary Problems       Physical Therapy Goals          Problem: Physical Therapy    Goal Priority Disciplines Outcome Interventions   Physical Therapy Goal     PT, PT/OT Progressing    Description: Goals to be met by: 25     Patient will increase functional independence with mobility by performin. Supine to sit with Set-up [place]  2. Sit to supine with Set-up  [place]  3. Sit to stand transfer with Supervision  4. Gait  x 200 feet with Supervision using Rolling Walker.   5. 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 Glomerulitis with one activated endothelial cell: treated with Solumedrol pulse 375 mg IVBP x 3 doses with short prednisone taper; thymoglobin 100 mg x 6 doses 10/04/2007 minimal histologic changes no acute rejection     Allergy     Anemia of renal disease     CKD (chronic kidney disease), stage IV     CMV (cytomegalovirus) status positive     Colon polyp     benign    Depression     GN (glomerulonephritis)     GN (glomerulonephritis)     unknown type    Herpes infection, other 2009    on buttocks    Hyperlipidemia     Hypertension     Immunosuppression     Living-donor kidney transplant recipient 01/15/2007    Meningitis     history of viral meningitis    Proteinuria 1/5/2015    Proteinuria     Pulmonary nodules     Renal hypertension     S/P breast augmentation     Skin cancer     Urinary tract infection        Past Surgical History:   Procedure Laterality Date    COLON SURGERY      COLONOSCOPY W/ BIOPSIES      cosemetic surgery      facelift and breast augmentation     HYSTERECTOMY      INTRAMEDULLARY RODDING OF HUMERUS Right 12/2/2024    Procedure: INSERTION, INTRAMEDULLARY MONTSERRAT, HUMERUS;  Surgeon: Samuel Dow DO;  Location: [facility];  Service: Orthopedics;  Laterality: Right;  supine vacular backward c arm synthes  shoulder scope drape    KIDNEY TRANSPLANT      NEPHRECTOMY Left     9/2016    RENAL BIOPSY      TONSILLECTOMY, ADENOIDECTOMY         Time Tracking:     PT Received On: 01/24/25  PT Start Time: 0911     PT Stop Time: 0929  PT Total Time (min): 18 min     Billable Minutes: Evaluation 18 minutes      01/24/2025

## 2025-01-24 NOTE — NURSING
01/24/25 0814   Peritoneal Dialysis   Exchange Type Cycler   Peritoneal Treatment Status Completed   Cycler Peritoneal Dialysis   Initial Drain Volume (mL) 1149 mL   Effluent Appearance Clear   Number of Cycles 4   Fill Volume (mL) 2400 mL   Total Volume (mL) 729989 mL   Average Dwell Time (specify hr/min) 1:49   Cycler PD Total UF (mL) 1028 mL   Cycler Treatment Comments CCPD completed.  Pt tolerated well.  I drain: 1149, Total UF: 1028.

## 2025-01-24 NOTE — PLAN OF CARE
Discussed dc planning with pt and  is at bedside. Discussed SNF vs HH. Pt stated she would like to go home with HH. Pt Choice provided. Pt Choice is [facility]. Pt states she has a RW and w/c at home.  states he can drive pt home at dc.  Referral sent to [facility] via [employer].

## 2025-01-24 NOTE — PLAN OF CARE
Problem: Occupational Therapy  Goal: Occupational Therapy Goal  Description: Goals to be met by: 2/24/2025     Patient will increase functional independence with ADLs by performing:    UE Dressing with Stand-by Assist.  LE Dressing with Stand-by Assist.   Grooming while standing at sink with Modified [place].  Toileting from toilet with Modified [place] for hygiene and clothing management.   Toilet transfer to toilet with Modified [place].  Bathing in shower w/ Stand-by Assist.     Outcome: Progressing

## 2025-01-24 NOTE — PT/OT/SLP EVAL
Occupational Therapy  Evaluation    Name: Malissa Schwartz  MRN: [id number]  Admitting Diagnosis: weakness & black stool   Recent Surgery: * No surgery found *      Recommendations:     Discharge therapy intensity: Moderate Intensity Therapy   Discharge Equipment Recommendations:  to be determined by next level of care  Barriers to discharge:  Other (Comment) (ongoing medical needs)    Assessment:     Malissa Schwartz is a 74 y.o. female was admitted with weakness and black stool and current diagnosis of NSTEMI; pt has history of ESRD on PD, failed kidney transplant, HTN, HLD, depression, anemia of chronic disease, colon polyp, lung nodule, left lower extremity skin cancer awaiting treatment, renal hypertension, recent humerus fracture requiring intramedullary nailing.  She presents with the following performance deficits affecting function: weakness, impaired endurance, impaired self care skills, impaired functional mobility, gait instability, impaired balance, decreased upper extremity function, pain, orthopedic precautions. Pt appeared flat upon OT entrance, possibly due to fatigue post PT session. She was able to answer all interview questions with A + O x 4. Min A was provided to come to seated position EOB. Sit to stand also required min A w/ RW. Pt was CGA to ambulate to  for toileting, where she sat while CNA changed bed sheets. Pt had a small BM while seated and required max A for hygiene standing at the toilet. Pt unable to use grab bars for assist due to bars being on same side as injured RUE. However, her level of mobility at this time and support from  and PCA provides evidence of great rehab potential of moderate intensity post d/c.     Rehab Prognosis: Good; patient would benefit from acute skilled OT services to address these deficits and reach maximum level of function.       Plan:     Patient to be seen 4 x/week to address the above listed problems via self-care/home management, therapeutic  activities, therapeutic exercises  Plan of Care Expires: 02/24/25  Plan of Care Reviewed with: patient    Subjective     Chief Complaint: Pain in the RUE due to IMN s/p fx.   Patient/Family Comments/goals: To return home.     Occupational Profile:  Living Environment: Able to live on 1st floor, BR has walk-in shower w/ bars and chair.   Previous level of function: Assist for bathing and dressing.   Roles and Routines: Wife, mother, grandmother   Equipment Used at Home: shower chair, rollator, wheelchair, cane, quad (rollator used most of the time for ambulation)  Assistance upon Discharge:  and sitters 7 days / 12 hr shifts     Pain/Comfort:  Pain Rating 1: 5/10  Location - Side 1: Right  Location - Orientation 1: upper  Location 1: arm  Pain Addressed 1: Distraction, Reposition    Patients cultural, spiritual, Worship conflicts given the current situation: no    Objective:     OT communicated with RN prior to session.      Patient was found supine with peripheral IV, telemetry upon OT entry to room.    General Precautions: Standard, fall  Orthopedic Precautions: RUE non weight bearing (RUE nwb/pwb only on RW; okay for ADLs) ROM as tolerated.   Braces: N/A      Bed Mobility:    Patient completed Rolling/Turning to Left with  minimum assistance  Patient completed Supine to Sit with minimum assistance    Functional Mobility/Transfers:  Patient completed Sit <> Stand Transfer with minimum assistance  with  rolling walker   Patient completed Toilet Transfer Step Transfer technique with minimum assistance with  rolling walker to sit and max to stand w/ RW due to inability to use grab bars following RUE px.   Functional Mobility: CGA w/ RW to the BR.     Activities of Daily Living:  Lower Body Dressing: CGA for doff, max to don socks.    Toileting: maximal assistance for hygiene post BM standing at toilet     AMPA 6 Click ADL:  AMPAC Total Score: 16    Functional Cognition:  Orientation: oriented to Person,  Place, Time, and Situation    Visual Perceptual Skills:  Intact    Upper Extremity Function:  Right Upper Extremity:   Shoulder AROM impaired (~80-90 degrees flexion and abduction; external and internal rotation WFL); distal joints WFL     Left Upper Extremity:  WFL    Balance:   Static sitting balance: WFL  Dynamic sitting balance:Impaired. Requires CGA.   Static standing balance: WFL w/ RW   Dynamic standing balance:Impaired. Support of RW and CGA.     Therapeutic Positioning  Risk for acquired pressure injuries is decreased due to ability to get to BSC/toilet with assist.    OT interventions performed during the course of today's session:   Education was provided on benefits of and recommendations for therapeutic positioning  Therapeutic positioning was provided at the conclusion of session to offload all bony prominences for the prevention and/or reduction of pressure injuries    Skin assessment: full body skin assessment was performed    Findings: known area of altered skin integrity at bilateral heels (dressing in place) and  noted redness at buttocks (wound care on case)    OT recommendations for therapeutic positioning throughout hospitalization:   Follow [facility] Pressure Injury Prevention Protocol  Geomat recommended for sacral protection while C    Additional Treatment:  ADL Training: Toileting at bathroom commode and hygiene post BM.     Patient Education:  Patient provided with verbal education education regarding OT role/goals/POC, post op precautions, fall prevention, safety awareness, and pressure ulcer prevention.  Understanding was verbalized.     Patient left right sidelying with all lines intact, call button in reach, wedge under L side, and RN notified.    GOALS:   Multidisciplinary Problems       Occupational Therapy Goals          Problem: Occupational Therapy    Goal Priority Disciplines Outcome Interventions   Occupational Therapy Goal     OT, PT/OT Progressing    Description: Goals to be met  by: 2/24/2025     Patient will increase functional independence with ADLs by performing:    UE Dressing with Stand-by Assist.  LE Dressing with Stand-by Assist.   Grooming while standing at sink with Modified [place].  Toileting from toilet with Modified [place] for hygiene and clothing management.   Toilet transfer to toilet with Modified [place].  Bathing in shower w/ Stand-by Assist.                          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 Glomerulitis with one activated endothelial cell: treated with Solumedrol pulse 375 mg IVBP x 3 doses with short prednisone taper; thymoglobin 100 mg x 6 doses 10/04/2007 minimal histologic changes no acute rejection     Allergy     Anemia of renal disease     CKD (chronic kidney disease), stage IV     CMV (cytomegalovirus) status positive     Colon polyp     benign    Depression     GN (glomerulonephritis)     GN (glomerulonephritis)     unknown type    Herpes infection, other 2009    on buttocks    Hyperlipidemia     Hypertension     Immunosuppression     Living-donor kidney transplant recipient 01/15/2007    Meningitis     history of viral meningitis    Proteinuria 1/5/2015    Proteinuria     Pulmonary nodules     Renal hypertension     S/P breast augmentation     Skin cancer     Urinary tract infection          Past Surgical History:   Procedure Laterality Date    COLON SURGERY      COLONOSCOPY W/ BIOPSIES      cosemetic surgery      facelift and breast augmentation     HYSTERECTOMY      INTRAMEDULLARY RODDING OF HUMERUS Right 12/2/2024    Procedure: INSERTION, INTRAMEDULLARY MONTSERRAT, HUMERUS;  Surgeon: Samuel Dow DO;  Location: [facility];  Service: Orthopedics;  Laterality: Right;  supine vacular backward c arm synthes  shoulder scope drape    KIDNEY TRANSPLANT      NEPHRECTOMY Left     9/2016    RENAL BIOPSY      TONSILLECTOMY, ADENOIDECTOMY          Time Tracking:     OT Date of Treatment: 01/24/25  OT Start Time: 1113  OT Stop Time: 1141  OT Total Time (min): 28 min    Billable Minutes:Evaluation mod complexity 15 min  Self Care/Home Management 13 min    1/24/2025

## 2025-01-24 NOTE — PLAN OF CARE
Problem: Physical Therapy  Goal: Physical Therapy Goal  Description: Goals to be met by: 25     Patient will increase functional independence with mobility by performin. Supine to sit with Set-up [place]  2. Sit to supine with Set-up [place]  3. Sit to stand transfer with Supervision  4. Gait  x 200 feet with Supervision using Rolling Walker.   5. Ascend/descend 2 stairs with bilateral Handrails & Supervision     Outcome: Progressing

## 2025-01-24 NOTE — PLAN OF CARE
01/24/25 1430   Medicare Message   Important Message from Medicare regarding Discharge Appeal Rights Given to patient/caregiver;Explained to patient/caregiver     IMM given to pt and  at bedside. Both voiced understanding of IMM.

## 2025-01-24 NOTE — PROGRESS NOTES
Nephrology  Note    Patient Name: Malissa Schwartz  Age: 74 y.o.  : 1950  MRN: [id number]  Admission Date: 2025        Malissa Schwartz is a 74 y.o. female is very well known to me for her ESRD and on CCPD and she has been doing very good at home.  Admitted to the hospital with complaints of generalized weakness of 1 day duration and also reports some black stools for the last few weeks.  No chest pains.  No abdominal pains.  No cough congestion no fever or chills.  No dysuria hematuria hesitancy frequency urgency.  Some nausea reported.  But no vomiting.  No hematemesis hemoptysis  reported.  No fever or chills.  Lives with her  at home.  Her significant other history include hypertension, anemia of chronic kidney disease, hyperlipidemia, failed living related kidney transplant which lasted many years.  She has been very compliant to her dialysis regimen.  Also has some depression problem.    2025   Better.  Still poor appetite.  Still with some weakness.  No nausea.  No shortness of breath.  Had good CCPD done.  She reported the her dermatologist has told her that she will be getting radiation therapy to the skin cancer on the left lower extremity.    Current Facility-Administered Medications   Medication Dose Route Frequency Provider Last Rate Last Admin    acetaminophen tablet 1,000 mg  1,000 mg Oral Q6H PRN Lisa Stone FNP        aluminum-magnesium hydroxide-simethicone 200-200-20 mg/5 mL suspension 30 mL  30 mL Oral QID PRN Lisa Stone FNP        amLODIPine tablet 5 mg  5 mg Oral Daily Marco A Newell MD   5 mg at 25 0834    aspirin EC tablet 81 mg  81 mg Oral Daily Marco A Newell MD   81 mg at 25 0833    bisacodyL suppository 10 mg  10 mg Rectal Daily PRN Lisa Stone FNP        calcitRIOL capsule 0.25 mcg  0.25 mcg Oral Once per day on  Marco A Newell MD   0.25 mcg at 25 0900    clindamycin capsule 450 mg  450 mg Oral Q8H Marco A Newell  MD   450 mg at 01/24/25 0654    dextrose 50% injection 12.5 g  12.5 g Intravenous PRN Lisa Stone, LETHA        dextrose 50% injection 25 g  25 g Intravenous PRN Lisa Stone, LETHA        EScitalopram oxalate tablet 10 mg  10 mg Oral Daily George Mendoza MD   10 mg at 01/24/25 0834    folic acid tablet 1 mg  1 mg Oral Daily Denisha Romero MD   1 mg at 01/24/25 0834    glucagon (human recombinant) injection 1 mg  1 mg Intramuscular PRN Lisa Stone, OMIDP        glucose chewable tablet 16 g  16 g Oral PRN Dinorah Stonesa, OMIDP        glucose chewable tablet 24 g  24 g Oral PRN Lisa Stone, LETHA        megestroL 400 mg/10 mL (10 mL) suspension 400 mg  400 mg Oral Daily George Mendoza MD   400 mg at 01/24/25 0834    melatonin tablet 6 mg  6 mg Oral Nightly PRN Lisa Stone FNP   6 mg at 01/23/25 0308    ondansetron injection 4 mg  4 mg Intravenous Q4H PRN Lisa Stone, LETHA        pantoprazole EC tablet 40 mg  40 mg Oral Daily George Mendoza MD   40 mg at 01/24/25 0834    polyethylene glycol packet 17 g  17 g Oral BID PRN Lisa Stone, LETHA        potassium chloride CR tablet 10 mEq  10 mEq Oral Daily George Mendoza MD   10 mEq at 01/24/25 0834    sodium chloride 0.9% flush 10 mL  10 mL Intravenous PRN Lisa Stone FNP        vitamin renal formula (B-complex-vitamin c-folic acid) 1 mg per capsule 1 capsule  1 capsule Oral Daily George Mendoza MD   1 capsule at 01/24/25 0833       Sergio Crabtree MD    Past Medical History:   Diagnosis Date    Acute rejection of kidney transplant 9/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 Glomerulitis with one activated endothelial cell: treated with Solumedrol pulse 375 mg IVBP x 3 doses with short prednisone taper; thymoglobin 100 mg x 6 doses 10/04/2007 minimal histologic changes no acute rejection     Allergy     Anemia of renal disease     CKD (chronic kidney disease), stage IV     CMV (cytomegalovirus) status positive      Colon polyp     benign    Depression     GN (glomerulonephritis)     GN (glomerulonephritis)     unknown type    Herpes infection, other 2009    on buttocks    Hyperlipidemia     Hypertension     Immunosuppression     Living-donor kidney transplant recipient 01/15/2007    Meningitis     history of viral meningitis    Proteinuria 1/5/2015    Proteinuria     Pulmonary nodules     Renal hypertension     S/P breast augmentation     Skin cancer     Urinary tract infection       Past Surgical History:   Procedure Laterality Date    COLON SURGERY      COLONOSCOPY W/ BIOPSIES      cosemetic surgery      facelift and breast augmentation     HYSTERECTOMY      INTRAMEDULLARY RODDING OF HUMERUS Right 12/2/2024    Procedure: INSERTION, INTRAMEDULLARY MONTSERRAT, HUMERUS;  Surgeon: Samuel Dow DO;  Location: [facility] OR;  Service: Orthopedics;  Laterality: Right;  supine vacular backward c arm synthes  shoulder scope drape    KIDNEY TRANSPLANT      NEPHRECTOMY Left     9/2016    RENAL BIOPSY      TONSILLECTOMY, ADENOIDECTOMY        Family History   Problem Relation Name Age of Onset    Kidney disease Sister          ESRD    Kidney disease Brother          CKD from HTN    Hypertension Brother      Heart failure Mother      Heart failure Father      Diabetes Neg Hx      Stroke Neg Hx       Social History     Tobacco Use    Smoking status: Former     Current packs/day: 1.00     Average packs/day: 1 pack/day for 15.0 years (15.0 ttl pk-yrs)     Types: Cigarettes    Smokeless tobacco: Never    Tobacco comments:     quit in 2014; smoked for 15 years and at the most smoked 1 ppd   Substance Use Topics    Alcohol use: Yes     Comment: seldom     Medications Prior to Admission   Medication Sig Dispense Refill Last Dose/Taking    amLODIPine (NORVASC) 10 MG tablet Take 5 mg by mouth once daily.   Taking    aspirin (ECOTRIN) 81 MG EC tablet Take 81 mg by mouth once daily.   Past Week    B complex-vitamin C-folic acid (NEPHRO-JENN) 0.8 mg Tab Take  0.8 mg by mouth Daily.   Past Week    calcitRIOL (ROCALTROL) 0.25 MCG Cap Take 0.25 mcg by mouth 3 (three) times a week.   Past Week    methocarbamoL (ROBAXIN) 500 MG Tab Take 500 mg by mouth 4 (four) times daily.   Past Week    pantoprazole (PROTONIX) 40 MG tablet Take 40 mg by mouth once daily.   Past Week    potassium chloride (KLOR-CON) 10 MEQ TbSR Take 10 mEq by mouth once daily.   Past Week    traMADoL (ULTRAM) 50 mg tablet Take 50 mg by mouth 3 (three) times daily as needed for Pain.   Past Week    D5W PgBk 50 mL with ceFAZolin 1 gram SolR Cefazolin intraperitoneally times 10 more days       EScitalopram oxalate (LEXAPRO) 10 MG tablet Take 1 tablet (10 mg total) by mouth once daily. (Patient not taking: Reported on 12/23/2024) 90 tablet 3     folic acid (FOLVITE) 1 MG tablet Take 1 mg by mouth once daily.       furosemide (LASIX) 20 MG tablet Take 20 mg by mouth once daily.       megestroL (MEGACE) 400 mg/10 mL (10 mL) Susp Take 10 mLs (400 mg total) by mouth once daily. 300 mL 0     zolpidem (AMBIEN) 10 mg Tab Take 10 mg by mouth every evening.        Review of patient's allergies indicates:   Allergen Reactions    Codeine Palpitations and Rash    Cefepime      Other Reaction(s): Encephalopathy    Renal impairment increased risk for this reaction    Levofloxacin      Other Reaction(s): Encephalopathy    Renal pt who had some encephalopathy while on Levaquin & Cefepime, neurology physicians think they may have been due to the antibiotics (both possible side effects for each)    Iodine Nausea Only            Review of Systems:     All 12 point review of system done negative except 1 history of present illness    Objective:       VITAL SIGNS: 24 HR MIN & MAX LAST    Temp  Min: 97.4 °F (36.3 °C)  Max: 97.6 °F (36.4 °C)  97.4 °F (36.3 °C)        BP  Min: 94/67  Max: 148/74  132/77     Pulse  Min: 65  Max: 72  65     Resp  Min: 13  Max: 20  18    SpO2  Min: 93 %  Max: 100 %  100 %      GEN:  Very frail looking  female in no respiratory distress.  Awake alert oriented to time person place.  HEENT:  Atraumatic normocephalic.  Pupils reactive.  Extraocular movements intact.  No oral lesion.  Neck supple.  No JVD.  CV: RRR without rub or gallop.  PULM: CTAB, unlabored  ABD: Soft, NT/ND abdomen with NABS, PD catheter noted.  No obvious exit site infection noted.  EXT: No cyanosis or edema, there is skin growth on the left lower extremity just above the ankle.  SKIN: Warm and dry  PSYCH: Awake, alert, and appropriately conversant  Vascular access:  PD catheter.          Component Value Date/Time     (L) 01/24/2025 0744     (L) 01/23/2025 0139     06/20/2018 1402     01/08/2018 0835    K 3.6 01/24/2025 0744    K 3.4 (L) 01/23/2025 0139    K 4.8 06/20/2018 1402    K 4.6 01/08/2018 0835    CO2 20 (L) 01/24/2025 0744    CO2 20 (L) 01/23/2025 0139    CO2 23 06/20/2018 1402    CO2 22 (L) 01/08/2018 0835    BUN 55.4 (H) 01/24/2025 0744    BUN 73.0 (H) 01/23/2025 0139    BUN 30 (H) 06/20/2018 1402    BUN 24 (H) 01/08/2018 0835    CREATININE 7.96 (H) 01/24/2025 0744    CREATININE 9.84 (H) 01/23/2025 0139    CREATININE 1.9 (H) 06/20/2018 1402    CREATININE 1.8 (H) 01/08/2018 0835    CALCIUM 8.4 01/24/2025 0744    CALCIUM 8.7 01/23/2025 0139    CALCIUM 9.7 06/20/2018 1402    CALCIUM 9.1 01/08/2018 0835    PHOS 7.6 (H) 12/03/2024 0509    PHOS 3.8 06/20/2018 1402            Component Value Date/Time    WBC 10.97 01/23/2025 0403    WBC 12.53 (H) 01/22/2025 1141    WBC 4.26 (L) 07/04/2024 0000    WBC 6.90 06/20/2018 1402    WBC 3.37 (L) 01/08/2018 0835    HGB 8.1 (L) 01/23/2025 0403    HGB 9.4 (L) 01/22/2025 1141    HGB 9 (L) 12/18/2024 0000    HGB 10.2 (L) 11/26/2024 0000    HGB 10.5 (L) 06/20/2018 1402    HGB 9.1 (L) 01/08/2018 0835    HCT 25.4 (L) 01/23/2025 0403    HCT 30.3 (L) 01/22/2025 1141    HCT 29.8 (L) 12/18/2024 0000    HCT 31.8 (L) 11/26/2024 0000    HCT 32.0 (L) 06/05/2020 0815    HCT 31.6 (L) 06/20/2018  1402    HCT 27.3 (L) 01/08/2018 0835     01/23/2025 0403     01/22/2025 1141     06/20/2018 1402     01/08/2018 0835         X-Ray Chest AP Portable   Final Result      No acute abnormality.         Electronically signed by: Marilou Laughlin MD   Date:    01/22/2025   Time:    13:03          Assessment / Plan:   ESRD.  Will continue nightly CCPD  Elevated troponins  Weakness, resolving  Anemia of chronic kidney disease  Hypertension  History of depression      Recommendations  Will continue nightly CCPD  Physical therapy and mobilize  If stable by tomorrow she should be able to go home
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
## 2025-01-24 NOTE — PROGRESS NOTES
[facility] - 4th Floor Medical Telemetry  Wound Care    Patient Name:  Malissa Schwartz   MRN:  [id number]  Date: 1/24/2025  Diagnosis: <principal problem not specified>    History:     Past Medical History:   Diagnosis Date    Acute rejection of kidney transplant 9/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 Glomerulitis with one activated endothelial cell: treated with Solumedrol pulse 375 mg IVBP x 3 doses with short prednisone taper; thymoglobin 100 mg x 6 doses 10/04/2007 minimal histologic changes no acute rejection     Allergy     Anemia of renal disease     CKD (chronic kidney disease), stage IV     CMV (cytomegalovirus) status positive     Colon polyp     benign    Depression     GN (glomerulonephritis)     GN (glomerulonephritis)     unknown type    Herpes infection, other 2009    on buttocks    Hyperlipidemia     Hypertension     Immunosuppression     Living-donor kidney transplant recipient 01/15/2007    Meningitis     history of viral meningitis    Proteinuria 1/5/2015    Proteinuria     Pulmonary nodules     Renal hypertension     S/P breast augmentation     Skin cancer     Urinary tract infection        Social History     Socioeconomic History    Marital status:    Tobacco Use    Smoking status: Former     Current packs/day: 1.00     Average packs/day: 1 pack/day for 15.0 years (15.0 ttl pk-yrs)     Types: Cigarettes    Smokeless tobacco: Never    Tobacco comments:     quit in 2014; smoked for 15 years and at the most smoked 1 ppd   Substance and Sexual Activity    Alcohol use: Yes     Comment: seldom    Drug use: No    Sexual activity: Never   Social History Narrative    Likes to travel and follow [employer] (has season tickets).    Lives with , 2 dogs and 3 cats    Used to work in real estate and banking     Social Drivers of Health     Financial Resource Strain: Low Risk  (12/3/2024)    Overall Financial Resource Strain (CARDIA)     Difficulty of Paying Living  Expenses: Not hard at all   Food Insecurity: No Food Insecurity (12/3/2024)    Hunger Vital Sign     Worried About Running Out of Food in the Last Year: Never true     Ran Out of Food in the Last Year: Never true   Transportation Needs: No Transportation Needs (12/3/2024)    TRANSPORTATION NEEDS     Transportation : No   Physical Activity: Inactive (12/3/2024)    Exercise Vital Sign     Days of Exercise per Week: 0 days     Minutes of Exercise per Session: 0 min   Stress: No Stress Concern Present (12/3/2024)    [employer] - Occupational Stress Questionnaire     Feeling of Stress : Only a little   Housing Stability: Low Risk  (12/3/2024)    Housing Stability Vital Sign     Unable to Pay for Housing in the Last Year: No     Homeless in the Last Year: No       Precautions:     Allergies as of 01/22/2025 - Reviewed 01/22/2025   Allergen Reaction Noted    Codeine Palpitations and Rash 01/07/2013    Cefepime  08/23/2024    Levofloxacin  01/03/2021    Iodine Nausea Only 12/09/2022       WOC Assessment Details/Treatment     Initial visit regarding affected areas at bilateral heels and Left lateral lower extremity lesion. Patient is currently resting on a pressure redistribution mattress, with pressure injury prevention measures in place. Patient reports LLE lateral leg lesion is a skin cancer  that she has had for some time and is untreated. She has bilateral wounds over her heels that are suggestive of deep tissue injury present on admission.  Areas cleansed and assessed and redressed. Discussed pressure injury prevention measures with patient who verbalized understanding.      01/24/25 1130        Wound 10/05/24 1717 Pressure Injury Left Heel #4   Date First Assessed/Time First Assessed: 10/05/24 1717   Present on Original Admission: Yes  Primary Wound Type: Pressure Injury  Side: Left  Location: Heel  Wound Number: #4   Wound Image    Pressure Injury Stage DTPI   Dressing Appearance  Intact;Clean   Drainage Amount None   Appearance Maroon;Purple  (blistered)   Tissue loss description Not applicable   Periwound Area Intact;Dry   Wound Edges Defined   Wound Length (cm) 4 cm   Care Cleansed with:;Sterile normal saline   Dressing Reinforced;Foam   Off Loading   (heel lift suspension boots)   Dressing Change Due 01/25/25        Wound 10/05/24 1718 Pressure Injury Right Heel #5   Date First Assessed/Time First Assessed: 10/05/24 1718   Present on Original Admission: Yes  Primary Wound Type: Pressure Injury  Side: Right  Location: Heel  Wound Number: #5   Wound Image    Pressure Injury Stage DTPI   Dressing Appearance Dry;Intact   Drainage Amount None   Appearance Maroon;Purple  (blistered area)   Tissue loss description Not applicable   Periwound Area Intact;Dry   Wound Edges Defined   Wound Length (cm) 3 cm   Wound Width (cm) 3 cm   Wound Surface Area (cm^2) 9 cm^2   Care Sterile normal saline   Dressing Reinforced;Foam   Off Loading   (heel lift suspension boots)   Dressing Change Due 01/25/25        Wound 01/24/25 1130 Other (comment) Left lower Leg   Date First Assessed/Time First Assessed: 01/24/25 1130   Present on Original Admission: Yes  Primary Wound Type: (c) Other (comment)  Side: Left  Orientation: lower  Location: Leg   Wound Image    Dressing Appearance Open to air   Drainage Amount None   Appearance Gray;Black;Dry  (raised firm lesion , patient reports as a skin cancer that has been untreated.)   Tissue loss description Not applicable   Black (%), Wound Tissue Color 80 %   Periwound Area Intact;Dry   Wound Edges Jagged   Wound Length (cm) 3 cm   Care Sterile normal saline   Dressing Applied;Foam   Periwound Care Dry periwound area maintained   Dressing Change Due 01/26/25         Recommendations made to primary team for local wound care and pressure injury prevention measures to include a low air loss mattress . Orders placed.     01/24/2025

## 2025-01-25 LAB
ANION GAP SERPL CALC-SCNC: 14 MEQ/L
BASOPHILS # BLD AUTO: 0.02 X10(3)/MCL
BASOPHILS NFR BLD AUTO: 0.3 %
BUN SERPL-MCNC: 45.3 MG/DL (ref 9.8–20.1)
CALCIUM SERPL-MCNC: 9.2 MG/DL (ref 8.4–10.2)
CHLORIDE SERPL-SCNC: 97 MMOL/L (ref 98–107)
CO2 SERPL-SCNC: 25 MMOL/L (ref 23–31)
CREAT SERPL-MCNC: 7.27 MG/DL (ref 0.55–1.02)
CREAT/UREA NIT SERPL: 6
EOSINOPHIL # BLD AUTO: 0.16 X10(3)/MCL (ref 0–0.9)
EOSINOPHIL NFR BLD AUTO: 2.2 %
ERYTHROCYTE [DISTWIDTH] IN BLOOD BY AUTOMATED COUNT: 14.7 % (ref 11.5–17)
GFR SERPLBLD CREATININE-BSD FMLA CKD-EPI: 5 ML/MIN/1.73/M2
GLUCOSE SERPL-MCNC: 82 MG/DL (ref 82–115)
HCT VFR BLD AUTO: 32.3 % (ref 37–47)
HGB BLD-MCNC: 10.3 G/DL (ref 12–16)
IMM GRANULOCYTES # BLD AUTO: 0.04 X10(3)/MCL (ref 0–0.04)
IMM GRANULOCYTES NFR BLD AUTO: 0.6 %
LYMPHOCYTES # BLD AUTO: 1.21 X10(3)/MCL (ref 0.6–4.6)
LYMPHOCYTES NFR BLD AUTO: 16.9 %
MCH RBC QN AUTO: 26.3 PG (ref 27–31)
MCHC RBC AUTO-ENTMCNC: 31.9 G/DL (ref 33–36)
MCV RBC AUTO: 82.6 FL (ref 80–94)
MONOCYTES # BLD AUTO: 0.54 X10(3)/MCL (ref 0.1–1.3)
MONOCYTES NFR BLD AUTO: 7.5 %
NEUTROPHILS # BLD AUTO: 5.19 X10(3)/MCL (ref 2.1–9.2)
NEUTROPHILS NFR BLD AUTO: 72.5 %
NRBC BLD AUTO-RTO: 0 %
PLATELET # BLD AUTO: 325 X10(3)/MCL (ref 130–400)
PMV BLD AUTO: 9.6 FL (ref 7.4–10.4)
POTASSIUM SERPL-SCNC: 3.4 MMOL/L (ref 3.5–5.1)
RBC # BLD AUTO: 3.91 X10(6)/MCL (ref 4.2–5.4)
SODIUM SERPL-SCNC: 136 MMOL/L (ref 136–145)
WBC # BLD AUTO: 7.16 X10(3)/MCL (ref 4.5–11.5)

## 2025-01-25 PROCEDURE — 36415 COLL VENOUS BLD VENIPUNCTURE: CPT | Performed by: INTERNAL MEDICINE

## 2025-01-25 PROCEDURE — 97116 GAIT TRAINING THERAPY: CPT | Mod: CQ

## 2025-01-25 PROCEDURE — 25000003 PHARM REV CODE 250: Performed by: INTERNAL MEDICINE

## 2025-01-25 PROCEDURE — 85025 COMPLETE CBC W/AUTO DIFF WBC: CPT | Performed by: INTERNAL MEDICINE

## 2025-01-25 PROCEDURE — 25000003 PHARM REV CODE 250

## 2025-01-25 PROCEDURE — 80048 BASIC METABOLIC PNL TOTAL CA: CPT | Performed by: INTERNAL MEDICINE

## 2025-01-25 PROCEDURE — 25000003 PHARM REV CODE 250: Performed by: PHYSICIAN ASSISTANT

## 2025-01-25 PROCEDURE — S0179 MEGESTROL 20 MG: HCPCS | Performed by: INTERNAL MEDICINE

## 2025-01-25 PROCEDURE — 21400001 HC TELEMETRY ROOM

## 2025-01-25 PROCEDURE — 97530 THERAPEUTIC ACTIVITIES: CPT | Mod: CQ

## 2025-01-25 RX ORDER — POTASSIUM CHLORIDE 750 MG/1
10 TABLET, EXTENDED RELEASE ORAL ONCE
Status: COMPLETED | OUTPATIENT
Start: 2025-01-25 | End: 2025-01-25

## 2025-01-25 RX ADMIN — FOLIC ACID 1 MG: 1 TABLET ORAL at 08:01

## 2025-01-25 RX ADMIN — AMLODIPINE BESYLATE 5 MG: 5 TABLET ORAL at 08:01

## 2025-01-25 RX ADMIN — POTASSIUM CHLORIDE 10 MEQ: 750 TABLET, FILM COATED, EXTENDED RELEASE ORAL at 08:01

## 2025-01-25 RX ADMIN — ESCITALOPRAM OXALATE 10 MG: 10 TABLET ORAL at 08:01

## 2025-01-25 RX ADMIN — POTASSIUM CHLORIDE 10 MEQ: 750 TABLET, FILM COATED, EXTENDED RELEASE ORAL at 09:01

## 2025-01-25 RX ADMIN — CLINDAMYCIN HYDROCHLORIDE 450 MG: 150 CAPSULE ORAL at 07:01

## 2025-01-25 RX ADMIN — PANTOPRAZOLE SODIUM 40 MG: 40 TABLET, DELAYED RELEASE ORAL at 08:01

## 2025-01-25 RX ADMIN — ALUMINUM HYDROXIDE, MAGNESIUM HYDROXIDE, AND SIMETHICONE 30 ML: 1200; 120; 1200 SUSPENSION ORAL at 04:01

## 2025-01-25 RX ADMIN — Medication 1 CAPSULE: at 08:01

## 2025-01-25 RX ADMIN — CLINDAMYCIN HYDROCHLORIDE 450 MG: 150 CAPSULE ORAL at 02:01

## 2025-01-25 RX ADMIN — ASPIRIN 81 MG: 81 TABLET, COATED ORAL at 08:01

## 2025-01-25 RX ADMIN — MEGESTROL ACETATE 400 MG: 400 SUSPENSION ORAL at 08:01

## 2025-01-25 NOTE — PT/OT/SLP PROGRESS
Physical Therapy Treatment    Patient Name:  Malissa Schwartz   MRN:  [id number]    Recommendations:     Discharge therapy intensity: Moderate Intensity Therapy   Discharge Equipment Recommendations: none  Barriers to discharge: None    Assessment:     Malissa Schwartz is a 74 y.o. female.  She presents with the following impairments/functional limitations: weakness, impaired endurance, impaired self care skills, impaired functional mobility, gait instability, impaired balance, decreased safety awareness, pain.  Communicated with NSG prior to session.  Patient found supine upon PT entry to room.     Rehab Prognosis: Good; patient would benefit from acute skilled PT services to address these deficits and reach maximum level of function.    Recent Surgery: * No surgery found *    General Precautions: Standard, fall  Orthopedic Precautions: RUE non weight bearing (Can use RW and perform ADL's)  Braces: N/A  Respiratory Status: Room air  Skin Integrity: Visible skin intact    Plan:     During this hospitalization, patient would benefit from acute PT services 5 x/week to address the identified rehab impairments via gait training, therapeutic activities, therapeutic exercises, neuromuscular re-education and progress toward the following goals:    Plan of Care Expires:  02/24/25    Subjective     Chief Complaint: Ready to go home    Objective:     Functional Mobility:  Bed Mobility: Mod A from supine to EOB.   EOB Balance: SBA to maintain balance while EOB. EOB to kezia pants and socks.  Transfers: Sit to Stands x 2 Mod Assist.   Gait Training: Pt ambulated 100 ft with RW min Assist    Education:  Role and goals of PT, transfer training, bed mobility, gait training, balance training, safety awareness, assistive device, strengthening exercises, and importance of participating in PT to return to PLOF     Patient left up in chair with all lines intact and call button in reach    GOALS:   Multidisciplinary Problems       Physical  Therapy Goals          Problem: Physical Therapy    Goal Priority Disciplines Outcome Interventions   Physical Therapy Goal     PT, PT/OT Progressing    Description: Goals to be met by: 25     Patient will increase functional independence with mobility by performin. Supine to sit with Set-up [place]  2. Sit to supine with Set-up [place]  3. Sit to stand transfer with Supervision  4. Gait  x 200 feet with Supervision using Rolling Walker.   5. Ascend/descend 2 stairs with bilateral Handrails & Supervision                          Time Tracking:     Billable Minutes: Gait Training 13 and Therapeutic Activity 12    Treatment Type: Treatment  PT/PTA: PTA     Number of PTA visits since last PT visit: 2025

## 2025-01-25 NOTE — NURSING
01/25/25 0730        Peritoneal Dialysis Catheter Continuous cycling Mid lower abdomen   No placement date or time found.   Present Prior to Hospital Arrival?: Yes  Dialysis Type: Continuous cycling  Catheter Location: Mid lower abdomen   Dressing Intervention Other (Comment)  (gauze)   Dressing Status Clean;Dry;Intact   Dressing Gauze   Cycler Peritoneal Dialysis   Initial Drain Volume (mL) 2059 mL   Effluent Appearance Clear   Number of Cycles 4   Fill Volume (mL) 2400 mL   Total Volume (mL) 23537 mL   Cycler PD Total UF (mL) 1174 mL   Cycler Treatment Comments Nightly PD treatment completed without difficulty.
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
## 2025-01-25 NOTE — PROGRESS NOTES
Nephrology  Note    Patient Name: Malissa Schwartz  Age: 74 y.o.  : 1950  MRN: [id number]  Admission Date: 2025      Malissa Schwartz is a 74 y.o. female is very well known to me for her ESRD and on CCPD and she has been doing very good at home.  Admitted to the hospital with complaints of generalized weakness of 1 day duration and also reports some black stools for the last few weeks.  No chest pains.  No abdominal pains.  No cough congestion no fever or chills.  No dysuria hematuria hesitancy frequency urgency.  Some nausea reported.  But no vomiting.  No hematemesis hemoptysis  reported.  No fever or chills.  Lives with her  at home.  Her significant other history include hypertension, anemia of chronic kidney disease, hyperlipidemia, failed living related kidney transplant which lasted many years.  She has been very compliant to her dialysis regimen.  Also has some depression problem.    2025   Better.  Still poor appetite.  Still with some weakness.  No nausea.  No shortness of breath.  Had good CCPD done.  She reported the her dermatologist has told her that she will be getting radiation therapy to the skin cancer on the left lower extremity.    25  No labs for review today. VSS. Tolerated CPPD last night without issue. She has no complaints. Eating breakfast. No SOB, CP, abd pain, n/v/d or swelling    Current Facility-Administered Medications   Medication Dose Route Frequency Provider Last Rate Last Admin    acetaminophen tablet 1,000 mg  1,000 mg Oral Q6H PRN Lisa Stone FNP        aluminum-magnesium hydroxide-simethicone 200-200-20 mg/5 mL suspension 30 mL  30 mL Oral QID PRN Lisa Stone FNP        amLODIPine tablet 5 mg  5 mg Oral Daily Marco A Newell MD   5 mg at 25 0834    aspirin EC tablet 81 mg  81 mg Oral Daily Marco A Newell MD   81 mg at 25 0833    bisacodyL suppository 10 mg  10 mg Rectal Daily PRN Lisa Stone FNP        calcitRIOL capsule 0.25 mcg   0.25 mcg Oral Once per day on Monday Wednesday Friday Marco A Newell MD   0.25 mcg at 01/24/25 0900    clindamycin capsule 450 mg  450 mg Oral Q8H Marco A Newell MD   450 mg at 01/24/25 2311    dextrose 50% injection 12.5 g  12.5 g Intravenous PRN Lisa Stone, OMIDP        dextrose 50% injection 25 g  25 g Intravenous PRN Lisa Stone, OMIDP        EScitalopram oxalate tablet 10 mg  10 mg Oral Daily George Mendoza MD   10 mg at 01/24/25 0834    folic acid tablet 1 mg  1 mg Oral Daily Denisha Romero MD   1 mg at 01/24/25 0834    glucagon (human recombinant) injection 1 mg  1 mg Intramuscular PRN Lisa Stone, LETHA        glucose chewable tablet 16 g  16 g Oral PRN Dinorah Stonesa, LETHA        glucose chewable tablet 24 g  24 g Oral PRN Lisa Stone, LETHA        megestroL 400 mg/10 mL (10 mL) suspension 400 mg  400 mg Oral Daily George Mendoza MD   400 mg at 01/24/25 0834    melatonin tablet 6 mg  6 mg Oral Nightly PRN Lisa Stone FNP   6 mg at 01/23/25 0308    ondansetron injection 4 mg  4 mg Intravenous Q4H PRN Lisa Stone, LETHA        pantoprazole EC tablet 40 mg  40 mg Oral Daily George Mendoza MD   40 mg at 01/24/25 0834    polyethylene glycol packet 17 g  17 g Oral BID PRN Lisa Stone FNP        potassium chloride CR tablet 10 mEq  10 mEq Oral Daily George Mendoza MD   10 mEq at 01/24/25 0834    sodium chloride 0.9% flush 10 mL  10 mL Intravenous PRN Lisa Stone FNP        vitamin renal formula (B-complex-vitamin c-folic acid) 1 mg per capsule 1 capsule  1 capsule Oral Daily George Mendoza MD   1 capsule at 01/24/25 0833       Sergio Crabtree MD    Past Medical History:   Diagnosis Date    Acute rejection of kidney transplant 9/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 Glomerulitis with one activated endothelial cell: treated with Solumedrol pulse 375 mg IVBP x 3 doses with short prednisone taper; thymoglobin 100 mg x 6 doses 10/04/2007 minimal  histologic changes no acute rejection     Allergy     Anemia of renal disease     CKD (chronic kidney disease), stage IV     CMV (cytomegalovirus) status positive     Colon polyp     benign    Depression     GN (glomerulonephritis)     GN (glomerulonephritis)     unknown type    Herpes infection, other 2009    on buttocks    Hyperlipidemia     Hypertension     Immunosuppression     Living-donor kidney transplant recipient 01/15/2007    Meningitis     history of viral meningitis    Proteinuria 1/5/2015    Proteinuria     Pulmonary nodules     Renal hypertension     S/P breast augmentation     Skin cancer     Urinary tract infection       Past Surgical History:   Procedure Laterality Date    COLON SURGERY      COLONOSCOPY W/ BIOPSIES      cosemetic surgery      facelift and breast augmentation     HYSTERECTOMY      INTRAMEDULLARY RODDING OF HUMERUS Right 12/2/2024    Procedure: INSERTION, INTRAMEDULLARY MONTSERRAT, HUMERUS;  Surgeon: Samuel Dow DO;  Location: [facility] OR;  Service: Orthopedics;  Laterality: Right;  supine vacular backward c arm synthes  shoulder scope drape    KIDNEY TRANSPLANT      NEPHRECTOMY Left     9/2016    RENAL BIOPSY      TONSILLECTOMY, ADENOIDECTOMY        Family History   Problem Relation Name Age of Onset    Kidney disease Sister          ESRD    Kidney disease Brother          CKD from HTN    Hypertension Brother      Heart failure Mother      Heart failure Father      Diabetes Neg Hx      Stroke Neg Hx       Social History     Tobacco Use    Smoking status: Former     Current packs/day: 1.00     Average packs/day: 1 pack/day for 15.0 years (15.0 ttl pk-yrs)     Types: Cigarettes    Smokeless tobacco: Never    Tobacco comments:     quit in 2014; smoked for 15 years and at the most smoked 1 ppd   Substance Use Topics    Alcohol use: Yes     Comment: seldom     Medications Prior to Admission   Medication Sig Dispense Refill Last Dose/Taking    amLODIPine (NORVASC) 10 MG tablet Take 5 mg by mouth  once daily.   Taking    aspirin (ECOTRIN) 81 MG EC tablet Take 81 mg by mouth once daily.   Past Week    B complex-vitamin C-folic acid (NEPHRO-JENN) 0.8 mg Tab Take 0.8 mg by mouth Daily.   Past Week    calcitRIOL (ROCALTROL) 0.25 MCG Cap Take 0.25 mcg by mouth 3 (three) times a week.   Past Week    methocarbamoL (ROBAXIN) 500 MG Tab Take 500 mg by mouth 4 (four) times daily.   Past Week    pantoprazole (PROTONIX) 40 MG tablet Take 40 mg by mouth once daily.   Past Week    potassium chloride (KLOR-CON) 10 MEQ TbSR Take 10 mEq by mouth once daily.   Past Week    traMADoL (ULTRAM) 50 mg tablet Take 50 mg by mouth 3 (three) times daily as needed for Pain.   Past Week    D5W PgBk 50 mL with ceFAZolin 1 gram SolR Cefazolin intraperitoneally times 10 more days       EScitalopram oxalate (LEXAPRO) 10 MG tablet Take 1 tablet (10 mg total) by mouth once daily. (Patient not taking: Reported on 12/23/2024) 90 tablet 3     folic acid (FOLVITE) 1 MG tablet Take 1 mg by mouth once daily.       furosemide (LASIX) 20 MG tablet Take 20 mg by mouth once daily.       megestroL (MEGACE) 400 mg/10 mL (10 mL) Susp Take 10 mLs (400 mg total) by mouth once daily. 300 mL 0     zolpidem (AMBIEN) 10 mg Tab Take 10 mg by mouth every evening.        Review of patient's allergies indicates:   Allergen Reactions    Codeine Palpitations and Rash    Cefepime      Other Reaction(s): Encephalopathy    Renal impairment increased risk for this reaction    Levofloxacin      Other Reaction(s): Encephalopathy    Renal pt who had some encephalopathy while on Levaquin & Cefepime, neurology physicians think they may have been due to the antibiotics (both possible side effects for each)    Iodine Nausea Only            Review of Systems:     All 12 point review of system done negative except 1 history of present illness    Objective:       VITAL SIGNS: 24 HR MIN & MAX LAST    Temp  Min: 97.3 °F (36.3 °C)  Max: 97.7 °F (36.5 °C)  97.5 °F (36.4 °C)        BP   Min: 98/56  Max: 132/77  126/76     Pulse  Min: 65  Max: 74  74     Resp  Min: 18  Max: 20  18    SpO2  Min: 97 %  Max: 100 %  97 %      GEN:  Very frail looking female in no respiratory distress.  Awake alert oriented to time person place.  HEENT:  Atraumatic normocephalic.  Pupils reactive.  Extraocular movements intact.  No oral lesion.  Neck supple.  No JVD.  CV: RRR without rub or gallop.  PULM: CTAB, unlabored  ABD: Soft, NT/ND abdomen with NABS, PD catheter noted.  No obvious exit site infection noted.  EXT: No cyanosis or edema, there is skin growth on the left lower extremity just above the ankle.  SKIN: Warm and dry  PSYCH: Awake, alert, and appropriately conversant  Vascular access:  PD catheter.          Component Value Date/Time     (L) 01/24/2025 0744     (L) 01/23/2025 0139     06/20/2018 1402     01/08/2018 0835    K 3.6 01/24/2025 0744    K 3.4 (L) 01/23/2025 0139    K 4.8 06/20/2018 1402    K 4.6 01/08/2018 0835    CO2 20 (L) 01/24/2025 0744    CO2 20 (L) 01/23/2025 0139    CO2 23 06/20/2018 1402    CO2 22 (L) 01/08/2018 0835    BUN 55.4 (H) 01/24/2025 0744    BUN 73.0 (H) 01/23/2025 0139    BUN 30 (H) 06/20/2018 1402    BUN 24 (H) 01/08/2018 0835    CREATININE 7.96 (H) 01/24/2025 0744    CREATININE 9.84 (H) 01/23/2025 0139    CREATININE 1.9 (H) 06/20/2018 1402    CREATININE 1.8 (H) 01/08/2018 0835    CALCIUM 8.4 01/24/2025 0744    CALCIUM 8.7 01/23/2025 0139    CALCIUM 9.7 06/20/2018 1402    CALCIUM 9.1 01/08/2018 0835    PHOS 7.6 (H) 12/03/2024 0509    PHOS 3.8 06/20/2018 1402            Component Value Date/Time    WBC 10.97 01/23/2025 0403    WBC 12.53 (H) 01/22/2025 1141    WBC 4.26 (L) 07/04/2024 0000    WBC 6.90 06/20/2018 1402    WBC 3.37 (L) 01/08/2018 0835    HGB 8.1 (L) 01/23/2025 0403    HGB 9.4 (L) 01/22/2025 1141    HGB 9 (L) 12/18/2024 0000    HGB 10.2 (L) 11/26/2024 0000    HGB 10.5 (L) 06/20/2018 1402    HGB 9.1 (L) 01/08/2018 0835    HCT 25.4 (L) 01/23/2025  0403    HCT 30.3 (L) 01/22/2025 1141    HCT 29.8 (L) 12/18/2024 0000    HCT 31.8 (L) 11/26/2024 0000    HCT 32.0 (L) 06/05/2020 0815    HCT 31.6 (L) 06/20/2018 1402    HCT 27.3 (L) 01/08/2018 0835     01/23/2025 0403     01/22/2025 1141     06/20/2018 1402     01/08/2018 0835         X-Ray Chest AP Portable   Final Result      No acute abnormality.         Electronically signed by: Marilou Laughlin MD   Date:    01/22/2025   Time:    13:03          Assessment / Plan:   ESRD.  Will continue nightly CCPD  Elevated troponins - cardiology rec outpatient stress test  Weakness, resolving  Anemia of chronic kidney disease  Hypertension  History of depression  LLE cellulitis - on clindamycin      Recommendations  - Will continue nightly CCPD  - Physical therapy and mobilize  - ok to d/c from renal standpoint when appropriate per primary team  - labs daily while inpatient  - give 20 mEq potassium today

## 2025-01-26 LAB
ANION GAP SERPL CALC-SCNC: 12 MEQ/L
BASOPHILS # BLD AUTO: 0.03 X10(3)/MCL
BASOPHILS NFR BLD AUTO: 0.6 %
BUN SERPL-MCNC: 42.8 MG/DL (ref 9.8–20.1)
CALCIUM SERPL-MCNC: 8.2 MG/DL (ref 8.4–10.2)
CHLORIDE SERPL-SCNC: 99 MMOL/L (ref 98–107)
CO2 SERPL-SCNC: 25 MMOL/L (ref 23–31)
CREAT SERPL-MCNC: 6.82 MG/DL (ref 0.55–1.02)
CREAT/UREA NIT SERPL: 6
EOSINOPHIL # BLD AUTO: 0.1 X10(3)/MCL (ref 0–0.9)
EOSINOPHIL NFR BLD AUTO: 1.9 %
ERYTHROCYTE [DISTWIDTH] IN BLOOD BY AUTOMATED COUNT: 15.2 % (ref 11.5–17)
GFR SERPLBLD CREATININE-BSD FMLA CKD-EPI: 6 ML/MIN/1.73/M2
GLUCOSE SERPL-MCNC: 103 MG/DL (ref 82–115)
HCT VFR BLD AUTO: 30.9 % (ref 37–47)
HGB BLD-MCNC: 9.6 G/DL (ref 12–16)
IMM GRANULOCYTES # BLD AUTO: 0.05 X10(3)/MCL (ref 0–0.04)
IMM GRANULOCYTES NFR BLD AUTO: 0.9 %
LYMPHOCYTES # BLD AUTO: 1.06 X10(3)/MCL (ref 0.6–4.6)
LYMPHOCYTES NFR BLD AUTO: 19.8 %
MCH RBC QN AUTO: 26.1 PG (ref 27–31)
MCHC RBC AUTO-ENTMCNC: 31.1 G/DL (ref 33–36)
MCV RBC AUTO: 84 FL (ref 80–94)
MONOCYTES # BLD AUTO: 0.44 X10(3)/MCL (ref 0.1–1.3)
MONOCYTES NFR BLD AUTO: 8.2 %
NEUTROPHILS # BLD AUTO: 3.67 X10(3)/MCL (ref 2.1–9.2)
NEUTROPHILS NFR BLD AUTO: 68.6 %
NRBC BLD AUTO-RTO: 0 %
PLATELET # BLD AUTO: 318 X10(3)/MCL (ref 130–400)
PMV BLD AUTO: 9.3 FL (ref 7.4–10.4)
POTASSIUM SERPL-SCNC: 3.5 MMOL/L (ref 3.5–5.1)
RBC # BLD AUTO: 3.68 X10(6)/MCL (ref 4.2–5.4)
SODIUM SERPL-SCNC: 136 MMOL/L (ref 136–145)
WBC # BLD AUTO: 5.35 X10(3)/MCL (ref 4.5–11.5)

## 2025-01-26 PROCEDURE — 25000003 PHARM REV CODE 250

## 2025-01-26 PROCEDURE — 25000003 PHARM REV CODE 250: Performed by: INTERNAL MEDICINE

## 2025-01-26 PROCEDURE — 80048 BASIC METABOLIC PNL TOTAL CA: CPT | Performed by: INTERNAL MEDICINE

## 2025-01-26 PROCEDURE — 85025 COMPLETE CBC W/AUTO DIFF WBC: CPT | Performed by: INTERNAL MEDICINE

## 2025-01-26 PROCEDURE — S0179 MEGESTROL 20 MG: HCPCS | Performed by: INTERNAL MEDICINE

## 2025-01-26 PROCEDURE — 90945 DIALYSIS ONE EVALUATION: CPT

## 2025-01-26 PROCEDURE — 21400001 HC TELEMETRY ROOM

## 2025-01-26 PROCEDURE — 63600175 PHARM REV CODE 636 W HCPCS: Mod: JZ,TB | Performed by: NURSE PRACTITIONER

## 2025-01-26 PROCEDURE — 36415 COLL VENOUS BLD VENIPUNCTURE: CPT | Performed by: INTERNAL MEDICINE

## 2025-01-26 RX ORDER — ZOLPIDEM TARTRATE 5 MG/1
5 TABLET ORAL ONCE
Status: DISCONTINUED | OUTPATIENT
Start: 2025-01-26 | End: 2025-01-26

## 2025-01-26 RX ADMIN — ASPIRIN 81 MG: 81 TABLET, COATED ORAL at 08:01

## 2025-01-26 RX ADMIN — MELATONIN 6 MG: at 10:01

## 2025-01-26 RX ADMIN — ESCITALOPRAM OXALATE 10 MG: 10 TABLET ORAL at 08:01

## 2025-01-26 RX ADMIN — EPOETIN ALFA-EPBX 20000 UNITS: 20000 INJECTION, SOLUTION INTRAVENOUS; SUBCUTANEOUS at 03:01

## 2025-01-26 RX ADMIN — CLINDAMYCIN HYDROCHLORIDE 450 MG: 150 CAPSULE ORAL at 02:01

## 2025-01-26 RX ADMIN — PANTOPRAZOLE SODIUM 40 MG: 40 TABLET, DELAYED RELEASE ORAL at 08:01

## 2025-01-26 RX ADMIN — POTASSIUM CHLORIDE 10 MEQ: 750 TABLET, FILM COATED, EXTENDED RELEASE ORAL at 08:01

## 2025-01-26 RX ADMIN — Medication 1 CAPSULE: at 08:01

## 2025-01-26 RX ADMIN — FOLIC ACID 1 MG: 1 TABLET ORAL at 08:01

## 2025-01-26 RX ADMIN — AMLODIPINE BESYLATE 5 MG: 5 TABLET ORAL at 08:01

## 2025-01-26 RX ADMIN — MEGESTROL ACETATE 400 MG: 400 SUSPENSION ORAL at 08:01

## 2025-01-26 RX ADMIN — CLINDAMYCIN HYDROCHLORIDE 450 MG: 150 CAPSULE ORAL at 08:01

## 2025-01-26 RX ADMIN — CLINDAMYCIN HYDROCHLORIDE 450 MG: 150 CAPSULE ORAL at 03:01

## 2025-01-26 NOTE — NURSING
01/26/25 1000   Cycler Peritoneal Dialysis   Initial Drain Volume (mL) 879 mL   Effluent Appearance Clear   Number of Cycles 4   Total Volume (mL) 11 mL   Cycler PD Total UF (mL) 627 mL   Cycler Treatment Comments Nightly PD treatment completed without difficulty. Line capped and secured.

## 2025-01-26 NOTE — PROGRESS NOTES
Nephrology  Note    Patient Name: Malissa Schwartz  Age: 74 y.o.  : 1950  MRN: [id number]  Admission Date: 2025      Malissa Schwartz is a 74 y.o. female is very well known to me for her ESRD and on CCPD and she has been doing very good at home.  Admitted to the hospital with complaints of generalized weakness of 1 day duration and also reports some black stools for the last few weeks.  No chest pains.  No abdominal pains.  No cough congestion no fever or chills.  No dysuria hematuria hesitancy frequency urgency.  Some nausea reported.  But no vomiting.  No hematemesis hemoptysis  reported.  No fever or chills.  Lives with her  at home.  Her significant other history include hypertension, anemia of chronic kidney disease, hyperlipidemia, failed living related kidney transplant which lasted many years.  She has been very compliant to her dialysis regimen.  Also has some depression problem.    2025   Better.  Still poor appetite.  Still with some weakness.  No nausea.  No shortness of breath.  Had good CCPD done.  She reported the her dermatologist has told her that she will be getting radiation therapy to the skin cancer on the left lower extremity.    25  No labs for review today. VSS. Tolerated CPPD last night without issue. She has no complaints. Eating breakfast. No SOB, CP, abd pain, n/v/d or swelling    2025  Patient is resting quietly in bed.  Denies complaints.  Peritoneal effluent is clear.    Current Facility-Administered Medications   Medication Dose Route Frequency Provider Last Rate Last Admin    acetaminophen tablet 1,000 mg  1,000 mg Oral Q6H PRN Lisa Stone FNP        aluminum-magnesium hydroxide-simethicone 200-200-20 mg/5 mL suspension 30 mL  30 mL Oral QID PRN Lisa Stone FNP   30 mL at 25 1615    amLODIPine tablet 5 mg  5 mg Oral Daily Marco A Newell MD   5 mg at 25 0809    aspirin EC tablet 81 mg  81 mg Oral Daily Marco A Newell MD   81 mg at  01/26/25 0809    bisacodyL suppository 10 mg  10 mg Rectal Daily PRN Lisa Stone FNP        calcitRIOL capsule 0.25 mcg  0.25 mcg Oral Once per day on Monday Wednesday Friday Marco A Newell MD   0.25 mcg at 01/24/25 0900    clindamycin capsule 450 mg  450 mg Oral Q8H Marco A Newell MD   450 mg at 01/26/25 0808    dextrose 50% injection 12.5 g  12.5 g Intravenous PRN Lisa Stone, LETHA        dextrose 50% injection 25 g  25 g Intravenous PRN Lisa Stone, LETHA        EScitalopram oxalate tablet 10 mg  10 mg Oral Daily George Mendoza MD   10 mg at 01/26/25 0809    folic acid tablet 1 mg  1 mg Oral Daily Denisha Romero MD   1 mg at 01/26/25 0809    glucagon (human recombinant) injection 1 mg  1 mg Intramuscular PRN Lisa Stone FNP        glucose chewable tablet 16 g  16 g Oral PRN Lisa Stone FNP        glucose chewable tablet 24 g  24 g Oral PRN Lisa Stone FNP        megestroL 400 mg/10 mL (10 mL) suspension 400 mg  400 mg Oral Daily George Mendoza MD   400 mg at 01/26/25 0808    melatonin tablet 6 mg  6 mg Oral Nightly PRN Lisa Stone FNP   6 mg at 01/23/25 0308    ondansetron injection 4 mg  4 mg Intravenous Q4H PRN Lisa Stone FNP        pantoprazole EC tablet 40 mg  40 mg Oral Daily George Mendoza MD   40 mg at 01/26/25 0809    polyethylene glycol packet 17 g  17 g Oral BID PRN Lisa Stone FNP        potassium chloride CR tablet 10 mEq  10 mEq Oral Daily George Mendoza MD   10 mEq at 01/26/25 0809    sodium chloride 0.9% flush 10 mL  10 mL Intravenous PRN Lisa Stone FNP        vitamin renal formula (B-complex-vitamin c-folic acid) 1 mg per capsule 1 capsule  1 capsule Oral Daily George Mendoza MD   1 capsule at 01/26/25 0809       Sergio Crabtree MD    Past Medical History:   Diagnosis Date    Acute rejection of kidney transplant 9/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 Glomerulitis with one activated endothelial cell: treated  with Solumedrol pulse 375 mg IVBP x 3 doses with short prednisone taper; thymoglobin 100 mg x 6 doses 10/04/2007 minimal histologic changes no acute rejection     Allergy     Anemia of renal disease     CKD (chronic kidney disease), stage IV     CMV (cytomegalovirus) status positive     Colon polyp     benign    Depression     GN (glomerulonephritis)     GN (glomerulonephritis)     unknown type    Herpes infection, other 2009    on buttocks    Hyperlipidemia     Hypertension     Immunosuppression     Living-donor kidney transplant recipient 01/15/2007    Meningitis     history of viral meningitis    Proteinuria 1/5/2015    Proteinuria     Pulmonary nodules     Renal hypertension     S/P breast augmentation     Skin cancer     Urinary tract infection       Past Surgical History:   Procedure Laterality Date    COLON SURGERY      COLONOSCOPY W/ BIOPSIES      cosemetic surgery      facelift and breast augmentation     HYSTERECTOMY      INTRAMEDULLARY RODDING OF HUMERUS Right 12/2/2024    Procedure: INSERTION, INTRAMEDULLARY MONTSERRAT, HUMERUS;  Surgeon: Samuel Dow DO;  Location: [facility];  Service: Orthopedics;  Laterality: Right;  supine vacular backward c arm synthes  shoulder scope drape    KIDNEY TRANSPLANT      NEPHRECTOMY Left     9/2016    RENAL BIOPSY      TONSILLECTOMY, ADENOIDECTOMY        Family History   Problem Relation Name Age of Onset    Kidney disease Sister          ESRD    Kidney disease Brother          CKD from HTN    Hypertension Brother      Heart failure Mother      Heart failure Father      Diabetes Neg Hx      Stroke Neg Hx       Social History     Tobacco Use    Smoking status: Former     Current packs/day: 1.00     Average packs/day: 1 pack/day for 15.0 years (15.0 ttl pk-yrs)     Types: Cigarettes    Smokeless tobacco: Never    Tobacco comments:     quit in 2014; smoked for 15 years and at the most smoked 1 ppd   Substance Use Topics    Alcohol use: Yes     Comment: seldom     Medications Prior  to Admission   Medication Sig Dispense Refill Last Dose/Taking    amLODIPine (NORVASC) 10 MG tablet Take 5 mg by mouth once daily.   Taking    aspirin (ECOTRIN) 81 MG EC tablet Take 81 mg by mouth once daily.   Past Week    B complex-vitamin C-folic acid (NEPHRO-JENN) 0.8 mg Tab Take 0.8 mg by mouth Daily.   Past Week    calcitRIOL (ROCALTROL) 0.25 MCG Cap Take 0.25 mcg by mouth 3 (three) times a week.   Past Week    methocarbamoL (ROBAXIN) 500 MG Tab Take 500 mg by mouth 4 (four) times daily.   Past Week    pantoprazole (PROTONIX) 40 MG tablet Take 40 mg by mouth once daily.   Past Week    potassium chloride (KLOR-CON) 10 MEQ TbSR Take 10 mEq by mouth once daily.   Past Week    traMADoL (ULTRAM) 50 mg tablet Take 50 mg by mouth 3 (three) times daily as needed for Pain.   Past Week    D5W PgBk 50 mL with ceFAZolin 1 gram SolR Cefazolin intraperitoneally times 10 more days       EScitalopram oxalate (LEXAPRO) 10 MG tablet Take 1 tablet (10 mg total) by mouth once daily. (Patient not taking: Reported on 12/23/2024) 90 tablet 3     folic acid (FOLVITE) 1 MG tablet Take 1 mg by mouth once daily.       furosemide (LASIX) 20 MG tablet Take 20 mg by mouth once daily.       megestroL (MEGACE) 400 mg/10 mL (10 mL) Susp Take 10 mLs (400 mg total) by mouth once daily. 300 mL 0     zolpidem (AMBIEN) 10 mg Tab Take 10 mg by mouth every evening.        Review of patient's allergies indicates:   Allergen Reactions    Codeine Palpitations and Rash    Cefepime      Other Reaction(s): Encephalopathy    Renal impairment increased risk for this reaction    Levofloxacin      Other Reaction(s): Encephalopathy    Renal pt who had some encephalopathy while on Levaquin & Cefepime, neurology physicians think they may have been due to the antibiotics (both possible side effects for each)    Iodine Nausea Only            Review of Systems:     All 12 point review of system done negative except 1 history of present illness    Objective:        VITAL SIGNS: 24 HR MIN & MAX LAST    Temp  Min: 97.4 °F (36.3 °C)  Max: 98.6 °F (37 °C)  98.1 °F (36.7 °C)        BP  Min: 116/69  Max: 143/98  122/76     Pulse  Min: 73  Max: 81  73     Resp  Min: 18  Max: 20  18    SpO2  Min: 96 %  Max: 99 %  96 %      GEN:  Very frail looking female in no respiratory distress.  Awake alert oriented to time person place.  HEENT:  Atraumatic normocephalic.  Pupils reactive.  Extraocular movements intact.  No oral lesion.  Neck supple.  No JVD.  CV: RRR without rub or gallop.  PULM: CTAB, unlabored  ABD: Soft, NT/ND abdomen with NABS, PD catheter noted.  No obvious exit site infection noted.  EXT: No cyanosis or edema, there is skin growth on the left lower extremity just above the ankle.  SKIN: Warm and dry  PSYCH: Awake, alert, and appropriately conversant            Component Value Date/Time     01/26/2025 0609     01/25/2025 0620     06/20/2018 1402     01/08/2018 0835    K 3.5 01/26/2025 0609    K 3.4 (L) 01/25/2025 0620    K 4.8 06/20/2018 1402    K 4.6 01/08/2018 0835    CO2 25 01/26/2025 0609    CO2 25 01/25/2025 0620    CO2 23 06/20/2018 1402    CO2 22 (L) 01/08/2018 0835    BUN 42.8 (H) 01/26/2025 0609    BUN 45.3 (H) 01/25/2025 0620    BUN 30 (H) 06/20/2018 1402    BUN 24 (H) 01/08/2018 0835    CREATININE 6.82 (H) 01/26/2025 0609    CREATININE 7.27 (H) 01/25/2025 0620    CREATININE 1.9 (H) 06/20/2018 1402    CREATININE 1.8 (H) 01/08/2018 0835    CALCIUM 8.2 (L) 01/26/2025 0609    CALCIUM 9.2 01/25/2025 0620    CALCIUM 9.7 06/20/2018 1402    CALCIUM 9.1 01/08/2018 0835    PHOS 7.6 (H) 12/03/2024 0509    PHOS 3.8 06/20/2018 1402            Component Value Date/Time    WBC 5.35 01/26/2025 0609    WBC 7.16 01/25/2025 0620    WBC 4.26 (L) 07/04/2024 0000    WBC 6.90 06/20/2018 1402    WBC 3.37 (L) 01/08/2018 0835    HGB 9.6 (L) 01/26/2025 0609    HGB 10.3 (L) 01/25/2025 0620    HGB 9 (L) 12/18/2024 0000    HGB 10.2 (L) 11/26/2024 0000    HGB 10.5  (L) 06/20/2018 1402    HGB 9.1 (L) 01/08/2018 0835    HCT 30.9 (L) 01/26/2025 0609    HCT 32.3 (L) 01/25/2025 0620    HCT 29.8 (L) 12/18/2024 0000    HCT 31.8 (L) 11/26/2024 0000    HCT 32.0 (L) 06/05/2020 0815    HCT 31.6 (L) 06/20/2018 1402    HCT 27.3 (L) 01/08/2018 0835     01/26/2025 0609     01/25/2025 0620     06/20/2018 1402     01/08/2018 0835         X-Ray Chest AP Portable   Final Result      No acute abnormality.         Electronically signed by: Marilou Laughlin MD   Date:    01/22/2025   Time:    13:03          Assessment / Plan:   ESRD, on peritoneal dialysis  Elevated troponin - cardiology recommends outpatient stress test  Weakness, resolving  Anemia of chronic kidney disease  Hypertension  History of depression  LLE cellulitis - on clindamycin      Recommendations  - Will continue nightly CCPD  -continue physical therapy  -will give erythropoietin for treatment of anemia of chronic disease.    Lynn Winter NP  Nephrology
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
## 2025-01-26 NOTE — PLAN OF CARE
Problem: Wound  Goal: Optimal Coping  Outcome: Not Progressing  Intervention: Support Patient and Family Response  Flowsheets (Taken 1/26/2025 1719)  Supportive Measures: (Pt adamently refusing wound care to heels, addressed concerns that may be inhibiting cooperation, pt still refusing)   active listening utilized   counseling provided   decision-making supported   goal-setting facilitated   self-care encouraged   self-reflection promoted   self-responsibility promoted   problem-solving facilitated   positive reinforcement provided   relaxation techniques promoted   verbalization of feelings encouraged  Family/Support System Care:   caregiver stress acknowledged   self-care encouraged   involvement promoted   presence promoted   support provided  Goal: Skin Health and Integrity  Outcome: Not Progressing  Intervention: Optimize Skin Protection  Flowsheets (Taken 1/26/2025 1719)  Pressure Reduction Techniques: (Pt refusing wedging)   frequent weight shift encouraged   heels elevated off bed   positioned off wounds   pressure points protected  Pressure Reduction Devices: heel offloading device utilized  Skin Protection: incontinence pads utilized  Activity Management: Ambulated to bathroom - L4  Head of Bed (HOB) Positioning: HOB at 30-45 degrees  Goal: Optimal Wound Healing  Outcome: Not Progressing     Problem: Skin Injury Risk Increased  Goal: Skin Health and Integrity  Outcome: Not Progressing  Intervention: Optimize Skin Protection  Flowsheets (Taken 1/26/2025 1719)  Pressure Reduction Techniques: (Pt refusing wedging)   frequent weight shift encouraged   heels elevated off bed   positioned off wounds   pressure points protected  Pressure Reduction Devices: heel offloading device utilized  Skin Protection: incontinence pads utilized  Activity Management: Ambulated to bathroom - L4  Head of Bed (HOB) Positioning: HOB at 30-45 degrees

## 2025-01-26 NOTE — PROGRESS NOTES
Inpatient Nutrition Evaluation    Admit Date: 1/22/2025   Total duration of encounter: 4 days   Patient Age: 74 y.o.    Nutrition Recommendation/Prescription     Continue liberalized diet.  Appetite stimulant as medically feasible.  Add Buddy (provides 90 kcal, 2.5 g protein per serving) BID to trial for wound healing.    Nutrition Assessment     Chart Review    Reason Seen: malnutrition screening tool (MST)    Malnutrition Screening Tool Results   Have you recently lost weight without trying?: Unsure  Have you been eating poorly because of a decreased appetite?: Yes   MST Score: 3   Diagnosis:  NSTEMI-suspect type 2  ESRD on PD  Mild left lower extremity cellulitis  Sepsis secondary to above-improved   Anemia of chronic disease  Hypokalemia   Metabolic acidosis  Physical deconditioning     Relevant Medical History: ESRD on PD, failed kidney transplant, HTN, HLD, depression, anemia of chronic disease, colon polyp, lung nodule, left lower extremity skin cancer awaiting treatment, renal hypertension, recent humerus fracture requiring intramedullary nailing    Scheduled Medications:  amLODIPine, 5 mg, Daily  aspirin, 81 mg, Daily  calcitRIOL, 0.25 mcg, Once per day on Monday Wednesday Friday  clindamycin, 450 mg, Q8H  epoetin jak-epbx, 20,000 Units, Once  EScitalopram oxalate, 10 mg, Daily  folic acid, 1 mg, Daily  megestroL, 400 mg, Daily  pantoprazole, 40 mg, Daily  potassium chloride, 10 mEq, Daily  vitamin renal formula (B-complex-vitamin c-folic acid), 1 capsule, Daily    Continuous Infusions:   PRN Medications:   Current Facility-Administered Medications:     acetaminophen, 1,000 mg, Oral, Q6H PRN    aluminum-magnesium hydroxide-simethicone, 30 mL, Oral, QID PRN    bisacodyL, 10 mg, Rectal, Daily PRN    dextrose 50%, 12.5 g, Intravenous, PRN    dextrose 50%, 25 g, Intravenous, PRN    glucagon (human recombinant), 1 mg, Intramuscular, PRN    glucose, 16 g, Oral, PRN    glucose, 24 g, Oral, PRN    melatonin, 6 mg,  "Oral, Nightly PRN    ondansetron, 4 mg, Intravenous, Q4H PRN    polyethylene glycol, 17 g, Oral, BID PRN    sodium chloride 0.9%, 10 mL, Intravenous, PRN    Recent Labs   Lab 01/22/25  1141 01/23/25  0139 01/23/25  0403 01/24/25  0744 01/25/25  0620 01/26/25  0609    133*  --  134* 136 136   K 3.9 3.4*  --  3.6 3.4* 3.5   CALCIUM 8.5 8.7  --  8.4 9.2 8.2*   MG  --  2.70*  --   --   --   --    CL 96* 94*  --  98 97* 99   CO2 21* 20*  --  20* 25 25   BUN 81.3* 73.0*  --  55.4* 45.3* 42.8*   CREATININE 10.88* 9.84*  --  7.96* 7.27* 6.82*   EGFRNORACEVR 3 4  --  5 5 6   GLUCOSE 79* 170*  --  108 82 103   BILITOT 0.4 0.3  --  0.3  --   --    ALKPHOS 208* 182*  --  191*  --   --    ALT 18 19  --  22  --   --    AST 26 37*  --  36*  --   --    ALBUMIN 1.6* 1.5*  --  1.7*  --   --    WBC 12.53*  --  10.97  --  7.16 5.35   HGB 9.4*  --  8.1*  --  10.3* 9.6*   HCT 30.3*  --  25.4*  --  32.3* 30.9*     Nutrition Orders:  Diet Adult Regular    Appetite/Oral Intake: fair/50-75% of meals  Factors Affecting Nutritional Intake: none identified  Food/Synagogue/Cultural Preferences:  regular diet  Food Allergies: none reported  Last Bowel Movement: 01/25/25  Wound(s):     Wound 10/05/24 1717 Pressure Injury Left Heel #4-Tissue loss description: Not applicable       Wound 10/05/24 1718 Pressure Injury Right Heel #5-Tissue loss description: Not applicable       Wound 01/24/25 1130 Other (comment) Left lower Leg-Tissue loss description: Not applicable     Comments    1/26/25 Reports fair appetite, food needs "more salt". On regular diet. Discussed requesting seasonings on trays to her preference. Denies any unintentional wt loss, thinks dry UBW ~120lbs. Noted wounds and WOCN note, add Buddy BID to trial.    Anthropometrics    Height: 5' 2" (157.5 cm),    Last Weight: 62.6 kg (138 lb) (01/22/25 1048),    BMI (Calculated): 25.2  BMI Classification: overweight (BMI 25-29.9)     Ideal Body Weight (IBW), Female: 110 lb     % Ideal Body " Weight, Female (lb): 125.45 %                             Usual Weight Provided By: patient denies unintentional weight loss    Wt Readings from Last 5 Encounters:   01/22/25 62.6 kg (138 lb)   12/23/24 63 kg (138 lb 14.2 oz)   12/09/24 63.5 kg (140 lb)   12/02/24 59 kg (130 lb)   10/26/24 54.4 kg (120 lb)     Weight Change(s) Since Admission:   Wt Readings from Last 1 Encounters:   01/22/25 1048 62.6 kg (138 lb)   Admit Weight: 62.6 kg (138 lb) (01/22/25 1048),      Patient Education     Not applicable.    Nutrition Goals & Monitoring     Dietitian will monitor: energy intake    Nutrition Risk/Follow-Up: low (follow-up in 5-7 days)  Patients assigned 'low nutrition risk' status do not qualify for a full nutritional assessment but will be monitored and re-evaluated in a 5-7 day time period. Please consult if re-evaluation needed sooner.

## 2025-01-26 NOTE — NURSING
01/25/25 2159   Cycler Peritoneal Dialysis   Initial Drain Volume (mL) 987 mL   Effluent Appearance Clear   Number of Cycles 4   Fill Volume (mL) 2500 mL   Total Volume (mL) 57342 mL   Cycler Treatment Comments Cycler setup at bedside, dressing changed, PD cath accessed and treatment initiated per P&P. Cycler running alarm free upon departure

## 2025-01-27 PROBLEM — T85.611A PERITONEAL DIALYSIS CATHETER DYSFUNCTION: Status: RESOLVED | Noted: 2023-09-26 | Resolved: 2025-01-27

## 2025-01-27 PROBLEM — L89.622 PRESSURE ULCER OF LEFT HEEL, STAGE 2: Status: RESOLVED | Noted: 2024-08-26 | Resolved: 2025-01-27

## 2025-01-27 PROBLEM — L89.616 PRESSURE INJURY OF DEEP TISSUE OF RIGHT HEEL: Status: ACTIVE | Noted: 2025-01-27

## 2025-01-27 PROBLEM — L89.626 PRESSURE INJURY OF DEEP TISSUE OF LEFT HEEL: Status: ACTIVE | Noted: 2025-01-27

## 2025-01-27 PROBLEM — N18.6 ESRD (END STAGE RENAL DISEASE): Status: ACTIVE | Noted: 2025-01-27

## 2025-01-27 PROBLEM — S81.802A OPEN WOUND OF LEFT LOWER LEG: Status: RESOLVED | Noted: 2024-08-26 | Resolved: 2025-01-27

## 2025-01-27 LAB
ALBUMIN SERPL-MCNC: 1.7 G/DL (ref 3.4–4.8)
BACTERIA FLD CULT: NORMAL
BASOPHILS # BLD AUTO: 0.04 X10(3)/MCL
BASOPHILS NFR BLD AUTO: 0.5 %
BUN SERPL-MCNC: 38.5 MG/DL (ref 9.8–20.1)
CALCIUM SERPL-MCNC: 8.7 MG/DL (ref 8.4–10.2)
CHLORIDE SERPL-SCNC: 100 MMOL/L (ref 98–107)
CO2 SERPL-SCNC: 25 MMOL/L (ref 23–31)
CREAT SERPL-MCNC: 6.84 MG/DL (ref 0.55–1.02)
EOSINOPHIL # BLD AUTO: 0.08 X10(3)/MCL (ref 0–0.9)
EOSINOPHIL NFR BLD AUTO: 0.9 %
ERYTHROCYTE [DISTWIDTH] IN BLOOD BY AUTOMATED COUNT: 15.9 % (ref 11.5–17)
GFR SERPLBLD CREATININE-BSD FMLA CKD-EPI: 6 ML/MIN/1.73/M2
GLUCOSE SERPL-MCNC: 98 MG/DL (ref 82–115)
HCT VFR BLD AUTO: 33.7 % (ref 37–47)
HGB BLD-MCNC: 10.3 G/DL (ref 12–16)
IMM GRANULOCYTES # BLD AUTO: 0.07 X10(3)/MCL (ref 0–0.04)
IMM GRANULOCYTES NFR BLD AUTO: 0.8 %
LYMPHOCYTES # BLD AUTO: 0.82 X10(3)/MCL (ref 0.6–4.6)
LYMPHOCYTES NFR BLD AUTO: 9.2 %
MCH RBC QN AUTO: 26.5 PG (ref 27–31)
MCHC RBC AUTO-ENTMCNC: 30.6 G/DL (ref 33–36)
MCV RBC AUTO: 86.9 FL (ref 80–94)
MONOCYTES # BLD AUTO: 0.41 X10(3)/MCL (ref 0.1–1.3)
MONOCYTES NFR BLD AUTO: 4.6 %
NEUTROPHILS # BLD AUTO: 7.46 X10(3)/MCL (ref 2.1–9.2)
NEUTROPHILS NFR BLD AUTO: 84 %
NRBC BLD AUTO-RTO: 0 %
PHOSPHATE SERPL-MCNC: 5.1 MG/DL (ref 2.3–4.7)
PLATELET # BLD AUTO: 315 X10(3)/MCL (ref 130–400)
PMV BLD AUTO: 9 FL (ref 7.4–10.4)
POTASSIUM SERPL-SCNC: 3.6 MMOL/L (ref 3.5–5.1)
RBC # BLD AUTO: 3.88 X10(6)/MCL (ref 4.2–5.4)
SODIUM SERPL-SCNC: 139 MMOL/L (ref 136–145)
WBC # BLD AUTO: 8.88 X10(3)/MCL (ref 4.5–11.5)

## 2025-01-27 PROCEDURE — 25000003 PHARM REV CODE 250

## 2025-01-27 PROCEDURE — 84134 ASSAY OF PREALBUMIN: CPT

## 2025-01-27 PROCEDURE — 97116 GAIT TRAINING THERAPY: CPT | Mod: CQ

## 2025-01-27 PROCEDURE — 94760 N-INVAS EAR/PLS OXIMETRY 1: CPT

## 2025-01-27 PROCEDURE — 99223 1ST HOSP IP/OBS HIGH 75: CPT | Mod: ,,, | Performed by: STUDENT IN AN ORGANIZED HEALTH CARE EDUCATION/TRAINING PROGRAM

## 2025-01-27 PROCEDURE — S0179 MEGESTROL 20 MG: HCPCS | Performed by: INTERNAL MEDICINE

## 2025-01-27 PROCEDURE — 21400001 HC TELEMETRY ROOM

## 2025-01-27 PROCEDURE — 80069 RENAL FUNCTION PANEL: CPT | Performed by: NURSE PRACTITIONER

## 2025-01-27 PROCEDURE — 25000003 PHARM REV CODE 250: Performed by: INTERNAL MEDICINE

## 2025-01-27 PROCEDURE — 97530 THERAPEUTIC ACTIVITIES: CPT | Mod: CQ

## 2025-01-27 PROCEDURE — 36415 COLL VENOUS BLD VENIPUNCTURE: CPT

## 2025-01-27 PROCEDURE — 97535 SELF CARE MNGMENT TRAINING: CPT | Mod: CO

## 2025-01-27 PROCEDURE — 36415 COLL VENOUS BLD VENIPUNCTURE: CPT | Performed by: NURSE PRACTITIONER

## 2025-01-27 PROCEDURE — 99223 1ST HOSP IP/OBS HIGH 75: CPT | Mod: ,,,

## 2025-01-27 PROCEDURE — 85025 COMPLETE CBC W/AUTO DIFF WBC: CPT | Performed by: INTERNAL MEDICINE

## 2025-01-27 RX ORDER — MUPIROCIN 20 MG/G
OINTMENT TOPICAL DAILY
Status: DISCONTINUED | OUTPATIENT
Start: 2025-01-28 | End: 2025-02-03 | Stop reason: HOSPADM

## 2025-01-27 RX ORDER — ENOXAPARIN SODIUM 100 MG/ML
30 INJECTION SUBCUTANEOUS EVERY 24 HOURS
Status: DISCONTINUED | OUTPATIENT
Start: 2025-01-27 | End: 2025-01-27

## 2025-01-27 RX ADMIN — AMLODIPINE BESYLATE 5 MG: 5 TABLET ORAL at 08:01

## 2025-01-27 RX ADMIN — CLINDAMYCIN HYDROCHLORIDE 450 MG: 150 CAPSULE ORAL at 03:01

## 2025-01-27 RX ADMIN — CLINDAMYCIN HYDROCHLORIDE 450 MG: 150 CAPSULE ORAL at 06:01

## 2025-01-27 RX ADMIN — POTASSIUM CHLORIDE 10 MEQ: 750 TABLET, FILM COATED, EXTENDED RELEASE ORAL at 08:01

## 2025-01-27 RX ADMIN — ASPIRIN 81 MG: 81 TABLET, COATED ORAL at 08:01

## 2025-01-27 RX ADMIN — Medication 1 CAPSULE: at 08:01

## 2025-01-27 RX ADMIN — MEGESTROL ACETATE 400 MG: 400 SUSPENSION ORAL at 08:01

## 2025-01-27 RX ADMIN — ACETAMINOPHEN 1000 MG: 500 TABLET ORAL at 07:01

## 2025-01-27 RX ADMIN — CALCITRIOL CAPSULES 0.25 MCG 0.25 MCG: 0.25 CAPSULE ORAL at 08:01

## 2025-01-27 RX ADMIN — PANTOPRAZOLE SODIUM 40 MG: 40 TABLET, DELAYED RELEASE ORAL at 08:01

## 2025-01-27 RX ADMIN — CLINDAMYCIN HYDROCHLORIDE 450 MG: 150 CAPSULE ORAL at 09:01

## 2025-01-27 RX ADMIN — FOLIC ACID 1 MG: 1 TABLET ORAL at 08:01

## 2025-01-27 RX ADMIN — ESCITALOPRAM OXALATE 10 MG: 10 TABLET ORAL at 08:01

## 2025-01-27 NOTE — NURSING
01/27/25 1757   Cycler Peritoneal Dialysis   Effluent Appearance Yellow   Number of Cycles 4   Fill Volume (mL) 2400 mL   Total Volume (mL) 50591 mL   Cycler Treatment Comments CCPD setup and started per P&P

## 2025-01-27 NOTE — SUBJECTIVE & OBJECTIVE
Scheduled Meds:   amLODIPine  5 mg Oral Daily    aspirin  81 mg Oral Daily    calcitRIOL  0.25 mcg Oral Once per day on Monday Wednesday Friday    clindamycin  450 mg Oral Q8H    enoxparin  30 mg Subcutaneous Q24H (prophylaxis, 1700)    EScitalopram oxalate  10 mg Oral Daily    folic acid  1 mg Oral Daily    megestroL  400 mg Oral Daily    pantoprazole  40 mg Oral Daily    potassium chloride  10 mEq Oral Daily    vitamin renal formula (B-complex-vitamin c-folic acid)  1 capsule Oral Daily     Continuous Infusions:  PRN Meds:  Current Facility-Administered Medications:     acetaminophen, 1,000 mg, Oral, Q6H PRN    aluminum-magnesium hydroxide-simethicone, 30 mL, Oral, QID PRN    bisacodyL, 10 mg, Rectal, Daily PRN    dextrose 50%, 12.5 g, Intravenous, PRN    dextrose 50%, 25 g, Intravenous, PRN    glucagon (human recombinant), 1 mg, Intramuscular, PRN    glucose, 16 g, Oral, PRN    glucose, 24 g, Oral, PRN    melatonin, 6 mg, Oral, Nightly PRN    ondansetron, 4 mg, Intravenous, Q4H PRN    polyethylene glycol, 17 g, Oral, BID PRN    sodium chloride 0.9%, 10 mL, Intravenous, PRN    Review of patient's allergies indicates:   Allergen Reactions    Codeine Palpitations and Rash    Cefepime      Other Reaction(s): Encephalopathy    Renal impairment increased risk for this reaction    Levofloxacin      Other Reaction(s): Encephalopathy    Renal pt who had some encephalopathy while on Levaquin & Cefepime, neurology physicians think they may have been due to the antibiotics (both possible side effects for each)    Iodine Nausea Only        Past Medical History:   Diagnosis Date    Acute rejection of kidney transplant 9/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 Glomerulitis with one activated endothelial cell: treated with Solumedrol pulse 375 mg IVBP x 3 doses with short prednisone taper; thymoglobin 100 mg x 6 doses 10/04/2007 minimal histologic changes no acute rejection     Allergy     Anemia of renal  disease     CKD (chronic kidney disease), stage IV     CMV (cytomegalovirus) status positive     Colon polyp     benign    Depression     GN (glomerulonephritis)     GN (glomerulonephritis)     unknown type    Herpes infection, other 2009    on buttocks    Hyperlipidemia     Hypertension     Immunosuppression     Living-donor kidney transplant recipient 01/15/2007    Meningitis     history of viral meningitis    Proteinuria 1/5/2015    Proteinuria     Pulmonary nodules     Renal hypertension     S/P breast augmentation     Skin cancer     Urinary tract infection      Past Surgical History:   Procedure Laterality Date    COLON SURGERY      COLONOSCOPY W/ BIOPSIES      cosemetic surgery      facelift and breast augmentation     HYSTERECTOMY      INTRAMEDULLARY RODDING OF HUMERUS Right 12/2/2024    Procedure: INSERTION, INTRAMEDULLARY MONTSERRAT, HUMERUS;  Surgeon: Samuel Dow DO;  Location: [facility];  Service: Orthopedics;  Laterality: Right;  supine vacular backward c arm synthes  shoulder scope drape    KIDNEY TRANSPLANT      NEPHRECTOMY Left     9/2016    RENAL BIOPSY      TONSILLECTOMY, ADENOIDECTOMY         Family History       Problem Relation (Age of Onset)    Heart failure Mother, Father    Hypertension Brother    Kidney disease Sister, Brother          Tobacco Use    Smoking status: Former     Current packs/day: 1.00     Average packs/day: 1 pack/day for 15.0 years (15.0 ttl pk-yrs)     Types: Cigarettes    Smokeless tobacco: Never    Tobacco comments:     quit in 2014; smoked for 15 years and at the most smoked 1 ppd   Substance and Sexual Activity    Alcohol use: Yes     Comment: seldom    Drug use: No    Sexual activity: Never     Review of Systems   Constitutional:  Positive for activity change. Negative for chills, diaphoresis and fever.   Skin:  Positive for wound.   Neurological:  Positive for weakness.       Objective:     Vital Signs (Most Recent):  Temp: 98 °F (36.7 °C) (01/27/25 1509)  Pulse: 77  (01/27/25 1509)  Resp: 18 (01/27/25 1509)  BP: 115/67 (01/27/25 1509)  SpO2: 98 % (01/27/25 1509) Vital Signs (24h Range):  Temp:  [97.8 °F (36.6 °C)-98.8 °F (37.1 °C)] 98 °F (36.7 °C)  Pulse:  [77-82] 77  Resp:  [18-21] 18  SpO2:  [96 %-99 %] 98 %  BP: (104-136)/(67-79) 115/67     Weight: 61.2 kg (135 lb)  Body mass index is 24.69 kg/m².     Physical Exam  Vitals reviewed.   Constitutional:       General: She is awake. She is not in acute distress.     Appearance: She is normal weight. She is ill-appearing (chronic).      Comments: Appears older than stated age; thin with diffuse muscle wasting    HENT:      Head: Normocephalic and atraumatic.      Nose: Nose normal.   Cardiovascular:      Rate and Rhythm: Normal rate and regular rhythm.      Pulses: Normal pulses.   Pulmonary:      Effort: Pulmonary effort is normal. No respiratory distress.   Musculoskeletal:        Legs:         Feet:    Skin:     General: Skin is warm and dry.      Capillary Refill: Capillary refill takes less than 2 seconds.   Neurological:      General: No focal deficit present.      Mental Status: She is alert and oriented to person, place, and time.   Psychiatric:         Mood and Affect: Mood is depressed.         Behavior: Behavior is cooperative.         Right medial heel: 5 x 5.5 cm        Left medial heel: 4.5 x 6 cm       Left lateral lower leg              Laboratory:    BMP:   Recent Labs   Lab 01/23/25  0139 01/24/25  0744 01/27/25  0701   *   < > 139   K 3.4*   < > 3.6   CL 94*   < > 100   CO2 20*   < > 25   BUN 73.0*   < > 38.5*   CREATININE 9.84*   < > 6.84*   CALCIUM 8.7   < > 8.7   MG 2.70*  --   --     < > = values in this interval not displayed.       CBC:   Recent Labs   Lab 01/27/25  0701   WBC 8.88   RBC 3.88*   HGB 10.3*   HCT 33.7*      MCV 86.9   MCH 26.5*   MCHC 30.6*     CMP:   Recent Labs   Lab 01/24/25  0744 01/25/25  0620 01/27/25  0701   CALCIUM 8.4   < > 8.7   ALBUMIN 1.7*  --  1.7*   *   < >  139   K 3.6   < > 3.6   CO2 20*   < > 25   CL 98   < > 100   BUN 55.4*   < > 38.5*   CREATININE 7.96*   < > 6.84*   ALKPHOS 191*  --   --    ALT 22  --   --    AST 36*  --   --    BILITOT 0.3  --   --     < > = values in this interval not displayed.     Coagulation:   Recent Labs   Lab 01/22/25  1141 01/22/25  1913 01/23/25  1006   INR 1.4*  --   --    APTT 31.4   < > 34.5*    < > = values in this interval not displayed.       LFTs:   Recent Labs   Lab 01/24/25  0744 01/27/25  0701   ALT 22  --    AST 36*  --    ALKPHOS 191*  --    BILITOT 0.3  --    ALBUMIN 1.7* 1.7*     Microbiology Results (last 7 days)       Procedure Component Value Units Date/Time    Body Fluid Culture [[id number]] Collected: 01/22/25 1654    Order Status: Completed Specimen: Body Fluid from Peritoneal Fluid Updated: 01/27/25 1132     Body Fluid Culture Final Report: At 5 days. No growth    Stool Culture [[id number]]  (Normal) Collected: 01/22/25 1729    Order Status: Completed Specimen: Stool Updated: 01/24/25 0830     Stool Culture Negative for Salmonella, Shigella, Campylobacter, Vibrio, Aeromonas, Pleisiomonas,Yersinia, or Shiga Toxin 1 and 2.    Gram Stain [[id number]] Collected: 01/22/25 1654    Order Status: Completed Specimen: Body Fluid from Peritoneal Fluid Updated: 01/22/25 2103     GRAM STAIN Rare WBC observed      No bacteria seen            Diagnostic Results:  I have reviewed all pertinent imaging results/findings within the past 24 hours.
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
## 2025-01-27 NOTE — PT/OT/SLP PROGRESS
Occupational Therapy   Treatment    Name: Malissa Schwartz  MRN: [id number]  Admitting Diagnosis:  <principal problem not specified>       Recommendations:     Recommended therapy intensity at discharge: Moderate Intensity Therapy   Discharge Equipment Recommendations:  to be determined by next level of care  Barriers to discharge:       Assessment:     Malissa Schwartz is a 74 y.o. female with a medical diagnosis of weakness and black stool and current diagnosis of NSTEMI; pt has history of ESRD on PD, failed kidney transplant, HTN, HLD, depression, anemia of chronic disease, colon polyp, lung nodule, left lower extremity skin cancer awaiting treatment, renal hypertension, recent humerus fracture requiring intramedullary nailing.  She presents with flat affect able to mobilize from EOB to [facility]. Performance deficits affecting function are weakness, impaired endurance, impaired self care skills, impaired functional mobility, gait instability, impaired balance, decreased upper extremity function, pain, orthopedic precautions.     Rehab Prognosis:  Fair; patient would benefit from acute skilled OT services to address these deficits and reach maximum level of function.       Plan:     Patient to be seen 4 x/week to address the above listed problems via self-care/home management, therapeutic activities, therapeutic exercises  Plan of Care Expires: 02/24/25  Plan of Care Reviewed with: patient    Subjective     Pain/Comfort:       Objective:     Communicated with: RN prior to session.  Patient found HOB elevated with   upon OT entry to room.    General Precautions: Standard, fall    Orthopedic Precautions:RUE non weight bearing (RUE nwb/pwb only on RW; okay for ADLs)  Braces: N/A     Occupational Performance:     Bed Mobility:    Patient completed Supine to Sit with maximal assistance     Functional Mobility/Transfers:  Patient completed Sit <> Stand Transfer with moderate assistance and maximal assistance  with  rolling  walker   Patient completed Toilet Transfer Step Transfer technique with contact guard assistance with  rolling walker  Functional Mobility: patient requiring max A stand from EOB and Mod A stand from commode. No LOB while mobilizing in room    Activities of Daily Living:  Grooming: stand by assistance washing hands at sink  Toileting: minimum assistance pericare performed in sitting following +BM requiring assistance to ensure cleanliness    Patient Education:  Patient provided with verbal education education regarding OT role/goals/POC.  Understanding was verbalized.      Patient left up in chair with all lines intact and call button in reach.    GOALS:   Multidisciplinary Problems       Occupational Therapy Goals          Problem: Occupational Therapy    Goal Priority Disciplines Outcome Interventions   Occupational Therapy Goal     OT, PT/OT Progressing    Description: Goals to be met by: 2/24/2025     Patient will increase functional independence with ADLs by performing:    UE Dressing with Stand-by Assist.  LE Dressing with Stand-by Assist.   Grooming while standing at sink with Modified [place].  Toileting from toilet with Modified [place] for hygiene and clothing management.   Toilet transfer to toilet with Modified [place].  Bathing in shower w/ Stand-by Assist.                          Time Tracking:     OT Date of Treatment: 01/27/25  OT Start Time: 1039  OT Stop Time: 1102  OT Total Time (min): 23 min    Billable Minutes:Self Care/Home Management 2    OT/ZULEMA: ZULEMA     Number of ZULEMA visits since last OT visit: 1 1/27/2025
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
## 2025-01-27 NOTE — NURSING
01/27/25 0717   Cycler Peritoneal Dialysis   Initial Drain Volume (mL) 874 mL   Effluent Appearance Yellow   Number of Cycles 4   Fill Volume (mL) 2400 mL   Cycler PD Total UF (mL) 951 mL   Cycler Treatment Comments CCPD completed overnight, pt tolerated well

## 2025-01-27 NOTE — PT/OT/SLP PROGRESS
Physical Therapy Treatment    Patient Name:  Malissa Schwartz   MRN:  [id number]    Recommendations:     Discharge therapy intensity: Moderate Intensity Therapy   Discharge Equipment Recommendations: to be determined by next level of care  Barriers to discharge: Impaired mobility    Assessment:     Malissa Schwartz is a 74 y.o. female admitted with a medical diagnosis of  weakness and black stool and current diagnosis of NSTEMI; pt has history of ESRD on PD, failed kidney transplant, HTN, HLD, depression, anemia of chronic disease, colon polyp, lung nodule, left lower extremity skin cancer awaiting treatment, renal hypertension, recent humerus fracture requiring intramedullary nailing.  She presents with the following impairments/functional limitations: weakness, gait instability, impaired endurance, impaired balance, decreased lower extremity function, impaired skin, impaired self care skills, impaired functional mobility, decreased upper extremity function, pain, orthopedic precautions.    Pt with a flat affect but answering questions appropriately. Pt's personal sitter present and spouse arrived later. Gait distance limited by emergency in hallway prompting a return to the room. Upon second trial, pt with active BM, pt unaware. Would benefit from using a brief when mobilizing.      Rehab Prognosis: Good; patient would benefit from acute skilled PT services to address these deficits and reach maximum level of function.    Recent Surgery: Procedure(s) (LRB):  Insertion, Catheter, Central Venous, Hemodialysis (N/A)      Plan:     During this hospitalization, patient would benefit from acute PT services 5 x/week to address the identified rehab impairments via gait training, therapeutic activities, therapeutic exercises, neuromuscular re-education and progress toward the following goals:    Plan of Care Expires:  02/24/25    Subjective     Chief Complaint: shoulder pain  Patient/Family Comments/goals: to get to  bed  Pain/Comfort:  Pain Rating 1: other (see comments) (reported shoulder pain but did not rate)      Objective:     Communicated with RN prior to session.  Patient found up in chair with peripheral IV, telemetry upon PT entry to room.     General Precautions: Standard, fall  Orthopedic Precautions: N/A (RUE nwb/pwb only on RW; okay for ADLs)  Braces: N/A  Respiratory Status: Room air  Blood Pressure: NT  Skin Integrity:  pt with known heel wounds and skin cancer lesion - wound care on the case      Functional Mobility:  Bed Mobility:    Sit to supine: Mod assist x 2  Transfers:    Sit<->stand:   Mod assist on first trial, unsteadiness upon standing  Min assist on second trial with cues for improved anterior weight shift and KAITLIN  Gait: Ambulated x 20' with RW, slow jocelyn, decreased step length, returned to room 2/2 emergency in hallway. On second trial, pt with active BM onto floor requiring increased time for chao-care.     Therapeutic Activities/Exercises:      Education:  Patient, spouse were, and home caregiver were provided with verbal education education regarding PT role/goals/POC, fall prevention, safety awareness, and pressure ulcer prevention.  Understanding was verbalized.     Patient left HOB elevated with all lines intact, call button in reach, and nurse notified. Personal sitter and spouse present.     GOALS:   Multidisciplinary Problems       Physical Therapy Goals          Problem: Physical Therapy    Goal Priority Disciplines Outcome Interventions   Physical Therapy Goal     PT, PT/OT Progressing    Description: Goals to be met by: 25     Patient will increase functional independence with mobility by performin. Supine to sit with Set-up [place]  2. Sit to supine with Set-up [place]illa  3. Sit to stand transfer with Supervision  4. Gait  x 200 feet with Supervision using Rolling Walker.   5. Ascend/descend 2 stairs with bilateral Handrails & Supervision                           Time Tracking:     PT Received On: 01/27/25  PT Start Time: 1335     PT Stop Time: 1402  PT Total Time (min): 27 min     Billable Minutes: Gait Training 1 unit and Therapeutic Activity 1 unit    Treatment Type: Treatment  PT/PTA: PTA     Number of PTA visits since last PT visit: 2     01/27/2025

## 2025-01-27 NOTE — CONSULTS
INTERVENTIONAL NEPHROLOGY INITIAL CONSULTATION NOTE       Patient Name: Malissa Schwartz  CLARIBEL 1950    Patient Seen Date: 2025  Patient Seen Time: 3:32 PM     Consult requested by: Tyree Morillo MD     Reason for consult: ESRD needing to initiate HD       HPI: 74-year-old female with ESRD on PD presented to the hospital with black stools for a few weeks. During hospitalization, the pt was continued on CCPD as maintained by her nephrology team. The pt is now being planned for discharge to a nursing/rehab facility, however they do not have the capability of completing PD. Nephrology service desires to transition the pt to HD during this time, hence interventional nephrology service was consulted for insertion of a tunneled dialysis catheter.    Pt seen and examined at bedside this PM. Multiple family members present. Pt denies complaints. She is known to our service for having a L RC AVF that was created by Dr. Valenzuela. She underwent a fistulogram with me, most recently in . She gives history that her fistula became nonfunctional, but is unaware specifically when based on the timeline presented above. She has been successful with PD treatments otherwise.    Risks and benefits of tunneled dialysis catheter insertion. and intravenous conscious sedation was reviewed with the patient. The patient agrees to proceed with the intended procedure. Consents for both intravenous conscious sedation and procedure were signed and placed within the chart.       Review of Systems:  General:  No fatigue  Skin: No rashes  HEENT: No vision changes  CVS: No CP  RS: No SOB  GIT: No abdominal pain  Extremities: No swelling  Neurological:  No focal weakness  Psych: No depression    Past Medical History:   Diagnosis Date    Acute rejection of kidney transplant 9/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 Glomerulitis with one activated endothelial cell: treated with Solumedrol pulse 375 mg IVBP x 3 doses  with short prednisone taper; thymoglobin 100 mg x 6 doses 10/04/2007 minimal histologic changes no acute rejection     Allergy     Anemia of renal disease     CKD (chronic kidney disease), stage IV     CMV (cytomegalovirus) status positive     Colon polyp     benign    Depression     GN (glomerulonephritis)     GN (glomerulonephritis)     unknown type    Herpes infection, other 2009    on buttocks    Hyperlipidemia     Hypertension     Immunosuppression     Living-donor kidney transplant recipient 01/15/2007    Meningitis     history of viral meningitis    Proteinuria 1/5/2015    Proteinuria     Pulmonary nodules     Renal hypertension     S/P breast augmentation     Skin cancer     Urinary tract infection       Past Surgical History:   Procedure Laterality Date    COLON SURGERY      COLONOSCOPY W/ BIOPSIES      cosemetic surgery      facelift and breast augmentation     HYSTERECTOMY      INTRAMEDULLARY RODDING OF HUMERUS Right 12/2/2024    Procedure: INSERTION, INTRAMEDULLARY MONTSERRAT, HUMERUS;  Surgeon: Samuel Dow, ;  Location: [facility];  Service: Orthopedics;  Laterality: Right;  supine vacular backward c arm synthes  shoulder scope drape    KIDNEY TRANSPLANT      NEPHRECTOMY Left     9/2016    RENAL BIOPSY      TONSILLECTOMY, ADENOIDECTOMY        Review of patient's allergies indicates:   Allergen Reactions    Codeine Palpitations and Rash    Cefepime      Other Reaction(s): Encephalopathy    Renal impairment increased risk for this reaction    Levofloxacin      Other Reaction(s): Encephalopathy    Renal pt who had some encephalopathy while on Levaquin & Cefepime, neurology physicians think they may have been due to the antibiotics (both possible side effects for each)    Iodine Nausea Only      Social History     Tobacco Use    Smoking status: Former     Current packs/day: 1.00     Average packs/day: 1 pack/day for 15.0 years (15.0 ttl pk-yrs)     Types: Cigarettes    Smokeless tobacco: Never    Tobacco  comments:     quit in 2014; smoked for 15 years and at the most smoked 1 ppd   Substance Use Topics    Alcohol use: Yes     Comment: seldom    Drug use: No      Family History   Problem Relation Name Age of Onset    Kidney disease Sister          ESRD    Kidney disease Brother          CKD from HTN    Hypertension Brother      Heart failure Mother      Heart failure Father      Diabetes Neg Hx      Stroke Neg Hx           Current Facility-Administered Medications:     acetaminophen tablet 1,000 mg, 1,000 mg, Oral, Q6H PRN, Lisa Stone, FNP    aluminum-magnesium hydroxide-simethicone 200-200-20 mg/5 mL suspension 30 mL, 30 mL, Oral, QID PRN, Lisa Stone, FNP, 30 mL at 01/25/25 1615    amLODIPine tablet 5 mg, 5 mg, Oral, Daily, Marco A Newell MD, 5 mg at 01/27/25 0850    aspirin EC tablet 81 mg, 81 mg, Oral, Daily, Marco A Newell MD, 81 mg at 01/27/25 0851    bisacodyL suppository 10 mg, 10 mg, Rectal, Daily PRN, Lisa Stone FNP    calcitRIOL capsule 0.25 mcg, 0.25 mcg, Oral, Once per day on Monday Wednesday Friday, Marco A Newell MD, 0.25 mcg at 01/27/25 0858    clindamycin capsule 450 mg, 450 mg, Oral, Q8H, Marco A Newell MD, 450 mg at 01/27/25 1504    dextrose 50% injection 12.5 g, 12.5 g, Intravenous, PRN, Lisa Stone, FNP    dextrose 50% injection 25 g, 25 g, Intravenous, PRN, Lisa Stone, OMIDP    enoxaparin injection 30 mg, 30 mg, Subcutaneous, Q24H (prophylaxis, 1700), Tyree Morillo MD    EScitalopram oxalate tablet 10 mg, 10 mg, Oral, Daily, George Mendoza MD, 10 mg at 01/27/25 0850    folic acid tablet 1 mg, 1 mg, Oral, Daily, Denisha Romero MD, 1 mg at 01/27/25 0851    glucagon (human recombinant) injection 1 mg, 1 mg, Intramuscular, PRN, Lisa Stone, FNP    glucose chewable tablet 16 g, 16 g, Oral, PRN, Lisa Stone, LETHA    glucose chewable tablet 24 g, 24 g, Oral, PRN, Lisa Stone, LETHA    megestroL 400 mg/10 mL (10 mL) suspension 400 mg, 400 mg, Oral, Daily, George Mendoza,  MD, 400 mg at 01/27/25 0851    melatonin tablet 6 mg, 6 mg, Oral, Nightly PRN, Lisa Stone FNP, 6 mg at 01/26/25 2249    ondansetron injection 4 mg, 4 mg, Intravenous, Q4H PRN, Lisa Stone FNP    pantoprazole EC tablet 40 mg, 40 mg, Oral, Daily, George Mendoza MD, 40 mg at 01/27/25 0851    polyethylene glycol packet 17 g, 17 g, Oral, BID PRN, Lisa Stone FNP    potassium chloride CR tablet 10 mEq, 10 mEq, Oral, Daily, George Mendoza MD, 10 mEq at 01/27/25 0851    sodium chloride 0.9% flush 10 mL, 10 mL, Intravenous, PRN, Lisa Stnoe, LETHA    vitamin renal formula (B-complex-vitamin c-folic acid) 1 mg per capsule 1 capsule, 1 capsule, Oral, Daily, George Mendoza MD, 1 capsule at 01/27/25 0851    Vital Signs (24 h):  Temp:  [97.8 °F (36.6 °C)-98.8 °F (37.1 °C)] 98 °F (36.7 °C)  Pulse:  [77-82] 77  Resp:  [18-21] 18  SpO2:  [96 %-99 %] 98 %  BP: (104-136)/(67-79) 115/67   I/O last 3 completed shifts:  In: 240 [P.O.:240]  Out: 627 [Other:627]  I/O this shift:  In: -   Out: 951 [Other:951]        Physical Exam:  General: NAD, elderly.  HEENT: NC/AT, EOMI  CVS: RRR  RS: breathing easily  Abdominal: Soft, NT/ND  Extremities: No edema b/l LE  Skin: No rash, no lesions.  Neurological: No focal deficits.  Psych: Normal affect  Dialysis Access: PD catheter in situ without signs of infection. L RC AVF that appears immature with very light thrill.    Results:    Lab Results   Component Value Date     01/27/2025     06/20/2018    K 3.6 01/27/2025    K 4.8 06/20/2018     01/27/2025     06/20/2018    CO2 25 01/27/2025    CO2 23 06/20/2018    BUN 38.5 (H) 01/27/2025    BUN 30 (H) 06/20/2018    CREATININE 6.84 (H) 01/27/2025    CREATININE 1.9 (H) 06/20/2018     (H) 06/20/2018     Lab Results   Component Value Date    WBC 8.88 01/27/2025    WBC 4.26 (L) 07/04/2024    WBC 6.90 06/20/2018    HGB 10.3 (L) 01/27/2025    HGB 9 (L) 12/18/2024    HGB 10.5 (L) 06/20/2018      01/27/2025     06/20/2018    MCV 86.9 01/27/2025    MCV 87 07/04/2024    MCV 88 06/20/2018       Assessment and Plan:      ESRD on CCPD in need of transition to HD (temporarily).  Need for tunneled dialysis catheter insertion.  Pt with ESRD typically on CCPD in need of transition to HD for a short period of time as she is transferred to skilled nursing facility incapable of completing PD. Pt with previously created L RC AVF which is now nonfunctional, hence interventional nephrology service is consulted for insertion of a tunneled dialysis catheter.  - Will plan on placement of tunneled dialysis catheter tomorrow in cath lab setting.  - Consents obtained and placed in chart.  - NPO after midnight.  - Lovenox to be held today.    Thank you for your consult. Please feel free to reach me with any questions.  Plan for follow-up tomorrow for procedure.    Apollo Jacob DO  Interventional Nephrology  Cell: [phone]
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
## 2025-01-27 NOTE — PLAN OF CARE
Problem: Adult Inpatient Plan of Care  Goal: Plan of Care Review  Outcome: Progressing  Goal: Patient-Specific Goal (Individualized)  Outcome: Progressing  Goal: Absence of Hospital-Acquired Illness or Injury  Outcome: Progressing  Goal: Optimal Comfort and Wellbeing  Outcome: Progressing  Goal: Readiness for Transition of Care  Outcome: Progressing     Problem: Wound  Goal: Optimal Functional Ability  Outcome: Progressing  Goal: Absence of Infection Signs and Symptoms  Outcome: Progressing     Problem: Wound  Goal: Absence of Infection Signs and Symptoms  Outcome: Progressing
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
## 2025-01-27 NOTE — PROGRESS NOTES
[facility] - 4th Floor Medical Telemetry  Wound Care    Patient Name:  Malissa Schwartz   MRN:  [id number]  Date: 1/27/2025  Diagnosis: <principal problem not specified>    History:     Past Medical History:   Diagnosis Date    Acute rejection of kidney transplant 9/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 Glomerulitis with one activated endothelial cell: treated with Solumedrol pulse 375 mg IVBP x 3 doses with short prednisone taper; thymoglobin 100 mg x 6 doses 10/04/2007 minimal histologic changes no acute rejection     Allergy     Anemia of renal disease     CKD (chronic kidney disease), stage IV     CMV (cytomegalovirus) status positive     Colon polyp     benign    Depression     GN (glomerulonephritis)     GN (glomerulonephritis)     unknown type    Herpes infection, other 2009    on buttocks    Hyperlipidemia     Hypertension     Immunosuppression     Living-donor kidney transplant recipient 01/15/2007    Meningitis     history of viral meningitis    Proteinuria 1/5/2015    Proteinuria     Pulmonary nodules     Renal hypertension     S/P breast augmentation     Skin cancer     Urinary tract infection        Social History     Socioeconomic History    Marital status:    Tobacco Use    Smoking status: Former     Current packs/day: 1.00     Average packs/day: 1 pack/day for 15.0 years (15.0 ttl pk-yrs)     Types: Cigarettes    Smokeless tobacco: Never    Tobacco comments:     quit in 2014; smoked for 15 years and at the most smoked 1 ppd   Substance and Sexual Activity    Alcohol use: Yes     Comment: seldom    Drug use: No    Sexual activity: Never   Social History Narrative    Likes to travel and follow [employer] (has season tickets).    Lives with , 2 dogs and 3 cats    Used to work in real estate and banking     Social Drivers of Health     Financial Resource Strain: Low Risk  (12/3/2024)    Overall Financial Resource Strain (CARDIA)     Difficulty of Paying Living  Expenses: Not hard at all   Food Insecurity: No Food Insecurity (12/3/2024)    Hunger Vital Sign     Worried About Running Out of Food in the Last Year: Never true     Ran Out of Food in the Last Year: Never true   Transportation Needs: No Transportation Needs (12/3/2024)    TRANSPORTATION NEEDS     Transportation : No   Physical Activity: Inactive (12/3/2024)    Exercise Vital Sign     Days of Exercise per Week: 0 days     Minutes of Exercise per Session: 0 min   Stress: No Stress Concern Present (12/3/2024)    [employer] [place] [employer] - Occupational Stress Questionnaire     Feeling of Stress : Only a little   Housing Stability: Low Risk  (12/3/2024)    Housing Stability Vital Sign     Unable to Pay for Housing in the Last Year: No     Homeless in the Last Year: No       Precautions:     Allergies as of 01/22/2025 - Reviewed 01/22/2025   Allergen Reaction Noted    Codeine Palpitations and Rash 01/07/2013    Cefepime  08/23/2024    Levofloxacin  01/03/2021    Iodine Nausea Only 12/09/2022       WO Assessment Details/Treatment     Follow up visit, noting low air loss bed  and heel lift boots in use. Discussed status and plan of care with assigned nurse .       01/27/25 0900   Skin   Specialty Bed/Overlay Low air loss  (patient reports tolerating low air loss bed well.)   Pressure Injury Prevention    Heel protection technique   (heel lift boots in use.)         Recommendations made to primary team Nisha FOLEY,  for local wound care measures and nutrition and pressure injury prevention measures. Assigned nurse Nisha contacted attending MD and new order received for wound care physician consult regarding bilateral heel pressure injury wounds present on admission.      01/27/2025

## 2025-01-27 NOTE — PROGRESS NOTES
Nephrology  Note    Patient Name: Malissa Schwartz  Age: 74 y.o.  : 1950  MRN: [id number]  Admission Date: 2025      Malissa Schwartz is a 74 y.o. female is very well known to me for her ESRD and on CCPD and she has been doing very good at home.  Admitted to the hospital with complaints of generalized weakness of 1 day duration and also reports some black stools for the last few weeks.  No chest pains.  No abdominal pains.  No cough congestion no fever or chills.  No dysuria hematuria hesitancy frequency urgency.  Some nausea reported.  But no vomiting.  No hematemesis hemoptysis  reported.  No fever or chills.  Lives with her  at home.  Her significant other history include hypertension, anemia of chronic kidney disease, hyperlipidemia, failed living related kidney transplant which lasted many years.  She has been very compliant to her dialysis regimen.  Also has some depression problem.    2025   Better.  Still poor appetite.  Still with some weakness.  No nausea.  No shortness of breath.  Had good CCPD done.  She reported the her dermatologist has told her that she will be getting radiation therapy to the skin cancer on the left lower extremity.    25  No labs for review today. VSS. Tolerated CPPD last night without issue. She has no complaints. Eating breakfast. No SOB, CP, abd pain, n/v/d or swelling    2025  Patient is resting quietly in bed.  Denies complaints.  Peritoneal effluent is clear.    2025   Awake alert oriented to time person place sitting up in the recliner.  Complains of weakness.  Wants physical therapy.   told me that she will qualify for some skilled nursing facility for which she will have to be on hemodialysis.   present.    Current Facility-Administered Medications   Medication Dose Route Frequency Provider Last Rate Last Admin    acetaminophen tablet 1,000 mg  1,000 mg Oral Q6H Lisa Esparza FNP        aluminum-magnesium  hydroxide-simethicone 200-200-20 mg/5 mL suspension 30 mL  30 mL Oral QID PRN Lisa Stone FNP   30 mL at 01/25/25 1615    amLODIPine tablet 5 mg  5 mg Oral Daily Marco A Newell MD   5 mg at 01/27/25 0850    aspirin EC tablet 81 mg  81 mg Oral Daily Marco A Newell MD   81 mg at 01/27/25 0851    bisacodyL suppository 10 mg  10 mg Rectal Daily PRN Lisa Stone FNP        calcitRIOL capsule 0.25 mcg  0.25 mcg Oral Once per day on Monday Wednesday Friday Marco A Newell MD   0.25 mcg at 01/27/25 0858    clindamycin capsule 450 mg  450 mg Oral Q8H Marco A Newell MD   450 mg at 01/27/25 0652    dextrose 50% injection 12.5 g  12.5 g Intravenous PRN Lisa Stone, LETHA        dextrose 50% injection 25 g  25 g Intravenous PRN Lisa Stone FNP        enoxaparin injection 30 mg  30 mg Subcutaneous Q24H (prophylaxis, 1700) Tyree Morillo MD        EScitalopram oxalate tablet 10 mg  10 mg Oral Daily George Mendoza MD   10 mg at 01/27/25 0850    folic acid tablet 1 mg  1 mg Oral Daily Denisha Romero MD   1 mg at 01/27/25 0851    glucagon (human recombinant) injection 1 mg  1 mg Intramuscular PRN Lisa Stone FNP        glucose chewable tablet 16 g  16 g Oral PRN Lisa Stone FNP        glucose chewable tablet 24 g  24 g Oral PRN Lisa Stone FNP        megestroL 400 mg/10 mL (10 mL) suspension 400 mg  400 mg Oral Daily George Mendoza MD   400 mg at 01/27/25 0851    melatonin tablet 6 mg  6 mg Oral Nightly PRN Lisa Stone FNP   6 mg at 01/26/25 2249    ondansetron injection 4 mg  4 mg Intravenous Q4H PRN Lisa Stone FNP        pantoprazole EC tablet 40 mg  40 mg Oral Daily George Mendoza MD   40 mg at 01/27/25 0851    polyethylene glycol packet 17 g  17 g Oral BID PRN Lisa Stone FNP        potassium chloride CR tablet 10 mEq  10 mEq Oral Daily George Mendoza MD   10 mEq at 01/27/25 0851    sodium chloride 0.9% flush 10 mL  10 mL Intravenous PRN Lisa Stone FNP        vitamin renal  formula (B-complex-vitamin c-folic acid) 1 mg per capsule 1 capsule  1 capsule Oral Daily George Mendoza MD   1 capsule at 01/27/25 0851       Sergio Crabtree MD    Past Medical History:   Diagnosis Date    Acute rejection of kidney transplant 9/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 Glomerulitis with one activated endothelial cell: treated with Solumedrol pulse 375 mg IVBP x 3 doses with short prednisone taper; thymoglobin 100 mg x 6 doses 10/04/2007 minimal histologic changes no acute rejection     Allergy     Anemia of renal disease     CKD (chronic kidney disease), stage IV     CMV (cytomegalovirus) status positive     Colon polyp     benign    Depression     GN (glomerulonephritis)     GN (glomerulonephritis)     unknown type    Herpes infection, other 2009    on buttocks    Hyperlipidemia     Hypertension     Immunosuppression     Living-donor kidney transplant recipient 01/15/2007    Meningitis     history of viral meningitis    Proteinuria 1/5/2015    Proteinuria     Pulmonary nodules     Renal hypertension     S/P breast augmentation     Skin cancer     Urinary tract infection       Past Surgical History:   Procedure Laterality Date    COLON SURGERY      COLONOSCOPY W/ BIOPSIES      cosemetic surgery      facelift and breast augmentation     HYSTERECTOMY      INTRAMEDULLARY RODDING OF HUMERUS Right 12/2/2024    Procedure: INSERTION, INTRAMEDULLARY MONTSERRAT, HUMERUS;  Surgeon: Samuel Dow DO;  Location: [facility] OR;  Service: Orthopedics;  Laterality: Right;  supine vacular backward c arm synthes  shoulder scope drape    KIDNEY TRANSPLANT      NEPHRECTOMY Left     9/2016    RENAL BIOPSY      TONSILLECTOMY, ADENOIDECTOMY        Family History   Problem Relation Name Age of Onset    Kidney disease Sister          ESRD    Kidney disease Brother          CKD from HTN    Hypertension Brother      Heart failure Mother      Heart failure Father      Diabetes Neg Hx      Stroke Neg Hx        Social History     Tobacco Use    Smoking status: Former     Current packs/day: 1.00     Average packs/day: 1 pack/day for 15.0 years (15.0 ttl pk-yrs)     Types: Cigarettes    Smokeless tobacco: Never    Tobacco comments:     quit in 2014; smoked for 15 years and at the most smoked 1 ppd   Substance Use Topics    Alcohol use: Yes     Comment: seldom     Medications Prior to Admission   Medication Sig Dispense Refill Last Dose/Taking    amLODIPine (NORVASC) 10 MG tablet Take 5 mg by mouth once daily.   Taking    aspirin (ECOTRIN) 81 MG EC tablet Take 81 mg by mouth once daily.   Past Week    B complex-vitamin C-folic acid (NEPHRO-JENN) 0.8 mg Tab Take 0.8 mg by mouth Daily.   Past Week    calcitRIOL (ROCALTROL) 0.25 MCG Cap Take 0.25 mcg by mouth 3 (three) times a week.   Past Week    methocarbamoL (ROBAXIN) 500 MG Tab Take 500 mg by mouth 4 (four) times daily.   Past Week    pantoprazole (PROTONIX) 40 MG tablet Take 40 mg by mouth once daily.   Past Week    potassium chloride (KLOR-CON) 10 MEQ TbSR Take 10 mEq by mouth once daily.   Past Week    traMADoL (ULTRAM) 50 mg tablet Take 50 mg by mouth 3 (three) times daily as needed for Pain.   Past Week    D5W PgBk 50 mL with ceFAZolin 1 gram SolR Cefazolin intraperitoneally times 10 more days       EScitalopram oxalate (LEXAPRO) 10 MG tablet Take 1 tablet (10 mg total) by mouth once daily. (Patient not taking: Reported on 12/23/2024) 90 tablet 3     folic acid (FOLVITE) 1 MG tablet Take 1 mg by mouth once daily.       furosemide (LASIX) 20 MG tablet Take 20 mg by mouth once daily.       megestroL (MEGACE) 400 mg/10 mL (10 mL) Susp Take 10 mLs (400 mg total) by mouth once daily. 300 mL 0     zolpidem (AMBIEN) 10 mg Tab Take 10 mg by mouth every evening.        Review of patient's allergies indicates:   Allergen Reactions    Codeine Palpitations and Rash    Cefepime      Other Reaction(s): Encephalopathy    Renal impairment increased risk for this reaction     Levofloxacin      Other Reaction(s): Encephalopathy    Renal pt who had some encephalopathy while on Levaquin & Cefepime, neurology physicians think they may have been due to the antibiotics (both possible side effects for each)    Iodine Nausea Only            Review of Systems:     All 12 point review of system done negative except 1 history of present illness    Objective:       VITAL SIGNS: 24 HR MIN & MAX LAST    Temp  Min: 97.8 °F (36.6 °C)  Max: 98.8 °F (37.1 °C)  98.3 °F (36.8 °C)        BP  Min: 104/69  Max: 136/79  104/69     Pulse  Min: 78  Max: 82  78     Resp  Min: 18  Max: 21  18    SpO2  Min: 96 %  Max: 99 %  99 %      GEN:  Very frail looking female in no respiratory distress.  Awake alert oriented to time person place.  HEENT:  Atraumatic normocephalic.  Pupils reactive.  Extraocular movements intact.  No oral lesion.  Neck supple.  No JVD.  CV: RRR without rub or gallop.  PULM: CTAB, unlabored  ABD: Soft, NT/ND abdomen with NABS, PD catheter noted.  No obvious exit site infection noted.  EXT: No cyanosis or edema, there is skin growth on the left lower extremity just above the ankle.  SKIN: Warm and dry  PSYCH: Awake, alert, and appropriately conversant            Component Value Date/Time     01/27/2025 0701     01/26/2025 0609     06/20/2018 1402     01/08/2018 0835    K 3.6 01/27/2025 0701    K 3.5 01/26/2025 0609    K 4.8 06/20/2018 1402    K 4.6 01/08/2018 0835    CO2 25 01/27/2025 0701    CO2 25 01/26/2025 0609    CO2 23 06/20/2018 1402    CO2 22 (L) 01/08/2018 0835    BUN 38.5 (H) 01/27/2025 0701    BUN 42.8 (H) 01/26/2025 0609    BUN 30 (H) 06/20/2018 1402    BUN 24 (H) 01/08/2018 0835    CREATININE 6.84 (H) 01/27/2025 0701    CREATININE 6.82 (H) 01/26/2025 0609    CREATININE 1.9 (H) 06/20/2018 1402    CREATININE 1.8 (H) 01/08/2018 0835    CALCIUM 8.7 01/27/2025 0701    CALCIUM 8.2 (L) 01/26/2025 0609    CALCIUM 9.7 06/20/2018 1402    CALCIUM 9.1 01/08/2018 0835     PHOS 5.1 (H) 01/27/2025 0701    PHOS 3.8 06/20/2018 1402            Component Value Date/Time    WBC 8.88 01/27/2025 0701    WBC 5.35 01/26/2025 0609    WBC 4.26 (L) 07/04/2024 0000    WBC 6.90 06/20/2018 1402    WBC 3.37 (L) 01/08/2018 0835    HGB 10.3 (L) 01/27/2025 0701    HGB 9.6 (L) 01/26/2025 0609    HGB 9 (L) 12/18/2024 0000    HGB 10.2 (L) 11/26/2024 0000    HGB 10.5 (L) 06/20/2018 1402    HGB 9.1 (L) 01/08/2018 0835    HCT 33.7 (L) 01/27/2025 0701    HCT 30.9 (L) 01/26/2025 0609    HCT 29.8 (L) 12/18/2024 0000    HCT 31.8 (L) 11/26/2024 0000    HCT 32.0 (L) 06/05/2020 0815    HCT 31.6 (L) 06/20/2018 1402    HCT 27.3 (L) 01/08/2018 0835     01/27/2025 0701     01/26/2025 0609     06/20/2018 1402     01/08/2018 0835         X-Ray Chest AP Portable   Final Result      No acute abnormality.         Electronically signed by: Marilou Laughlin MD   Date:    01/22/2025   Time:    13:03          Assessment / Plan:   ESRD, on peritoneal dialysis  Elevated troponin - cardiology recommends outpatient stress test  Weakness, resolving  Anemia of chronic kidney disease  Hypertension  History of depression  LLE cellulitis - on clindamycin      Recommendations  Discussed with the patient and the  about tunneled dialysis catheter placement so that she can have hemodialysis as an outpatient till she is getting her physical therapy in some skilled nursing facility.  Patient will keep her PD catheter also so that after she is out of the skilled nursing facility she can resume her peritoneal dialysis.  Will consult vascular surgeon for tunneled dialysis catheter placement.

## 2025-01-28 LAB
ALBUMIN SERPL-MCNC: 1.6 G/DL (ref 3.4–4.8)
ALBUMIN/GLOB SERPL: 0.4 RATIO (ref 1.1–2)
ALP SERPL-CCNC: 194 UNIT/L (ref 40–150)
ALT SERPL-CCNC: 16 UNIT/L (ref 0–55)
ANION GAP SERPL CALC-SCNC: 9 MEQ/L
AST SERPL-CCNC: 20 UNIT/L (ref 5–34)
BILIRUB SERPL-MCNC: 0.3 MG/DL
BUN SERPL-MCNC: 37 MG/DL (ref 9.8–20.1)
CALCIUM SERPL-MCNC: 8.8 MG/DL (ref 8.4–10.2)
CHLORIDE SERPL-SCNC: 101 MMOL/L (ref 98–107)
CO2 SERPL-SCNC: 25 MMOL/L (ref 23–31)
CREAT SERPL-MCNC: 6.4 MG/DL (ref 0.55–1.02)
CREAT/UREA NIT SERPL: 6
GFR SERPLBLD CREATININE-BSD FMLA CKD-EPI: 6 ML/MIN/1.73/M2
GLOBULIN SER-MCNC: 3.8 GM/DL (ref 2.4–3.5)
GLUCOSE SERPL-MCNC: 110 MG/DL (ref 82–115)
MAGNESIUM SERPL-MCNC: 2.3 MG/DL (ref 1.6–2.6)
POTASSIUM SERPL-SCNC: 3.2 MMOL/L (ref 3.5–5.1)
PREALB SERPL-MCNC: 14.8 MG/DL (ref 14–37)
PROT SERPL-MCNC: 5.4 GM/DL (ref 5.8–7.6)
SODIUM SERPL-SCNC: 135 MMOL/L (ref 136–145)

## 2025-01-28 PROCEDURE — S0179 MEGESTROL 20 MG: HCPCS | Performed by: INTERNAL MEDICINE

## 2025-01-28 PROCEDURE — C1750 CATH, HEMODIALYSIS,LONG-TERM: HCPCS | Performed by: STUDENT IN AN ORGANIZED HEALTH CARE EDUCATION/TRAINING PROGRAM

## 2025-01-28 PROCEDURE — 21400001 HC TELEMETRY ROOM

## 2025-01-28 PROCEDURE — 76937 US GUIDE VASCULAR ACCESS: CPT | Mod: 26,,, | Performed by: STUDENT IN AN ORGANIZED HEALTH CARE EDUCATION/TRAINING PROGRAM

## 2025-01-28 PROCEDURE — 02HV33Z INSERTION OF INFUSION DEVICE INTO SUPERIOR VENA CAVA, PERCUTANEOUS APPROACH: ICD-10-PCS | Performed by: STUDENT IN AN ORGANIZED HEALTH CARE EDUCATION/TRAINING PROGRAM

## 2025-01-28 PROCEDURE — 97116 GAIT TRAINING THERAPY: CPT | Mod: CQ

## 2025-01-28 PROCEDURE — 76937 US GUIDE VASCULAR ACCESS: CPT | Performed by: STUDENT IN AN ORGANIZED HEALTH CARE EDUCATION/TRAINING PROGRAM

## 2025-01-28 PROCEDURE — 77001 FLUOROGUIDE FOR VEIN DEVICE: CPT | Performed by: STUDENT IN AN ORGANIZED HEALTH CARE EDUCATION/TRAINING PROGRAM

## 2025-01-28 PROCEDURE — 25000003 PHARM REV CODE 250: Performed by: INTERNAL MEDICINE

## 2025-01-28 PROCEDURE — 77001 FLUOROGUIDE FOR VEIN DEVICE: CPT | Mod: 26,,, | Performed by: STUDENT IN AN ORGANIZED HEALTH CARE EDUCATION/TRAINING PROGRAM

## 2025-01-28 PROCEDURE — 36558 INSERT TUNNELED CV CATH: CPT | Mod: RT,,, | Performed by: STUDENT IN AN ORGANIZED HEALTH CARE EDUCATION/TRAINING PROGRAM

## 2025-01-28 PROCEDURE — 83735 ASSAY OF MAGNESIUM: CPT | Performed by: STUDENT IN AN ORGANIZED HEALTH CARE EDUCATION/TRAINING PROGRAM

## 2025-01-28 PROCEDURE — 80053 COMPREHEN METABOLIC PANEL: CPT | Performed by: STUDENT IN AN ORGANIZED HEALTH CARE EDUCATION/TRAINING PROGRAM

## 2025-01-28 PROCEDURE — 99152 MOD SED SAME PHYS/QHP 5/>YRS: CPT | Performed by: STUDENT IN AN ORGANIZED HEALTH CARE EDUCATION/TRAINING PROGRAM

## 2025-01-28 PROCEDURE — 97535 SELF CARE MNGMENT TRAINING: CPT | Mod: CO

## 2025-01-28 PROCEDURE — 63600175 PHARM REV CODE 636 W HCPCS: Performed by: STUDENT IN AN ORGANIZED HEALTH CARE EDUCATION/TRAINING PROGRAM

## 2025-01-28 PROCEDURE — 99152 MOD SED SAME PHYS/QHP 5/>YRS: CPT | Mod: ,,, | Performed by: STUDENT IN AN ORGANIZED HEALTH CARE EDUCATION/TRAINING PROGRAM

## 2025-01-28 PROCEDURE — 36558 INSERT TUNNELED CV CATH: CPT | Mod: RT | Performed by: STUDENT IN AN ORGANIZED HEALTH CARE EDUCATION/TRAINING PROGRAM

## 2025-01-28 PROCEDURE — 0JH63XZ INSERTION OF TUNNELED VASCULAR ACCESS DEVICE INTO CHEST SUBCUTANEOUS TISSUE AND FASCIA, PERCUTANEOUS APPROACH: ICD-10-PCS | Performed by: STUDENT IN AN ORGANIZED HEALTH CARE EDUCATION/TRAINING PROGRAM

## 2025-01-28 PROCEDURE — 36415 COLL VENOUS BLD VENIPUNCTURE: CPT | Performed by: STUDENT IN AN ORGANIZED HEALTH CARE EDUCATION/TRAINING PROGRAM

## 2025-01-28 DEVICE — GLIDEPATH HEMODIALYSIS CATH, ST, DL 14.5 FR. 19CM
Type: IMPLANTABLE DEVICE | Site: CHEST | Status: FUNCTIONAL
Brand: GLIDEPATH LONG-TERM HEMODIALYSIS CATHETER WITH PRELOADED STYLET

## 2025-01-28 RX ORDER — FENTANYL CITRATE 50 UG/ML
INJECTION, SOLUTION INTRAMUSCULAR; INTRAVENOUS
Status: DISCONTINUED | OUTPATIENT
Start: 2025-01-28 | End: 2025-01-28 | Stop reason: HOSPADM

## 2025-01-28 RX ORDER — MIDAZOLAM HYDROCHLORIDE 1 MG/ML
INJECTION INTRAMUSCULAR; INTRAVENOUS
Status: DISCONTINUED | OUTPATIENT
Start: 2025-01-28 | End: 2025-01-28 | Stop reason: HOSPADM

## 2025-01-28 RX ORDER — HEPARIN SODIUM 1000 [USP'U]/ML
INJECTION, SOLUTION INTRAVENOUS; SUBCUTANEOUS
Status: DISCONTINUED | OUTPATIENT
Start: 2025-01-28 | End: 2025-01-28 | Stop reason: HOSPADM

## 2025-01-28 RX ORDER — LIDOCAINE HYDROCHLORIDE 10 MG/ML
INJECTION, SOLUTION INFILTRATION; PERINEURAL
Status: DISCONTINUED | OUTPATIENT
Start: 2025-01-28 | End: 2025-01-28 | Stop reason: HOSPADM

## 2025-01-28 RX ADMIN — CLINDAMYCIN HYDROCHLORIDE 450 MG: 150 CAPSULE ORAL at 05:01

## 2025-01-28 RX ADMIN — ESCITALOPRAM OXALATE 10 MG: 10 TABLET ORAL at 08:01

## 2025-01-28 RX ADMIN — FOLIC ACID 1 MG: 1 TABLET ORAL at 08:01

## 2025-01-28 RX ADMIN — PANTOPRAZOLE SODIUM 40 MG: 40 TABLET, DELAYED RELEASE ORAL at 08:01

## 2025-01-28 RX ADMIN — MEGESTROL ACETATE 400 MG: 400 SUSPENSION ORAL at 08:01

## 2025-01-28 RX ADMIN — POTASSIUM CHLORIDE 10 MEQ: 750 TABLET, FILM COATED, EXTENDED RELEASE ORAL at 08:01

## 2025-01-28 RX ADMIN — CLINDAMYCIN HYDROCHLORIDE 450 MG: 150 CAPSULE ORAL at 09:01

## 2025-01-28 RX ADMIN — ASPIRIN 81 MG: 81 TABLET, COATED ORAL at 08:01

## 2025-01-28 RX ADMIN — AMLODIPINE BESYLATE 5 MG: 5 TABLET ORAL at 08:01

## 2025-01-28 NOTE — NURSING
01/28/25 0948   Cycler Peritoneal Dialysis   Cycler PD Total UF (mL) 1034 mL   Cycler Treatment Comments tolerated pd well. vss. cap placed.

## 2025-01-28 NOTE — PROGRESS NOTES
Nephrology  Note    Patient Name: Malissa Schwartz  Age: 74 y.o.  : 1950  MRN: [id number]  Admission Date: 2025      Malissa Schwartz is a 74 y.o. female is very well known to me for her ESRD and on CCPD and she has been doing very good at home.  Admitted to the hospital with complaints of generalized weakness of 1 day duration and also reports some black stools for the last few weeks.  No chest pains.  No abdominal pains.  No cough congestion no fever or chills.  No dysuria hematuria hesitancy frequency urgency.  Some nausea reported.  But no vomiting.  No hematemesis hemoptysis  reported.  No fever or chills.  Lives with her  at home.  Her significant other history include hypertension, anemia of chronic kidney disease, hyperlipidemia, failed living related kidney transplant which lasted many years.  She has been very compliant to her dialysis regimen.  Also has some depression problem.    2025   Better.  Still poor appetite.  Still with some weakness.  No nausea.  No shortness of breath.  Had good CCPD done.  She reported the her dermatologist has told her that she will be getting radiation therapy to the skin cancer on the left lower extremity.    25  No labs for review today. VSS. Tolerated CPPD last night without issue. She has no complaints. Eating breakfast. No SOB, CP, abd pain, n/v/d or swelling    2025  Patient is resting quietly in bed.  Denies complaints.  Peritoneal effluent is clear.    2025   Awake alert oriented to time person place sitting up in the recliner.  Complains of weakness.  Wants physical therapy.   told me that she will qualify for some skilled nursing facility for which she will have to be on hemodialysis.   present.    2025   She is NPO as she is waiting to get a tunneled dialysis catheter placement done today.  Patient has no new complaints.   are working to get him to some skilled nursing facility.  She had  good CCPD last night.    Current Facility-Administered Medications   Medication Dose Route Frequency Provider Last Rate Last Admin    acetaminophen tablet 1,000 mg  1,000 mg Oral Q6H PRN Lisa Stone FNP   1,000 mg at 01/27/25 1929    aluminum-magnesium hydroxide-simethicone 200-200-20 mg/5 mL suspension 30 mL  30 mL Oral QID PRN Lisa Stone FNP   30 mL at 01/25/25 1615    amLODIPine tablet 5 mg  5 mg Oral Daily Marco A Newell MD   5 mg at 01/28/25 0848    aspirin EC tablet 81 mg  81 mg Oral Daily Marco A Newell MD   81 mg at 01/28/25 0848    bisacodyL suppository 10 mg  10 mg Rectal Daily PRN Lisa Stone FNP        calcitRIOL capsule 0.25 mcg  0.25 mcg Oral Once per day on Monday Wednesday Friday Marco A Newell MD   0.25 mcg at 01/27/25 0858    clindamycin capsule 450 mg  450 mg Oral Q8H Marco A Newell MD   450 mg at 01/28/25 0559    dextrose 50% injection 12.5 g  12.5 g Intravenous PRN Lisa Stone FNP        dextrose 50% injection 25 g  25 g Intravenous PRN Lisa Stone FNP        EScitalopram oxalate tablet 10 mg  10 mg Oral Daily George Mendoza MD   10 mg at 01/28/25 0848    folic acid tablet 1 mg  1 mg Oral Daily Denisha Romero MD   1 mg at 01/28/25 0848    glucagon (human recombinant) injection 1 mg  1 mg Intramuscular PRN Lisa Stone FNP        glucose chewable tablet 16 g  16 g Oral PRN Lisa Stone FNP        glucose chewable tablet 24 g  24 g Oral PRN Lisa Stone FNP        megestroL 400 mg/10 mL (10 mL) suspension 400 mg  400 mg Oral Daily George Mendoza MD   400 mg at 01/28/25 0848    melatonin tablet 6 mg  6 mg Oral Nightly PRN Lisa Stone FNP   6 mg at 01/26/25 2249    mupirocin 2 % ointment   Topical (Top) Daily Amarilis Oneil FNP        ondansetron injection 4 mg  4 mg Intravenous Q4H PRN Lisa Stone FNP        pantoprazole EC tablet 40 mg  40 mg Oral Daily George Mendoza MD   40 mg at 01/28/25 0874    polyethylene glycol packet 17 g  17 g Oral  BID PRN Lisa Stone, OMIDP        potassium chloride CR tablet 10 mEq  10 mEq Oral Daily George Mendoza MD   10 mEq at 01/28/25 0848    sodium chloride 0.9% flush 10 mL  10 mL Intravenous PRN Lisa Stone, OMIDP        vitamin renal formula (B-complex-vitamin c-folic acid) 1 mg per capsule 1 capsule  1 capsule Oral Daily George Mendoza MD   1 capsule at 01/27/25 0851       Sergio Crabtree MD    Past Medical History:   Diagnosis Date    Acute rejection of kidney transplant 9/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 Glomerulitis with one activated endothelial cell: treated with Solumedrol pulse 375 mg IVBP x 3 doses with short prednisone taper; thymoglobin 100 mg x 6 doses 10/04/2007 minimal histologic changes no acute rejection     Allergy     Anemia of renal disease     CKD (chronic kidney disease), stage IV     CMV (cytomegalovirus) status positive     Colon polyp     benign    Depression     GN (glomerulonephritis)     GN (glomerulonephritis)     unknown type    Herpes infection, other 2009    on buttocks    Hyperlipidemia     Hypertension     Immunosuppression     Living-donor kidney transplant recipient 01/15/2007    Meningitis     history of viral meningitis    Proteinuria 1/5/2015    Proteinuria     Pulmonary nodules     Renal hypertension     S/P breast augmentation     Skin cancer     Urinary tract infection       Past Surgical History:   Procedure Laterality Date    COLON SURGERY      COLONOSCOPY W/ BIOPSIES      cosemetic surgery      facelift and breast augmentation     HYSTERECTOMY      INTRAMEDULLARY RODDING OF HUMERUS Right 12/2/2024    Procedure: INSERTION, INTRAMEDULLARY MONTSERRAT, HUMERUS;  Surgeon: Samuel Dow DO;  Location: [facility];  Service: Orthopedics;  Laterality: Right;  supine vacular backward c arm synthes  shoulder scope drape    KIDNEY TRANSPLANT      NEPHRECTOMY Left     9/2016    RENAL BIOPSY      TONSILLECTOMY, ADENOIDECTOMY        Family History   Problem  Relation Name Age of Onset    Kidney disease Sister          ESRD    Kidney disease Brother          CKD from HTN    Hypertension Brother      Heart failure Mother      Heart failure Father      Diabetes Neg Hx      Stroke Neg Hx       Social History     Tobacco Use    Smoking status: Former     Current packs/day: 1.00     Average packs/day: 1 pack/day for 15.0 years (15.0 ttl pk-yrs)     Types: Cigarettes    Smokeless tobacco: Never    Tobacco comments:     quit in 2014; smoked for 15 years and at the most smoked 1 ppd   Substance Use Topics    Alcohol use: Yes     Comment: seldom     Medications Prior to Admission   Medication Sig Dispense Refill Last Dose/Taking    amLODIPine (NORVASC) 10 MG tablet Take 5 mg by mouth once daily.   Taking    aspirin (ECOTRIN) 81 MG EC tablet Take 81 mg by mouth once daily.   Past Week    B complex-vitamin C-folic acid (NEPHRO-JENN) 0.8 mg Tab Take 0.8 mg by mouth Daily.   Past Week    calcitRIOL (ROCALTROL) 0.25 MCG Cap Take 0.25 mcg by mouth 3 (three) times a week.   Past Week    methocarbamoL (ROBAXIN) 500 MG Tab Take 500 mg by mouth 4 (four) times daily.   Past Week    pantoprazole (PROTONIX) 40 MG tablet Take 40 mg by mouth once daily.   Past Week    potassium chloride (KLOR-CON) 10 MEQ TbSR Take 10 mEq by mouth once daily.   Past Week    traMADoL (ULTRAM) 50 mg tablet Take 50 mg by mouth 3 (three) times daily as needed for Pain.   Past Week    D5W PgBk 50 mL with ceFAZolin 1 gram SolR Cefazolin intraperitoneally times 10 more days       EScitalopram oxalate (LEXAPRO) 10 MG tablet Take 1 tablet (10 mg total) by mouth once daily. (Patient not taking: Reported on 12/23/2024) 90 tablet 3     folic acid (FOLVITE) 1 MG tablet Take 1 mg by mouth once daily.       furosemide (LASIX) 20 MG tablet Take 20 mg by mouth once daily.       megestroL (MEGACE) 400 mg/10 mL (10 mL) Susp Take 10 mLs (400 mg total) by mouth once daily. 300 mL 0     zolpidem (AMBIEN) 10 mg Tab Take 10 mg by mouth  every evening.        Review of patient's allergies indicates:   Allergen Reactions    Codeine Palpitations and Rash    Cefepime      Other Reaction(s): Encephalopathy    Renal impairment increased risk for this reaction    Levofloxacin      Other Reaction(s): Encephalopathy    Renal pt who had some encephalopathy while on Levaquin & Cefepime, neurology physicians think they may have been due to the antibiotics (both possible side effects for each)    Iodine Nausea Only            Review of Systems:     All 12 point review of system done negative except 1 history of present illness    Objective:       VITAL SIGNS: 24 HR MIN & MAX LAST    Temp  Min: 97.5 °F (36.4 °C)  Max: 98.4 °F (36.9 °C)  97.5 °F (36.4 °C)        BP  Min: 112/68  Max: 135/77  112/68     Pulse  Min: 66  Max: 83  72     Resp  Min: 18  Max: 18  18    SpO2  Min: 96 %  Max: 99 %  99 %      GEN:  Very frail looking female in no respiratory distress.  Awake alert oriented to time person place.   present in the room.  And other caregiver present.  HEENT:  Atraumatic normocephalic.  Pupils reactive.  Extraocular movements intact.  No oral lesion.  Neck supple.  No JVD.  CV: RRR without rub or gallop.  PULM: CTAB, unlabored  ABD: Soft, NT/ND abdomen with NABS, PD catheter noted.  No obvious exit site infection noted.  EXT: No cyanosis or edema, there is skin growth on the left lower extremity just above the ankle.  SKIN: Warm and dry  PSYCH: Awake, alert, and appropriately conversant            Component Value Date/Time     (L) 01/28/2025 0431     01/27/2025 0701     06/20/2018 1402     01/08/2018 0835    K 3.2 (L) 01/28/2025 0431    K 3.6 01/27/2025 0701    K 4.8 06/20/2018 1402    K 4.6 01/08/2018 0835    CO2 25 01/28/2025 0431    CO2 25 01/27/2025 0701    CO2 23 06/20/2018 1402    CO2 22 (L) 01/08/2018 0835    BUN 37.0 (H) 01/28/2025 0431    BUN 38.5 (H) 01/27/2025 0701    BUN 30 (H) 06/20/2018 1402    BUN 24 (H) 01/08/2018  0835    CREATININE 6.40 (H) 01/28/2025 0431    CREATININE 6.84 (H) 01/27/2025 0701    CREATININE 1.9 (H) 06/20/2018 1402    CREATININE 1.8 (H) 01/08/2018 0835    CALCIUM 8.8 01/28/2025 0431    CALCIUM 8.7 01/27/2025 0701    CALCIUM 9.7 06/20/2018 1402    CALCIUM 9.1 01/08/2018 0835    PHOS 5.1 (H) 01/27/2025 0701    PHOS 3.8 06/20/2018 1402            Component Value Date/Time    WBC 8.88 01/27/2025 0701    WBC 5.35 01/26/2025 0609    WBC 4.26 (L) 07/04/2024 0000    WBC 6.90 06/20/2018 1402    WBC 3.37 (L) 01/08/2018 0835    HGB 10.3 (L) 01/27/2025 0701    HGB 9.6 (L) 01/26/2025 0609    HGB 9 (L) 12/18/2024 0000    HGB 10.2 (L) 11/26/2024 0000    HGB 10.5 (L) 06/20/2018 1402    HGB 9.1 (L) 01/08/2018 0835    HCT 33.7 (L) 01/27/2025 0701    HCT 30.9 (L) 01/26/2025 0609    HCT 29.8 (L) 12/18/2024 0000    HCT 31.8 (L) 11/26/2024 0000    HCT 32.0 (L) 06/05/2020 0815    HCT 31.6 (L) 06/20/2018 1402    HCT 27.3 (L) 01/08/2018 0835     01/27/2025 0701     01/26/2025 0609     06/20/2018 1402     01/08/2018 0835         X-Ray Chest AP Portable   Final Result      No acute abnormality.         Electronically signed by: Marilou Laughlin MD   Date:    01/22/2025   Time:    13:03          Assessment / Plan:   ESRD, on peritoneal dialysis  Elevated troponin - cardiology recommends outpatient stress test  Weakness, resolving  Anemia of chronic kidney disease  Hypertension  History of depression  LLE cellulitis - on clindamycin      Recommendations  Will cancel her CCPD for tonight  Tunneled dialysis catheter placement will be done today  Will start her on hemodialysis tomorrow and every Monday Wednesday Friday

## 2025-01-28 NOTE — PT/OT/SLP PROGRESS
Occupational Therapy   Treatment    Name: Malissa Schwartz  MRN: [id number]  Admitting Diagnosis:  <principal problem not specified>       Recommendations:     Recommended therapy intensity at discharge: Moderate Intensity Therapy   Discharge Equipment Recommendations:  to be determined by next level of care  Barriers to discharge:       Assessment:     Malissa Schwartz is a 74 y.o. female with a medical diagnosis of weakness and black stool and current diagnosis of NSTEMI; pt has history of ESRD on PD, failed kidney transplant, HTN, HLD, depression, anemia of chronic disease, colon polyp, lung nodule, left lower extremity skin cancer awaiting treatment, renal hypertension, recent humerus fracture requiring intramedullary nailing.  Performance deficits affecting function are weakness, impaired endurance, impaired self care skills, impaired functional mobility, gait instability, impaired balance, decreased upper extremity function, pain, orthopedic precautions.     Rehab Prognosis:  Fair; patient would benefit from acute skilled OT services to address these deficits and reach maximum level of function.       Plan:     Patient to be seen 4 x/week to address the above listed problems via self-care/home management, therapeutic activities, therapeutic exercises  Plan of Care Expires: 02/24/25  Plan of Care Reviewed with: patient    Subjective     Pain/Comfort:       Objective:     Communicated with: Rn prior to session.  Patient found HOB elevated with   upon OT entry to room.    General Precautions: Standard, fall    Orthopedic Precautions:RUE non weight bearing (RUE nwb/pwb only on RW; okay for ADLs)  Braces: N/A     Occupational Performance:     Bed Mobility:    Patient completed Supine to Sit with minimum assistance     Functional Mobility/Transfers:  Patient completed Sit <> Stand Transfer with moderate assistance  with  rolling walker   Patient completed Bed <> Chair Transfer using Step Transfer technique with moderate  assistance with rolling walker  Functional Mobility: no LOB while mobilizing in room    Activities of Daily Living:  Lower Body Dressing: moderate assistance donning brief like underwear seated EOB    Patient Education:  Patient provided with verbal education education regarding OT role/goals/POC.  Understanding was verbalized.      Patient left up in chair with all lines intact and call button in reach.    GOALS:   Multidisciplinary Problems       Occupational Therapy Goals          Problem: Occupational Therapy    Goal Priority Disciplines Outcome Interventions   Occupational Therapy Goal     OT, PT/OT Progressing    Description: Goals to be met by: 2/24/2025     Patient will increase functional independence with ADLs by performing:    UE Dressing with Stand-by Assist.  LE Dressing with Stand-by Assist.   Grooming while standing at sink with Modified [place].  Toileting from toilet with Modified [place] for hygiene and clothing management.   Toilet transfer to toilet with Modified [place].  Bathing in shower w/ Stand-by Assist.                          Time Tracking:     OT Date of Treatment: 01/28/25  OT Start Time: 1028  OT Stop Time: 1057  OT Total Time (min): 29 min    Billable Minutes:Self Care/Home Management 2    OT/ZULEMA: ZULEMA     Number of ZULEMA visits since last OT visit: 2    1/28/2025

## 2025-01-28 NOTE — PROCEDURES
INTERVENTIONAL NEPHROLOGY PROCEDURE NOTE:   TUNNELED DIALYSIS CATHETER (TDC) INSERTION       Patient Name: Malissa Schwartz  CLARIBEL 1950    Procedure Date:    2025    Performing Physician:   Dr. Jacob    Access History: Pt is with ESRD requiring HD.    Pre-Op Diagnosis: N18.6 End Stage Renal Disease (ESRD).  Post-Op Diagnosis: Same    Procedure: Tunneled dialysis catheter insertion.    Indication: ESRD requiring HD.    Anatomical Site: right internal jugular (RIJ)/right chest wall (RCW)    Informed Consent:  The patient was evaluated in the pre-operative area with assessment including the American Society of Anesthesia risk classification. The procedure is discussed in detail including risks, benefits alternatives and options and the patient agrees to proceed. Informed consent was obtained from the patient.     Maximum sterile barrier technique: The patient was prepped and draped using chlorhexidine prep and maximum sterile barrier technique.    Sedation Note:  Risks and benefits of sedation were reviewed with the patient or surrogate, including bleeding, infection, nausea, vomiting, dizziness, instability, damage to a nerve, damage to a blood vessel, cellulitis, reaction to medications, amnesia, loss of consciousness, respiratory arrest, cardiac arrest.     The patient received the following medications: Versed 1.5 mg IV and Fentanyl 50 mcg IV; patient did remain alert, responsive, and conversational throughout the procedure. I was personally responsible for supervising the administration of moderate sedation services during the procedure performed and I affirm all the guidelines and requirements described in the CPT 2024 section on moderate sedation were followed, including the use of an independent trained observer who had no other duties during the procedure. Start sedation time was 1447 and stop time was 1508. The total face-to-face time was 21 minutes.    Procedure Steps:   The patient was  "prepped and draped in sterile fashion. Procedure ultrasound revealed patent right internal jugular vein. Local anesthesia was administered by injecting 1% lidocaine at the intended site of cannulation. With use of live ultrasonographic visualization, the vessel was cannulated with a  21 g mini-stick needle (image saved to chart). The 0.018" mini-stick guide wire was introduced being careful not to bend the tip. Then by using Seldinger technique, the needle was exchanged for the mini-stick sheath. The inner cannula and mini-stick wire were removed and a J wire was inserted through the sheath. The wire was guided with fluoroscopic guidance, with the tip parked in the inferior vena cava. At this time a cut down on the vessel was made extending the venotomy laterally to the expected tunnel.    The catheter exit site and tunnel was approximated and infiltrated with lidocaine. A stab incision was made at the exit site. The 19 cm (cuff-to-tip) tunneled dialysis catheter assembly with tunneler was then tunneled up through the exit site to the lateral aspect of the venotomy and the catheter pulled through the tunnel. The cuff was placed approximately 1.5 centimeters inside the tunnel. The 12 Fr dilator was passed freely over the wire; it was removed and replaced sequentially with 14 Fr and 16 Fr dilators. The permacath was then inserted via pull-away sheath method. The placement of the catheter tip (at the junction of the SVC and right atrium) and the top of the permacath was confirmed with fluoroscopy.     Catheter appeared to function well with various tests utilizing a 10 cc syringe. The catheter limbs were then flushed with saline, followed by instillation of appropriate amounts of heparin as marked by the catheter hub. Venotomy site and tunnel exit site was closed with monoderm suture, being careful to not jacobs the catheter. Catheter hub was fixed to the chest wall using silk suture.      ASSESSMENT/PLAN:  - " Successful placement of right internal jugular (RIJ)/right chest wall (RCW) 19 cm (cuff-to-tip) TDC (BD [employer]).    EBL: <10 cc    Complications: None    Orders to the dialysis unit: OK to use tunneled dialysis catheter for HD.    Thank you for allowing me the opportunity in taking care of this patient. Please reach me with any questions.    Apollo Jacob DO  Interventional Nephrology  Cell: [phone]

## 2025-01-28 NOTE — PT/OT/SLP PROGRESS
Physical Therapy Treatment    Patient Name:  Malissa Schwartz   MRN:  [id number]    Recommendations:     Discharge therapy intensity: Moderate Intensity Therapy   Discharge Equipment Recommendations: to be determined by next level of care  Barriers to discharge: Impaired mobility    Assessment:     Malissa Schwartz is a 74 y.o. female admitted with a medical diagnosis of  weakness and black stool and current diagnosis of NSTEMI; pt has history of ESRD on PD, failed kidney transplant, HTN, HLD, depression, anemia of chronic disease, colon polyp, lung nodule, left lower extremity skin cancer awaiting treatment, renal hypertension, recent humerus fracture requiring intramedullary nailing.  She presents with the following impairments/functional limitations: weakness, gait instability, impaired endurance, impaired balance, decreased lower extremity function, impaired skin, impaired self care skills, impaired functional mobility, decreased upper extremity function, pain, orthopedic precautions.    Pt remains with a flat affect but participated well in tx session.     Rehab Prognosis: Good; patient would benefit from acute skilled PT services to address these deficits and reach maximum level of function.    Recent Surgery: Procedure(s) (LRB):  Insertion, Catheter, Central Venous, Hemodialysis (N/A)      Plan:     During this hospitalization, patient would benefit from acute PT services 5 x/week to address the identified rehab impairments via gait training, therapeutic activities, therapeutic exercises, neuromuscular re-education and progress toward the following goals:    Plan of Care Expires:  02/24/25    Subjective     Chief Complaint: L heel pain  Patient/Family Comments/goals: to get to bed  Pain/Comfort:  Pain Rating 1: other (see comments) (ROSANGELA heels L worse than R; did not rate pain)      Objective:     Communicated with RN prior to session.  Patient found HOB elevated with peripheral IV, telemetry upon PT entry to room.      General Precautions: Standard, fall  Orthopedic Precautions: N/A  Braces: N/A  Respiratory Status: Room air  Blood Pressure: NT  Skin Integrity:  pt with known heel wounds and skin cancer lesions - wound care on the case. Heels covered.       Functional Mobility:  Bed Mobility:    Supine to sit: Min assist with cues for technique  Transfers:    Sit<->stand:   Mod assist on first trial with increased time to achieve erect posture; WB pxns maintained  Min assist on second trial with cues for positioning and increased anterior weight shift  Gait: Ambulated x 100' with RW, slow jocelyn, decreased step ROSANGELA, mild kyphotic posture however improved with cues    Therapeutic Activities/Exercises:      Education:  Patient, spouse were, and home caregiver were provided with verbal education education regarding PT role/goals/POC, fall prevention, safety awareness, and pressure ulcer prevention.  Understanding was verbalized.     Patient left up in chair with all lines intact, call button in reach, geomat cushion, and nurse notified.     GOALS:   Multidisciplinary Problems       Physical Therapy Goals          Problem: Physical Therapy    Goal Priority Disciplines Outcome Interventions   Physical Therapy Goal     PT, PT/OT Progressing    Description: Goals to be met by: 25     Patient will increase functional independence with mobility by performin. Supine to sit with Set-up [place]  2. Sit to supine with Set-up [place]  3. Sit to stand transfer with Supervision  4. Gait  x 200 feet with Supervision using Rolling Walker.   5. Ascend/descend 2 stairs with bilateral Handrails & Supervision                          Time Tracking:     PT Received On: 25  PT Start Time: 1028     PT Stop Time: 1057  PT Total Time (min): 29 min     Billable Minutes: Gait Training 2 units    Treatment Type: Treatment  PT/PTA: PTA     Number of PTA visits since last PT visit: 3     2025
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
## 2025-01-28 NOTE — PLAN OF CARE
01/28/25 1449   Discharge Reassessment   Assessment Type Discharge Planning Assessment   Did the patient's condition or plan change since previous assessment? Yes   Discharge Plan discussed with: Adult children;Patient;Spouse/sig other;Caregiver   Communicated REINA with patient/caregiver Date not available/Unable to determine   Discharge Plan A Skilled Nursing Facility   Discharge Plan B Home Health   Post-Acute Status   Post-Acute Authorization Placement   Post-Acute Placement Status Referrals Sent   Patient choice form signed by patient/caregiver List with quality metrics by geographic area provided;List from System Post-Acute Care     Spoke to patient, spouse, and caregiver at bedside about SNF. Also spoke to daughter on the phone. Everyone is in agreement for placement. Choice obtained ([facility]). PASRR completed. [facility] Notified.

## 2025-01-29 PROCEDURE — 21400001 HC TELEMETRY ROOM

## 2025-01-29 PROCEDURE — 80100016 HC MAINTENANCE HEMODIALYSIS

## 2025-01-29 PROCEDURE — 25000003 PHARM REV CODE 250: Performed by: INTERNAL MEDICINE

## 2025-01-29 PROCEDURE — 5A1D70Z PERFORMANCE OF URINARY FILTRATION, INTERMITTENT, LESS THAN 6 HOURS PER DAY: ICD-10-PCS | Performed by: INTERNAL MEDICINE

## 2025-01-29 PROCEDURE — S0179 MEGESTROL 20 MG: HCPCS | Performed by: INTERNAL MEDICINE

## 2025-01-29 PROCEDURE — 63600175 PHARM REV CODE 636 W HCPCS

## 2025-01-29 RX ORDER — ZINC OXIDE 20 G/100G
OINTMENT TOPICAL
Status: DISCONTINUED | OUTPATIENT
Start: 2025-01-29 | End: 2025-02-03 | Stop reason: HOSPADM

## 2025-01-29 RX ADMIN — Medication 1 CAPSULE: at 12:01

## 2025-01-29 RX ADMIN — CLINDAMYCIN HYDROCHLORIDE 450 MG: 150 CAPSULE ORAL at 05:01

## 2025-01-29 RX ADMIN — ASPIRIN 81 MG: 81 TABLET, COATED ORAL at 12:01

## 2025-01-29 RX ADMIN — PANTOPRAZOLE SODIUM 40 MG: 40 TABLET, DELAYED RELEASE ORAL at 12:01

## 2025-01-29 RX ADMIN — ESCITALOPRAM OXALATE 10 MG: 10 TABLET ORAL at 12:01

## 2025-01-29 RX ADMIN — MEGESTROL ACETATE 400 MG: 400 SUSPENSION ORAL at 12:01

## 2025-01-29 RX ADMIN — POTASSIUM CHLORIDE 10 MEQ: 750 TABLET, FILM COATED, EXTENDED RELEASE ORAL at 12:01

## 2025-01-29 RX ADMIN — CLINDAMYCIN HYDROCHLORIDE 450 MG: 150 CAPSULE ORAL at 04:01

## 2025-01-29 RX ADMIN — FOLIC ACID 1 MG: 1 TABLET ORAL at 12:01

## 2025-01-29 RX ADMIN — AMLODIPINE BESYLATE 5 MG: 5 TABLET ORAL at 12:01

## 2025-01-29 RX ADMIN — ONDANSETRON 4 MG: 2 INJECTION INTRAMUSCULAR; INTRAVENOUS at 01:01

## 2025-01-29 RX ADMIN — CALCITRIOL CAPSULES 0.25 MCG 0.25 MCG: 0.25 CAPSULE ORAL at 12:01

## 2025-01-29 NOTE — NURSING
01/29/25 1238   Post-Hemodialysis Assessment   Total UF (mL) 500 mL   Post-Hemodialysis Comments tolerated hd well. vss throughout. cvc fx well.

## 2025-01-29 NOTE — PLAN OF CARE
[facility] [place] [employer] to Nevaeh Pimentel, awaiting response.

## 2025-01-29 NOTE — PROGRESS NOTES
Nephrology  Note    Patient Name: Malissa Schwartz  Age: 74 y.o.  : 1950  MRN: [id number]  Admission Date: 2025      Malissa Schwartz is a 74 y.o. female is very well known to me for her ESRD and on CCPD and she has been doing very good at home.  Admitted to the hospital with complaints of generalized weakness of 1 day duration and also reports some black stools for the last few weeks.  No chest pains.  No abdominal pains.  No cough congestion no fever or chills.  No dysuria hematuria hesitancy frequency urgency.  Some nausea reported.  But no vomiting.  No hematemesis hemoptysis  reported.  No fever or chills.  Lives with her  at home.  Her significant other history include hypertension, anemia of chronic kidney disease, hyperlipidemia, failed living related kidney transplant which lasted many years.  She has been very compliant to her dialysis regimen.  Also has some depression problem.    2025   Better.  Still poor appetite.  Still with some weakness.  No nausea.  No shortness of breath.  Had good CCPD done.  She reported the her dermatologist has told her that she will be getting radiation therapy to the skin cancer on the left lower extremity.    25  No labs for review today. VSS. Tolerated CPPD last night without issue. She has no complaints. Eating breakfast. No SOB, CP, abd pain, n/v/d or swelling    2025  Patient is resting quietly in bed.  Denies complaints.  Peritoneal effluent is clear.    2025   Awake alert oriented to time person place sitting up in the recliner.  Complains of weakness.  Wants physical therapy.   told me that she will qualify for some skilled nursing facility for which she will have to be on hemodialysis.   present.    2025   She is NPO as she is waiting to get a tunneled dialysis catheter placement done today.  Patient has no new complaints.   are working to get him to some skilled nursing facility.  She had  good CCPD last night.     01/29/2025  Currently tolerating hemodialysis.  Complains of pain in the right side of the neck where a new tunneled dialysis catheter has been place.    Current Facility-Administered Medications   Medication Dose Route Frequency Provider Last Rate Last Admin    acetaminophen tablet 1,000 mg  1,000 mg Oral Q6H PRN Lisa Stone FNP   1,000 mg at 01/27/25 1929    aluminum-magnesium hydroxide-simethicone 200-200-20 mg/5 mL suspension 30 mL  30 mL Oral QID PRN Lisa Stone FNP   30 mL at 01/25/25 1615    amLODIPine tablet 5 mg  5 mg Oral Daily Marco A Newell MD   5 mg at 01/28/25 0848    aspirin EC tablet 81 mg  81 mg Oral Daily Marco A Newell MD   81 mg at 01/28/25 0848    bisacodyL suppository 10 mg  10 mg Rectal Daily PRN Lisa Stone FNP        calcitRIOL capsule 0.25 mcg  0.25 mcg Oral Once per day on Monday Wednesday Friday Marco A Newell MD   0.25 mcg at 01/27/25 0858    clindamycin capsule 450 mg  450 mg Oral Q8H Marco A Newell MD   450 mg at 01/29/25 0531    dextrose 50% injection 12.5 g  12.5 g Intravenous PRN Lisa Stone FNP        dextrose 50% injection 25 g  25 g Intravenous PRN Lisa Stone, LETHA        EScitalopram oxalate tablet 10 mg  10 mg Oral Daily George Mendoza MD   10 mg at 01/28/25 0848    folic acid tablet 1 mg  1 mg Oral Daily Denisha Romero MD   1 mg at 01/28/25 0848    glucagon (human recombinant) injection 1 mg  1 mg Intramuscular PRN Lisa Stone FNP        glucose chewable tablet 16 g  16 g Oral PRN Lisa Stone FNP        glucose chewable tablet 24 g  24 g Oral PRN Lisa Stone FNP        megestroL 400 mg/10 mL (10 mL) suspension 400 mg  400 mg Oral Daily George Mendoza MD   400 mg at 01/28/25 0848    melatonin tablet 6 mg  6 mg Oral Nightly PRN Lisa Stone FNP   6 mg at 01/26/25 2249    mupirocin 2 % ointment   Topical (Top) Daily Amarilis Oneil FNP        ondansetron injection 4 mg  4 mg Intravenous Q4H PRN Chase,  LETHA Gonzalez        pantoprazole EC tablet 40 mg  40 mg Oral Daily George Mendoza MD   40 mg at 01/28/25 0848    polyethylene glycol packet 17 g  17 g Oral BID PRN Lisa Stone FNP        potassium chloride CR tablet 10 mEq  10 mEq Oral Daily George Mendoza MD   10 mEq at 01/28/25 0848    sodium chloride 0.9% flush 10 mL  10 mL Intravenous PRN Lisa Stone FNP        vitamin renal formula (B-complex-vitamin c-folic acid) 1 mg per capsule 1 capsule  1 capsule Oral Daily George Mendoza MD   1 capsule at 01/27/25 0851       Sergio Crabtree MD    Past Medical History:   Diagnosis Date    Acute rejection of kidney transplant 9/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 Glomerulitis with one activated endothelial cell: treated with Solumedrol pulse 375 mg IVBP x 3 doses with short prednisone taper; thymoglobin 100 mg x 6 doses 10/04/2007 minimal histologic changes no acute rejection     Allergy     Anemia of renal disease     CKD (chronic kidney disease), stage IV     CMV (cytomegalovirus) status positive     Colon polyp     benign    Depression     GN (glomerulonephritis)     GN (glomerulonephritis)     unknown type    Herpes infection, other 2009    on buttocks    Hyperlipidemia     Hypertension     Immunosuppression     Living-donor kidney transplant recipient 01/15/2007    Meningitis     history of viral meningitis    Proteinuria 1/5/2015    Proteinuria     Pulmonary nodules     Renal hypertension     S/P breast augmentation     Skin cancer     Urinary tract infection       Past Surgical History:   Procedure Laterality Date    COLON SURGERY      COLONOSCOPY W/ BIOPSIES      cosemetic surgery      facelift and breast augmentation     HYSTERECTOMY      INTRAMEDULLARY RODDING OF HUMERUS Right 12/2/2024    Procedure: INSERTION, INTRAMEDULLARY MONTSERRAT, HUMERUS;  Surgeon: Samuel Dow DO;  Location: [facility];  Service: Orthopedics;  Laterality: Right;  supine vacular backward c arm synthes   shoulder scope drape    KIDNEY TRANSPLANT      NEPHRECTOMY Left     9/2016    RENAL BIOPSY      TONSILLECTOMY, ADENOIDECTOMY        Family History   Problem Relation Name Age of Onset    Kidney disease Sister          ESRD    Kidney disease Brother          CKD from HTN    Hypertension Brother      Heart failure Mother      Heart failure Father      Diabetes Neg Hx      Stroke Neg Hx       Social History     Tobacco Use    Smoking status: Former     Current packs/day: 1.00     Average packs/day: 1 pack/day for 15.0 years (15.0 ttl pk-yrs)     Types: Cigarettes    Smokeless tobacco: Never    Tobacco comments:     quit in 2014; smoked for 15 years and at the most smoked 1 ppd   Substance Use Topics    Alcohol use: Yes     Comment: seldom     Medications Prior to Admission   Medication Sig Dispense Refill Last Dose/Taking    amLODIPine (NORVASC) 10 MG tablet Take 5 mg by mouth once daily.   Taking    aspirin (ECOTRIN) 81 MG EC tablet Take 81 mg by mouth once daily.   Past Week    B complex-vitamin C-folic acid (NEPHRO-JENN) 0.8 mg Tab Take 0.8 mg by mouth Daily.   Past Week    calcitRIOL (ROCALTROL) 0.25 MCG Cap Take 0.25 mcg by mouth 3 (three) times a week.   Past Week    methocarbamoL (ROBAXIN) 500 MG Tab Take 500 mg by mouth 4 (four) times daily.   Past Week    pantoprazole (PROTONIX) 40 MG tablet Take 40 mg by mouth once daily.   Past Week    potassium chloride (KLOR-CON) 10 MEQ TbSR Take 10 mEq by mouth once daily.   Past Week    traMADoL (ULTRAM) 50 mg tablet Take 50 mg by mouth 3 (three) times daily as needed for Pain.   Past Week    D5W PgBk 50 mL with ceFAZolin 1 gram SolR Cefazolin intraperitoneally times 10 more days       EScitalopram oxalate (LEXAPRO) 10 MG tablet Take 1 tablet (10 mg total) by mouth once daily. (Patient not taking: Reported on 12/23/2024) 90 tablet 3     folic acid (FOLVITE) 1 MG tablet Take 1 mg by mouth once daily.       furosemide (LASIX) 20 MG tablet Take 20 mg by mouth once daily.        megestroL (MEGACE) 400 mg/10 mL (10 mL) Susp Take 10 mLs (400 mg total) by mouth once daily. 300 mL 0     zolpidem (AMBIEN) 10 mg Tab Take 10 mg by mouth every evening.        Review of patient's allergies indicates:   Allergen Reactions    Codeine Palpitations and Rash    Cefepime      Other Reaction(s): Encephalopathy    Renal impairment increased risk for this reaction    Levofloxacin      Other Reaction(s): Encephalopathy    Renal pt who had some encephalopathy while on Levaquin & Cefepime, neurology physicians think they may have been due to the antibiotics (both possible side effects for each)    Iodine Nausea Only            Review of Systems:     All 12 point review of system done negative except 1 history of present illness    Objective:       VITAL SIGNS: 24 HR MIN & MAX LAST    Temp  Min: 97.5 °F (36.4 °C)  Max: 99.3 °F (37.4 °C)  99.3 °F (37.4 °C)        BP  Min: 112/62  Max: 129/74  129/74     Pulse  Min: 72  Max: 86  80     Resp  Min: 18  Max: 20  18    SpO2  Min: 95 %  Max: 99 %  95 %      GEN:  Very frail looking female in no respiratory distress.  Awake alert oriented to time person place.  HEENT:  Atraumatic normocephalic.  Pupils reactive.  Extraocular movements intact.  No oral lesion.  Neck supple.  No JVD.  CV: RRR without rub or gallop.  PULM: CTAB, unlabored  ABD: Soft, NT/ND abdomen with NABS, PD catheter noted.  No obvious exit site infection noted.  EXT: No cyanosis or edema, there is skin growth on the left lower extremity just above the ankle.  SKIN: Warm and dry  PSYCH: Awake, alert, and appropriately conversant  Dialysis excess  right IJ tunneled dialysis catheter and also PD catheter present.          Component Value Date/Time     (L) 01/28/2025 0431     01/27/2025 0701     06/20/2018 1402     01/08/2018 0835    K 3.2 (L) 01/28/2025 0431    K 3.6 01/27/2025 0701    K 4.8 06/20/2018 1402    K 4.6 01/08/2018 0835    CO2 25 01/28/2025 0431    CO2 25 01/27/2025  0701    CO2 23 06/20/2018 1402    CO2 22 (L) 01/08/2018 0835    BUN 37.0 (H) 01/28/2025 0431    BUN 38.5 (H) 01/27/2025 0701    BUN 30 (H) 06/20/2018 1402    BUN 24 (H) 01/08/2018 0835    CREATININE 6.40 (H) 01/28/2025 0431    CREATININE 6.84 (H) 01/27/2025 0701    CREATININE 1.9 (H) 06/20/2018 1402    CREATININE 1.8 (H) 01/08/2018 0835    CALCIUM 8.8 01/28/2025 0431    CALCIUM 8.7 01/27/2025 0701    CALCIUM 9.7 06/20/2018 1402    CALCIUM 9.1 01/08/2018 0835    PHOS 5.1 (H) 01/27/2025 0701    PHOS 3.8 06/20/2018 1402            Component Value Date/Time    WBC 8.88 01/27/2025 0701    WBC 5.35 01/26/2025 0609    WBC 4.26 (L) 07/04/2024 0000    WBC 6.90 06/20/2018 1402    WBC 3.37 (L) 01/08/2018 0835    HGB 10.3 (L) 01/27/2025 0701    HGB 9.6 (L) 01/26/2025 0609    HGB 9 (L) 12/18/2024 0000    HGB 10.2 (L) 11/26/2024 0000    HGB 10.5 (L) 06/20/2018 1402    HGB 9.1 (L) 01/08/2018 0835    HCT 33.7 (L) 01/27/2025 0701    HCT 30.9 (L) 01/26/2025 0609    HCT 29.8 (L) 12/18/2024 0000    HCT 31.8 (L) 11/26/2024 0000    HCT 32.0 (L) 06/05/2020 0815    HCT 31.6 (L) 06/20/2018 1402    HCT 27.3 (L) 01/08/2018 0835     01/27/2025 0701     01/26/2025 0609     06/20/2018 1402     01/08/2018 0835         X-Ray Chest AP Portable   Final Result      No acute abnormality.         Electronically signed by: Marilou Laughlin MD   Date:    01/22/2025   Time:    13:03      ULS US Guidance for Needle Placement    (Results Pending)       Assessment / Plan:   ESRD, now on hemodialysis on Monday Wednesday Friday.  Elevated troponin - cardiology recommends outpatient stress test  Weakness, resolving  Anemia of chronic kidney disease  Hypertension  History of depression  LLE cellulitis - on clindamycin      Recommendations  Now we will continue hemodialysis on her on Monday Wednesday Friday  Case management is working for placement in some skilled nursing facility.

## 2025-01-29 NOTE — PT/OT/SLP PROGRESS
Physical Therapy      Patient Name:  Malissa Schwartz   MRN:  [id number]    Patient off floor at 1153. Will follow up as schedule permits.

## 2025-01-30 LAB
ANION GAP SERPL CALC-SCNC: 10 MEQ/L
BASOPHILS # BLD AUTO: 0.03 X10(3)/MCL
BASOPHILS NFR BLD AUTO: 0.5 %
BUN SERPL-MCNC: 25.3 MG/DL (ref 9.8–20.1)
CALCIUM SERPL-MCNC: 8.5 MG/DL (ref 8.4–10.2)
CHLORIDE SERPL-SCNC: 104 MMOL/L (ref 98–107)
CO2 SERPL-SCNC: 21 MMOL/L (ref 23–31)
CREAT SERPL-MCNC: 4.97 MG/DL (ref 0.55–1.02)
CREAT/UREA NIT SERPL: 5
EOSINOPHIL # BLD AUTO: 0.05 X10(3)/MCL (ref 0–0.9)
EOSINOPHIL NFR BLD AUTO: 0.9 %
ERYTHROCYTE [DISTWIDTH] IN BLOOD BY AUTOMATED COUNT: 16.5 % (ref 11.5–17)
GFR SERPLBLD CREATININE-BSD FMLA CKD-EPI: 9 ML/MIN/1.73/M2
GLUCOSE SERPL-MCNC: 82 MG/DL (ref 82–115)
HCT VFR BLD AUTO: 33.4 % (ref 37–47)
HGB BLD-MCNC: 10.4 G/DL (ref 12–16)
IMM GRANULOCYTES # BLD AUTO: 0.05 X10(3)/MCL (ref 0–0.04)
IMM GRANULOCYTES NFR BLD AUTO: 0.9 %
LYMPHOCYTES # BLD AUTO: 1.29 X10(3)/MCL (ref 0.6–4.6)
LYMPHOCYTES NFR BLD AUTO: 22.5 %
MCH RBC QN AUTO: 26.8 PG (ref 27–31)
MCHC RBC AUTO-ENTMCNC: 31.1 G/DL (ref 33–36)
MCV RBC AUTO: 86.1 FL (ref 80–94)
MONOCYTES # BLD AUTO: 0.51 X10(3)/MCL (ref 0.1–1.3)
MONOCYTES NFR BLD AUTO: 8.9 %
NEUTROPHILS # BLD AUTO: 3.8 X10(3)/MCL (ref 2.1–9.2)
NEUTROPHILS NFR BLD AUTO: 66.3 %
NRBC BLD AUTO-RTO: 0 %
PLATELET # BLD AUTO: 214 X10(3)/MCL (ref 130–400)
PMV BLD AUTO: 8.8 FL (ref 7.4–10.4)
POTASSIUM SERPL-SCNC: 4.7 MMOL/L (ref 3.5–5.1)
RBC # BLD AUTO: 3.88 X10(6)/MCL (ref 4.2–5.4)
SODIUM SERPL-SCNC: 135 MMOL/L (ref 136–145)
WBC # BLD AUTO: 5.73 X10(3)/MCL (ref 4.5–11.5)

## 2025-01-30 PROCEDURE — 97535 SELF CARE MNGMENT TRAINING: CPT | Mod: CO

## 2025-01-30 PROCEDURE — 63600175 PHARM REV CODE 636 W HCPCS: Performed by: STUDENT IN AN ORGANIZED HEALTH CARE EDUCATION/TRAINING PROGRAM

## 2025-01-30 PROCEDURE — S0179 MEGESTROL 20 MG: HCPCS | Performed by: INTERNAL MEDICINE

## 2025-01-30 PROCEDURE — 25000003 PHARM REV CODE 250: Performed by: INTERNAL MEDICINE

## 2025-01-30 PROCEDURE — 21400001 HC TELEMETRY ROOM

## 2025-01-30 PROCEDURE — 36415 COLL VENOUS BLD VENIPUNCTURE: CPT | Performed by: STUDENT IN AN ORGANIZED HEALTH CARE EDUCATION/TRAINING PROGRAM

## 2025-01-30 PROCEDURE — 80048 BASIC METABOLIC PNL TOTAL CA: CPT | Performed by: STUDENT IN AN ORGANIZED HEALTH CARE EDUCATION/TRAINING PROGRAM

## 2025-01-30 PROCEDURE — 99232 SBSQ HOSP IP/OBS MODERATE 35: CPT | Mod: ,,,

## 2025-01-30 PROCEDURE — 85025 COMPLETE CBC W/AUTO DIFF WBC: CPT | Performed by: STUDENT IN AN ORGANIZED HEALTH CARE EDUCATION/TRAINING PROGRAM

## 2025-01-30 PROCEDURE — 25000003 PHARM REV CODE 250: Performed by: STUDENT IN AN ORGANIZED HEALTH CARE EDUCATION/TRAINING PROGRAM

## 2025-01-30 RX ORDER — HEPARIN SODIUM 5000 [USP'U]/ML
5000 INJECTION, SOLUTION INTRAVENOUS; SUBCUTANEOUS EVERY 12 HOURS
Status: DISCONTINUED | OUTPATIENT
Start: 2025-01-30 | End: 2025-02-02

## 2025-01-30 RX ADMIN — CLINDAMYCIN HYDROCHLORIDE 450 MG: 150 CAPSULE ORAL at 12:01

## 2025-01-30 RX ADMIN — ESCITALOPRAM OXALATE 10 MG: 10 TABLET ORAL at 10:01

## 2025-01-30 RX ADMIN — HEPARIN SODIUM 5000 UNITS: 5000 INJECTION, SOLUTION INTRAVENOUS; SUBCUTANEOUS at 10:01

## 2025-01-30 RX ADMIN — CLINDAMYCIN HYDROCHLORIDE 450 MG: 150 CAPSULE ORAL at 10:01

## 2025-01-30 RX ADMIN — ASPIRIN 81 MG: 81 TABLET, COATED ORAL at 10:01

## 2025-01-30 RX ADMIN — POTASSIUM CHLORIDE 10 MEQ: 750 TABLET, FILM COATED, EXTENDED RELEASE ORAL at 10:01

## 2025-01-30 RX ADMIN — CLINDAMYCIN HYDROCHLORIDE 450 MG: 150 CAPSULE ORAL at 03:01

## 2025-01-30 RX ADMIN — Medication 1 CAPSULE: at 10:01

## 2025-01-30 RX ADMIN — PANTOPRAZOLE SODIUM 40 MG: 40 TABLET, DELAYED RELEASE ORAL at 10:01

## 2025-01-30 RX ADMIN — AMLODIPINE BESYLATE 5 MG: 5 TABLET ORAL at 10:01

## 2025-01-30 RX ADMIN — RUGBY ZINC OXIDE 20%: 20 OINTMENT TOPICAL at 10:01

## 2025-01-30 RX ADMIN — FOLIC ACID 1 MG: 1 TABLET ORAL at 10:01

## 2025-01-30 RX ADMIN — MEGESTROL ACETATE 400 MG: 400 SUSPENSION ORAL at 10:01
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
## 2025-01-30 NOTE — PROGRESS NOTES
Nephrology  Note    Patient Name: Malissa Schwartz  Age: 74 y.o.  : 1950  MRN: [id number]  Admission Date: 2025      Malissa Schwartz is a 74 y.o. female is very well known to me for her ESRD and on CCPD and she has been doing very good at home.  Admitted to the hospital with complaints of generalized weakness of 1 day duration and also reports some black stools for the last few weeks.  No chest pains.  No abdominal pains.  No cough congestion no fever or chills.  No dysuria hematuria hesitancy frequency urgency.  Some nausea reported.  But no vomiting.  No hematemesis hemoptysis  reported.  No fever or chills.  Lives with her  at home.  Her significant other history include hypertension, anemia of chronic kidney disease, hyperlipidemia, failed living related kidney transplant which lasted many years.  She has been very compliant to her dialysis regimen.  Also has some depression problem.    2025   Better.  Still poor appetite.  Still with some weakness.  No nausea.  No shortness of breath.  Had good CCPD done.  She reported the her dermatologist has told her that she will be getting radiation therapy to the skin cancer on the left lower extremity.    25  No labs for review today. VSS. Tolerated CPPD last night without issue. She has no complaints. Eating breakfast. No SOB, CP, abd pain, n/v/d or swelling    2025  Patient is resting quietly in bed.  Denies complaints.  Peritoneal effluent is clear.    2025   Awake alert oriented to time person place sitting up in the recliner.  Complains of weakness.  Wants physical therapy.   told me that she will qualify for some skilled nursing facility for which she will have to be on hemodialysis.   present.    2025   She is NPO as she is waiting to get a tunneled dialysis catheter placement done today.  Patient has no new complaints.   are working to get him to some skilled nursing facility.  She had  good CCPD last night.     01/29/2025  Currently tolerating hemodialysis.  Complains of pain in the right side of the neck where a new tunneled dialysis catheter has been place.    01/30/2025    present in the room reports that patient has not been talking ever since she has come back from dialysis yesterday.  At present she is sitting in her recliner.  Confused.  Will only speak few words when asked and looks very weak.  She has incontinence of the stools.  Very poor oral intake noted.    Current Facility-Administered Medications   Medication Dose Route Frequency Provider Last Rate Last Admin    acetaminophen tablet 1,000 mg  1,000 mg Oral Q6H PRN Lisa Stone FNP   1,000 mg at 01/27/25 1929    aluminum-magnesium hydroxide-simethicone 200-200-20 mg/5 mL suspension 30 mL  30 mL Oral QID PRN Lisa Stone FNP   30 mL at 01/25/25 1615    amLODIPine tablet 5 mg  5 mg Oral Daily Marco A Newell MD   5 mg at 01/30/25 1044    aspirin EC tablet 81 mg  81 mg Oral Daily Marco A Newell MD   81 mg at 01/30/25 1044    bisacodyL suppository 10 mg  10 mg Rectal Daily PRN Lisa Stone FNP        calcitRIOL capsule 0.25 mcg  0.25 mcg Oral Once per day on Monday Wednesday Friday Marco A Newell MD   0.25 mcg at 01/29/25 1248    clindamycin capsule 450 mg  450 mg Oral Q8H Marco A Newell MD   450 mg at 01/30/25 1508    dextrose 50% injection 12.5 g  12.5 g Intravenous PRN Lisa Stone FNP        dextrose 50% injection 25 g  25 g Intravenous PRN Lisa Stone FNP        EScitalopram oxalate tablet 10 mg  10 mg Oral Daily George Mendoza MD   10 mg at 01/30/25 1044    folic acid tablet 1 mg  1 mg Oral Daily Denisha Romero MD   1 mg at 01/30/25 1044    glucagon (human recombinant) injection 1 mg  1 mg Intramuscular PRN Lisa Stone FNP        glucose chewable tablet 16 g  16 g Oral PRN Lisa Stone FNP        glucose chewable tablet 24 g  24 g Oral PRN Lisa Stone FNP        megestroL 400 mg/10 mL (10  mL) suspension 400 mg  400 mg Oral Daily George Mendoza MD   400 mg at 01/30/25 1044    melatonin tablet 6 mg  6 mg Oral Nightly PRN Lisa Stone FNP   6 mg at 01/26/25 2249    mupirocin 2 % ointment   Topical (Top) Daily Amarilsi Oneil FNP        ondansetron injection 4 mg  4 mg Intravenous Q4H PRN Lisa Stone FNP   4 mg at 01/29/25 1336    pantoprazole EC tablet 40 mg  40 mg Oral Daily George Mendoza MD   40 mg at 01/30/25 1044    polyethylene glycol packet 17 g  17 g Oral BID PRN Lisa Stone FNP        potassium chloride CR tablet 10 mEq  10 mEq Oral Daily George Mendoza MD   10 mEq at 01/30/25 1044    sodium chloride 0.9% flush 10 mL  10 mL Intravenous PRN Lisa Stone FNP        vitamin renal formula (B-complex-vitamin c-folic acid) 1 mg per capsule 1 capsule  1 capsule Oral Daily George Mendoza MD   1 capsule at 01/30/25 1044    zinc oxide 20 % ointment   Topical (Top) PRN Tyree Morillo MD   Given at 01/30/25 1044       Sergio Crabtree MD    Past Medical History:   Diagnosis Date    Acute rejection of kidney transplant 9/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 Glomerulitis with one activated endothelial cell: treated with Solumedrol pulse 375 mg IVBP x 3 doses with short prednisone taper; thymoglobin 100 mg x 6 doses 10/04/2007 minimal histologic changes no acute rejection     Allergy     Anemia of renal disease     CKD (chronic kidney disease), stage IV     CMV (cytomegalovirus) status positive     Colon polyp     benign    Depression     GN (glomerulonephritis)     GN (glomerulonephritis)     unknown type    Herpes infection, other 2009    on buttocks    Hyperlipidemia     Hypertension     Immunosuppression     Living-donor kidney transplant recipient 01/15/2007    Meningitis     history of viral meningitis    Proteinuria 1/5/2015    Proteinuria     Pulmonary nodules     Renal hypertension     S/P breast augmentation     Skin cancer     Urinary tract  infection       Past Surgical History:   Procedure Laterality Date    COLON SURGERY      COLONOSCOPY W/ BIOPSIES      cosemetic surgery      facelift and breast augmentation     HYSTERECTOMY      INSERTION OF TUNNELED CENTRAL VENOUS HEMODIALYSIS CATHETER N/A 1/28/2025    Procedure: Insertion, Catheter, Central Venous, Hemodialysis;  Surgeon: Apollo Jacob DO;  Location: [facility] CATH LAB;  Service: Nephrology;  Laterality: N/A;    INTRAMEDULLARY RODDING OF HUMERUS Right 12/2/2024    Procedure: INSERTION, INTRAMEDULLARY MONTSERRAT, HUMERUS;  Surgeon: Samuel Dow DO;  Location: [facility] OR;  Service: Orthopedics;  Laterality: Right;  supine vacular backward c arm synthes  shoulder scope drape    KIDNEY TRANSPLANT      NEPHRECTOMY Left     9/2016    RENAL BIOPSY      TONSILLECTOMY, ADENOIDECTOMY        Family History   Problem Relation Name Age of Onset    Kidney disease Sister          ESRD    Kidney disease Brother          CKD from HTN    Hypertension Brother      Heart failure Mother      Heart failure Father      Diabetes Neg Hx      Stroke Neg Hx       Social History     Tobacco Use    Smoking status: Former     Current packs/day: 1.00     Average packs/day: 1 pack/day for 15.0 years (15.0 ttl pk-yrs)     Types: Cigarettes    Smokeless tobacco: Never    Tobacco comments:     quit in 2014; smoked for 15 years and at the most smoked 1 ppd   Substance Use Topics    Alcohol use: Yes     Comment: seldom     Medications Prior to Admission   Medication Sig Dispense Refill Last Dose/Taking    amLODIPine (NORVASC) 10 MG tablet Take 5 mg by mouth once daily.   Taking    aspirin (ECOTRIN) 81 MG EC tablet Take 81 mg by mouth once daily.   Past Week    B complex-vitamin C-folic acid (NEPHRO-JENN) 0.8 mg Tab Take 0.8 mg by mouth Daily.   Past Week    calcitRIOL (ROCALTROL) 0.25 MCG Cap Take 0.25 mcg by mouth 3 (three) times a week.   Past Week    methocarbamoL (ROBAXIN) 500 MG Tab Take 500 mg by mouth 4 (four) times daily.   Past Week     pantoprazole (PROTONIX) 40 MG tablet Take 40 mg by mouth once daily.   Past Week    potassium chloride (KLOR-CON) 10 MEQ TbSR Take 10 mEq by mouth once daily.   Past Week    traMADoL (ULTRAM) 50 mg tablet Take 50 mg by mouth 3 (three) times daily as needed for Pain.   Past Week    D5W PgBk 50 mL with ceFAZolin 1 gram SolR Cefazolin intraperitoneally times 10 more days       EScitalopram oxalate (LEXAPRO) 10 MG tablet Take 1 tablet (10 mg total) by mouth once daily. (Patient not taking: Reported on 12/23/2024) 90 tablet 3     folic acid (FOLVITE) 1 MG tablet Take 1 mg by mouth once daily.       furosemide (LASIX) 20 MG tablet Take 20 mg by mouth once daily.       megestroL (MEGACE) 400 mg/10 mL (10 mL) Susp Take 10 mLs (400 mg total) by mouth once daily. 300 mL 0     zolpidem (AMBIEN) 10 mg Tab Take 10 mg by mouth every evening.        Review of patient's allergies indicates:   Allergen Reactions    Codeine Palpitations and Rash    Cefepime      Other Reaction(s): Encephalopathy    Renal impairment increased risk for this reaction    Levofloxacin      Other Reaction(s): Encephalopathy    Renal pt who had some encephalopathy while on Levaquin & Cefepime, neurology physicians think they may have been due to the antibiotics (both possible side effects for each)    Iodine Nausea Only            Review of Systems:     All 12 point review of system done negative except 1 history of present illness    Objective:       VITAL SIGNS: 24 HR MIN & MAX LAST    Temp  Min: 98 °F (36.7 °C)  Max: 98.7 °F (37.1 °C)  98.4 °F (36.9 °C)        BP  Min: 126/61  Max: 169/72  (!) 142/82     Pulse  Min: 82  Max: 86  83     Resp  Min: 18  Max: 18  18    SpO2  Min: 97 %  Max: 98 %  97 %      GEN:  Very frail looking female in no respiratory distress.  Awake alert , minimally conversant.  HEENT:  Atraumatic normocephalic.  Pupils reactive.  Extraocular movements intact.  No oral lesion.  Neck supple.  No JVD.  CV: RRR without rub or  gallop.  PULM: CTAB, unlabored  ABD: Soft, NT/ND abdomen with NABS, PD catheter noted.  No obvious exit site infection noted.  EXT: No cyanosis or edema, there is skin growth on the left lower extremity just above the ankle.  SKIN: Warm and dry  PSYCH: Awake, alert, minimum conversation noted.  Dialysis excess  right IJ tunneled dialysis catheter and also PD catheter present.          Component Value Date/Time     (L) 01/30/2025 0608     (L) 01/28/2025 0431     06/20/2018 1402     01/08/2018 0835    K 4.7 01/30/2025 0608    K 3.2 (L) 01/28/2025 0431    K 4.8 06/20/2018 1402    K 4.6 01/08/2018 0835    CO2 21 (L) 01/30/2025 0608    CO2 25 01/28/2025 0431    CO2 23 06/20/2018 1402    CO2 22 (L) 01/08/2018 0835    BUN 25.3 (H) 01/30/2025 0608    BUN 37.0 (H) 01/28/2025 0431    BUN 30 (H) 06/20/2018 1402    BUN 24 (H) 01/08/2018 0835    CREATININE 4.97 (H) 01/30/2025 0608    CREATININE 6.40 (H) 01/28/2025 0431    CREATININE 1.9 (H) 06/20/2018 1402    CREATININE 1.8 (H) 01/08/2018 0835    CALCIUM 8.5 01/30/2025 0608    CALCIUM 8.8 01/28/2025 0431    CALCIUM 9.7 06/20/2018 1402    CALCIUM 9.1 01/08/2018 0835    PHOS 5.1 (H) 01/27/2025 0701    PHOS 3.8 06/20/2018 1402            Component Value Date/Time    WBC 5.73 01/30/2025 0608    WBC 8.88 01/27/2025 0701    WBC 4.26 (L) 07/04/2024 0000    WBC 6.90 06/20/2018 1402    WBC 3.37 (L) 01/08/2018 0835    HGB 10.4 (L) 01/30/2025 0608    HGB 10.3 (L) 01/27/2025 0701    HGB 9 (L) 12/18/2024 0000    HGB 10.2 (L) 11/26/2024 0000    HGB 10.5 (L) 06/20/2018 1402    HGB 9.1 (L) 01/08/2018 0835    HCT 33.4 (L) 01/30/2025 0608    HCT 33.7 (L) 01/27/2025 0701    HCT 29.8 (L) 12/18/2024 0000    HCT 31.8 (L) 11/26/2024 0000    HCT 32.0 (L) 06/05/2020 0815    HCT 31.6 (L) 06/20/2018 1402    HCT 27.3 (L) 01/08/2018 0835     01/30/2025 0608     01/27/2025 0701     06/20/2018 1402     01/08/2018 0835         X-Ray Chest AP Portable   Final  Result      No acute abnormality.         Electronically signed by: Marilou Laughlin MD   Date:    01/22/2025   Time:    13:03      ULS US Guidance for Needle Placement    (Results Pending)       Assessment / Plan:   ESRD, now on hemodialysis on Monday Wednesday Friday.  Elevated troponin - cardiology recommends outpatient stress test  Weakness, resolving  Anemia of chronic kidney disease  Hypertension  History of depression  LLE cellulitis - on clindamycin      Recommendations  Overall deteriorating condition  From the renal standpoint of view patient can have 100 g of proteins a day as recommended by wound care, but she has not been eating much even with the Megace, so how to accomplish it?

## 2025-01-30 NOTE — DIALYSIS - OUTPATIENT INFORMATION
Outpatient Dialysis Information        [facility]  [place] [facility] [place] [facility] [place] for a return callform clinic Paty Cameron to find out if the accommodate a MWF chair time .

## 2025-01-30 NOTE — PLAN OF CARE
SSC spoke to Cindy with [facility]. They can accept the patient medically but still waiting on insurance authorization before they can accept fully.

## 2025-01-30 NOTE — PROGRESS NOTES
Inpatient Nutrition Evaluation    Admit Date: 1/22/2025   Total duration of encounter: 8 days   Patient Age: 74 y.o.    Nutrition Recommendation/Prescription     Consider liberalizing diet to low sodium.  Add Boost Plus (provides 360 kcal, 14 g protein per serving) BID.  Encouraged increased intake.  Discontinue Buddy (provides 90 kcal, 2.5 g protein per serving) due to lack of consumption.    Nutrition Assessment     Chart Review    Reason Seen: follow-up    Malnutrition Screening Tool Results   Have you recently lost weight without trying?: Unsure  Have you been eating poorly because of a decreased appetite?: Yes   MST Score: 3   Diagnosis:  NSTEMI-suspect type 2  ESRD on PD  Mild left lower extremity cellulitis  Sepsis secondary to above-improved   Anemia of chronic disease  Hypokalemia   Metabolic acidosis  Physical deconditioning     Relevant Medical History: ESRD on PD, failed kidney transplant, HTN, HLD, depression, anemia of chronic disease, colon polyp, lung nodule, left lower extremity skin cancer awaiting treatment, renal hypertension, recent humerus fracture requiring intramedullary nailing    Scheduled Medications:  amLODIPine, 5 mg, Daily  aspirin, 81 mg, Daily  calcitRIOL, 0.25 mcg, Once per day on Monday Wednesday Friday  clindamycin, 450 mg, Q8H  EScitalopram oxalate, 10 mg, Daily  folic acid, 1 mg, Daily  megestroL, 400 mg, Daily  mupirocin, , Daily  pantoprazole, 40 mg, Daily  potassium chloride, 10 mEq, Daily  vitamin renal formula (B-complex-vitamin c-folic acid), 1 capsule, Daily    Continuous Infusions: none       PRN Medications:   Current Facility-Administered Medications:     acetaminophen, 1,000 mg, Oral, Q6H PRN    aluminum-magnesium hydroxide-simethicone, 30 mL, Oral, QID PRN    bisacodyL, 10 mg, Rectal, Daily PRN    dextrose 50%, 12.5 g, Intravenous, PRN    dextrose 50%, 25 g, Intravenous, PRN    glucagon (human recombinant), 1 mg, Intramuscular, PRN    glucose, 16 g, Oral, PRN     glucose, 24 g, Oral, PRN    melatonin, 6 mg, Oral, Nightly PRN    ondansetron, 4 mg, Intravenous, Q4H PRN    polyethylene glycol, 17 g, Oral, BID PRN    sodium chloride 0.9%, 10 mL, Intravenous, PRN    zinc oxide, , Topical (Top), PRN    Recent Labs   Lab 01/24/25  0744 01/25/25  0620 01/26/25  0609 01/27/25  0701 01/27/25  1735 01/28/25  0431 01/30/25  0608   * 136 136 139  --  135* 135*   K 3.6 3.4* 3.5 3.6  --  3.2* 4.7   CALCIUM 8.4 9.2 8.2* 8.7  --  8.8 8.5   PHOS  --   --   --  5.1*  --   --   --    MG  --   --   --   --   --  2.30  --    CL 98 97* 99 100  --  101 104   CO2 20* 25 25 25  --  25 21*   BUN 55.4* 45.3* 42.8* 38.5*  --  37.0* 25.3*   CREATININE 7.96* 7.27* 6.82* 6.84*  --  6.40* 4.97*   EGFRNORACEVR 5 5 6 6  --  6 9   GLUCOSE 108 82 103 98  --  110 82   BILITOT 0.3  --   --   --   --  0.3  --    ALKPHOS 191*  --   --   --   --  194*  --    ALT 22  --   --   --   --  16  --    AST 36*  --   --   --   --  20  --    ALBUMIN 1.7*  --   --  1.7*  --  1.6*  --    PREALB  --   --   --   --  14.8  --   --    WBC  --  7.16 5.35 8.88  --   --  5.73   HGB  --  10.3* 9.6* 10.3*  --   --  10.4*   HCT  --  32.3* 30.9* 33.7*  --   --  33.4*     Nutrition Orders:  Diet Renal On Dialysis Standard Tray  Dietary nutrition supplements BID; Buddy - Any flavor    Appetite/Oral Intake: poor/0-25% of meals  Factors Affecting Nutritional Intake: decreased appetite  Food/Jewish/Cultural Preferences:  regular diet  Food Allergies: none reported  Last Bowel Movement: 01/29/25  Wound(s):     Wound 01/29/25 1700 Incontinence associated dermatitis Perirectal-Tissue loss description: Partial thickness       Wound 01/29/25 1700 Skin Tear Right upper Back-Tissue loss description: Partial thickness       Wound 10/05/24 1717 Pressure Injury Left Heel #4-Tissue loss description: Not applicable       Wound 10/05/24 1718 Pressure Injury Right Heel #5-Tissue loss description: Not applicable       Wound 12/02/24 0615 Skin Tear  "Left Leg-Tissue loss description: Partial thickness       Wound 01/24/25 1130 Other (comment) Left lower Leg-Tissue loss description: Not applicable     Comments    1/26/25 Reports fair appetite, food needs "more salt". On regular diet. Discussed requesting seasonings on trays to her preference. Denies any unintentional wt loss, thinks dry UBW ~120lbs. Noted wounds and WOCN note, add Buddy BID to trial.    1/30/25 Patient did not say much during rounds, family at bedside reports decreased intake over the past few days, likes chocolate Boost (will add), not drinking Buddy (will discontinue). Megestrol noted, on outpatient medication list.    Anthropometrics    Height: 5' 2" (157.5 cm),    Last Weight: 61.2 kg (135 lb) (01/27/25 0616),    BMI (Calculated): 24.7  BMI Classification: overweight (BMI 25-29.9)     Ideal Body Weight (IBW), Female: 110 lb     % Ideal Body Weight, Female (lb): 125.45 %                             Usual Weight Provided By: patient denies unintentional weight loss    Wt Readings from Last 5 Encounters:   01/27/25 61.2 kg (135 lb)   12/23/24 63 kg (138 lb 14.2 oz)   12/09/24 63.5 kg (140 lb)   12/02/24 59 kg (130 lb)   10/26/24 54.4 kg (120 lb)     Weight Change(s) Since Admission:   Wt Readings from Last 1 Encounters:   01/27/25 0616 61.2 kg (135 lb)   01/22/25 1048 62.6 kg (138 lb)   Admit Weight: 62.6 kg (138 lb) (01/22/25 1048),      Patient Education Not applicable.    Nutrition Goals & Monitoring     Dietitian will monitor: energy intake    Nutrition Risk/Follow-Up: low (follow-up in 5-7 days)  Patients assigned 'low nutrition risk' status do not qualify for a full nutritional assessment but will be monitored and re-evaluated in a 5-7 day time period. Please consult if re-evaluation needed sooner.   "
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
## 2025-01-30 NOTE — PT/OT/SLP PROGRESS
Occupational Therapy   Treatment    Name: Malissa Schwartz  MRN: [id number]  Admitting Diagnosis:  <principal problem not specified>  2 Days Post-Op    Recommendations:     Recommended therapy intensity at discharge: Moderate Intensity Therapy   Discharge Equipment Recommendations:  to be determined by next level of care  Barriers to discharge:       Assessment:     Malissa Schwartz is a 74 y.o. female with a medical diagnosis of weakness and black stool and current diagnosis of NSTEMI; pt has history of ESRD on PD, failed kidney transplant, HTN, HLD, depression, anemia of chronic disease, colon polyp, lung nodule, left lower extremity skin cancer awaiting treatment, renal hypertension, recent humerus fracture requiring intramedullary nailing.  She presents with flat affect, with requiring max VC to communicate with therapist or  in room. Performance deficits affecting function are weakness, impaired endurance, impaired self care skills, impaired functional mobility, gait instability, impaired balance, decreased upper extremity function, pain, orthopedic precautions.     Rehab Prognosis:  Fair; patient would benefit from acute skilled OT services to address these deficits and reach maximum level of function.       Plan:     Patient to be seen 4 x/week to address the above listed problems via self-care/home management, therapeutic activities, therapeutic exercises  Plan of Care Expires: 02/24/25  Plan of Care Reviewed with: patient    Subjective     Pain/Comfort:       Objective:     Communicated with: RN prior to session.  Patient found HOB elevated with   upon OT entry to room.    General Precautions: Standard, fall    Orthopedic Precautions:RUE non weight bearing (RUE nwb/pwb only on RW; okay for ADLs)  Braces: N/A     Occupational Performance:     Bed Mobility:    Patient completed Supine to Sit with moderate assistance     Functional Mobility/Transfers:  Patient completed Sit <> Stand Transfer with maximal  assistance  with  no assistive device   Functional Mobility: patient mobilizing from EOB to in room sink then to bedside chair with min A slow stephie requiring max VC for RW managment    Activities of Daily Living:  Grooming: stand by assistance washing hands standing at sink    Patient Education:  Patient provided with verbal education education regarding OT role/goals/POC.  Understanding was verbalized.      Patient left up in chair with all lines intact and call button in reach.    GOALS:   Multidisciplinary Problems       Occupational Therapy Goals          Problem: Occupational Therapy    Goal Priority Disciplines Outcome Interventions   Occupational Therapy Goal     OT, PT/OT Progressing    Description: Goals to be met by: 2/24/2025     Patient will increase functional independence with ADLs by performing:    UE Dressing with Stand-by Assist.  LE Dressing with Stand-by Assist.   Grooming while standing at sink with Modified [place].  Toileting from toilet with Modified [place] for hygiene and clothing management.   Toilet transfer to toilet with Modified [place].  Bathing in shower w/ Stand-by Assist.                          Time Tracking:     OT Date of Treatment: 01/30/25  OT Start Time: 1358  OT Stop Time: 1432  OT Total Time (min): 34 min    Billable Minutes:Self Care/Home Management 2    OT/ZULEMA: ZULEMA     Number of ZULEMA visits since last OT visit: 3    1/30/2025
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
## 2025-01-31 PROCEDURE — 63600175 PHARM REV CODE 636 W HCPCS: Performed by: STUDENT IN AN ORGANIZED HEALTH CARE EDUCATION/TRAINING PROGRAM

## 2025-01-31 PROCEDURE — S0179 MEGESTROL 20 MG: HCPCS | Performed by: INTERNAL MEDICINE

## 2025-01-31 PROCEDURE — 25000003 PHARM REV CODE 250: Performed by: STUDENT IN AN ORGANIZED HEALTH CARE EDUCATION/TRAINING PROGRAM

## 2025-01-31 PROCEDURE — 25000003 PHARM REV CODE 250

## 2025-01-31 PROCEDURE — 97530 THERAPEUTIC ACTIVITIES: CPT

## 2025-01-31 PROCEDURE — 25000003 PHARM REV CODE 250: Performed by: INTERNAL MEDICINE

## 2025-01-31 PROCEDURE — 80100016 HC MAINTENANCE HEMODIALYSIS

## 2025-01-31 PROCEDURE — 97535 SELF CARE MNGMENT TRAINING: CPT

## 2025-01-31 PROCEDURE — 21400001 HC TELEMETRY ROOM

## 2025-01-31 RX ORDER — IBUPROFEN 200 MG
16 TABLET ORAL
Status: DISCONTINUED | OUTPATIENT
Start: 2025-01-31 | End: 2025-02-03 | Stop reason: HOSPADM

## 2025-01-31 RX ORDER — GLUCAGON 1 MG
1 KIT INJECTION
Status: DISCONTINUED | OUTPATIENT
Start: 2025-01-31 | End: 2025-02-03 | Stop reason: HOSPADM

## 2025-01-31 RX ORDER — IBUPROFEN 200 MG
24 TABLET ORAL
Status: DISCONTINUED | OUTPATIENT
Start: 2025-01-31 | End: 2025-02-03 | Stop reason: HOSPADM

## 2025-01-31 RX ORDER — INSULIN ASPART 100 [IU]/ML
0-10 INJECTION, SOLUTION INTRAVENOUS; SUBCUTANEOUS
Status: DISCONTINUED | OUTPATIENT
Start: 2025-01-31 | End: 2025-02-03 | Stop reason: HOSPADM

## 2025-01-31 RX ADMIN — HEPARIN SODIUM 5000 UNITS: 5000 INJECTION, SOLUTION INTRAVENOUS; SUBCUTANEOUS at 09:01

## 2025-01-31 RX ADMIN — HEPARIN SODIUM 5000 UNITS: 5000 INJECTION, SOLUTION INTRAVENOUS; SUBCUTANEOUS at 01:01

## 2025-01-31 RX ADMIN — ASPIRIN 81 MG: 81 TABLET, COATED ORAL at 01:01

## 2025-01-31 RX ADMIN — PANTOPRAZOLE SODIUM 40 MG: 40 TABLET, DELAYED RELEASE ORAL at 01:01

## 2025-01-31 RX ADMIN — Medication 1 CAPSULE: at 01:01

## 2025-01-31 RX ADMIN — FOLIC ACID 1 MG: 1 TABLET ORAL at 01:01

## 2025-01-31 RX ADMIN — CLINDAMYCIN HYDROCHLORIDE 450 MG: 150 CAPSULE ORAL at 01:01

## 2025-01-31 RX ADMIN — MUPIROCIN: 20 OINTMENT TOPICAL at 01:01

## 2025-01-31 RX ADMIN — CLINDAMYCIN HYDROCHLORIDE 450 MG: 150 CAPSULE ORAL at 09:01

## 2025-01-31 RX ADMIN — ESCITALOPRAM OXALATE 10 MG: 10 TABLET ORAL at 01:01

## 2025-01-31 RX ADMIN — AMLODIPINE BESYLATE 5 MG: 5 TABLET ORAL at 01:01

## 2025-01-31 RX ADMIN — POTASSIUM CHLORIDE 10 MEQ: 750 TABLET, FILM COATED, EXTENDED RELEASE ORAL at 01:01

## 2025-01-31 RX ADMIN — CALCITRIOL CAPSULES 0.25 MCG 0.25 MCG: 0.25 CAPSULE ORAL at 03:01

## 2025-01-31 RX ADMIN — MEGESTROL ACETATE 400 MG: 400 SUSPENSION ORAL at 01:01

## 2025-01-31 RX ADMIN — CLINDAMYCIN HYDROCHLORIDE 450 MG: 150 CAPSULE ORAL at 06:01

## 2025-01-31 NOTE — PT/OT/SLP PROGRESS
"Physical Therapy Treatment    Patient Name:  Malissa Schwartz   MRN:  [id number]    Recommendations:     Discharge therapy intensity: Moderate Intensity Therapy   Discharge Equipment Recommendations: to be determined by next level of care  Barriers to discharge:  medical dx, impaired mobility, decreased independence     Assessment:     Malissa Schwartz is a 74 y.o. female admitted with a medical diagnosis of  weakness and black stool and current diagnosis of NSTEMI.  Pt has history of ESRD on PD, failed kidney transplant, HTN, HLD, depression, anemia of chronic disease, colon polyp, lung nodule, left lower extremity skin cancer awaiting treatment, renal hypertension, recent humerus fracture requiring intramedullary nailing.     She presents with the following impairments/functional limitations: weakness, gait instability, decreased upper extremity function, impaired endurance, impaired balance, decreased lower extremity function, impaired self care skills, impaired functional mobility.    Pt with very minimal verbalizations throughout session; only stated "sh*t" multiple times and "please let me go" upon standing the first time. Increased time required 2/2 pt with poor initiation to task. Max encouragement provided. Conversation occurs with pt's spouse outside of room after session; all questions/concerns addressed and answered as appropriate.     Rehab Prognosis: Fair; patient would benefit from acute skilled PT services to address these deficits and reach maximum level of function.    Recent Surgery: Procedure(s) (LRB):  Insertion, Catheter, Central Venous, Hemodialysis (N/A) 3 Days Post-Op    Plan:     During this hospitalization, patient would benefit from acute PT services 5 x/week to address the identified rehab impairments via gait training, therapeutic activities, therapeutic exercises and progress toward the following goals:    Plan of Care Expires:  02/24/25    Subjective     Chief Complaint: " n/a  Patient/Family Comments/goals: to get stronger   Pain/Comfort:  Pain Rating 1: 0/10      Objective:     Communicated with NSG prior to session.  Patient found HOB elevated with peripheral IV, telemetry, pressure relief boots, SCD (dialysis catheter, wedge on pt's L side) upon PT entry to room.     General Precautions: Standard, fall  Orthopedic Precautions: N/A, NWB except PWB for RW and ADLs only  Braces: N/A  Respiratory Status: Room air      Functional Mobility:  Bed Mobility:     Supine to Sit: maximal assistance  Sit to Supine: maximal assistance  Transfers:     Sit to Stand:  maximal assistance with rolling walker; x2 trials from EOB   Pre-Gait: on trial two of standing, pt able to take a few very small side steps with use of RW; encouragement required to continue   Balance: good static sitting       Education:  Patient and spouse were provided with verbal education  regarding PT role/goals/POC, fall prevention, safety awareness, discharge/DME recommendations, and pressure ulcer prevention.  Understanding was verbalized, however additional teaching warranted.     Patient left HOB elevated with all lines intact, call button in reach, and RN present at bedside    GOALS:   Multidisciplinary Problems       Physical Therapy Goals          Problem: Physical Therapy    Goal Priority Disciplines Outcome Interventions   Physical Therapy Goal     PT, PT/OT Progressing    Description: Goals to be met by: 25     Patient will increase functional independence with mobility by performin. Supine to sit with Set-up [place]  2. Sit to supine with Set-up [place]  3. Sit to stand transfer with Supervision  4. Gait  x 200 feet with Supervision using Rolling Walker.   5. Ascend/descend 2 stairs with bilateral Handrails & Supervision                          Time Tracking:     PT Received On: 25  PT Start Time: 1304     PT Stop Time: 1342  PT Total Time (min): 38 min     Billable Minutes: Therapeutic  Activity 2    Treatment Type: Treatment  PT/PTA: PT     Number of PTA visits since last PT visit: 4     01/31/2025
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
## 2025-01-31 NOTE — PT/OT/SLP PROGRESS
"Occupational Therapy   Treatment    Name: Malissa Schwartz  MRN: [id number]  Admitting Diagnosis:  <principal problem not specified>  3 Days Post-Op    Recommendations:     Recommended therapy intensity at discharge: Moderate Intensity Therapy   Discharge Equipment Recommendations:  to be determined by next level of care  Barriers to discharge:   ongoing medical needs, impaired mobility/ADLs    Assessment:     Malissa Schawrtz is a 74 y.o. female with a medical diagnosis of weakness and black stool and current diagnosis of NSTEMI; pt has history of ESRD on PD, failed kidney transplant, HTN, HLD, depression, anemia of chronic disease, colon polyp, lung nodule, left lower extremity skin cancer awaiting treatment, renal hypertension, recent humerus fracture requiring intramedullary nailing. Performance deficits affecting function are weakness, impaired endurance, impaired self care skills, impaired functional mobility, gait instability, impaired balance, decreased upper extremity function, pain, orthopedic precautions.     Pt remains with flat affect and withdrawn throughout today's session. Pt did not respond to any prompts from therapist and only stated "sh*t" multiple times. Requires max encouragement to attempt standing and side steps at bedside. Pt's  present and expressed his concern of pt's mood/affect; all questions/concerns addressed and answered as appropriate.     Rehab Prognosis:  Fair; patient would benefit from acute skilled OT services to address these deficits and reach maximum level of function.       Plan:     Patient to be seen 4 x/week to address the above listed problems via self-care/home management, therapeutic activities, therapeutic exercises  Plan of Care Expires: 02/24/25  Plan of Care Reviewed with: patient    Subjective     Pain/Comfort:  Pain Rating 1: 0/10    Objective:     Communicated with: RN prior to session.  Patient found HOB elevated with peripheral IV, pulse ox (continuous), " telemetry upon OT entry to room.    General Precautions: Standard, fall    Orthopedic Precautions:RUE non weight bearing (RUE nwb/pwb only on RW; okay for ADLs)  Braces: N/A     Occupational Performance:     Bed Mobility:    Sup<>sit: maxx2     Functional Mobility/Transfers:  Sit to Stand:  maximal assistance with rolling walker; x2 trials from EOB   Functional Mobility: brief side steps at bedside with RW; requiring max encouragement to persist     Activities of Daily Living:  LBD: max A to doff/don socks   Grooming: pt scratching her back continuously throughout session. Pt not responsive/engaging to therapist's prompts to don moisturizer - requiring max A.     Patient Education:  Patient provided with verbal education education regarding OT role/goals/POC.  Understanding was verbalized.      Patient left HOB elevated with all lines intact and call button in reach.fly and RN present.     GOALS:   Multidisciplinary Problems       Occupational Therapy Goals          Problem: Occupational Therapy    Goal Priority Disciplines Outcome Interventions   Occupational Therapy Goal     OT, PT/OT Progressing    Description: Goals to be met by: 2/24/2025     Patient will increase functional independence with ADLs by performing:    UE Dressing with Stand-by Assist.  LE Dressing with Stand-by Assist.   Grooming while standing at sink with Modified [place].  Toileting from toilet with Modified [place] for hygiene and clothing management.   Toilet transfer to toilet with Modified [place].  Bathing in shower w/ Stand-by Assist.                          Time Tracking:     OT Date of Treatment: 01/31/25  OT Start Time: 1319  OT Stop Time: 1342  OT Total Time (min): 23 min    Billable Minutes:Self Care/Home Management 2    OT/ZULEMA: OT     Number of ZULEMA visits since last OT visit: 4    1/31/2025

## 2025-01-31 NOTE — PROGRESS NOTES
Nephrology  Note    Patient Name: Malissa Schwartz  Age: 74 y.o.  : 1950  MRN: [id number]  Admission Date: 2025      Malissa Schwartz is a 74 y.o. female is very well known to me for her ESRD and on CCPD and she has been doing very good at home.  Admitted to the hospital with complaints of generalized weakness of 1 day duration and also reports some black stools for the last few weeks.  No chest pains.  No abdominal pains.  No cough congestion no fever or chills.  No dysuria hematuria hesitancy frequency urgency.  Some nausea reported.  But no vomiting.  No hematemesis hemoptysis  reported.  No fever or chills.  Lives with her  at home.  Her significant other history include hypertension, anemia of chronic kidney disease, hyperlipidemia, failed living related kidney transplant which lasted many years.  She has been very compliant to her dialysis regimen.  Also has some depression problem.    2025   Better.  Still poor appetite.  Still with some weakness.  No nausea.  No shortness of breath.  Had good CCPD done.  She reported the her dermatologist has told her that she will be getting radiation therapy to the skin cancer on the left lower extremity.    25  No labs for review today. VSS. Tolerated CPPD last night without issue. She has no complaints. Eating breakfast. No SOB, CP, abd pain, n/v/d or swelling    2025  Patient is resting quietly in bed.  Denies complaints.  Peritoneal effluent is clear.    2025   Awake alert oriented to time person place sitting up in the recliner.  Complains of weakness.  Wants physical therapy.   told me that she will qualify for some skilled nursing facility for which she will have to be on hemodialysis.   present.    2025   She is NPO as she is waiting to get a tunneled dialysis catheter placement done today.  Patient has no new complaints.   are working to get him to some skilled nursing facility.  She had  good CCPD last night.     01/29/2025  Currently tolerating hemodialysis.  Complains of pain in the right side of the neck where a new tunneled dialysis catheter has been place.    01/30/2025    present in the room reports that patient has not been talking ever since she has come back from dialysis yesterday.  At present she is sitting in her recliner.  Confused.  Will only speak few words when asked and looks very weak.  She has incontinence of the stools.  Very poor oral intake noted.    01/31/2025   Currently tolerating dialysis.  Will decrease time to 2-1/2 hours today.  We will not remove any fluids today either.  She has not been eating at all.  She has been very confused but with further stimulation she is able to communicate and was asking for hamburger and agreed to eat the turkey sandwich available at present time in the dialysis unit.    Current Facility-Administered Medications   Medication Dose Route Frequency Provider Last Rate Last Admin    acetaminophen tablet 1,000 mg  1,000 mg Oral Q6H PRN Lisa Stone FNP   1,000 mg at 01/27/25 1929    aluminum-magnesium hydroxide-simethicone 200-200-20 mg/5 mL suspension 30 mL  30 mL Oral QID PRN Lisa Stone FNP   30 mL at 01/25/25 1615    amLODIPine tablet 5 mg  5 mg Oral Daily Marco A Newell MD   5 mg at 01/30/25 1044    aspirin EC tablet 81 mg  81 mg Oral Daily Marco A Newell MD   81 mg at 01/30/25 1044    bisacodyL suppository 10 mg  10 mg Rectal Daily PRN Lisa Stone FNP        calcitRIOL capsule 0.25 mcg  0.25 mcg Oral Once per day on Monday Wednesday Friday Marco A Newlel MD   0.25 mcg at 01/29/25 1248    clindamycin capsule 450 mg  450 mg Oral Q8H Catie Shepard DO   450 mg at 01/31/25 0604    dextrose 50% injection 12.5 g  12.5 g Intravenous PRN Lisa Stone FNP        dextrose 50% injection 25 g  25 g Intravenous PRN Lisa Stone FNP        EScitalopram oxalate tablet 10 mg  10 mg Oral Daily George Mendoza MD   10 mg at  01/30/25 1044    folic acid tablet 1 mg  1 mg Oral Daily Denisha Romero MD   1 mg at 01/30/25 1044    glucagon (human recombinant) injection 1 mg  1 mg Intramuscular PRN Lisa Stone FNP        glucose chewable tablet 16 g  16 g Oral PRN Lisa Stone FNP        glucose chewable tablet 24 g  24 g Oral PRN Lisa Stone FNP        heparin (porcine) injection 5,000 Units  5,000 Units Subcutaneous Q12H Catie Shepard DO   5,000 Units at 01/30/25 2200    megestroL 400 mg/10 mL (10 mL) suspension 400 mg  400 mg Oral Daily George Mendoza MD   400 mg at 01/30/25 1044    melatonin tablet 6 mg  6 mg Oral Nightly PRN Lisa Stone FNP   6 mg at 01/26/25 2249    mupirocin 2 % ointment   Topical (Top) Daily Amarilis Oneil FNP        ondansetron injection 4 mg  4 mg Intravenous Q4H PRN Lisa Stone FNP   4 mg at 01/29/25 1336    pantoprazole EC tablet 40 mg  40 mg Oral Daily George Mendoza MD   40 mg at 01/30/25 1044    polyethylene glycol packet 17 g  17 g Oral BID PRN Lisa Stone FNP        potassium chloride CR tablet 10 mEq  10 mEq Oral Daily George Mendoza MD   10 mEq at 01/30/25 1044    sodium chloride 0.9% flush 10 mL  10 mL Intravenous PRN Lisa Stone FNP        vitamin renal formula (B-complex-vitamin c-folic acid) 1 mg per capsule 1 capsule  1 capsule Oral Daily George Mendoza MD   1 capsule at 01/30/25 1044    zinc oxide 20 % ointment   Topical (Top) PRN Tyree Morillo MD   Given at 01/30/25 1044       Sergio Crabtree MD    Past Medical History:   Diagnosis Date    Acute rejection of kidney transplant 9/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 Glomerulitis with one activated endothelial cell: treated with Solumedrol pulse 375 mg IVBP x 3 doses with short prednisone taper; thymoglobin 100 mg x 6 doses 10/04/2007 minimal histologic changes no acute rejection     Allergy     Anemia of renal disease     CKD (chronic kidney disease), stage IV     CMV  (cytomegalovirus) status positive     Colon polyp     benign    Depression     GN (glomerulonephritis)     GN (glomerulonephritis)     unknown type    Herpes infection, other 2009    on buttocks    Hyperlipidemia     Hypertension     Immunosuppression     Living-donor kidney transplant recipient 01/15/2007    Meningitis     history of viral meningitis    Proteinuria 1/5/2015    Proteinuria     Pulmonary nodules     Renal hypertension     S/P breast augmentation     Skin cancer     Urinary tract infection       Past Surgical History:   Procedure Laterality Date    COLON SURGERY      COLONOSCOPY W/ BIOPSIES      cosemetic surgery      facelift and breast augmentation     HYSTERECTOMY      INSERTION OF TUNNELED CENTRAL VENOUS HEMODIALYSIS CATHETER N/A 1/28/2025    Procedure: Insertion, Catheter, Central Venous, Hemodialysis;  Surgeon: Apollo Jacob DO;  Location: [facility] CATH LAB;  Service: Nephrology;  Laterality: N/A;    INTRAMEDULLARY RODDING OF HUMERUS Right 12/2/2024    Procedure: INSERTION, INTRAMEDULLARY MONTSERRAT, HUMERUS;  Surgeon: Samuel Dow DO;  Location: [facility] OR;  Service: Orthopedics;  Laterality: Right;  supine vacular backward c arm synthes  shoulder scope drape    KIDNEY TRANSPLANT      NEPHRECTOMY Left     9/2016    RENAL BIOPSY      TONSILLECTOMY, ADENOIDECTOMY        Family History   Problem Relation Name Age of Onset    Kidney disease Sister          ESRD    Kidney disease Brother          CKD from HTN    Hypertension Brother      Heart failure Mother      Heart failure Father      Diabetes Neg Hx      Stroke Neg Hx       Social History     Tobacco Use    Smoking status: Former     Current packs/day: 1.00     Average packs/day: 1 pack/day for 15.0 years (15.0 ttl pk-yrs)     Types: Cigarettes    Smokeless tobacco: Never    Tobacco comments:     quit in 2014; smoked for 15 years and at the most smoked 1 ppd   Substance Use Topics    Alcohol use: Yes     Comment: seldom     Medications Prior to Admission    Medication Sig Dispense Refill Last Dose/Taking    amLODIPine (NORVASC) 10 MG tablet Take 5 mg by mouth once daily.   Taking    aspirin (ECOTRIN) 81 MG EC tablet Take 81 mg by mouth once daily.   Past Week    B complex-vitamin C-folic acid (NEPHRO-JENN) 0.8 mg Tab Take 0.8 mg by mouth Daily.   Past Week    calcitRIOL (ROCALTROL) 0.25 MCG Cap Take 0.25 mcg by mouth 3 (three) times a week.   Past Week    methocarbamoL (ROBAXIN) 500 MG Tab Take 500 mg by mouth 4 (four) times daily.   Past Week    pantoprazole (PROTONIX) 40 MG tablet Take 40 mg by mouth once daily.   Past Week    potassium chloride (KLOR-CON) 10 MEQ TbSR Take 10 mEq by mouth once daily.   Past Week    traMADoL (ULTRAM) 50 mg tablet Take 50 mg by mouth 3 (three) times daily as needed for Pain.   Past Week    D5W PgBk 50 mL with ceFAZolin 1 gram SolR Cefazolin intraperitoneally times 10 more days       EScitalopram oxalate (LEXAPRO) 10 MG tablet Take 1 tablet (10 mg total) by mouth once daily. (Patient not taking: Reported on 12/23/2024) 90 tablet 3     folic acid (FOLVITE) 1 MG tablet Take 1 mg by mouth once daily.       furosemide (LASIX) 20 MG tablet Take 20 mg by mouth once daily.       megestroL (MEGACE) 400 mg/10 mL (10 mL) Susp Take 10 mLs (400 mg total) by mouth once daily. 300 mL 0     zolpidem (AMBIEN) 10 mg Tab Take 10 mg by mouth every evening.        Review of patient's allergies indicates:   Allergen Reactions    Codeine Palpitations and Rash    Cefepime      Other Reaction(s): Encephalopathy    Renal impairment increased risk for this reaction    Levofloxacin      Other Reaction(s): Encephalopathy    Renal pt who had some encephalopathy while on Levaquin & Cefepime, neurology physicians think they may have been due to the antibiotics (both possible side effects for each)    Iodine Nausea Only            Review of Systems:     All 12 point review of system done negative except 1 history of present illness    Objective:       VITAL SIGNS:  24 HR MIN & MAX LAST    Temp  Min: 97.9 °F (36.6 °C)  Max: 98.8 °F (37.1 °C)  98.2 °F (36.8 °C)        BP  Min: 132/76  Max: 159/80  (!) 159/80     Pulse  Min: 83  Max: 93  86     Resp  Min: 16  Max: 18  18    SpO2  Min: 96 %  Max: 99 %  98 %      GEN:  Very frail looking female in no respiratory distress.  Awake alert .  HEENT:  Atraumatic normocephalic.  Pupils reactive.  Extraocular movements intact.  No oral lesion.  Neck supple.  No JVD.  CV: RRR without rub or gallop.  PULM: CTAB, unlabored  ABD: Soft, NT/ND abdomen with NABS, PD catheter noted.  No obvious exit site infection noted.  EXT: No cyanosis or edema, there is skin growth on the left lower extremity just above the ankle.  SKIN: Warm and dry  PSYCH: Awake, alert, more conversant than yesterday.  Dialysis excess  right IJ tunneled dialysis catheter and also PD catheter present.          Component Value Date/Time     (L) 01/30/2025 0608     (L) 01/28/2025 0431     06/20/2018 1402     01/08/2018 0835    K 4.7 01/30/2025 0608    K 3.2 (L) 01/28/2025 0431    K 4.8 06/20/2018 1402    K 4.6 01/08/2018 0835    CO2 21 (L) 01/30/2025 0608    CO2 25 01/28/2025 0431    CO2 23 06/20/2018 1402    CO2 22 (L) 01/08/2018 0835    BUN 25.3 (H) 01/30/2025 0608    BUN 37.0 (H) 01/28/2025 0431    BUN 30 (H) 06/20/2018 1402    BUN 24 (H) 01/08/2018 0835    CREATININE 4.97 (H) 01/30/2025 0608    CREATININE 6.40 (H) 01/28/2025 0431    CREATININE 1.9 (H) 06/20/2018 1402    CREATININE 1.8 (H) 01/08/2018 0835    CALCIUM 8.5 01/30/2025 0608    CALCIUM 8.8 01/28/2025 0431    CALCIUM 9.7 06/20/2018 1402    CALCIUM 9.1 01/08/2018 0835    PHOS 5.1 (H) 01/27/2025 0701    PHOS 3.8 06/20/2018 1402            Component Value Date/Time    WBC 5.73 01/30/2025 0608    WBC 8.88 01/27/2025 0701    WBC 4.26 (L) 07/04/2024 0000    WBC 6.90 06/20/2018 1402    WBC 3.37 (L) 01/08/2018 0835    HGB 10.4 (L) 01/30/2025 0608    HGB 10.3 (L) 01/27/2025 0701    HGB 9 (L)  12/18/2024 0000    HGB 10.2 (L) 11/26/2024 0000    HGB 10.5 (L) 06/20/2018 1402    HGB 9.1 (L) 01/08/2018 0835    HCT 33.4 (L) 01/30/2025 0608    HCT 33.7 (L) 01/27/2025 0701    HCT 29.8 (L) 12/18/2024 0000    HCT 31.8 (L) 11/26/2024 0000    HCT 32.0 (L) 06/05/2020 0815    HCT 31.6 (L) 06/20/2018 1402    HCT 27.3 (L) 01/08/2018 0835     01/30/2025 0608     01/27/2025 0701     06/20/2018 1402     01/08/2018 0835         X-Ray Chest AP Portable   Final Result      No acute abnormality.         Electronically signed by: Marilou Laughlin MD   Date:    01/22/2025   Time:    13:03      ULS US Guidance for Needle Placement    (Results Pending)       Assessment / Plan:   ESRD, now on hemodialysis on Monday Wednesday Friday.  Elevated troponin - cardiology recommends outpatient stress test  Weakness, resolving  Anemia of chronic kidney disease  Hypertension  History of depression  LLE cellulitis - on clindamycin      Recommendations  Change diet to regular diet  Continue all other medical management

## 2025-01-31 NOTE — PROGRESS NOTES
01/31/25 1208        Hemodialysis Catheter 01/28/25 1400 right subclavian   Placement Date/Time: 01/28/25 1400   Hemodialysis Catheter Type: Tunneled catheter  Location: right subclavian   Site Assessment No drainage;No redness;No swelling;No warmth   Line Securement Device Secured with sutures   Dressing Type CHG impregnated dressing/sponge;Transparent (Tegaderm)   Dressing Status Clean;Dry;Intact   Dressing Intervention Integrity maintained   Date on Dressing 01/28/25   Dressing Due to be Changed 02/04/25   Post-Hemodialysis Assessment   Blood Volume Processed (Liters) 53 L   Dialyzer Clearance Lightly streaked   Duration of Treatment 150 minutes   Additional Fluid Intake (mL) 500 mL   Total UF (mL) 500 mL   Net Fluid Removal 0   Patient Response to Treatment tolerated well   Post-Hemodialysis Comments Tx complete, pt reinfused, cvc deaccessed, new sterile caps applied

## 2025-01-31 NOTE — PT/OT/SLP PROGRESS
Occupational Therapy      Patient Name:  Malissa Schwartz   MRN:  [id number]    Patient OOR for dialysis this AM. Will follow-up this afternoon as able.    1/31/2025

## 2025-01-31 NOTE — PLAN OF CARE
Patient has been denied by [facility] Rehab at [facility], [facility], and [facility]. [facility] pending insurance authorization and OPT HD time they can accommodate.  WANG Bailey currently working on outpatient dialysis.

## 2025-02-01 LAB
POCT GLUCOSE: 100 MG/DL (ref 70–110)
POCT GLUCOSE: 107 MG/DL (ref 70–110)

## 2025-02-01 PROCEDURE — 21400001 HC TELEMETRY ROOM

## 2025-02-01 PROCEDURE — 25000003 PHARM REV CODE 250: Performed by: INTERNAL MEDICINE

## 2025-02-01 PROCEDURE — S0179 MEGESTROL 20 MG: HCPCS | Performed by: INTERNAL MEDICINE

## 2025-02-01 PROCEDURE — 63600175 PHARM REV CODE 636 W HCPCS: Performed by: STUDENT IN AN ORGANIZED HEALTH CARE EDUCATION/TRAINING PROGRAM

## 2025-02-01 PROCEDURE — 25000003 PHARM REV CODE 250: Performed by: STUDENT IN AN ORGANIZED HEALTH CARE EDUCATION/TRAINING PROGRAM

## 2025-02-01 RX ORDER — ESCITALOPRAM OXALATE 10 MG/1
20 TABLET ORAL DAILY
Status: DISCONTINUED | OUTPATIENT
Start: 2025-02-02 | End: 2025-02-03 | Stop reason: HOSPADM

## 2025-02-01 RX ORDER — AMLODIPINE BESYLATE 5 MG/1
10 TABLET ORAL DAILY
Status: DISCONTINUED | OUTPATIENT
Start: 2025-02-01 | End: 2025-02-03 | Stop reason: HOSPADM

## 2025-02-01 RX ADMIN — MEGESTROL ACETATE 400 MG: 400 SUSPENSION ORAL at 09:02

## 2025-02-01 RX ADMIN — POTASSIUM CHLORIDE 10 MEQ: 750 TABLET, FILM COATED, EXTENDED RELEASE ORAL at 09:02

## 2025-02-01 RX ADMIN — HEPARIN SODIUM 5000 UNITS: 5000 INJECTION, SOLUTION INTRAVENOUS; SUBCUTANEOUS at 09:02

## 2025-02-01 RX ADMIN — PANTOPRAZOLE SODIUM 40 MG: 40 TABLET, DELAYED RELEASE ORAL at 09:02

## 2025-02-01 RX ADMIN — MUPIROCIN: 20 OINTMENT TOPICAL at 09:02

## 2025-02-01 RX ADMIN — AMLODIPINE BESYLATE 10 MG: 5 TABLET ORAL at 08:02

## 2025-02-01 RX ADMIN — ESCITALOPRAM OXALATE 10 MG: 10 TABLET ORAL at 09:02

## 2025-02-01 RX ADMIN — ASPIRIN 81 MG: 81 TABLET, COATED ORAL at 09:02

## 2025-02-01 RX ADMIN — FOLIC ACID 1 MG: 1 TABLET ORAL at 09:02

## 2025-02-01 RX ADMIN — Medication 1 CAPSULE: at 09:02
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
## 2025-02-01 NOTE — PLAN OF CARE
Problem: Adult Inpatient Plan of Care  Goal: Plan of Care Review  Outcome: Progressing  Goal: Patient-Specific Goal (Individualized)  Outcome: Progressing  Goal: Absence of Hospital-Acquired Illness or Injury  Outcome: Progressing  Goal: Optimal Comfort and Wellbeing  Outcome: Progressing  Goal: Readiness for Transition of Care  Outcome: Not Progressing     Problem: Fall Injury Risk  Goal: Absence of Fall and Fall-Related Injury  Outcome: Progressing     Problem: Hemodialysis  Goal: Safe, Effective Therapy Delivery  Outcome: Progressing  Goal: Effective Tissue Perfusion  Outcome: Progressing  Goal: Absence of Infection Signs and Symptoms  Outcome: Progressing     Problem: Infection  Goal: Absence of Infection Signs and Symptoms  Outcome: Progressing

## 2025-02-02 LAB
ALBUMIN SERPL-MCNC: 1.7 G/DL (ref 3.4–4.8)
ALBUMIN/GLOB SERPL: 0.5 RATIO (ref 1.1–2)
ALP SERPL-CCNC: 192 UNIT/L (ref 40–150)
ALT SERPL-CCNC: 22 UNIT/L (ref 0–55)
ANION GAP SERPL CALC-SCNC: 10 MEQ/L
AST SERPL-CCNC: 32 UNIT/L (ref 5–34)
BASOPHILS # BLD AUTO: 0.05 X10(3)/MCL
BASOPHILS NFR BLD AUTO: 0.8 %
BILIRUB SERPL-MCNC: 0.3 MG/DL
BUN SERPL-MCNC: 24.7 MG/DL (ref 9.8–20.1)
CALCIUM SERPL-MCNC: 8.3 MG/DL (ref 8.4–10.2)
CHLORIDE SERPL-SCNC: 106 MMOL/L (ref 98–107)
CO2 SERPL-SCNC: 20 MMOL/L (ref 23–31)
CREAT SERPL-MCNC: 6.45 MG/DL (ref 0.55–1.02)
CREAT/UREA NIT SERPL: 4
EOSINOPHIL # BLD AUTO: 0.09 X10(3)/MCL (ref 0–0.9)
EOSINOPHIL NFR BLD AUTO: 1.4 %
ERYTHROCYTE [DISTWIDTH] IN BLOOD BY AUTOMATED COUNT: 16.7 % (ref 11.5–17)
GFR SERPLBLD CREATININE-BSD FMLA CKD-EPI: 6 ML/MIN/1.73/M2
GLOBULIN SER-MCNC: 3.6 GM/DL (ref 2.4–3.5)
GLUCOSE SERPL-MCNC: 78 MG/DL (ref 82–115)
HCT VFR BLD AUTO: 31.9 % (ref 37–47)
HGB BLD-MCNC: 9.8 G/DL (ref 12–16)
IMM GRANULOCYTES # BLD AUTO: 0.05 X10(3)/MCL (ref 0–0.04)
IMM GRANULOCYTES NFR BLD AUTO: 0.8 %
LYMPHOCYTES # BLD AUTO: 1.32 X10(3)/MCL (ref 0.6–4.6)
LYMPHOCYTES NFR BLD AUTO: 20.6 %
MCH RBC QN AUTO: 26.4 PG (ref 27–31)
MCHC RBC AUTO-ENTMCNC: 30.7 G/DL (ref 33–36)
MCV RBC AUTO: 86 FL (ref 80–94)
MONOCYTES # BLD AUTO: 0.56 X10(3)/MCL (ref 0.1–1.3)
MONOCYTES NFR BLD AUTO: 8.8 %
NEUTROPHILS # BLD AUTO: 4.33 X10(3)/MCL (ref 2.1–9.2)
NEUTROPHILS NFR BLD AUTO: 67.6 %
NRBC BLD AUTO-RTO: 0 %
PHOSPHATE SERPL-MCNC: 4.8 MG/DL (ref 2.3–4.7)
PLATELET # BLD AUTO: 268 X10(3)/MCL (ref 130–400)
PMV BLD AUTO: 9.6 FL (ref 7.4–10.4)
POCT GLUCOSE: 72 MG/DL (ref 70–110)
POTASSIUM SERPL-SCNC: 4.8 MMOL/L (ref 3.5–5.1)
PROT SERPL-MCNC: 5.3 GM/DL (ref 5.8–7.6)
RBC # BLD AUTO: 3.71 X10(6)/MCL (ref 4.2–5.4)
SODIUM SERPL-SCNC: 136 MMOL/L (ref 136–145)
WBC # BLD AUTO: 6.4 X10(3)/MCL (ref 4.5–11.5)

## 2025-02-02 PROCEDURE — 63600175 PHARM REV CODE 636 W HCPCS: Performed by: STUDENT IN AN ORGANIZED HEALTH CARE EDUCATION/TRAINING PROGRAM

## 2025-02-02 PROCEDURE — 84100 ASSAY OF PHOSPHORUS: CPT | Performed by: NURSE PRACTITIONER

## 2025-02-02 PROCEDURE — 25000003 PHARM REV CODE 250: Performed by: INTERNAL MEDICINE

## 2025-02-02 PROCEDURE — 25000003 PHARM REV CODE 250: Performed by: STUDENT IN AN ORGANIZED HEALTH CARE EDUCATION/TRAINING PROGRAM

## 2025-02-02 PROCEDURE — 85025 COMPLETE CBC W/AUTO DIFF WBC: CPT | Performed by: NURSE PRACTITIONER

## 2025-02-02 PROCEDURE — 21400001 HC TELEMETRY ROOM

## 2025-02-02 PROCEDURE — 80053 COMPREHEN METABOLIC PANEL: CPT | Performed by: NURSE PRACTITIONER

## 2025-02-02 PROCEDURE — 36415 COLL VENOUS BLD VENIPUNCTURE: CPT | Performed by: NURSE PRACTITIONER

## 2025-02-02 PROCEDURE — S0179 MEGESTROL 20 MG: HCPCS | Performed by: INTERNAL MEDICINE

## 2025-02-02 PROCEDURE — 25000003 PHARM REV CODE 250

## 2025-02-02 RX ADMIN — AMLODIPINE BESYLATE 10 MG: 5 TABLET ORAL at 08:02

## 2025-02-02 RX ADMIN — ASPIRIN 81 MG: 81 TABLET, COATED ORAL at 08:02

## 2025-02-02 RX ADMIN — ESCITALOPRAM OXALATE 20 MG: 10 TABLET ORAL at 08:02

## 2025-02-02 RX ADMIN — FOLIC ACID 1 MG: 1 TABLET ORAL at 08:02

## 2025-02-02 RX ADMIN — POTASSIUM CHLORIDE 10 MEQ: 750 TABLET, FILM COATED, EXTENDED RELEASE ORAL at 08:02

## 2025-02-02 RX ADMIN — Medication 1 CAPSULE: at 08:02

## 2025-02-02 RX ADMIN — MEGESTROL ACETATE 400 MG: 400 SUSPENSION ORAL at 08:02

## 2025-02-02 RX ADMIN — PANTOPRAZOLE SODIUM 40 MG: 40 TABLET, DELAYED RELEASE ORAL at 08:02
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
## 2025-02-02 NOTE — PLAN OF CARE
Problem: Adult Inpatient Plan of Care  Goal: Plan of Care Review  Outcome: Progressing  Goal: Patient-Specific Goal (Individualized)  Outcome: Progressing  Goal: Absence of Hospital-Acquired Illness or Injury  Outcome: Progressing  Goal: Optimal Comfort and Wellbeing  Outcome: Progressing  Goal: Readiness for Transition of Care  Outcome: Progressing     Problem: Fall Injury Risk  Goal: Absence of Fall and Fall-Related Injury  Outcome: Progressing     Problem: Hemodialysis  Goal: Safe, Effective Therapy Delivery  Outcome: Progressing  Goal: Effective Tissue Perfusion  Outcome: Progressing  Goal: Absence of Infection Signs and Symptoms  Outcome: Progressing     Problem: Infection  Goal: Absence of Infection Signs and Symptoms  Outcome: Progressing

## 2025-02-03 VITALS
DIASTOLIC BLOOD PRESSURE: 77 MMHG | TEMPERATURE: 98 F | HEART RATE: 88 BPM | BODY MASS INDEX: 25.97 KG/M2 | WEIGHT: 141.13 LBS | OXYGEN SATURATION: 98 % | HEIGHT: 62 IN | RESPIRATION RATE: 20 BRPM | SYSTOLIC BLOOD PRESSURE: 144 MMHG

## 2025-02-03 LAB
ALBUMIN SERPL-MCNC: 2 G/DL (ref 3.4–4.8)
ALBUMIN/GLOB SERPL: 0.5 RATIO (ref 1.1–2)
ALP SERPL-CCNC: 220 UNIT/L (ref 40–150)
ALT SERPL-CCNC: 22 UNIT/L (ref 0–55)
ANION GAP SERPL CALC-SCNC: 11 MEQ/L
AST SERPL-CCNC: 27 UNIT/L (ref 5–34)
BASOPHILS # BLD AUTO: 0.05 X10(3)/MCL
BASOPHILS NFR BLD AUTO: 0.6 %
BILIRUB SERPL-MCNC: 0.3 MG/DL
BUN SERPL-MCNC: 37 MG/DL (ref 9.8–20.1)
CALCIUM SERPL-MCNC: 8.7 MG/DL (ref 8.4–10.2)
CHLORIDE SERPL-SCNC: 107 MMOL/L (ref 98–107)
CO2 SERPL-SCNC: 19 MMOL/L (ref 23–31)
CREAT SERPL-MCNC: 8 MG/DL (ref 0.55–1.02)
CREAT/UREA NIT SERPL: 5
EOSINOPHIL # BLD AUTO: 0.07 X10(3)/MCL (ref 0–0.9)
EOSINOPHIL NFR BLD AUTO: 0.9 %
ERYTHROCYTE [DISTWIDTH] IN BLOOD BY AUTOMATED COUNT: 17.2 % (ref 11.5–17)
GFR SERPLBLD CREATININE-BSD FMLA CKD-EPI: 5 ML/MIN/1.73/M2
GLOBULIN SER-MCNC: 4.1 GM/DL (ref 2.4–3.5)
GLUCOSE SERPL-MCNC: 87 MG/DL (ref 82–115)
HCT VFR BLD AUTO: 33.3 % (ref 37–47)
HGB BLD-MCNC: 10.2 G/DL (ref 12–16)
IMM GRANULOCYTES # BLD AUTO: 0.05 X10(3)/MCL (ref 0–0.04)
IMM GRANULOCYTES NFR BLD AUTO: 0.6 %
LYMPHOCYTES # BLD AUTO: 1.53 X10(3)/MCL (ref 0.6–4.6)
LYMPHOCYTES NFR BLD AUTO: 19.1 %
MCH RBC QN AUTO: 26.2 PG (ref 27–31)
MCHC RBC AUTO-ENTMCNC: 30.6 G/DL (ref 33–36)
MCV RBC AUTO: 85.6 FL (ref 80–94)
MONOCYTES # BLD AUTO: 0.52 X10(3)/MCL (ref 0.1–1.3)
MONOCYTES NFR BLD AUTO: 6.5 %
NEUTROPHILS # BLD AUTO: 5.8 X10(3)/MCL (ref 2.1–9.2)
NEUTROPHILS NFR BLD AUTO: 72.3 %
NRBC BLD AUTO-RTO: 0 %
PHOSPHATE SERPL-MCNC: 5.3 MG/DL (ref 2.3–4.7)
PLATELET # BLD AUTO: 387 X10(3)/MCL (ref 130–400)
PMV BLD AUTO: 9.5 FL (ref 7.4–10.4)
POTASSIUM SERPL-SCNC: 5.2 MMOL/L (ref 3.5–5.1)
PROT SERPL-MCNC: 6.1 GM/DL (ref 5.8–7.6)
RBC # BLD AUTO: 3.89 X10(6)/MCL (ref 4.2–5.4)
SODIUM SERPL-SCNC: 137 MMOL/L (ref 136–145)
WBC # BLD AUTO: 8.02 X10(3)/MCL (ref 4.5–11.5)

## 2025-02-03 PROCEDURE — 85025 COMPLETE CBC W/AUTO DIFF WBC: CPT | Performed by: NURSE PRACTITIONER

## 2025-02-03 PROCEDURE — 80100016 HC MAINTENANCE HEMODIALYSIS

## 2025-02-03 PROCEDURE — 36415 COLL VENOUS BLD VENIPUNCTURE: CPT | Performed by: NURSE PRACTITIONER

## 2025-02-03 PROCEDURE — 80053 COMPREHEN METABOLIC PANEL: CPT | Performed by: NURSE PRACTITIONER

## 2025-02-03 PROCEDURE — 84100 ASSAY OF PHOSPHORUS: CPT | Performed by: NURSE PRACTITIONER

## 2025-02-03 RX ORDER — ESCITALOPRAM OXALATE 20 MG/1
20 TABLET ORAL DAILY
Qty: 90 TABLET | Refills: 3 | Status: ON HOLD | OUTPATIENT
Start: 2025-02-03 | End: 2025-02-11 | Stop reason: HOSPADM

## 2025-02-03 NOTE — PLAN OF CARE
Problem: Adult Inpatient Plan of Care  Goal: Plan of Care Review  Outcome: Met  Goal: Patient-Specific Goal (Individualized)  Outcome: Met  Goal: Absence of Hospital-Acquired Illness or Injury  Outcome: Met  Goal: Optimal Comfort and Wellbeing  Outcome: Met  Goal: Readiness for Transition of Care  Outcome: Met     Problem: Fall Injury Risk  Goal: Absence of Fall and Fall-Related Injury  Outcome: Met     Problem: Hemodialysis  Goal: Safe, Effective Therapy Delivery  Outcome: Met  Goal: Effective Tissue Perfusion  Outcome: Met  Goal: Absence of Infection Signs and Symptoms  Outcome: Met     Problem: Infection  Goal: Absence of Infection Signs and Symptoms  Outcome: Met
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
## 2025-02-03 NOTE — PT/OT/SLP PROGRESS
Physical Therapy      Patient Name:  Malissa Schwartz   MRN:  [id number]    Patient off floor for dialysis in a.m. Noted d/c orders. Will follow up as schedule permits and pt availability.

## 2025-02-03 NOTE — PLAN OF CARE
02/03/25 1403   Final Note   Assessment Type Final Discharge Note   Anticipated Discharge Disposition SNF   Post-Acute Status   Post-Acute Authorization Placement   Post-Acute Placement Status Set-up Complete/Auth obtained   Discharge Delays None known at this time     Patient will discharge to [facility] today. Oupatient dialysis arrange at [facility] at 12:50pm per WANG Bailey.

## 2025-02-03 NOTE — PLAN OF CARE
[facility] sent discharge information to [facility], awaiting nuse to call report to

## 2025-02-03 NOTE — PROGRESS NOTES
Nephrology  Note    Patient Name: Malissa Schwartz  Age: 74 y.o.  : 1950  MRN: [id number]  Admission Date: 2025      Malissa Schwartz is a 74 y.o. female is very well known to me for her ESRD and on CCPD and she has been doing very good at home.  Admitted to the hospital with complaints of generalized weakness of 1 day duration and also reports some black stools for the last few weeks.  No chest pains.  No abdominal pains.  No cough congestion no fever or chills.  No dysuria hematuria hesitancy frequency urgency.  Some nausea reported.  But no vomiting.  No hematemesis hemoptysis  reported.  No fever or chills.  Lives with her  at home.  Her significant other history include hypertension, anemia of chronic kidney disease, hyperlipidemia, failed living related kidney transplant which lasted many years.  She has been very compliant to her dialysis regimen.  Also has some depression problem.    2025   Better.  Still poor appetite.  Still with some weakness.  No nausea.  No shortness of breath.  Had good CCPD done.  She reported the her dermatologist has told her that she will be getting radiation therapy to the skin cancer on the left lower extremity.    25  No labs for review today. VSS. Tolerated CPPD last night without issue. She has no complaints. Eating breakfast. No SOB, CP, abd pain, n/v/d or swelling    2025  Patient is resting quietly in bed.  Denies complaints.  Peritoneal effluent is clear.    2025   Awake alert oriented to time person place sitting up in the recliner.  Complains of weakness.  Wants physical therapy.   told me that she will qualify for some skilled nursing facility for which she will have to be on hemodialysis.   present.    2025   She is NPO as she is waiting to get a tunneled dialysis catheter placement done today.  Patient has no new complaints.   are working to get him to some skilled nursing facility.  She had  good CCPD last night.     01/29/2025  Currently tolerating hemodialysis.  Complains of pain in the right side of the neck where a new tunneled dialysis catheter has been place.    01/30/2025    present in the room reports that patient has not been talking ever since she has come back from dialysis yesterday.  At present she is sitting in her recliner.  Confused.  Will only speak few words when asked and looks very weak.  She has incontinence of the stools.  Very poor oral intake noted.    01/31/2025   Currently tolerating dialysis.  Will decrease time to 2-1/2 hours today.  We will not remove any fluids today either.  She has not been eating at all.  She has been very confused but with further stimulation she is able to communicate and was asking for hamburger and agreed to eat the turkey sandwich available at present time in the dialysis unit.    02/03/2025   Currently tolerating dialysis.  Will remove only 500 cc of fluid patient is comfortable resting.    Current Facility-Administered Medications   Medication Dose Route Frequency Provider Last Rate Last Admin    acetaminophen tablet 1,000 mg  1,000 mg Oral Q6H PRN Lisa Stone FNP   1,000 mg at 01/27/25 1929    aluminum-magnesium hydroxide-simethicone 200-200-20 mg/5 mL suspension 30 mL  30 mL Oral QID PRN Lisa Stone FNP   30 mL at 01/25/25 1615    amLODIPine tablet 10 mg  10 mg Oral Daily Catie Shepard DO   10 mg at 02/02/25 0833    aspirin EC tablet 81 mg  81 mg Oral Daily Marco A Newell MD   81 mg at 02/02/25 0833    bisacodyL suppository 10 mg  10 mg Rectal Daily PRN Lisa Stone FNP        calcitRIOL capsule 0.25 mcg  0.25 mcg Oral Once per day on Monday Wednesday Friday Marco A Newlel MD   0.25 mcg at 01/31/25 1559    dextrose 50% injection 12.5 g  12.5 g Intravenous PRN Lisa Stone FNP        dextrose 50% injection 12.5 g  12.5 g Intravenous PRN Catie Shepard DO        dextrose 50% injection 25 g  25 g Intravenous PRN Lisa Stone,  LETHA        dextrose 50% injection 25 g  25 g Intravenous PRN Catie Shepard, DO        EScitalopram oxalate tablet 20 mg  20 mg Oral Daily Aleksandar Gallegos PMHNP   20 mg at 02/02/25 0833    folic acid tablet 1 mg  1 mg Oral Daily Denisha Romero MD   1 mg at 02/02/25 0833    glucagon (human recombinant) injection 1 mg  1 mg Intramuscular PRN Lisa Stone, OMIDP        glucagon (human recombinant) injection 1 mg  1 mg Intramuscular PRN Oscarx, Catie, DO        glucose chewable tablet 16 g  16 g Oral PRN Lisa Stone, FNP        glucose chewable tablet 16 g  16 g Oral PRN Oscarx, Catie, DO        glucose chewable tablet 24 g  24 g Oral PRN Lisa Stone, OMIDP        glucose chewable tablet 24 g  24 g Oral PRN Eagle Shepardnet, DO        insulin aspart U-100 injection 0-10 Units  0-10 Units Subcutaneous QID (AC + HS) PRN Catie Shepard, DO        megestroL 400 mg/10 mL (10 mL) suspension 400 mg  400 mg Oral Daily George Mendoza MD   400 mg at 02/02/25 0833    melatonin tablet 6 mg  6 mg Oral Nightly PRN Lisa Stone FNP   6 mg at 01/26/25 2249    mupirocin 2 % ointment   Topical (Top) Daily Amarilis Oneil FNP   Given at 02/01/25 0900    ondansetron injection 4 mg  4 mg Intravenous Q4H PRN Lisa Stone FNP   4 mg at 01/29/25 1336    pantoprazole EC tablet 40 mg  40 mg Oral Daily George Mendoza MD   40 mg at 02/02/25 0833    polyethylene glycol packet 17 g  17 g Oral BID PRN Lisa Stone FNP        potassium chloride CR tablet 10 mEq  10 mEq Oral Daily George Mendoza MD   10 mEq at 02/02/25 0833    sodium chloride 0.9% flush 10 mL  10 mL Intravenous PRN Lisa Stone FNP        vitamin renal formula (B-complex-vitamin c-folic acid) 1 mg per capsule 1 capsule  1 capsule Oral Daily George Mendoza MD   1 capsule at 02/02/25 0833    zinc oxide 20 % ointment   Topical (Top) PRN Tyree Morillo MD   Given at 01/30/25 1044       Sergio Crabtree MD    Past Medical History:   Diagnosis Date    Acute rejection  of kidney transplant 9/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 Glomerulitis with one activated endothelial cell: treated with Solumedrol pulse 375 mg IVBP x 3 doses with short prednisone taper; thymoglobin 100 mg x 6 doses 10/04/2007 minimal histologic changes no acute rejection     Allergy     Anemia of renal disease     CKD (chronic kidney disease), stage IV     CMV (cytomegalovirus) status positive     Colon polyp     benign    Depression     GN (glomerulonephritis)     GN (glomerulonephritis)     unknown type    Herpes infection, other 2009    on buttocks    Hyperlipidemia     Hypertension     Immunosuppression     Living-donor kidney transplant recipient 01/15/2007    Meningitis     history of viral meningitis    Proteinuria 1/5/2015    Proteinuria     Pulmonary nodules     Renal hypertension     S/P breast augmentation     Skin cancer     Urinary tract infection       Past Surgical History:   Procedure Laterality Date    COLON SURGERY      COLONOSCOPY W/ BIOPSIES      cosemetic surgery      facelift and breast augmentation     HYSTERECTOMY      INSERTION OF TUNNELED CENTRAL VENOUS HEMODIALYSIS CATHETER N/A 1/28/2025    Procedure: Insertion, Catheter, Central Venous, Hemodialysis;  Surgeon: Apollo Jacbo DO;  Location: [facility] CATH LAB;  Service: Nephrology;  Laterality: N/A;    INTRAMEDULLARY RODDING OF HUMERUS Right 12/2/2024    Procedure: INSERTION, INTRAMEDULLARY MONTSERRAT, HUMERUS;  Surgeon: Samuel Dow DO;  Location: [facility] OR;  Service: Orthopedics;  Laterality: Right;  supine vacular backward c arm synthes  shoulder scope drape    KIDNEY TRANSPLANT      NEPHRECTOMY Left     9/2016    RENAL BIOPSY      TONSILLECTOMY, ADENOIDECTOMY        Family History   Problem Relation Name Age of Onset    Kidney disease Sister          ESRD    Kidney disease Brother          CKD from HTN    Hypertension Brother      Heart failure Mother      Heart failure Father      Diabetes Neg Hx      Stroke Neg Hx        Social History     Tobacco Use    Smoking status: Former     Current packs/day: 1.00     Average packs/day: 1 pack/day for 15.0 years (15.0 ttl pk-yrs)     Types: Cigarettes    Smokeless tobacco: Never    Tobacco comments:     quit in 2014; smoked for 15 years and at the most smoked 1 ppd   Substance Use Topics    Alcohol use: Yes     Comment: seldom     Medications Prior to Admission   Medication Sig Dispense Refill Last Dose/Taking    amLODIPine (NORVASC) 10 MG tablet Take 5 mg by mouth once daily.   Taking    aspirin (ECOTRIN) 81 MG EC tablet Take 81 mg by mouth once daily.   Past Week    B complex-vitamin C-folic acid (NEPHRO-JENN) 0.8 mg Tab Take 0.8 mg by mouth Daily.   Past Week    calcitRIOL (ROCALTROL) 0.25 MCG Cap Take 0.25 mcg by mouth 3 (three) times a week.   Past Week    methocarbamoL (ROBAXIN) 500 MG Tab Take 500 mg by mouth 4 (four) times daily.   Past Week    pantoprazole (PROTONIX) 40 MG tablet Take 40 mg by mouth once daily.   Past Week    potassium chloride (KLOR-CON) 10 MEQ TbSR Take 10 mEq by mouth once daily.   Past Week    traMADoL (ULTRAM) 50 mg tablet Take 50 mg by mouth 3 (three) times daily as needed for Pain.   Past Week    D5W PgBk 50 mL with ceFAZolin 1 gram SolR Cefazolin intraperitoneally times 10 more days       folic acid (FOLVITE) 1 MG tablet Take 1 mg by mouth once daily.       megestroL (MEGACE) 400 mg/10 mL (10 mL) Susp Take 10 mLs (400 mg total) by mouth once daily. 300 mL 0     [DISCONTINUED] EScitalopram oxalate (LEXAPRO) 10 MG tablet Take 1 tablet (10 mg total) by mouth once daily. (Patient not taking: Reported on 12/23/2024) 90 tablet 3     [DISCONTINUED] furosemide (LASIX) 20 MG tablet Take 20 mg by mouth once daily.       [DISCONTINUED] zolpidem (AMBIEN) 10 mg Tab Take 10 mg by mouth every evening.        Review of patient's allergies indicates:   Allergen Reactions    Codeine Palpitations and Rash    Cefepime      Other Reaction(s): Encephalopathy    Renal  impairment increased risk for this reaction    Levofloxacin      Other Reaction(s): Encephalopathy    Renal pt who had some encephalopathy while on Levaquin & Cefepime, neurology physicians think they may have been due to the antibiotics (both possible side effects for each)    Iodine Nausea Only            Review of Systems:     All 12 point review of system done negative except 1 history of present illness    Objective:       VITAL SIGNS: 24 HR MIN & MAX LAST    Temp  Min: 98.3 °F (36.8 °C)  Max: 99.1 °F (37.3 °C)  98.8 °F (37.1 °C)        BP  Min: 138/64  Max: 157/75  (!) 152/80     Pulse  Min: 83  Max: 91  88     Resp  Min: 18  Max: 18  18    SpO2  Min: 96 %  Max: 98 %  96 %      GEN:  Very frail looking female in no respiratory distress.    HEENT:  Atraumatic normocephalic.  Pupils reactive.  Extraocular movements intact.  No oral lesion.  Neck supple.  No JVD.  CV: RRR without rub or gallop.  PULM: CTAB, unlabored  ABD: Soft, NT/ND abdomen with NABS, PD catheter noted.  No obvious exit site infection noted.  EXT: No cyanosis or edema, there is skin growth on the left lower extremity just above the ankle.  SKIN: Warm and dry  PSYCH:  Very sleepy tired.  Dialysis excess  right IJ tunneled dialysis catheter and also PD catheter present.          Component Value Date/Time     02/03/2025 0621     02/02/2025 0648     06/20/2018 1402     01/08/2018 0835    K 5.2 (H) 02/03/2025 0621    K 4.8 02/02/2025 0648    K 4.8 06/20/2018 1402    K 4.6 01/08/2018 0835    CO2 19 (L) 02/03/2025 0621    CO2 20 (L) 02/02/2025 0648    CO2 23 06/20/2018 1402    CO2 22 (L) 01/08/2018 0835    BUN 37.0 (H) 02/03/2025 0621    BUN 24.7 (H) 02/02/2025 0648    BUN 30 (H) 06/20/2018 1402    BUN 24 (H) 01/08/2018 0835    CREATININE 8.00 (H) 02/03/2025 0621    CREATININE 6.45 (H) 02/02/2025 0648    CREATININE 1.9 (H) 06/20/2018 1402    CREATININE 1.8 (H) 01/08/2018 0835    CALCIUM 8.7 02/03/2025 0621    CALCIUM 8.3 (L)  02/02/2025 0648    CALCIUM 9.7 06/20/2018 1402    CALCIUM 9.1 01/08/2018 0835    PHOS 5.3 (H) 02/03/2025 0621    PHOS 3.8 06/20/2018 1402            Component Value Date/Time    WBC 8.02 02/03/2025 0621    WBC 6.40 02/02/2025 0648    WBC 4.26 (L) 07/04/2024 0000    WBC 6.90 06/20/2018 1402    WBC 3.37 (L) 01/08/2018 0835    HGB 10.2 (L) 02/03/2025 0621    HGB 9.8 (L) 02/02/2025 0648    HGB 9 (L) 12/18/2024 0000    HGB 10.2 (L) 11/26/2024 0000    HGB 10.5 (L) 06/20/2018 1402    HGB 9.1 (L) 01/08/2018 0835    HCT 33.3 (L) 02/03/2025 0621    HCT 31.9 (L) 02/02/2025 0648    HCT 29.8 (L) 12/18/2024 0000    HCT 31.8 (L) 11/26/2024 0000    HCT 32.0 (L) 06/05/2020 0815    HCT 31.6 (L) 06/20/2018 1402    HCT 27.3 (L) 01/08/2018 0835     02/03/2025 0621     02/02/2025 0648     06/20/2018 1402     01/08/2018 0835         X-Ray Chest AP Portable   Final Result      No acute abnormality.         Electronically signed by: Marilou Laughlin MD   Date:    01/22/2025   Time:    13:03      ULS US Guidance for Needle Placement    (Results Pending)       Assessment / Plan:   ESRD, now on hemodialysis on Monday Wednesday Friday.  Elevated troponin - cardiology recommends outpatient stress test  Weakness, resolving  Anemia of chronic kidney disease  Hypertension  History of depression  LLE cellulitis - on clindamycin      Recommendations  Change diet to regular diet  Continue all other medical management

## 2025-02-04 ENCOUNTER — HOSPITAL ENCOUNTER (INPATIENT)
Facility: HOSPITAL | Age: 75
LOS: 6 days | Discharge: HOSPICE | DRG: 064 | End: 2025-02-11
Attending: EMERGENCY MEDICINE | Admitting: INTERNAL MEDICINE
Payer: MEDICARE

## 2025-02-04 DIAGNOSIS — L89.616 PRESSURE INJURY OF DEEP TISSUE OF RIGHT HEEL: ICD-10-CM

## 2025-02-04 DIAGNOSIS — G93.40 ENCEPHALOPATHY, UNSPECIFIED TYPE: ICD-10-CM

## 2025-02-04 DIAGNOSIS — R07.9 CHEST PAIN: ICD-10-CM

## 2025-02-04 DIAGNOSIS — R45.1 AGITATION: ICD-10-CM

## 2025-02-04 DIAGNOSIS — R45.1 RESTLESSNESS AND AGITATION: Primary | ICD-10-CM

## 2025-02-04 DIAGNOSIS — N28.89 LEFT RENAL MASS: ICD-10-CM

## 2025-02-04 DIAGNOSIS — N18.6 ESRD (END STAGE RENAL DISEASE): ICD-10-CM

## 2025-02-04 DIAGNOSIS — I63.9 STROKE: ICD-10-CM

## 2025-02-04 DIAGNOSIS — I63.9 CEREBROVASCULAR ACCIDENT (CVA), UNSPECIFIED MECHANISM: ICD-10-CM

## 2025-02-04 LAB
ALLENS TEST BLOOD GAS (OHS): YES
AMMONIA PLAS-MSCNC: 33.4 UMOL/L (ref 18–72)
BASE EXCESS BLD CALC-SCNC: -1.8 MMOL/L (ref -2–2)
BLOOD GAS SAMPLE TYPE (OHS): ABNORMAL
CA-I BLD-SCNC: 1.25 MMOL/L (ref 1.12–1.23)
CLARITY BODY FLUID (OLG): NORMAL
CO2 BLDA-SCNC: 22.2 MMOL/L
COHGB MFR BLDA: 1.7 % (ref 0.5–1.5)
COLOR BODY FLUID (OLG): YELLOW
DRAWN BY BLOOD GAS (OHS): ABNORMAL
GRAM STN SPEC: NORMAL
GRAM STN SPEC: NORMAL
HCO3 BLDA-SCNC: 21.3 MMOL/L (ref 22–26)
INHALED O2 CONCENTRATION: 21 %
LYMPHOCYTES NFR FLD MANUAL: 45 %
METHGB MFR BLDA: 0.5 % (ref 0.4–1.5)
MONOCYTE MAN % BF (OLG): 44 %
NEUTROPHILS MAN % BF (OLG): 10 %
O2 HB BLOOD GAS (OHS): 95 % (ref 94–97)
OHS QRS DURATION: 80 MS
OHS QTC CALCULATION: 435 MS
OXYHGB MFR BLDA: 10.4 G/DL (ref 12–16)
PCO2 BLDA: 30 MMHG (ref 35–45)
PH BLDA: 7.46 [PH] (ref 7.35–7.45)
PO2 BLDA: 77 MMHG (ref 80–100)
POTASSIUM BLOOD GAS (OHS): 4.6 MMOL/L (ref 3.5–5)
SAMPLE SITE BLOOD GAS (OHS): ABNORMAL
SAO2 % BLDA: 96 %
SODIUM BLOOD GAS (OHS): 131 MMOL/L (ref 137–145)
TNC BODY FLUID (OLG): 40 /UL

## 2025-02-04 PROCEDURE — 89051 BODY FLUID CELL COUNT: CPT | Performed by: STUDENT IN AN ORGANIZED HEALTH CARE EDUCATION/TRAINING PROGRAM

## 2025-02-04 PROCEDURE — 96372 THER/PROPH/DIAG INJ SC/IM: CPT | Performed by: EMERGENCY MEDICINE

## 2025-02-04 PROCEDURE — 82803 BLOOD GASES ANY COMBINATION: CPT

## 2025-02-04 PROCEDURE — 02HV33Z INSERTION OF INFUSION DEVICE INTO SUPERIOR VENA CAVA, PERCUTANEOUS APPROACH: ICD-10-PCS | Performed by: INTERNAL MEDICINE

## 2025-02-04 PROCEDURE — 87070 CULTURE OTHR SPECIMN AEROBIC: CPT | Performed by: STUDENT IN AN ORGANIZED HEALTH CARE EDUCATION/TRAINING PROGRAM

## 2025-02-04 PROCEDURE — 87150 DNA/RNA AMPLIFIED PROBE: CPT

## 2025-02-04 PROCEDURE — G0378 HOSPITAL OBSERVATION PER HR: HCPCS

## 2025-02-04 PROCEDURE — 99900035 HC TECH TIME PER 15 MIN (STAT)

## 2025-02-04 PROCEDURE — 87186 SC STD MICRODIL/AGAR DIL: CPT | Performed by: STUDENT IN AN ORGANIZED HEALTH CARE EDUCATION/TRAINING PROGRAM

## 2025-02-04 PROCEDURE — 99285 EMERGENCY DEPT VISIT HI MDM: CPT | Mod: 25

## 2025-02-04 PROCEDURE — 63600175 PHARM REV CODE 636 W HCPCS

## 2025-02-04 PROCEDURE — 87205 SMEAR GRAM STAIN: CPT | Performed by: STUDENT IN AN ORGANIZED HEALTH CARE EDUCATION/TRAINING PROGRAM

## 2025-02-04 PROCEDURE — C1751 CATH, INF, PER/CENT/MIDLINE: HCPCS

## 2025-02-04 PROCEDURE — 82140 ASSAY OF AMMONIA: CPT

## 2025-02-04 PROCEDURE — 36600 WITHDRAWAL OF ARTERIAL BLOOD: CPT

## 2025-02-04 PROCEDURE — 36410 VNPNXR 3YR/> PHY/QHP DX/THER: CPT

## 2025-02-04 PROCEDURE — 96372 THER/PROPH/DIAG INJ SC/IM: CPT

## 2025-02-04 PROCEDURE — 93005 ELECTROCARDIOGRAM TRACING: CPT

## 2025-02-04 PROCEDURE — 63600175 PHARM REV CODE 636 W HCPCS: Performed by: EMERGENCY MEDICINE

## 2025-02-04 RX ORDER — CALCITRIOL 0.25 UG/1
0.25 CAPSULE ORAL
Status: DISCONTINUED | OUTPATIENT
Start: 2025-02-05 | End: 2025-02-11 | Stop reason: HOSPADM

## 2025-02-04 RX ORDER — AMLODIPINE BESYLATE 5 MG/1
5 TABLET ORAL DAILY
Status: DISCONTINUED | OUTPATIENT
Start: 2025-02-04 | End: 2025-02-07

## 2025-02-04 RX ORDER — HALOPERIDOL 5 MG/ML
5 INJECTION INTRAMUSCULAR
Status: COMPLETED | OUTPATIENT
Start: 2025-02-04 | End: 2025-02-04

## 2025-02-04 RX ORDER — ACETAMINOPHEN 325 MG/1
650 TABLET ORAL EVERY 8 HOURS PRN
Status: DISCONTINUED | OUTPATIENT
Start: 2025-02-04 | End: 2025-02-11 | Stop reason: HOSPADM

## 2025-02-04 RX ORDER — FOLIC ACID 1 MG/1
1 TABLET ORAL DAILY
Status: DISCONTINUED | OUTPATIENT
Start: 2025-02-04 | End: 2025-02-11 | Stop reason: HOSPADM

## 2025-02-04 RX ORDER — NALOXONE HCL 0.4 MG/ML
0.02 VIAL (ML) INJECTION
Status: DISCONTINUED | OUTPATIENT
Start: 2025-02-04 | End: 2025-02-11 | Stop reason: HOSPADM

## 2025-02-04 RX ORDER — ONDANSETRON HYDROCHLORIDE 2 MG/ML
4 INJECTION, SOLUTION INTRAVENOUS EVERY 8 HOURS PRN
Status: DISCONTINUED | OUTPATIENT
Start: 2025-02-04 | End: 2025-02-11 | Stop reason: HOSPADM

## 2025-02-04 RX ORDER — IBUPROFEN 200 MG
24 TABLET ORAL
Status: DISCONTINUED | OUTPATIENT
Start: 2025-02-04 | End: 2025-02-11 | Stop reason: HOSPADM

## 2025-02-04 RX ORDER — HEPARIN SODIUM 5000 [USP'U]/ML
5000 INJECTION, SOLUTION INTRAVENOUS; SUBCUTANEOUS EVERY 8 HOURS
Status: DISCONTINUED | OUTPATIENT
Start: 2025-02-04 | End: 2025-02-06

## 2025-02-04 RX ORDER — ACETAMINOPHEN 325 MG/1
650 TABLET ORAL EVERY 4 HOURS PRN
Status: DISCONTINUED | OUTPATIENT
Start: 2025-02-04 | End: 2025-02-11 | Stop reason: HOSPADM

## 2025-02-04 RX ORDER — SODIUM CHLORIDE 0.9 % (FLUSH) 0.9 %
10 SYRINGE (ML) INJECTION EVERY 12 HOURS PRN
Status: DISCONTINUED | OUTPATIENT
Start: 2025-02-04 | End: 2025-02-11 | Stop reason: HOSPADM

## 2025-02-04 RX ORDER — IBUPROFEN 200 MG
16 TABLET ORAL
Status: DISCONTINUED | OUTPATIENT
Start: 2025-02-04 | End: 2025-02-11 | Stop reason: HOSPADM

## 2025-02-04 RX ORDER — GLUCAGON 1 MG
1 KIT INJECTION
Status: DISCONTINUED | OUTPATIENT
Start: 2025-02-04 | End: 2025-02-11 | Stop reason: HOSPADM

## 2025-02-04 RX ORDER — PANTOPRAZOLE SODIUM 40 MG/1
40 TABLET, DELAYED RELEASE ORAL DAILY
Status: DISCONTINUED | OUTPATIENT
Start: 2025-02-04 | End: 2025-02-11 | Stop reason: HOSPADM

## 2025-02-04 RX ORDER — ASPIRIN 81 MG/1
81 TABLET ORAL DAILY
Status: DISCONTINUED | OUTPATIENT
Start: 2025-02-04 | End: 2025-02-08

## 2025-02-04 RX ORDER — DIPHENHYDRAMINE HYDROCHLORIDE 50 MG/ML
25 INJECTION INTRAMUSCULAR; INTRAVENOUS
Status: COMPLETED | OUTPATIENT
Start: 2025-02-04 | End: 2025-02-04

## 2025-02-04 RX ORDER — TALC
6 POWDER (GRAM) TOPICAL NIGHTLY PRN
Status: DISCONTINUED | OUTPATIENT
Start: 2025-02-04 | End: 2025-02-11 | Stop reason: HOSPADM

## 2025-02-04 RX ORDER — ESCITALOPRAM OXALATE 10 MG/1
20 TABLET ORAL DAILY
Status: DISCONTINUED | OUTPATIENT
Start: 2025-02-04 | End: 2025-02-05

## 2025-02-04 RX ADMIN — DIPHENHYDRAMINE HYDROCHLORIDE 25 MG: 50 INJECTION INTRAMUSCULAR; INTRAVENOUS at 12:02

## 2025-02-04 RX ADMIN — HEPARIN SODIUM 5000 UNITS: 5000 INJECTION, SOLUTION INTRAVENOUS; SUBCUTANEOUS at 02:02

## 2025-02-04 RX ADMIN — HEPARIN SODIUM 5000 UNITS: 5000 INJECTION, SOLUTION INTRAVENOUS; SUBCUTANEOUS at 09:02

## 2025-02-04 RX ADMIN — HALOPERIDOL LACTATE 5 MG: 5 INJECTION, SOLUTION INTRAMUSCULAR at 12:02

## 2025-02-04 NOTE — NURSING
PD sample obtained at Pt bedside per P&P. Sample was labled and initialed and then given to Pt Nurse.

## 2025-02-04 NOTE — ED PROVIDER NOTES
Encounter Date: 2/4/2025    SCRIBE #1 NOTE: I, Tenzin Oro, am scribing for, and in the presence of,  Flaco Antony MD. I have scribed the following portions of the note - Other sections scribed: HPI, ROS, PE.       History     Chief Complaint   Patient presents with    Altered Mental Status     Pt from [facility] with AMS. Pt was dc from here last night after temp dialysis catheter placed. Per staff pt has been combative towards family and staff and not speaking. CBG 86     Pt is a 74 y.o. female with a pMHx of HTN, HLD, and CKD presenting to the ED complaining of an altered mental status. Pt is not responding to questions or commands,  provides a history. The  states that over the past 3-4 days, his wife has not been speaking, is hardly eating or drinking, and becomes agitated when people approach her. He states that she is normally responsive and of sound mental status. The  states she has been this way ever since having her dialysis port put in place. This was on 01/28 and she was discharged last night. She had previously been receiving peritoneal dialysis. The plan was to get her dialysis port put in and start hemodialysis so she could get placement in a nursing home.     Pt's nephrologist is George Mendoza MD..    The history is provided by the spouse. The history is limited by the condition of the patient. No  was used.     Review of patient's allergies indicates:   Allergen Reactions    Codeine Palpitations and Rash    Cefepime      Other Reaction(s): Encephalopathy    Renal impairment increased risk for this reaction    Levofloxacin      Other Reaction(s): Encephalopathy    Renal pt who had some encephalopathy while on Levaquin & Cefepime, neurology physicians think they may have been due to the antibiotics (both possible side effects for each)    Iodine Nausea Only     Past Medical History:   Diagnosis Date    Acute rejection of kidney transplant 9/10/07  9/10/2007    09/10/2007 mild acute rejection; +C4D. Glomerulitis with one activated endothelial cell: treated with Solumedrol pulse 375 mg IVBP x 3 doses with short prednisone taper; thymoglobin 100 mg x 6 doses 10/04/2007 minimal histologic changes no acute rejection     Allergy     Anemia of renal disease     CKD (chronic kidney disease), stage IV     CMV (cytomegalovirus) status positive     Colon polyp     benign    Depression     GN (glomerulonephritis)     GN (glomerulonephritis)     unknown type    Herpes infection, other 2009    on buttocks    Hyperlipidemia     Hypertension     Immunosuppression     Living-donor kidney transplant recipient 01/15/2007    Meningitis     history of viral meningitis    Proteinuria 1/5/2015    Proteinuria     Pulmonary nodules     Renal hypertension     S/P breast augmentation     Skin cancer     Urinary tract infection      Past Surgical History:   Procedure Laterality Date    COLON SURGERY      COLONOSCOPY W/ BIOPSIES      cosemetic surgery      facelift and breast augmentation     HYSTERECTOMY      INSERTION OF TUNNELED CENTRAL VENOUS HEMODIALYSIS CATHETER N/A 1/28/2025    Procedure: Insertion, Catheter, Central Venous, Hemodialysis;  Surgeon: Apollo Jacob DO;  Location: [facility] CATH LAB;  Service: Nephrology;  Laterality: N/A;    INTRAMEDULLARY RODDING OF HUMERUS Right 12/2/2024    Procedure: INSERTION, INTRAMEDULLARY MONTSERRAT, HUMERUS;  Surgeon: Samuel Dow DO;  Location: [facility] OR;  Service: Orthopedics;  Laterality: Right;  supine vacular backward c arm synthes  shoulder scope drape    KIDNEY TRANSPLANT      NEPHRECTOMY Left     9/2016    RENAL BIOPSY      TONSILLECTOMY, ADENOIDECTOMY       Family History   Problem Relation Name Age of Onset    Kidney disease Sister          ESRD    Kidney disease Brother          CKD from HTN    Hypertension Brother      Heart failure Mother      Heart failure Father      Diabetes Neg Hx      Stroke Neg Hx       Social History     Tobacco Use  "   Smoking status: Former     Current packs/day: 1.00     Average packs/day: 1 pack/day for 15.0 years (15.0 ttl pk-yrs)     Types: Cigarettes    Smokeless tobacco: Never    Tobacco comments:     quit in 2014; smoked for 15 years and at the most smoked 1 ppd   Substance Use Topics    Alcohol use: Yes     Comment: seldom    Drug use: No     Review of Systems   Unable to perform ROS: Mental status change       Physical Exam     Initial Vitals [02/04/25 1056]   BP Pulse Resp Temp SpO2   (!) 160/90 86 20 97.7 °F (36.5 °C) 98 %      MAP       --         Physical Exam    Nursing note and vitals reviewed.  Constitutional: No distress.   Lying in a flexed position.   Occasionally swats at staff.  Says "Ouch" with the inflation of blood pressure cuff.   HENT:   Head: Normocephalic and atraumatic.   Eyes: Conjunctivae are normal.   Cardiovascular:  Normal rate and intact distal pulses.           Pulmonary/Chest: No respiratory distress. She has no rhonchi.   Dialysis access to the right upper chest wall.    Abdominal: Abdomen is soft. Bowel sounds are normal. There is no abdominal tenderness. There is no rebound and no guarding.   Musculoskeletal:         General: No edema.     Skin: Skin is warm and dry.         ED Course   Procedures  Labs Reviewed   BLOOD GAS - Abnormal       Result Value    Sample Type Arterial Blood      Sample site Right Radial Artery      Drawn by LF,RRT      pH, Blood gas 7.460 (*)     pCO2, Blood gas 30.0 (*)     pO2, Blood gas 77.0 (*)     Sodium, Blood Gas 131 (*)     Potassium, Blood Gas 4.6      Calcium Level Ionized 1.25 (*)     TOC2, Blood gas 22.2      Base Excess, Blood gas -1.80      sO2, Blood gas 96.0      HCO3, Blood gas 21.3 (*)     THb, Blood gas 10.4 (*)     O2 Hb, Blood Gas 95.0      CO Hgb 1.7 (*)     Met Hgb 0.5      Allens Test Yes      FIO2, Blood gas 21     AMMONIA - Normal    Ammonia Level 33.4     GRAM STAIN OLG   BODY FLUID CULTURE OLG   CELL COUNT, BODY FLUID        ECG " Results              EKG 12-lead (Final result)        Collection Time Result Time QRS Duration OHS QTC Calculation    02/04/25 11:11:04 02/04/25 14:57:40 80 435                     Final result by Interface, Lab In [facility] (02/04/25 14:57:44)                   Narrative:    Test Reason : R45.1,    Vent. Rate :  89 BPM     Atrial Rate :  89 BPM     P-R Int : 146 ms          QRS Dur :  80 ms      QT Int : 358 ms       P-R-T Axes :  55 -54  -9 degrees    QTcB Int : 435 ms    Normal sinus rhythm  Left anterior fascicular block  Nonspecific T wave abnormality  Abnormal ECG  When compared with ECG of 22-Jan-2025 10:40,  Premature atrial complexes are no longer Present  Nonspecific T wave abnormality now evident in Inferior leads  Confirmed by Herb Ruiz ([id number]) on 2/4/2025 2:57:38 PM    Referred By: AAAREFERRAL SELF           Confirmed By: Herb Ruiz                                  Imaging Results              CT Head Without Contrast (Final result)  Result time 02/04/25 13:33:13      Final result by Yogesh Wolf MD (02/04/25 13:33:13)                   Impression:      1.  No acute intracranial findings identified.    2.  Marked chronic microangiopathic ischemia and age appropriate atrophy.      Electronically signed by: Yogesh Wolf  Date:    02/04/2025  Time:    13:33               Narrative:    EXAMINATION:  CT HEAD WITHOUT CONTRAST    CLINICAL HISTORY:  Mental status change, unknown cause;    TECHNIQUE:  Sequential axial images were performed of the brain without contrast.    Dose product length of 1054 mGycm. Automated exposure control was utilized to minimize radiation dose.    COMPARISON:  December 2, 2024.    FINDINGS:  There is no intracranial mass effect, midline shift, hydrocephalus or hemorrhage. There is no sulcal effacement or low attenuation changes to suggest recent large vessel territory infarction. Chronic appearing periventricular and subcortical white matter low attenuation changes are  present and are consistent with marked chronic microangiopathic ischemia.  There are right occipital lobe gyriform remote calcifications.  The ventricular system and sulcal markings prominence is consistent with atrophy. There is no acute extra axial fluid collection. Visualized paranasal sinuses are clear without mucosal thickening, polypoidal abnormality or air-fluid levels.  There is chronic loss of pneumatization and right mastoid air cells with opacification.                                       Medications   sodium chloride 0.9% flush 10 mL (has no administration in time range)   naloxone 0.4 mg/mL injection 0.02 mg (has no administration in time range)   glucose chewable tablet 16 g (has no administration in time range)   glucose chewable tablet 24 g (has no administration in time range)   dextrose 50% injection 12.5 g (has no administration in time range)   dextrose 50% injection 25 g (has no administration in time range)   glucagon (human recombinant) injection 1 mg (has no administration in time range)   heparin (porcine) injection 5,000 Units (5,000 Units Subcutaneous Given 2/4/25 1419)   acetaminophen tablet 650 mg (has no administration in time range)   ondansetron injection 4 mg (has no administration in time range)   acetaminophen tablet 650 mg (has no administration in time range)   melatonin tablet 6 mg (has no administration in time range)   amLODIPine tablet 5 mg (has no administration in time range)   aspirin EC tablet 81 mg (has no administration in time range)   calcitRIOL capsule 0.25 mcg (has no administration in time range)   EScitalopram oxalate tablet 20 mg (has no administration in time range)   folic acid tablet 1 mg (has no administration in time range)   pantoprazole EC tablet 40 mg (has no administration in time range)   sodium zirconium cyclosilicate packet 10 g (has no administration in time range)   haloperidol lactate injection 5 mg (5 mg Intramuscular Given 2/4/25 1233)    diphenhydrAMINE injection 25 mg (25 mg Intramuscular Given 2/4/25 1233)     Medical Decision Making  The differential diagnosis includes, but is not limited to, dementia, encephalopathy, delirium, electrolyte abnormalities.     Amount and/or Complexity of Data Reviewed  Labs: ordered.  Radiology: ordered.    Risk  Prescription drug management.            Scribe Attestation:   Scribe #1: I performed the above scribed service and the documentation accurately describes the services I performed. I attest to the accuracy of the note.    Attending Attestation:           Physician Attestation for Scribe:  Physician Attestation Statement for Scribe #1: I, Flaco Antony MD, reviewed documentation, as scribed by Tenzin Oro in my presence, and it is both accurate and complete.             ED Course as of 02/04/25 4499   Tue Feb 04, 2025   1127 Patient was admitted to the hospital from 01/22/2025 through 02/03/2025 discharged last night back to nursing home [LF]   1128 Patient had an EKG performed at 1111.  By my interpretation 89 beats per minute normal sinus rhythm left anterior fascicular block no ST segment elevations.  No ectopy no change from previous EKG on 01/22/2025 [LF]   1130 Patient's labs in his system from today I reviewed the CBC and CMP [LF]   1218 I discussed option of restarting metoprolol.  Family would like started low-dose because she had become too bradycardic in the past. [LF]   1249 Discussed with Trang of the hospitalist service they will put her back in as an observation.  CT of the head is pending patient has no focal neurologic deficits [LF]      ED Course User Index  [LF] Flaco Atnony MD                           Clinical Impression:  Final diagnoses:  [R45.1] Agitation  [G93.40] Encephalopathy, unspecified type          ED Disposition Condition    Observation Stable                Flaco Antony MD  02/04/25 4549

## 2025-02-04 NOTE — CONSULTS
Nephrology Initial Consult Note    Patient Name: Malissa Schwartz  Age: 74 y.o.  : 1950  MRN: [id number]  Admission Date: 2025    Reason for Consult:      ESRD  Self, Aaareferral    HPI:     Malissa Schwartz is a 74 y.o. female who presents with persistent encephalopathy.  Past medical history significant for ESRD, hypertension, hyperlipidemia, deconditioning, it is to be, humerus fracture, failed renal transplant, and recent left lower extremity cellulitis.  Patient was discharge after hospitalization yesterday 2025 from [facility] to a skilled nursing facility.  During her previous hospital stay she was transitioned from peritoneal dialysis to hemodialysis in order to get her to a skilled nursing facility after admission.  Patient was noted to be we can confused prior to hospital admission.  Her  states that nothing changed since she was discharge from the hospital, but she has not improved.  She remains confused, combative, and not verbally responsive to questioning.  Nephrology was consulted for ESRD management.  Patient did dialyze on 2025 prior to hospital discharge.  Patient is barely arousable to sternal rub, and not answering any questions at time of my examination.        Current Facility-Administered Medications   Medication Dose Route Frequency Provider Last Rate Last Admin    acetaminophen tablet 650 mg  650 mg Oral Q4H PRN Myke Butler PA        acetaminophen tablet 650 mg  650 mg Oral Q8H PRN Myke Butler PA        amLODIPine tablet 5 mg  5 mg Oral Daily Myke Butler PA        aspirin EC tablet 81 mg  81 mg Oral Daily Myke Butler PA        [START ON 2025] calcitRIOL capsule 0.25 mcg  0.25 mcg Oral Once per day on  Myke Butler PA        dextrose 50% injection 12.5 g  12.5 g Intravenous PRN Myke Butler PA        dextrose 50% injection 25 g  25 g Intravenous PRN Myke Butler PA        EScitalopram  oxalate tablet 20 mg  20 mg Oral Daily Myke Butler PA        folic acid tablet 1 mg  1 mg Oral Daily Myke Butler PA        glucagon (human recombinant) injection 1 mg  1 mg Intramuscular PRN Myke Butler PA        glucose chewable tablet 16 g  16 g Oral PRN Myke Butler PA        glucose chewable tablet 24 g  24 g Oral PRN Myke Butler PA        heparin (porcine) injection 5,000 Units  5,000 Units Subcutaneous Q8H Myke Butler PA   5,000 Units at 02/04/25 1419    melatonin tablet 6 mg  6 mg Oral Nightly PRN Myke Butler PA        naloxone 0.4 mg/mL injection 0.02 mg  0.02 mg Intravenous PRN Myke Butler PA        ondansetron injection 4 mg  4 mg Intravenous Q8H PRN Myke Butler PA        pantoprazole EC tablet 40 mg  40 mg Oral Daily Myke Butler PA        sodium chloride 0.9% flush 10 mL  10 mL Intravenous Q12H PRN Myke Butler PA        sodium zirconium cyclosilicate packet 10 g  10 g Oral Once Myke Butler PA Griffith, Patrick W., MD    Past Medical History:   Diagnosis Date    Acute rejection of kidney transplant 9/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 Glomerulitis with one activated endothelial cell: treated with Solumedrol pulse 375 mg IVBP x 3 doses with short prednisone taper; thymoglobin 100 mg x 6 doses 10/04/2007 minimal histologic changes no acute rejection     Allergy     Anemia of renal disease     CKD (chronic kidney disease), stage IV     CMV (cytomegalovirus) status positive     Colon polyp     benign    Depression     GN (glomerulonephritis)     GN (glomerulonephritis)     unknown type    Herpes infection, other 2009    on buttocks    Hyperlipidemia     Hypertension     Immunosuppression     Living-donor kidney transplant recipient 01/15/2007    Meningitis     history of viral meningitis    Proteinuria 1/5/2015    Proteinuria     Pulmonary nodules     Renal hypertension     S/P breast  augmentation     Skin cancer     Urinary tract infection       Past Surgical History:   Procedure Laterality Date    COLON SURGERY      COLONOSCOPY W/ BIOPSIES      cosemetic surgery      facelift and breast augmentation     HYSTERECTOMY      INSERTION OF TUNNELED CENTRAL VENOUS HEMODIALYSIS CATHETER N/A 1/28/2025    Procedure: Insertion, Catheter, Central Venous, Hemodialysis;  Surgeon: Apollo Jacob DO;  Location: [facility] CATH LAB;  Service: Nephrology;  Laterality: N/A;    INTRAMEDULLARY RODDING OF HUMERUS Right 12/2/2024    Procedure: INSERTION, INTRAMEDULLARY MONTSERRAT, HUMERUS;  Surgeon: Samuel Dow DO;  Location: [facility] OR;  Service: Orthopedics;  Laterality: Right;  supine vacular backward c arm synthes  shoulder scope drape    KIDNEY TRANSPLANT      NEPHRECTOMY Left     9/2016    RENAL BIOPSY      TONSILLECTOMY, ADENOIDECTOMY        Family History   Problem Relation Name Age of Onset    Kidney disease Sister          ESRD    Kidney disease Brother          CKD from HTN    Hypertension Brother      Heart failure Mother      Heart failure Father      Diabetes Neg Hx      Stroke Neg Hx       Social History     Tobacco Use    Smoking status: Former     Current packs/day: 1.00     Average packs/day: 1 pack/day for 15.0 years (15.0 ttl pk-yrs)     Types: Cigarettes    Smokeless tobacco: Never    Tobacco comments:     quit in 2014; smoked for 15 years and at the most smoked 1 ppd   Substance Use Topics    Alcohol use: Yes     Comment: seldom     Medications Prior to Admission   Medication Sig Dispense Refill Last Dose/Taking    amLODIPine (NORVASC) 10 MG tablet Take 5 mg by mouth once daily.   Taking    aspirin (ECOTRIN) 81 MG EC tablet Take 81 mg by mouth once daily.   Taking    B complex-vitamin C-folic acid (NEPHRO-JENN) 0.8 mg Tab Take 0.8 mg by mouth Daily.   Taking    calcitRIOL (ROCALTROL) 0.25 MCG Cap Take 0.25 mcg by mouth 3 (three) times a week.   Taking    D5W PgBk 50 mL with ceFAZolin 1 gram SolR Cefazolin  intraperitoneally times 10 more days   Taking    EScitalopram oxalate (LEXAPRO) 20 MG tablet Take 1 tablet (20 mg total) by mouth once daily. 90 tablet 3 Taking    folic acid (FOLVITE) 1 MG tablet Take 1 mg by mouth once daily.   Taking    megestroL (MEGACE) 400 mg/10 mL (10 mL) Susp Take 10 mLs (400 mg total) by mouth once daily. 300 mL 0 Taking    methocarbamoL (ROBAXIN) 500 MG Tab Take 500 mg by mouth 4 (four) times daily.   Taking    pantoprazole (PROTONIX) 40 MG tablet Take 40 mg by mouth once daily.   Taking    potassium chloride (KLOR-CON) 10 MEQ TbSR Take 10 mEq by mouth once daily.   Taking    traMADoL (ULTRAM) 50 mg tablet Take 50 mg by mouth 3 (three) times daily as needed for Pain.   Taking As Needed     Review of patient's allergies indicates:   Allergen Reactions    Codeine Palpitations and Rash    Cefepime      Other Reaction(s): Encephalopathy    Renal impairment increased risk for this reaction    Levofloxacin      Other Reaction(s): Encephalopathy    Renal pt who had some encephalopathy while on Levaquin & Cefepime, neurology physicians think they may have been due to the antibiotics (both possible side effects for each)    Iodine Nausea Only            Review of Systems:     Unable to obtain ROS due to mental status.      Objective:       VITAL SIGNS: 24 HR MIN & MAX LAST    Temp  Min: 97.7 °F (36.5 °C)  Max: 97.7 °F (36.5 °C)  97.7 °F (36.5 °C)        BP  Min: 144/77  Max: 160/90  (!) 151/76     Pulse  Min: 86  Max: 93  86     Resp  Min: 18  Max: 20  18    SpO2  Min: 98 %  Max: 100 %  100 %      GEN:  Ill-appearing WF  CV: RRR +S1,S2 without murmur  PULM: CTAB, unlabored  ABD: Soft, NT/ND abdomen with NABS  EXT:  1+ dependent edema  SKIN: Warm and dry  PSYCH:  Essentially unresponsive, barely arouses to sternal rub.  Dialysis access:  Right IJ PermCath, PD catheter            Component Value Date/Time     02/04/2025 0530     02/03/2025 0621     06/20/2018 1402      01/08/2018 0835    K 5.5 (H) 02/04/2025 0530    K 5.2 (H) 02/03/2025 0621    K 4.8 06/20/2018 1402    K 4.6 01/08/2018 0835    CO2 22 (L) 02/04/2025 0530    CO2 19 (L) 02/03/2025 0621    CO2 23 06/20/2018 1402    CO2 22 (L) 01/08/2018 0835    BUN 17.6 02/04/2025 0530    BUN 37.0 (H) 02/03/2025 0621    BUN 30 (H) 06/20/2018 1402    BUN 24 (H) 01/08/2018 0835    CREATININE 4.96 (H) 02/04/2025 0530    CREATININE 8.00 (H) 02/03/2025 0621    CREATININE 1.9 (H) 06/20/2018 1402    CREATININE 1.8 (H) 01/08/2018 0835    CALCIUM 8.8 02/04/2025 0530    CALCIUM 8.7 02/03/2025 0621    CALCIUM 9.7 06/20/2018 1402    CALCIUM 9.1 01/08/2018 0835    PHOS 5.3 (H) 02/03/2025 0621    PHOS 3.8 06/20/2018 1402            Component Value Date/Time    WBC 7.46 02/04/2025 0530    WBC 8.02 02/03/2025 0621    WBC 4.26 (L) 07/04/2024 0000    WBC 6.90 06/20/2018 1402    WBC 3.37 (L) 01/08/2018 0835    HGB 9.8 (L) 02/04/2025 0530    HGB 10.2 (L) 02/03/2025 0621    HGB 9 (L) 12/18/2024 0000    HGB 10.2 (L) 11/26/2024 0000    HGB 10.5 (L) 06/20/2018 1402    HGB 9.1 (L) 01/08/2018 0835    HCT 33.0 (L) 02/04/2025 0530    HCT 33.3 (L) 02/03/2025 0621    HCT 29.8 (L) 12/18/2024 0000    HCT 31.8 (L) 11/26/2024 0000    HCT 32.0 (L) 06/05/2020 0815    HCT 31.6 (L) 06/20/2018 1402    HCT 27.3 (L) 01/08/2018 0835     02/04/2025 0530     02/03/2025 0621     06/20/2018 1402     01/08/2018 0835             CT Head Without Contrast   Final Result      1.  No acute intracranial findings identified.      2.  Marked chronic microangiopathic ischemia and age appropriate atrophy.         Electronically signed by: Yogesh Wolf   Date:    02/04/2025   Time:    13:33      MRI Brain Without Contrast    (Results Pending)       Assessment / Plan:       There are no hospital problems to display for this patient.    ESRD - previously on peritoneal dialysis, recently transitioned to hemodialysis for skilled nursing placement.  Has been both  hemodialysis, and peritoneal dialysis access.  Acute encephalopathy  Hyperkalemia    Plan:  Patient readmitted after discharge yesterday.  She remains encephalopathic.  Hospital medicine team are admitting patient for workup encephalopathy.  We will rule out peritonitis as cause of encephalopathy with PD fluid culture, cell count, and Gram stain.  We will plan to do regular hemodialysis tomorrow on MWF schedule.    Isaiah Mosquera DO  Nephrology  [facility]  Clinic number: [phone]      Note was created with the assistance of electronic Dictation Services.  Note was reviewed to decrease errors, however, these may still be present.  Please contact me about questions or concerns with the dictation.

## 2025-02-04 NOTE — PLAN OF CARE
02/04/25 1556   Discharge Assessment   Assessment Type Discharge Planning Assessment   Confirmed/corrected address, phone number and insurance Yes   Confirmed Demographics Correct on Facesheet   Source of Information family   If unable to respond/provide information was family/caregiver contacted? Yes   Contact Name/Number jessica arnold, spouse, [phone]   Communicated REINA with patient/caregiver Date not available/Unable to determine   Reason For Admission agitation, ams   People in Home facility resident   Facility Arrived From: [facility] the [facility]   Do you have help at home or someone to help you manage your care at home? Yes   Who are your caregiver(s) and their phone number(s)? jessica arnold, spouse, [phone]; gillian Jama   Prior to hospitilization cognitive status: Unable to Assess   Current cognitive status: Unable to Assess   Home Layout Able to live on 1st floor   Equipment Currently Used at Home walker, rolling   Readmission within 30 days? Yes   Patient currently being followed by outpatient case management? No   Do you take prescription medications? Yes   Do you have prescription coverage? Yes   Coverage [employer]   Do you have any problems affording any of your prescribed medications? No   Is the patient taking medications as prescribed? yes   Who is going to help you get home at discharge? family   How do you get to doctors appointments? family or friend will provide   Are you on dialysis? Yes   Discharge Plan A Return to nursing home   Discharge Plan B Return to Nursing Home   DME Needed Upon Discharge  none   Discharge Plan discussed with: Spouse/sig other   Name(s) and Number(s) jessica arnold, spouse, [phone]   Transition of Care Barriers None   OTHER   Name(s) of People in Home [facility]; spouse     Completed assessment with pt's spouse and private sitter at bedside. Introduced self and explained role as SW. Both verb understanding to all questions asked. PCP is Sergio Crabtree.  Pt was a d/c to [facility] at the [facility] yesterday and returned 2/2 agitation and AMS. Updates sent to [facility]. Plan for pt to return to SNF if accepted back.    Rhiannon Finch LCSW

## 2025-02-04 NOTE — ED NOTES
Assumed care of pt. At this time.. pt. Arrives via EMS C/o AMS. Pt. Nonverbal at this time. Move extremities to pain. Noted brusing to left forearm and swelling to right upper forearm from old fracture that she was previously admitted for. Pt. Tender to touch to all extremities and torso.. noted dialysis port to right upper chest and parataenial dialysis port to left lower abd. Pt. Was admitted to [facility] on 2/3/2024..  reports pt. Has not eating since she had dialysis port placed and her mentation has been declining since then.

## 2025-02-04 NOTE — ED NOTES
Attempted IV insertion at this time with assistance with no success will attempt again when pt. More calm after medications per MD order

## 2025-02-04 NOTE — H&P
[facility] Medicine History & Physical Examination       Patient Name: Malissa Schwartz  MRN: [id number]  Patient Class: OP- Observation   Admission Date: 2/4/2025   Admitting Physician: POLINA Service   Length of Stay: 0  Attending Physician: Dr. Nielsen  Primary Care Provider: Sergio Crabtree MD  Face-to-Face encounter date: 02/04/2025  Code Status:Full code   Chief Complaint: Altered Mental Status (Pt from [facility] with AMS. Pt was dc from here last night after temp dialysis catheter placed. Per staff pt has been combative towards family and staff and not speaking. CBG 86)      Screening for Social Drivers for health:  Patient screened for food insecurity, housing instability, transportation needs, utility difficulties, and interpersonal safety (select all that apply as identified as concern)  []Housing or Food  []Transportation Needs  []Utility Difficulties  []Interpersonal safety  [x]None      Patient information was obtained from patient, patient's family, past medical records and ER records.  ED records were reviewed in detail and documented below    HISTORY OF PRESENT ILLNESS:   Malissa Schwartz is a 74 y.o. female who has a past medical history of HTN, HLD, deconditioning, humerus fracture with IM nail, NSTEMI, failed kidney transplant, ESRD-HD M/W/F previously on peritoneal dialysis, and recent cellulitis left lower extremity completed clindamycin treatment who presented to [facility] on 2/4/2025 with a primary complaint of altered mental status for the last 3-4 days according to the patient's , Brayden Schwartz at bedside.  The patient was admitted on 01/22/2025 for NSTEMI likely type 2, sepsis secondary to left lower extremity cellulitis, and ESRD on PD that was converted to hemodialysis prior to discharge.  During her hospitalization she was evaluated by consultants including wound care, Nephrology, Psychiatry, and Cardiology.  Heparin was discontinued and CIS  recommended outpatient stress test (PET) in 2 weeks.  Right-sided tunneled dialysis catheter was placed on 01/28/2025 and she tolerated hemodialysis multiple times during her admission.  And progress note by Nephrology on 01/30 she was noted to be weak, confused, and not speaking much with poor intake.  Psychiatry recommended increasing the Lexapro which was done prior to her discharge.  Blood cultures with no growth to date.  She had dialysis on 02/03/2025 prior to being discharged to [facility].  The patient's , Mr. Brayden Schwartz at bedside states that she has not been herself since she was initiated on hemodialysis on 01/29/2025.  States prior to dialysis she was AAO x3, but since she was initiated on dialysis she has not been eating much, intermittently confused, and becomes quite agitated/combative.  On exam, she is protecting her airway, but minimally cooperative with examiner.  The patient will open her eyes to pain, she uses inappropriate/confused words, and localizes pain for GCS of 10.  She does not appear to have any acute focal neurological deficits and appears to have possibly a metabolic encephalopathy.   denies any history of CVA or TIA.  ABG and ammonia level unremarkable.  Patient does not make any urine, so unable to obtain UA and UDS.  CT head without contrast showed no acute findings.  No further history available at this time.      ED course: Vital signs revealed the patient is afebrile and hemodynamically stable on room air.  Labs CBC showed normal WBC of 7, chronic normocytic anemia with a hemoglobin of 9, chemistry with elevated creatinine of 4.96 and BUN of 17, elevated potassium of 5.5, low albumin of 2, normal ammonia level of 33, elevated TSH of 6, normal lipid panel.  ABG with mild respiratory alkalosis.  CT head showed no acute intracranial findings.  Treated in the ED with Haldol and Benadryl.  Ordered [facility].    Ordered consultation to Nephrology and wound  care nurse     Patient admitted for acute encephalopathy possibly metabolic versus ischemic in etiology     PAST MEDICAL HISTORY:     Past Medical History:   Diagnosis Date    Acute rejection of kidney transplant 9/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 Glomerulitis with one activated endothelial cell: treated with Solumedrol pulse 375 mg IVBP x 3 doses with short prednisone taper; thymoglobin 100 mg x 6 doses 10/04/2007 minimal histologic changes no acute rejection     Allergy     Anemia of renal disease     CKD (chronic kidney disease), stage IV     CMV (cytomegalovirus) status positive     Colon polyp     benign    Depression     GN (glomerulonephritis)     GN (glomerulonephritis)     unknown type    Herpes infection, other 2009    on buttocks    Hyperlipidemia     Hypertension     Immunosuppression     Living-donor kidney transplant recipient 01/15/2007    Meningitis     history of viral meningitis    Proteinuria 1/5/2015    Proteinuria     Pulmonary nodules     Renal hypertension     S/P breast augmentation     Skin cancer     Urinary tract infection        PAST SURGICAL HISTORY:     Past Surgical History:   Procedure Laterality Date    COLON SURGERY      COLONOSCOPY W/ BIOPSIES      cosemetic surgery      facelift and breast augmentation     HYSTERECTOMY      INSERTION OF TUNNELED CENTRAL VENOUS HEMODIALYSIS CATHETER N/A 1/28/2025    Procedure: Insertion, Catheter, Central Venous, Hemodialysis;  Surgeon: Apollo Jacob DO;  Location: [facility] CATH LAB;  Service: Nephrology;  Laterality: N/A;    INTRAMEDULLARY RODDING OF HUMERUS Right 12/2/2024    Procedure: INSERTION, INTRAMEDULLARY MONTSERRAT, HUMERUS;  Surgeon: Samuel Dow DO;  Location: [facility] OR;  Service: Orthopedics;  Laterality: Right;  supine vacular backward c arm synthes  shoulder scope drape    KIDNEY TRANSPLANT      NEPHRECTOMY Left     9/2016    RENAL BIOPSY      TONSILLECTOMY, ADENOIDECTOMY         ALLERGIES:   Codeine, Cefepime,  Levofloxacin, and Iodine    FAMILY HISTORY:   Reviewed and negative    SOCIAL HISTORY:     Social History     Tobacco Use    Smoking status: Former     Current packs/day: 1.00     Average packs/day: 1 pack/day for 15.0 years (15.0 ttl pk-yrs)     Types: Cigarettes    Smokeless tobacco: Never    Tobacco comments:     quit in 2014; smoked for 15 years and at the most smoked 1 ppd   Substance Use Topics    Alcohol use: Yes     Comment: seldom        HOME MEDICATIONS:     Prior to Admission medications    Medication Sig Start Date End Date Taking? Authorizing Provider   amLODIPine (NORVASC) 10 MG tablet Take 5 mg by mouth once daily.   Yes Provider, Historical   aspirin (ECOTRIN) 81 MG EC tablet Take 81 mg by mouth once daily.   Yes Provider, Historical   B complex-vitamin C-folic acid (NEPHRO-JENN) 0.8 mg Tab Take 0.8 mg by mouth Daily.   Yes Provider, Historical   calcitRIOL (ROCALTROL) 0.25 MCG Cap Take 0.25 mcg by mouth 3 (three) times a week.   Yes Provider, Historical   D5W PgBk 50 mL with ceFAZolin 1 gram SolR Cefazolin intraperitoneally times 10 more days 10/9/24  Yes Denisha Romero MD   EScitalopram oxalate (LEXAPRO) 20 MG tablet Take 1 tablet (20 mg total) by mouth once daily. 2/3/25 2/3/26 Yes Catie Shepard,    folic acid (FOLVITE) 1 MG tablet Take 1 mg by mouth once daily.   Yes Provider, Historical   megestroL (MEGACE) 400 mg/10 mL (10 mL) Susp Take 10 mLs (400 mg total) by mouth once daily. 8/30/24 2/4/25 Yes James Oropeza MD   methocarbamoL (ROBAXIN) 500 MG Tab Take 500 mg by mouth 4 (four) times daily.   Yes Provider, Historical   pantoprazole (PROTONIX) 40 MG tablet Take 40 mg by mouth once daily.   Yes Provider, Historical   potassium chloride (KLOR-CON) 10 MEQ TbSR Take 10 mEq by mouth once daily.   Yes Provider, Historical   traMADoL (ULTRAM) 50 mg tablet Take 50 mg by mouth 3 (three) times daily as needed for Pain.   Yes Provider, Historical   losartan (COZAAR) 100 MG tablet TAKE ONE  TABLET BY MOUTH ONCE DAILY 1/28/20 5/13/22  Salvador Moeller MD   metoprolol succinate (TOPROL-XL) 25 MG 24 hr tablet Take 2 tablets (50 mg total) by mouth once daily.  Patient not taking: Reported on 5/12/2022 3/10/17 5/13/22  Linda Nelson MD   mycophenolate (CELLCEPT) 250 mg Cap Take 2 capsules (500 mg total) by mouth 2 (two) times daily. Z94.0 kidney transplant  Patient not taking: Reported on 5/12/2022 7/25/19 5/13/22  Nicole Trejo MD   tacrolimus (PROGRAF) 1 MG Cap Take 3mg every AM and 2mg every PM.  Route: Oral.  Z94.0 Kidney Transplant.  Patient not taking: Reported on 5/12/2022 7/26/19 5/13/22  Nicole Trejo MD       REVIEW OF SYSTEMS:   Except as documented, all other systems reviewed and negative     PHYSICAL EXAM:     VITAL SIGNS: 24 HRS MIN & MAX LAST   Temp  Min: 97.7 °F (36.5 °C)  Max: 97.9 °F (36.6 °C) 97.7 °F (36.5 °C)   BP  Min: 144/77  Max: 160/90 (!) 151/76   Pulse  Min: 86  Max: 99  86   Resp  Min: 18  Max: 20 18   SpO2  Min: 98 %  Max: 100 % 100 %     General appearance:  Thin and acute on chronically ill and debilitated  female lying on her left side in bed in no acute distress.  She is saturating well on room air and protecting her airway.    HENT: Atraumatic/normocephalic head. Moist mucous membranes of oral cavity.  Protecting her airway.  Eyes: Normal extraocular movements.  Normal conjunctivae sclerae.  Neck: Supple.  Right IJ tunneled dialysis catheter.  Lungs: Clear to auscultation bilaterally. No wheezing present.  Non labored respirations.  Heart: Regular rate and rhythm. S1 and S2 present with no murmurs/gallop/rub. No pedal edema. No JVD present.   Abdomen: Soft, non-distended, non-tender. No rebound tenderness/guarding. Bowel sounds are normal.   Extremities: No cyanosis, clubbing, or edema.  Skin:  Left heel with stage IV decubitus ulcer and right heel with blister.  No evidence of acute infection.  These are present on admission  today.  Neuro:  Chronically ill and debilitated appearing.  GCS of 10 as noted above in H and P. no acute focal neurological deficits.    LABS AND IMAGING:     Recent Labs   Lab 02/02/25 0648 02/03/25 0621 02/04/25 0530   WBC 6.40 8.02 7.46   RBC 3.71* 3.89* 3.75*   HGB 9.8* 10.2* 9.8*   HCT 31.9* 33.3* 33.0*   MCV 86.0 85.6 88.0   MCH 26.4* 26.2* 26.1*   MCHC 30.7* 30.6* 29.7*   RDW 16.7 17.2* 17.0    387 374   MPV 9.6 9.5 9.3       Recent Labs   Lab 02/02/25 0648 02/03/25 0621 02/04/25 0530 02/04/25  1334    137 138  --    K 4.8 5.2* 5.5*  --     107 105  --    CO2 20* 19* 22*  --    BUN 24.7* 37.0* 17.6  --    CREATININE 6.45* 8.00* 4.96*  --    CALCIUM 8.3* 8.7 8.8  --    PH  --   --   --  7.460*   ALBUMIN 1.7* 2.0* 2.0*  --    ALKPHOS 192* 220* 183*  --    ALT 22 22 20  --    AST 32 27 24  --    BILITOT 0.3 0.3 0.4  --        Microbiology Results (last 7 days)       Procedure Component Value Units Date/Time    Gram Stain [[id number]]     Order Status: Sent Specimen: Peritoneal Fluid     Body Fluid Culture [[id number]]     Order Status: Sent Specimen: Peritoneal Fluid              CT Head Without Contrast  Narrative: EXAMINATION:  CT HEAD WITHOUT CONTRAST    CLINICAL HISTORY:  Mental status change, unknown cause;    TECHNIQUE:  Sequential axial images were performed of the brain without contrast.    Dose product length of 1054 mGycm. Automated exposure control was utilized to minimize radiation dose.    COMPARISON:  December 2, 2024.    FINDINGS:  There is no intracranial mass effect, midline shift, hydrocephalus or hemorrhage. There is no sulcal effacement or low attenuation changes to suggest recent large vessel territory infarction. Chronic appearing periventricular and subcortical white matter low attenuation changes are present and are consistent with marked chronic microangiopathic ischemia.  There are right occipital lobe gyriform remote calcifications.  The ventricular system and  sulcal markings prominence is consistent with atrophy. There is no acute extra axial fluid collection. Visualized paranasal sinuses are clear without mucosal thickening, polypoidal abnormality or air-fluid levels.  There is chronic loss of pneumatization and right mastoid air cells with opacification.  Impression: 1.  No acute intracranial findings identified.    2.  Marked chronic microangiopathic ischemia and age appropriate atrophy.    Electronically signed by: Yogesh Wolf  Date:    02/04/2025  Time:    13:33      ASSESSMENT & PLAN:   Acute encephalopathy possibly metabolic versus ischemic etiologies   ESRD-HD M/W/F via right IJ tunneled dialysis catheter  Hyperkalemia, mild  History of sepsis secondary to cellulitis of left lower extremity completed clindamycin therapy  History of NSTEMI type 2 with CIS recommending outpatient stress test (PET scan )   Left heel stage 4 decubitus ulcers with eschar and right heel with stage II decubitus ulcer with blister.  Generalized weakness and debility   Right Humerus fracture with IM nail (12/2024)  Deconditioning   Left lower extremity skin cancer awaiting treatment   History of colon polyp/lung nodule  History of peritoneal dialysis and failed kidney transplant   Essential hypertension   Dyslipidemia   Generalized anxiety disorder/major depressive disorder  Former smoker  No history of GI bleed  TTE (01/22/2025): EF 60-65%.  Moderate TR.    --Continue consultation to Nephrology and appreciate their recommendations  --Consult to wound care nurse and appreciate recommendations  --Consider neurology consult   --CT head without contrast showed no acute intracranial findings   --Ordered MRI of the brain without contrast   --The patient would not be a thrombolytic candidate given her onset of 3-4 days of altered mental status   --Unremarkable ABG, ammonia, and unable to get UA/UDS because she is aneuric.  No indication for CXR on this admission.  Prior CXR no acute findings.     --Afebrile and hemodynamically stable on room air  --Ordered Lokelma due to hyperkalemia   --Resume appropriate home medications supportive care as ordered --ordered neuro checks every 4 hours and fall precautions  --Continue telemetry and pulse ox monitoring   --Prior blood cultures on last admission showed no growth to date  --AM labs-CBC, CMP, magnesium and phosphorus    Patient was discharged yesterday to [facility], but has returned to the hospital due to altered mental status/combativeness.  See H&P above for all details.      VTE Prophylaxis: will be placed on Heparin for DVT prophylaxis and will be advised to be as mobile as possible and sit in a chair as tolerated    Patient condition:  Fair    __________________________________________________________________________  INPATIENT LIST OF MEDICATIONS     Scheduled Meds:   amLODIPine  5 mg Oral Daily    aspirin  81 mg Oral Daily    [START ON 2/5/2025] calcitRIOL  0.25 mcg Oral Once per day on Monday Wednesday Friday    EScitalopram oxalate  20 mg Oral Daily    folic acid  1 mg Oral Daily    heparin (porcine)  5,000 Units Subcutaneous Q8H    pantoprazole  40 mg Oral Daily    sodium zirconium cyclosilicate  10 g Oral Once     Continuous Infusions:  PRN Meds:.  Current Facility-Administered Medications:     acetaminophen, 650 mg, Oral, Q4H PRN    acetaminophen, 650 mg, Oral, Q8H PRN    dextrose 50%, 12.5 g, Intravenous, PRN    dextrose 50%, 25 g, Intravenous, PRN    glucagon (human recombinant), 1 mg, Intramuscular, PRN    glucose, 16 g, Oral, PRN    glucose, 24 g, Oral, PRN    melatonin, 6 mg, Oral, Nightly PRN    naloxone, 0.02 mg, Intravenous, PRN    ondansetron, 4 mg, Intravenous, Q8H PRN    sodium chloride 0.9%, 10 mL, Intravenous, Q12H PRN      Myke GROVE have reviewed and discussed the case with Dr. Nielsen  Please see the following addendum for further assessment and plan from there attending  MD.    02/04/2025    ________________________________________________________________________________    MD Addendum:  I, Dr. Nielsen assumed care of this patient today  For the patient encounter, I performed the substantive portion of the visit, I reviewed the NP/PA documentation, treatment plan, and medical decision making.  I had face to face time with this patient     A. History:  74 y.o. female who has a past medical history of HTN, HLD, deconditioning, humerus fracture with IM nail, NSTEMI, failed kidney transplant, ESRD-HD M/W/F previously on peritoneal dialysis, and recent cellulitis left lower extremity completed clindamycin treatment who presented to [facility] on 2/4/2025 with a primary complaint of altered mental status for the last 3-4 days according to the patient's , Brayden Schwartz at bedside.  The patient was admitted on 01/22/2025 for NSTEMI likely type 2, sepsis secondary to left lower extremity cellulitis, and ESRD on PD that was converted to hemodialysis prior to discharge.  During her hospitalization she was evaluated by consultants including wound care, Nephrology, Psychiatry, and Cardiology.  Heparin was discontinued and CIS recommended outpatient stress test (PET) in 2 weeks.  Right-sided tunneled dialysis catheter was placed on 01/28/2025 and she tolerated hemodialysis multiple times during her admission.  And progress note by Nephrology on 01/30 she was noted to be weak, confused, and not speaking much with poor intake.  Psychiatry recommended increasing the Lexapro which was done prior to her discharge.  Blood cultures with no growth to date.  She had dialysis on 02/03/2025 prior to being discharged to [facility].  The patient's , Mr. Brayden Schwartz at bedside states that she has not been herself since she was initiated on hemodialysis on 01/29/2025.  States prior to dialysis she was AAO x3, but since she was initiated on dialysis she has not been eating much,  intermittently confused, and becomes quite agitated/combative.  On exam, she is protecting her airway, but minimally cooperative with examiner.  The patient will open her eyes to pain, she uses inappropriate/confused words, and localizes pain for GCS of 10.  She does not appear to have any acute focal neurological deficits and appears to have possibly a metabolic encephalopathy.   denies any history of CVA or TIA.  ABG and ammonia level unremarkable.  Patient does not make any urine, so unable to obtain UA and UDS.  CT head without contrast showed no acute findings.  No further history available at this time.       ED course: Vital signs revealed the patient is afebrile and hemodynamically stable on room air.  Labs CBC showed normal WBC of 7, chronic normocytic anemia with a hemoglobin of 9, chemistry with elevated creatinine of 4.96 and BUN of 17, elevated potassium of 5.5, low albumin of 2, normal ammonia level of 33, elevated TSH of 6, normal lipid panel.  ABG with mild respiratory alkalosis.  CT head showed no acute intracranial findings.  Treated in the ED with Haldol and Benadryl.  Ordered [facility].     Ordered consultation to Nephrology and wound care nurse      Patient admitted for acute encephalopathy possibly metabolic versus ischemic in etiology       ASSESSMENT & PLAN:   Acute encephalopathy possibly metabolic versus ischemic etiologies   ESRD-HD M/W/F via right IJ tunneled dialysis catheter  Hyperkalemia, mild  History of sepsis secondary to cellulitis of left lower extremity completed clindamycin therapy  History of NSTEMI type 2 with CIS recommending outpatient stress test (PET scan )   Left heel stage 4 decubitus ulcers with eschar and right heel with stage II decubitus ulcer with blister.  Generalized weakness and debility   Right Humerus fracture with IM nail (12/2024)  Deconditioning   Left lower extremity skin cancer awaiting treatment   History of colon polyp/lung nodule  History of  peritoneal dialysis and failed kidney transplant   Essential hypertension   Dyslipidemia   Generalized anxiety disorder/major depressive disorder  Former smoker  No history of GI bleed  TTE (01/22/2025): EF 60-65%.  Moderate TR.     Plan  Admit  --Continue consultation to Nephrology and appreciate their recommendations  --Consider neurology consult   --CT head without contrast showed no acute intracranial findings   --f/up MRI of the brain without contrast   --The patient would not be a thrombolytic candidate given her onset of 3-4 days of altered mental status   --Unremarkable ABG, ammonia, and unable to get UA/UDS because she is aneuric.  No indication for CXR on this admission.  Prior CXR no acute findings.    --Afebrile and hemodynamically stable on room air  --Ordered Lokelma due to hyperkalemia   --Resume appropriate home medications supportive care as ordered --ordered neuro checks every 4 hours and fall precautions  --Continue telemetry and pulse ox monitoring   --Prior blood cultures on last admission showed no growth to date  --AM labs-CBC, CMP, magnesium and phosphorus  wound care          VTE Prophylaxis: will be placed on Heparin for DVT prophylaxis and will be advised to be as mobile as possible and sit in a chair as tolerated     Patient condition:  Fair    Discharge Planning and Disposition: No mobility needs. Ambulating well. Good social support system.   Anticipated discharge    If patient was admitted under observational status it is with my approval/permission.        All diagnosis and differential diagnosis have been reviewed; assessment and plan has been documented; I have personally reviewed the labs and test results that are presently available; I have reviewed the patients medication list; I have reviewed the consulting providers response and recommendations. I have reviewed or attempted to review medical records based upon their availability.    All of the patient and family questions have  been addressed and answered. Patient's is agreeable to the above stated plan. I will continue to monitor closely and make adjustments to medical management as needed.    If patient was admitted under observational status it is with my approval/permission.      Gia Blunt   02/04/2025

## 2025-02-05 ENCOUNTER — DOCUMENT SCAN (OUTPATIENT)
Dept: HOME HEALTH SERVICES | Facility: HOSPITAL | Age: 75
End: 2025-02-05
Payer: MEDICARE

## 2025-02-05 PROBLEM — L89.620 UNSTAGEABLE PRESSURE ULCER OF LEFT HEEL: Chronic | Status: ACTIVE | Noted: 2025-01-27

## 2025-02-05 LAB
ALBUMIN SERPL-MCNC: 2.1 G/DL (ref 3.4–4.8)
ALBUMIN/GLOB SERPL: 0.6 RATIO (ref 1.1–2)
ALP SERPL-CCNC: 188 UNIT/L (ref 40–150)
ALT SERPL-CCNC: 17 UNIT/L (ref 0–55)
ANION GAP SERPL CALC-SCNC: 15 MEQ/L
AST SERPL-CCNC: 26 UNIT/L (ref 5–34)
BASOPHILS # BLD AUTO: 0.08 X10(3)/MCL
BASOPHILS NFR BLD AUTO: 1.1 %
BILIRUB SERPL-MCNC: 0.4 MG/DL
BUN SERPL-MCNC: 25.7 MG/DL (ref 9.8–20.1)
CALCIUM SERPL-MCNC: 8.5 MG/DL (ref 8.4–10.2)
CHLORIDE SERPL-SCNC: 105 MMOL/L (ref 98–107)
CO2 SERPL-SCNC: 18 MMOL/L (ref 23–31)
CREAT SERPL-MCNC: 6.33 MG/DL (ref 0.55–1.02)
CREAT/UREA NIT SERPL: 4
EOSINOPHIL # BLD AUTO: 0.07 X10(3)/MCL (ref 0–0.9)
EOSINOPHIL NFR BLD AUTO: 1 %
ERYTHROCYTE [DISTWIDTH] IN BLOOD BY AUTOMATED COUNT: 17 % (ref 11.5–17)
GFR SERPLBLD CREATININE-BSD FMLA CKD-EPI: 6 ML/MIN/1.73/M2
GLOBULIN SER-MCNC: 3.5 GM/DL (ref 2.4–3.5)
GLUCOSE SERPL-MCNC: 50 MG/DL (ref 82–115)
HCT VFR BLD AUTO: 34.6 % (ref 37–47)
HGB BLD-MCNC: 10.3 G/DL (ref 12–16)
IMM GRANULOCYTES # BLD AUTO: 0.03 X10(3)/MCL (ref 0–0.04)
IMM GRANULOCYTES NFR BLD AUTO: 0.4 %
LYMPHOCYTES # BLD AUTO: 1.34 X10(3)/MCL (ref 0.6–4.6)
LYMPHOCYTES NFR BLD AUTO: 19.2 %
MAGNESIUM SERPL-MCNC: 2.6 MG/DL (ref 1.6–2.6)
MCH RBC QN AUTO: 26.4 PG (ref 27–31)
MCHC RBC AUTO-ENTMCNC: 29.8 G/DL (ref 33–36)
MCV RBC AUTO: 88.7 FL (ref 80–94)
MONOCYTES # BLD AUTO: 0.57 X10(3)/MCL (ref 0.1–1.3)
MONOCYTES NFR BLD AUTO: 8.2 %
NEUTROPHILS # BLD AUTO: 4.9 X10(3)/MCL (ref 2.1–9.2)
NEUTROPHILS NFR BLD AUTO: 70.1 %
NRBC BLD AUTO-RTO: 0 %
PHOSPHATE SERPL-MCNC: 6.5 MG/DL (ref 2.3–4.7)
PLATELET # BLD AUTO: 379 X10(3)/MCL (ref 130–400)
PMV BLD AUTO: 9.3 FL (ref 7.4–10.4)
POTASSIUM SERPL-SCNC: 5 MMOL/L (ref 3.5–5.1)
PROT SERPL-MCNC: 5.6 GM/DL (ref 5.8–7.6)
RBC # BLD AUTO: 3.9 X10(6)/MCL (ref 4.2–5.4)
SODIUM SERPL-SCNC: 138 MMOL/L (ref 136–145)
WBC # BLD AUTO: 6.99 X10(3)/MCL (ref 4.5–11.5)

## 2025-02-05 PROCEDURE — 84100 ASSAY OF PHOSPHORUS: CPT

## 2025-02-05 PROCEDURE — 63600175 PHARM REV CODE 636 W HCPCS

## 2025-02-05 PROCEDURE — 80100016 HC MAINTENANCE HEMODIALYSIS

## 2025-02-05 PROCEDURE — 84439 ASSAY OF FREE THYROXINE: CPT

## 2025-02-05 PROCEDURE — 83735 ASSAY OF MAGNESIUM: CPT

## 2025-02-05 PROCEDURE — 21400001 HC TELEMETRY ROOM

## 2025-02-05 PROCEDURE — 5A1D70Z PERFORMANCE OF URINARY FILTRATION, INTERMITTENT, LESS THAN 6 HOURS PER DAY: ICD-10-PCS | Performed by: STUDENT IN AN ORGANIZED HEALTH CARE EDUCATION/TRAINING PROGRAM

## 2025-02-05 PROCEDURE — 96372 THER/PROPH/DIAG INJ SC/IM: CPT

## 2025-02-05 PROCEDURE — 85025 COMPLETE CBC W/AUTO DIFF WBC: CPT

## 2025-02-05 PROCEDURE — 25000003 PHARM REV CODE 250: Performed by: INTERNAL MEDICINE

## 2025-02-05 PROCEDURE — 11000001 HC ACUTE MED/SURG PRIVATE ROOM

## 2025-02-05 PROCEDURE — 36415 COLL VENOUS BLD VENIPUNCTURE: CPT

## 2025-02-05 PROCEDURE — 80053 COMPREHEN METABOLIC PANEL: CPT

## 2025-02-05 RX ORDER — ATORVASTATIN CALCIUM 40 MG/1
40 TABLET, FILM COATED ORAL NIGHTLY
Status: DISCONTINUED | OUTPATIENT
Start: 2025-02-05 | End: 2025-02-11 | Stop reason: HOSPADM

## 2025-02-05 RX ADMIN — HEPARIN SODIUM 5000 UNITS: 5000 INJECTION, SOLUTION INTRAVENOUS; SUBCUTANEOUS at 05:02

## 2025-02-05 RX ADMIN — LEUCINE, PHENYLALANINE, LYSINE, METHIONINE, ISOLEUCINE, VALINE, HISTIDINE, THREONINE, TRYPTOPHAN, ALANINE, GLYCINE, ARGININE, PROLINE, SERINE, TYROSINE, SODIUM ACETATE, DIBASIC POTASSIUM PHOSPHATE, MAGNESIUM CHLORIDE, SODIUM CHLORIDE, CALCIUM CHLORIDE, DEXTROSE
880; 489; 33; 5; 438; 204; 255; 311; 247; 51; 170; 238; 261; 289; 213; 297; 77; 179; 77; 17; 247 INJECTION INTRAVENOUS at 05:02

## 2025-02-05 RX ADMIN — HEPARIN SODIUM 5000 UNITS: 5000 INJECTION, SOLUTION INTRAVENOUS; SUBCUTANEOUS at 03:02

## 2025-02-05 RX ADMIN — HEPARIN SODIUM 5000 UNITS: 5000 INJECTION, SOLUTION INTRAVENOUS; SUBCUTANEOUS at 08:02

## 2025-02-05 NOTE — AI DETERIORATION ALERT
Artificial Intelligence Notification  [facility]  [address]  [place] [address]  Phone: [phone]    This documentation was triggered by an Artificial Intelligence Notification:    Admit Date: 2025   LOS: 0  Code Status: Full Code  : 1950  Age: 74 y.o.  Weight:   Wt Readings from Last 1 Encounters:   25 63.5 kg (140 lb)        Sex: female  Bed: [facility]  MRN: [id number]  Attending Physician: Jose Raul Nielsen MD     Date of Alert: 2025  Time AI Alert Received: 0515            Vitals:    25 0427   BP:    Pulse:    Resp:    Temp: 97 °F (36.1 °C)     SpO2: 99 %      Artificial Intelligence alert discussed with Provider:     Name: ALAINA Zafar   Date/Time of Provider Notification:       Patient Condition: Pt admitted for encephalopathy, hyperkalemia. Pt being treated for possible peritonitis, nephrology following.

## 2025-02-05 NOTE — PROCEDURES
"Malissa Schwartz is a 74 y.o. female patient.    Temp: 98.2 °F (36.8 °C) (02/04/25 2015)  Pulse: 91 (02/04/25 2015)  Resp: 18 (02/04/25 2015)  BP: (!) 169/78 (02/04/25 2015)  SpO2: 98 % (02/04/25 2015)  Weight: 63.5 kg (140 lb) (02/04/25 1056)  Height: 5' 2" (157.5 cm) (02/04/25 1056)    PICC  Date/Time: 2/4/2025 7:30 PM  Consent Done: Yes  Time out: Immediately prior to procedure a time out was called to verify the correct patient, procedure, equipment, support staff and site/side marked as required  Indications: vascular access  Anesthesia: local infiltration  Local anesthetic: lidocaine 1% without epinephrine  Anesthetic Total (mL): 3  Preparation: skin prepped with ChloraPrep  Skin prep agent dried: skin prep agent completely dried prior to procedure  Sterile barriers: all five maximum sterile barriers used - cap, mask, sterile gown, sterile gloves, and large sterile sheet  Hand hygiene: hand hygiene performed prior to central venous catheter insertion  Location details: right basilic  Catheter type: single lumen  Catheter size: 4 Fr  Catheter Length: 15cm    Ultrasound guidance: yes  Vessel Caliber: medium and patent, compressibility normal  Needle advanced into vessel with real time Ultrasound guidance.  Guidewire confirmed in vessel.  Image recorded and saved.  Sterile sheath used.  Number of attempts: 1  Post-procedure: blood return through all ports and sterile dressing applied    Complications: none          Name jitendra doss  2/4/2025    "

## 2025-02-05 NOTE — PROGRESS NOTES
[facility] Hospital Medicine Progress Note        A. History:  74 y.o. female who has a past medical history of HTN, HLD, deconditioning, humerus fracture with IM nail, NSTEMI, failed kidney transplant, ESRD-HD M/W/F previously on peritoneal dialysis, and recent cellulitis left lower extremity completed clindamycin treatment who presented to [facility] on 2/4/2025 with a primary complaint of altered mental status for the last 3-4 days according to the patient's , Brayden Schwartz at bedside.  The patient was admitted on 01/22/2025 for NSTEMI likely type 2, sepsis secondary to left lower extremity cellulitis, and ESRD on PD that was converted to hemodialysis prior to discharge.  During her hospitalization she was evaluated by consultants including wound care, Nephrology, Psychiatry, and Cardiology.  Heparin was discontinued and CIS recommended outpatient stress test (PET) in 2 weeks.  Right-sided tunneled dialysis catheter was placed on 01/28/2025 and she tolerated hemodialysis multiple times during her admission.  And progress note by Nephrology on 01/30 she was noted to be weak, confused, and not speaking much with poor intake.  Psychiatry recommended increasing the Lexapro which was done prior to her discharge.  Blood cultures with no growth to date.  She had dialysis on 02/03/2025 prior to being discharged to [facility].  The patient's , Mr. Brayden Schwartz at bedside states that she has not been herself since she was initiated on hemodialysis on 01/29/2025.  States prior to dialysis she was AAO x3, but since she was initiated on dialysis she has not been eating much, intermittently confused, and becomes quite agitated/combative.  On exam, she is protecting her airway, but minimally cooperative with examiner.  The patient will open her eyes to pain, she uses inappropriate/confused words, and localizes pain for GCS of 10.  She does not appear to have any acute focal  neurological deficits and appears to have possibly a metabolic encephalopathy.   denies any history of CVA or TIA.  ABG and ammonia level unremarkable.  Patient does not make any urine, so unable to obtain UA and UDS.  CT head without contrast showed no acute findings.  No further history available at this time.       ED course: Vital signs revealed the patient is afebrile and hemodynamically stable on room air.  Labs CBC showed normal WBC of 7, chronic normocytic anemia with a hemoglobin of 9, chemistry with elevated creatinine of 4.96 and BUN of 17, elevated potassium of 5.5, low albumin of 2, normal ammonia level of 33, elevated TSH of 6, normal lipid panel.  ABG with mild respiratory alkalosis.  CT head showed no acute intracranial findings.  Treated in the ED with Haldol and Benadryl.  Ordered Lokelma.     Ordered consultation to Nephrology and wound care nurse   Patient admitted for acute encephalopathy possibly metabolic versus ischemic in etiology        Today's information   Overnight events patient has not being tolerating her feeds she has been refusing medications and food she is nonverbal   Vitals reviewed and stable on room air   Labs reviewed in keeping with end-stage renal disease   Blood cultures negative at 24 hours   MRI of the brain reviewed shows an abnormality in the left corpus callosum.  Neurology has been consulted      Exam    Has gone for MRI brain      ASSESSMENT & PLAN:   Acute encephalopathy possibly metabolic versus ischemic etiologies   ESRD-HD M/W/F via right IJ tunneled dialysis catheter  Hyperkalemia, mild  History of sepsis secondary to cellulitis of left lower extremity completed clindamycin therapy  History of NSTEMI type 2 with CIS recommending outpatient stress test (PET scan )   Left heel stage 4 decubitus ulcers with eschar and right heel with stage II decubitus ulcer with blister.  Generalized weakness and debility   Right Humerus fracture with IM nail  (12/2024)  Deconditioning   Left lower extremity skin cancer awaiting treatment   History of colon polyp/lung nodule  History of peritoneal dialysis and failed kidney transplant   Essential hypertension   Dyslipidemia   Generalized anxiety disorder/major depressive disorder  Former smoker  No history of GI bleed  TTE (01/22/2025): EF 60-65%.  Moderate TR.     Plan  neuro checks every 4 hours and fall precautions  MRI of the brain reviewed shows an abnormality in the left corpus callosum.    Continue aspirin 81 mg daily, add on p.o. atorvastatin 40 mg q.h.s.  Consult Neurology for further input given abnormality in the left corpus callosum   Consult speech for evaluation   Consult palliative Care for goals of care   Discontinue Lexapro until mentation improves   Nephrology on board helping with hemodialysis   The patient would not be a thrombolytic candidate given her onset of 3-4 days of altered mental status   Unremarkable ABG, ammonia, and unable to get UA/UDS because she is aneuric.  No indication for CXR on this admission.  Prior CXR no acute findings.    Resume appropriate home medications supportive care as ordered --  Continue telemetry and pulse ox monitoring   wound care           VTE Prophylaxis: will be placed on Heparin for DVT prophylaxis and will be advised to be as mobile as possible and sit in a chair as tolerated     Patient condition:  Fair     Discharge Planning and Disposition: No mobility needs. Ambulating well. Good social support system.   Anticipated discharge     If patient was admitted under observational status it is with my approval/permission.        All diagnosis and differential diagnosis have been reviewed; assessment and plan has been documented; I have personally reviewed the labs and test results that are presently available; I have reviewed the patients medication list; I have reviewed the consulting providers response and recommendations. I have reviewed or attempted to review  medical records based upon their availability.    All of the patient and family questions have been addressed and answered. Patient's is agreeable to the above stated plan. I will continue to monitor closely and make adjustments to medical management as needed.     If patient was admitted under observational status it is with my approval/permission.         VITAL SIGNS: 24 HRS MIN & MAX LAST   Temp  Min: 97.7 °F (36.5 °C)  Max: 98.4 °F (36.9 °C) 98.4 °F (36.9 °C)   BP  Min: 102/63  Max: 116/68 110/71   Pulse  Min: 56  Max: 69  69   Resp  Min: 18  Max: 18 18   SpO2  Min: 93 %  Max: 97 % (!) 93 %     I have reviewed the following labs:  Recent Labs   Lab 08/12/24 0441 08/13/24  0433 08/14/24  0500   WBC 7.19 5.66 5.53   RBC 4.95 4.46* 4.39*   HGB 14.8 13.6* 13.4*   HCT 43.5 39.0* 38.4*   MCV 87.9 87.4 87.5   MCH 29.9 30.5 30.5   MCHC 34.0 34.9 34.9   RDW 12.8 12.7 12.7    206 200   MPV 9.0 9.0 9.4     Recent Labs   Lab 08/12/24 0441 08/13/24  0433 08/14/24  0500 08/15/24  0508   * 133* 132* 135*   K 3.5 3.4* 3.3* 4.1   CL 97* 101 98 101   CO2 24 22* 23 22*   BUN 20.0 19.6 16.7 15.4   CREATININE 1.05 1.00 0.99 0.92   CALCIUM 9.3 9.0 8.9 9.2   MG 2.60 2.10 1.80 2.00   ALBUMIN 3.6 3.4 3.3*  --    ALKPHOS 112 103 97  --    ALT 22 19 20  --    AST 24 20 25  --    BILITOT 0.8 0.7 0.7  --      Microbiology Results (last 7 days)       ** No results found for the last 168 hours. **          _____________________________________________________________________    Malnutrition Status:    Scheduled Med:   apixaban  5 mg Oral BID    aspirin  81 mg Oral Daily    atorvastatin  40 mg Oral QHS    diltiaZEM  360 mg Oral Daily    docusate sodium  50 mg Oral BID    ergocalciferol  50,000 Units Oral Q7 Days    guaiFENesin  600 mg Oral BID    insulin glargine U-100  15 Units Subcutaneous QHS    QUEtiapine  150 mg Oral Nightly      Continuous Infusions:     PRN Meds:    Current Facility-Administered Medications:      acetaminophen, 1,000 mg, Oral, Q6H PRN    aluminum-magnesium hydroxide-simethicone, 30 mL, Oral, QID PRN    bisacodyL, 10 mg, Rectal, Daily PRN    dextrose 10%, 12.5 g, Intravenous, PRN    dextrose 10%, 25 g, Intravenous, PRN    glucagon (human recombinant), 1 mg, Intramuscular, PRN    glucose, 16 g, Oral, PRN    glucose, 24 g, Oral, PRN    guaiFENesin 100 mg/5 ml, 200 mg, Oral, Q4H PRN    HYDROcodone-acetaminophen, 1 tablet, Oral, Q4H PRN    insulin aspart U-100, 1-10 Units, Subcutaneous, QID (AC + HS) PRN    melatonin, 6 mg, Oral, Nightly PRN    naloxone, 0.02 mg, Intravenous, PRN    ondansetron, 4 mg, Intravenous, Q4H PRN    prochlorperazine, 5 mg, Intravenous, Q6H PRN    senna-docusate 8.6-50 mg, 1 tablet, Oral, BID PRN    sodium chloride 0.9%, 10 mL, Intravenous, PRN     Radiology:  I have personally reviewed the following imaging and agree with the radiologist.     Cardiac catheterization  Procedure performed in the Invasive Lab    - See Procedure Log link below for nursing documentation    - See OpNote on Surgeries Tab for physician findings    - See Imaging Tab for radiologist dictation      Jose Raul Nielsen MD  Department of Hospital Medicine   [facility]   08/18/2024

## 2025-02-05 NOTE — PLAN OF CARE
Problem: Adult Inpatient Plan of Care  Goal: Plan of Care Review  Outcome: Progressing  Goal: Patient-Specific Goal (Individualized)  Outcome: Progressing  Goal: Absence of Hospital-Acquired Illness or Injury  Outcome: Progressing  Goal: Optimal Comfort and Wellbeing  Outcome: Progressing  Goal: Readiness for Transition of Care  Outcome: Progressing     Problem: Infection  Goal: Absence of Infection Signs and Symptoms  Outcome: Progressing     Problem: Wound  Goal: Optimal Coping  Outcome: Progressing  Goal: Optimal Functional Ability  Outcome: Progressing  Goal: Absence of Infection Signs and Symptoms  Outcome: Progressing  Goal: Improved Oral Intake  Outcome: Progressing  Goal: Optimal Pain Control and Function  Outcome: Progressing  Goal: Skin Health and Integrity  Outcome: Progressing  Goal: Optimal Wound Healing  Outcome: Progressing     Problem: Hemodialysis  Goal: Safe, Effective Therapy Delivery  Outcome: Progressing  Goal: Effective Tissue Perfusion  Outcome: Progressing  Goal: Absence of Infection Signs and Symptoms  Outcome: Progressing     Problem: Skin Injury Risk Increased  Goal: Skin Health and Integrity  Outcome: Progressing

## 2025-02-05 NOTE — CONSULTS
Inpatient Nutrition Assessment    Admit Date: 2/4/2025   Total duration of encounter: 1 day   Patient Age: 74 y.o.    Nutrition Recommendation/Prescription     Continue oral diet as tolerated; Diet Heart Healthy Fluid - 1500mL; Standard Tray   Novasource Renal provides 475 kcal, 22 gm protein per container   Assist with meals/feeding as needed.    Communication of Recommendations: reviewed with nurse    Nutrition Assessment     Malnutrition Assessment/Nutrition-Focused Physical Exam       Malnutrition Level: other (see comments) (Unable to assess) (02/05/25 1251)  Energy Intake (Malnutrition): less than or equal to 50% for greater than or equal to 5 days (02/05/25 1251)  Weight Loss (Malnutrition): other (see comments) (Does not meet criteria) (02/05/25 1251)                                         Fluid Accumulation (Malnutrition): other (see comments) (Unable to assess) (02/05/25 1251)        A minimum of two characteristics is recommended for diagnosis of either severe or non-severe malnutrition.    Chart Review    Reason Seen: physician consult for Pressure sores to heels    Malnutrition Screening Tool Results   Have you recently lost weight without trying?: Unsure  Have you been eating poorly because of a decreased appetite?: Yes   MST Score: 3   Diagnosis:  Acute encephalopathy possibly metabolic versus ischemic etiologies   ESRD-HD M/W/F via right IJ tunneled dialysis catheter  Hyperkalemia, mild  Left heel stage 4 decubitus ulcers with eschar and right heel with stage II decubitus ulcer with blister.  Generalized weakness and debility     Relevant Medical History: HTN, HLD, deconditioning, humerus fracture with IM nail, NSTEMI, failed kidney transplant, ESRD-HD M/W/F previously on peritoneal dialysis, and recent cellulitis left lower extremity     Scheduled Medications:  amLODIPine, 5 mg, Daily  aspirin, 81 mg, Daily  calcitRIOL, 0.25 mcg, Once per day on Monday Wednesday Friday  folic acid, 1 mg,  Daily  heparin (porcine), 5,000 Units, Q8H  pantoprazole, 40 mg, Daily    Continuous Infusions:   PRN Medications:  acetaminophen, 650 mg, Q4H PRN  acetaminophen, 650 mg, Q8H PRN  dextrose 50%, 12.5 g, PRN  dextrose 50%, 25 g, PRN  glucagon (human recombinant), 1 mg, PRN  glucose, 16 g, PRN  glucose, 24 g, PRN  melatonin, 6 mg, Nightly PRN  naloxone, 0.02 mg, PRN  ondansetron, 4 mg, Q8H PRN  sodium chloride 0.9%, 10 mL, Q12H PRN  sodium chloride 0.9%, 10 mL, Q12H PRN    Calorie Containing IV Medications: no significant kcals from medications at this time    Recent Labs   Lab 01/30/25  0608 02/02/25  0648 02/03/25  0621 02/04/25  0530 02/04/25  1348 02/05/25  0355   * 136 137 138  --  138   K 4.7 4.8 5.2* 5.5*  --  5.0   CALCIUM 8.5 8.3* 8.7 8.8  --  8.5   PHOS  --  4.8* 5.3*  --   --  6.5*   MG  --   --   --   --   --  2.60    106 107 105  --  105   CO2 21* 20* 19* 22*  --  18*   BUN 25.3* 24.7* 37.0* 17.6  --  25.7*   CREATININE 4.97* 6.45* 8.00* 4.96*  --  6.33*   EGFRNORACEVR 9 6 5 9  --  6   GLUCOSE 82 78* 87 54*  --  50*   BILITOT  --  0.3 0.3 0.4  --  0.4   ALKPHOS  --  192* 220* 183*  --  188*   ALT  --  22 22 20  --  17   AST  --  32 27 24  --  26   ALBUMIN  --  1.7* 2.0* 2.0*  --  2.1*   TRIG  --   --   --  102  --   --    AMMONIA  --   --   --   --  33.4  --    WBC 5.73 6.40 8.02 7.46  --  6.99   HGB 10.4* 9.8* 10.2* 9.8*  --  10.3*   HCT 33.4* 31.9* 33.3* 33.0*  --  34.6*     Nutrition Orders:  Diet Heart Healthy Fluid - 1500mL; Standard Tray  Dietary nutrition supplements All Meals; Novasource Renal - [employer]    Appetite/Oral Intake: poor/0-25% of meals  Factors Affecting Nutritional Intake: impaired cognitive status/motor control and decreased appetite  Social Needs Impacting Access to Food: unable to assess at this time; will attempt on follow-up  Food/Congregational/Cultural Preferences: unable to obtain  Food Allergies: no known food allergies  Last Bowel Movement: 02/04/25  Wound(s):      "Wound 02/02/25 1200 Other (comment) Left distal;anterior Arm-Tissue loss description: Not applicable       Wound 10/05/24 1717 Pressure Injury Left Heel #4-Tissue loss description: Full thickness     Comments    2/5/25 Pt's  reports pt has not been eating much since pt started on HD 1/29/25, previously on PD, had been discharged from the hospital a couple days ago and has had further decline overall.     Anthropometrics    Height: 5' 2.01" (157.5 cm), Height Method: Estimated  Last Weight: 63.5 kg (140 lb) (02/04/25 1056), Weight Method: Bed Scale  BMI (Calculated): 25.6  BMI Classification: overweight (BMI 25-29.9)     Ideal Body Weight (IBW), Female: 110.05 lb     % Ideal Body Weight, Female (lb): 127.27 %                             Usual Weight Provided By: EMR weight history and unable to obtain usual weight    Wt Readings from Last 5 Encounters:   02/04/25 63.5 kg (140 lb)   02/02/25 64 kg (141 lb 1.6 oz)   12/23/24 63 kg (138 lb 14.2 oz)   12/09/24 63.5 kg (140 lb)   12/02/24 59 kg (130 lb)     Weight Change(s) Since Admission:   Wt Readings from Last 1 Encounters:   02/04/25 1056 63.5 kg (140 lb)   Admit Weight: 63.5 kg (140 lb) (02/04/25 1056), Weight Method: Estimated    Estimated Needs    Weight Used For Calorie Calculations: 63.5 kg (139 lb 15.9 oz)  Energy Calorie Requirements (kcal): 6818-5365 kcal (25-30 kcal/kg)  Energy Need Method: Kcal/kg  Weight Used For Protein Calculations: 63.5 kg (139 lb 15.9 oz)  Protein Requirements: 76gm (1.2 gm/kg)  Fluid Requirements (mL): 1,000 ml + output (renal on HD)        Enteral Nutrition     Patient not receiving enteral nutrition at this time.    Parenteral Nutrition     Patient not receiving parenteral nutrition support at this time.    Evaluation of Received Nutrient Intake    Calories: not meeting estimated needs  Protein: not meeting estimated needs    Patient Education     Not applicable.    Nutrition Diagnosis     PES: Inadequate oral intake related " to acute illness as evidenced by <50% intake of meals for >/= 7 days. (new)     PES:            Nutrition Interventions     Intervention(s): commercial beverage and collaboration with other providers  Intervention(s):      Goal: Meet greater than 80% of nutritional needs by follow-up. (new)  Goal: Consume % of meals/snacks by follow-up. (new)    Nutrition Goals & Monitoring     Dietitian will monitor: food and beverage intake and weight change  Discharge planning: resume home regimen  Nutrition Risk/Follow-Up: high (follow-up in 1-4 days)   Please consult if re-assessment needed sooner.

## 2025-02-05 NOTE — CONSULTS
[facility]  Wound Care  Consult Note    Patient Name: Malissa Schwartz  MRN: [id number]  Admission Date: 2/4/2025  Hospital Length of Stay: 0 days  Attending Physician: Jose Raul Nielsen MD  Primary Care Provider: Sergio Crabtree MD     Inpatient consult to Wound Care Physician  Consult performed by: Amarilis Oneil FNP  Consult ordered by: Jose Raul Nielsen MD        Subjective:     History of Present Illness:  Wound medicine consult    The patient is a 74 year old female with a multitude of medical problems including HTN, HLD, failed kidney transplant, ESRD on PD, deconditioning with recent humerus fracture s/p IM nail (12/2024).  She was recently discharged from this facility on 2/3/25 after a 13 day admission with NSTEMI, LLE cellulitis, missed PD and deconditioning. She was converted to hemodialysis prior to discharge so that she could go to rehab, she was discharged to [facility] for SNF on 2/3/25. She returned form NH on 2/4/25 with AMS, combative towards staff and family and not speaking. Of note records reveal she had flat affect and not speaking prior to discharge and was evaluated by Psychiatry.  She has been admitted for workup of acute encephalopathy; CT head without acute acute intracranial findings; MRI brain ordered, PD fluid culture in process.   Wound medicine consulted upon admission for continuity of care, we saw this patient during recent prior encounter. She presents with bilateral heel pressure injuries as well as multiple areas of skin cancer, awaiting treatment, followed by dermatology outpatient.     2/5/25 - initial evaluation for this encounter done today. Met the patient in dialysis bay 3. She is awake with eyes open, she does not respond to questioning but does track me around the room. She does become agitated with removal of bilateral lower extremity dressings, crossing and moving legs around.   She is on a low air loss bed with bilateral heel boots  in place as well as sequential compression devices on BLE. She is in no apparent distress.   I was unable to complete full skin assessment because patient currently on HD, will plan for follow up visit.           Scheduled Meds:   amLODIPine  5 mg Oral Daily    aspirin  81 mg Oral Daily    calcitRIOL  0.25 mcg Oral Once per day on Monday Wednesday Friday    folic acid  1 mg Oral Daily    heparin (porcine)  5,000 Units Subcutaneous Q8H    pantoprazole  40 mg Oral Daily     Continuous Infusions:  PRN Meds:  Current Facility-Administered Medications:     acetaminophen, 650 mg, Oral, Q4H PRN    acetaminophen, 650 mg, Oral, Q8H PRN    dextrose 50%, 12.5 g, Intravenous, PRN    dextrose 50%, 25 g, Intravenous, PRN    glucagon (human recombinant), 1 mg, Intramuscular, PRN    glucose, 16 g, Oral, PRN    glucose, 24 g, Oral, PRN    melatonin, 6 mg, Oral, Nightly PRN    naloxone, 0.02 mg, Intravenous, PRN    ondansetron, 4 mg, Intravenous, Q8H PRN    sodium chloride 0.9%, 10 mL, Intravenous, Q12H PRN    Flushing PICC/Midline Protocol, , , Until Discontinued **AND** sodium chloride 0.9%, 10 mL, Intravenous, Q12H PRN    Review of patient's allergies indicates:   Allergen Reactions    Codeine Palpitations and Rash    Cefepime      Other Reaction(s): Encephalopathy    Renal impairment increased risk for this reaction    Levofloxacin      Other Reaction(s): Encephalopathy    Renal pt who had some encephalopathy while on Levaquin & Cefepime, neurology physicians think they may have been due to the antibiotics (both possible side effects for each)    Iodine Nausea Only        Past Medical History:   Diagnosis Date    Acute rejection of kidney transplant 9/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 Glomerulitis with one activated endothelial cell: treated with Solumedrol pulse 375 mg IVBP x 3 doses with short prednisone taper; thymoglobin 100 mg x 6 doses 10/04/2007 minimal histologic changes no acute rejection      Allergy     Anemia of renal disease     CKD (chronic kidney disease), stage IV     CMV (cytomegalovirus) status positive     Colon polyp     benign    Depression     GN (glomerulonephritis)     GN (glomerulonephritis)     unknown type    Herpes infection, other 2009    on buttocks    Hyperlipidemia     Hypertension     Immunosuppression     Living-donor kidney transplant recipient 01/15/2007    Meningitis     history of viral meningitis    Proteinuria 1/5/2015    Proteinuria     Pulmonary nodules     Renal hypertension     S/P breast augmentation     Skin cancer     Urinary tract infection      Past Surgical History:   Procedure Laterality Date    COLON SURGERY      COLONOSCOPY W/ BIOPSIES      cosemetic surgery      facelift and breast augmentation     HYSTERECTOMY      INSERTION OF TUNNELED CENTRAL VENOUS HEMODIALYSIS CATHETER N/A 1/28/2025    Procedure: Insertion, Catheter, Central Venous, Hemodialysis;  Surgeon: Apollo Jacob DO;  Location: [facility] CATH LAB;  Service: Nephrology;  Laterality: N/A;    INTRAMEDULLARY RODDING OF HUMERUS Right 12/2/2024    Procedure: INSERTION, INTRAMEDULLARY MONTSERRAT, HUMERUS;  Surgeon: Samuel Dow DO;  Location: [facility] OR;  Service: Orthopedics;  Laterality: Right;  supine vacular backward c arm synthes  shoulder scope drape    KIDNEY TRANSPLANT      NEPHRECTOMY Left     9/2016    RENAL BIOPSY      TONSILLECTOMY, ADENOIDECTOMY         Family History       Problem Relation (Age of Onset)    Heart failure Mother, Father    Hypertension Brother    Kidney disease Sister, Brother          Tobacco Use    Smoking status: Former     Current packs/day: 1.00     Average packs/day: 1 pack/day for 15.0 years (15.0 ttl pk-yrs)     Types: Cigarettes    Smokeless tobacco: Never    Tobacco comments:     quit in 2014; smoked for 15 years and at the most smoked 1 ppd   Substance and Sexual Activity    Alcohol use: Yes     Comment: seldom    Drug use: No    Sexual activity: Never     Review of Systems    Unable to perform ROS: Patient nonverbal       Objective:     Vital Signs (Most Recent):  Temp: 96.8 °F (36 °C) (02/05/25 0741)  Pulse: 92 (02/05/25 0741)  Resp: 20 (02/05/25 0741)  BP: (!) 159/88 (02/05/25 0741)  SpO2: (!) 93 % (02/05/25 0741) Vital Signs (24h Range):  Temp:  [96.8 °F (36 °C)-98.7 °F (37.1 °C)] 96.8 °F (36 °C)  Pulse:  [] 92  Resp:  [18-20] 20  SpO2:  [93 %-100 %] 93 %  BP: (142-169)/(76-95) 159/88     Weight: 63.5 kg (140 lb)  Body mass index is 25.61 kg/m².     Physical Exam  Vitals reviewed.   Constitutional:       General: She is awake. She is not in acute distress.     Appearance: She is normal weight. She is ill-appearing (chronic).      Comments: Appears older than stated age; thin with diffuse muscle wasting    HENT:      Head: Normocephalic and atraumatic.      Nose: Nose normal.   Cardiovascular:      Rate and Rhythm: Normal rate and regular rhythm.      Pulses: Normal pulses.   Pulmonary:      Effort: Pulmonary effort is normal. No respiratory distress.   Musculoskeletal:        Legs:         Feet:    Skin:     General: Skin is warm and dry.      Capillary Refill: Capillary refill takes less than 2 seconds.   Neurological:      General: No focal deficit present.      Mental Status: She is alert.      Comments: Non-verbal   Responds to touch with pulling away and crossing legs; seems agitated   She does track me around the room and looks at me when I call her name.        Left heel: 3 x 2.5 cm       Right heel: 4 x 3.5 cm                Laboratory:    ABGs:   Recent Labs   Lab 02/04/25  1334   PH 7.460*   PCO2 30.0*   HCO3 21.3*       BMP:   Recent Labs   Lab 02/05/25  0355      K 5.0      CO2 18*   BUN 25.7*   CREATININE 6.33*   CALCIUM 8.5   MG 2.60       CBC:   Recent Labs   Lab 02/05/25  0355   WBC 6.99   RBC 3.90*   HGB 10.3*   HCT 34.6*      MCV 88.7   MCH 26.4*   MCHC 29.8*     CMP:   Recent Labs   Lab 02/05/25  0355   CALCIUM 8.5   ALBUMIN 2.1*       K 5.0   CO2 18*      BUN 25.7*   CREATININE 6.33*   ALKPHOS 188*   ALT 17   AST 26   BILITOT 0.4       LFTs:   Recent Labs   Lab 02/05/25  0355   ALT 17   AST 26   ALKPHOS 188*   BILITOT 0.4   ALBUMIN 2.1*       Microbiology Results (last 7 days)       Procedure Component Value Units Date/Time    Body Fluid Culture [[id number]] Collected: 02/04/25 1707    Order Status: Completed Specimen: Peritoneal Fluid Updated: 02/05/25 0645     Body Fluid Culture No Growth At 24 Hours    Gram Stain [[id number]] Collected: 02/04/25 1707    Order Status: Completed Specimen: Peritoneal Fluid Updated: 02/04/25 1935     GRAM STAIN Moderate WBC observed      No bacteria seen            Diagnostic Results:  I have reviewed all pertinent imaging results/findings within the past 24 hours.    Physical Exam  Assessment/Plan:       Right heel DTI and Left heel unstageable pressure injury - present on admission. May need wound follow up once discharged from rehab.   Acute encephalopathy   ESRD, on peritoneal dialysis at home; currently on HD during hospitalization and SNF stay.   Deconditioned; currently non-verbal, agitated and combative  Recent right humerus fracture s/p IM nailing - December 2024  Left lower extremity skin cancer awaiting treatment              Chart reviewed, patient examined and wounds assessed.     Plan of Care:     [x]   Wound was assessed. Familiar with patient from recent admission. Bilateral heel pressure injuries. Left heel covered with black eschar with soft edges. No infection. Will resume betadine daily and plan to debride gradually as edges begin to lift. If signs of infection develop will need surgical or podiatry consultation for surgical debridement.     The right heel still has intact blood blister, we will continue betadine and monitor as it evolves.      [x]   Patient tolerated well.     [x]   Wound Care Orders: Left heel and Right heel: paint with betadine generously then cover with gauze and  ABD pad and secure with whole roll of Kerlix - daily  Left anterior lower leg - resolved   Monitor for any signs of infection and deterioration: Watch for increased drainage or pain, fevers, chills, swelling.     [x]   Offloading: Must offload the wound to potentiate wound healing: has REED mattress, turning q 2 hrs; use of wedges and heel offloading devices to be used at all times while in bed; ( [employer]). This needs to be reinforced by every staff nurse caring for patient on every shift of every day. May still use PT/OT services.  Must offload heels at all times while in bed or chair; ok for PT/OT and intermittent standing/ambulating.   Discussed need to use offloading boots as well as using pillows to float heels/feet.      [x]   Nutrition: Must have a high protein diet to support wound healing (if renal disease, see nephrologist for amount allowed). This should be over 100 grams protein / day (if no renal issues). Also recommend MVI along with vit C, vit D, zinc and Buddy. Prealbumin on 1/27/25 - 14.8. Follow RD  recommendations      [x]   Discussed plan of care with nurse caring for patient today.      The time spent including preparing to see the patient, obtaining patient history and assessment, evaluation of the plan of care, patient/caregiver counseling and education, orders, documentation, coordination of care, and other professional medical management activities for today's encounter was 75 minute.             Thank you for your consult. I will follow-up with patient. Please contact us if you have any additional questions.    Amarilis Oneil, FNP  Wound Care  [facility]

## 2025-02-05 NOTE — NURSING
EMR reviewed , noting consult to wound care MD, and note from wound care FNP from this am visit. Staff reports patient resting on a low air loss bed with pressure injury prevention measures in place and is currently undergoing dialysis. Will defer further assessment and recommendations to wound care MD.

## 2025-02-05 NOTE — PLAN OF CARE
Problem: Adult Inpatient Plan of Care  Goal: Plan of Care Review  Outcome: Progressing  Goal: Patient-Specific Goal (Individualized)  Outcome: Progressing  Goal: Absence of Hospital-Acquired Illness or Injury  Outcome: Progressing  Intervention: Prevent Skin Injury  Flowsheets (Taken 2/4/2025 1848)  Body Position:   left   turned  Skin Protection: incontinence pads utilized  Device Skin Pressure Protection: positioning supports utilized  Goal: Optimal Comfort and Wellbeing  Outcome: Progressing  Goal: Readiness for Transition of Care  Outcome: Progressing     Problem: Infection  Goal: Absence of Infection Signs and Symptoms  Outcome: Progressing     Problem: Wound  Goal: Optimal Coping  Outcome: Progressing  Goal: Optimal Functional Ability  Outcome: Progressing  Goal: Absence of Infection Signs and Symptoms  Outcome: Progressing  Goal: Improved Oral Intake  Outcome: Progressing  Goal: Optimal Pain Control and Function  Outcome: Progressing  Goal: Skin Health and Integrity  Outcome: Progressing  Goal: Optimal Wound Healing  Outcome: Progressing     Problem: Hemodialysis  Goal: Safe, Effective Therapy Delivery  Outcome: Progressing  Goal: Effective Tissue Perfusion  Outcome: Progressing  Goal: Absence of Infection Signs and Symptoms  Outcome: Progressing     Problem: Skin Injury Risk Increased  Goal: Skin Health and Integrity  Outcome: Progressing  Intervention: Optimize Skin Protection  Flowsheets (Taken 2/4/2025 1268)  Pressure Reduction Techniques:   frequent weight shift encouraged   heels elevated off bed   positioned off wounds   pressure points protected  Pressure Reduction Devices: foam padding utilized  Skin Protection: incontinence pads utilized  Activity Management: Rolling - L1

## 2025-02-05 NOTE — PROGRESS NOTES
Nephrology  Note    Patient Name: Malissa Schwartz  Age: 74 y.o.  : 1950  MRN: [id number]  Admission Date: 2025        Malissa Schwartz is a 74 y.o. female who presents with persistent encephalopathy.  Past medical history significant for ESRD, hypertension, hyperlipidemia, deconditioning, it is to be, humerus fracture, failed renal transplant, and recent left lower extremity cellulitis.  Patient was discharge after hospitalization yesterday 2025 from [facility] to a skilled nursing facility.  During her previous hospital stay she was transitioned from peritoneal dialysis to hemodialysis in order to get her to a skilled nursing facility after admission.  Patient was noted to be we can confused prior to hospital admission.  Her  states that nothing changed since she was discharge from the hospital, but she has not improved.  She remains confused, combative, and not verbally responsive to questioning.  Nephrology was consulted for ESRD management.  Patient did dialyze on 2025 prior to hospital discharge.  Patient is barely arousable to sternal rub, and not answering any questions at time of my examination.    2025   Currently tolerating dialysis.  Remains nonverbal.  No respiratory distress.  Awake and alert.      Current Facility-Administered Medications   Medication Dose Route Frequency Provider Last Rate Last Admin    acetaminophen tablet 650 mg  650 mg Oral Q4H PRN Myke Butler PA        acetaminophen tablet 650 mg  650 mg Oral Q8H PRN Myke Butler PA        amLODIPine tablet 5 mg  5 mg Oral Daily Myke Butler PA        aspirin EC tablet 81 mg  81 mg Oral Daily Myke Butler PA        calcitRIOL capsule 0.25 mcg  0.25 mcg Oral Once per day on  Myke Butler PA        dextrose 50% injection 12.5 g  12.5 g Intravenous PRN Myke Butler PA        dextrose 50% injection 25 g  25 g Intravenous PRN Myke Butler PA         EScitalopram oxalate tablet 20 mg  20 mg Oral Daily Myke Butler PA        folic acid tablet 1 mg  1 mg Oral Daily Myke Butler PA        glucagon (human recombinant) injection 1 mg  1 mg Intramuscular PRN Myke Butler PA        glucose chewable tablet 16 g  16 g Oral PRN Myke Butler PA        glucose chewable tablet 24 g  24 g Oral PRN Myke Butler PA        heparin (porcine) injection 5,000 Units  5,000 Units Subcutaneous Q8H Myke Butler PA   5,000 Units at 02/05/25 0504    melatonin tablet 6 mg  6 mg Oral Nightly PRN Myke Butler PA        naloxone 0.4 mg/mL injection 0.02 mg  0.02 mg Intravenous PRN Myke Butler PA        ondansetron injection 4 mg  4 mg Intravenous Q8H PRN Myke Butler PA        pantoprazole EC tablet 40 mg  40 mg Oral Daily Myke Butler PA        sodium chloride 0.9% flush 10 mL  10 mL Intravenous Q12H PRN Myke Butler PA        sodium chloride 0.9% flush 10 mL  10 mL Intravenous Q12H PRN Jose Raul Nielsen MD        sodium zirconium cyclosilicate packet 10 g  10 g Oral Once Myke Butler PA Griffith, Patrick W., MD    Past Medical History:   Diagnosis Date    Acute rejection of kidney transplant 9/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 Glomerulitis with one activated endothelial cell: treated with Solumedrol pulse 375 mg IVBP x 3 doses with short prednisone taper; thymoglobin 100 mg x 6 doses 10/04/2007 minimal histologic changes no acute rejection     Allergy     Anemia of renal disease     CKD (chronic kidney disease), stage IV     CMV (cytomegalovirus) status positive     Colon polyp     benign    Depression     GN (glomerulonephritis)     GN (glomerulonephritis)     unknown type    Herpes infection, other 2009    on buttocks    Hyperlipidemia     Hypertension     Immunosuppression     Living-donor kidney transplant recipient 01/15/2007    Meningitis     history of viral meningitis     Proteinuria 1/5/2015    Proteinuria     Pulmonary nodules     Renal hypertension     S/P breast augmentation     Skin cancer     Urinary tract infection       Past Surgical History:   Procedure Laterality Date    COLON SURGERY      COLONOSCOPY W/ BIOPSIES      cosemetic surgery      facelift and breast augmentation     HYSTERECTOMY      INSERTION OF TUNNELED CENTRAL VENOUS HEMODIALYSIS CATHETER N/A 1/28/2025    Procedure: Insertion, Catheter, Central Venous, Hemodialysis;  Surgeon: Apollo Jacob DO;  Location: [facility] CATH LAB;  Service: Nephrology;  Laterality: N/A;    INTRAMEDULLARY RODDING OF HUMERUS Right 12/2/2024    Procedure: INSERTION, INTRAMEDULLARY MONTSERRAT, HUMERUS;  Surgeon: Samuel Dow DO;  Location: [facility] OR;  Service: Orthopedics;  Laterality: Right;  supine vacular backward c arm synthes  shoulder scope drape    KIDNEY TRANSPLANT      NEPHRECTOMY Left     9/2016    RENAL BIOPSY      TONSILLECTOMY, ADENOIDECTOMY        Family History   Problem Relation Name Age of Onset    Kidney disease Sister          ESRD    Kidney disease Brother          CKD from HTN    Hypertension Brother      Heart failure Mother      Heart failure Father      Diabetes Neg Hx      Stroke Neg Hx       Social History     Tobacco Use    Smoking status: Former     Current packs/day: 1.00     Average packs/day: 1 pack/day for 15.0 years (15.0 ttl pk-yrs)     Types: Cigarettes    Smokeless tobacco: Never    Tobacco comments:     quit in 2014; smoked for 15 years and at the most smoked 1 ppd   Substance Use Topics    Alcohol use: Yes     Comment: seldom     Medications Prior to Admission   Medication Sig Dispense Refill Last Dose/Taking    amLODIPine (NORVASC) 10 MG tablet Take 5 mg by mouth once daily.   Taking    aspirin (ECOTRIN) 81 MG EC tablet Take 81 mg by mouth once daily.   Taking    B complex-vitamin C-folic acid (NEPHRO-JENN) 0.8 mg Tab Take 0.8 mg by mouth Daily.   Taking    calcitRIOL (ROCALTROL) 0.25 MCG Cap Take 0.25 mcg by  mouth 3 (three) times a week.   Taking    D5W PgBk 50 mL with ceFAZolin 1 gram SolR Cefazolin intraperitoneally times 10 more days   Taking    EScitalopram oxalate (LEXAPRO) 20 MG tablet Take 1 tablet (20 mg total) by mouth once daily. 90 tablet 3 Taking    folic acid (FOLVITE) 1 MG tablet Take 1 mg by mouth once daily.   Taking    megestroL (MEGACE) 400 mg/10 mL (10 mL) Susp Take 10 mLs (400 mg total) by mouth once daily. 300 mL 0 Taking    methocarbamoL (ROBAXIN) 500 MG Tab Take 500 mg by mouth 4 (four) times daily.   Taking    pantoprazole (PROTONIX) 40 MG tablet Take 40 mg by mouth once daily.   Taking    potassium chloride (KLOR-CON) 10 MEQ TbSR Take 10 mEq by mouth once daily.   Taking    traMADoL (ULTRAM) 50 mg tablet Take 50 mg by mouth 3 (three) times daily as needed for Pain.   Taking As Needed     Review of patient's allergies indicates:   Allergen Reactions    Codeine Palpitations and Rash    Cefepime      Other Reaction(s): Encephalopathy    Renal impairment increased risk for this reaction    Levofloxacin      Other Reaction(s): Encephalopathy    Renal pt who had some encephalopathy while on Levaquin & Cefepime, neurology physicians think they may have been due to the antibiotics (both possible side effects for each)    Iodine Nausea Only            Review of Systems:     Unable to obtain ROS due to mental status.      Objective:       VITAL SIGNS: 24 HR MIN & MAX LAST    Temp  Min: 96.8 °F (36 °C)  Max: 98.7 °F (37.1 °C)  96.8 °F (36 °C)        BP  Min: 142/77  Max: 169/78  (!) 159/88     Pulse  Min: 86  Max: 106  92     Resp  Min: 18  Max: 20  20    SpO2  Min: 93 %  Max: 100 %  (!) 93 %      GEN:  Moderately developed and nourished.  Nonverbal.  Awake alert looks all around but does not answer any questions.  CV: RRR +S1,S2 without murmur  PULM: CTAB, unlabored  ABD: Soft, NT/ND abdomen with NABS  EXT:  1+ dependent edema  SKIN: Warm and dry  PSYCH:  Awake alert comfortable.  Moves her upper  extremities and lower extremity to some degree.  Nonverbal.  Dialysis access:  Right IJ PermCath, PD catheter            Component Value Date/Time     02/05/2025 0355     02/04/2025 0530     06/20/2018 1402     01/08/2018 0835    K 5.0 02/05/2025 0355    K 5.5 (H) 02/04/2025 0530    K 4.8 06/20/2018 1402    K 4.6 01/08/2018 0835    CO2 18 (L) 02/05/2025 0355    CO2 22 (L) 02/04/2025 0530    CO2 23 06/20/2018 1402    CO2 22 (L) 01/08/2018 0835    BUN 25.7 (H) 02/05/2025 0355    BUN 17.6 02/04/2025 0530    BUN 30 (H) 06/20/2018 1402    BUN 24 (H) 01/08/2018 0835    CREATININE 6.33 (H) 02/05/2025 0355    CREATININE 4.96 (H) 02/04/2025 0530    CREATININE 1.9 (H) 06/20/2018 1402    CREATININE 1.8 (H) 01/08/2018 0835    CALCIUM 8.5 02/05/2025 0355    CALCIUM 8.8 02/04/2025 0530    CALCIUM 9.7 06/20/2018 1402    CALCIUM 9.1 01/08/2018 0835    PHOS 6.5 (H) 02/05/2025 0355    PHOS 3.8 06/20/2018 1402            Component Value Date/Time    WBC 6.99 02/05/2025 0355    WBC 7.46 02/04/2025 0530    WBC 4.26 (L) 07/04/2024 0000    WBC 6.90 06/20/2018 1402    WBC 3.37 (L) 01/08/2018 0835    HGB 10.3 (L) 02/05/2025 0355    HGB 9.8 (L) 02/04/2025 0530    HGB 9 (L) 12/18/2024 0000    HGB 10.2 (L) 11/26/2024 0000    HGB 10.5 (L) 06/20/2018 1402    HGB 9.1 (L) 01/08/2018 0835    HCT 34.6 (L) 02/05/2025 0355    HCT 33.0 (L) 02/04/2025 0530    HCT 29.8 (L) 12/18/2024 0000    HCT 31.8 (L) 11/26/2024 0000    HCT 32.0 (L) 06/05/2020 0815    HCT 31.6 (L) 06/20/2018 1402    HCT 27.3 (L) 01/08/2018 0835     02/05/2025 0355     02/04/2025 0530     06/20/2018 1402     01/08/2018 0835             CT Head Without Contrast   Final Result      1.  No acute intracranial findings identified.      2.  Marked chronic microangiopathic ischemia and age appropriate atrophy.         Electronically signed by: Yogesh Wolf   Date:    02/04/2025   Time:    13:33      MRI Brain Without Contrast    (Results  Pending)       Assessment / Plan:       ESRD - previously on peritoneal dialysis, recently transitioned to hemodialysis for skilled nursing placement.  Has been both hemodialysis, and peritoneal dialysis access.  Acute encephalopathy  Hyperkalemia    Plan:  Will provide dialysis on Monday Wednesday Friday  May need feeding tube for nutrition  Continue other management to find out the cause of her encephalopathy

## 2025-02-05 NOTE — SUBJECTIVE & OBJECTIVE
Scheduled Meds:   amLODIPine  5 mg Oral Daily    aspirin  81 mg Oral Daily    calcitRIOL  0.25 mcg Oral Once per day on Monday Wednesday Friday    folic acid  1 mg Oral Daily    heparin (porcine)  5,000 Units Subcutaneous Q8H    pantoprazole  40 mg Oral Daily     Continuous Infusions:  PRN Meds:  Current Facility-Administered Medications:     acetaminophen, 650 mg, Oral, Q4H PRN    acetaminophen, 650 mg, Oral, Q8H PRN    dextrose 50%, 12.5 g, Intravenous, PRN    dextrose 50%, 25 g, Intravenous, PRN    glucagon (human recombinant), 1 mg, Intramuscular, PRN    glucose, 16 g, Oral, PRN    glucose, 24 g, Oral, PRN    melatonin, 6 mg, Oral, Nightly PRN    naloxone, 0.02 mg, Intravenous, PRN    ondansetron, 4 mg, Intravenous, Q8H PRN    sodium chloride 0.9%, 10 mL, Intravenous, Q12H PRN    Flushing PICC/Midline Protocol, , , Until Discontinued **AND** sodium chloride 0.9%, 10 mL, Intravenous, Q12H PRN    Review of patient's allergies indicates:   Allergen Reactions    Codeine Palpitations and Rash    Cefepime      Other Reaction(s): Encephalopathy    Renal impairment increased risk for this reaction    Levofloxacin      Other Reaction(s): Encephalopathy    Renal pt who had some encephalopathy while on Levaquin & Cefepime, neurology physicians think they may have been due to the antibiotics (both possible side effects for each)    Iodine Nausea Only        Past Medical History:   Diagnosis Date    Acute rejection of kidney transplant 9/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 Glomerulitis with one activated endothelial cell: treated with Solumedrol pulse 375 mg IVBP x 3 doses with short prednisone taper; thymoglobin 100 mg x 6 doses 10/04/2007 minimal histologic changes no acute rejection     Allergy     Anemia of renal disease     CKD (chronic kidney disease), stage IV     CMV (cytomegalovirus) status positive     Colon polyp     benign    Depression     GN (glomerulonephritis)     GN (glomerulonephritis)      unknown type    Herpes infection, other 2009    on buttocks    Hyperlipidemia     Hypertension     Immunosuppression     Living-donor kidney transplant recipient 01/15/2007    Meningitis     history of viral meningitis    Proteinuria 1/5/2015    Proteinuria     Pulmonary nodules     Renal hypertension     S/P breast augmentation     Skin cancer     Urinary tract infection      Past Surgical History:   Procedure Laterality Date    COLON SURGERY      COLONOSCOPY W/ BIOPSIES      cosemetic surgery      facelift and breast augmentation     HYSTERECTOMY      INSERTION OF TUNNELED CENTRAL VENOUS HEMODIALYSIS CATHETER N/A 1/28/2025    Procedure: Insertion, Catheter, Central Venous, Hemodialysis;  Surgeon: Apollo Jacob DO;  Location: [facility] CATH LAB;  Service: Nephrology;  Laterality: N/A;    INTRAMEDULLARY RODDING OF HUMERUS Right 12/2/2024    Procedure: INSERTION, INTRAMEDULLARY MONTSERRAT, HUMERUS;  Surgeon: Samuel Dow DO;  Location: [facility] OR;  Service: Orthopedics;  Laterality: Right;  supine vacular backward c arm synthes  shoulder scope drape    KIDNEY TRANSPLANT      NEPHRECTOMY Left     9/2016    RENAL BIOPSY      TONSILLECTOMY, ADENOIDECTOMY         Family History       Problem Relation (Age of Onset)    Heart failure Mother, Father    Hypertension Brother    Kidney disease Sister, Brother          Tobacco Use    Smoking status: Former     Current packs/day: 1.00     Average packs/day: 1 pack/day for 15.0 years (15.0 ttl pk-yrs)     Types: Cigarettes    Smokeless tobacco: Never    Tobacco comments:     quit in 2014; smoked for 15 years and at the most smoked 1 ppd   Substance and Sexual Activity    Alcohol use: Yes     Comment: seldom    Drug use: No    Sexual activity: Never     Review of Systems   Unable to perform ROS: Patient nonverbal       Objective:     Vital Signs (Most Recent):  Temp: 96.8 °F (36 °C) (02/05/25 0741)  Pulse: 92 (02/05/25 0741)  Resp: 20 (02/05/25 0741)  BP: (!) 159/88 (02/05/25 0741)  SpO2:  (!) 93 % (02/05/25 0781) Vital Signs (24h Range):  Temp:  [96.8 °F (36 °C)-98.7 °F (37.1 °C)] 96.8 °F (36 °C)  Pulse:  [] 92  Resp:  [18-20] 20  SpO2:  [93 %-100 %] 93 %  BP: (142-169)/(76-95) 159/88     Weight: 63.5 kg (140 lb)  Body mass index is 25.61 kg/m².     Physical Exam  Vitals reviewed.   Constitutional:       General: She is awake. She is not in acute distress.     Appearance: She is normal weight. She is ill-appearing (chronic).      Comments: Appears older than stated age; thin with diffuse muscle wasting    HENT:      Head: Normocephalic and atraumatic.      Nose: Nose normal.   Cardiovascular:      Rate and Rhythm: Normal rate and regular rhythm.      Pulses: Normal pulses.   Pulmonary:      Effort: Pulmonary effort is normal. No respiratory distress.   Musculoskeletal:        Legs:         Feet:    Skin:     General: Skin is warm and dry.      Capillary Refill: Capillary refill takes less than 2 seconds.   Neurological:      General: No focal deficit present.      Mental Status: She is alert.      Comments: Non-verbal   Responds to touch with pulling away and crossing legs; seems agitated   She does track me around the room and looks at me when I call her name.        Left heel: 3 x 2.5 cm       Right heel: 4 x 3.5 cm                Laboratory:    ABGs:   Recent Labs   Lab 02/04/25  1334   PH 7.460*   PCO2 30.0*   HCO3 21.3*       BMP:   Recent Labs   Lab 02/05/25 0355      K 5.0      CO2 18*   BUN 25.7*   CREATININE 6.33*   CALCIUM 8.5   MG 2.60       CBC:   Recent Labs   Lab 02/05/25 0355   WBC 6.99   RBC 3.90*   HGB 10.3*   HCT 34.6*      MCV 88.7   MCH 26.4*   MCHC 29.8*     CMP:   Recent Labs   Lab 02/05/25 0355   CALCIUM 8.5   ALBUMIN 2.1*      K 5.0   CO2 18*      BUN 25.7*   CREATININE 6.33*   ALKPHOS 188*   ALT 17   AST 26   BILITOT 0.4       LFTs:   Recent Labs   Lab 02/05/25 0355   ALT 17   AST 26   ALKPHOS 188*   BILITOT 0.4   ALBUMIN 2.1*        Microbiology Results (last 7 days)       Procedure Component Value Units Date/Time    Body Fluid Culture [[id number]] Collected: 02/04/25 1707    Order Status: Completed Specimen: Peritoneal Fluid Updated: 02/05/25 0645     Body Fluid Culture No Growth At 24 Hours    Gram Stain [[id number]] Collected: 02/04/25 1707    Order Status: Completed Specimen: Peritoneal Fluid Updated: 02/04/25 1935     GRAM STAIN Moderate WBC observed      No bacteria seen            Diagnostic Results:  I have reviewed all pertinent imaging results/findings within the past 24 hours.

## 2025-02-05 NOTE — NURSING
02/05/25 1208        Hemodialysis Catheter 01/28/25 1400 right subclavian   Placement Date/Time: 01/28/25 1400   Hemodialysis Catheter Type: Tunneled catheter  Location: right subclavian   Line Necessity Review CRRT/HD   Site Assessment No redness;No drainage;No swelling;No warmth   Line Securement Device Secured with sutures   Dressing Type CHG impregnated dressing/sponge;Central line dressing;Transparent (Tegaderm)   Dressing Status Clean;Dry;Intact   Dressing Intervention Sterile dressing change   Date on Dressing 02/05/25   Dressing Due to be Changed 02/12/25   Venous Patency/Care normal saline locked   Arterial Patency/Care normal saline locked   Waveform Not being transduced   Post-Hemodialysis Assessment   Rinseback Volume (mL) 250 mL   Blood Volume Processed (Liters) 62 L   Dialyzer Clearance Clear   Duration of Treatment 180 minutes   Total UF (mL) 2000 mL   Net Fluid Removal 1500   Patient Response to Treatment Pt tolerated well   Post-Hemodialysis Comments Tx ended, Pt reinfused.

## 2025-02-05 NOTE — HPI
Wound medicine re-check     The patient is a 74 year old female with a multitude of medical problems including HTN, HLD, failed kidney transplant, ESRD on PD, deconditioning with recent humerus fracture s/p IM nail (12/2024).  She was recently discharged from this facility on 2/3/25 after a 13 day admission with NSTEMI, LLE cellulitis, missed PD and deconditioning. She was converted to hemodialysis prior to discharge so that she could go to rehab, she was discharged to [facility] for SNF on 2/3/25. She returned form NH on 2/4/25 with AMS, combative towards staff and family and not speaking. Of note records reveal she had flat affect and not speaking prior to discharge and was evaluated by Psychiatry.  concerned because she has not shown improvement.  She has been admitted for workup of acute encephalopathy; CT head without acute acute intracranial findings; MRI brain shows abnormality in the left corpus callosum - Neuro consult noted , PD fluid culture in process.   Wound medicine consulted upon admission for continuity of care, we saw this patient during recent prior encounter. She presents with bilateral heel pressure injuries as well as multiple areas of skin cancer, awaiting treatment, followed by dermatology outpatient.   Initial evaluation on 2/5/24 - bilateral heel pressure injuries present on admission. Right heel unstageable, left heel DTI intact blister.      2/10/25 - wound re-check today.  Met the patient in room 1007.  She opens her eyes intermittently but does not respond to verbal stimuli, does not move upper or lower extremities today, seems to fall asleep intermittently during assessment. I am accompanied by CNA and staff nurse.   Her  and caregiver are also at bedside and report patient has been steadily declining and not responding at all as of late.   She is on a low air loss bed with bilateral heel boots in place as well as sequential compression devices on BLE. She is in no  apparent distress.

## 2025-02-06 PROBLEM — I63.9 STROKE: Status: ACTIVE | Noted: 2025-02-06

## 2025-02-06 LAB
APICAL FOUR CHAMBER EJECTION FRACTION: 55 %
APICAL TWO CHAMBER EJECTION FRACTION: 53 %
AV INDEX (PROSTH): 0.75
AV MEAN GRADIENT: 4 MMHG
AV PEAK GRADIENT: 7 MMHG
AV VALVE AREA BY VELOCITY RATIO: 2 CM²
AV VALVE AREA: 2.1 CM²
AV VELOCITY RATIO: 0.69
BSA FOR ECHO PROCEDURE: 1.67 M2
CV ECHO LV RWT: 0.41 CM
DOP CALC AO PEAK VEL: 1.3 M/S
DOP CALC AO VTI: 22.9 CM
DOP CALC LVOT AREA: 2.8 CM2
DOP CALC LVOT DIAMETER: 1.9 CM
DOP CALC LVOT PEAK VEL: 0.9 M/S
DOP CALC LVOT STROKE VOLUME: 48.5 CM3
DOP CALC MV VTI: 24 CM
DOP CALCLVOT PEAK VEL VTI: 17.1 CM
E WAVE DECELERATION TIME: 169 MSEC
E/A RATIO: 0.55
E/E' RATIO: 10 M/S
ECHO LV POSTERIOR WALL: 0.9 CM (ref 0.6–1.1)
EST. AVERAGE GLUCOSE BLD GHB EST-MCNC: 76.7 MG/DL
FRACTIONAL SHORTENING: 38.6 % (ref 28–44)
HBA1C MFR BLD: 4.3 %
HR MV ECHO: 89 BPM
INTERVENTRICULAR SEPTUM: 1.1 CM (ref 0.6–1.1)
LEFT ATRIUM AREA SYSTOLIC (APICAL 2 CHAMBER): 14.1 CM2
LEFT ATRIUM AREA SYSTOLIC (APICAL 4 CHAMBER): 19.3 CM2
LEFT ATRIUM SIZE: 3.9 CM
LEFT INTERNAL DIMENSION IN SYSTOLE: 2.7 CM (ref 2.1–4)
LEFT VENTRICLE DIASTOLIC VOLUME INDEX: 53.48 ML/M2
LEFT VENTRICLE DIASTOLIC VOLUME: 87.7 ML
LEFT VENTRICLE END DIASTOLIC VOLUME APICAL 2 CHAMBER: 74.1 ML
LEFT VENTRICLE END DIASTOLIC VOLUME APICAL 4 CHAMBER: 73.2 ML
LEFT VENTRICLE END SYSTOLIC VOLUME APICAL 2 CHAMBER: 38 ML
LEFT VENTRICLE END SYSTOLIC VOLUME APICAL 4 CHAMBER: 48.1 ML
LEFT VENTRICLE MASS INDEX: 90.1 G/M2
LEFT VENTRICLE SYSTOLIC VOLUME INDEX: 16.8 ML/M2
LEFT VENTRICLE SYSTOLIC VOLUME: 27.5 ML
LEFT VENTRICULAR INTERNAL DIMENSION IN DIASTOLE: 4.4 CM (ref 3.5–6)
LEFT VENTRICULAR MASS: 147.8 G
LV LATERAL E/E' RATIO: 9.1 M/S
LV SEPTAL E/E' RATIO: 12.2 M/S
LVED V (TEICH): 87.7 ML
LVES V (TEICH): 27.5 ML
LVOT MG: 2 MMHG
LVOT MV: 0.62 CM/S
MV MEAN GRADIENT: 3 MMHG
MV PEAK A VEL: 1.33 M/S
MV PEAK E VEL: 0.73 M/S
MV PEAK GRADIENT: 8 MMHG
MV STENOSIS PRESSURE HALF TIME: 96 MS
MV VALVE AREA BY CONTINUITY EQUATION: 2.02 CM2
MV VALVE AREA P 1/2 METHOD: 2.29 CM2
OHS LV EJECTION FRACTION SIMPSONS BIPLANE MOD: 55 %
PISA TR MAX VEL: 1.7 M/S
POCT GLUCOSE: 103 MG/DL (ref 70–110)
POCT GLUCOSE: 117 MG/DL (ref 70–110)
POCT GLUCOSE: 125 MG/DL (ref 70–110)
RA PRESSURE ESTIMATED: 3 MMHG
RV TB RVSP: 5 MMHG
SINUS: 3.1 CM
T4 FREE SERPL-MCNC: 1.34 NG/DL (ref 0.7–1.48)
TDI LATERAL: 0.08 M/S
TDI SEPTAL: 0.06 M/S
TDI: 0.07 M/S
TR MAX PG: 12 MMHG
TRICUSPID ANNULAR PLANE SYSTOLIC EXCURSION: 1.48 CM
TV REST PULMONARY ARTERY PRESSURE: 15 MMHG
Z-SCORE OF LEFT VENTRICULAR DIMENSION IN END DIASTOLE: -0.48
Z-SCORE OF LEFT VENTRICULAR DIMENSION IN END SYSTOLE: -0.46

## 2025-02-06 PROCEDURE — 25000003 PHARM REV CODE 250: Performed by: INTERNAL MEDICINE

## 2025-02-06 PROCEDURE — 36415 COLL VENOUS BLD VENIPUNCTURE: CPT | Performed by: NURSE PRACTITIONER

## 2025-02-06 PROCEDURE — 92610 EVALUATE SWALLOWING FUNCTION: CPT

## 2025-02-06 PROCEDURE — 63600175 PHARM REV CODE 636 W HCPCS: Performed by: NURSE PRACTITIONER

## 2025-02-06 PROCEDURE — 25000003 PHARM REV CODE 250: Performed by: NURSE PRACTITIONER

## 2025-02-06 PROCEDURE — 11000001 HC ACUTE MED/SURG PRIVATE ROOM

## 2025-02-06 PROCEDURE — 63600175 PHARM REV CODE 636 W HCPCS

## 2025-02-06 PROCEDURE — 83036 HEMOGLOBIN GLYCOSYLATED A1C: CPT | Performed by: NURSE PRACTITIONER

## 2025-02-06 PROCEDURE — 21400001 HC TELEMETRY ROOM

## 2025-02-06 RX ORDER — BISACODYL 10 MG/1
10 SUPPOSITORY RECTAL DAILY PRN
Status: DISCONTINUED | OUTPATIENT
Start: 2025-02-06 | End: 2025-02-11 | Stop reason: HOSPADM

## 2025-02-06 RX ORDER — LEVETIRACETAM 500 MG/1
500 TABLET ORAL 2 TIMES DAILY
Status: DISCONTINUED | OUTPATIENT
Start: 2025-02-06 | End: 2025-02-07

## 2025-02-06 RX ORDER — MUPIROCIN 20 MG/G
OINTMENT TOPICAL 2 TIMES DAILY
Status: DISCONTINUED | OUTPATIENT
Start: 2025-02-10 | End: 2025-02-11 | Stop reason: HOSPADM

## 2025-02-06 RX ORDER — HYDRALAZINE HYDROCHLORIDE 20 MG/ML
5 INJECTION INTRAMUSCULAR; INTRAVENOUS EVERY 6 HOURS PRN
Status: DISCONTINUED | OUTPATIENT
Start: 2025-02-06 | End: 2025-02-06

## 2025-02-06 RX ORDER — HYDRALAZINE HYDROCHLORIDE 20 MG/ML
5 INJECTION INTRAMUSCULAR; INTRAVENOUS EVERY 6 HOURS PRN
Status: DISCONTINUED | OUTPATIENT
Start: 2025-02-06 | End: 2025-02-11 | Stop reason: HOSPADM

## 2025-02-06 RX ADMIN — HEPARIN SODIUM 5000 UNITS: 5000 INJECTION, SOLUTION INTRAVENOUS; SUBCUTANEOUS at 05:02

## 2025-02-06 RX ADMIN — LEUCINE, PHENYLALANINE, LYSINE, METHIONINE, ISOLEUCINE, VALINE, HISTIDINE, THREONINE, TRYPTOPHAN, ALANINE, GLYCINE, ARGININE, PROLINE, SERINE, TYROSINE, SODIUM ACETATE, DIBASIC POTASSIUM PHOSPHATE, MAGNESIUM CHLORIDE, SODIUM CHLORIDE, CALCIUM CHLORIDE, DEXTROSE
880; 489; 33; 5; 438; 204; 255; 311; 247; 51; 170; 238; 261; 289; 213; 297; 77; 179; 77; 17; 247 INJECTION INTRAVENOUS at 08:02

## 2025-02-06 RX ADMIN — LEVETIRACETAM 500 MG: 100 INJECTION, SOLUTION INTRAVENOUS at 11:02

## 2025-02-06 NOTE — PT/OT/SLP PROGRESS
Physical Therapy Treatment    Patient Name:  Malissa Schwartz   MRN:  [id number]    Spoke with nurse. Patient not appropriate for therapy today. PT to f/u tomorrow.

## 2025-02-06 NOTE — CONSULTS
Inpatient consult to Palliative Care  Consult performed by: Gaby Palomino NP  Consult ordered by: Jose Raul Nielsen MD      Patient Name: Malissa Schwartz   MRN: [id number]   Admission Date: 2/4/2025   Hospital Length of Stay: 1   Attending Provider: Jose Raul Nielsen MD   Consulting Provider: LETHA Bryant  Reason for Consult: Goals of Care  Primary Care Physician: Sergio Crabtree MD     Principal Problem: <principal problem not specified>     Patient information was obtained from spouse/SO, relative(s), caregiver / friend, and ER records.      Final diagnoses:  [R45.1] Agitation  [G93.40] Encephalopathy, unspecified type     Assessment/Plan:     I reviewed the patient and family's understanding of the seriousness of the illness and its expected prognosis. I clarified current code status, which is Full Code. I identified the surrogate decision maker to be the patient's , Brayden. I answered all questions and we formulated a plan including recommendations for symptom management and how to best achieve goals of care.       Patient is resting in bed, working with speech therapy. It appears she has been cleared for regular diet by ST. Family and friends at bedside. Introduced service. Reviewed patient's functional status at home prior to previous admission, course of previous admission, and current hospitalization. Her  and private sitter explain that she needed assistance with ADLs, including dressing and showering, and had HH with PT services prior to admission. She was able to manage her own PD. They feel she had significant neurologic changes and became nonverbal after the placement of tunneled dialysis catheter during previous admission. They explain she was discharged to rehab and returned soon there after due to agitation. Discussed that neurology evaluation is pending and we will await their input to further discuss goals of care.    Mr. Owen does not know of a medical POA, but states  "she has a living will. Asked him to bring living will to be placed on file at the hospital. He reports her election is "to do what you need to do immediately, but not to be on machines long term." He also states that they have not previously discussed her medical wishes in detail. Family and friends in the room express concerns that the patient's daughter is calling for updates and does not live locally. Explained that Mr. Owen is legal next of kin and surrogate decision maker, therefore he would be the point of contact. Informed them I would discuss with nursing staff. Point of contact corrected in Epic also. Offered support. Encouraged to call with questions or concerns. Palliative Medicine will continue to follow.    Advance Care Planning     Date: 02/06/2025    [facility]  I engaged the patient and family in a voluntary conversation about advance care planning and we specifically addressed what the goals of care would be moving forward, in light of the patient's change in clinical status, specifically current condition.  We did specifically address the patient's likely prognosis, which is fair .  We explored the patient's values and preferences for future care.  The patient and family endorses that what is most important right now is to focus on curative/life-prolongation (regardless of treatment burdens)    Accordingly, we have decided that the best plan to meet the patient's goals includes continuing with treatment       History of Present Illness:     74 year old female with PMH of HTN, HLD, deconditioning, humerus fracture with IM nail, NSTEMI, failed kidney transplant, ESRD-HD MWF previously on peritoneal dialysis, and recent cellulitis LLE completed antibiotic therapy.  She was recently hospitalized and discharged to nursing home at [facility]. She presented to the ED on 2/4/25 with primary complaint of altered mental status for 3-4 days. CT head negative for acute findings. In ED she received Haldol, " benadryl, and Lokelma. Overnight she did not tolerate feedings, refusing medications and food, and is nonverbal. MRI brain shows abnormality in the left corpus callosum, for which Neurology was consulted. Palliative Medicine is consulted for goals of care.      Active Ambulatory Problems     Diagnosis Date Noted    Living-donor kidney transplant recipient on 01/15/2007 01/15/2007    GN (glomerulonephritis)     Renal hypertension     Hyperlipidemia     Immunosuppression - Inducted with Medrol 500 mg; maintained on Prograf, Cellcept and Prednisone     Stage 3 chronic kidney disease     Complication of transplanted kidney 01/08/2015    Anemia of renal disease     Depression     Left renal mass 09/21/2016    S/p nephrectomy--> left on 9/22/2016 01/08/2018    Hypotension 05/13/2022    Bacterial peritonitis 12/22/2023    Wound infection 08/24/2024    Moderate malnutrition 10/07/2024    Fracture of humeral shaft, right, closed 12/02/2024    Closed fracture of nasal bone 12/02/2024    Fall 12/02/2024    Pressure injury of deep tissue of right heel 01/27/2025    Unstageable pressure ulcer of left heel 01/27/2025    ESRD (end stage renal disease) 01/27/2025     Resolved Ambulatory Problems     Diagnosis Date Noted    CMV D+/R+     Acute rejection of kidney transplant 9/10/07 09/10/2007    Proteinuria 01/05/2015    ABMR of kidney transplant 1/8/15 01/13/2015    Peritoneal dialysis catheter dysfunction 09/26/2023    Weakness 01/17/2024    Symptomatic anemia 08/26/2024    Left leg pain 08/26/2024    Open wound of left lower leg 08/26/2024    Pressure ulcer of left heel, stage 2 08/26/2024     Past Medical History:   Diagnosis Date    Allergy     CKD (chronic kidney disease), stage IV     Colon polyp     Herpes infection, other 2009    Hypertension     Meningitis     Pulmonary nodules     S/P breast augmentation     Skin cancer     Urinary tract infection         Past Surgical History:   Procedure Laterality Date    COLON SURGERY       COLONOSCOPY W/ BIOPSIES      cosemetic surgery      facelift and breast augmentation     HYSTERECTOMY      INSERTION OF TUNNELED CENTRAL VENOUS HEMODIALYSIS CATHETER N/A 1/28/2025    Procedure: Insertion, Catheter, Central Venous, Hemodialysis;  Surgeon: Apollo Jacob DO;  Location: [facility] CATH LAB;  Service: Nephrology;  Laterality: N/A;    INTRAMEDULLARY RODDING OF HUMERUS Right 12/2/2024    Procedure: INSERTION, INTRAMEDULLARY MONTSERRAT, HUMERUS;  Surgeon: Samuel Dow DO;  Location: [facility] OR;  Service: Orthopedics;  Laterality: Right;  supine vacular backward c arm synthes  shoulder scope drape    KIDNEY TRANSPLANT      NEPHRECTOMY Left     9/2016    RENAL BIOPSY      TONSILLECTOMY, ADENOIDECTOMY          Review of patient's allergies indicates:   Allergen Reactions    Codeine Palpitations and Rash    Cefepime      Other Reaction(s): Encephalopathy    Renal impairment increased risk for this reaction    Levofloxacin      Other Reaction(s): Encephalopathy    Renal pt who had some encephalopathy while on Levaquin & Cefepime, neurology physicians think they may have been due to the antibiotics (both possible side effects for each)    Iodine Nausea Only          Current Facility-Administered Medications:     acetaminophen tablet 650 mg, 650 mg, Oral, Q4H PRN, Myke Butler PA    acetaminophen tablet 650 mg, 650 mg, Oral, Q8H PRN, Myke Butler PA    Amino acid 4.25% - dextrose 5% (CLINIMIX-E) solution (1L provides 42.5 gm AA, 50 gm CHO (170 kcal/L dextrose), Na 35, K 30, Mg 5, Ca 4.5, Acetate 70, Cl 39, Phos 15), , Intravenous, Continuous, Jose Raul Nielsen MD, Last Rate: 50 mL/hr at 02/05/25 1739, New Bag at 02/05/25 1739    amLODIPine tablet 5 mg, 5 mg, Oral, Daily, Myke Butler PA    aspirin EC tablet 81 mg, 81 mg, Oral, Daily, Myke Butler PA    atorvastatin tablet 40 mg, 40 mg, Oral, QHS, Jose Raul Nielsen MD    calcitRIOL capsule 0.25 mcg, 0.25 mcg, Oral, Once per day on Monday Wednesday  Friday, Myke Butler PA    dextrose 50% injection 12.5 g, 12.5 g, Intravenous, PRN, Myke Butler PA    dextrose 50% injection 25 g, 25 g, Intravenous, PRN, Myke Butler PA    folic acid tablet 1 mg, 1 mg, Oral, Daily, Myke Butler PA    glucagon (human recombinant) injection 1 mg, 1 mg, Intramuscular, PRN, Myke Butler PA    glucose chewable tablet 16 g, 16 g, Oral, PRN, Myke Butler PA    glucose chewable tablet 24 g, 24 g, Oral, PRN, Myke Butler PA    heparin (porcine) injection 5,000 Units, 5,000 Units, Subcutaneous, Q8H, Myke Butler PA, 5,000 Units at 02/06/25 0506    melatonin tablet 6 mg, 6 mg, Oral, Nightly PRN, Myke Butler PA    naloxone 0.4 mg/mL injection 0.02 mg, 0.02 mg, Intravenous, PRN, Myke Butler PA    ondansetron injection 4 mg, 4 mg, Intravenous, Q8H PRN, Myke Butler PA    pantoprazole EC tablet 40 mg, 40 mg, Oral, Daily, Myke Butler PA    sodium chloride 0.9% flush 10 mL, 10 mL, Intravenous, Q12H PRN, Myke Butler PA    Flushing PICC/Midline Protocol, , , Until Discontinued **AND** sodium chloride 0.9% flush 10 mL, 10 mL, Intravenous, Q12H PRN, Jose Raul Nielsen MD       Current Facility-Administered Medications:     acetaminophen, 650 mg, Oral, Q4H PRN    acetaminophen, 650 mg, Oral, Q8H PRN    dextrose 50%, 12.5 g, Intravenous, PRN    dextrose 50%, 25 g, Intravenous, PRN    glucagon (human recombinant), 1 mg, Intramuscular, PRN    glucose, 16 g, Oral, PRN    glucose, 24 g, Oral, PRN    melatonin, 6 mg, Oral, Nightly PRN    naloxone, 0.02 mg, Intravenous, PRN    ondansetron, 4 mg, Intravenous, Q8H PRN    sodium chloride 0.9%, 10 mL, Intravenous, Q12H PRN    Flushing PICC/Midline Protocol, , , Until Discontinued **AND** sodium chloride 0.9%, 10 mL, Intravenous, Q12H PRN     Family History   Problem Relation Name Age of Onset    Kidney disease Sister          ESRD    Kidney disease Brother          CKD from HTN     "Hypertension Brother      Heart failure Mother      Heart failure Father      Diabetes Neg Hx      Stroke Neg Hx          Review of Systems   Unable to perform ROS: Patient nonverbal            Objective:   BP (!) 159/86   Pulse 103   Temp 98.4 °F (36.9 °C) (Oral)   Resp 18   Ht 5' 2.01" (1.575 m)   Wt 63.5 kg (140 lb)   SpO2 98%   BMI 25.60 kg/m²      Physical Exam  Constitutional:       General: She is not in acute distress.     Appearance: She is ill-appearing.   HENT:      Mouth/Throat:      Mouth: Mucous membranes are moist.   Eyes:      Extraocular Movements: Extraocular movements intact.   Cardiovascular:      Rate and Rhythm: Normal rate.   Pulmonary:      Effort: Pulmonary effort is normal. No respiratory distress.   Musculoskeletal:      Comments: Generalized deconditioning   Skin:     General: Skin is warm and dry.   Neurological:      Mental Status: She is alert.      Comments: Smiles during conversation  Tracks with eyes  Does not speak/nod/gesture            Review of Symptoms  Review of Symptoms      Symptom Assessment (ESAS 0-10 Scale)  Pain:  0  Dyspnea:  0  Anxiety:  0  Nausea:  0  Depression:  0  Anorexia:  0  Fatigue:  0  Insomnia:  0  Restlessness:  0  Agitation:  0         Living Arrangements:  Lives with spouse    Psychosocial/Cultural:   See Palliative Psychosocial Note: Yes  . Has one daughter and one son from previous marriage. Has sitter at home. Prior to admit she had HH with PT services. Sitter helps with ADLs, including dressing and showering. She was managing her own peritoneal dialysis prior to admit.  **Primary  to Follow**  Palliative Care  Consult: No      Advance Care Planning   Advance Directives:     Decision Making:  Family answered questions  Goals of Care: The family endorses that what is most important right now is to focus on curative/life-prolongation (regardless of treatment burdens)    Accordingly, we have decided that the best " plan to meet the patient's goals includes continuing with treatment          PAINAD: NA    Caregiver burden formerly assessed: Yes    > 50% of 50 min of encounter was spent in chart review, face to face discussion of goals of care, symptom assessment, coordination of care and emotional support.    LETHA Bryant-BC  Palliative Medicine  [facility]

## 2025-02-06 NOTE — CONSULTS
[facility]  Neurology  Consult Note    Patient Name: Malissa Schwartz  MRN: [id number]  Admission Date: 2/4/2025  Hospital Length of Stay: 1 days  Code Status: Full Code   Attending Provider: Jose Raul Nielsen MD   Consulting Provider: Nicole Tariq NP  Primary Care Physician: Sergio Crabtree MD  Principal Problem:<principal problem not specified>    Inpatient Consult to Neurology Services (General Neurology)  Consult performed by: Nicole Tariq NP  Consult ordered by: Tracy Christensen AGACN-BC         Subjective:     Chief Complaint:  AMS     HPI:   74-year-old female with PMH HTN, HLD, deconditioning, STEMI, failed kidney transplant with ESRD on HD, and recent cellulitis to LLE s/p clindamycin who presented to ED on 02/04 d/t AMS.  Patient was recently admitted 01/22-2/3 for NSTEMI, sepsis 2/2 LLE cellulitis, and acute on chronic renal failure.  Prior to this admission patient on PD which was converted to HD during that admission, first round HD on 01/29.  Patient's  reports day after tunneled dialysis catheter placed on 1/28 she no longer spoke and refused to eat with increased generalized weakness.  She was discharged on 2/3 from [facility] and admitted to [facility]. Patient brought to ED via EMS on 2/4 with AMS.    Evita Dixon reports patient ate lunch today after not eating or drinking over past 4 days. Patient has also talked first time today since 1/28.   Patient admitted for acute encephalopathy possibly metabolic versus ischemic in etiology. MRI brain w/o showed a rounded focus of restricted diffusion and edema in the left splenium of the corpus callosum. Stroke neurology consulted for evaluation.          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 Glomerulitis with one activated endothelial cell: treated with Solumedrol pulse 375 mg IVBP x 3 doses with short  prednisone taper; thymoglobin 100 mg x 6 doses 10/04/2007 minimal histologic changes no acute rejection     Allergy     Anemia of renal disease     CKD (chronic kidney disease), stage IV     CMV (cytomegalovirus) status positive     Colon polyp     benign    Depression     GN (glomerulonephritis)     GN (glomerulonephritis)     unknown type    Herpes infection, other 2009    on buttocks    Hyperlipidemia     Hypertension     Immunosuppression     Living-donor kidney transplant recipient 01/15/2007    Meningitis     history of viral meningitis    Proteinuria 1/5/2015    Proteinuria     Pulmonary nodules     Renal hypertension     S/P breast augmentation     Skin cancer     Urinary tract infection        Past Surgical History:   Procedure Laterality Date    COLON SURGERY      COLONOSCOPY W/ BIOPSIES      cosemetic surgery      facelift and breast augmentation     HYSTERECTOMY      INSERTION OF TUNNELED CENTRAL VENOUS HEMODIALYSIS CATHETER N/A 1/28/2025    Procedure: Insertion, Catheter, Central Venous, Hemodialysis;  Surgeon: Apollo Jacob DO;  Location: [facility] CATH LAB;  Service: Nephrology;  Laterality: N/A;    INTRAMEDULLARY RODDING OF HUMERUS Right 12/2/2024    Procedure: INSERTION, INTRAMEDULLARY MONTSERRAT, HUMERUS;  Surgeon: Samuel Dow DO;  Location: [facility] OR;  Service: Orthopedics;  Laterality: Right;  supine vacular backward c arm synthes  shoulder scope drape    KIDNEY TRANSPLANT      NEPHRECTOMY Left     9/2016    RENAL BIOPSY      TONSILLECTOMY, ADENOIDECTOMY         Review of patient's allergies indicates:   Allergen Reactions    Codeine Palpitations and Rash    Cefepime      Other Reaction(s): Encephalopathy    Renal impairment increased risk for this reaction    Levofloxacin      Other Reaction(s): Encephalopathy    Renal pt who had some encephalopathy while on Levaquin & Cefepime, neurology physicians think they may have been due to the antibiotics (both possible side effects for each)    Iodine Nausea Only        No current facility-administered medications on file prior to encounter.     Current Outpatient Medications on File Prior to Encounter   Medication Sig    amLODIPine (NORVASC) 10 MG tablet Take 5 mg by mouth once daily.    aspirin (ECOTRIN) 81 MG EC tablet Take 81 mg by mouth once daily.    B complex-vitamin C-folic acid (NEPHRO-JENN) 0.8 mg Tab Take 0.8 mg by mouth Daily.    calcitRIOL (ROCALTROL) 0.25 MCG Cap Take 0.25 mcg by mouth 3 (three) times a week.    D5W PgBk 50 mL with ceFAZolin 1 gram SolR Cefazolin intraperitoneally times 10 more days    EScitalopram oxalate (LEXAPRO) 20 MG tablet Take 1 tablet (20 mg total) by mouth once daily.    folic acid (FOLVITE) 1 MG tablet Take 1 mg by mouth once daily.    megestroL (MEGACE) 400 mg/10 mL (10 mL) Susp Take 10 mLs (400 mg total) by mouth once daily.    methocarbamoL (ROBAXIN) 500 MG Tab Take 500 mg by mouth 4 (four) times daily.    pantoprazole (PROTONIX) 40 MG tablet Take 40 mg by mouth once daily.    potassium chloride (KLOR-CON) 10 MEQ TbSR Take 10 mEq by mouth once daily.    traMADoL (ULTRAM) 50 mg tablet Take 50 mg by mouth 3 (three) times daily as needed for Pain.    [DISCONTINUED] losartan (COZAAR) 100 MG tablet TAKE ONE TABLET BY MOUTH ONCE DAILY    [DISCONTINUED] metoprolol succinate (TOPROL-XL) 25 MG 24 hr tablet Take 2 tablets (50 mg total) by mouth once daily. (Patient not taking: Reported on 5/12/2022)    [DISCONTINUED] mycophenolate (CELLCEPT) 250 mg Cap Take 2 capsules (500 mg total) by mouth 2 (two) times daily. Z94.0 kidney transplant (Patient not taking: Reported on 5/12/2022)    [DISCONTINUED] tacrolimus (PROGRAF) 1 MG Cap Take 3mg every AM and 2mg every PM.  Route: Oral.  Z94.0 Kidney Transplant. (Patient not taking: Reported on 5/12/2022)     Family History       Problem Relation (Age of Onset)    Heart failure Mother, Father    Hypertension Brother    Kidney disease Sister, Brother          Tobacco Use    Smoking status: Former      "Current packs/day: 1.00     Average packs/day: 1 pack/day for 15.0 years (15.0 ttl pk-yrs)     Types: Cigarettes    Smokeless tobacco: Never    Tobacco comments:     quit in 2014; smoked for 15 years and at the most smoked 1 ppd   Substance and Sexual Activity    Alcohol use: Yes     Comment: seldom    Drug use: No    Sexual activity: Never     Review of Systems   Unable to perform ROS: Mental status change     Objective:     Vital Signs (Most Recent):  Temp: 98.4 °F (36.9 °C) (02/06/25 0728)  Pulse: 103 (02/06/25 0728)  Resp: 18 (02/06/25 0728)  BP: (!) 159/86 (02/06/25 0728)  SpO2: 98 % (02/06/25 0728) Vital Signs (24h Range):  Temp:  [98.4 °F (36.9 °C)-99.2 °F (37.3 °C)] 98.4 °F (36.9 °C)  Pulse:  [] 103  Resp:  [17-20] 18  SpO2:  [97 %-100 %] 98 %  BP: (130-178)/(70-96) 159/86     Weight: 63.5 kg (140 lb)  Body mass index is 25.6 kg/m².     Physical Exam  Vitals and nursing note reviewed.   HENT:      Head: Normocephalic and atraumatic.      Right Ear: External ear normal.      Left Ear: External ear normal.      Nose: Nose normal.      Mouth/Throat:      Mouth: Mucous membranes are moist.      Pharynx: Oropharynx is clear.   Eyes:      Extraocular Movements: Extraocular movements intact.      Conjunctiva/sclera: Conjunctivae normal.      Pupils: Pupils are equal, round, and reactive to light.   Cardiovascular:      Rate and Rhythm: Normal rate.   Pulmonary:      Effort: Pulmonary effort is normal.   Abdominal:      Palpations: Abdomen is soft.   Skin:     General: Skin is warm and dry.   Neurological:      Mental Status: She is alert.      Cranial Nerves: No dysarthria.      Comments: Patient would not name her  or Evita french.   She smiled on command and when requested to lift arms, she stated clearly "why do you want me to do that".   She smiled spontaneously and nodded yes to her  was love of her life.   No drift of extremities. BUE strength 5/5. BLE strength 3/5.           "   NEUROLOGICAL EXAMINATION:     CRANIAL NERVES     CN III, IV, VI   Pupils are equal, round, and reactive to light.      Significant Labs: All pertinent lab results from the past 24 hours have been reviewed.    Significant Imaging: I have reviewed all pertinent imaging results/findings within the past 24 hours.  Assessment and Plan:     Stroke  - presented with AMS   - Stroke RF: ESRD  - Intervention: None, out of window.   - Etiology: TBD    Stroke workup:  -CTh: Impression:  1.  No acute intracranial findings identified.  2.  Marked chronic microangiopathic ischemia and age appropriate atrophy.   -MRI brain: Evaluation is limited by motion artifact.  There is a rounded focus of restricted diffusion and edema in the left splenium of the corpus callosum.  Moderate patchy T2/FLAIR hyperintense in the subcortical and periventricular white matter likely represent chronic microvascular ischemic changes.  There is no mass effect or midline shift.  The basal cisterns are patent.  There is mild diffuse parenchymal volume loss.  There is no hydrocephalus or abnormal extra-axial fluid collection.  The major intracranial flow voids are patent.  Impression:  1. Single cytotoxic lesion in the corpus callosum is nonspecific and may be metabolic or toxic.  2. Moderate chronic microvascular ischemic changes.   -ECHO: ordered   -CUS: ordered  -LDL: 97   -A1c: 4.3%   -TSH: 6.322   -home medications include: ASA 81 mg   NIH Stroke Scale    Current NIH Stroke Score Values      Flowsheet Row Most Recent Value   1a. Level of Consciousness 0-->Alert, keenly responsive   1b. LOC Questions 2-->Answers neither question correctly   1c. LOC Commands 0-->Performs both tasks correctly   2. Best Gaze 0-->Normal   3. Visual 0-->No visual loss   4. Facial Palsy 0-->Normal symmetrical movements   5a. Motor Arm, Left 0-->No drift, limb holds 90 (or 45) degrees for full 10 secs   5b. Motor Arm, Right 0-->No drift, limb holds 90 (or 45) degrees for  full 10 secs   6a. Motor Leg, Left 0-->No drift, leg holds 30 degree position for full 5 secs   6b. Motor Leg, Right 0-->No drift, leg holds 30 degree position for full 5 secs   7. Limb Ataxia 0-->Absent   8. Sensory 0-->Normal, no sensory loss   9. Best Language 2-->Severe aphasia, all communication is through fragmentary expression, great need for inference, questioning, and guessing by the listener. Range of information that can be exchanged is limited, listener carries burden of. . . (see row details)   10. Dysarthria 0-->Normal   11. Extinction and Inattention (formerly Neglect) 0-->No abnormality   Total (NIH Stroke Scale) 4            Plan:  -continue stroke workup (echo, CUS, A1C, MRA Neck and Brain)  -continue Aspirin 81mg daily  -continue Atorvastatin 40mg daily  -MCT x 2 weeks, consider longer duration of monitoring or loop if needed evaluate cardiac cause for stroke  -PT/OT/ST to evaluate  -SCD for DVT prophylaxis   - ok to normalize blood pressure  - Neuro checks q4hr ... stat CTh if any neuro change   - Continuous telemetry monitoring  - NPO until passes [place] or cleared by SLP  - No bedrest, ok for patient to ambulate, RN to observe first ambulation for safety  - Other recommendations may follow from MD            VTE Risk Mitigation (From admission, onward)           Ordered     heparin (porcine) injection 5,000 Units  Every 8 hours         02/04/25 1301     IP VTE HIGH RISK PATIENT  Once         02/04/25 1301     Place sequential compression device  Until discontinued         02/04/25 1301                    Thank you for your consult.     Nicole Tariq NP  Neurology  [facility]

## 2025-02-06 NOTE — PROGRESS NOTES
[facility]  Wound Care  Progress Note    Patient Name: Malissa Schwartz  MRN: [id number]  Admission Date: 2/4/2025  Hospital Length of Stay: 1 days  Attending Physician: Jose Raul Nielsen MD  Primary Care Provider: Sergio Crabtree MD     Subjective:     HPI:  Wound medicine re-check     The patient is a 74 year old female with a multitude of medical problems including HTN, HLD, failed kidney transplant, ESRD on PD, deconditioning with recent humerus fracture s/p IM nail (12/2024).  She was recently discharged from this facility on 2/3/25 after a 13 day admission with NSTEMI, LLE cellulitis, missed PD and deconditioning. She was converted to hemodialysis prior to discharge so that she could go to rehab, she was discharged to [facility] for SNF on 2/3/25. She returned form NH on 2/4/25 with AMS, combative towards staff and family and not speaking. Of note records reveal she had flat affect and not speaking prior to discharge and was evaluated by Psychiatry.  concerned because she has not shown improvement.  She has been admitted for workup of acute encephalopathy; CT head without acute acute intracranial findings; MRI brain shows abnormality in the left corpus callosum - Neuro consult noted , PD fluid culture in process.   Wound medicine consulted upon admission for continuity of care, we saw this patient during recent prior encounter. She presents with bilateral heel pressure injuries as well as multiple areas of skin cancer, awaiting treatment, followed by dermatology outpatient.   Initial evaluation on 2/5/24 - bilateral heel pressure injuries present on admission. Right heel unstageable, left heel DTI intact blister.      2/6/25 - wound re-check today.  Met the patient in room 1007.  She is awake with eyes open, she does not respond to questioning but does track me around the room and looks at me when I call her name. CNA's in room providing daily care after BM. Some  agitation during care, swinging arms, kicking legs.    She is on a low air loss bed with bilateral heel boots in place as well as sequential compression devices on BLE. She is in no apparent distress.             Hospital Course:   No notes on file          Scheduled Meds:   amLODIPine  5 mg Oral Daily    aspirin  81 mg Oral Daily    atorvastatin  40 mg Oral QHS    calcitRIOL  0.25 mcg Oral Once per day on Monday Wednesday Friday    folic acid  1 mg Oral Daily    heparin (porcine)  5,000 Units Subcutaneous Q8H    pantoprazole  40 mg Oral Daily     Continuous Infusions:   Amino acid 4.25% - dextrose 5% (CLINIMIX-E) solution (1L provides 42.5 gm AA, 50 gm CHO (170 kcal/L dextrose), Na 35, K 30, Mg 5, Ca 4.5, Acetate 70, Cl 39, Phos 15)   Intravenous Continuous 50 mL/hr at 02/05/25 1739 New Bag at 02/05/25 1739     PRN Meds:  Current Facility-Administered Medications:     acetaminophen, 650 mg, Oral, Q4H PRN    acetaminophen, 650 mg, Oral, Q8H PRN    dextrose 50%, 12.5 g, Intravenous, PRN    dextrose 50%, 25 g, Intravenous, PRN    glucagon (human recombinant), 1 mg, Intramuscular, PRN    glucose, 16 g, Oral, PRN    glucose, 24 g, Oral, PRN    melatonin, 6 mg, Oral, Nightly PRN    naloxone, 0.02 mg, Intravenous, PRN    ondansetron, 4 mg, Intravenous, Q8H PRN    sodium chloride 0.9%, 10 mL, Intravenous, Q12H PRN    Flushing PICC/Midline Protocol, , , Until Discontinued **AND** sodium chloride 0.9%, 10 mL, Intravenous, Q12H PRN    Review of Systems   Unable to perform ROS: Patient nonverbal     Objective:     Vital Signs (Most Recent):  Temp: 98.4 °F (36.9 °C) (02/06/25 0728)  Pulse: 103 (02/06/25 0728)  Resp: 18 (02/06/25 0728)  BP: (!) 159/86 (02/06/25 0728)  SpO2: 98 % (02/06/25 0728) Vital Signs (24h Range):  Temp:  [98.4 °F (36.9 °C)-99.2 °F (37.3 °C)] 98.4 °F (36.9 °C)  Pulse:  [] 103  Resp:  [17-20] 18  SpO2:  [97 %-100 %] 98 %  BP: (130-178)/(70-96) 159/86     Weight: 63.5 kg (140 lb)  Body mass index is  25.6 kg/m².     Physical Exam  Vitals reviewed.   Constitutional:       General: She is awake. She is not in acute distress.     Appearance: She is normal weight. She is ill-appearing (chronic).      Comments: Appears older than stated age; thin with diffuse muscle wasting    HENT:      Head: Normocephalic and atraumatic.      Nose: Nose normal.   Cardiovascular:      Rate and Rhythm: Normal rate and regular rhythm.      Pulses: Normal pulses.   Pulmonary:      Effort: Pulmonary effort is normal. No respiratory distress.   Musculoskeletal:        Legs:         Feet:    Skin:     General: Skin is warm and dry.      Capillary Refill: Capillary refill takes less than 2 seconds.      Comments: Redness to sacrum that blanches; area of bruising to right upper back and other scattered bruises and superficial abrasions/skin tears to bilateral upper and lower extremities    Neurological:      General: No focal deficit present.      Mental Status: She is alert.      Comments: Non-verbal   Responds to touch with pulling away and crossing legs; seems agitated   She does track me around the room and looks at me when I call her name.        Left heel       Right heel       Left lower leg; dorsal foot - skin cancer growth       Posterior view                Laboratory:    ABGs:   Recent Labs   Lab 02/04/25  1334   PH 7.460*   PCO2 30.0*   HCO3 21.3*       BMP:   Recent Labs   Lab 02/05/25  0355      K 5.0      CO2 18*   BUN 25.7*   CREATININE 6.33*   CALCIUM 8.5   MG 2.60     CBC:   Recent Labs   Lab 02/05/25  0355   WBC 6.99   RBC 3.90*   HGB 10.3*   HCT 34.6*      MCV 88.7   MCH 26.4*   MCHC 29.8*     CMP:   Recent Labs   Lab 02/05/25  0355   CALCIUM 8.5   ALBUMIN 2.1*      K 5.0   CO2 18*      BUN 25.7*   CREATININE 6.33*   ALKPHOS 188*   ALT 17   AST 26   BILITOT 0.4       LFTs:   Recent Labs   Lab 02/05/25  0355   ALT 17   AST 26   ALKPHOS 188*   BILITOT 0.4   ALBUMIN 2.1*       Microbiology  Results (last 7 days)       Procedure Component Value Units Date/Time    Body Fluid Culture [[id number]] Collected: 02/04/25 1707    Order Status: Completed Specimen: Peritoneal Fluid Updated: 02/06/25 0951     Body Fluid Culture No Growth At 48 Hours    Gram Stain [[id number]] Collected: 02/04/25 1707    Order Status: Completed Specimen: Peritoneal Fluid Updated: 02/04/25 1935     GRAM STAIN Moderate WBC observed      No bacteria seen              Diagnostic Results:  I have reviewed all pertinent imaging results/findings within the past 24 hours.    Assessment/Plan:     Right heel DTI and Left heel unstageable pressure injury - present on admission. May need wound follow up once discharged from rehab.   Acute encephalopathy   ESRD, on peritoneal dialysis at home; currently on HD during hospitalization and SNF stay.   Deconditioned; currently non-verbal, agitated and combative  Recent right humerus fracture s/p IM nailing - December 2024  Left lower extremity skin cancer awaiting treatment              Chart reviewed, patient examined and wounds assessed.     Plan of Care:     [x]   Wound was assessed. Re-check patient today, unable to turn patient  yesterday to visualize posterior side on HD. No significant change to heels. Left heel covered with black eschar, edges more firm. Right heel intact blister, stable, no cellulitis/obvious infection.      Buttocks/sacrum blanching erythema - no breaks; denuded skin around anus.     Discussed with staff at bedside that constant offloading is necessary to prevent further skin damage and breakdown. Use heel boots and use pillows to float and offload. Use Purple wedge to keep patient off of sacrum. Use Zinc oxide paste around anus to protect from stool/moisture.      [x]   Patient tolerated well.     [x]   Wound Care Orders: Left heel and Right heel: paint with betadine generously then cover with gauze and ABD pad and secure with whole roll of Kerlix - daily  Monitor for any  signs of infection and deterioration: Watch for increased drainage or pain, fevers, chills, swelling.     [x]   Offloading: Must offload the wound to potentiate wound healing: has REED mattress, turning q 2 hrs; use of wedges and heel offloading devices to be used at all times while in bed; ( [employer] Tru[employer]ue). This needs to be reinforced by every staff nurse caring for patient on every shift of every day. May still use PT/OT services.  Must offload heels at all times while in bed or chair; ok for PT/OT and intermittent standing/ambulating.   Discussed need to use offloading boots as well as using pillows to float heels/feet.      [x]   Nutrition: Must have a high protein diet to support wound healing (if renal disease, see nephrologist for amount allowed). This should be over 100 grams protein / day (if no renal issues). Also recommend MVI along with vit C, vit D, zinc and Buddy. Prealbumin on 1/27/25 - 14.8. Follow RD  recommendations      [x]   Discussed plan of care with nurse caring for patient today.      The time spent including preparing to see the patient, obtaining patient history and assessment, evaluation of the plan of care, patient/caregiver counseling and education, orders, documentation, coordination of care, and other professional medical management activities for today's encounter was 50 minutes.            LETHA Matute  Wound Care  [facility]

## 2025-02-06 NOTE — PROCEDURES
EEG    Dx: Altered mental status, Left sided stroke    Duration: 30 min    Technical: Standard digital EEG was performed at [facility]. The 10/20 international system of electrode placement was used.  Photic   stimulation was performed.    Description: The posterior dominant rhythm was 6 Hz.  There   were occasional left temporal sharp waves centered at T7.  Triphasic waves were intermittently present. The patient drowsed.  There were   no  clinical seizures seen.    Impression: Left sided sharp waves indicative of a potentially epileptogenic focus from that region.  Evidence of underlying metabolic encephalopathy. Please correlate clinically.

## 2025-02-06 NOTE — PROGRESS NOTES
[facility] Hospital Medicine Progress Note        Chief Complaint: Inpatient Follow-up     HPI: 74 y.o. female who has a past medical history of HTN, HLD, deconditioning, humerus fracture with IM nail, NSTEMI, failed kidney transplant, ESRD-HD M/W/F previously on peritoneal dialysis, and recent cellulitis left lower extremity completed clindamycin treatment who presented to [facility] on 2/4/2025 with a primary complaint of altered mental status for the last 3-4 days according to the patient's , Brayden Schwartz at bedside.  The patient was admitted on 01/22/2025 for NSTEMI likely type 2, sepsis secondary to left lower extremity cellulitis, and ESRD on PD that was converted to hemodialysis prior to discharge.  During her hospitalization she was evaluated by consultants including wound care, Nephrology, Psychiatry, and Cardiology.  Heparin was discontinued and CIS recommended outpatient stress test (PET) in 2 weeks.  Right-sided tunneled dialysis catheter was placed on 01/28/2025 and she tolerated hemodialysis multiple times during her admission.  And progress note by Nephrology on 01/30 she was noted to be weak, confused, and not speaking much with poor intake.  Psychiatry recommended increasing the Lexapro which was done prior to her discharge.  Blood cultures with no growth to date.  She had dialysis on 02/03/2025 prior to being discharged to [facility].  The patient's , Mr. Brayden Schwartz at bedside states that she has not been herself since she was initiated on hemodialysis on 01/29/2025.  States prior to dialysis she was AAO x3, but since she was initiated on dialysis she has not been eating much, intermittently confused, and becomes quite agitated/combative.  On exam, she is protecting her airway, but minimally cooperative with examiner.  The patient will open her eyes to pain, she uses inappropriate/confused words, and localizes pain for GCS of 10.  She does  not appear to have any acute focal neurological deficits and appears to have possibly a metabolic encephalopathy.   denies any history of CVA or TIA.  ABG and ammonia level unremarkable.  Patient does not make any urine, so unable to obtain UA and UDS.  CT head without contrast showed no acute findings.  No further history available at this time.       ED course: Vital signs revealed the patient is afebrile and hemodynamically stable on room air.  Labs CBC showed normal WBC of 7, chronic normocytic anemia with a hemoglobin of 9, chemistry with elevated creatinine of 4.96 and BUN of 17, elevated potassium of 5.5, low albumin of 2, normal ammonia level of 33, elevated TSH of 6, normal lipid panel.  ABG with mild respiratory alkalosis.  CT head showed no acute intracranial findings.  Treated in the ED with Haldol and Benadryl.  Ordered Lokelma.     Ordered consultation to Nephrology and wound care nurse   Patient admitted for acute encephalopathy possibly metabolic versus ischemic in etiology     Interval Hx:   MRI with Single cytotoxic lesion in the corpus callosum is nonspecific and may be metabolic or toxic.  Neurology was consulted.  Therapy services following.  Palliative medicine following.  Undergoing dialysis per Nephrology.  No overnight events reported by the nursing staff.    Patient was seen examined, nonverbal,no family at bedside.  Chart was reviewed.      Objective/physical exam:  General: In no acute distress  Chest: Clear to auscultation bilaterally  Heart:  Normal rate ,+S1, S2  Abdomen: Soft, nontender, BS +  MSK: Warm, no lower extremity edema, no clubbing or cyanosis  Neurologic:  Limited exam- Alert ,opens eyes, nonverbal.  Does not follow  commands.was informed by staff that she is close to her baseline     VITAL SIGNS: 24 HRS MIN & MAX LAST   Temp  Min: 97.5 °F (36.4 °C)  Max: 99.2 °F (37.3 °C) 97.5 °F (36.4 °C)   BP  Min: 136/70  Max: 178/96 (!) 170/89   Pulse  Min: 85  Max: 103  85    Resp  Min: 17  Max: 20 18   SpO2  Min: 97 %  Max: 100 % 100 %     I have reviewed the following labs:  Recent Labs   Lab 02/03/25 0621 02/04/25 0530 02/05/25 0355   WBC 8.02 7.46 6.99   RBC 3.89* 3.75* 3.90*   HGB 10.2* 9.8* 10.3*   HCT 33.3* 33.0* 34.6*   MCV 85.6 88.0 88.7   MCH 26.2* 26.1* 26.4*   MCHC 30.6* 29.7* 29.8*   RDW 17.2* 17.0 17.0    374 379   MPV 9.5 9.3 9.3     Recent Labs   Lab 02/03/25 0621 02/04/25 0530 02/04/25  1334 02/05/25  0355    138  --  138   K 5.2* 5.5*  --  5.0    105  --  105   CO2 19* 22*  --  18*   BUN 37.0* 17.6  --  25.7*   CREATININE 8.00* 4.96*  --  6.33*   CALCIUM 8.7 8.8  --  8.5   PH  --   --  7.460*  --    MG  --   --   --  2.60   ALBUMIN 2.0* 2.0*  --  2.1*   ALKPHOS 220* 183*  --  188*   ALT 22 20  --  17   AST 27 24  --  26   BILITOT 0.3 0.4  --  0.4     Microbiology Results (last 7 days)       Procedure Component Value Units Date/Time    Body Fluid Culture [[id number]] Collected: 02/04/25 1707    Order Status: Completed Specimen: Peritoneal Fluid Updated: 02/06/25 0951     Body Fluid Culture No Growth At 48 Hours    Gram Stain [[id number]] Collected: 02/04/25 1707    Order Status: Completed Specimen: Peritoneal Fluid Updated: 02/04/25 1935     GRAM STAIN Moderate WBC observed      No bacteria seen             See below for Radiology    Assessment/Plan:  Acute encephalopathy  CVA  ESRD-HD M/W/F via right IJ tunneled dialysis catheter  Hyperkalemia, mild  Hypoglycemia.  Normocytic anemia  Generalized weakness and debility   Physical Deconditioning   Essential hypertension   Left heel stage 4 decubitus ulcers with eschar and right heel with stage II decubitus ulcer with blister.  History of sepsis secondary to cellulitis of left lower extremity completed clindamycin therapy  Left lower extremity skin cancer awaiting treatment   History of NSTEMI type 2 with CIS recommending outpatient stress test (PET scan )   Right Humerus fracture with IM nail  (12/2024)  History of colon polyp/lung nodule  History of peritoneal dialysis and failed kidney transplant   Dyslipidemia   Generalized anxiety disorder/major depressive disorder  Former smoker    Plan   Neurology was consulted.  Continue the stroke workup ;continue neuro checks.  Fall precautions.  On aspirin, statin.  PTOT speech consulted.Currently on IV nutrition  Palliative medicine following.  Continue dialysis per Nephrology.  Follow up BMP.  Continue Wound care.  Been afebrile without leukocytosis.  Noted hypoglycemia, Monitor blood sugars.    Continue supportive care, monitor blood pressure, adjust antihypertensive regimen as needed.  Continue other appropriate home medications.        VTE prophylaxis:  Heparin subQ> SCD      Anticipated discharge and Disposition:   To be decided      All diagnosis and differential diagnosis have been reviewed; assessment and plan has been documented; I have personally reviewed the labs and test results that are presently available; I have reviewed the patients medication list; I have reviewed the consulting providers response and recommendations. I have reviewed or attempted to review medical records based upon their availability    All of the patient's questions have been  addressed and answered. Patient's is agreeable to the above stated plan. I will continue to monitor closely and make adjustments to medical management as needed.    Portions of this note dictated using EMR integrated voice recognition software, and may be subject to voice recognition errors not corrected at proofreading. Please contact writer for clarification if needed.   _____________________________________________________________________    Malnutrition Status:  Nutrition consulted. Most recent weight and BMI monitored-     Measurements:  Wt Readings from Last 1 Encounters:   02/04/25 63.5 kg (140 lb)   Body mass index is 25.6 kg/m².    Patient has been screened and assessed by RD.    Malnutrition  Type:  Context:    Level: other (see comments) (Unable to assess)    Malnutrition Characteristic Summary:  Weight Loss (Malnutrition): other (see comments) (Does not meet criteria)  Energy Intake (Malnutrition): less than or equal to 50% for greater than or equal to 5 days  Fluid Accumulation (Malnutrition): other (see comments) (Unable to assess)    Interventions/Recommendations (treatment strategy):        Scheduled Med:   amLODIPine  5 mg Oral Daily    aspirin  81 mg Oral Daily    atorvastatin  40 mg Oral QHS    calcitRIOL  0.25 mcg Oral Once per day on Monday Wednesday Friday    folic acid  1 mg Oral Daily    heparin (porcine)  5,000 Units Subcutaneous Q8H    [START ON 2/10/2025] mupirocin   Nasal BID    pantoprazole  40 mg Oral Daily      Continuous Infusions:   Amino acid 4.25% - dextrose 5% (CLINIMIX-E) solution (1L provides 42.5 gm AA, 50 gm CHO (170 kcal/L dextrose), Na 35, K 30, Mg 5, Ca 4.5, Acetate 70, Cl 39, Phos 15)   Intravenous Continuous 50 mL/hr at 02/05/25 1739 New Bag at 02/05/25 1739      PRN Meds:    Current Facility-Administered Medications:     acetaminophen, 650 mg, Oral, Q4H PRN    acetaminophen, 650 mg, Oral, Q8H PRN    bisacodyL, 10 mg, Rectal, Daily PRN    dextrose 50%, 12.5 g, Intravenous, PRN    dextrose 50%, 25 g, Intravenous, PRN    glucagon (human recombinant), 1 mg, Intramuscular, PRN    glucose, 16 g, Oral, PRN    glucose, 24 g, Oral, PRN    melatonin, 6 mg, Oral, Nightly PRN    naloxone, 0.02 mg, Intravenous, PRN    ondansetron, 4 mg, Intravenous, Q8H PRN    sodium chloride 0.9%, 10 mL, Intravenous, Q12H PRN    Flushing PICC/Midline Protocol, , , Until Discontinued **AND** sodium chloride 0.9%, 10 mL, Intravenous, Q12H PRN     Radiology:  I have personally reviewed the following imaging and agree with the radiologist.     MRI Brain Without Contrast  Narrative: EXAMINATION:  MRI BRAIN WITHOUT CONTRAST    CLINICAL HISTORY:  Mental status change, unknown cause;Altered mental  status;    TECHNIQUE:  Multiplanar, multisequence MR images of the brain were obtained without the administration of intravenous contrast.    COMPARISON:  CT head dated 02/04/2025    FINDINGS:  Evaluation is limited by motion artifact.  There is a rounded focus of restricted diffusion and edema in the left splenium of the corpus callosum.  Moderate patchy T2/FLAIR hyperintense in the subcortical and periventricular white matter likely represent chronic microvascular ischemic changes.  There is no mass effect or midline shift.  The basal cisterns are patent.  There is mild diffuse parenchymal volume loss.  There is no hydrocephalus or abnormal extra-axial fluid collection.  The major intracranial flow voids are patent.  Impression: 1. Single cytotoxic lesion in the corpus callosum is nonspecific and may be metabolic or toxic.  2. Moderate chronic microvascular ischemic changes.    Electronically signed by: Violet Gillette  Date:    02/05/2025  Time:    14:30      Federica Orta MD  Department of Hospital Medicine   [facility]   02/06/2025

## 2025-02-06 NOTE — HPI
74-year-old female with PMH HTN, HLD, deconditioning, STEMI, failed kidney transplant with ESRD on HD, and recent cellulitis to LLE s/p clindamycin who presented to ED on 02/04 d/t AMS.  Patient was recently admitted 01/22-2/3 for NSTEMI, sepsis 2/2 LLE cellulitis, and acute on chronic renal failure.  Prior to this admission patient on PD which was converted to HD during that admission, first round HD on 01/29.  Patient's  reports day after tunneled dialysis catheter placed on 1/28 she no longer spoke and refused to eat with increased generalized weakness.  She was discharged on 2/3 from [facility] and admitted to [facility].    Patient admitted for acute encephalopathy possibly metabolic versus ischemic in etiology. MRI brain w/o showed a rounded focus of restricted diffusion and edema in the left splenium of the corpus callosum. Stroke neurology consulted for evaluation.   Concerns for seizure-like activity on 02/06, started on Keppra 500 mg b.i.d., and EEG 02/06 consistent with metabolic encephalopathy and unrevealing for electrographic seizures.  Family endorsing guidance on goals of care.  Family requesting neurology input on meaningful neurologic recovery to assist with their decisions regarding goals of care.    Repeat CT head on 02/07 unrevealing for acute intracranial abnormalities.  Most recent labs significant for hyponatremia and acute on chronic renal indices.

## 2025-02-06 NOTE — SUBJECTIVE & OBJECTIVE
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 Glomerulitis with one activated endothelial cell: treated with Solumedrol pulse 375 mg IVBP x 3 doses with short prednisone taper; thymoglobin 100 mg x 6 doses 10/04/2007 minimal histologic changes no acute rejection     Allergy     Anemia of renal disease     CKD (chronic kidney disease), stage IV     CMV (cytomegalovirus) status positive     Colon polyp     benign    Depression     GN (glomerulonephritis)     GN (glomerulonephritis)     unknown type    Herpes infection, other 2009    on buttocks    Hyperlipidemia     Hypertension     Immunosuppression     Living-donor kidney transplant recipient 01/15/2007    Meningitis     history of viral meningitis    Proteinuria 1/5/2015    Proteinuria     Pulmonary nodules     Renal hypertension     S/P breast augmentation     Skin cancer     Urinary tract infection        Past Surgical History:   Procedure Laterality Date    COLON SURGERY      COLONOSCOPY W/ BIOPSIES      cosemetic surgery      facelift and breast augmentation     HYSTERECTOMY      INSERTION OF TUNNELED CENTRAL VENOUS HEMODIALYSIS CATHETER N/A 1/28/2025    Procedure: Insertion, Catheter, Central Venous, Hemodialysis;  Surgeon: Apollo Jacob DO;  Location: [facility] CATH LAB;  Service: Nephrology;  Laterality: N/A;    INTRAMEDULLARY RODDING OF HUMERUS Right 12/2/2024    Procedure: INSERTION, INTRAMEDULLARY MONTSERRAT, HUMERUS;  Surgeon: Samuel Dow DO;  Location: [facility] OR;  Service: Orthopedics;  Laterality: Right;  supine vacular backward c arm synthes  shoulder scope drape    KIDNEY TRANSPLANT      NEPHRECTOMY Left     9/2016    RENAL BIOPSY      TONSILLECTOMY, ADENOIDECTOMY         Review of patient's allergies indicates:   Allergen Reactions    Codeine Palpitations and Rash    Cefepime      Other Reaction(s): Encephalopathy    Renal impairment increased risk for this reaction     Levofloxacin      Other Reaction(s): Encephalopathy    Renal pt who had some encephalopathy while on Levaquin & Cefepime, neurology physicians think they may have been due to the antibiotics (both possible side effects for each)    Iodine Nausea Only       No current facility-administered medications on file prior to encounter.     Current Outpatient Medications on File Prior to Encounter   Medication Sig    amLODIPine (NORVASC) 10 MG tablet Take 5 mg by mouth once daily.    aspirin (ECOTRIN) 81 MG EC tablet Take 81 mg by mouth once daily.    B complex-vitamin C-folic acid (NEPHRO-JENN) 0.8 mg Tab Take 0.8 mg by mouth Daily.    calcitRIOL (ROCALTROL) 0.25 MCG Cap Take 0.25 mcg by mouth 3 (three) times a week.    D5W PgBk 50 mL with ceFAZolin 1 gram SolR Cefazolin intraperitoneally times 10 more days    EScitalopram oxalate (LEXAPRO) 20 MG tablet Take 1 tablet (20 mg total) by mouth once daily.    folic acid (FOLVITE) 1 MG tablet Take 1 mg by mouth once daily.    megestroL (MEGACE) 400 mg/10 mL (10 mL) Susp Take 10 mLs (400 mg total) by mouth once daily.    methocarbamoL (ROBAXIN) 500 MG Tab Take 500 mg by mouth 4 (four) times daily.    pantoprazole (PROTONIX) 40 MG tablet Take 40 mg by mouth once daily.    potassium chloride (KLOR-CON) 10 MEQ TbSR Take 10 mEq by mouth once daily.    traMADoL (ULTRAM) 50 mg tablet Take 50 mg by mouth 3 (three) times daily as needed for Pain.    [DISCONTINUED] losartan (COZAAR) 100 MG tablet TAKE ONE TABLET BY MOUTH ONCE DAILY    [DISCONTINUED] metoprolol succinate (TOPROL-XL) 25 MG 24 hr tablet Take 2 tablets (50 mg total) by mouth once daily. (Patient not taking: Reported on 5/12/2022)    [DISCONTINUED] mycophenolate (CELLCEPT) 250 mg Cap Take 2 capsules (500 mg total) by mouth 2 (two) times daily. Z94.0 kidney transplant (Patient not taking: Reported on 5/12/2022)    [DISCONTINUED] tacrolimus (PROGRAF) 1 MG Cap Take 3mg every AM and 2mg every PM.  Route: Oral.  Z94.0 Kidney  Transplant. (Patient not taking: Reported on 5/12/2022)     Family History       Problem Relation (Age of Onset)    Heart failure Mother, Father    Hypertension Brother    Kidney disease Sister, Brother          Tobacco Use    Smoking status: Former     Current packs/day: 1.00     Average packs/day: 1 pack/day for 15.0 years (15.0 ttl pk-yrs)     Types: Cigarettes    Smokeless tobacco: Never    Tobacco comments:     quit in 2014; smoked for 15 years and at the most smoked 1 ppd   Substance and Sexual Activity    Alcohol use: Yes     Comment: seldom    Drug use: No    Sexual activity: Never     Review of Systems   Unable to perform ROS: Mental status change     Objective:     Vital Signs (Most Recent):  Temp: 98.4 °F (36.9 °C) (02/06/25 0728)  Pulse: 103 (02/06/25 0728)  Resp: 18 (02/06/25 0728)  BP: (!) 159/86 (02/06/25 0728)  SpO2: 98 % (02/06/25 0728) Vital Signs (24h Range):  Temp:  [98.4 °F (36.9 °C)-99.2 °F (37.3 °C)] 98.4 °F (36.9 °C)  Pulse:  [] 103  Resp:  [17-20] 18  SpO2:  [97 %-100 %] 98 %  BP: (130-178)/(70-96) 159/86     Weight: 63.5 kg (140 lb)  Body mass index is 25.6 kg/m².     Physical Exam  Vitals and nursing note reviewed.   HENT:      Head: Normocephalic and atraumatic.      Right Ear: External ear normal.      Left Ear: External ear normal.      Nose: Nose normal.      Mouth/Throat:      Mouth: Mucous membranes are moist.      Pharynx: Oropharynx is clear.   Eyes:      Extraocular Movements: Extraocular movements intact.      Conjunctiva/sclera: Conjunctivae normal.      Pupils: Pupils are equal, round, and reactive to light.   Cardiovascular:      Rate and Rhythm: Normal rate.   Pulmonary:      Effort: Pulmonary effort is normal.   Abdominal:      Palpations: Abdomen is soft.   Skin:     General: Skin is warm and dry.   Neurological:      Mental Status: She is alert.      Cranial Nerves: No dysarthria.      Comments: Patient would not name her  or sitter, Evita.   She smiled on  "command and when requested to lift arms, she stated clearly "why do you want me to do that".   She smiled spontaneously and nodded yes to her  was love of her life.   No drift of extremities. BUE strength 5/5. BLE strength 3/5.             NEUROLOGICAL EXAMINATION:     CRANIAL NERVES     CN III, IV, VI   Pupils are equal, round, and reactive to light.      Significant Labs: All pertinent lab results from the past 24 hours have been reviewed.    Significant Imaging: I have reviewed all pertinent imaging results/findings within the past 24 hours.  "

## 2025-02-06 NOTE — PLAN OF CARE
Problem: Adult Inpatient Plan of Care  Goal: Plan of Care Review  Outcome: Progressing  Goal: Patient-Specific Goal (Individualized)  Outcome: Progressing  Goal: Absence of Hospital-Acquired Illness or Injury  Outcome: Progressing  Goal: Optimal Comfort and Wellbeing  Outcome: Progressing  Goal: Readiness for Transition of Care  Outcome: Progressing     Problem: Infection  Goal: Absence of Infection Signs and Symptoms  Outcome: Progressing     Problem: Wound  Goal: Optimal Coping  Outcome: Progressing  Goal: Optimal Functional Ability  Outcome: Progressing  Goal: Absence of Infection Signs and Symptoms  Outcome: Progressing  Goal: Improved Oral Intake  Outcome: Progressing  Goal: Optimal Pain Control and Function  Outcome: Progressing  Goal: Skin Health and Integrity  Outcome: Progressing  Goal: Optimal Wound Healing  Outcome: Progressing     Problem: Hemodialysis  Goal: Safe, Effective Therapy Delivery  Outcome: Progressing  Goal: Effective Tissue Perfusion  Outcome: Progressing  Goal: Absence of Infection Signs and Symptoms  Outcome: Progressing     Problem: Skin Injury Risk Increased  Goal: Skin Health and Integrity  Outcome: Progressing     Problem: Coping Ineffective  Goal: Effective Coping  Outcome: Progressing

## 2025-02-06 NOTE — SUBJECTIVE & OBJECTIVE
Subjective:     HPI:  Wound medicine re-check     The patient is a 74 year old female with a multitude of medical problems including HTN, HLD, failed kidney transplant, ESRD on PD, deconditioning with recent humerus fracture s/p IM nail (12/2024).  She was recently discharged from this facility on 2/3/25 after a 13 day admission with NSTEMI, LLE cellulitis, missed PD and deconditioning. She was converted to hemodialysis prior to discharge so that she could go to rehab, she was discharged to [facility] for SNF on 2/3/25. She returned form NH on 2/4/25 with AMS, combative towards staff and family and not speaking. Of note records reveal she had flat affect and not speaking prior to discharge and was evaluated by Psychiatry.  concerned because she has not shown improvement.  She has been admitted for workup of acute encephalopathy; CT head without acute acute intracranial findings; MRI brain shows abnormality in the left corpus callosum - Neuro consult noted , PD fluid culture in process.   Wound medicine consulted upon admission for continuity of care, we saw this patient during recent prior encounter. She presents with bilateral heel pressure injuries as well as multiple areas of skin cancer, awaiting treatment, followed by dermatology outpatient.   Initial evaluation on 2/5/24 - bilateral heel pressure injuries present on admission. Right heel unstageable, left heel DTI intact blister.      2/6/25 - wound re-check today.  Met the patient in room 1007.  She is awake with eyes open, she does not respond to questioning but does track me around the room and looks at me when I call her name. CNA's in room providing daily care after BM. Some agitation during care, swinging arms, kicking legs.    She is on a low air loss bed with bilateral heel boots in place as well as sequential compression devices on BLE. She is in no apparent distress.             Hospital Course:   No notes on file          Scheduled  Meds:   amLODIPine  5 mg Oral Daily    aspirin  81 mg Oral Daily    atorvastatin  40 mg Oral QHS    calcitRIOL  0.25 mcg Oral Once per day on Monday Wednesday Friday    folic acid  1 mg Oral Daily    heparin (porcine)  5,000 Units Subcutaneous Q8H    pantoprazole  40 mg Oral Daily     Continuous Infusions:   Amino acid 4.25% - dextrose 5% (CLINIMIX-E) solution (1L provides 42.5 gm AA, 50 gm CHO (170 kcal/L dextrose), Na 35, K 30, Mg 5, Ca 4.5, Acetate 70, Cl 39, Phos 15)   Intravenous Continuous 50 mL/hr at 02/05/25 1739 New Bag at 02/05/25 1739     PRN Meds:  Current Facility-Administered Medications:     acetaminophen, 650 mg, Oral, Q4H PRN    acetaminophen, 650 mg, Oral, Q8H PRN    dextrose 50%, 12.5 g, Intravenous, PRN    dextrose 50%, 25 g, Intravenous, PRN    glucagon (human recombinant), 1 mg, Intramuscular, PRN    glucose, 16 g, Oral, PRN    glucose, 24 g, Oral, PRN    melatonin, 6 mg, Oral, Nightly PRN    naloxone, 0.02 mg, Intravenous, PRN    ondansetron, 4 mg, Intravenous, Q8H PRN    sodium chloride 0.9%, 10 mL, Intravenous, Q12H PRN    Flushing PICC/Midline Protocol, , , Until Discontinued **AND** sodium chloride 0.9%, 10 mL, Intravenous, Q12H PRN    Review of Systems   Unable to perform ROS: Patient nonverbal     Objective:     Vital Signs (Most Recent):  Temp: 98.4 °F (36.9 °C) (02/06/25 0728)  Pulse: 103 (02/06/25 0728)  Resp: 18 (02/06/25 0728)  BP: (!) 159/86 (02/06/25 0728)  SpO2: 98 % (02/06/25 0728) Vital Signs (24h Range):  Temp:  [98.4 °F (36.9 °C)-99.2 °F (37.3 °C)] 98.4 °F (36.9 °C)  Pulse:  [] 103  Resp:  [17-20] 18  SpO2:  [97 %-100 %] 98 %  BP: (130-178)/(70-96) 159/86     Weight: 63.5 kg (140 lb)  Body mass index is 25.6 kg/m².     Physical Exam  Vitals reviewed.   Constitutional:       General: She is awake. She is not in acute distress.     Appearance: She is normal weight. She is ill-appearing (chronic).      Comments: Appears older than stated age; thin with diffuse muscle  wasting    HENT:      Head: Normocephalic and atraumatic.      Nose: Nose normal.   Cardiovascular:      Rate and Rhythm: Normal rate and regular rhythm.      Pulses: Normal pulses.   Pulmonary:      Effort: Pulmonary effort is normal. No respiratory distress.   Musculoskeletal:        Legs:         Feet:    Skin:     General: Skin is warm and dry.      Capillary Refill: Capillary refill takes less than 2 seconds.      Comments: Redness to sacrum that blanches; area of bruising to right upper back and other scattered bruises and superficial abrasions/skin tears to bilateral upper and lower extremities    Neurological:      General: No focal deficit present.      Mental Status: She is alert.      Comments: Non-verbal   Responds to touch with pulling away and crossing legs; seems agitated   She does track me around the room and looks at me when I call her name.        Left heel       Right heel       Left lower leg; dorsal foot - skin cancer growth       Posterior view                Laboratory:    ABGs:   Recent Labs   Lab 02/04/25  1334   PH 7.460*   PCO2 30.0*   HCO3 21.3*       BMP:   Recent Labs   Lab 02/05/25  0355      K 5.0      CO2 18*   BUN 25.7*   CREATININE 6.33*   CALCIUM 8.5   MG 2.60     CBC:   Recent Labs   Lab 02/05/25  0355   WBC 6.99   RBC 3.90*   HGB 10.3*   HCT 34.6*      MCV 88.7   MCH 26.4*   MCHC 29.8*     CMP:   Recent Labs   Lab 02/05/25  0355   CALCIUM 8.5   ALBUMIN 2.1*      K 5.0   CO2 18*      BUN 25.7*   CREATININE 6.33*   ALKPHOS 188*   ALT 17   AST 26   BILITOT 0.4       LFTs:   Recent Labs   Lab 02/05/25  0355   ALT 17   AST 26   ALKPHOS 188*   BILITOT 0.4   ALBUMIN 2.1*       Microbiology Results (last 7 days)       Procedure Component Value Units Date/Time    Body Fluid Culture [[id number]] Collected: 02/04/25 1707    Order Status: Completed Specimen: Peritoneal Fluid Updated: 02/06/25 0951     Body Fluid Culture No Growth At 48 Hours    Gram Stain  [[id number]] Collected: 02/04/25 1707    Order Status: Completed Specimen: Peritoneal Fluid Updated: 02/04/25 1935     GRAM STAIN Moderate WBC observed      No bacteria seen              Diagnostic Results:  I have reviewed all pertinent imaging results/findings within the past 24 hours.

## 2025-02-06 NOTE — ASSESSMENT & PLAN NOTE
- presented with AMS   - Stroke RF: ESRD  - Intervention: None, out of window.   - Etiology: TBD    Stroke workup:  -CTh: Impression:  1.  No acute intracranial findings identified.  2.  Marked chronic microangiopathic ischemia and age appropriate atrophy.   -MRI brain: Evaluation is limited by motion artifact.  There is a rounded focus of restricted diffusion and edema in the left splenium of the corpus callosum.  Moderate patchy T2/FLAIR hyperintense in the subcortical and periventricular white matter likely represent chronic microvascular ischemic changes.  There is no mass effect or midline shift.  The basal cisterns are patent.  There is mild diffuse parenchymal volume loss.  There is no hydrocephalus or abnormal extra-axial fluid collection.  The major intracranial flow voids are patent.  Impression:  1. Single cytotoxic lesion in the corpus callosum is nonspecific and may be metabolic or toxic.  2. Moderate chronic microvascular ischemic changes.   -ECHO: ordered   -CUS: ordered  -LDL: 97   -A1c: 4.3%   -TSH: 6.322   -home medications include: ASA 81 mg   NIH Stroke Scale    Current NIH Stroke Score Values      Flowsheet Row Most Recent Value   1a. Level of Consciousness 0-->Alert, keenly responsive   1b. LOC Questions 2-->Answers neither question correctly   1c. LOC Commands 0-->Performs both tasks correctly   2. Best Gaze 0-->Normal   3. Visual 0-->No visual loss   4. Facial Palsy 0-->Normal symmetrical movements   5a. Motor Arm, Left 0-->No drift, limb holds 90 (or 45) degrees for full 10 secs   5b. Motor Arm, Right 0-->No drift, limb holds 90 (or 45) degrees for full 10 secs   6a. Motor Leg, Left 0-->No drift, leg holds 30 degree position for full 5 secs   6b. Motor Leg, Right 0-->No drift, leg holds 30 degree position for full 5 secs   7. Limb Ataxia 0-->Absent   8. Sensory 0-->Normal, no sensory loss   9. Best Language 2-->Severe aphasia, all communication is through fragmentary expression, great need  for inference, questioning, and guessing by the listener. Range of information that can be exchanged is limited, listener carries burden of. . . (see row details)   10. Dysarthria 0-->Normal   11. Extinction and Inattention (formerly Neglect) 0-->No abnormality   Total (NIH Stroke Scale) 4            Plan:  -continue stroke workup (echo, CUS, A1C, MRA Neck and Brain)  -continue Aspirin 81mg daily  -continue Atorvastatin 40mg daily  -MCT x 2 weeks, consider longer duration of monitoring or loop if needed evaluate cardiac cause for stroke  -PT/OT/ST to evaluate  -SCD for DVT prophylaxis   - ok to normalize blood pressure  - Neuro checks q4hr ... stat CTh if any neuro change   - Continuous telemetry monitoring  - NPO until passes [place] or cleared by SLP  - No bedrest, ok for patient to ambulate, RN to observe first ambulation for safety  - Other recommendations may follow from MD

## 2025-02-06 NOTE — PROGRESS NOTES
Nephrology consult follow up note    HPI:      Malissa Schwartz is a 74 y.o. female who presents with persistent encephalopathy. Past medical history significant for ESRD, hypertension, hyperlipidemia, deconditioning, it is to be, humerus fracture, failed renal transplant, and recent left lower extremity cellulitis. Patient was discharge after hospitalization yesterday 02/03/2025 from [facility] to a skilled nursing facility. During her previous hospital stay she was transitioned from peritoneal dialysis to hemodialysis in order to get her to a skilled nursing facility after admission. Patient was noted to be we can confused prior to hospital admission. Her  states that nothing changed since she was discharge from the hospital, but she has not improved. She remains confused, combative, and not verbally responsive to questioning. Nephrology was consulted for ESRD management. Patient did dialyze on 02/03/2025 prior to hospital discharge. Patient is barely arousable to sternal rub, and not answering any questions at time of my examination.     Interval history:     02/05/2025   Currently tolerating dialysis.  Remains nonverbal.  No respiratory distress.  Awake and alert.    2/6/25  No acute events overnight.  Remains encephalopathic.     Review of Systems:     Unable to obtain ROS due to mental status    Past medical, family, surgical, and social history reviewed and unchanged from initial consult note.     Objective:       VITAL SIGNS: 24 HR MIN & MAX LAST    Temp  Min: 97.5 °F (36.4 °C)  Max: 99.2 °F (37.3 °C)  97.5 °F (36.4 °C)        BP  Min: 136/70  Max: 178/96  (!) 170/89     Pulse  Min: 85  Max: 103  85     Resp  Min: 17  Max: 20  18    SpO2  Min: 97 %  Max: 100 %  100 %      GEN: Chronically ill appearing WF in NAD  CV: RRR +S1,S2 without murmur  PULM: CTAB, unlabored  ABD: Soft, NT/ND abdomen with NABS  EXT: No cyanosis or edema  SKIN: Warm and dry  PSYCH:  Awake and alert.  Not responding to  questioning.  Dialysis access:  Right IJ PermCath, PD catheter            Component Value Date/Time     02/05/2025 0355     02/04/2025 0530     06/20/2018 1402     01/08/2018 0835    K 5.0 02/05/2025 0355    K 5.5 (H) 02/04/2025 0530    K 4.8 06/20/2018 1402    K 4.6 01/08/2018 0835    CO2 18 (L) 02/05/2025 0355    CO2 22 (L) 02/04/2025 0530    CO2 23 06/20/2018 1402    CO2 22 (L) 01/08/2018 0835    BUN 25.7 (H) 02/05/2025 0355    BUN 17.6 02/04/2025 0530    BUN 30 (H) 06/20/2018 1402    BUN 24 (H) 01/08/2018 0835    CREATININE 6.33 (H) 02/05/2025 0355    CREATININE 4.96 (H) 02/04/2025 0530    CREATININE 1.9 (H) 06/20/2018 1402    CREATININE 1.8 (H) 01/08/2018 0835    CALCIUM 8.5 02/05/2025 0355    CALCIUM 8.8 02/04/2025 0530    CALCIUM 9.7 06/20/2018 1402    CALCIUM 9.1 01/08/2018 0835    PHOS 6.5 (H) 02/05/2025 0355    PHOS 3.8 06/20/2018 1402            Component Value Date/Time    WBC 6.99 02/05/2025 0355    WBC 7.46 02/04/2025 0530    WBC 4.26 (L) 07/04/2024 0000    WBC 6.90 06/20/2018 1402    WBC 3.37 (L) 01/08/2018 0835    HGB 10.3 (L) 02/05/2025 0355    HGB 9.8 (L) 02/04/2025 0530    HGB 9 (L) 12/18/2024 0000    HGB 10.2 (L) 11/26/2024 0000    HGB 10.5 (L) 06/20/2018 1402    HGB 9.1 (L) 01/08/2018 0835    HCT 34.6 (L) 02/05/2025 0355    HCT 33.0 (L) 02/04/2025 0530    HCT 29.8 (L) 12/18/2024 0000    HCT 31.8 (L) 11/26/2024 0000    HCT 32.0 (L) 06/05/2020 0815    HCT 31.6 (L) 06/20/2018 1402    HCT 27.3 (L) 01/08/2018 0835     02/05/2025 0355     02/04/2025 0530     06/20/2018 1402     01/08/2018 0835         Imaging reviewed      Assessment / Plan:       Active Hospital Problems    Diagnosis  POA    Pressure injury of deep tissue of right heel [L89.616]  Yes    Unstageable pressure ulcer of left heel [L89.620]  Yes     Chronic    ESRD (end stage renal disease) [N18.6]  Yes      Resolved Hospital Problems   No resolved problems to display.       ESRD -  previously on peritoneal dialysis, recently transitioned to hemodialysis for skilled nursing placement.  Has been both hemodialysis, and peritoneal dialysis access.  Acute encephalopathy  Hyperkalemia     Plan:  No acute indication for off schedule hemodialysis today.  Plan to dialyze tomorrow on MWF schedule.  P.o. intake remains poor.  Continue Clinimix for now.    Isaiah Mosquera DO  Nephrology  [facility]  Clinic number: [phone]      Note was created with the assistance of electronic Dictation Services.  Note was reviewed to decrease errors, however, these may still be present.  Please contact me about questions or concerns with the dictation.

## 2025-02-06 NOTE — PLAN OF CARE
Updates sent to Jorgee at the [facility] via [employer]. As of now, plan is for pt to return to SNF once stable for d/c.     Rhiannon Finch LCSW

## 2025-02-06 NOTE — PT/OT/SLP EVAL
[facility]  Speech Language Pathology Department  Clinical Swallow Evaluation    Patient Name:  Malissa Schwartz   MRN:  [id number]    Recommendations     General recommendations:  SLP follow up regarding CVA workup  Solid texture recommendation:  Regular  Liquid consistency recommendation: Thin   Medications: per patient preference  Swallow strategies/precautions: small bites/sips, slow rate, upright for PO intake, and assist with feeding as needed  Precautions: aspiration    History     Malissa Schwartz is a/n 74 y.o. female re-admitted from a SNF due to AMS.  SLP was consult due to poor PO intake.  A CVA workup has been initiated.    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 Glomerulitis with one activated endothelial cell: treated with Solumedrol pulse 375 mg IVBP x 3 doses with short prednisone taper; thymoglobin 100 mg x 6 doses 10/04/2007 minimal histologic changes no acute rejection     Allergy     Anemia of renal disease     CKD (chronic kidney disease), stage IV     CMV (cytomegalovirus) status positive     Colon polyp     benign    Depression     GN (glomerulonephritis)     GN (glomerulonephritis)     unknown type    Herpes infection, other 2009    on buttocks    Hyperlipidemia     Hypertension     Immunosuppression     Living-donor kidney transplant recipient 01/15/2007    Meningitis     history of viral meningitis    Proteinuria 1/5/2015    Proteinuria     Pulmonary nodules     Renal hypertension     S/P breast augmentation     Skin cancer     Urinary tract infection      Home diet texture/consistency: Regular and thin liquids  Current method of nutrition: PO diet (heart healthy)    Imaging     MRI Brain Without Contrast    Narrative  EXAMINATION:  MRI BRAIN WITHOUT CONTRAST    CLINICAL HISTORY:  Mental status change, unknown cause;Altered mental status;    TECHNIQUE:  Multiplanar,  multisequence MR images of the brain were obtained without the administration of intravenous contrast.    COMPARISON:  CT head dated 02/04/2025    FINDINGS:  Evaluation is limited by motion artifact.  There is a rounded focus of restricted diffusion and edema in the left splenium of the corpus callosum.  Moderate patchy T2/FLAIR hyperintense in the subcortical and periventricular white matter likely represent chronic microvascular ischemic changes.  There is no mass effect or midline shift.  The basal cisterns are patent.  There is mild diffuse parenchymal volume loss.  There is no hydrocephalus or abnormal extra-axial fluid collection.  The major intracranial flow voids are patent.    Impression  1. Single cytotoxic lesion in the corpus callosum is nonspecific and may be metabolic or toxic.  2. Moderate chronic microvascular ischemic changes.      Electronically signed by: Violet Gillette  Date:    02/05/2025  Time:    14:30    Subjective     Patient awake, alert, and nonverbal.  Max encouragement required for participation.    Patient goals: unable to state   Spiritual/Cultural/Judaism Beliefs/Practices that affect care: no    Pain/Comfort: Pain Rating 1: 0/10    Respiratory Status:  room air    Restraints/positioning devices: none    Objective     ORAL MUSCULATURE  Dentition: own teeth  Secretion Management: adequate  Mucosal Quality: good  Facial Movement: WFL  Buccal Strength & Mobility: WFL  Mandibular Strength & Mobility: WFL  Oral Labial Strength & Mobility: WFL  Lingual Strength & Mobility: WFL  Vocal Quality: no phonation    PO TRIALS  Consistency Fed By Oral Symptoms Pharyngeal Symptoms   Puree SLP None None   Thin liquid by straw SLP None None   Chewable solid SLP None None     Assessment     Oropharyngeal swallow WFL.  No overt/clinical signs/sx aspiration noted.    Outcome Measures     Functional Oral Intake Scale: 7 - Total oral diet with no restrictions    Education     Patient and spouse were  provided with verbal education regarding SLP POC.  Understanding was verbalized.    Plan     SLP Follow-Up:  Yes   Plan of Care reviewed with:  patient, spouse, caregiver     Time Tracking     SLP Treatment Date:   02/06/25  Speech Start Time:  1055  Speech Stop Time:  1110     Speech Total Time (min):  15 min    Billable minutes:  Swallow and Oral Function Evaluation, 15 minutes     02/06/2025

## 2025-02-06 NOTE — CARE UPDATE
Orders per Dr. Garcia to begin Keppra 500 mg bid. Checked with pharmacy for renal dosing, okay to proceed with Keppra 500 mg twice daily.     Nicole Tariq NP

## 2025-02-07 LAB
ALLENS TEST BLOOD GAS (OHS): YES
AMMONIA PLAS-MSCNC: 15.3 UMOL/L (ref 18–72)
ANION GAP SERPL CALC-SCNC: 10 MEQ/L
BASE EXCESS BLD CALC-SCNC: 4.9 MMOL/L (ref -2–2)
BASOPHILS # BLD AUTO: 0.05 X10(3)/MCL
BASOPHILS NFR BLD AUTO: 0.6 %
BLOOD GAS SAMPLE TYPE (OHS): ABNORMAL
BUN SERPL-MCNC: 38.7 MG/DL (ref 9.8–20.1)
CA-I BLD-SCNC: 1.21 MMOL/L (ref 1.12–1.23)
CALCIUM SERPL-MCNC: 8.3 MG/DL (ref 8.4–10.2)
CHLORIDE SERPL-SCNC: 104 MMOL/L (ref 98–107)
CO2 BLDA-SCNC: 28.6 MMOL/L
CO2 SERPL-SCNC: 18 MMOL/L (ref 23–31)
COHGB MFR BLDA: 1.6 % (ref 0.5–1.5)
CREAT SERPL-MCNC: 5.9 MG/DL (ref 0.55–1.02)
CREAT/UREA NIT SERPL: 7
DRAWN BY BLOOD GAS (OHS): ABNORMAL
EOSINOPHIL # BLD AUTO: 0.08 X10(3)/MCL (ref 0–0.9)
EOSINOPHIL NFR BLD AUTO: 1 %
ERYTHROCYTE [DISTWIDTH] IN BLOOD BY AUTOMATED COUNT: 16.5 % (ref 11.5–17)
GFR SERPLBLD CREATININE-BSD FMLA CKD-EPI: 7 ML/MIN/1.73/M2
GLUCOSE SERPL-MCNC: 101 MG/DL (ref 82–115)
HCO3 BLDA-SCNC: 27.6 MMOL/L (ref 22–26)
HCT VFR BLD AUTO: 32.3 % (ref 37–47)
HGB BLD-MCNC: 10.2 G/DL (ref 12–16)
IMM GRANULOCYTES # BLD AUTO: 0.04 X10(3)/MCL (ref 0–0.04)
IMM GRANULOCYTES NFR BLD AUTO: 0.5 %
LYMPHOCYTES # BLD AUTO: 1.39 X10(3)/MCL (ref 0.6–4.6)
LYMPHOCYTES NFR BLD AUTO: 17 %
MAGNESIUM SERPL-MCNC: 2.5 MG/DL (ref 1.6–2.6)
MCH RBC QN AUTO: 26.4 PG (ref 27–31)
MCHC RBC AUTO-ENTMCNC: 31.6 G/DL (ref 33–36)
MCV RBC AUTO: 83.5 FL (ref 80–94)
METHGB MFR BLDA: 0.3 % (ref 0.4–1.5)
MONOCYTES # BLD AUTO: 0.63 X10(3)/MCL (ref 0.1–1.3)
MONOCYTES NFR BLD AUTO: 7.7 %
NEUTROPHILS # BLD AUTO: 5.97 X10(3)/MCL (ref 2.1–9.2)
NEUTROPHILS NFR BLD AUTO: 73.2 %
NRBC BLD AUTO-RTO: 0 %
O2 HB BLOOD GAS (OHS): 96.2 % (ref 94–97)
OXYGEN DEVICE BLOOD GAS (OHS): ABNORMAL
OXYHGB MFR BLDA: 10.8 G/DL (ref 12–16)
PCO2 BLDA: 33 MMHG (ref 35–45)
PH BLDA: 7.53 [PH] (ref 7.35–7.45)
PHOSPHATE SERPL-MCNC: 7.2 MG/DL (ref 2.3–4.7)
PLATELET # BLD AUTO: 338 X10(3)/MCL (ref 130–400)
PMV BLD AUTO: 9.3 FL (ref 7.4–10.4)
PO2 BLDA: 87 MMHG (ref 80–100)
POCT GLUCOSE: 109 MG/DL (ref 70–110)
POCT GLUCOSE: 127 MG/DL (ref 70–110)
POCT GLUCOSE: 127 MG/DL (ref 70–110)
POTASSIUM BLOOD GAS (OHS): 3.5 MMOL/L (ref 3.5–5)
POTASSIUM SERPL-SCNC: 4.1 MMOL/L (ref 3.5–5.1)
PROLACTIN LEVEL (OLG): 49.53 NG/ML (ref 5.18–26.53)
RBC # BLD AUTO: 3.87 X10(6)/MCL (ref 4.2–5.4)
SAMPLE SITE BLOOD GAS (OHS): ABNORMAL
SAO2 % BLDA: 97.6 %
SODIUM BLOOD GAS (OHS): 132 MMOL/L (ref 137–145)
SODIUM SERPL-SCNC: 132 MMOL/L (ref 136–145)
WBC # BLD AUTO: 8.16 X10(3)/MCL (ref 4.5–11.5)

## 2025-02-07 PROCEDURE — 87070 CULTURE OTHR SPECIMN AEROBIC: CPT | Performed by: INTERNAL MEDICINE

## 2025-02-07 PROCEDURE — 87040 BLOOD CULTURE FOR BACTERIA: CPT | Performed by: INTERNAL MEDICINE

## 2025-02-07 PROCEDURE — 83735 ASSAY OF MAGNESIUM: CPT | Performed by: INTERNAL MEDICINE

## 2025-02-07 PROCEDURE — 84100 ASSAY OF PHOSPHORUS: CPT | Performed by: INTERNAL MEDICINE

## 2025-02-07 PROCEDURE — 80048 BASIC METABOLIC PNL TOTAL CA: CPT | Performed by: INTERNAL MEDICINE

## 2025-02-07 PROCEDURE — 99900035 HC TECH TIME PER 15 MIN (STAT)

## 2025-02-07 PROCEDURE — 85025 COMPLETE CBC W/AUTO DIFF WBC: CPT | Performed by: INTERNAL MEDICINE

## 2025-02-07 PROCEDURE — 82803 BLOOD GASES ANY COMBINATION: CPT

## 2025-02-07 PROCEDURE — 21400001 HC TELEMETRY ROOM

## 2025-02-07 PROCEDURE — 82140 ASSAY OF AMMONIA: CPT | Performed by: INTERNAL MEDICINE

## 2025-02-07 PROCEDURE — 63600175 PHARM REV CODE 636 W HCPCS: Performed by: INTERNAL MEDICINE

## 2025-02-07 PROCEDURE — 80100016 HC MAINTENANCE HEMODIALYSIS

## 2025-02-07 PROCEDURE — 25000003 PHARM REV CODE 250: Performed by: NURSE PRACTITIONER

## 2025-02-07 PROCEDURE — 36600 WITHDRAWAL OF ARTERIAL BLOOD: CPT

## 2025-02-07 PROCEDURE — 25000003 PHARM REV CODE 250

## 2025-02-07 PROCEDURE — 25000003 PHARM REV CODE 250: Performed by: INTERNAL MEDICINE

## 2025-02-07 PROCEDURE — 36415 COLL VENOUS BLD VENIPUNCTURE: CPT | Performed by: INTERNAL MEDICINE

## 2025-02-07 PROCEDURE — 84146 ASSAY OF PROLACTIN: CPT | Performed by: INTERNAL MEDICINE

## 2025-02-07 RX ORDER — VANCOMYCIN HCL IN 5 % DEXTROSE 1.25 G/25
20 PLASTIC BAG, INJECTION (ML) INTRAVENOUS ONCE
Status: DISCONTINUED | OUTPATIENT
Start: 2025-02-07 | End: 2025-02-07

## 2025-02-07 RX ORDER — AMLODIPINE BESYLATE 5 MG/1
5 TABLET ORAL 2 TIMES DAILY
Status: DISCONTINUED | OUTPATIENT
Start: 2025-02-08 | End: 2025-02-11 | Stop reason: HOSPADM

## 2025-02-07 RX ADMIN — LEVETIRACETAM 500 MG: 100 INJECTION, SOLUTION INTRAVENOUS at 10:02

## 2025-02-07 RX ADMIN — ASPIRIN 81 MG: 81 TABLET, COATED ORAL at 08:02

## 2025-02-07 RX ADMIN — AMLODIPINE BESYLATE 5 MG: 5 TABLET ORAL at 08:02

## 2025-02-07 RX ADMIN — LEVETIRACETAM 500 MG: 500 TABLET, FILM COATED ORAL at 08:02

## 2025-02-07 RX ADMIN — VANCOMYCIN HYDROCHLORIDE 1250 MG: 1.25 INJECTION, POWDER, LYOPHILIZED, FOR SOLUTION INTRAVENOUS at 07:02

## 2025-02-07 RX ADMIN — HYDRALAZINE HYDROCHLORIDE 5 MG: 20 INJECTION INTRAMUSCULAR; INTRAVENOUS at 05:02

## 2025-02-07 NOTE — PROGRESS NOTES
Advance Care Planning     Date: 02/07/2025    Palliative Care follow up completed, review of chart reveals that patient was cleared by speech for total oral diet with no restrictions. Per neurology note yesterday  had reported patient was more alert today than yesterday and norberto to track and acknowledge family in the room. Review of neurology notes revealed MRI brain w/o showed a rounded focus of restricted diffusion and edema in the left splenium of the corpus callosum. EEG impression:Impression: Left sided sharp waves indicative of a potentially epileptogenic focus from that region.  Evidence of underlying metabolic encephalopathy. Neurology started Keppra 500 mg BID.     Per notes last night, nurse notified attending and Dr. Reyes of neurological change. Patient's  at bedside states patient  noted difficulty with waking of patient prior to leaving for dialysis. Assessed patient in dialysis, Patient lethargic, only able to open eyes partially and closes back closed promptly afterwards, does not participate in assessment.  Spoke with Dr. Orta and Gaby Palliative Care NP regarding assessment findings. CT of the head pending. Palliative care to continue to discuss goals of care. Patient's  at bedside updated on plan of care and patient's current status by Dr. Orta. Goals of care to be determined pending CT results and recommendations by neurology.       [facility]  I engaged the family in a voluntary conversation about advance care planning and we specifically addressed what the goals of care would be moving forward, in light of the patient's change in clinical status, specifically patient's current condtio and current plan of care.  We explored the patient's values and preferences for future care.  The family endorses that what is most important right now is to focus on curative/life-prolongation (regardless of treatment burdens)    Accordingly, we have decided that the best plan to meet the  "patient's goals includes continuing with treatment    Palliative Care RN visit was spent on advance care planning, goals of care discussion, emotional support, formulating and communicating prognosis and exploring burden/benefit of various approaches of treatment. This discussion occurred on a fully voluntary basis with the verbal consent of the patient and/or family.        Addendum: Reviewed neurology note, patient minimally responsive, not speaking; not following commands. Patient's  at bedside. Discussed patient's current status and discussed in the event should the patient's heart and breathing would stop what would be the medical wishes of patient/family. Discussed that patient was a full code and informed that if her heart and breathing would stop that medical staff would provide heroic measures including compressions, cardioversion, mechanical ventilation and potentially other life supportive measures to attempt to have return of circulation.     I reviewed the risks and benefits of aggressive cardiopulmonary resuscitative measures taken in the event of a cardiopulmonary arrest event. I discussed the high risk of non-survival from such an event.  The explained that statistically, only about 1 in 15 patients survive a cardiopulmonary arrest to return home, despite aggressive CPR and life saving measures. I discussed the risk of increased morbidity and a potential decline in quality of life with survival from such an event. Finally I reviewed the risks to the patient who survives such an event, including chest wall injuries from CPR, the risk of persistent ventilator needs and the risk of anoxic brain injuries.  If she were to survive such an event, she also may suffer anoxic brain injury I explained that in such situations such a high risk of death and high-risk of increased morbidity with survival, some patients might elect to allow a natural death are DNR code status.  Patient's  states : "we " "have talked about this in the past and she never wanted to be on life support or to be brought back, I think we have a DNR in our will at the bank" .The patient's  therefore elects on her behalf,  a DNR code status    Dr. Orta, Palliative Care and medical team notified of goals of care update.     Advance Care Planning     Date: 02/07/2025    Code Status  In light of the patients advanced and life limiting illness,I engaged the the family in a voluntary conversation about the patient's preferences for care  at the very end of life. The patient wishes to have a natural, peaceful death.  Along those lines, the patient does not wish to have CPR or other invasive treatments performed when her heart and/or breathing stops. I communicated to the family that a DNR order would be placed in her medical record to reflect this preference.        "

## 2025-02-07 NOTE — PROGRESS NOTES
"Pharmacokinetic Initial Assessment: IV Vancomycin    Assessment/Plan:    Initiate intravenous vancomycin with loading dose of 1250 mg once with subsequent doses when random concentrations are less than 20 mcg/mL  Desired empiric serum trough concentration is 10 to 20 mcg/mL  Draw vancomycin random level on 2/10 at 0430.  Pharmacy will continue to follow and monitor vancomycin.         Patient brief summary:  Malissa Schwartz is a 74 y.o. female initiated on antimicrobial therapy with IV Vancomycin for treatment of suspected sepsis      Actual Body Weight:   63.5 kg    Renal Function:   Estimated Creatinine Clearance: 7.2 mL/min (A) (based on SCr of 5.9 mg/dL (H)).,     Dialysis Method (if applicable):  intermittent HD    CBC (last 72 hours):  Recent Labs   Lab Result Units 02/05/25  0355 02/06/25  1127 02/07/25  0736   WBC x10(3)/mcL 6.99  --  8.16   Hgb g/dL 10.3*  --  10.2*   Hemoglobin A1c %  --  4.3  --    Hct % 34.6*  --  32.3*   Platelet x10(3)/mcL 379  --  338   Mono % % 8.2  --  7.7   Eos % % 1.0  --  1.0   Basophil % % 1.1  --  0.6       Metabolic Panel (last 72 hours):  Recent Labs   Lab Result Units 02/05/25 0355 02/07/25  0736   Sodium mmol/L 138 132*   Potassium mmol/L 5.0 4.1   Chloride mmol/L 105 104   CO2 mmol/L 18* 18*   Glucose mg/dL 50* 101   Blood Urea Nitrogen mg/dL 25.7* 38.7*   Creatinine mg/dL 6.33* 5.90*   Albumin g/dL 2.1*  --    Bilirubin Total mg/dL 0.4  --    ALP unit/L 188*  --    AST unit/L 26  --    ALT unit/L 17  --    Magnesium Level mg/dL 2.60 2.50   Phosphorus Level mg/dL 6.5* 7.2*       Drug levels (last 3 results):  No results for input(s): "VANCOMYCINRA", "VANCORANDOM", "VANCOMYCINPE", "VANCOPEAK", "VANCOMYCINTR", "VANCOTROUGH" in the last 72 hours.    Microbiologic Results:  Microbiology Results (last 7 days)       Procedure Component Value Units Date/Time    Wound Culture [[id number]]     Order Status: Sent Specimen: Wound from Heel     Blood Culture [[id number]]     Order " Status: Sent Specimen: Blood from Arm, Left     Blood Culture [[id number]]     Order Status: Sent Specimen: Blood from Arm, Right     Body Fluid Culture [[id number]] Collected: 02/04/25 1707    Order Status: Completed Specimen: Peritoneal Fluid Updated: 02/07/25 1116     Body Fluid Culture No Growth At 72 Hours    Gram Stain [[id number]] Collected: 02/04/25 1707    Order Status: Completed Specimen: Peritoneal Fluid Updated: 02/04/25 1935     GRAM STAIN Moderate WBC observed      No bacteria seen

## 2025-02-07 NOTE — PROGRESS NOTES
Nephrology following for ESRD management.     Exam:  Gen: Ill appaering WF  Ext: No LE edema  HD access: RIJ permcath      Plan:  Seen on HD at 9:50am. 1.5L UF as tolerated. Continue MWF HD.     Isaiah Mosquera DO  Nephrology  [facility]  Clinic number: [phone]      Note was created with the assistance of electronic Dictation Services.  Note was reviewed to decrease errors, however, these may still be present.  Please contact me about questions or concerns with the dictation.

## 2025-02-07 NOTE — NURSING
02/07/25 1210   Post-Hemodialysis Assessment   Blood Volume Processed (Liters) 63 L   Dialyzer Clearance Moderately streaked   Duration of Treatment 180 minutes   Total UF (mL) 1500 mL   Patient Response to Treatment Tolerated well   Post-Hemodialysis Comments Tx completed as ordered. CVC functioned well

## 2025-02-07 NOTE — NURSING
Notified Tracy Patel, NP, concerning status change (pt unable to swallow on command). IV Keppra ordered and pt made NPO until Speech can reevaluate. Spoke to Dr. Thairy Reyes in person, explaining the development. She assessed the pt and remarked that her pupils are now dilated, which is a change from my initial neuro assessment. I mentioned this to Dr. Reyes, who stated that she will review the pt's history and decide the next step to take in her care.

## 2025-02-07 NOTE — PT/OT/SLP PROGRESS
SLP attempting clinical swallowing evaluation, however pt lethargic and unable to follow commands. PO intake is unsafe. SLP to reattempt tomorrow as appropriate.

## 2025-02-07 NOTE — PLAN OF CARE
Updates sent to [facility] at the [facility] via [employer]. Plan for pt to return to SNF once medically stable. D/c date unknown at this time. Pt NPO pending SLP re-eval.     Rhiannon Finch, LCSW    [id number] Received update from Cindy at [facility] confirming she is receiving updates and that pt will need new auth prior to readmission.

## 2025-02-07 NOTE — PT/OT/SLP PROGRESS
I reviewed the recent LFTs and ultrasound results by phone with the patient  ALT remains elevated and she has diffuse fatty changes to her liver  Advised abstinence from alcohol, low-fat diet, gentle weight loss as able  I have ordered all the serologies to exclude viral, autoimmune and metabolic etiology    Order directly to [employer]     Thanks [facility]      Patient Name:  Malissa Schwartz   MRN:  [id number]    PT eval orders received. Pt currently off of floor for dialysis and pending CT afterward 2/2 possible neuro change per nsg. PT to f/u as appropriate.     2/7/2025

## 2025-02-07 NOTE — CARE UPDATE
Notified by nurse, Armand, that patient is unable to swallow on command. Spoke with nurse and he was told in report she was swallowing with no issues prior. Denies she has any mentation changes or new focal deficits. VS stable. Will give PM dose of Keppra IV, keep NPO for now and have SLP reevaluate.

## 2025-02-07 NOTE — PROGRESS NOTES
Inpatient Nutrition Assessment    Admit Date: 2/4/2025   Total duration of encounter: 3 days   Patient Age: 74 y.o.    Nutrition Recommendation/Prescription     Continue PPN until able to tolerate tube feeding or oral diet: Clinimix E 4.25/5% @ 50ml/hr (408 kcal 51 gm protein)    Oral diet per SLP recs once appropriate    If unable to begin oral diet, rec tube feeding for nutrition: Novasource Renal goal rate 40ml/hr (based on run time 20h/day)  Provides:  1600 kcal  72gm protein  675 ml free water    Free water flush: 20ml/hr or per MD    *begin at 20ml/hr and increase by 10ml every 3-4 hours as tolerated to goal rate      Communication of Recommendations: reviewed with nurse    Nutrition Assessment     Malnutrition Assessment/Nutrition-Focused Physical Exam    Malnutrition Context: acute illness or injury (02/07/25 1642)  Malnutrition Level: moderate (02/07/25 1642)  Energy Intake (Malnutrition): less than or equal to 50% for greater than or equal to 5 days (02/07/25 1642)  Weight Loss (Malnutrition): other (see comments) (Does not meet criteria) (02/07/25 1642)  Subcutaneous Fat (Malnutrition): moderate depletion (02/07/25 1642)  Orbital Region (Subcutaneous Fat Loss): moderate depletion        Muscle Mass (Malnutrition): moderate depletion (02/07/25 1642)  [place] Region (Muscle Loss): moderate depletion  Clavicle Bone Region (Muscle Loss): moderate depletion  Clavicle and Acromion Bone Region (Muscle Loss): moderate depletion                 Fluid Accumulation (Malnutrition): other (see comments) (Not present) (02/07/25 1642)        A minimum of two characteristics is recommended for diagnosis of either severe or non-severe malnutrition.    Chart Review    Reason Seen: physician consult for Pressure sores to heels    Malnutrition Screening Tool Results   Have you recently lost weight without trying?: Unsure  Have you been eating poorly because of a decreased appetite?: Yes   MST Score: 3   Diagnosis:  Acute  encephalopathy possibly metabolic versus ischemic etiologies   ESRD-HD M/W/F via right IJ tunneled dialysis catheter  Hyperkalemia, mild  Left heel stage 4 decubitus ulcers with eschar and right heel with stage II decubitus ulcer with blister.  Generalized weakness and debility     Relevant Medical History: HTN, HLD, deconditioning, humerus fracture with IM nail, NSTEMI, failed kidney transplant, ESRD-HD M/W/F previously on peritoneal dialysis, and recent cellulitis left lower extremity     Scheduled Medications:  amLODIPine, 5 mg, Daily  aspirin, 81 mg, Daily  atorvastatin, 40 mg, QHS  calcitRIOL, 0.25 mcg, Once per day on Monday Wednesday Friday  folic acid, 1 mg, Daily  levETIRAcetam, 500 mg, BID  [START ON 2/10/2025] mupirocin, , BID  pantoprazole, 40 mg, Daily    Continuous Infusions:  Amino acid 4.25% - dextrose 5% (CLINIMIX-E) solution (1L provides 42.5 gm AA, 50 gm CHO (170 kcal/L dextrose), Na 35, K 30, Mg 5, Ca 4.5, Acetate 70, Cl 39, Phos 15), Last Rate: 50 mL/hr at 02/06/25 2014  Amino acid 4.25% - dextrose 5% (CLINIMIX-E) solution (1L provides 42.5 gm AA, 50 gm CHO (170 kcal/L dextrose), Na 35, K 30, Mg 5, Ca 4.5, Acetate 70, Cl 39, Phos 15)    PRN Medications:  acetaminophen, 650 mg, Q4H PRN  acetaminophen, 650 mg, Q8H PRN  bisacodyL, 10 mg, Daily PRN  dextrose 50%, 12.5 g, PRN  dextrose 50%, 25 g, PRN  glucagon (human recombinant), 1 mg, PRN  glucose, 16 g, PRN  glucose, 24 g, PRN  hydrALAZINE, 5 mg, Q6H PRN  melatonin, 6 mg, Nightly PRN  naloxone, 0.02 mg, PRN  ondansetron, 4 mg, Q8H PRN  sodium chloride 0.9%, 10 mL, Q12H PRN  sodium chloride 0.9%, 10 mL, Q12H PRN    Calorie Containing IV Medications: Clinimix    Recent Labs   Lab 02/02/25  0648 02/03/25  0621 02/04/25  0530 02/04/25  1348 02/05/25  0355 02/06/25  1127 02/07/25  0736    137 138  --  138  --  132*   K 4.8 5.2* 5.5*  --  5.0  --  4.1   CALCIUM 8.3* 8.7 8.8  --  8.5  --  8.3*   PHOS 4.8* 5.3*  --   --  6.5*  --  7.2*   MG  --   --    --   --  2.60  --  2.50    107 105  --  105  --  104   CO2 20* 19* 22*  --  18*  --  18*   BUN 24.7* 37.0* 17.6  --  25.7*  --  38.7*   CREATININE 6.45* 8.00* 4.96*  --  6.33*  --  5.90*   EGFRNORACEVR 6 5 9  --  6  --  7   GLUCOSE 78* 87 54*  --  50*  --  101   BILITOT 0.3 0.3 0.4  --  0.4  --   --    ALKPHOS 192* 220* 183*  --  188*  --   --    ALT 22 22 20  --  17  --   --    AST 32 27 24  --  26  --   --    ALBUMIN 1.7* 2.0* 2.0*  --  2.1*  --   --    TRIG  --   --  102  --   --   --   --    HGBA1C  --   --   --   --   --  4.3  --    AMMONIA  --   --   --  33.4  --   --   --    WBC 6.40 8.02 7.46  --  6.99  --  8.16   HGB 9.8* 10.2* 9.8*  --  10.3*  --  10.2*   HCT 31.9* 33.3* 33.0*  --  34.6*  --  32.3*     Nutrition Orders:  Amino acid 4.25% - dextrose 5% (CLINIMIX-E) solution (1L provides 42.5 gm AA, 50 gm CHO (170 kcal/L dextrose), Na 35, K 30, Mg 5, Ca 4.5, Acetate 70, Cl 39, Phos 15)  Diet NPO  Amino acid 4.25% - dextrose 5% (CLINIMIX-E) solution (1L provides 42.5 gm AA, 50 gm CHO (170 kcal/L dextrose), Na 35, K 30, Mg 5, Ca 4.5, Acetate 70, Cl 39, Phos 15)  Dietary nutrition supplements All Meals; [facility] [employer]    Appetite/Oral Intake: poor/0-25% of meals  Factors Affecting Nutritional Intake: impaired cognitive status/motor control and decreased appetite  Social Needs Impacting Access to Food: unable to assess at this time; will attempt on follow-up  Food/Church/Cultural Preferences: unable to obtain  Food Allergies: no known food allergies  Last Bowel Movement: 02/07/25  Wound(s):     Wound 01/24/25 1130 Other (comment) Left lower Leg-Tissue loss description: Not applicable       Wound 02/02/25 1200 Other (comment) Left distal;anterior Arm-Tissue loss description: Not applicable       Wound 10/05/24 1717 Pressure Injury Left Heel #4-Tissue loss description: Full thickness     Comments    2/5/25 Pt's  reports pt has not been eating much since pt started on HD 1/29/25,  "previously on PD, had been discharged from the hospital a couple days ago and has had further decline overal    2/7/25 Pt currently NPO per SLP, very lethargic, nurse reports minimal oral intake prior to NPO, was drinking a few sips of oral supplements, but overall has been sleeping; PPN running        Anthropometrics    Height: 5' 2.01" (157.5 cm), Height Method: Estimated  Last Weight: 63.5 kg (140 lb) (02/04/25 1056), Weight Method: Bed Scale  BMI (Calculated): 25.6  BMI Classification: overweight (BMI 25-29.9)     Ideal Body Weight (IBW), Female: 110.05 lb     % Ideal Body Weight, Female (lb): 127.27 %                             Usual Weight Provided By: EMR weight history and unable to obtain usual weight    Wt Readings from Last 5 Encounters:   02/04/25 63.5 kg (140 lb)   02/02/25 64 kg (141 lb 1.6 oz)   12/23/24 63 kg (138 lb 14.2 oz)   12/09/24 63.5 kg (140 lb)   12/02/24 59 kg (130 lb)     Weight Change(s) Since Admission:   Wt Readings from Last 1 Encounters:   02/04/25 1056 63.5 kg (140 lb)   Admit Weight: 63.5 kg (140 lb) (02/04/25 1056), Weight Method: Estimated    Estimated Needs    Weight Used For Calorie Calculations: 63.5 kg (139 lb 15.9 oz)  Energy Calorie Requirements (kcal): 8477-8647 kcal (25-30 kcal/kg)  Energy Need Method: Kcal/kg  Weight Used For Protein Calculations: 63.5 kg (139 lb 15.9 oz)  Protein Requirements: 76gm (1.2 gm/kg)  Fluid Requirements (mL): 1,000 ml + output (renal on HD)        Enteral Nutrition     Patient not receiving enteral nutrition at this time.    Parenteral Nutrition     Standard Formula: Clinimix E 4.25/5  Custom Formula: not applicable  Additives: none  Rate/Volume: 50ml/hr  Lipids: none  Total Nutrition Provided by Parenteral Nutrition:  Calories Provided  408 kcal/d, 25% needs   Protein Provided  51 g/d, 67% needs   Dextrose Provided  60 g/d, GIR 0.66 mg CHO/kg/min   Fluid Provided  1200 ml/d       Evaluation of Received Nutrient Intake    Calories: not meeting " estimated needs  Protein: not meeting estimated needs    Patient Education     Not applicable.    Nutrition Diagnosis     PES: Inadequate oral intake related to acute illness as evidenced by <50% intake of meals for >/= 7 days. (active)     PES: Moderate acute disease or injury related malnutrition Related to suboptimal protein energy intake As Evidenced by:  - energy intake: <= 50% for 7 days (meets criteria for <= 50% >= 5 days (severe - acute)) - muscle mass depletion: 6 areas of moderate muscle loss (Deltoid, Temporalis, Trapezius, Acromion, Clavicle, Pectoralis) - loss of subcutaneous fat: 1 area of moderate fat loss (Infraorbital) active    Nutrition Interventions     Intervention(s): collaboration with other providers  Intervention(s):      Goal: Meet greater than 80% of nutritional needs by follow-up. (goal not met)  Goal: Consume % of meals/snacks by follow-up. (goal not met)    Nutrition Goals & Monitoring     Dietitian will monitor: energy intake, weight change, electrolyte/renal panel, and beliefs/attitudes  Discharge planning: too early to determine; pending clinical course  Nutrition Risk/Follow-Up: high (follow-up in 1-4 days)   Please consult if re-assessment needed sooner.

## 2025-02-07 NOTE — PROGRESS NOTES
[facility] Hospital Medicine Progress Note        Chief Complaint: Inpatient Follow-up     HPI: 74 y.o. female who has a past medical history of HTN, HLD, deconditioning, humerus fracture with IM nail, NSTEMI, failed kidney transplant, ESRD-HD M/W/F previously on peritoneal dialysis, and recent cellulitis left lower extremity completed clindamycin treatment who presented to [facility] on 2/4/2025 with a primary complaint of altered mental status for the last 3-4 days according to the patient's , Brayden Schwartz at bedside.  The patient was admitted on 01/22/2025 for NSTEMI likely type 2, sepsis secondary to left lower extremity cellulitis, and ESRD on PD that was converted to hemodialysis prior to discharge.  During her hospitalization she was evaluated by consultants including wound care, Nephrology, Psychiatry, and Cardiology.  Heparin was discontinued and CIS recommended outpatient stress test (PET) in 2 weeks.  Right-sided tunneled dialysis catheter was placed on 01/28/2025 and she tolerated hemodialysis multiple times during her admission.  And progress note by Nephrology on 01/30 she was noted to be weak, confused, and not speaking much with poor intake.  Psychiatry recommended increasing the Lexapro which was done prior to her discharge.  Blood cultures with no growth to date.  She had dialysis on 02/03/2025 prior to being discharged to [facility].  The patient's , Mr. Brayden Schwartz at bedside states that she has not been herself since she was initiated on hemodialysis on 01/29/2025.  States prior to dialysis she was AAO x3, but since she was initiated on dialysis she has not been eating much, intermittently confused, and becomes quite agitated/combative.  On exam, she is protecting her airway, but minimally cooperative with examiner.  The patient will open her eyes to pain, she uses inappropriate/confused words, and localizes pain for GCS of 10.  She does  not appear to have any acute focal neurological deficits and appears to have possibly a metabolic encephalopathy.   denies any history of CVA or TIA.  ABG and ammonia level unremarkable.  Patient does not make any urine, so unable to obtain UA and UDS.  CT head without contrast showed no acute findings.  No further history available at this time.       ED course: Vital signs revealed the patient is afebrile and hemodynamically stable on room air.  Labs CBC showed normal WBC of 7, chronic normocytic anemia with a hemoglobin of 9, chemistry with elevated creatinine of 4.96 and BUN of 17, elevated potassium of 5.5, low albumin of 2, normal ammonia level of 33, elevated TSH of 6, normal lipid panel.  ABG with mild respiratory alkalosis.  CT head showed no acute intracranial findings.  Treated in the ED with Haldol and Benadryl.  Ordered Lokelma.  Ordered consultation to Nephrology and wound care nurse bowen.  Admitted for encephalopathy    MRI with Single cytotoxic lesion in the corpus callosum is nonspecific and may be metabolic or toxic.  Neurology was consulted.  Therapy services consulted.  Palliative medicine was consulted.     Interval Hx:   Concern for change in mental status overnight, CT scan was ordered and Neurology notified.  Made NPO.  Palliative medicine following    Met with the family this morning, verbalized understanding about the plan based on the CT scan and neurology input.  Patient was seen examined while in dialysis, opens eyes, nonverbal.  Chart was reviewed.  Afebrile.  Most recent lab work was reviewed.    Objective/physical exam:  General: In no acute distress  Chest: Clear to auscultation bilaterally  Heart:  Normal rate ,+S1, S2  Abdomen: Soft, nontender, BS +  MSK: Warm, no lower extremity edema, no clubbing or cyanosis  Neurologic:  Limited exam- Alert ,opens eyes, nonverbal.  Does not follow  commands.was informed by staff that she is close to her baseline     VITAL SIGNS: 24 HRS  MIN & MAX LAST   Temp  Min: 97.8 °F (36.6 °C)  Max: 98.4 °F (36.9 °C) 97.8 °F (36.6 °C)   BP  Min: 149/83  Max: 186/99 (!) (P) 167/98   Pulse  Min: 83  Max: 89  89   Resp  Min: 18  Max: 18 18   SpO2  Min: 96 %  Max: 99 % 96 %     I have reviewed the following labs:  Recent Labs   Lab 02/04/25  0530 02/05/25  0355 02/07/25  0736   WBC 7.46 6.99 8.16   RBC 3.75* 3.90* 3.87*   HGB 9.8* 10.3* 10.2*   HCT 33.0* 34.6* 32.3*   MCV 88.0 88.7 83.5   MCH 26.1* 26.4* 26.4*   MCHC 29.7* 29.8* 31.6*   RDW 17.0 17.0 16.5    379 338   MPV 9.3 9.3 9.3     Recent Labs   Lab 02/03/25  0621 02/04/25  0530 02/04/25  1334 02/05/25  0355 02/07/25  0736    138  --  138 132*   K 5.2* 5.5*  --  5.0 4.1    105  --  105 104   CO2 19* 22*  --  18* 18*   BUN 37.0* 17.6  --  25.7* 38.7*   CREATININE 8.00* 4.96*  --  6.33* 5.90*   CALCIUM 8.7 8.8  --  8.5 8.3*   PH  --   --  7.460*  --   --    MG  --   --   --  2.60 2.50   ALBUMIN 2.0* 2.0*  --  2.1*  --    ALKPHOS 220* 183*  --  188*  --    ALT 22 20  --  17  --    AST 27 24  --  26  --    BILITOT 0.3 0.4  --  0.4  --      Microbiology Results (last 7 days)       Procedure Component Value Units Date/Time    Blood Culture [[id number]]     Order Status: Sent Specimen: Blood from Arm, Left     Blood Culture [[id number]]     Order Status: Sent Specimen: Blood from Arm, Right     Body Fluid Culture [[id number]] Collected: 02/04/25 1707    Order Status: Completed Specimen: Peritoneal Fluid Updated: 02/07/25 1116     Body Fluid Culture No Growth At 72 Hours    Gram Stain [[id number]] Collected: 02/04/25 1707    Order Status: Completed Specimen: Peritoneal Fluid Updated: 02/04/25 1935     GRAM STAIN Moderate WBC observed      No bacteria seen             See below for Radiology    Assessment/Plan:  Acute encephalopathy  CVA  ESRD-HD M/W/F via right IJ tunneled dialysis catheter  Hyperkalemia, mild  Hypoglycemia.  Normocytic anemia  Generalized weakness and debility   Physical  Deconditioning   Essential hypertension   Left heel stage 4 decubitus ulcers with eschar and right heel with stage II decubitus ulcer with blister.  History of sepsis secondary to cellulitis of left lower extremity completed clindamycin therapy  Left lower extremity skin cancer awaiting treatment   History of NSTEMI type 2 with CIS recommending outpatient stress test (PET scan )   Right Humerus fracture with IM nail (12/2024)  History of colon polyp/lung nodule  History of peritoneal dialysis and failed kidney transplant   Dyslipidemia   Generalized anxiety disorder/major depressive disorder  Former smoker    Plan   Neurology was consulted.  Continue the stroke workup ;continue neuro checks.  Fall precautions.  On aspirin, statin.   PTOT speech on board.  Initially cleared by speech for swallowing, now with new concerns about change in mental status , made NPO, continue IV nutrition/NG tube feeding.  CT scan was ordered this morning, follow up on the report, neurology notified about change in mental status.  Continue Keppra.  We will also follow up on encephalopathy workup that was ordered(ammonia, blood gas, blood cultures, UA, wound cultures, imaging).  Initiate empiric antibiotics.Continue Wound care.   Monitor electrolytes and replete appropriately  Continue dialysis per Nephrology.  Follow up BMP.  Monitor blood sugars.  , watch for hypoglycemia  Monitor blood pressure, adjust antihypertensive regimen as needed.    Continue other appropriate home medications.        VTE prophylaxis:  Heparin subQ> SCD      Anticipated discharge and Disposition:   To be decided      All diagnosis and differential diagnosis have been reviewed; assessment and plan has been documented; I have personally reviewed the labs and test results that are presently available; I have reviewed the patients medication list; I have reviewed the consulting providers response and recommendations. I have reviewed or attempted to review medical  records based upon their availability    All of the patient's questions have been  addressed and answered. Patient's is agreeable to the above stated plan. I will continue to monitor closely and make adjustments to medical management as needed.    Portions of this note dictated using EMR integrated voice recognition software, and may be subject to voice recognition errors not corrected at proofreading. Please contact writer for clarification if needed.   _____________________________________________________________________    Malnutrition Status:  Nutrition consulted. Most recent weight and BMI monitored-     Measurements:  Wt Readings from Last 1 Encounters:   02/04/25 63.5 kg (140 lb)   Body mass index is 25.6 kg/m².    Patient has been screened and assessed by RD.    Malnutrition Type:  Context:    Level: other (see comments) (Unable to assess)    Malnutrition Characteristic Summary:  Weight Loss (Malnutrition): other (see comments) (Does not meet criteria)  Energy Intake (Malnutrition): less than or equal to 50% for greater than or equal to 5 days  Fluid Accumulation (Malnutrition): other (see comments) (Unable to assess)    Interventions/Recommendations (treatment strategy):        Scheduled Med:   amLODIPine  5 mg Oral Daily    aspirin  81 mg Oral Daily    atorvastatin  40 mg Oral QHS    calcitRIOL  0.25 mcg Oral Once per day on Monday Wednesday Friday    folic acid  1 mg Oral Daily    levETIRAcetam  500 mg Oral BID    [START ON 2/10/2025] mupirocin   Nasal BID    pantoprazole  40 mg Oral Daily      Continuous Infusions:   Amino acid 4.25% - dextrose 5% (CLINIMIX-E) solution (1L provides 42.5 gm AA, 50 gm CHO (170 kcal/L dextrose), Na 35, K 30, Mg 5, Ca 4.5, Acetate 70, Cl 39, Phos 15)   Intravenous Continuous 50 mL/hr at 02/06/25 2014 New Bag at 02/06/25 2014    Amino acid 4.25% - dextrose 5% (CLINIMIX-E) solution (1L provides 42.5 gm AA, 50 gm CHO (170 kcal/L dextrose), Na 35, K 30, Mg 5, Ca 4.5, Acetate 70,  Cl 39, Phos 15)   Intravenous Continuous          PRN Meds:    Current Facility-Administered Medications:     acetaminophen, 650 mg, Oral, Q4H PRN    acetaminophen, 650 mg, Oral, Q8H PRN    bisacodyL, 10 mg, Rectal, Daily PRN    dextrose 50%, 12.5 g, Intravenous, PRN    dextrose 50%, 25 g, Intravenous, PRN    glucagon (human recombinant), 1 mg, Intramuscular, PRN    glucose, 16 g, Oral, PRN    glucose, 24 g, Oral, PRN    hydrALAZINE, 5 mg, Intravenous, Q6H PRN    melatonin, 6 mg, Oral, Nightly PRN    naloxone, 0.02 mg, Intravenous, PRN    ondansetron, 4 mg, Intravenous, Q8H PRN    sodium chloride 0.9%, 10 mL, Intravenous, Q12H PRN    Flushing PICC/Midline Protocol, , , Until Discontinued **AND** sodium chloride 0.9%, 10 mL, Intravenous, Q12H PRN     Radiology:  I have personally reviewed the following imaging and agree with the radiologist.         Federica Orta MD  Department of Hospital Medicine   [facility]   02/07/2025

## 2025-02-07 NOTE — PROGRESS NOTES
Pt off of floor at HD and pending CT afterward 2/2 MS change per nsg, will con't to f/u as appropriate.

## 2025-02-07 NOTE — PLAN OF CARE
Problem: Adult Inpatient Plan of Care  Goal: Optimal Comfort and Wellbeing  Outcome: Progressing  Intervention: Provide Person-Centered Care  Flowsheets (Taken 2/7/2025 0410)  Trust Relationship/Rapport:   care explained   questions encouraged   reassurance provided     Problem: Infection  Goal: Absence of Infection Signs and Symptoms  Outcome: Progressing  Intervention: Prevent or Manage Infection  Flowsheets (Taken 2/7/2025 0410)  Fever Reduction/Comfort Measures:   lightweight bedding   lightweight clothing     Problem: Wound  Goal: Improved Oral Intake  Outcome: Not Progressing  Intervention: Promote and Optimize Oral Intake  Flowsheets (Taken 2/7/2025 0410)  Nutrition Interventions: supplemental foods provided

## 2025-02-07 NOTE — CONSULTS
Re AMS    Hpi  Very complicated hx  Pt with progessive decline over last 2 weeks; was doing ok then took another turn down  Now minimally responsive; not speaking; not following commands  Eeg abnormal  Stroke w/u complete; left callosal stroke  ESRD on HD  On keppra for sz activity/epileptic appearing EEG  Scheduled Meds:   amLODIPine  5 mg Oral Daily    aspirin  81 mg Oral Daily    atorvastatin  40 mg Oral QHS    calcitRIOL  0.25 mcg Oral Once per day on Monday Wednesday Friday    folic acid  1 mg Oral Daily    levETIRAcetam  500 mg Oral BID    [START ON 2/10/2025] mupirocin   Nasal BID    pantoprazole  40 mg Oral Daily     Continuous Infusions:   Amino acid 4.25% - dextrose 5% (CLINIMIX-E) solution (1L provides 42.5 gm AA, 50 gm CHO (170 kcal/L dextrose), Na 35, K 30, Mg 5, Ca 4.5, Acetate 70, Cl 39, Phos 15)   Intravenous Continuous 50 mL/hr at 02/06/25 2014 New Bag at 02/06/25 2014     PRN Meds:.  Current Facility-Administered Medications:     acetaminophen, 650 mg, Oral, Q4H PRN    acetaminophen, 650 mg, Oral, Q8H PRN    bisacodyL, 10 mg, Rectal, Daily PRN    dextrose 50%, 12.5 g, Intravenous, PRN    dextrose 50%, 25 g, Intravenous, PRN    glucagon (human recombinant), 1 mg, Intramuscular, PRN    glucose, 16 g, Oral, PRN    glucose, 24 g, Oral, PRN    hydrALAZINE, 5 mg, Intravenous, Q6H PRN    melatonin, 6 mg, Oral, Nightly PRN    naloxone, 0.02 mg, Intravenous, PRN    ondansetron, 4 mg, Intravenous, Q8H PRN    sodium chloride 0.9%, 10 mL, Intravenous, Q12H PRN    Flushing PICC/Midline Protocol, , , Until Discontinued **AND** sodium chloride 0.9%, 10 mL, Intravenous, Q12H PRN  Vitals:    02/07/25 0802   BP: (!) (P) 167/98   Pulse:    Resp:    Temp:      Past Medical History:   Diagnosis Date    Acute rejection of kidney transplant 9/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 Glomerulitis with one activated endothelial cell: treated with Solumedrol pulse 375 mg IVBP x 3 doses with short prednisone  taper; thymoglobin 100 mg x 6 doses 10/04/2007 minimal histologic changes no acute rejection     Allergy     Anemia of renal disease     CKD (chronic kidney disease), stage IV     CMV (cytomegalovirus) status positive     Colon polyp     benign    Depression     GN (glomerulonephritis)     GN (glomerulonephritis)     unknown type    Herpes infection, other 2009    on buttocks    Hyperlipidemia     Hypertension     Immunosuppression     Living-donor kidney transplant recipient 01/15/2007    Meningitis     history of viral meningitis    Proteinuria 1/5/2015    Proteinuria     Pulmonary nodules     Renal hypertension     S/P breast augmentation     Skin cancer     Urinary tract infection      Social History     Socioeconomic History    Marital status:    Tobacco Use    Smoking status: Former     Current packs/day: 1.00     Average packs/day: 1 pack/day for 15.0 years (15.0 ttl pk-yrs)     Types: Cigarettes    Smokeless tobacco: Never    Tobacco comments:     quit in 2014; smoked for 15 years and at the most smoked 1 ppd   Substance and Sexual Activity    Alcohol use: Yes     Comment: seldom    Drug use: No    Sexual activity: Never   Social History Narrative    Likes to travel and follow [email or web address] (has season tickets).    Lives with , 2 dogs and 3 cats    Used to work in real estate and [employer]     Social Drivers of Health     Financial Resource Strain: Patient Unable To Answer (2/4/2025)    Overall Financial Resource Strain (CARDIA)     Difficulty of Paying Living Expenses: Patient unable to answer   Food Insecurity: Patient Unable To Answer (2/4/2025)    Hunger Vital Sign     Worried About Running Out of Food in the Last Year: Patient unable to answer     Ran Out of Food in the Last Year: Patient unable to answer   Transportation Needs: Patient Unable To Answer (2/4/2025)    TRANSPORTATION NEEDS     Transportation : Patient unable to answer   Physical Activity: Inactive (12/3/2024)    Exercise Vital  Sign     Days of Exercise per Week: 0 days     Minutes of Exercise per Session: 0 min   Stress: Patient Unable To Answer (2/4/2025)    [employer] - Occupational Stress Questionnaire     Feeling of Stress : Patient unable to answer   Housing Stability: Patient Unable To Answer (2/4/2025)    Housing Stability Vital Sign     Unable to Pay for Housing in the Last Year: Patient unable to answer     Homeless in the Last Year: Patient unable to answer     Past Surgical History:   Procedure Laterality Date    COLON SURGERY      COLONOSCOPY W/ BIOPSIES      cosemetic surgery      facelift and breast augmentation     HYSTERECTOMY      INSERTION OF TUNNELED CENTRAL VENOUS HEMODIALYSIS CATHETER N/A 1/28/2025    Procedure: Insertion, Catheter, Central Venous, Hemodialysis;  Surgeon: Apollo Jacob DO;  Location: [facility] CATH LAB;  Service: Nephrology;  Laterality: N/A;    INTRAMEDULLARY RODDING OF HUMERUS Right 12/2/2024    Procedure: INSERTION, INTRAMEDULLARY MONTSERRAT, HUMERUS;  Surgeon: Samuel Dow DO;  Location: [facility] OR;  Service: Orthopedics;  Laterality: Right;  supine vacular backward c arm synthes  shoulder scope drape    KIDNEY TRANSPLANT      NEPHRECTOMY Left     9/2016    RENAL BIOPSY      TONSILLECTOMY, ADENOIDECTOMY     \  Results for orders placed or performed during the hospital encounter of 02/04/25   MRI Brain Without Contrast    Narrative    EXAMINATION:  MRI BRAIN WITHOUT CONTRAST    CLINICAL HISTORY:  Mental status change, unknown cause;Altered mental status;    TECHNIQUE:  Multiplanar, multisequence MR images of the brain were obtained without the administration of intravenous contrast.    COMPARISON:  CT head dated 02/04/2025    FINDINGS:  Evaluation is limited by motion artifact.  There is a rounded focus of restricted diffusion and edema in the left splenium of the corpus callosum.  Moderate patchy T2/FLAIR hyperintense in the subcortical and periventricular white matter likely  represent chronic microvascular ischemic changes.  There is no mass effect or midline shift.  The basal cisterns are patent.  There is mild diffuse parenchymal volume loss.  There is no hydrocephalus or abnormal extra-axial fluid collection.  The major intracranial flow voids are patent.      Impression    1. Single cytotoxic lesion in the corpus callosum is nonspecific and may be metabolic or toxic.  2. Moderate chronic microvascular ischemic changes.      Electronically signed by: Violet Gillette  Date:    02/05/2025  Time:    14:30   MRA Brain without contrast    Narrative    EXAMINATION:  MRA BRAIN WITHOUT CONTRAST    CLINICAL HISTORY:  Stroke, follow up;    TECHNIQUE:  - Three-dimensional time of flight brain MR angiography of intracranial vessels is performed, and maximum intensity projection reformatted images are presented in multiple three-dimensional rotational projections.    COMPARISON:  None available    FINDINGS:  Evaluation is limited by motion artifact.    The internal carotid arteries, middle cerebral arteries and anterior arteries are patent.    The vertebral, basilar artery and posterior cerebral arteries are patent.      Impression    1. Evaluation limited by motion artifact.  2. No large vessel occlusion or flow-limiting stenosis.      Electronically signed by: Violet Gillette  Date:    02/06/2025  Time:    14:10   CT Head Without Contrast    Narrative    EXAMINATION:  CT HEAD WITHOUT CONTRAST    CLINICAL HISTORY:  Mental status change, unknown cause;    TECHNIQUE:  Sequential axial images were performed of the brain without contrast.    Dose product length of 1054 mGycm. Automated exposure control was utilized to minimize radiation dose.    COMPARISON:  December 2, 2024.    FINDINGS:  There is no intracranial mass effect, midline shift, hydrocephalus or hemorrhage. There is no sulcal effacement or low attenuation changes to suggest recent large vessel territory infarction. Chronic appearing  periventricular and subcortical white matter low attenuation changes are present and are consistent with marked chronic microangiopathic ischemia.  There are right occipital lobe gyriform remote calcifications.  The ventricular system and sulcal markings prominence is consistent with atrophy. There is no acute extra axial fluid collection. Visualized paranasal sinuses are clear without mucosal thickening, polypoidal abnormality or air-fluid levels.  There is chronic loss of pneumatization and right mastoid air cells with opacification.      Impression    1.  No acute intracranial findings identified.    2.  Marked chronic microangiopathic ischemia and age appropriate atrophy.      Electronically signed by: Yogesh Wolf  Date:    02/04/2025  Time:    13:33   EEG    Narrative    Abrams, Adan LYONS MD     2/6/2025  1:59 PM  EEG    Dx: Altered mental status, Left sided stroke    Duration: 30 min    Technical: Standard digital EEG was performed at [facility]. The 10/20   international system of electrode placement was used.  Photic   stimulation was performed.    Description: The posterior dominant rhythm was 6 Hz.  There   were occasional left temporal sharp waves centered at T7.    Triphasic waves were intermittently present. The patient drowsed.    There were   no  clinical seizures seen.    Impression: Left sided sharp waves indicative of a potentially   epileptogenic focus from that region.  Evidence of underlying   metabolic encephalopathy. Please correlate clinically.         Imp  Severe metabolic encephalopathy  Continue keppra  General medical workup  Some of this is attributable to the stroke as well  No active recs at this time

## 2025-02-08 LAB
ALBUMIN SERPL-MCNC: 2 G/DL (ref 3.4–4.8)
ALBUMIN/GLOB SERPL: 0.5 RATIO (ref 1.1–2)
ALP SERPL-CCNC: 164 UNIT/L (ref 40–150)
ALT SERPL-CCNC: 13 UNIT/L (ref 0–55)
ANION GAP SERPL CALC-SCNC: 12 MEQ/L
AST SERPL-CCNC: 19 UNIT/L (ref 5–34)
BASOPHILS # BLD AUTO: 0.03 X10(3)/MCL
BASOPHILS NFR BLD AUTO: 0.5 %
BILIRUB SERPL-MCNC: 0.4 MG/DL
BUN SERPL-MCNC: 23 MG/DL (ref 9.8–20.1)
CALCIUM SERPL-MCNC: 8.8 MG/DL (ref 8.4–10.2)
CHLORIDE SERPL-SCNC: 99 MMOL/L (ref 98–107)
CO2 SERPL-SCNC: 22 MMOL/L (ref 23–31)
CREAT SERPL-MCNC: 3.84 MG/DL (ref 0.55–1.02)
CREAT/UREA NIT SERPL: 6
EOSINOPHIL # BLD AUTO: 0.04 X10(3)/MCL (ref 0–0.9)
EOSINOPHIL NFR BLD AUTO: 0.6 %
ERYTHROCYTE [DISTWIDTH] IN BLOOD BY AUTOMATED COUNT: 16.6 % (ref 11.5–17)
GFR SERPLBLD CREATININE-BSD FMLA CKD-EPI: 12 ML/MIN/1.73/M2
GLOBULIN SER-MCNC: 4 GM/DL (ref 2.4–3.5)
GLUCOSE SERPL-MCNC: 97 MG/DL (ref 82–115)
HCT VFR BLD AUTO: 32.6 % (ref 37–47)
HGB BLD-MCNC: 10.1 G/DL (ref 12–16)
IMM GRANULOCYTES # BLD AUTO: 0.02 X10(3)/MCL (ref 0–0.04)
IMM GRANULOCYTES NFR BLD AUTO: 0.3 %
LYMPHOCYTES # BLD AUTO: 1.48 X10(3)/MCL (ref 0.6–4.6)
LYMPHOCYTES NFR BLD AUTO: 22.2 %
MAGNESIUM SERPL-MCNC: 2.1 MG/DL (ref 1.6–2.6)
MCH RBC QN AUTO: 26.3 PG (ref 27–31)
MCHC RBC AUTO-ENTMCNC: 31 G/DL (ref 33–36)
MCV RBC AUTO: 84.9 FL (ref 80–94)
MONOCYTES # BLD AUTO: 0.73 X10(3)/MCL (ref 0.1–1.3)
MONOCYTES NFR BLD AUTO: 11 %
NEUTROPHILS # BLD AUTO: 4.36 X10(3)/MCL (ref 2.1–9.2)
NEUTROPHILS NFR BLD AUTO: 65.4 %
NRBC BLD AUTO-RTO: 0 %
PHOSPHATE SERPL-MCNC: 5.2 MG/DL (ref 2.3–4.7)
PLATELET # BLD AUTO: 344 X10(3)/MCL (ref 130–400)
PMV BLD AUTO: 9.4 FL (ref 7.4–10.4)
POCT GLUCOSE: 101 MG/DL (ref 70–110)
POCT GLUCOSE: 117 MG/DL (ref 70–110)
POCT GLUCOSE: 99 MG/DL (ref 70–110)
POTASSIUM SERPL-SCNC: 4 MMOL/L (ref 3.5–5.1)
PROT SERPL-MCNC: 6 GM/DL (ref 5.8–7.6)
RBC # BLD AUTO: 3.84 X10(6)/MCL (ref 4.2–5.4)
SODIUM SERPL-SCNC: 133 MMOL/L (ref 136–145)
WBC # BLD AUTO: 6.66 X10(3)/MCL (ref 4.5–11.5)

## 2025-02-08 PROCEDURE — 21400001 HC TELEMETRY ROOM

## 2025-02-08 PROCEDURE — 84100 ASSAY OF PHOSPHORUS: CPT | Performed by: INTERNAL MEDICINE

## 2025-02-08 PROCEDURE — 36415 COLL VENOUS BLD VENIPUNCTURE: CPT | Performed by: INTERNAL MEDICINE

## 2025-02-08 PROCEDURE — 85025 COMPLETE CBC W/AUTO DIFF WBC: CPT | Performed by: INTERNAL MEDICINE

## 2025-02-08 PROCEDURE — 25000003 PHARM REV CODE 250: Performed by: INTERNAL MEDICINE

## 2025-02-08 PROCEDURE — 63600175 PHARM REV CODE 636 W HCPCS: Performed by: INTERNAL MEDICINE

## 2025-02-08 PROCEDURE — 80053 COMPREHEN METABOLIC PANEL: CPT | Performed by: INTERNAL MEDICINE

## 2025-02-08 PROCEDURE — 83735 ASSAY OF MAGNESIUM: CPT | Performed by: INTERNAL MEDICINE

## 2025-02-08 RX ORDER — ASPIRIN 300 MG/1
300 SUPPOSITORY RECTAL DAILY
Status: DISCONTINUED | OUTPATIENT
Start: 2025-02-08 | End: 2025-02-11 | Stop reason: HOSPADM

## 2025-02-08 RX ORDER — ASPIRIN 300 MG/1
300 SUPPOSITORY RECTAL DAILY
Status: DISCONTINUED | OUTPATIENT
Start: 2025-02-09 | End: 2025-02-08

## 2025-02-08 RX ADMIN — ASPIRIN 300 MG: 300 SUPPOSITORY RECTAL at 05:02

## 2025-02-08 RX ADMIN — LEUCINE, PHENYLALANINE, LYSINE, METHIONINE, ISOLEUCINE, VALINE, HISTIDINE, THREONINE, TRYPTOPHAN, ALANINE, GLYCINE, ARGININE, PROLINE, SERINE, TYROSINE, SODIUM ACETATE, DIBASIC POTASSIUM PHOSPHATE, MAGNESIUM CHLORIDE, SODIUM CHLORIDE, CALCIUM CHLORIDE, DEXTROSE
880; 489; 33; 5; 438; 204; 255; 311; 247; 51; 170; 238; 261; 289; 213; 297; 77; 179; 77; 17; 247 INJECTION INTRAVENOUS at 11:02

## 2025-02-08 RX ADMIN — LEVETIRACETAM 500 MG: 100 INJECTION, SOLUTION INTRAVENOUS at 09:02

## 2025-02-08 RX ADMIN — LEVETIRACETAM 500 MG: 100 INJECTION, SOLUTION INTRAVENOUS at 10:02

## 2025-02-08 RX ADMIN — PIPERACILLIN SODIUM AND TAZOBACTAM SODIUM 4.5 G: 4; .5 INJECTION, POWDER, LYOPHILIZED, FOR SOLUTION INTRAVENOUS at 05:02

## 2025-02-08 NOTE — PT/OT/SLP PROGRESS
Physical Therapy      Patient Name:  Malissa Schwartz   MRN:  [id number]    Attempted to see pt for PT evaluation. Pt unarousable to sternal rub, tactile stim, cold rag to face/neck. Brought to long sit with totalA, eyes briefly opened then closed again. (+) withdrawal to pain RLE, (-) in LLE. Spontaneous movement of BUE noted with tremors. Educated family on strategies to improve alertness and orientation while in the hospital. Not appropriate for further mobility at this time, will continue to follow.    2/8/25    Conference Time: [id number]

## 2025-02-08 NOTE — PROGRESS NOTES
NEPHROLOGY PROGRESS NOTE      Patient Demographics:  Name:  Malissa Schwartz  Age: 74 y.o.  MRN:  [id number]  Admission Date: 2/4/2025      Subjective:      Resting quietly  Encephalopathic    EEG showed seizure activity    Current Facility-Administered Medications   Medication Dose Route Frequency Provider Last Rate Last Admin    acetaminophen tablet 650 mg  650 mg Oral Q4H PRN Myke Butler PA        acetaminophen tablet 650 mg  650 mg Oral Q8H PRN Myke Butler PA        Amino acid 4.25% - dextrose 5% (CLINIMIX-E) solution (1L provides 42.5 gm AA, 50 gm CHO (170 kcal/L dextrose), Na 35, K 30, Mg 5, Ca 4.5, Acetate 70, Cl 39, Phos 15)   Intravenous Continuous Federica Orta MD        amLODIPine tablet 5 mg  5 mg Oral BID Federica Orta MD        aspirin EC tablet 81 mg  81 mg Oral Daily Myke Butler PA   81 mg at 02/07/25 0803    atorvastatin tablet 40 mg  40 mg Oral QHS Jose Raul Nielsen MD        bisacodyL suppository 10 mg  10 mg Rectal Daily PRN Nicole Tariq NP        calcitRIOL capsule 0.25 mcg  0.25 mcg Oral Once per day on Monday Wednesday Friday Myke Butler PA        dextrose 50% injection 12.5 g  12.5 g Intravenous PRN Myke Butler PA        dextrose 50% injection 25 g  25 g Intravenous PRN Myke Butler PA        folic acid tablet 1 mg  1 mg Oral Daily Myke Butler PA        glucagon (human recombinant) injection 1 mg  1 mg Intramuscular PRN Myke Butler PA        glucose chewable tablet 16 g  16 g Oral PRN Myke Butler PA        glucose chewable tablet 24 g  24 g Oral PRN Myke Butler PA        hydrALAZINE injection 5 mg  5 mg Intravenous Q6H PRN Federica Orta MD   5 mg at 02/07/25 0525    levETIRAcetam (Keppra) 500 mg in D5W 100 mL IVPB (MB+)  500 mg Intravenous Q12H Federica Orta MD   Stopped at 02/08/25 1027    LORazepam (ATIVAN) injection 0.5 mg  0.5 mg Intravenous Q8H PRN Federica Orta MD        melatonin tablet 6 mg  6 mg Oral  "Nightly PRN Myke Butler PA        [START ON 2/10/2025] mupirocin 2 % ointment   Nasal BID Jose Raul Nielsen MD        naloxone 0.4 mg/mL injection 0.02 mg  0.02 mg Intravenous PRN Myke Butler PA        ondansetron injection 4 mg  4 mg Intravenous Q8H PRN Myke Butler PA        pantoprazole EC tablet 40 mg  40 mg Oral Daily Myke Butler PA        sodium chloride 0.9% flush 10 mL  10 mL Intravenous Q12H PRN Myke Butler PA        sodium chloride 0.9% flush 10 mL  10 mL Intravenous Q12H PRN Jose Raul Nielsen MD        vancomycin - pharmacy to dose   Intravenous pharmacy to manage frequency Federica Orta MD               Review of Systems   Unable to perform ROS: Mental acuity         Objective:    BP (!) 154/92   Pulse 89   Temp 97.8 °F (36.6 °C) (Oral)   Resp 17   Ht 5' 2.01" (1.575 m)   Wt 63.5 kg (140 lb)   SpO2 99%   BMI 25.60 kg/m²       Intake/Output Summary (Last 24 hours) at 2/8/2025 1016  Last data filed at 2/7/2025 1210  Gross per 24 hour   Intake --   Output 1500 ml   Net -1500 ml             Physical Exam  Vitals reviewed.   Constitutional:       Appearance: Normal appearance.   HENT:      Head: Normocephalic and atraumatic.      Nose: Nose normal.   Cardiovascular:      Rate and Rhythm: Normal rate and regular rhythm.      Pulses: Normal pulses.      Heart sounds: Normal heart sounds.   Pulmonary:      Effort: Pulmonary effort is normal.      Breath sounds: Normal breath sounds.   Abdominal:      General: Bowel sounds are normal.      Palpations: Abdomen is soft.   Musculoskeletal:         General: Normal range of motion.      Cervical back: Normal range of motion.      Comments: Some deconditioning   Skin:     General: Skin is warm and dry.            Inpatient Diagnostics:  Recent Results (from the past 24 hours)   POCT glucose    Collection Time: 02/07/25 12:55 PM   Result Value Ref Range    POCT Glucose 127 (H) 70 - 110 mg/dL   Ammonia    Collection Time: 02/07/25 "  5:25 PM   Result Value Ref Range    Ammonia Level 15.3 (L) 18.0 - 72.0 umol/L   Prolactin    Collection Time: 02/07/25  5:25 PM   Result Value Ref Range    Prolactin Level 49.53 (H) 5.18 - 26.53 ng/mL   RT Blood Gas    Collection Time: 02/07/25  6:03 PM   Result Value Ref Range    Sample Type Arterial Blood     Sample site Right Radial Artery     Drawn by hd,rrt     pH, Blood gas 7.530 (H) 7.350 - 7.450    pCO2, Blood gas 33.0 (L) 35.0 - 45.0 mmHg    pO2, Blood gas 87.0 80.0 - 100.0 mmHg    Sodium, Blood Gas 132 (L) 137 - 145 mmol/L    Potassium, Blood Gas 3.5 3.5 - 5.0 mmol/L    Calcium Level Ionized 1.21 1.12 - 1.23 mmol/L    TOC2, Blood gas 28.6 mmol/L    Base Excess, Blood gas 4.90 (H) -2.00 - 2.00 mmol/L    sO2, Blood gas 97.6 %    HCO3, Blood gas 27.6 (H) 22.0 - 26.0 mmol/L    THb, Blood gas 10.8 (L) 12 - 16 g/dL    O2 Hb, Blood Gas 96.2 94.0 - 97.0 %    CO Hgb 1.6 (H) 0.5 - 1.5 %    Met Hgb 0.3 (L) 0.4 - 1.5 %    Allens Test Yes     Oxygen Device, Blood gas RA    POCT glucose    Collection Time: 02/07/25 10:25 PM   Result Value Ref Range    POCT Glucose 127 (H) 70 - 110 mg/dL   Comprehensive Metabolic Panel    Collection Time: 02/08/25  4:38 AM   Result Value Ref Range    Sodium 133 (L) 136 - 145 mmol/L    Potassium 4.0 3.5 - 5.1 mmol/L    Chloride 99 98 - 107 mmol/L    CO2 22 (L) 23 - 31 mmol/L    Glucose 97 82 - 115 mg/dL    Blood Urea Nitrogen 23.0 (H) 9.8 - 20.1 mg/dL    Creatinine 3.84 (H) 0.55 - 1.02 mg/dL    Calcium 8.8 8.4 - 10.2 mg/dL    Protein Total 6.0 5.8 - 7.6 gm/dL    Albumin 2.0 (L) 3.4 - 4.8 g/dL    Globulin 4.0 (H) 2.4 - 3.5 gm/dL    Albumin/Globulin Ratio 0.5 (L) 1.1 - 2.0 ratio    Bilirubin Total 0.4 <=1.5 mg/dL     (H) 40 - 150 unit/L    ALT 13 0 - 55 unit/L    AST 19 5 - 34 unit/L    eGFR 12 mL/min/1.73/m2    Anion Gap 12.0 mEq/L    BUN/Creatinine Ratio 6    Magnesium    Collection Time: 02/08/25  4:38 AM   Result Value Ref Range    Magnesium Level 2.10 1.60 - 2.60 mg/dL    Phosphorus    Collection Time: 02/08/25  4:38 AM   Result Value Ref Range    Phosphorus Level 5.2 (H) 2.3 - 4.7 mg/dL   CBC with Differential    Collection Time: 02/08/25  4:38 AM   Result Value Ref Range    WBC 6.66 4.50 - 11.50 x10(3)/mcL    RBC 3.84 (L) 4.20 - 5.40 x10(6)/mcL    Hgb 10.1 (L) 12.0 - 16.0 g/dL    Hct 32.6 (L) 37.0 - 47.0 %    MCV 84.9 80.0 - 94.0 fL    MCH 26.3 (L) 27.0 - 31.0 pg    MCHC 31.0 (L) 33.0 - 36.0 g/dL    RDW 16.6 11.5 - 17.0 %    Platelet 344 130 - 400 x10(3)/mcL    MPV 9.4 7.4 - 10.4 fL    Neut % 65.4 %    Lymph % 22.2 %    Mono % 11.0 %    Eos % 0.6 %    Basophil % 0.5 %    Imm Grans % 0.3 %    Neut # 4.36 2.1 - 9.2 x10(3)/mcL    Lymph # 1.48 0.6 - 4.6 x10(3)/mcL    Mono # 0.73 0.1 - 1.3 x10(3)/mcL    Eos # 0.04 0 - 0.9 x10(3)/mcL    Baso # 0.03 <=0.2 x10(3)/mcL    Imm Gran # 0.02 0.00 - 0.04 x10(3)/mcL    NRBC% 0.0 %   POCT glucose    Collection Time: 02/08/25  5:46 AM   Result Value Ref Range    POCT Glucose 101 70 - 110 mg/dL       A/P--NE 02/08    1---ESRD on HD--Cont MWF schedule while inpatient--Does have a PD Catheter  2---Encephalopathy---EEG done--Shows possible seizures--Cont Keppra--Follow for improvement  Neurology on board  3---HTN--Controlled  4---Anemia secondary to CKD---Follow H&H    IV--SL    NO NEW ORDERS:          Anne Burch DNP, ANP-C    2/8/2025

## 2025-02-08 NOTE — PROGRESS NOTES
[facility] Hospital Medicine Progress Note        Chief Complaint: Inpatient Follow-up     HPI: 74 y.o. female who has a past medical history of HTN, HLD, deconditioning, humerus fracture with IM nail, NSTEMI, failed kidney transplant, ESRD-HD M/W/F previously on peritoneal dialysis, and recent cellulitis left lower extremity completed clindamycin treatment who presented to [facility] on 2/4/2025 with a primary complaint of altered mental status for the last 3-4 days according to the patient's , Brayden Schwartz at bedside.  The patient was admitted on 01/22/2025 for NSTEMI likely type 2, sepsis secondary to left lower extremity cellulitis, and ESRD on PD that was converted to hemodialysis prior to discharge.  During her hospitalization she was evaluated by consultants including wound care, Nephrology, Psychiatry, and Cardiology.  Heparin was discontinued and CIS recommended outpatient stress test (PET) in 2 weeks.  Right-sided tunneled dialysis catheter was placed on 01/28/2025 and she tolerated hemodialysis multiple times during her admission.  And progress note by Nephrology on 01/30 she was noted to be weak, confused, and not speaking much with poor intake.  Psychiatry recommended increasing the Lexapro which was done prior to her discharge.  Blood cultures with no growth to date.  She had dialysis on 02/03/2025 prior to being discharged to [facility].  The patient's , Mr. Brayden Schwartz at bedside states that she has not been herself since she was initiated on hemodialysis on 01/29/2025.  States prior to dialysis she was AAO x3, but since she was initiated on dialysis she has not been eating much, intermittently confused, and becomes quite agitated/combative.  On exam, she is protecting her airway, but minimally cooperative with examiner.  The patient will open her eyes to pain, she uses inappropriate/confused words, and localizes pain for GCS of 10.  She does  not appear to have any acute focal neurological deficits and appears to have possibly a metabolic encephalopathy.   denies any history of CVA or TIA.  ABG and ammonia level unremarkable.  Patient does not make any urine, so unable to obtain UA and UDS.  CT head without contrast showed no acute findings.  No further history available at this time.       ED course: Vital signs revealed the patient is afebrile and hemodynamically stable on room air.  Labs CBC showed normal WBC of 7, chronic normocytic anemia with a hemoglobin of 9, chemistry with elevated creatinine of 4.96 and BUN of 17, elevated potassium of 5.5, low albumin of 2, normal ammonia level of 33, elevated TSH of 6, normal lipid panel.  ABG with mild respiratory alkalosis.  CT head showed no acute intracranial findings.  Treated in the ED with Haldol and Benadryl.  Ordered Lokelma.  Ordered consultation to Nephrology and wound care nurse bowen.  Admitted for encephalopathy    MRI with Single cytotoxic lesion in the corpus callosum is nonspecific and may be metabolic or toxic.  Neurology was consulted.  Therapy services consulted.  Palliative medicine was consulted.  Now DNR.  PTOT speech consulted.    Concern for change in mental status during the hospital course, CT scan was ordered and Neurology notified.  CT with no intracranial abnormality.  EEG abnormal.  Made NPO.  Continued IV nutrition.    Interval Hx:   Encephalopathy likely related to seizures post stroke.  Initiated on Keppra.  Continuing IV nutrition.  Awaiting speech evaluation.      Patient was seen examined , nonverbal, sleeping, met with the family, discussed about the poor prognosis, family verbalized understanding.  Chart was reviewed.  Afebrile.  Most recent lab work was reviewed.    Objective/physical exam:  General: In no acute distress  Chest: Clear to auscultation bilaterally  Heart:  Normal rate ,+S1, S2  Abdomen: Soft, nontender, BS +  MSK: Warm, no lower extremity edema, no  clubbing or cyanosis  Neurologic:  Limited exam- Alert ,opens eyes, nonverbal.  Does not follow  commands.was informed by staff that she is close to her baseline     VITAL SIGNS: 24 HRS MIN & MAX LAST   Temp  Min: 97.8 °F (36.6 °C)  Max: 98.8 °F (37.1 °C) 98.5 °F (36.9 °C)   BP  Min: 140/83  Max: 157/88 (!) 157/88   Pulse  Min: 82  Max: 102  82   Resp  Min: 17  Max: 18 18   SpO2  Min: 97 %  Max: 99 % 99 %     I have reviewed the following labs:  Recent Labs   Lab 02/05/25  0355 02/07/25  0736 02/08/25  0438   WBC 6.99 8.16 6.66   RBC 3.90* 3.87* 3.84*   HGB 10.3* 10.2* 10.1*   HCT 34.6* 32.3* 32.6*   MCV 88.7 83.5 84.9   MCH 26.4* 26.4* 26.3*   MCHC 29.8* 31.6* 31.0*   RDW 17.0 16.5 16.6    338 344   MPV 9.3 9.3 9.4     Recent Labs   Lab 02/04/25  0530 02/04/25  1334 02/05/25  0355 02/07/25  0736 02/07/25  1803 02/08/25  0438     --  138 132*  --  133*   K 5.5*  --  5.0 4.1  --  4.0     --  105 104  --  99   CO2 22*  --  18* 18*  --  22*   BUN 17.6  --  25.7* 38.7*  --  23.0*   CREATININE 4.96*  --  6.33* 5.90*  --  3.84*   CALCIUM 8.8  --  8.5 8.3*  --  8.8   PH  --  7.460*  --   --  7.530*  --    MG  --   --  2.60 2.50  --  2.10   ALBUMIN 2.0*  --  2.1*  --   --  2.0*   ALKPHOS 183*  --  188*  --   --  164*   ALT 20  --  17  --   --  13   AST 24  --  26  --   --  19   BILITOT 0.4  --  0.4  --   --  0.4     Microbiology Results (last 7 days)       Procedure Component Value Units Date/Time    Body Fluid Culture [[id number]] Collected: 02/04/25 1707    Order Status: Completed Specimen: Peritoneal Fluid Updated: 02/08/25 1012     Body Fluid Culture No growth at 4 days    Wound Culture [[id number]] Collected: 02/07/25 2045    Order Status: Sent Specimen: Wound from Heel Updated: 02/07/25 2053    Blood Culture [[id number]] Collected: 02/07/25 1725    Order Status: Resulted Specimen: Blood from Arm, Right Updated: 02/07/25 1858    Blood Culture [[id number]] Collected: 02/07/25 1725    Order Status:  Resulted Specimen: Blood from Arm, Left Updated: 02/07/25 1858    Gram Stain [[id number]] Collected: 02/04/25 1707    Order Status: Completed Specimen: Peritoneal Fluid Updated: 02/04/25 1935     GRAM STAIN Moderate WBC observed      No bacteria seen             See below for Radiology    Assessment/Plan:  Acute encephalopathy  CVA  ESRD-HD M/W/F via right IJ tunneled dialysis catheter  Hyperkalemia, mild  Hypoglycemia.  Normocytic anemia  Generalized weakness and debility   Physical Deconditioning   Essential hypertension   Left heel stage 4 decubitus ulcers with eschar and right heel with stage II decubitus ulcer with blister.  History of sepsis secondary to cellulitis of left lower extremity completed clindamycin therapy  Left lower extremity skin cancer awaiting treatment   History of NSTEMI type 2 with CIS recommending outpatient stress test (PET scan )   Right Humerus fracture with IM nail (12/2024)  History of colon polyp/lung nodule  History of peritoneal dialysis and failed kidney transplant   Dyslipidemia   Generalized anxiety disorder/major depressive disorder  Former smoker    Plan   Seen by Neurology, on aspirin.  Statin  Concern for change in mental status, CT head with no acute intracranial abnormalities, EEG abnormal, likely related to seizures.  Continue Keppra  PTOT speech on board.  Initially cleared by speech for swallowing, now with new concerns about change in mental status , made NPO, continue IV nutrition/NG tube feeding.  Discussed with the staff to await speech evaluation today.  May have to consult GI for PEG tube if failing  We will also follow up on encephalopathy workup that was ordered(ammonia-not elevated, blood gas-reviewed, blood cultures-negative so far, UA-pending, wound cultures-pending,).  CT chest abdomen pelvis with small volume ascites  .Initiated empiric antibiotics-on vancomycin, Zosyn.Continue Wound care.   Monitor electrolytes and replete appropriately  Continue dialysis  per Nephrology.  Follow up BMP.  Monitor blood sugars, watch for hypoglycemia  Monitor blood pressure, adjust antihypertensive regimen as needed.    Continue other appropriate home medications.  Prognosis is poor, discussed with the family, palliative medicine following.  DNR      VTE prophylaxis:  Heparin subQ> SCD      Anticipated discharge and Disposition:   To be decided      All diagnosis and differential diagnosis have been reviewed; assessment and plan has been documented; I have personally reviewed the labs and test results that are presently available; I have reviewed the patients medication list; I have reviewed the consulting providers response and recommendations. I have reviewed or attempted to review medical records based upon their availability    All of the patient's questions have been  addressed and answered. Patient's is agreeable to the above stated plan. I will continue to monitor closely and make adjustments to medical management as needed.    Portions of this note dictated using EMR integrated voice recognition software, and may be subject to voice recognition errors not corrected at proofreading. Please contact writer for clarification if needed.   _____________________________________________________________________    Malnutrition Status:  Nutrition consulted. Most recent weight and BMI monitored-     Measurements:  Wt Readings from Last 1 Encounters:   02/04/25 63.5 kg (140 lb)   Body mass index is 25.6 kg/m².    Patient has been screened and assessed by RD.    Malnutrition Type:  Context: acute illness or injury  Level: moderate    Malnutrition Characteristic Summary:  Weight Loss (Malnutrition): other (see comments) (Does not meet criteria)  Energy Intake (Malnutrition): less than or equal to 50% for greater than or equal to 5 days  Subcutaneous Fat (Malnutrition): moderate depletion  Muscle Mass (Malnutrition): moderate depletion  Fluid Accumulation (Malnutrition): other (see comments)  (Not present)    Interventions/Recommendations (treatment strategy):        Scheduled Med:   amLODIPine  5 mg Oral BID    aspirin  81 mg Oral Daily    atorvastatin  40 mg Oral QHS    calcitRIOL  0.25 mcg Oral Once per day on Monday Wednesday Friday    folic acid  1 mg Oral Daily    levETIRAcetam (Keppra) IV (PEDS and ADULTS)  500 mg Intravenous Q12H    [START ON 2/10/2025] mupirocin   Nasal BID    pantoprazole  40 mg Oral Daily      Continuous Infusions:   Amino acid 4.25% - dextrose 5% (CLINIMIX-E) solution (1L provides 42.5 gm AA, 50 gm CHO (170 kcal/L dextrose), Na 35, K 30, Mg 5, Ca 4.5, Acetate 70, Cl 39, Phos 15)   Intravenous Continuous          PRN Meds:    Current Facility-Administered Medications:     acetaminophen, 650 mg, Oral, Q4H PRN    acetaminophen, 650 mg, Oral, Q8H PRN    bisacodyL, 10 mg, Rectal, Daily PRN    dextrose 50%, 12.5 g, Intravenous, PRN    dextrose 50%, 25 g, Intravenous, PRN    glucagon (human recombinant), 1 mg, Intramuscular, PRN    glucose, 16 g, Oral, PRN    glucose, 24 g, Oral, PRN    hydrALAZINE, 5 mg, Intravenous, Q6H PRN    lorazepam, 0.5 mg, Intravenous, Q8H PRN    melatonin, 6 mg, Oral, Nightly PRN    naloxone, 0.02 mg, Intravenous, PRN    ondansetron, 4 mg, Intravenous, Q8H PRN    sodium chloride 0.9%, 10 mL, Intravenous, Q12H PRN    Flushing PICC/Midline Protocol, , , Until Discontinued **AND** sodium chloride 0.9%, 10 mL, Intravenous, Q12H PRN    vancomycin - pharmacy to dose, , Intravenous, pharmacy to manage frequency     Radiology:  I have personally reviewed the following imaging and agree with the radiologist.         Fedeirca Orta MD  Department of Hospital Medicine   [facility]   02/08/2025

## 2025-02-08 NOTE — PLAN OF CARE
Problem: Adult Inpatient Plan of Care  Goal: Optimal Comfort and Wellbeing  Outcome: Progressing  Intervention: Monitor Pain and Promote Comfort  Flowsheets (Taken 2/8/2025 1522)  Pain Management Interventions:   around-the-clock dosing utilized   medication offered   position adjusted     Problem: Infection  Goal: Absence of Infection Signs and Symptoms  Outcome: Progressing  Intervention: Prevent or Manage Infection  Flowsheets (Taken 2/8/2025 1522)  Fever Reduction/Comfort Measures:   lightweight clothing   lightweight bedding  Isolation Precautions: precautions maintained     Problem: Wound  Goal: Optimal Functional Ability  Outcome: Progressing  Intervention: Optimize Functional Ability  Flowsheets (Taken 2/8/2025 1522)  Activity Management: Rolling - L1

## 2025-02-09 LAB
ANION GAP SERPL CALC-SCNC: 9 MEQ/L
BASOPHILS # BLD AUTO: 0.06 X10(3)/MCL
BASOPHILS NFR BLD AUTO: 0.7 %
BUN SERPL-MCNC: 41.3 MG/DL (ref 9.8–20.1)
CALCIUM SERPL-MCNC: 8.6 MG/DL (ref 8.4–10.2)
CHLORIDE SERPL-SCNC: 98 MMOL/L (ref 98–107)
CO2 SERPL-SCNC: 23 MMOL/L (ref 23–31)
CREAT SERPL-MCNC: 5.44 MG/DL (ref 0.55–1.02)
CREAT/UREA NIT SERPL: 8
EOSINOPHIL # BLD AUTO: 0.06 X10(3)/MCL (ref 0–0.9)
EOSINOPHIL NFR BLD AUTO: 0.7 %
ERYTHROCYTE [DISTWIDTH] IN BLOOD BY AUTOMATED COUNT: 16.4 % (ref 11.5–17)
GFR SERPLBLD CREATININE-BSD FMLA CKD-EPI: 8 ML/MIN/1.73/M2
GLUCOSE SERPL-MCNC: 100 MG/DL (ref 82–115)
GLUCOSE SERPL-MCNC: 94 MG/DL (ref 70–110)
HCT VFR BLD AUTO: 32.4 % (ref 37–47)
HGB BLD-MCNC: 10.3 G/DL (ref 12–16)
IMM GRANULOCYTES # BLD AUTO: 0.03 X10(3)/MCL (ref 0–0.04)
IMM GRANULOCYTES NFR BLD AUTO: 0.4 %
LEFT CCA DIST DIAS: 8 CM/S
LEFT CCA DIST SYS: 71 CM/S
LEFT CCA PROX DIAS: 8 CM/S
LEFT CCA PROX SYS: 113 CM/S
LEFT ECA DIAS: 0 CM/S
LEFT ECA SYS: 51 CM/S
LEFT ICA DIST DIAS: 10 CM/S
LEFT ICA DIST SYS: 51 CM/S
LEFT ICA MID DIAS: 13 CM/S
LEFT ICA MID SYS: 69 CM/S
LEFT ICA PROX DIAS: 5 CM/S
LEFT ICA PROX SYS: 27 CM/S
LEFT VERTEBRAL DIAS: 4 CM/S
LEFT VERTEBRAL SYS: 74 CM/S
LYMPHOCYTES # BLD AUTO: 1.37 X10(3)/MCL (ref 0.6–4.6)
LYMPHOCYTES NFR BLD AUTO: 17 %
MCH RBC QN AUTO: 26.6 PG (ref 27–31)
MCHC RBC AUTO-ENTMCNC: 31.8 G/DL (ref 33–36)
MCV RBC AUTO: 83.7 FL (ref 80–94)
MONOCYTES # BLD AUTO: 0.85 X10(3)/MCL (ref 0.1–1.3)
MONOCYTES NFR BLD AUTO: 10.6 %
NEUTROPHILS # BLD AUTO: 5.67 X10(3)/MCL (ref 2.1–9.2)
NEUTROPHILS NFR BLD AUTO: 70.6 %
NRBC BLD AUTO-RTO: 0 %
OHS CV CAROTID RIGHT ICA EDV HIGHEST: 11
OHS CV CAROTID ULTRASOUND LEFT ICA/CCA RATIO: 0.97
OHS CV CAROTID ULTRASOUND RIGHT ICA/CCA RATIO: 0.94
OHS CV PV CAROTID LEFT HIGHEST CCA: 113
OHS CV PV CAROTID LEFT HIGHEST ICA: 69
OHS CV PV CAROTID RIGHT HIGHEST CCA: 74
OHS CV PV CAROTID RIGHT HIGHEST ICA: 59
OHS CV US CAROTID LEFT HIGHEST EDV: 13
PLATELET # BLD AUTO: 320 X10(3)/MCL (ref 130–400)
PMV BLD AUTO: 9.3 FL (ref 7.4–10.4)
POCT GLUCOSE: 100 MG/DL (ref 70–110)
POCT GLUCOSE: 97 MG/DL (ref 70–110)
POCT GLUCOSE: 98 MG/DL (ref 70–110)
POTASSIUM SERPL-SCNC: 4.5 MMOL/L (ref 3.5–5.1)
RBC # BLD AUTO: 3.87 X10(6)/MCL (ref 4.2–5.4)
RIGHT CCA DIST DIAS: 6 CM/S
RIGHT CCA DIST SYS: 63 CM/S
RIGHT CCA PROX DIAS: 0 CM/S
RIGHT CCA PROX SYS: 74 CM/S
RIGHT ECA DIAS: 0 CM/S
RIGHT ECA SYS: 65 CM/S
RIGHT ICA DIST DIAS: 10 CM/S
RIGHT ICA DIST SYS: 54 CM/S
RIGHT ICA MID DIAS: 11 CM/S
RIGHT ICA MID SYS: 52 CM/S
RIGHT ICA PROX DIAS: 3 CM/S
RIGHT ICA PROX SYS: 59 CM/S
RIGHT VERTEBRAL DIAS: 1 CM/S
RIGHT VERTEBRAL SYS: 41 CM/S
SODIUM SERPL-SCNC: 130 MMOL/L (ref 136–145)
WBC # BLD AUTO: 8.04 X10(3)/MCL (ref 4.5–11.5)

## 2025-02-09 PROCEDURE — 87507 IADNA-DNA/RNA PROBE TQ 12-25: CPT | Performed by: INTERNAL MEDICINE

## 2025-02-09 PROCEDURE — 21400001 HC TELEMETRY ROOM

## 2025-02-09 PROCEDURE — 85025 COMPLETE CBC W/AUTO DIFF WBC: CPT | Performed by: INTERNAL MEDICINE

## 2025-02-09 PROCEDURE — 25000003 PHARM REV CODE 250: Performed by: INTERNAL MEDICINE

## 2025-02-09 PROCEDURE — 36415 COLL VENOUS BLD VENIPUNCTURE: CPT | Performed by: INTERNAL MEDICINE

## 2025-02-09 PROCEDURE — 63600175 PHARM REV CODE 636 W HCPCS: Performed by: INTERNAL MEDICINE

## 2025-02-09 PROCEDURE — 80048 BASIC METABOLIC PNL TOTAL CA: CPT | Performed by: INTERNAL MEDICINE

## 2025-02-09 RX ORDER — HEPARIN SODIUM 5000 [USP'U]/ML
5000 INJECTION, SOLUTION INTRAVENOUS; SUBCUTANEOUS EVERY 12 HOURS
Status: DISCONTINUED | OUTPATIENT
Start: 2025-02-10 | End: 2025-02-11 | Stop reason: HOSPADM

## 2025-02-09 RX ADMIN — ASPIRIN 300 MG: 300 SUPPOSITORY RECTAL at 10:02

## 2025-02-09 RX ADMIN — PIPERACILLIN SODIUM AND TAZOBACTAM SODIUM 4.5 G: 4; .5 INJECTION, POWDER, LYOPHILIZED, FOR SOLUTION INTRAVENOUS at 06:02

## 2025-02-09 RX ADMIN — LEVETIRACETAM 500 MG: 100 INJECTION, SOLUTION INTRAVENOUS at 09:02

## 2025-02-09 RX ADMIN — LEVETIRACETAM 500 MG: 100 INJECTION, SOLUTION INTRAVENOUS at 10:02

## 2025-02-09 NOTE — PLAN OF CARE
Problem: Adult Inpatient Plan of Care  Goal: Absence of Hospital-Acquired Illness or Injury  Outcome: Progressing  Intervention: Identify and Manage Fall Risk  Flowsheets (Taken 2/9/2025 1640)  Safety Promotion/Fall Prevention:   assistive device/personal item within reach   medications reviewed   side rails raised x 2  Goal: Optimal Comfort and Wellbeing  Outcome: Progressing  Intervention: Provide Person-Centered Care  Flowsheets (Taken 2/9/2025 1640)  Trust Relationship/Rapport:   questions encouraged   reassurance provided     Problem: Infection  Goal: Absence of Infection Signs and Symptoms  Outcome: Progressing  Intervention: Prevent or Manage Infection  Flowsheets (Taken 2/9/2025 1640)  Infection Management: aseptic technique maintained  Isolation Precautions: precautions maintained

## 2025-02-09 NOTE — PROGRESS NOTES
[facility] Hospital Medicine Progress Note        Chief Complaint: Inpatient Follow-up     HPI: 74 y.o. female who has a past medical history of HTN, HLD, deconditioning, humerus fracture with IM nail, NSTEMI, failed kidney transplant, ESRD-HD M/W/F previously on peritoneal dialysis, and recent cellulitis left lower extremity completed clindamycin treatment who presented to [facility] on 2/4/2025 with a primary complaint of altered mental status for the last 3-4 days according to the patient's , Brayden Schwartz at bedside.  The patient was admitted on 01/22/2025 for NSTEMI likely type 2, sepsis secondary to left lower extremity cellulitis, and ESRD on PD that was converted to hemodialysis prior to discharge.  During her hospitalization she was evaluated by consultants including wound care, Nephrology, Psychiatry, and Cardiology.  Heparin was discontinued and CIS recommended outpatient stress test (PET) in 2 weeks.  Right-sided tunneled dialysis catheter was placed on 01/28/2025 and she tolerated hemodialysis multiple times during her admission.  And progress note by Nephrology on 01/30 she was noted to be weak, confused, and not speaking much with poor intake.  Psychiatry recommended increasing the Lexapro which was done prior to her discharge.  Blood cultures with no growth to date.  She had dialysis on 02/03/2025 prior to being discharged to [facility].  The patient's , Mr. Brayden Schwartz at bedside states that she has not been herself since she was initiated on hemodialysis on 01/29/2025.  States prior to dialysis she was AAO x3, but since she was initiated on dialysis she has not been eating much, intermittently confused, and becomes quite agitated/combative.  On exam, she is protecting her airway, but minimally cooperative with examiner.  The patient will open her eyes to pain, she uses inappropriate/confused words, and localizes pain for GCS of 10.  She does  not appear to have any acute focal neurological deficits and appears to have possibly a metabolic encephalopathy.   denies any history of CVA or TIA.  ABG and ammonia level unremarkable.  Patient does not make any urine, so unable to obtain UA and UDS.  CT head without contrast showed no acute findings.  No further history available at this time.       ED course: Vital signs revealed the patient is afebrile and hemodynamically stable on room air.  Labs CBC showed normal WBC of 7, chronic normocytic anemia with a hemoglobin of 9, chemistry with elevated creatinine of 4.96 and BUN of 17, elevated potassium of 5.5, low albumin of 2, normal ammonia level of 33, elevated TSH of 6, normal lipid panel.  ABG with mild respiratory alkalosis.  CT head showed no acute intracranial findings.  Treated in the ED with Haldol and Benadryl.  Ordered Lokelma.  Ordered consultation to Nephrology and wound care nurse bowen.  Admitted for encephalopathy    MRI with Single cytotoxic lesion in the corpus callosum is nonspecific and may be metabolic or toxic.  Neurology was consulted.  Therapy services consulted.  Palliative medicine was consulted.  Now DNR.  PTOT speech consulted.    Concern for change in mental status during the hospital course, CT scan was ordered and Neurology notified.  CT with no intracranial abnormality.  EEG abnormal.  Started on Keppra.  Made NPO.  Continued IV nutrition.  Initiated on empiric antibiotics while encephalopathic.    Interval Hx:   Continues on Keppra.  Prolactin elevated.  Patient is afebrile without any leukocytosis, however initiated on empiric antibiotics with continued encephalopathy.  Cultures sent.  Failing speech and swallow.  Discussed with the family -about poor prognosis, requesting to speak with Neurology to make a decision of PEG tube    Patient was seen ,examined , nonverbal, not arousable met with the family, discussed about the poor prognosis, family verbalized understanding.   Chart was reviewed.  Afebrile.  Most recent lab work was reviewed.    Objective/physical exam:  General:  Afebrile, lethargic  Chest: Clear  Heart:  Normal rate ,+S1, S2  Abdomen: Soft, nontender, BS +  MSK: Warm, no lower extremity edema, no clubbing or cyanosis.  Wounds as under media  Neurologic:  Barely arousable, Does not follow  commands.(was informed by staff that she is close to her new baseline how she was on presentation)    VITAL SIGNS: 24 HRS MIN & MAX LAST   Temp  Min: 98.5 °F (36.9 °C)  Max: 99.8 °F (37.7 °C) 98.5 °F (36.9 °C)   BP  Min: 139/71  Max: 159/90 (!) 151/86   Pulse  Min: 88  Max: 99  88   Resp  Min: 14  Max: 19 14   SpO2  Min: 96 %  Max: 98 % 98 %     I have reviewed the following labs:  Recent Labs   Lab 02/07/25  0736 02/08/25  0438 02/09/25  0400   WBC 8.16 6.66 8.04   RBC 3.87* 3.84* 3.87*   HGB 10.2* 10.1* 10.3*   HCT 32.3* 32.6* 32.4*   MCV 83.5 84.9 83.7   MCH 26.4* 26.3* 26.6*   MCHC 31.6* 31.0* 31.8*   RDW 16.5 16.6 16.4    344 320   MPV 9.3 9.4 9.3     Recent Labs   Lab 02/04/25  0530 02/04/25  1334 02/05/25  0355 02/07/25  0736 02/07/25  1803 02/08/25  0438 02/09/25  0400     --  138 132*  --  133* 130*   K 5.5*  --  5.0 4.1  --  4.0 4.5     --  105 104  --  99 98   CO2 22*  --  18* 18*  --  22* 23   BUN 17.6  --  25.7* 38.7*  --  23.0* 41.3*   CREATININE 4.96*  --  6.33* 5.90*  --  3.84* 5.44*   CALCIUM 8.8  --  8.5 8.3*  --  8.8 8.6   PH  --  7.460*  --   --  7.530*  --   --    MG  --   --  2.60 2.50  --  2.10  --    ALBUMIN 2.0*  --  2.1*  --   --  2.0*  --    ALKPHOS 183*  --  188*  --   --  164*  --    ALT 20  --  17  --   --  13  --    AST 24  --  26  --   --  19  --    BILITOT 0.4  --  0.4  --   --  0.4  --      Microbiology Results (last 7 days)       Procedure Component Value Units Date/Time    Body Fluid Culture [[id number]]  (Abnormal) Collected: 02/04/25 1707    Order Status: Completed Specimen: Peritoneal Fluid Updated: 02/09/25 1225     Body Fluid  Culture Staphylococcus epidermidis     Comment: Isolated from Thio only       Wound Culture [[id number]]  (Abnormal) Collected: 02/07/25 2045    Order Status: Completed Specimen: Wound from Heel Updated: 02/09/25 0738     Wound Culture Many Gram-negative Rods    Blood Culture [[id number]]  (Normal) Collected: 02/07/25 1725    Order Status: Completed Specimen: Blood from Arm, Left Updated: 02/08/25 2002     Blood Culture No Growth At 24 Hours    Blood Culture [[id number]]  (Normal) Collected: 02/07/25 1725    Order Status: Completed Specimen: Blood from Arm, Right Updated: 02/08/25 2002     Blood Culture No Growth At 24 Hours    Gram Stain [[id number]] Collected: 02/04/25 1707    Order Status: Completed Specimen: Peritoneal Fluid Updated: 02/04/25 1935     GRAM STAIN Moderate WBC observed      No bacteria seen             See below for Radiology    Assessment/Plan:  Acute encephalopathy  CVA  Seizures  ESRD-HD M/W/F via right IJ tunneled dialysis catheter  Hyperkalemia, mild  Hypoglycemia.  Normocytic anemia  Generalized weakness and debility   Physical Deconditioning   Left heel stage 4 decubitus ulcers with eschar and right heel with stage II decubitus ulcer with blister.  Concern for SSTI/peritonitis  History of sepsis secondary to cellulitis of left lower extremity completed clindamycin therapy  Left lower extremity skin cancer awaiting treatment   Essential hypertension   History of NSTEMI type 2 with CIS recommending outpatient stress test (PET scan )   Right Humerus fracture with IM nail (12/2024)  History of colon polyp/lung nodule  History of peritoneal dialysis and failed kidney transplant   Dyslipidemia   Generalized anxiety disorder/major depressive disorder  Former smoker    Plan   Seen by Neurology, on Aspirin.  Statin  Concern for change in mental status, CT head with no acute intracranial abnormalities, EEG abnormal, likely related to seizures.  Prolactin.  Continue Keppra  PTOT speech on board. May  have to consult GI for PEG tube if failing if family agreeable who would like to speak with Neurology and make a decision  Initiated empiric antibiotics for continued encephalopathy-on Vancomycin, Zosyn, sent cultures.  Body fluid culture with staph epidermidis, wound culture with many Gram-negative rods(could be contamination).  CT chest abdomen pelvis with small volume ascites  Blood cultures so far negative..UA and GI panel pending.  No fevers or leukocytosis.  Continue Wound care.   Monitor electrolytes and replete appropriately.  Continue dialysis per Nephrology.  Follow up BMP.  Monitor blood sugars, watch for hypoglycemia.  Monitor blood pressure, adjust antihypertensive regimen as needed.    Continue other appropriate home medications.  Prognosis is poor, discussed with the family, palliative medicine following.  DNR.    has been thinking about PEG tube, daughter who claims to be the POA, thinking about speaking with Neurology before consideration of PEG tube.  At this point,MD thinks the prognosis is very poor and if PEG tube is of any benefit, discussed with the family      VTE prophylaxis:  Heparin subQ      Anticipated discharge and Disposition:   To be decided      All diagnosis and differential diagnosis have been reviewed; assessment and plan has been documented; I have personally reviewed the labs and test results that are presently available; I have reviewed the patients medication list; I have reviewed the consulting providers response and recommendations. I have reviewed or attempted to review medical records based upon their availability    All of the patient's questions have been  addressed and answered. Patient's is agreeable to the above stated plan. I will continue to monitor closely and make adjustments to medical management as needed.    Portions of this note dictated using EMR integrated voice recognition software, and may be subject to voice recognition errors not corrected at  proofreading. Please contact writer for clarification if needed.   _____________________________________________________________________    Malnutrition Status:  Nutrition consulted. Most recent weight and BMI monitored-     Measurements:  Wt Readings from Last 1 Encounters:   02/04/25 63.5 kg (140 lb)   Body mass index is 25.6 kg/m².    Patient has been screened and assessed by RD.    Malnutrition Type:  Context: acute illness or injury  Level: moderate    Malnutrition Characteristic Summary:  Weight Loss (Malnutrition): other (see comments) (Does not meet criteria)  Energy Intake (Malnutrition): less than or equal to 50% for greater than or equal to 5 days  Subcutaneous Fat (Malnutrition): moderate depletion  Muscle Mass (Malnutrition): moderate depletion  Fluid Accumulation (Malnutrition): other (see comments) (Not present)    Interventions/Recommendations (treatment strategy):        Scheduled Med:   amLODIPine  5 mg Oral BID    aspirin  300 mg Rectal Daily    atorvastatin  40 mg Oral QHS    calcitRIOL  0.25 mcg Oral Once per day on Monday Wednesday Friday    folic acid  1 mg Oral Daily    levETIRAcetam (Keppra) IV (PEDS and ADULTS)  500 mg Intravenous Q12H    [START ON 2/10/2025] mupirocin   Nasal BID    pantoprazole  40 mg Oral Daily    piperacillin-tazobactam (Zosyn) IV (PEDS and ADULTS) (extended infusion is not appropriate)  4.5 g Intravenous Q12H      Continuous Infusions:   Amino acid 4.25% - dextrose 5% (CLINIMIX-E) solution (1L provides 42.5 gm AA, 50 gm CHO (170 kcal/L dextrose), Na 35, K 30, Mg 5, Ca 4.5, Acetate 70, Cl 39, Phos 15)   Intravenous Continuous 50 mL/hr at 02/08/25 2325 New Bag at 02/08/25 2325      PRN Meds:    Current Facility-Administered Medications:     acetaminophen, 650 mg, Oral, Q4H PRN    acetaminophen, 650 mg, Oral, Q8H PRN    bisacodyL, 10 mg, Rectal, Daily PRN    dextrose 50%, 12.5 g, Intravenous, PRN    dextrose 50%, 25 g, Intravenous, PRN    glucagon (human recombinant), 1  mg, Intramuscular, PRN    glucose, 16 g, Oral, PRN    glucose, 24 g, Oral, PRN    hydrALAZINE, 5 mg, Intravenous, Q6H PRN    lorazepam, 0.5 mg, Intravenous, Q8H PRN    melatonin, 6 mg, Oral, Nightly PRN    naloxone, 0.02 mg, Intravenous, PRN    ondansetron, 4 mg, Intravenous, Q8H PRN    sodium chloride 0.9%, 10 mL, Intravenous, Q12H PRN    Flushing PICC/Midline Protocol, , , Until Discontinued **AND** sodium chloride 0.9%, 10 mL, Intravenous, Q12H PRN    vancomycin - pharmacy to dose, , Intravenous, pharmacy to manage frequency     Radiology:  I have personally reviewed the following imaging and agree with the radiologist.         Federica Orta MD  Department of Hospital Medicine   [facility]   02/09/2025

## 2025-02-09 NOTE — PT/OT/SLP PROGRESS
Physical Therapy      Patient Name:  Malissa Schwartz   MRN:  [id number]    Pt continues to be unarousable upon entering room. NSG states that she has been in this state. Will f/u when pt is appropriate for evaluation.

## 2025-02-09 NOTE — PLAN OF CARE
Problem: Adult Inpatient Plan of Care  Goal: Plan of Care Review  Outcome: Progressing  Goal: Patient-Specific Goal (Individualized)  Outcome: Progressing  Goal: Absence of Hospital-Acquired Illness or Injury  Outcome: Progressing  Goal: Optimal Comfort and Wellbeing  Outcome: Progressing  Goal: Readiness for Transition of Care  Outcome: Progressing     Problem: Infection  Goal: Absence of Infection Signs and Symptoms  Outcome: Progressing     Problem: Wound  Goal: Optimal Coping  Outcome: Progressing  Goal: Optimal Functional Ability  Outcome: Progressing  Goal: Absence of Infection Signs and Symptoms  Outcome: Progressing  Goal: Improved Oral Intake  Outcome: Progressing  Goal: Optimal Pain Control and Function  Outcome: Progressing  Goal: Skin Health and Integrity  Outcome: Progressing  Goal: Optimal Wound Healing  Outcome: Progressing     Problem: Hemodialysis  Goal: Safe, Effective Therapy Delivery  Outcome: Progressing  Goal: Effective Tissue Perfusion  Outcome: Progressing  Goal: Absence of Infection Signs and Symptoms  Outcome: Progressing     Problem: Skin Injury Risk Increased  Goal: Skin Health and Integrity  Outcome: Progressing     Problem: Coping Ineffective  Goal: Effective Coping  Outcome: Progressing     Problem: Stroke, Ischemic (Includes Transient Ischemic Attack)  Goal: Optimal Coping  Outcome: Progressing  Goal: Effective Bowel Elimination  Outcome: Progressing  Goal: Optimal Cerebral Tissue Perfusion  Outcome: Progressing  Goal: Optimal Cognitive Function  Outcome: Progressing  Goal: Improved Communication Skills  Outcome: Progressing  Goal: Optimal Functional Ability  Outcome: Progressing  Goal: Optimal Nutrition Intake  Outcome: Progressing  Goal: Effective Oxygenation and Ventilation  Outcome: Progressing  Goal: Improved Sensorimotor Function  Outcome: Progressing  Goal: Safe and Effective Swallow  Outcome: Progressing  Goal: Effective Urinary Elimination  Outcome: Progressing

## 2025-02-10 PROBLEM — Z51.5 COMFORT MEASURES ONLY STATUS: Status: ACTIVE | Noted: 2025-02-10

## 2025-02-10 PROBLEM — G93.40 ENCEPHALOPATHY: Status: ACTIVE | Noted: 2025-02-10

## 2025-02-10 PROBLEM — L89.610 UNSTAGEABLE PRESSURE ULCER OF RIGHT HEEL: Status: ACTIVE | Noted: 2025-01-27

## 2025-02-10 LAB
ADV 40+41 DNA STL QL NAA+NON-PROBE: NOT DETECTED
ALBUMIN SERPL-MCNC: 2 G/DL (ref 3.4–4.8)
ALBUMIN/GLOB SERPL: 0.5 RATIO (ref 1.1–2)
ALP SERPL-CCNC: 191 UNIT/L (ref 40–150)
ALT SERPL-CCNC: 13 UNIT/L (ref 0–55)
ANION GAP SERPL CALC-SCNC: 13 MEQ/L
AST SERPL-CCNC: 24 UNIT/L (ref 5–34)
ASTRO TYP 1-8 RNA STL QL NAA+NON-PROBE: NOT DETECTED
BACTERIA WND CULT: ABNORMAL
BASOPHILS # BLD AUTO: 0.05 X10(3)/MCL
BASOPHILS NFR BLD AUTO: 0.5 %
BILIRUB SERPL-MCNC: 0.6 MG/DL
BUN SERPL-MCNC: 60.7 MG/DL (ref 9.8–20.1)
C CAYETANENSIS DNA STL QL NAA+NON-PROBE: NOT DETECTED
C COLI+JEJ+UPSA DNA STL QL NAA+NON-PROBE: NOT DETECTED
CALCIUM SERPL-MCNC: 8.8 MG/DL (ref 8.4–10.2)
CHLORIDE SERPL-SCNC: 96 MMOL/L (ref 98–107)
CO2 SERPL-SCNC: 21 MMOL/L (ref 23–31)
CREAT SERPL-MCNC: 6.96 MG/DL (ref 0.55–1.02)
CREAT/UREA NIT SERPL: 9
CRYPTOSP DNA STL QL NAA+NON-PROBE: NOT DETECTED
E HISTOLYT DNA STL QL NAA+NON-PROBE: NOT DETECTED
EAEC PAA PLAS AGGR+AATA ST NAA+NON-PRB: DETECTED
EC STX1+STX2 GENES STL QL NAA+NON-PROBE: NOT DETECTED
EOSINOPHIL # BLD AUTO: 0.13 X10(3)/MCL (ref 0–0.9)
EOSINOPHIL NFR BLD AUTO: 1.3 %
EPEC EAE GENE STL QL NAA+NON-PROBE: NOT DETECTED
ERYTHROCYTE [DISTWIDTH] IN BLOOD BY AUTOMATED COUNT: 16.1 % (ref 11.5–17)
ETEC LTA+ST1A+ST1B TOX ST NAA+NON-PROBE: NOT DETECTED
G LAMBLIA DNA STL QL NAA+NON-PROBE: NOT DETECTED
GFR SERPLBLD CREATININE-BSD FMLA CKD-EPI: 6 ML/MIN/1.73/M2
GLOBULIN SER-MCNC: 3.8 GM/DL (ref 2.4–3.5)
GLUCOSE SERPL-MCNC: 98 MG/DL (ref 82–115)
HBV SURFACE AG SERPL QL IA: NONREACTIVE
HCT VFR BLD AUTO: 33.9 % (ref 37–47)
HGB BLD-MCNC: 10.7 G/DL (ref 12–16)
IMM GRANULOCYTES # BLD AUTO: 0.03 X10(3)/MCL (ref 0–0.04)
IMM GRANULOCYTES NFR BLD AUTO: 0.3 %
INR PPP: 1.2
LYMPHOCYTES # BLD AUTO: 1.24 X10(3)/MCL (ref 0.6–4.6)
LYMPHOCYTES NFR BLD AUTO: 12.5 %
MCH RBC QN AUTO: 26.4 PG (ref 27–31)
MCHC RBC AUTO-ENTMCNC: 31.6 G/DL (ref 33–36)
MCV RBC AUTO: 83.7 FL (ref 80–94)
MONOCYTES # BLD AUTO: 0.58 X10(3)/MCL (ref 0.1–1.3)
MONOCYTES NFR BLD AUTO: 5.8 %
NEUTROPHILS # BLD AUTO: 7.89 X10(3)/MCL (ref 2.1–9.2)
NEUTROPHILS NFR BLD AUTO: 79.6 %
NOROVIRUS GI+II RNA STL QL NAA+NON-PROBE: NOT DETECTED
NRBC BLD AUTO-RTO: 0 %
P SHIGELLOIDES DNA STL QL NAA+NON-PROBE: NOT DETECTED
PLATELET # BLD AUTO: 327 X10(3)/MCL (ref 130–400)
PMV BLD AUTO: 9.6 FL (ref 7.4–10.4)
POCT GLUCOSE: 90 MG/DL (ref 70–110)
POCT GLUCOSE: 95 MG/DL (ref 70–110)
POTASSIUM SERPL-SCNC: 5 MMOL/L (ref 3.5–5.1)
PROT SERPL-MCNC: 5.8 GM/DL (ref 5.8–7.6)
PROTHROMBIN TIME: 15.2 SECONDS (ref 12.5–14.5)
RBC # BLD AUTO: 4.05 X10(6)/MCL (ref 4.2–5.4)
RVA RNA STL QL NAA+NON-PROBE: NOT DETECTED
S ENT+BONG DNA STL QL NAA+NON-PROBE: NOT DETECTED
SAPO I+II+IV+V RNA STL QL NAA+NON-PROBE: NOT DETECTED
SHIGELLA SP+EIEC IPAH ST NAA+NON-PROBE: NOT DETECTED
SODIUM SERPL-SCNC: 130 MMOL/L (ref 136–145)
V CHOL+PARA+VUL DNA STL QL NAA+NON-PROBE: NOT DETECTED
V CHOLERAE DNA STL QL NAA+NON-PROBE: NOT DETECTED
VANCOMYCIN SERPL-MCNC: 15.6 UG/ML (ref 15–20)
WBC # BLD AUTO: 9.92 X10(3)/MCL (ref 4.5–11.5)
Y ENTEROCOL DNA STL QL NAA+NON-PROBE: NOT DETECTED

## 2025-02-10 PROCEDURE — 85610 PROTHROMBIN TIME: CPT | Performed by: INTERNAL MEDICINE

## 2025-02-10 PROCEDURE — 25000003 PHARM REV CODE 250: Performed by: INTERNAL MEDICINE

## 2025-02-10 PROCEDURE — 80100016 HC MAINTENANCE HEMODIALYSIS

## 2025-02-10 PROCEDURE — 63600175 PHARM REV CODE 636 W HCPCS: Performed by: INTERNAL MEDICINE

## 2025-02-10 PROCEDURE — 85025 COMPLETE CBC W/AUTO DIFF WBC: CPT | Performed by: INTERNAL MEDICINE

## 2025-02-10 PROCEDURE — 36415 COLL VENOUS BLD VENIPUNCTURE: CPT | Performed by: INTERNAL MEDICINE

## 2025-02-10 PROCEDURE — 21400001 HC TELEMETRY ROOM

## 2025-02-10 PROCEDURE — 80202 ASSAY OF VANCOMYCIN: CPT | Performed by: INTERNAL MEDICINE

## 2025-02-10 PROCEDURE — 63600175 PHARM REV CODE 636 W HCPCS: Performed by: STUDENT IN AN ORGANIZED HEALTH CARE EDUCATION/TRAINING PROGRAM

## 2025-02-10 PROCEDURE — 27000207 HC ISOLATION

## 2025-02-10 PROCEDURE — 87340 HEPATITIS B SURFACE AG IA: CPT | Performed by: STUDENT IN AN ORGANIZED HEALTH CARE EDUCATION/TRAINING PROGRAM

## 2025-02-10 PROCEDURE — 80053 COMPREHEN METABOLIC PANEL: CPT | Performed by: INTERNAL MEDICINE

## 2025-02-10 RX ORDER — LORAZEPAM 2 MG/ML
1 INJECTION INTRAMUSCULAR EVERY 4 HOURS PRN
Status: DISCONTINUED | OUTPATIENT
Start: 2025-02-10 | End: 2025-02-11 | Stop reason: HOSPADM

## 2025-02-10 RX ORDER — MEROPENEM 500 MG/1
500 INJECTION, POWDER, FOR SOLUTION INTRAVENOUS
Status: DISCONTINUED | OUTPATIENT
Start: 2025-02-10 | End: 2025-02-11 | Stop reason: HOSPADM

## 2025-02-10 RX ORDER — GLYCOPYRROLATE 0.2 MG/ML
0.1 INJECTION INTRAMUSCULAR; INTRAVENOUS 3 TIMES DAILY PRN
Status: DISCONTINUED | OUTPATIENT
Start: 2025-02-10 | End: 2025-02-11 | Stop reason: HOSPADM

## 2025-02-10 RX ORDER — HEPARIN SODIUM 1000 [USP'U]/ML
3200 INJECTION, SOLUTION INTRAVENOUS; SUBCUTANEOUS
Status: DISCONTINUED | OUTPATIENT
Start: 2025-02-10 | End: 2025-02-11 | Stop reason: HOSPADM

## 2025-02-10 RX ORDER — HYDROMORPHONE HYDROCHLORIDE 2 MG/ML
1 INJECTION, SOLUTION INTRAMUSCULAR; INTRAVENOUS; SUBCUTANEOUS
Status: DISCONTINUED | OUTPATIENT
Start: 2025-02-10 | End: 2025-02-11 | Stop reason: HOSPADM

## 2025-02-10 RX ORDER — CLONIDINE 0.2 MG/24H
1 PATCH, EXTENDED RELEASE TRANSDERMAL
Status: DISCONTINUED | OUTPATIENT
Start: 2025-02-10 | End: 2025-02-11 | Stop reason: HOSPADM

## 2025-02-10 RX ADMIN — LEUCINE, PHENYLALANINE, LYSINE, METHIONINE, ISOLEUCINE, VALINE, HISTIDINE, THREONINE, TRYPTOPHAN, ALANINE, GLYCINE, ARGININE, PROLINE, SERINE, TYROSINE, SODIUM ACETATE, DIBASIC POTASSIUM PHOSPHATE, MAGNESIUM CHLORIDE, SODIUM CHLORIDE, CALCIUM CHLORIDE, DEXTROSE
880; 489; 33; 5; 438; 204; 255; 311; 247; 51; 170; 238; 261; 289; 213; 297; 77; 179; 77; 17; 247 INJECTION INTRAVENOUS at 05:02

## 2025-02-10 RX ADMIN — PIPERACILLIN SODIUM AND TAZOBACTAM SODIUM 4.5 G: 4; .5 INJECTION, POWDER, LYOPHILIZED, FOR SOLUTION INTRAVENOUS at 05:02

## 2025-02-10 RX ADMIN — ACYCLOVIR SODIUM 250 MG: 50 INJECTION, SOLUTION INTRAVENOUS at 03:02

## 2025-02-10 RX ADMIN — MUPIROCIN: 20 OINTMENT TOPICAL at 08:02

## 2025-02-10 RX ADMIN — LEVETIRACETAM 500 MG: 100 INJECTION, SOLUTION INTRAVENOUS at 10:02

## 2025-02-10 RX ADMIN — CLONIDINE 1 PATCH: 0.2 PATCH TRANSDERMAL at 04:02

## 2025-02-10 RX ADMIN — MEROPENEM 500 MG: 500 INJECTION, POWDER, FOR SOLUTION INTRAVENOUS at 03:02

## 2025-02-10 RX ADMIN — HEPARIN SODIUM 5000 UNITS: 5000 INJECTION, SOLUTION INTRAVENOUS; SUBCUTANEOUS at 08:02

## 2025-02-10 RX ADMIN — LEVETIRACETAM 500 MG: 100 INJECTION, SOLUTION INTRAVENOUS at 08:02

## 2025-02-10 RX ADMIN — HEPARIN SODIUM 3200 UNITS: 1000 INJECTION, SOLUTION INTRAVENOUS; SUBCUTANEOUS at 07:02

## 2025-02-10 NOTE — SUBJECTIVE & OBJECTIVE
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 Glomerulitis with one activated endothelial cell: treated with Solumedrol pulse 375 mg IVBP x 3 doses with short prednisone taper; thymoglobin 100 mg x 6 doses 10/04/2007 minimal histologic changes no acute rejection     Allergy     Anemia of renal disease     CKD (chronic kidney disease), stage IV     CMV (cytomegalovirus) status positive     Colon polyp     benign    Depression     GN (glomerulonephritis)     GN (glomerulonephritis)     unknown type    Herpes infection, other 2009    on buttocks    Hyperlipidemia     Hypertension     Immunosuppression     Living-donor kidney transplant recipient 01/15/2007    Meningitis     history of viral meningitis    Proteinuria 1/5/2015    Proteinuria     Pulmonary nodules     Renal hypertension     S/P breast augmentation     Skin cancer     Urinary tract infection        Past Surgical History:   Procedure Laterality Date    COLON SURGERY      COLONOSCOPY W/ BIOPSIES      cosemetic surgery      facelift and breast augmentation     HYSTERECTOMY      INSERTION OF TUNNELED CENTRAL VENOUS HEMODIALYSIS CATHETER N/A 1/28/2025    Procedure: Insertion, Catheter, Central Venous, Hemodialysis;  Surgeon: Apollo Jacob DO;  Location: [facility] CATH LAB;  Service: Nephrology;  Laterality: N/A;    INTRAMEDULLARY RODDING OF HUMERUS Right 12/2/2024    Procedure: INSERTION, INTRAMEDULLARY MONTSERRAT, HUMERUS;  Surgeon: Samuel Dow DO;  Location: [facility] OR;  Service: Orthopedics;  Laterality: Right;  supine vacular backward c arm synthes  shoulder scope drape    KIDNEY TRANSPLANT      NEPHRECTOMY Left     9/2016    RENAL BIOPSY      TONSILLECTOMY, ADENOIDECTOMY         Review of patient's allergies indicates:   Allergen Reactions    Codeine Palpitations and Rash    Cefepime      Other Reaction(s): Encephalopathy    Renal impairment increased risk for this reaction     Levofloxacin      Other Reaction(s): Encephalopathy    Renal pt who had some encephalopathy while on Levaquin & Cefepime, neurology physicians think they may have been due to the antibiotics (both possible side effects for each)    Iodine Nausea Only       Current Neurological Medications:     No current facility-administered medications on file prior to encounter.     Current Outpatient Medications on File Prior to Encounter   Medication Sig    amLODIPine (NORVASC) 10 MG tablet Take 5 mg by mouth once daily.    aspirin (ECOTRIN) 81 MG EC tablet Take 81 mg by mouth once daily.    B complex-vitamin C-folic acid (NEPHRO-JENN) 0.8 mg Tab Take 0.8 mg by mouth Daily.    calcitRIOL (ROCALTROL) 0.25 MCG Cap Take 0.25 mcg by mouth 3 (three) times a week.    D5W PgBk 50 mL with ceFAZolin 1 gram SolR Cefazolin intraperitoneally times 10 more days    EScitalopram oxalate (LEXAPRO) 20 MG tablet Take 1 tablet (20 mg total) by mouth once daily.    folic acid (FOLVITE) 1 MG tablet Take 1 mg by mouth once daily.    megestroL (MEGACE) 400 mg/10 mL (10 mL) Susp Take 10 mLs (400 mg total) by mouth once daily.    methocarbamoL (ROBAXIN) 500 MG Tab Take 500 mg by mouth 4 (four) times daily.    pantoprazole (PROTONIX) 40 MG tablet Take 40 mg by mouth once daily.    potassium chloride (KLOR-CON) 10 MEQ TbSR Take 10 mEq by mouth once daily.    traMADoL (ULTRAM) 50 mg tablet Take 50 mg by mouth 3 (three) times daily as needed for Pain.    [DISCONTINUED] losartan (COZAAR) 100 MG tablet TAKE ONE TABLET BY MOUTH ONCE DAILY    [DISCONTINUED] metoprolol succinate (TOPROL-XL) 25 MG 24 hr tablet Take 2 tablets (50 mg total) by mouth once daily. (Patient not taking: Reported on 5/12/2022)    [DISCONTINUED] mycophenolate (CELLCEPT) 250 mg Cap Take 2 capsules (500 mg total) by mouth 2 (two) times daily. Z94.0 kidney transplant (Patient not taking: Reported on 5/12/2022)    [DISCONTINUED] tacrolimus (PROGRAF) 1 MG Cap Take 3mg every AM and 2mg  every PM.  Route: Oral.  Z94.0 Kidney Transplant. (Patient not taking: Reported on 5/12/2022)     Family History       Problem Relation (Age of Onset)    Heart failure Mother, Father    Hypertension Brother    Kidney disease Sister, Brother          Tobacco Use    Smoking status: Former     Current packs/day: 1.00     Average packs/day: 1 pack/day for 15.0 years (15.0 ttl pk-yrs)     Types: Cigarettes    Smokeless tobacco: Never    Tobacco comments:     quit in 2014; smoked for 15 years and at the most smoked 1 ppd   Substance and Sexual Activity    Alcohol use: Yes     Comment: seldom    Drug use: No    Sexual activity: Never     Review of Systems   Unable to perform ROS: Acuity of condition       Objective:     Vital Signs (Most Recent):  Temp: 98.3 °F (36.8 °C) (02/10/25 1138)  Pulse: 87 (02/10/25 1154)  Resp: 18 (02/10/25 1154)  BP: 127/79 (02/10/25 1154)  SpO2: (!) 94 % (02/10/25 1138) Vital Signs (24h Range):  Temp:  [97.6 °F (36.4 °C)-98.9 °F (37.2 °C)] 98.3 °F (36.8 °C)  Pulse:  [81-98] 87  Resp:  [12-20] 18  SpO2:  [92 %-98 %] 94 %  BP: (127-180)/(74-99) 127/79     Weight: 63.5 kg (140 lb)  Body mass index is 25.6 kg/m².     Physical Exam  Vitals reviewed.   Constitutional:       General: She is sleeping.   HENT:      Head: Normocephalic and atraumatic.   Pulmonary:      Effort: Pulmonary effort is normal.   Skin:     General: Skin is dry.          NEUROLOGICAL EXAMINATION:     MENTAL STATUS   Follows commands: not following commands.   Speech: (UTO)  Level of consciousness: unresponsive to painful stimuli    MOTOR EXAM        Does not move to command or spontaneously     SENSORY EXAM        Withdraws to painful stimuli       Significant Labs:   Recent Lab Results         02/10/25  0552   02/10/25  0437   02/09/25  2346   02/09/25  1843        Albumin/Globulin Ratio   0.5           Adenovirus F 40/41     Not Detected         Albumin   2.0           ALP   191           ALT   13           Anion Gap   13.0            AST   24           ASTROVIRUS     Not Detected         Baso #   0.05           Basophil %   0.5           BILIRUBIN TOTAL   0.6           BUN   60.7           BUN/CREAT RATIO   9           Calcium   8.8           CAMPYLOBACTER     Not Detected         Chloride   96           CO2   21           Creatinine   6.96           CRYPTOSPORIDIUM     Not Detected         Cyclospora cayetanensis     Not Detected         eGFR   6  Comment: Estimated GFR calculated using the CKD-EPI creatinine (2021) equation.           Entamoeba histolytica     Not Detected         ENTEROAGGREGATIVE E. COLI (EAEC)     Detected         ENTEROPATHOGENIC E. COLI (EPEC)     Not Detected         Enterotoxigenic E. coli (ETEC)     Not Detected         Eos #   0.13           Eos %   1.3           Giardia lamblia     Not Detected         Globulin, Total   3.8           Glucose   98           Hematocrit   33.9           Hemoglobin   10.7           Hepatitis B Surface Ag   Nonreactive           Immature Grans (Abs)   0.03           Immature Granulocytes   0.3           Lymph #   1.24           LYMPH %   12.5           MCH   26.4           MCHC   31.6           MCV   83.7           Mono #   0.58           Mono %   5.8           MPV   9.6           Neut #   7.89           Neut %   79.6           Norovirus GI/GII     Not Detected         nRBC   0.0           Platelet Count   327           PLESIOMONAS SHIGELLOIDES     Not Detected         POCT Glucose 95       100       Potassium   5.0           PROTEIN TOTAL   5.8           RBC   4.05           RDW   16.1           Rotavirus A     Not Detected         SALMONELLA     Not Detected         SAPOVIRUS     Not Detected         SHIGA-LIKE TOXIN-PRODUCING E. COLI (STEC)     Not Detected         Shigella/Enteroinvasive E. coli (EIEC)     Not Detected         Sodium   130           Vancomycin, Random   15.6           Vibrio sp.     Not Detected         VIBRIO CHOLERAE     Not Detected         WBC   9.92            YERSINIA ENTEROCOLITICA     Not Detected                 Significant Imaging:   CT head w/o 2/6/2025:  Impression:     No acute intracranial process identified.       I have reviewed all pertinent imaging results/findings within the past 24 hours.

## 2025-02-10 NOTE — PROGRESS NOTES
[facility] - Ortho Neuro  Wound Care  Progress Note    Patient Name: Malissa Schwartz  MRN: [id number]  Admission Date: 2/4/2025  Hospital Length of Stay: 5 days  Attending Physician: Federica Orta MD  Primary Care Provider: Sergio Crabtree MD     Subjective:     HPI:  Wound medicine re-check     The patient is a 74 year old female with a multitude of medical problems including HTN, HLD, failed kidney transplant, ESRD on PD, deconditioning with recent humerus fracture s/p IM nail (12/2024).  She was recently discharged from this facility on 2/3/25 after a 13 day admission with NSTEMI, LLE cellulitis, missed PD and deconditioning. She was converted to hemodialysis prior to discharge so that she could go to rehab, she was discharged to [facility] for SNF on 2/3/25. She returned form NH on 2/4/25 with AMS, combative towards staff and family and not speaking. Of note records reveal she had flat affect and not speaking prior to discharge and was evaluated by Psychiatry.  concerned because she has not shown improvement.  She has been admitted for workup of acute encephalopathy; CT head without acute acute intracranial findings; MRI brain shows abnormality in the left corpus callosum - Neuro consult noted , PD fluid culture in process.   Wound medicine consulted upon admission for continuity of care, we saw this patient during recent prior encounter. She presents with bilateral heel pressure injuries as well as multiple areas of skin cancer, awaiting treatment, followed by dermatology outpatient.   Initial evaluation on 2/5/24 - bilateral heel pressure injuries present on admission. Right heel unstageable, left heel DTI intact blister.      2/10/25 - wound re-check today.  Met the patient in room 1007.  She opens her eyes intermittently but does not respond to verbal stimuli, does not move upper or lower extremities today, seems to fall asleep intermittently during assessment. I am accompanied  by CNA and staff nurse.   Her  and caregiver are also at bedside and report patient has been steadily declining and not responding at all as of late.   She is on a low air loss bed with bilateral heel boots in place as well as sequential compression devices on BLE. She is in no apparent distress.             Hospital Course:   No notes on file          Scheduled Meds:   acyclovir  5 mg/kg (Ideal) Intravenous Daily    amLODIPine  5 mg Oral BID    aspirin  300 mg Rectal Daily    atorvastatin  40 mg Oral QHS    calcitRIOL  0.25 mcg Oral Once per day on Monday Wednesday Friday    cloNIDine 0.2 mg/24 hr td ptwk  1 patch Transdermal Q7 Days    folic acid  1 mg Oral Daily    heparin (porcine)  5,000 Units Subcutaneous Q12H    levETIRAcetam (Keppra) IV (PEDS and ADULTS)  500 mg Intravenous Q12H    meropenem IV (PEDS and ADULTS)  500 mg Intravenous Q24H    mupirocin   Nasal BID    pantoprazole  40 mg Oral Daily     Continuous Infusions:   Amino acid 4.25% - dextrose 5% (CLINIMIX-E) solution (1L provides 42.5 gm AA, 50 gm CHO (170 kcal/L dextrose), Na 35, K 30, Mg 5, Ca 4.5, Acetate 70, Cl 39, Phos 15)   Intravenous Continuous 50 mL/hr at 02/10/25 0539 New Bag at 02/10/25 0539     PRN Meds:  Current Facility-Administered Medications:     acetaminophen, 650 mg, Oral, Q4H PRN    acetaminophen, 650 mg, Oral, Q8H PRN    bisacodyL, 10 mg, Rectal, Daily PRN    dextrose 50%, 12.5 g, Intravenous, PRN    dextrose 50%, 25 g, Intravenous, PRN    glucagon (human recombinant), 1 mg, Intramuscular, PRN    glucose, 16 g, Oral, PRN    glucose, 24 g, Oral, PRN    hydrALAZINE, 5 mg, Intravenous, Q6H PRN    lorazepam, 0.5 mg, Intravenous, Q8H PRN    melatonin, 6 mg, Oral, Nightly PRN    naloxone, 0.02 mg, Intravenous, PRN    ondansetron, 4 mg, Intravenous, Q8H PRN    sodium chloride 0.9%, 10 mL, Intravenous, Q12H PRN    Flushing PICC/Midline Protocol, , , Until Discontinued **AND** sodium chloride 0.9%, 10 mL, Intravenous, Q12H PRN     vancomycin - pharmacy to dose, , Intravenous, pharmacy to manage frequency    Review of Systems   Unable to perform ROS: Patient nonverbal     Objective:     Vital Signs (Most Recent):  Temp: 98.3 °F (36.8 °C) (02/10/25 1138)  Pulse: 87 (02/10/25 1154)  Resp: 18 (02/10/25 1154)  BP: 127/79 (02/10/25 1154)  SpO2: (!) 94 % (02/10/25 1138) Vital Signs (24h Range):  Temp:  [97.6 °F (36.4 °C)-98.9 °F (37.2 °C)] 98.3 °F (36.8 °C)  Pulse:  [81-98] 87  Resp:  [12-20] 18  SpO2:  [92 %-98 %] 94 %  BP: (127-180)/(74-99) 127/79     Weight: 63.5 kg (140 lb)  Body mass index is 25.6 kg/m².     Physical Exam  Vitals reviewed.   Constitutional:       General: She is not in acute distress.     Appearance: She is normal weight. She is ill-appearing (chronic).      Comments: Appears older than stated age; thin with diffuse muscle wasting    HENT:      Head: Normocephalic and atraumatic.      Nose: Nose normal.   Cardiovascular:      Rate and Rhythm: Normal rate and regular rhythm.      Pulses: Normal pulses.   Pulmonary:      Effort: Pulmonary effort is normal. No respiratory distress.   Musculoskeletal:        Legs:         Feet:    Skin:     General: Skin is warm and dry.      Capillary Refill: Capillary refill takes less than 2 seconds.      Comments: Redness to sacrum that blanches; area of bruising to right upper back and other scattered bruises and superficial abrasions/skin tears to bilateral upper and lower extremities   Upper back with diffuse erythema that blanches   Neurological:      General: No focal deficit present.      Mental Status: She is lethargic.      Comments: Non-verbal   Responds to touch with pulling away and crossing legs; seems agitated   She does track me around the room and looks at me when I call her name.        Upper back      Sacral region - redness - blanches, no breaks       Left heel: 3.8 x 2 cm       Right medial heel: 4 x 3 cm       Right heel - post debridement                     Laboratory:  A1C:   Recent Labs   Lab 02/06/25  1127   HGBA1C 4.3     ABGs:   Recent Labs   Lab 02/07/25  1803   PH 7.530*   PCO2 33.0*   HCO3 27.6*       BMP:   Recent Labs   Lab 02/08/25  0438 02/09/25  0400 02/10/25  0437   *   < > 130*   K 4.0   < > 5.0   CL 99   < > 96*   CO2 22*   < > 21*   BUN 23.0*   < > 60.7*   CREATININE 3.84*   < > 6.96*   CALCIUM 8.8   < > 8.8   MG 2.10  --   --     < > = values in this interval not displayed.       CBC:   Recent Labs   Lab 02/10/25  0437   WBC 9.92   RBC 4.05*   HGB 10.7*   HCT 33.9*      MCV 83.7   MCH 26.4*   MCHC 31.6*     CMP:   Recent Labs   Lab 02/10/25  0437   CALCIUM 8.8   ALBUMIN 2.0*   *   K 5.0   CO2 21*   CL 96*   BUN 60.7*   CREATININE 6.96*   ALKPHOS 191*   ALT 13   AST 24   BILITOT 0.6       LFTs:   Recent Labs   Lab 02/10/25  0437   ALT 13   AST 24   ALKPHOS 191*   BILITOT 0.6   ALBUMIN 2.0*       Microbiology Results (last 7 days)       Procedure Component Value Units Date/Time    Cerebrospinal Fluid Culture [[id number]]     Order Status: Sent Specimen: CSF (Spinal Fluid) from Cerebrospinal Fluid     AFB Smear [[id number]]     Order Status: Sent Specimen: CSF (Spinal Fluid) from Cerebrospinal Fluid     Mycobacteria and Nocardia Culture [[id number]]     Order Status: Sent Specimen: CSF (Spinal Fluid) from Cerebrospinal Fluid     Body Fluid Culture [[id number]]  (Abnormal) Collected: 02/04/25 1707    Order Status: Completed Specimen: Peritoneal Fluid Updated: 02/10/25 1028     Body Fluid Culture Staphylococcus epidermidis     Comment: Isolated from Thio only       Wound Culture [[id number]]  (Abnormal)  (Susceptibility) Collected: 02/07/25 2045    Order Status: Completed Specimen: Wound from Heel Updated: 02/10/25 0805     Wound Culture Many Escherichia coli    Blood Culture [[id number]]  (Normal) Collected: 02/07/25 1725    Order Status: Completed Specimen: Blood from Arm, Left Updated: 02/09/25 2001     Blood Culture No Growth At 48  Hours    Blood Culture [[id number]]  (Normal) Collected: 02/07/25 1725    Order Status: Completed Specimen: Blood from Arm, Right Updated: 02/09/25 2001     Blood Culture No Growth At 48 Hours    Gram Stain [[id number]] Collected: 02/04/25 1707    Order Status: Completed Specimen: Peritoneal Fluid Updated: 02/04/25 1935     GRAM STAIN Moderate WBC observed      No bacteria seen              Diagnostic Results:  I have reviewed all pertinent imaging results/findings within the past 24 hours.    Assessment/Plan:     Unstageable pressure ulcer of bilateral heels - present on admission.   Acute encephalopathy   ESRD, on peritoneal dialysis at home; currently on HD during hospitalization and SNF stay.   Deconditioned; currently non-verbal, agitated and combative  Recent right humerus fracture s/p IM nailing - December 2024  Left lower extremity skin cancer awaiting treatment              Chart reviewed, patient examined and wounds assessed.     Plan of Care:     [x]   Wound was assessed. Re-check patient today, left heel with intact dry black eschar; peeling up at edges proximal edge. No infection  Right heel - dry hard skin covering peeled away today revealing central area of tissue necrosis. No surrounding cellulitis or signs of infection.      Buttocks/sacrum blanching erythema - no breaks; denuded skin around anus.   Today with large area of erythema to upper back that does arianne. Seems to improve the longer she is on her side. Floor nurse notified.      Discussed with staff at bedside that constant offloading is necessary to prevent further skin damage and breakdown. Use heel boots and use pillows to float and offload. Use Purple wedge to keep patient off of sacrum. Use Zinc oxide paste around anus to protect from stool/moisture.       [x]   Patient tolerated well.     [x]   Wound Care Orders: Left heel and Right heel: paint eschar with betadine generously then cover with gauze and ABD pad and secure with whole  roll of Kerlix - daily  Monitor for any signs of infection and deterioration: Watch for increased drainage or pain, fevers, chills, swelling.    [x] Incontinence: control moisture/wound contamination: No briefs; use things such as purewick or molina catheter; rectal tube     [x]   Offloading: Must offload the wound to potentiate wound healing: has REED mattress, turning q 2 hrs; use of wedges and heel offloading devices to be used at all times while in bed; ( LILLI Simons). This needs to be reinforced by every staff nurse caring for patient on every shift of every day. May still use PT/OT services.  Must offload heels at all times while in bed or chair; ok for PT/OT and intermittent standing/ambulating.   Discussed need to use offloading boots as well as using pillows to float heels/feet.      [x]   Nutrition: Must have a high protein diet to support wound healing (if renal disease, see nephrologist for amount allowed). This should be over 100 grams protein / day (if no renal issues). Also recommend MVI along with vit C, vit D, zinc and Buddy. Prealbumin on 1/27/25 - 14.8. Follow RD  recommendations      [x]   Discussed plan of care with nurse caring for patient today.      The time spent including preparing to see the patient, obtaining patient history and assessment, evaluation of the plan of care, patient/caregiver counseling and education, orders, documentation, coordination of care, and other professional medical management activities for today's encounter was 50 minutes.      LETHA Matute  Wound Care  [facility]

## 2025-02-10 NOTE — CONSULTS
Infectious Disease  Consult Note//  progress note    Patient Name: Malissa Schwartz   MRN: [id number]   Admission Date: 2/4/2025   Hospital Length of Stay: 5 days  Attending Physician: Federica Orta MD   Primary Care Provider: Sergio Crabtree MD     Isolation Status: Contact       Impression     Altered mental status -date of admission February 4  Nuchal rigidity   Afebrile and hemodynamically stable otherwise   Protecting her airway despite a poor GCS    Patient is a known case of ESRD on peritoneal dialysis, she is still has a dialysis catheter     He has an extensive past medical history of hypertension, deconditioning, humerus fracture with intramedullary nail, CAD, failed kidney transplant, ESRD on hemodialysis, prior to she was on peritoneal dialysis   Recent left lower extremity cellulitis, treated with the clindamycin.      Jan /20/2025, a right-sided tunneled hemodialysis catheter was placed and then she was discharged    On the day of admission, she was afebrile, hemodynamically stable, WBC of 7, ammonia was 33.      Today when I saw her, she is very encephalopathic    was at the bedside.    CT chest abdomen pelvis shows small ascites  No tenderness to examination   MR angio is benign   MRI brain without contrast shows single cytotoxin lesion in the corpus callosum  Urine is clean  Ascitic fluid is clear on 2/4    Stage 1-2 and non stageable ulcers noted.  In the dependent regions     Patient is now on vanco and Zosyn      Recommendations      Patient is on vancomycin since 02/07/2024   So far microbiology benign.    I think we can DC vancomycin.      I will expand Zosyn to meropenem for CSF penetration   I will add acyclovir empirically   She needs a lumbar puncture.    If possible, we should get an MRI brain with  hemodialysis compatible contrast.   Case was discussed with the primary attending   Patient is in recurrent seizure activity since the stroke.    Neurology on board but they signed  off.        Entero aggregating E coli in the stool, at this time this is not a concernm platelet count is stable   No diarrhea   Contact isolation      I had a lengthy discussion with the family.    They are willing to go for lumbar puncture if needed.    We will follow her course peripherally           Portions of this note dictated using EMR integrated voice recognition software, and may be subject to voice recognition errors not corrected at proofreading. Please contact writer for clarification if needed.    Subjective:     Principal Problem: <principal problem not specified>         Past Medical History:   Diagnosis Date    Acute rejection of kidney transplant 9/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 Glomerulitis with one activated endothelial cell: treated with Solumedrol pulse 375 mg IVBP x 3 doses with short prednisone taper; thymoglobin 100 mg x 6 doses 10/04/2007 minimal histologic changes no acute rejection     Allergy     Anemia of renal disease     CKD (chronic kidney disease), stage IV     CMV (cytomegalovirus) status positive     Colon polyp     benign    Depression     GN (glomerulonephritis)     GN (glomerulonephritis)     unknown type    Herpes infection, other 2009    on buttocks    Hyperlipidemia     Hypertension     Immunosuppression     Living-donor kidney transplant recipient 01/15/2007    Meningitis     history of viral meningitis    Proteinuria 1/5/2015    Proteinuria     Pulmonary nodules     Renal hypertension     S/P breast augmentation     Skin cancer     Urinary tract infection         Past Surgical History:   Procedure Laterality Date    COLON SURGERY      COLONOSCOPY W/ BIOPSIES      cosemetic surgery      facelift and breast augmentation     HYSTERECTOMY      INSERTION OF TUNNELED CENTRAL VENOUS HEMODIALYSIS CATHETER N/A 1/28/2025    Procedure: Insertion, Catheter, Central Venous, Hemodialysis;  Surgeon: Apollo Jacob DO;  Location: [facility] CATH LAB;  Service:  Nephrology;  Laterality: N/A;    INTRAMEDULLARY RODDING OF HUMERUS Right 12/2/2024    Procedure: INSERTION, INTRAMEDULLARY MONTSERRAT, HUMERUS;  Surgeon: Samuel Dow DO;  Location: [facility];  Service: Orthopedics;  Laterality: Right;  supine vacular backward c arm synthes  shoulder scope drape    KIDNEY TRANSPLANT      NEPHRECTOMY Left     9/2016    RENAL BIOPSY      TONSILLECTOMY, ADENOIDECTOMY         Review of patient's allergies indicates:   Allergen Reactions    Codeine Palpitations and Rash    Cefepime      Other Reaction(s): Encephalopathy    Renal impairment increased risk for this reaction    Levofloxacin      Other Reaction(s): Encephalopathy    Renal pt who had some encephalopathy while on Levaquin & Cefepime, neurology physicians think they may have been due to the antibiotics (both possible side effects for each)    Iodine Nausea Only        Family History       Problem Relation (Age of Onset)    Heart failure Mother, Father    Hypertension Brother    Kidney disease Sister, Brother           As in H and  P    Social History     Socioeconomic History    Marital status:    Tobacco Use    Smoking status: Former     Current packs/day: 1.00     Average packs/day: 1 pack/day for 15.0 years (15.0 ttl pk-yrs)     Types: Cigarettes    Smokeless tobacco: Never    Tobacco comments:     quit in 2014; smoked for 15 years and at the most smoked 1 ppd   Substance and Sexual Activity    Alcohol use: Yes     Comment: seldom    Drug use: No    Sexual activity: Never   Social History Narrative    Likes to travel and follow [employer] (has season tickets).    Lives with , 2 dogs and 3 cats    Used to work in real estate and banking     Social Drivers of Health     Financial Resource Strain: Patient Unable To Answer (2/4/2025)    Overall Financial Resource Strain (CARDIA)     Difficulty of Paying Living Expenses: Patient unable to answer   Food Insecurity: Patient Unable To Answer (2/4/2025)    Hunger Vital Sign      Worried About Running Out of Food in the Last Year: Patient unable to answer     Ran Out of Food in the Last Year: Patient unable to answer   Transportation Needs: Patient Unable To Answer (2/4/2025)    TRANSPORTATION NEEDS     Transportation : Patient unable to answer   Physical Activity: Inactive (12/3/2024)    Exercise Vital Sign     Days of Exercise per Week: 0 days     Minutes of Exercise per Session: 0 min   Stress: Patient Unable To Answer (2/4/2025)    [employer] - Occupational Stress Questionnaire     Feeling of Stress : Patient unable to answer   Housing Stability: Patient Unable To Answer (2/4/2025)    Housing Stability Vital Sign     Unable to Pay for Housing in the Last Year: Patient unable to answer     Homeless in the Last Year: Patient unable to answer        Lines/Drains/Airways       Central Venous Catheter Line  Duration                  Hemodialysis Catheter 01/28/25 1400 right subclavian 12 days              Peripheral Intravenous Line  Duration                  Hemodialysis AV Fistula Left upper arm -- days         Midline Catheter - Single Lumen 02/04/25 1930 Right basilic vein (medial side of arm) other (see comments) 5 days                     Medication:  Medications Prior to Admission   Medication Sig    amLODIPine (NORVASC) 10 MG tablet Take 5 mg by mouth once daily.    aspirin (ECOTRIN) 81 MG EC tablet Take 81 mg by mouth once daily.    B complex-vitamin C-folic acid (NEPHRO-JENN) 0.8 mg Tab Take 0.8 mg by mouth Daily.    calcitRIOL (ROCALTROL) 0.25 MCG Cap Take 0.25 mcg by mouth 3 (three) times a week.    D5W PgBk 50 mL with ceFAZolin 1 gram SolR Cefazolin intraperitoneally times 10 more days    EScitalopram oxalate (LEXAPRO) 20 MG tablet Take 1 tablet (20 mg total) by mouth once daily.    folic acid (FOLVITE) 1 MG tablet Take 1 mg by mouth once daily.    megestroL (MEGACE) 400 mg/10 mL (10 mL) Susp Take 10 mLs (400 mg total) by mouth once daily.     "methocarbamoL (ROBAXIN) 500 MG Tab Take 500 mg by mouth 4 (four) times daily.    pantoprazole (PROTONIX) 40 MG tablet Take 40 mg by mouth once daily.    potassium chloride (KLOR-CON) 10 MEQ TbSR Take 10 mEq by mouth once daily.    traMADoL (ULTRAM) 50 mg tablet Take 50 mg by mouth 3 (three) times daily as needed for Pain.          Antimicrobials:  Antibiotics (From admission, onward)      Start     Stop Route Frequency Ordered    02/10/25 2000  vancomycin (VANCOCIN) 500 mg in D5W 100 mL IVPB (MB+)         -- IV Once 02/10/25 0945    02/10/25 0900  mupirocin 2 % ointment         02/15/25 0859 Nasl 2 times daily 02/06/25 1101    02/08/25 1715  piperacillin-tazobactam (ZOSYN) 4.5 g in D5W 100 mL IVPB (MB+)         -- IV Every 12 hours (non-standard times) 02/08/25 1606    02/07/25 1801  vancomycin - pharmacy to dose         -- IV pharmacy to manage frequency 02/07/25 1701             Antifungals (From admission, onward)      None            Antivirals (From admission, onward)      None               Review of Systems   ROS      Objective:     Vital Signs (Most Recent):  Temp: 98.3 °F (36.8 °C) (02/10/25 1138)  Pulse: 87 (02/10/25 1154)  Resp: 18 (02/10/25 1154)  BP: 127/79 (02/10/25 1154)  SpO2: (!) 94 % (02/10/25 1138)  Vital Signs (24h Range):  Temp:  [97.6 °F (36.4 °C)-98.9 °F (37.2 °C)] 98.3 °F (36.8 °C)  Pulse:  [81-98] 87  Resp:  [12-20] 18  SpO2:  [92 %-98 %] 94 %  BP: (127-180)/(74-99) 127/79      Weight:   Wt Readings from Last 1 Encounters:   02/04/25 63.5 kg (140 lb)      Body mass index is Body mass index is 25.6 kg/m².     Estimated Creatinine Clearance: Estimated Creatinine Clearance: 6.1 mL/min (A) (based on SCr of 6.96 mg/dL (H)).     Physical Exam  Physical Exam  Patient is afebrile  Hemodynamically stable  Not requiring supplemental oxygen  Lungs are clear to auscultation  S1-S2 heard        Significant Labs: Blood Culture: No results for input(s): "LABBLOO" in the last 4320 hours.      Microbiology " Results (last 7 days)       Procedure Component Value Units Date/Time    Body Fluid Culture [[id number]]  (Abnormal) Collected: 02/04/25 1707    Order Status: Completed Specimen: Peritoneal Fluid Updated: 02/10/25 1028     Body Fluid Culture Staphylococcus epidermidis     Comment: Isolated from Thio only       Wound Culture [[id number]]  (Abnormal)  (Susceptibility) Collected: 02/07/25 2045    Order Status: Completed Specimen: Wound from Heel Updated: 02/10/25 0805     Wound Culture Many Escherichia coli    Blood Culture [[id number]]  (Normal) Collected: 02/07/25 1725    Order Status: Completed Specimen: Blood from Arm, Left Updated: 02/09/25 2001     Blood Culture No Growth At 48 Hours    Blood Culture [[id number]]  (Normal) Collected: 02/07/25 1725    Order Status: Completed Specimen: Blood from Arm, Right Updated: 02/09/25 2001     Blood Culture No Growth At 48 Hours    Gram Stain [[id number]] Collected: 02/04/25 1707    Order Status: Completed Specimen: Peritoneal Fluid Updated: 02/04/25 1935     GRAM STAIN Moderate WBC observed      No bacteria seen             Significant Imaging: I have reviewed all pertinent imaging results/findings within the past 24 hours.      JANEEN RODRIGUEZ MD  Infectious Disease

## 2025-02-10 NOTE — PLAN OF CARE
Updates sent to Oliver at the [facility] via [employer]. Plan for pt to return to SNF once medically stable.; will require new auth. Pending nutritional plan.    Rhiannon Finch LCSW

## 2025-02-10 NOTE — ASSESSMENT & PLAN NOTE
Multifactorial  Difficult to give clear, distinct prognostic answers d/t multiple medical problems confounding neurologic exam  This was discussed extensively with patient's family at bedside per Dr. Abrams    -avoid sedating medications  -possible LP today per ID recommendation  -unsure if plans for HD today

## 2025-02-10 NOTE — PROGRESS NOTES
Nephrology consult follow up note    HPI:      Malissa Schwartz is a 74 y.o. female who presents with persistent encephalopathy. Past medical history significant for ESRD, hypertension, hyperlipidemia, deconditioning, it is to be, humerus fracture, failed renal transplant, and recent left lower extremity cellulitis. Patient was discharge after hospitalization yesterday 02/03/2025 from [facility] to a skilled nursing facility. During her previous hospital stay she was transitioned from peritoneal dialysis to hemodialysis in order to get her to a skilled nursing facility after admission. Patient was noted to be we can confused prior to hospital admission. Her  states that nothing changed since she was discharge from the hospital, but she has not improved. She remains confused, combative, and not verbally responsive to questioning. Nephrology was consulted for ESRD management. Patient did dialyze on 02/03/2025 prior to hospital discharge. Patient is barely arousable to sternal rub, and not answering any questions at time of my examination.     Interval history:     02/05/2025   Currently tolerating dialysis.  Remains nonverbal.  No respiratory distress.  Awake and alert.    2/6/25  No acute events overnight.  Remains encephalopathic.    02/10/2025  No clinical change.  Patient remains unresponsive.     Review of Systems:     Unable to obtain ROS due to mental status    Past medical, family, surgical, and social history reviewed and unchanged from initial consult note.     Objective:       VITAL SIGNS: 24 HR MIN & MAX LAST    Temp  Min: 97.6 °F (36.4 °C)  Max: 98.9 °F (37.2 °C)  98.3 °F (36.8 °C)        BP  Min: 127/79  Max: 180/92  127/79     Pulse  Min: 81  Max: 98  87     Resp  Min: 17  Max: 20  18    SpO2  Min: 92 %  Max: 98 %  (!) 94 %      GEN: Chronically ill appearing WF in NAD  CV: RRR +S1,S2 without murmur  PULM: CTAB, unlabored  ABD: Soft, NT/ND abdomen with NABS  EXT:  1+ dependent edema  SKIN:  Warm and dry  PSYCH:  Unresponsive  Dialysis access:  Right IJ PermCath, PD catheter            Component Value Date/Time     (L) 02/10/2025 0437     (L) 02/09/2025 0400     06/20/2018 1402     01/08/2018 0835    K 5.0 02/10/2025 0437    K 4.5 02/09/2025 0400    K 4.8 06/20/2018 1402    K 4.6 01/08/2018 0835    CO2 21 (L) 02/10/2025 0437    CO2 23 02/09/2025 0400    CO2 23 06/20/2018 1402    CO2 22 (L) 01/08/2018 0835    BUN 60.7 (H) 02/10/2025 0437    BUN 41.3 (H) 02/09/2025 0400    BUN 30 (H) 06/20/2018 1402    BUN 24 (H) 01/08/2018 0835    CREATININE 6.96 (H) 02/10/2025 0437    CREATININE 5.44 (H) 02/09/2025 0400    CREATININE 1.9 (H) 06/20/2018 1402    CREATININE 1.8 (H) 01/08/2018 0835    CALCIUM 8.8 02/10/2025 0437    CALCIUM 8.6 02/09/2025 0400    CALCIUM 9.7 06/20/2018 1402    CALCIUM 9.1 01/08/2018 0835    PHOS 5.2 (H) 02/08/2025 0438    PHOS 3.8 06/20/2018 1402            Component Value Date/Time    WBC 9.92 02/10/2025 0437    WBC 8.04 02/09/2025 0400    WBC 4.26 (L) 07/04/2024 0000    WBC 6.90 06/20/2018 1402    WBC 3.37 (L) 01/08/2018 0835    HGB 10.7 (L) 02/10/2025 0437    HGB 10.3 (L) 02/09/2025 0400    HGB 9 (L) 12/18/2024 0000    HGB 10.2 (L) 11/26/2024 0000    HGB 10.5 (L) 06/20/2018 1402    HGB 9.1 (L) 01/08/2018 0835    HCT 33.9 (L) 02/10/2025 0437    HCT 32.4 (L) 02/09/2025 0400    HCT 29.8 (L) 12/18/2024 0000    HCT 31.8 (L) 11/26/2024 0000    HCT 32.0 (L) 06/05/2020 0815    HCT 31.6 (L) 06/20/2018 1402    HCT 27.3 (L) 01/08/2018 0835     02/10/2025 0437     02/09/2025 0400     06/20/2018 1402     01/08/2018 0835         Imaging reviewed      Assessment / Plan:       Active Hospital Problems    Diagnosis  POA    Encephalopathy [G93.40]  Unknown    Unstageable pressure ulcer of right heel [L89.610]  Yes    Unstageable pressure ulcer of left heel [L89.620]  Yes     Chronic    ESRD (end stage renal disease) [N18.6]  Yes      Resolved Hospital  Problems   No resolved problems to display.       ESRD - previously on peritoneal dialysis, recently transitioned to hemodialysis for skilled nursing placement.  Has been both hemodialysis, and peritoneal dialysis access.  Acute encephalopathy  Hyperkalemia     Plan:  Plan for hemodialysis today on MWF schedule.    Isaiah Mosquera DO  Nephrology  [facility]  Clinic number: [phone]      Note was created with the assistance of electronic Dictation Services.  Note was reviewed to decrease errors, however, these may still be present.  Please contact me about questions or concerns with the dictation.

## 2025-02-10 NOTE — SUBJECTIVE & OBJECTIVE
Subjective:     HPI:  Wound medicine re-check     The patient is a 74 year old female with a multitude of medical problems including HTN, HLD, failed kidney transplant, ESRD on PD, deconditioning with recent humerus fracture s/p IM nail (12/2024).  She was recently discharged from this facility on 2/3/25 after a 13 day admission with NSTEMI, LLE cellulitis, missed PD and deconditioning. She was converted to hemodialysis prior to discharge so that she could go to rehab, she was discharged to [facility] for SNF on 2/3/25. She returned form NH on 2/4/25 with AMS, combative towards staff and family and not speaking. Of note records reveal she had flat affect and not speaking prior to discharge and was evaluated by Psychiatry.  concerned because she has not shown improvement.  She has been admitted for workup of acute encephalopathy; CT head without acute acute intracranial findings; MRI brain shows abnormality in the left corpus callosum - Neuro consult noted , PD fluid culture in process.   Wound medicine consulted upon admission for continuity of care, we saw this patient during recent prior encounter. She presents with bilateral heel pressure injuries as well as multiple areas of skin cancer, awaiting treatment, followed by dermatology outpatient.   Initial evaluation on 2/5/24 - bilateral heel pressure injuries present on admission. Right heel unstageable, left heel DTI intact blister.      2/10/25 - wound re-check today.  Met the patient in room 1007.  She opens her eyes intermittently but does not respond to verbal stimuli, does not move upper or lower extremities today, seems to fall asleep intermittently during assessment. I am accompanied by CNA and staff nurse.   Her  and caregiver are also at bedside and report patient has been steadily declining and not responding at all as of late.   She is on a low air loss bed with bilateral heel boots in place as well as sequential compression devices  on BLE. She is in no apparent distress.             Hospital Course:   No notes on file          Scheduled Meds:   acyclovir  5 mg/kg (Ideal) Intravenous Daily    amLODIPine  5 mg Oral BID    aspirin  300 mg Rectal Daily    atorvastatin  40 mg Oral QHS    calcitRIOL  0.25 mcg Oral Once per day on Monday Wednesday Friday    cloNIDine 0.2 mg/24 hr td ptwk  1 patch Transdermal Q7 Days    folic acid  1 mg Oral Daily    heparin (porcine)  5,000 Units Subcutaneous Q12H    levETIRAcetam (Keppra) IV (PEDS and ADULTS)  500 mg Intravenous Q12H    meropenem IV (PEDS and ADULTS)  500 mg Intravenous Q24H    mupirocin   Nasal BID    pantoprazole  40 mg Oral Daily     Continuous Infusions:   Amino acid 4.25% - dextrose 5% (CLINIMIX-E) solution (1L provides 42.5 gm AA, 50 gm CHO (170 kcal/L dextrose), Na 35, K 30, Mg 5, Ca 4.5, Acetate 70, Cl 39, Phos 15)   Intravenous Continuous 50 mL/hr at 02/10/25 0539 New Bag at 02/10/25 0539     PRN Meds:  Current Facility-Administered Medications:     acetaminophen, 650 mg, Oral, Q4H PRN    acetaminophen, 650 mg, Oral, Q8H PRN    bisacodyL, 10 mg, Rectal, Daily PRN    dextrose 50%, 12.5 g, Intravenous, PRN    dextrose 50%, 25 g, Intravenous, PRN    glucagon (human recombinant), 1 mg, Intramuscular, PRN    glucose, 16 g, Oral, PRN    glucose, 24 g, Oral, PRN    hydrALAZINE, 5 mg, Intravenous, Q6H PRN    lorazepam, 0.5 mg, Intravenous, Q8H PRN    melatonin, 6 mg, Oral, Nightly PRN    naloxone, 0.02 mg, Intravenous, PRN    ondansetron, 4 mg, Intravenous, Q8H PRN    sodium chloride 0.9%, 10 mL, Intravenous, Q12H PRN    Flushing PICC/Midline Protocol, , , Until Discontinued **AND** sodium chloride 0.9%, 10 mL, Intravenous, Q12H PRN    vancomycin - pharmacy to dose, , Intravenous, pharmacy to manage frequency    Review of Systems   Unable to perform ROS: Patient nonverbal     Objective:     Vital Signs (Most Recent):  Temp: 98.3 °F (36.8 °C) (02/10/25 1138)  Pulse: 87 (02/10/25 1154)  Resp: 18  (02/10/25 1154)  BP: 127/79 (02/10/25 1154)  SpO2: (!) 94 % (02/10/25 1138) Vital Signs (24h Range):  Temp:  [97.6 °F (36.4 °C)-98.9 °F (37.2 °C)] 98.3 °F (36.8 °C)  Pulse:  [81-98] 87  Resp:  [12-20] 18  SpO2:  [92 %-98 %] 94 %  BP: (127-180)/(74-99) 127/79     Weight: 63.5 kg (140 lb)  Body mass index is 25.6 kg/m².     Physical Exam  Vitals reviewed.   Constitutional:       General: She is not in acute distress.     Appearance: She is normal weight. She is ill-appearing (chronic).      Comments: Appears older than stated age; thin with diffuse muscle wasting    HENT:      Head: Normocephalic and atraumatic.      Nose: Nose normal.   Cardiovascular:      Rate and Rhythm: Normal rate and regular rhythm.      Pulses: Normal pulses.   Pulmonary:      Effort: Pulmonary effort is normal. No respiratory distress.   Musculoskeletal:        Legs:         Feet:    Skin:     General: Skin is warm and dry.      Capillary Refill: Capillary refill takes less than 2 seconds.      Comments: Redness to sacrum that blanches; area of bruising to right upper back and other scattered bruises and superficial abrasions/skin tears to bilateral upper and lower extremities   Upper back with diffuse erythema that blanches   Neurological:      General: No focal deficit present.      Mental Status: She is lethargic.      Comments: Non-verbal   Responds to touch with pulling away and crossing legs; seems agitated   She does track me around the room and looks at me when I call her name.        Upper back      Sacral region - redness - blanches, no breaks       Left heel: 3.8 x 2 cm       Right medial heel: 4 x 3 cm       Right heel - post debridement                    Laboratory:  A1C:   Recent Labs   Lab 02/06/25  1127   HGBA1C 4.3     ABGs:   Recent Labs   Lab 02/07/25  1803   PH 7.530*   PCO2 33.0*   HCO3 27.6*       BMP:   Recent Labs   Lab 02/08/25  0438 02/09/25  0400 02/10/25  0437   *   < > 130*   K 4.0   < > 5.0   CL 99   < >  96*   CO2 22*   < > 21*   BUN 23.0*   < > 60.7*   CREATININE 3.84*   < > 6.96*   CALCIUM 8.8   < > 8.8   MG 2.10  --   --     < > = values in this interval not displayed.       CBC:   Recent Labs   Lab 02/10/25  0437   WBC 9.92   RBC 4.05*   HGB 10.7*   HCT 33.9*      MCV 83.7   MCH 26.4*   MCHC 31.6*     CMP:   Recent Labs   Lab 02/10/25  0437   CALCIUM 8.8   ALBUMIN 2.0*   *   K 5.0   CO2 21*   CL 96*   BUN 60.7*   CREATININE 6.96*   ALKPHOS 191*   ALT 13   AST 24   BILITOT 0.6       LFTs:   Recent Labs   Lab 02/10/25  0437   ALT 13   AST 24   ALKPHOS 191*   BILITOT 0.6   ALBUMIN 2.0*       Microbiology Results (last 7 days)       Procedure Component Value Units Date/Time    Cerebrospinal Fluid Culture [[id number]]     Order Status: Sent Specimen: CSF (Spinal Fluid) from Cerebrospinal Fluid     AFB Smear [[id number]]     Order Status: Sent Specimen: CSF (Spinal Fluid) from Cerebrospinal Fluid     Mycobacteria and Nocardia Culture [[id number]]     Order Status: Sent Specimen: CSF (Spinal Fluid) from Cerebrospinal Fluid     Body Fluid Culture [[id number]]  (Abnormal) Collected: 02/04/25 1707    Order Status: Completed Specimen: Peritoneal Fluid Updated: 02/10/25 1028     Body Fluid Culture Staphylococcus epidermidis     Comment: Isolated from Thio only       Wound Culture [[id number]]  (Abnormal)  (Susceptibility) Collected: 02/07/25 2045    Order Status: Completed Specimen: Wound from Heel Updated: 02/10/25 0805     Wound Culture Many Escherichia coli    Blood Culture [[id number]]  (Normal) Collected: 02/07/25 1725    Order Status: Completed Specimen: Blood from Arm, Left Updated: 02/09/25 2001     Blood Culture No Growth At 48 Hours    Blood Culture [[id number]]  (Normal) Collected: 02/07/25 1725    Order Status: Completed Specimen: Blood from Arm, Right Updated: 02/09/25 2001     Blood Culture No Growth At 48 Hours    Gram Stain [[id number]] Collected: 02/04/25 1707    Order Status: Completed  Specimen: Peritoneal Fluid Updated: 02/04/25 1935     GRAM STAIN Moderate WBC observed      No bacteria seen              Diagnostic Results:  I have reviewed all pertinent imaging results/findings within the past 24 hours.

## 2025-02-10 NOTE — CONSULTS
[facility] - [facility]  Neurology  Consult Note    Patient Name: Malissa Schwartz  MRN: [id number]  Admission Date: 2/4/2025  Hospital Length of Stay: 5 days  Code Status: DNR   Attending Provider: Federica Orta MD   Consulting Provider: LUIS Roberts  Primary Care Physician: Sergio Crabtree MD  Principal Problem:<principal problem not specified>    Inpatient Consult to Neurology Services (General Neurology)  Consult performed by: Tracy Christensen AGACNP-BC  Consult ordered by: Federica Orta MD         Subjective:     Chief Complaint:       HPI:   74-year-old female with PMH HTN, HLD, deconditioning, STEMI, failed kidney transplant with ESRD on HD, and recent cellulitis to LLE s/p clindamycin who presented to ED on 02/04 d/t AMS.  Patient was recently admitted 01/22-2/3 for NSTEMI, sepsis 2/2 LLE cellulitis, and acute on chronic renal failure.  Prior to this admission patient on PD which was converted to HD during that admission, first round HD on 01/29.  Patient's  reports day after tunneled dialysis catheter placed on 1/28 she no longer spoke and refused to eat with increased generalized weakness.  [place] [facility] [place] [facility].    Patient admitted for acute encephalopathy possibly metabolic versus ischemic in etiology. MRI brain w/o showed a rounded focus of restricted diffusion and edema in the left splenium of the corpus callosum. Stroke neurology consulted for evaluation.   Concerns for seizure-like activity on 02/06, started on Keppra 500 mg b.i.d., and EEG 02/06 consistent with metabolic encephalopathy and unrevealing for electrographic seizures.  Family endorsing guidance on goals of care.  Family requesting neurology input on meaningful neurologic recovery to assist with their decisions regarding goals of care.    Repeat CT head on 02/07 unrevealing for acute intracranial abnormalities.  Most recent labs significant for hyponatremia  and acute on chronic renal indices.     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 Glomerulitis with one activated endothelial cell: treated with Solumedrol pulse 375 mg IVBP x 3 doses with short prednisone taper; thymoglobin 100 mg x 6 doses 10/04/2007 minimal histologic changes no acute rejection     Allergy     Anemia of renal disease     CKD (chronic kidney disease), stage IV     CMV (cytomegalovirus) status positive     Colon polyp     benign    Depression     GN (glomerulonephritis)     GN (glomerulonephritis)     unknown type    Herpes infection, other 2009    on buttocks    Hyperlipidemia     Hypertension     Immunosuppression     Living-donor kidney transplant recipient 01/15/2007    Meningitis     history of viral meningitis    Proteinuria 1/5/2015    Proteinuria     Pulmonary nodules     Renal hypertension     S/P breast augmentation     Skin cancer     Urinary tract infection        Past Surgical History:   Procedure Laterality Date    COLON SURGERY      COLONOSCOPY W/ BIOPSIES      cosemetic surgery      facelift and breast augmentation     HYSTERECTOMY      INSERTION OF TUNNELED CENTRAL VENOUS HEMODIALYSIS CATHETER N/A 1/28/2025    Procedure: Insertion, Catheter, Central Venous, Hemodialysis;  Surgeon: Apollo Jacob DO;  Location: [facility] CATH LAB;  Service: Nephrology;  Laterality: N/A;    INTRAMEDULLARY RODDING OF HUMERUS Right 12/2/2024    Procedure: INSERTION, INTRAMEDULLARY MONTSERRAT, HUMERUS;  Surgeon: Samuel Dow DO;  Location: [facility] OR;  Service: Orthopedics;  Laterality: Right;  supine vacular backward c arm synthes  shoulder scope drape    KIDNEY TRANSPLANT      NEPHRECTOMY Left     9/2016    RENAL BIOPSY      TONSILLECTOMY, ADENOIDECTOMY         Review of patient's allergies indicates:   Allergen Reactions    Codeine Palpitations and Rash    Cefepime      Other Reaction(s): Encephalopathy    Renal  impairment increased risk for this reaction    Levofloxacin      Other Reaction(s): Encephalopathy    Renal pt who had some encephalopathy while on Levaquin & Cefepime, neurology physicians think they may have been due to the antibiotics (both possible side effects for each)    Iodine Nausea Only       Current Neurological Medications:     No current facility-administered medications on file prior to encounter.     Current Outpatient Medications on File Prior to Encounter   Medication Sig    amLODIPine (NORVASC) 10 MG tablet Take 5 mg by mouth once daily.    aspirin (ECOTRIN) 81 MG EC tablet Take 81 mg by mouth once daily.    B complex-vitamin C-folic acid (NEPHRO-JENN) 0.8 mg Tab Take 0.8 mg by mouth Daily.    calcitRIOL (ROCALTROL) 0.25 MCG Cap Take 0.25 mcg by mouth 3 (three) times a week.    D5W PgBk 50 mL with ceFAZolin 1 gram SolR Cefazolin intraperitoneally times 10 more days    EScitalopram oxalate (LEXAPRO) 20 MG tablet Take 1 tablet (20 mg total) by mouth once daily.    folic acid (FOLVITE) 1 MG tablet Take 1 mg by mouth once daily.    megestroL (MEGACE) 400 mg/10 mL (10 mL) Susp Take 10 mLs (400 mg total) by mouth once daily.    methocarbamoL (ROBAXIN) 500 MG Tab Take 500 mg by mouth 4 (four) times daily.    pantoprazole (PROTONIX) 40 MG tablet Take 40 mg by mouth once daily.    potassium chloride (KLOR-CON) 10 MEQ TbSR Take 10 mEq by mouth once daily.    traMADoL (ULTRAM) 50 mg tablet Take 50 mg by mouth 3 (three) times daily as needed for Pain.    [DISCONTINUED] losartan (COZAAR) 100 MG tablet TAKE ONE TABLET BY MOUTH ONCE DAILY    [DISCONTINUED] metoprolol succinate (TOPROL-XL) 25 MG 24 hr tablet Take 2 tablets (50 mg total) by mouth once daily. (Patient not taking: Reported on 5/12/2022)    [DISCONTINUED] mycophenolate (CELLCEPT) 250 mg Cap Take 2 capsules (500 mg total) by mouth 2 (two) times daily. Z94.0 kidney transplant (Patient not taking: Reported on 5/12/2022)    [DISCONTINUED] tacrolimus  (PROGRAF) 1 MG Cap Take 3mg every AM and 2mg every PM.  Route: Oral.  Z94.0 Kidney Transplant. (Patient not taking: Reported on 5/12/2022)     Family History       Problem Relation (Age of Onset)    Heart failure Mother, Father    Hypertension Brother    Kidney disease Sister, Brother          Tobacco Use    Smoking status: Former     Current packs/day: 1.00     Average packs/day: 1 pack/day for 15.0 years (15.0 ttl pk-yrs)     Types: Cigarettes    Smokeless tobacco: Never    Tobacco comments:     quit in 2014; smoked for 15 years and at the most smoked 1 ppd   Substance and Sexual Activity    Alcohol use: Yes     Comment: seldom    Drug use: No    Sexual activity: Never     Review of Systems   Unable to perform ROS: Acuity of condition       Objective:     Vital Signs (Most Recent):  Temp: 98.3 °F (36.8 °C) (02/10/25 1138)  Pulse: 87 (02/10/25 1154)  Resp: 18 (02/10/25 1154)  BP: 127/79 (02/10/25 1154)  SpO2: (!) 94 % (02/10/25 1138) Vital Signs (24h Range):  Temp:  [97.6 °F (36.4 °C)-98.9 °F (37.2 °C)] 98.3 °F (36.8 °C)  Pulse:  [81-98] 87  Resp:  [12-20] 18  SpO2:  [92 %-98 %] 94 %  BP: (127-180)/(74-99) 127/79     Weight: 63.5 kg (140 lb)  Body mass index is 25.6 kg/m².     Physical Exam  Vitals reviewed.   Constitutional:       General: She is sleeping.   HENT:      Head: Normocephalic and atraumatic.   Pulmonary:      Effort: Pulmonary effort is normal.   Skin:     General: Skin is dry.          NEUROLOGICAL EXAMINATION:     MENTAL STATUS   Follows commands: not following commands.   Speech: (UTO)  Level of consciousness: unresponsive to painful stimuli    MOTOR EXAM        Does not move to command or spontaneously     SENSORY EXAM        Withdraws to painful stimuli       Significant Labs:   Recent Lab Results         02/10/25  0552   02/10/25  0437   02/09/25  2346   02/09/25  1843        Albumin/Globulin Ratio   0.5           Adenovirus F 40/41     Not Detected         Albumin   2.0           ALP   191            ALT   13           Anion Gap   13.0           AST   24           ASTROVIRUS     Not Detected         Baso #   0.05           Basophil %   0.5           BILIRUBIN TOTAL   0.6           BUN   60.7           BUN/CREAT RATIO   9           Calcium   8.8           CAMPYLOBACTER     Not Detected         Chloride   96           CO2   21           Creatinine   6.96           CRYPTOSPORIDIUM     Not Detected         Cyclospora cayetanensis     Not Detected         eGFR   6  Comment: Estimated GFR calculated using the CKD-EPI creatinine (2021) equation.           Entamoeba histolytica     Not Detected         ENTEROAGGREGATIVE E. COLI (EAEC)     Detected         ENTEROPATHOGENIC E. COLI (EPEC)     Not Detected         Enterotoxigenic E. coli (ETEC)     Not Detected         Eos #   0.13           Eos %   1.3           Giardia lamblia     Not Detected         Globulin, Total   3.8           Glucose   98           Hematocrit   33.9           Hemoglobin   10.7           Hepatitis B Surface Ag   Nonreactive           Immature Grans (Abs)   0.03           Immature Granulocytes   0.3           Lymph #   1.24           LYMPH %   12.5           MCH   26.4           MCHC   31.6           MCV   83.7           Mono #   0.58           Mono %   5.8           MPV   9.6           Neut #   7.89           Neut %   79.6           Norovirus GI/GII     Not Detected         nRBC   0.0           Platelet Count   327           PLESIOMONAS SHIGELLOIDES     Not Detected         POCT Glucose 95       100       Potassium   5.0           PROTEIN TOTAL   5.8           RBC   4.05           RDW   16.1           Rotavirus A     Not Detected         SALMONELLA     Not Detected         SAPOVIRUS     Not Detected         SHIGA-LIKE TOXIN-PRODUCING E. COLI (STEC)     Not Detected         Shigella/Enteroinvasive E. coli (EIEC)     Not Detected         Sodium   130           Vancomycin, Random   15.6           Vibrio sp.     Not Detected         VIBRIO  CHOLERAE     Not Detected         WBC   9.92           YERSINIA ENTEROCOLITICA     Not Detected                 Significant Imaging:   CT head w/o 2/6/2025:  Impression:     No acute intracranial process identified.       I have reviewed all pertinent imaging results/findings within the past 24 hours.  Assessment and Plan:     Encephalopathy  Multifactorial  Difficult to give clear, distinct prognostic answers d/t multiple medical problems confounding neurologic exam, as well as recent acute ischemic infarct and subsequent seizure  This was discussed extensively with patient's family at bedside per Dr. Abrams    -avoid sedating medications  -possible LP today per ID recommendation  -unsure if plans for HD today              VTE Risk Mitigation (From admission, onward)           Ordered     heparin (porcine) injection 5,000 Units  Every 12 hours         02/09/25 1427     IP VTE HIGH RISK PATIENT  Once         02/04/25 1301     Place sequential compression device  Until discontinued         02/04/25 1301                    Thank you for your consult.  Further recommendations to follow per MD Tracy Christensen, [facility]-BC  Neurology  [facility]    I have seen/examined the patient.  NP was scribe.  I agree with the findings unless outlined below:    Adam N Foreman, MD [facility]     Had long discussion with 3 family members and palliative RN  Given the circumstances, ~50 % change of meaningful neurological (not full) recovery in 6-12 months, assuming nothing else happens and PEG is placed.  ?s answered to the best of my ability    Metabolic encepahopathy, severe    Signing off

## 2025-02-10 NOTE — PROGRESS NOTES
[facility] Hospital Medicine Progress Note        Chief Complaint: Inpatient Follow-up     HPI: 74 y.o. female who has a past medical history of HTN, HLD, deconditioning, humerus fracture with IM nail, NSTEMI, failed kidney transplant, ESRD-HD M/W/F previously on peritoneal dialysis, and recent cellulitis left lower extremity completed clindamycin treatment who presented to [facility] on 2/4/2025 with a primary complaint of altered mental status for the last 3-4 days according to the patient's , Brayden Schwartz at bedside.  The patient was admitted on 01/22/2025 for NSTEMI likely type 2, sepsis secondary to left lower extremity cellulitis, and ESRD on PD that was converted to hemodialysis prior to discharge.  During her hospitalization she was evaluated by consultants including wound care, Nephrology, Psychiatry, and Cardiology.  Heparin was discontinued and CIS recommended outpatient stress test (PET) in 2 weeks.  Right-sided tunneled dialysis catheter was placed on 01/28/2025 and she tolerated hemodialysis multiple times during her admission.  And progress note by Nephrology on 01/30 she was noted to be weak, confused, and not speaking much with poor intake.  Psychiatry recommended increasing the Lexapro which was done prior to her discharge.  Blood cultures with no growth to date.  She had dialysis on 02/03/2025 prior to being discharged to [facility].  The patient's , Mr. Brayden Schwartz at bedside states that she has not been herself since she was initiated on hemodialysis on 01/29/2025.  States prior to dialysis she was AAO x3, but since she was initiated on dialysis she has not been eating much, intermittently confused, and becomes quite agitated/combative.  On exam, she is protecting her airway, but minimally cooperative with examiner.  The patient will open her eyes to pain, she uses inappropriate/confused words, and localizes pain for GCS of 10.  She does  not appear to have any acute focal neurological deficits and appears to have possibly a metabolic encephalopathy.   denies any history of CVA or TIA.  ABG and ammonia level unremarkable.  Patient does not make any urine, so unable to obtain UA and UDS.  CT head without contrast showed no acute findings.  No further history available at this time.       ED course: Vital signs revealed the patient is afebrile and hemodynamically stable on room air.  Labs CBC showed normal WBC of 7, chronic normocytic anemia with a hemoglobin of 9, chemistry with elevated creatinine of 4.96 and BUN of 17, elevated potassium of 5.5, low albumin of 2, normal ammonia level of 33, elevated TSH of 6, normal lipid panel.  ABG with mild respiratory alkalosis.  CT head showed no acute intracranial findings.  Treated in the ED with Haldol and Benadryl.  Ordered Lokelma.  Ordered consultation to Nephrology and wound care nurse bowen.  Admitted for encephalopathy    MRI with Single cytotoxic lesion in the corpus callosum is nonspecific and may be metabolic or toxic.  Neurology was consulted.  Therapy services consulted.  Palliative medicine was consulted.  Now DNR.  PTOT speech consulted.    Concern for change in mental status during the hospital course, CT scan was ordered and Neurology notified.  CT with no intracranial abnormality.  EEG abnormal. Prolactin elevated.Started on Keppra.  Made NPO.  Continued IV nutrition.  Initiated on empiric antibiotics while encephalopathic.  Failing speech and swallow evaluation.  Continued on Clinimix.Discussed with the family -about poor prognosis and about PEG tube, also requested to speak with Neurology regarding the stroke and seizures and further prognosis.  Staff was notified.    Interval Hx:   Continues on Keppra.  On Monday Wednesday Friday dialysis.  Being encephalopathic, initiated on empiric antibiotics.  Id was consulted    Patient was seen ,examined , nonverbal, not arousable met with the  family, discussed about the poor prognosis, family verbalized understanding, also requesting to speak with Neurology.  Chart was reviewed.  Afebrile.  Most recent lab work was reviewed.    Objective/physical exam:  General:  Afebrile, lethargic  Chest: Clear  Heart:  Normal rate ,+S1, S2  Abdomen: Soft, nontender, BS +  MSK: Warm, no lower extremity edema, no clubbing or cyanosis.  Wounds as under media  Neurologic:  Barely arousable, Does not follow  commands.(was informed by staff that she is close to her new baseline how she was on presentation)    VITAL SIGNS: 24 HRS MIN & MAX LAST   Temp  Min: 97.6 °F (36.4 °C)  Max: 98.9 °F (37.2 °C) 98.3 °F (36.8 °C)   BP  Min: 127/79  Max: 180/92 127/79   Pulse  Min: 81  Max: 98  87   Resp  Min: 12  Max: 20 18   SpO2  Min: 92 %  Max: 98 % (!) 94 %     I have reviewed the following labs:  Recent Labs   Lab 02/08/25  0438 02/09/25  0400 02/10/25  0437   WBC 6.66 8.04 9.92   RBC 3.84* 3.87* 4.05*   HGB 10.1* 10.3* 10.7*   HCT 32.6* 32.4* 33.9*   MCV 84.9 83.7 83.7   MCH 26.3* 26.6* 26.4*   MCHC 31.0* 31.8* 31.6*   RDW 16.6 16.4 16.1    320 327   MPV 9.4 9.3 9.6     Recent Labs   Lab 02/04/25  1334 02/05/25  0355 02/07/25  0736 02/07/25  1803 02/08/25 0438 02/09/25  0400 02/10/25  0437   NA  --  138 132*  --  133* 130* 130*   K  --  5.0 4.1  --  4.0 4.5 5.0   CL  --  105 104  --  99 98 96*   CO2  --  18* 18*  --  22* 23 21*   BUN  --  25.7* 38.7*  --  23.0* 41.3* 60.7*   CREATININE  --  6.33* 5.90*  --  3.84* 5.44* 6.96*   CALCIUM  --  8.5 8.3*  --  8.8 8.6 8.8   PH 7.460*  --   --  7.530*  --   --   --    MG  --  2.60 2.50  --  2.10  --   --    ALBUMIN  --  2.1*  --   --  2.0*  --  2.0*   ALKPHOS  --  188*  --   --  164*  --  191*   ALT  --  17  --   --  13  --  13   AST  --  26  --   --  19  --  24   BILITOT  --  0.4  --   --  0.4  --  0.6     Microbiology Results (last 7 days)       Procedure Component Value Units Date/Time    Cerebrospinal Fluid Culture [[id number]]      Order Status: Sent Specimen: CSF (Spinal Fluid) from Cerebrospinal Fluid     AFB Smear [[id number]]     Order Status: Sent Specimen: CSF (Spinal Fluid) from Cerebrospinal Fluid     Mycobacteria and Nocardia Culture [[id number]]     Order Status: Sent Specimen: CSF (Spinal Fluid) from Cerebrospinal Fluid     Body Fluid Culture [[id number]]  (Abnormal) Collected: 02/04/25 1707    Order Status: Completed Specimen: Peritoneal Fluid Updated: 02/10/25 1028     Body Fluid Culture Staphylococcus epidermidis     Comment: Isolated from Thio only       Wound Culture [[id number]]  (Abnormal)  (Susceptibility) Collected: 02/07/25 2045    Order Status: Completed Specimen: Wound from Heel Updated: 02/10/25 0805     Wound Culture Many Escherichia coli    Blood Culture [[id number]]  (Normal) Collected: 02/07/25 1725    Order Status: Completed Specimen: Blood from Arm, Left Updated: 02/09/25 2001     Blood Culture No Growth At 48 Hours    Blood Culture [[id number]]  (Normal) Collected: 02/07/25 1725    Order Status: Completed Specimen: Blood from Arm, Right Updated: 02/09/25 2001     Blood Culture No Growth At 48 Hours    Gram Stain [[id number]] Collected: 02/04/25 1707    Order Status: Completed Specimen: Peritoneal Fluid Updated: 02/04/25 1935     GRAM STAIN Moderate WBC observed      No bacteria seen             See below for Radiology    Assessment/Plan:  Acute encephalopathy  CVA  Seizures  ESRD-HD M/W/F via right IJ tunneled dialysis catheter  Hyperkalemia, mild  Hypoglycemia.  Normocytic anemia  Generalized weakness and debility   Physical Deconditioning   Left heel stage 4 decubitus ulcers with eschar and right heel with stage II decubitus ulcer with blister.  Concern for SSTI/peritonitis/meningitis?  History of sepsis secondary to cellulitis of left lower extremity completed clindamycin therapy  Left lower extremity skin cancer awaiting treatment   Essential hypertension   History of NSTEMI type 2 with CIS recommending  "outpatient stress test (PET scan )   Right Humerus fracture with IM nail (12/2024)  History of colon polyp/lung nodule  History of peritoneal dialysis and failed kidney transplant   Dyslipidemia   Generalized anxiety disorder/major depressive disorder  Former smoker    Plan   Seen by Neurology, on Aspirin TN.  On statin(unable to take)  Concern for change in mental status, CT head with no acute intracranial abnormalities, EEG abnormal, likely related to seizures and stroke.  Prolactin elevated.  Continue Keppra.  We will communicate with Neurology about any further adjustments in the setting of continued encephalopathy.  Family was also willing to speak with Neurology.  Unable to participate in any therapy evaluations, continued on Clinimix for now for nutrition  Initiated empiric antibiotics for continued encephalopathy-on Vancomycin, Zosyn, sent cultures.  Body fluid culture with staph epidermidis, wound culture with many E coli (could be contamination).  CT chest abdomen pelvis with small volume ascites  Blood cultures so far negative.  GI panel with EAEC.UA pending.  No fevers or leukocytosis.  Id was consulted.  Recommended LP.  Continue Wound care.   Monitor electrolytes and replete appropriately.  Continue dialysis per Nephrology.  Follow up BMP.  Monitor blood sugars, watch for hypoglycemia.  Monitor blood pressure, added clonidine patch while NPO.  Continue other appropriate home medications.  Prognosis is poor, discussed with the family, palliative medicine following.  DNR.  Appears the answer is " no" to feeding tube.  But,  Family was willing to speak with Neurology and willing for for lumbar puncture with ID before making a final decision.      VTE prophylaxis:  Heparin subQ      Anticipated discharge and Disposition:   To be decided      All diagnosis and differential diagnosis have been reviewed; assessment and plan has been documented; I have personally reviewed the labs and test results that are " presently available; I have reviewed the patients medication list; I have reviewed the consulting providers response and recommendations. I have reviewed or attempted to review medical records based upon their availability    All of the patient's questions have been  addressed and answered. Patient's is agreeable to the above stated plan. I will continue to monitor closely and make adjustments to medical management as needed.    Portions of this note dictated using EMR integrated voice recognition software, and may be subject to voice recognition errors not corrected at proofreading. Please contact writer for clarification if needed.   _____________________________________________________________________    Malnutrition Status:  Nutrition consulted. Most recent weight and BMI monitored-     Measurements:  Wt Readings from Last 1 Encounters:   02/04/25 63.5 kg (140 lb)   Body mass index is 25.6 kg/m².    Patient has been screened and assessed by RD.    Malnutrition Type:  Context: acute illness or injury  Level: moderate    Malnutrition Characteristic Summary:  Weight Loss (Malnutrition): other (see comments) (Does not meet criteria)  Energy Intake (Malnutrition): less than or equal to 50% for greater than or equal to 5 days  Subcutaneous Fat (Malnutrition): moderate depletion  Muscle Mass (Malnutrition): moderate depletion  Fluid Accumulation (Malnutrition): other (see comments) (Not present)    Interventions/Recommendations (treatment strategy):        Scheduled Med:   acyclovir  5 mg/kg (Ideal) Intravenous Daily    amLODIPine  5 mg Oral BID    aspirin  300 mg Rectal Daily    atorvastatin  40 mg Oral QHS    calcitRIOL  0.25 mcg Oral Once per day on Monday Wednesday Friday    folic acid  1 mg Oral Daily    heparin (porcine)  5,000 Units Subcutaneous Q12H    levETIRAcetam (Keppra) IV (PEDS and ADULTS)  500 mg Intravenous Q12H    meropenem IV (PEDS and ADULTS)  500 mg Intravenous Q24H    mupirocin   Nasal BID     pantoprazole  40 mg Oral Daily      Continuous Infusions:   Amino acid 4.25% - dextrose 5% (CLINIMIX-E) solution (1L provides 42.5 gm AA, 50 gm CHO (170 kcal/L dextrose), Na 35, K 30, Mg 5, Ca 4.5, Acetate 70, Cl 39, Phos 15)   Intravenous Continuous 50 mL/hr at 02/10/25 0539 New Bag at 02/10/25 0539      PRN Meds:    Current Facility-Administered Medications:     acetaminophen, 650 mg, Oral, Q4H PRN    acetaminophen, 650 mg, Oral, Q8H PRN    bisacodyL, 10 mg, Rectal, Daily PRN    dextrose 50%, 12.5 g, Intravenous, PRN    dextrose 50%, 25 g, Intravenous, PRN    glucagon (human recombinant), 1 mg, Intramuscular, PRN    glucose, 16 g, Oral, PRN    glucose, 24 g, Oral, PRN    hydrALAZINE, 5 mg, Intravenous, Q6H PRN    lorazepam, 0.5 mg, Intravenous, Q8H PRN    melatonin, 6 mg, Oral, Nightly PRN    naloxone, 0.02 mg, Intravenous, PRN    ondansetron, 4 mg, Intravenous, Q8H PRN    sodium chloride 0.9%, 10 mL, Intravenous, Q12H PRN    Flushing PICC/Midline Protocol, , , Until Discontinued **AND** sodium chloride 0.9%, 10 mL, Intravenous, Q12H PRN    vancomycin - pharmacy to dose, , Intravenous, pharmacy to manage frequency     Radiology:  I have personally reviewed the following imaging and agree with the radiologist.         Federica Orta MD  Department of Hospital Medicine   [facility]   02/10/2025

## 2025-02-10 NOTE — PROGRESS NOTES
"Advance Care Planning     Date: 02/10/2025    Patient is barely arousable to sternal rub, and not answering any questions at time of my examination. Patient remains dependent upon dialysis, on IV keppra IV EEG abnormal likely related to seizures and stroke.     Met with patient's daughter/MEGHANA, Amy, patient's son and patient's  at bedside. Patient in dialysis. Discuss/reviewed providers notes, we discussed patient's poor prognosis, and reviewed in the event patient's neurological status continues to decline peg vs comfort focus care. Patient's daughter at bedside states this is not the quality of life her mother would want and even if she would recover some neurologically she would not want to be dependent upon medical devices "including a wheel chair". Patient's daughter (MEGHANA),  and son all stated "we don't want to see her suffer or poked, we just want her comfortable".       We discussed hospice services and the benefits to patient at this stage in her medical condition. Patient's  reports that he went tour [facility] day prior, daughter/MEGHANA along with family requesting to transition goals of care to focus on comfort and quality of life, requesting comfort focus care to be initiated and transitioning to hospice services. We discussed aggressive therapies such as lumbar puncture and discontinuing dialysis to which all are agreeable of discontinuing and allowing patient to have a natural passing. Discussed case with Dr. Orta and Gaby Palliative Care NP. New orders/Recommendations obtained to transition to comfort focus care, initiate comfort focus order set and place referral to inpatient hospice services.       Power of   I initiated the process of voluntary advance care planning today and explained the importance of this process to the patient.  I introduced the concept of advance directives to the patient, as well. Then the patient received detailed information " about the importance of designating a Health Care Power of  (HCPOA). She was also instructed to communicate with this person about their wishes for future healthcare, should she become sick and lose decision-making capacity. The patient has previously appointed a HCPOA, and has appointed her daughter, health care agent:  Amy Ga      [facility]  I engaged the patient, family, and healthcare power of   in a voluntary conversation about advance care planning and we specifically addressed what the goals of care would be moving forward, in light of the patient's change in clinical status, specifically current conditions, goals of care and medical wishes. We discussed continuing current regimen vs comfort focus care.  We did specifically address the patient's likely prognosis, which is poor.  We explored the patient's values and preferences for future care.  The family and healthcare power of   endorses that what is most important right now is to focus on symptom/pain control, quality of life, even if it means sacrificing a little time, and comfort and QOL     Accordingly, we have decided that the best plan to meet the patient's goals includes no further escalation in treatment, enrolling in hospice care, and pivot to comfort-focused care      Hospice  I did explain the role for hospice care at this stage of the patient's illness, including its ability to help the patient live with the best quality of life possible.  We will be making a hospice referral.

## 2025-02-10 NOTE — PT/OT/SLP PROGRESS
[facility] Speech Language Pathology Department    Patient Name:  Malissa Schwartz   MRN:  [id number]    Pt remains minimally responsive and unsafe for PO intake.  LP pending.  Will follow up in AM.

## 2025-02-10 NOTE — PT/OT/SLP PROGRESS
Occupational Therapy  D/c OT Orders     Patient Name:  Malissa Schwartz   MRN:  [id number]    Patient not seen today secondary to Other (Comment). - pt has not been appropriate for participation in therapy services x multiple days. Will d/c orders at this time. Please reconsult when appropriate.     2/10/2025

## 2025-02-10 NOTE — PT/OT/SLP PROGRESS
Physical Therapy      Patient Name:  Malissa Schwartz   MRN:  [id number]    Patient remains unarousable and not appropriate for therapy at this time. PT will d/c orders; please reconsult when patient becomes appropriate. Thanks -    2/10/2025

## 2025-02-10 NOTE — PROGRESS NOTES
Pharmacokinetic Assessment Follow Up: IV Vancomycin    Vancomycin serum concentration assessment(s):    The random level was drawn correctly and can be used to guide therapy at this time. The measurement is within the desired definitive target range of 15 to 20 mcg/mL.    Vancomycin Regimen Plan:    Re-dose with vancomycin 500mg x1 post HD.  Re-dose when the random level is less than 20 mcg/mL, next level to be drawn at 04:30 on 02/12 prior to next hemodialysis.      Drug levels (last 3 results):  Recent Labs   Lab Result Units 02/10/25  0437   Vancomycin Random ug/ml 15.6       Pharmacy will continue to follow and monitor vancomycin.    Please contact pharmacy at extension [phone] for questions regarding this assessment.    Thank you for the consult,   Brian Richey       Patient brief summary:  Malissa Schwartz is a 74 y.o. female initiated on antimicrobial therapy with IV Vancomycin for treatment of sepsis    The patient's current regimen is pulse dose.    Drug Allergies:   Review of patient's allergies indicates:   Allergen Reactions    Codeine Palpitations and Rash    Cefepime      Other Reaction(s): Encephalopathy    Renal impairment increased risk for this reaction    Levofloxacin      Other Reaction(s): Encephalopathy    Renal pt who had some encephalopathy while on Levaquin & Cefepime, neurology physicians think they may have been due to the antibiotics (both possible side effects for each)    Iodine Nausea Only       Actual Body Weight:   63.5kg    Renal Function:   Estimated Creatinine Clearance: 6.1 mL/min (A) (based on SCr of 6.96 mg/dL (H)).,     Dialysis Method (if applicable):  intermittent HD    CBC (last 72 hours):  Recent Labs   Lab Result Units 02/08/25  0438 02/09/25  0400 02/10/25  0437   WBC x10(3)/mcL 6.66 8.04 9.92   Hgb g/dL 10.1* 10.3* 10.7*   Hct % 32.6* 32.4* 33.9*   Platelet x10(3)/mcL 344 320 327   Mono % % 11.0 10.6 5.8   Eos % % 0.6 0.7 1.3   Basophil % % 0.5 0.7 0.5       Metabolic Panel  (last 72 hours):  Recent Labs   Lab Result Units 02/07/25  1803 02/08/25  0438 02/09/25  0400 02/09/25  2346 02/10/25  0437   Sodium mmol/L  --  133* 130*  --  130*   Sodium, Blood Gas mmol/L 132*  --   --   --   --    Potassium mmol/L  --  4.0 4.5  --  5.0   Potassium, Blood Gas mmol/L 3.5  --   --   --   --    Chloride mmol/L  --  99 98  --  96*   CO2 mmol/L  --  22* 23  --  21*   Glucose mg/dL  --  97 100  --  98   Blood Urea Nitrogen mg/dL  --  23.0* 41.3*  --  60.7*   Creatinine mg/dL  --  3.84* 5.44*  --  6.96*   Albumin g/dL  --  2.0*  --   --  2.0*   Bilirubin Total mg/dL  --  0.4  --   --  0.6   ALP unit/L  --  164*  --   --  191*   AST unit/L  --  19  --   --  24   Astrovirus   --   --   --  Not Detected  --    ALT unit/L  --  13  --   --  13   Magnesium Level mg/dL  --  2.10  --   --   --    Phosphorus Level mg/dL  --  5.2*  --   --   --        Vancomycin Administrations:  vancomycin given in the last 96 hours                     vancomycin 1,250 mg in D5W 250 mL IVPB (admixture device) (mg) 1,250 mg New Bag 02/07/25 1900                    Microbiologic Results:  Microbiology Results (last 7 days)       Procedure Component Value Units Date/Time    Wound Culture [[id number]]  (Abnormal)  (Susceptibility) Collected: 02/07/25 2045    Order Status: Completed Specimen: Wound from Heel Updated: 02/10/25 0805     Wound Culture Many Escherichia coli    Blood Culture [[id number]]  (Normal) Collected: 02/07/25 1725    Order Status: Completed Specimen: Blood from Arm, Left Updated: 02/09/25 2001     Blood Culture No Growth At 48 Hours    Blood Culture [[id number]]  (Normal) Collected: 02/07/25 1725    Order Status: Completed Specimen: Blood from Arm, Right Updated: 02/09/25 2001     Blood Culture No Growth At 48 Hours    Body Fluid Culture [[id number]]  (Abnormal) Collected: 02/04/25 1707    Order Status: Completed Specimen: Peritoneal Fluid Updated: 02/09/25 1225     Body Fluid Culture Staphylococcus epidermidis      Comment: Isolated from Thio only       Gram Stain [[id number]] Collected: 02/04/25 1707    Order Status: Completed Specimen: Peritoneal Fluid Updated: 02/04/25 1935     GRAM STAIN Moderate WBC observed      No bacteria seen

## 2025-02-11 VITALS
OXYGEN SATURATION: 100 % | SYSTOLIC BLOOD PRESSURE: 158 MMHG | TEMPERATURE: 99 F | WEIGHT: 140 LBS | HEIGHT: 62 IN | RESPIRATION RATE: 24 BRPM | DIASTOLIC BLOOD PRESSURE: 90 MMHG | BODY MASS INDEX: 25.76 KG/M2 | HEART RATE: 93 BPM

## 2025-02-11 LAB
ALBUMIN SERPL-MCNC: 1.8 G/DL (ref 3.4–4.8)
ALBUMIN/GLOB SERPL: 0.4 RATIO (ref 1.1–2)
ALP SERPL-CCNC: 184 UNIT/L (ref 40–150)
ALT SERPL-CCNC: 20 UNIT/L (ref 0–55)
ANION GAP SERPL CALC-SCNC: 10 MEQ/L
AST SERPL-CCNC: 24 UNIT/L (ref 5–34)
BASOPHILS # BLD AUTO: 0.05 X10(3)/MCL
BASOPHILS NFR BLD AUTO: 0.6 %
BILIRUB SERPL-MCNC: 0.4 MG/DL
BUN SERPL-MCNC: 25.5 MG/DL (ref 9.8–20.1)
CALCIUM SERPL-MCNC: 9.3 MG/DL (ref 8.4–10.2)
CHLORIDE SERPL-SCNC: 102 MMOL/L (ref 98–107)
CO2 SERPL-SCNC: 21 MMOL/L (ref 23–31)
CREAT SERPL-MCNC: 3.84 MG/DL (ref 0.55–1.02)
CREAT/UREA NIT SERPL: 7
EOSINOPHIL # BLD AUTO: 0.11 X10(3)/MCL (ref 0–0.9)
EOSINOPHIL NFR BLD AUTO: 1.4 %
ERYTHROCYTE [DISTWIDTH] IN BLOOD BY AUTOMATED COUNT: 16.3 % (ref 11.5–17)
GFR SERPLBLD CREATININE-BSD FMLA CKD-EPI: 12 ML/MIN/1.73/M2
GLOBULIN SER-MCNC: 4.4 GM/DL (ref 2.4–3.5)
GLUCOSE SERPL-MCNC: 100 MG/DL (ref 82–115)
HCT VFR BLD AUTO: 34.4 % (ref 37–47)
HGB BLD-MCNC: 10.6 G/DL (ref 12–16)
IMM GRANULOCYTES # BLD AUTO: 0.04 X10(3)/MCL (ref 0–0.04)
IMM GRANULOCYTES NFR BLD AUTO: 0.5 %
LYMPHOCYTES # BLD AUTO: 1.07 X10(3)/MCL (ref 0.6–4.6)
LYMPHOCYTES NFR BLD AUTO: 13.5 %
MAGNESIUM SERPL-MCNC: 2.3 MG/DL (ref 1.6–2.6)
MCH RBC QN AUTO: 26.5 PG (ref 27–31)
MCHC RBC AUTO-ENTMCNC: 30.8 G/DL (ref 33–36)
MCV RBC AUTO: 86 FL (ref 80–94)
MONOCYTES # BLD AUTO: 0.6 X10(3)/MCL (ref 0.1–1.3)
MONOCYTES NFR BLD AUTO: 7.6 %
NEUTROPHILS # BLD AUTO: 6.07 X10(3)/MCL (ref 2.1–9.2)
NEUTROPHILS NFR BLD AUTO: 76.4 %
NRBC BLD AUTO-RTO: 0 %
PHOSPHATE SERPL-MCNC: 5.8 MG/DL (ref 2.3–4.7)
PLATELET # BLD AUTO: 275 X10(3)/MCL (ref 130–400)
PMV BLD AUTO: 9.6 FL (ref 7.4–10.4)
POCT GLUCOSE: 103 MG/DL (ref 70–110)
POTASSIUM SERPL-SCNC: 4.2 MMOL/L (ref 3.5–5.1)
PROT SERPL-MCNC: 6.2 GM/DL (ref 5.8–7.6)
RBC # BLD AUTO: 4 X10(6)/MCL (ref 4.2–5.4)
SODIUM SERPL-SCNC: 133 MMOL/L (ref 136–145)
WBC # BLD AUTO: 7.94 X10(3)/MCL (ref 4.5–11.5)

## 2025-02-11 PROCEDURE — 83735 ASSAY OF MAGNESIUM: CPT | Performed by: INTERNAL MEDICINE

## 2025-02-11 PROCEDURE — 63600175 PHARM REV CODE 636 W HCPCS: Performed by: INTERNAL MEDICINE

## 2025-02-11 PROCEDURE — 80053 COMPREHEN METABOLIC PANEL: CPT | Performed by: INTERNAL MEDICINE

## 2025-02-11 PROCEDURE — 85025 COMPLETE CBC W/AUTO DIFF WBC: CPT | Performed by: INTERNAL MEDICINE

## 2025-02-11 PROCEDURE — 63600175 PHARM REV CODE 636 W HCPCS

## 2025-02-11 PROCEDURE — 25000003 PHARM REV CODE 250: Performed by: INTERNAL MEDICINE

## 2025-02-11 PROCEDURE — 84100 ASSAY OF PHOSPHORUS: CPT | Performed by: INTERNAL MEDICINE

## 2025-02-11 PROCEDURE — 36415 COLL VENOUS BLD VENIPUNCTURE: CPT | Performed by: INTERNAL MEDICINE

## 2025-02-11 RX ORDER — ASPIRIN 300 MG/1
300 SUPPOSITORY RECTAL DAILY
Start: 2025-02-11

## 2025-02-11 RX ORDER — CLONIDINE 0.2 MG/24H
1 PATCH, EXTENDED RELEASE TRANSDERMAL
Start: 2025-02-17

## 2025-02-11 RX ORDER — MORPHINE SULFATE 4 MG/ML
2 INJECTION, SOLUTION INTRAMUSCULAR; INTRAVENOUS
Status: DISCONTINUED | OUTPATIENT
Start: 2025-02-11 | End: 2025-02-11 | Stop reason: HOSPADM

## 2025-02-11 RX ADMIN — MORPHINE SULFATE 2 MG: 4 INJECTION, SOLUTION INTRAMUSCULAR; INTRAVENOUS at 11:02

## 2025-02-11 RX ADMIN — LEVETIRACETAM 500 MG: 100 INJECTION, SOLUTION INTRAVENOUS at 10:02

## 2025-02-11 RX ADMIN — ASPIRIN 300 MG: 300 SUPPOSITORY RECTAL at 10:02

## 2025-02-11 RX ADMIN — HEPARIN SODIUM 5000 UNITS: 5000 INJECTION, SOLUTION INTRAVENOUS; SUBCUTANEOUS at 10:02

## 2025-02-11 NOTE — NURSING
Pt left via stretcher with AA @1329. Pt noted with eyes closed. Resp even and unlabored. No signs of distress. Daughter at bedside, left with belongings.

## 2025-02-11 NOTE — PROGRESS NOTES
Advance Care Planning     Date: 02/11/2025    Palliative care assessment completed with focus on symptom management. Patient remains with minimum responsiveness, nurse reports patient moans/grimacing with repositioning. Noted increase respirations, high 20's-low 30's with mild-moderate usage of accessory muscles noted. Hospice agency at bedside assessing and completing visit for inpatient hospice services. Patient remains on IV keppra. Discussed with Gaby Palliative care NP and updated on patient's current status, review of mar and vitals completed. New Orders/Recommendations given to schedule 2 mg Morphine q 4 hours ATC, continue hydromorphone 1 mg q 2 hours prn pain/dyspnea. Patient is pending acceptance to inpatient hospice agency. Patient's  at bedside, states patient's daughter is speaking with hospice agency nurse and making arrangements for patient to be transferred to [facility]  I engaged the family and healthcare power of   in a voluntary conversation about advance care planning and we specifically addressed what the goals of care would be moving forward, in light of the patient's change in clinical status, specifically current condition, goals of care and symptom managment.  We did specifically address the patient's likely prognosis, which is poor.  We explored the patient's values and preferences for future care.  The family and healthcare power of   endorses that what is most important right now is to focus on symptom/pain control, quality of life, even if it means sacrificing a little time, and comfort and QOL     Accordingly, we have decided that the best plan to meet the patient's goals includes no further escalation in treatment, enrolling in hospice care, and pivot to comfort-focused care

## 2025-02-11 NOTE — PLAN OF CARE
02/11/25 1113   Final Note   Assessment Type Final Discharge Note   Anticipated Discharge Disposition [facility]   Hospital Resources/Appts/Education Provided Post-Acute resouces added to AVS   Post-Acute Status   Post-Acute Authorization Hospice   Hospice Status Set-up Complete/Auth obtained   Discharge Delays None known at this time     Pt discharging to [facility] for inpt hospice. All d/c info sent. Transportation placed in will-call with [facility]. Will be taken out of will-call once nurse calls report.     Rhiannon Finch LCSW

## 2025-02-11 NOTE — PLAN OF CARE
Problem: Adult Inpatient Plan of Care  Goal: Plan of Care Review  Outcome: Met  Goal: Patient-Specific Goal (Individualized)  Outcome: Met  Goal: Absence of Hospital-Acquired Illness or Injury  Outcome: Met  Goal: Optimal Comfort and Wellbeing  Outcome: Met  Goal: Readiness for Transition of Care  Outcome: Met     Problem: Infection  Goal: Absence of Infection Signs and Symptoms  Outcome: Met     Problem: Wound  Goal: Optimal Coping  Outcome: Met  Goal: Optimal Functional Ability  Outcome: Met  Goal: Absence of Infection Signs and Symptoms  Outcome: Met  Goal: Improved Oral Intake  Outcome: Met  Goal: Optimal Pain Control and Function  Outcome: Met  Goal: Skin Health and Integrity  Outcome: Met  Goal: Optimal Wound Healing  Outcome: Met     Problem: Hemodialysis  Goal: Safe, Effective Therapy Delivery  Outcome: Met  Goal: Effective Tissue Perfusion  Outcome: Met  Goal: Absence of Infection Signs and Symptoms  Outcome: Met     Problem: Skin Injury Risk Increased  Goal: Skin Health and Integrity  Outcome: Met     Problem: Coping Ineffective  Goal: Effective Coping  Outcome: Met     Problem: Stroke, Ischemic (Includes Transient Ischemic Attack)  Goal: Optimal Coping  Outcome: Met  Goal: Effective Bowel Elimination  Outcome: Met  Goal: Optimal Cerebral Tissue Perfusion  Outcome: Met  Goal: Optimal Cognitive Function  Outcome: Met  Goal: Improved Communication Skills  Outcome: Met  Goal: Optimal Functional Ability  Outcome: Met  Goal: Optimal Nutrition Intake  Outcome: Met  Goal: Effective Oxygenation and Ventilation  Outcome: Met  Goal: Improved Sensorimotor Function  Outcome: Met  Goal: Safe and Effective Swallow  Outcome: Met  Goal: Effective Urinary Elimination  Outcome: Met     Problem: Palliative Care  Goal: Enhanced Quality of Life  Outcome: Met

## 2025-02-11 NOTE — NURSING
1209- Report given to ALAINA Abad at [facility].  1219-Pt taken out of will call, stated will be about 2 hours

## 2025-02-11 NOTE — NURSING
02/10/25 1900   Post-Hemodialysis Assessment   Blood Volume Processed (Liters) 64.1 L   Dialyzer Clearance Clear   Duration of Treatment 180 minutes   Total UF (mL) 145 mL   Patient Response to Treatment pt's pressure dropped during tx   Post-Hemodialysis Comments pt's pressure dropped, turned off UF and gave bolus, pt's pressure recovered, Net  mL. Last /99 and HR 93.

## 2025-02-11 NOTE — PLAN OF CARE
Problem: Adult Inpatient Plan of Care  Goal: Plan of Care Review  Outcome: Progressing  Goal: Patient-Specific Goal (Individualized)  Outcome: Progressing  Goal: Absence of Hospital-Acquired Illness or Injury  Outcome: Progressing  Goal: Optimal Comfort and Wellbeing  Outcome: Progressing  Goal: Readiness for Transition of Care  Outcome: Progressing     Problem: Infection  Goal: Absence of Infection Signs and Symptoms  Outcome: Progressing     Problem: Wound  Goal: Optimal Coping  Outcome: Progressing  Goal: Optimal Functional Ability  Outcome: Progressing  Goal: Absence of Infection Signs and Symptoms  Outcome: Progressing  Goal: Improved Oral Intake  Outcome: Progressing  Goal: Optimal Pain Control and Function  Outcome: Progressing  Goal: Skin Health and Integrity  Outcome: Progressing  Goal: Optimal Wound Healing  Outcome: Progressing     Problem: Hemodialysis  Goal: Safe, Effective Therapy Delivery  Outcome: Progressing  Goal: Effective Tissue Perfusion  Outcome: Progressing  Goal: Absence of Infection Signs and Symptoms  Outcome: Progressing     Problem: Skin Injury Risk Increased  Goal: Skin Health and Integrity  Outcome: Progressing     Problem: Coping Ineffective  Goal: Effective Coping  Outcome: Progressing     Problem: Stroke, Ischemic (Includes Transient Ischemic Attack)  Goal: Optimal Coping  Outcome: Progressing  Goal: Effective Bowel Elimination  Outcome: Progressing  Goal: Optimal Cerebral Tissue Perfusion  Outcome: Progressing  Goal: Optimal Cognitive Function  Outcome: Progressing  Goal: Improved Communication Skills  Outcome: Progressing  Goal: Optimal Functional Ability  Outcome: Progressing  Goal: Optimal Nutrition Intake  Outcome: Progressing  Goal: Effective Oxygenation and Ventilation  Outcome: Progressing  Goal: Improved Sensorimotor Function  Outcome: Progressing  Goal: Safe and Effective Swallow  Outcome: Progressing  Goal: Effective Urinary Elimination  Outcome: Progressing     Problem:  Palliative Care  Goal: Enhanced Quality of Life  Outcome: Progressing

## 2025-02-11 NOTE — PLAN OF CARE
SW contacted pt's dtr, Amy, and confirmed they want pt transferred to [facility] for inpt hospice services. Referral sent via Epic. Messaged Miriam in admissions to make aware.     Rhiannon Finch LCSW

## 2025-02-11 NOTE — PT/OT/SLP PROGRESS
[facility] Speech Language Pathology Department    Patient Name:  Malissa Schwartz   MRN:  [id number]    Family has decided to pursue hospice care.  Continued SLP services not warranted.

## 2025-02-12 LAB
BACTERIA BLD CULT: NORMAL
BACTERIA BLD CULT: NORMAL

## 2025-02-13 NOTE — PHYSICIAN QUERY
Please clarify the integumentary diagnosis related to the documentation outlined in the query message.  Unstageable Pressure Ulcer L heel

## 2025-02-13 NOTE — PHYSICIAN QUERY
Please clarify the integumentary diagnosis related to the documentation outlined in the query message.  Unstageable Pressure Ulcer Right Heel

## 2025-02-16 LAB
BACTERIA FLD CULT: ABNORMAL
MAYO GENERIC ORDERABLE RESULT: ABNORMAL

## 2025-02-17 NOTE — DISCHARGE SUMMARY
[facility] Hospital Medicine Discharge Summary    Admit Date: 2/4/2025  Discharge Date and Time: 2/11/25  Admitting Physician:  Team  Discharging Physician: Federica Orta MD.  Primary Care Physician: Sergio Crabtree MD  Consults: Cardiology, Infectious Disease, Nephrology, and Neurology    Discharge Diagnoses:  Acute encephalopathy  CVA  Seizures  ESRD-HD M/W/F via right IJ tunneled dialysis catheter  Hyperkalemia, mild  Hypoglycemia.  Normocytic anemia  Generalized weakness and debility   Physical Deconditioning   Left heel stage 4 decubitus ulcers with eschar and right heel with stage II decubitus ulcer with blister.  Concern for SSTI  History of sepsis secondary to cellulitis of left lower extremity completed clindamycin therapy  Left lower extremity skin cancer awaiting treatment   Essential hypertension   History of NSTEMI type 2 with CIS recommending outpatient stress test (PET scan )   Right Humerus fracture with IM nail (12/2024)  History of colon polyp/lung nodule  History of peritoneal dialysis and failed kidney transplant   Dyslipidemia   Generalized anxiety disorder/major depressive disorder  Former smoker    Hospital Course:   74 y.o. female who has a past medical history of HTN, HLD, deconditioning, humerus fracture with IM nail, NSTEMI, failed kidney transplant, ESRD-HD M/W/F previously on peritoneal dialysis, and recent cellulitis left lower extremity completed clindamycin treatment who presented to [facility] on 2/4/2025 with a primary complaint of altered mental status for the last 3-4 days according to the patient's , Brayden Schwartz at bedside.  The patient was admitted on 01/22/2025 for NSTEMI likely type 2, sepsis secondary to left lower extremity cellulitis, and ESRD on PD that was converted to hemodialysis prior to discharge.  During her hospitalization she was evaluated by consultants including wound care, Nephrology, Psychiatry, and Cardiology.   Heparin was discontinued and CIS recommended outpatient stress test (PET) in 2 weeks.  Right-sided tunneled dialysis catheter was placed on 01/28/2025 and she tolerated hemodialysis multiple times during her admission.  And progress note by Nephrology on 01/30 she was noted to be weak, confused, and not speaking much with poor intake.  Psychiatry recommended increasing the Lexapro which was done prior to her discharge.  Blood cultures with no growth to date.  She had dialysis on 02/03/2025 prior to being discharged to [facility].  The patient's , Mr. Brayden Schwartz at bedside states that she has not been herself since she was initiated on hemodialysis on 01/29/2025.  States prior to dialysis she was AAO x3, but since she was initiated on dialysis she has not been eating much, intermittently confused, and becomes quite agitated/combative.  On exam, she is protecting her airway, but minimally cooperative with examiner.  The patient will open her eyes to pain, she uses inappropriate/confused words, and localizes pain for GCS of 10.    denies any history of CVA or TIA.  ABG and ammonia level unremarkable.  Patient does not make any urine, so unable to obtain UA and UDS.  CT head without contrast showed no acute findings.  No further history available at this time.       ED course: Vital signs revealed the patient is afebrile and hemodynamically stable on room air.  Labs CBC showed normal WBC of 7, chronic normocytic anemia with a hemoglobin of 9, chemistry with elevated creatinine of 4.96 and BUN of 17, elevated potassium of 5.5, low albumin of 2, normal ammonia level of 33, elevated TSH of 6, normal lipid panel.  ABG with mild respiratory alkalosis.  CT head showed no acute intracranial findings.  Treated in the ED with Haldol and Benadryl.  Ordered Lokelma.  Ordered consultation to Nephrology and wound care nurse bowen.  Admitted for encephalopathy     MRI with Single cytotoxic lesion in the  corpus callosum is nonspecific and may be metabolic or toxic.  Neurology was consulted.  Therapy services consulted.  Palliative medicine was consulted.  Now DNR.  PTOT speech consulted.     Concern for change in mental status during the hospital course, CT scan was ordered and Neurology notified.  CT with no intracranial abnormality.  EEG abnormal. Prolactin elevated.Started on Keppra.  Made NPO.  Continued IV nutrition.  Initiated on empiric antibiotics while encephalopathic.  Failing speech and swallow evaluation.  Continued on Clinimix.Discussed with the family -about poor prognosis and about PEG tube, also requested to speak with Neurology regarding the stroke and seizures and further prognosis.  Also requested ID consultation, recommended LP. Patient's family inclined towards non invasive measures, refused PEG tube . Eventually discharged on Hospice.      Pt was seen and examined on the day of discharge    Vitals:  VITAL SIGNS: 24 HRS MIN & MAX LAST   No data recorded 98.6 °F (37 °C)   No data recorded (!) 158/90   No data recorded  93   No data recorded (!) 24   No data recorded 100 %       Physical Exam:  General:  Afebrile, lethargic  Chest: Clear  Heart:  Normal rate ,+S1, S2  Abdomen: Soft, nontender, BS +  MSK: Warm, no lower extremity edema, no clubbing or cyanosis.  Wounds as in media  Neurologic:  Barely arousable, Does not follow  commands.(was informed by staff that she is close to her new baseline how she was on presentation)    Procedures Performed:see full chart    Significant Diagnostic Studies: See Full reports for all details    Recent Labs   Lab 02/10/25  0437 02/11/25  0444   WBC 9.92 7.94   RBC 4.05* 4.00*   HGB 10.7* 10.6*   HCT 33.9* 34.4*   MCV 83.7 86.0   MCH 26.4* 26.5*   MCHC 31.6* 30.8*   RDW 16.1 16.3    275   MPV 9.6 9.6       Recent Labs   Lab 02/10/25  0437 02/11/25  0444   * 133*   K 5.0 4.2   CL 96* 102   CO2 21* 21*   BUN 60.7* 25.5*   CREATININE 6.96* 3.84*    CALCIUM 8.8 9.3   MG  --  2.30   ALBUMIN 2.0* 1.8*   ALKPHOS 191* 184*   ALT 13 20   AST 24 24   BILITOT 0.6 0.4        Microbiology Results (last 7 days)       Procedure Component Value Units Date/Time    Cerebrospinal Fluid Culture [[id number]]     Order Status: Canceled Specimen: CSF (Spinal Fluid) from Cerebrospinal Fluid     AFB Smear [[id number]]     Order Status: Canceled Specimen: CSF (Spinal Fluid) from Cerebrospinal Fluid     Mycobacteria and Nocardia Culture [[id number]]     Order Status: Canceled Specimen: CSF (Spinal Fluid) from Cerebrospinal Fluid     Wound Culture [[id number]]  (Abnormal)  (Susceptibility) Collected: 02/07/25 2045    Order Status: Completed Specimen: Wound from Heel Updated: 02/10/25 0805     Wound Culture Many Escherichia coli    Gram Stain [[id number]] Collected: 02/04/25 1707    Order Status: Completed Specimen: Peritoneal Fluid Updated: 02/04/25 1935     GRAM STAIN Moderate WBC observed      No bacteria seen             CV Ultrasound Bilateral Doppler Carotid  The right internal carotid artery is patent with less than 50% stenosis.  The left internal carotid artery is patent with less than 50% stenosis.  Bilateral vertebral arteries are patent with antegrade flow.         Medication List        START taking these medications      aspirin 300 MG suppository  Place 1 suppository (300 mg total) rectally once daily.  Replaces: aspirin 81 MG EC tablet     cloNIDine 0.2 mg/24 hr td ptwk 0.2 mg/24 hr  Commonly known as: CATAPRES  Place 1 patch onto the skin every 7 days.  Start taking on: February 17, 2025            STOP taking these medications      amLODIPine 10 MG tablet  Commonly known as: NORVASC     aspirin 81 MG EC tablet  Commonly known as: ECOTRIN  Replaced by: aspirin 300 MG suppository     calcitRIOL 0.25 MCG Cap  Commonly known as: ROCALTROL     D5W PgBk 50 mL with ceFAZolin 1 gram SolR     EScitalopram oxalate 20 MG tablet  Commonly known as: LEXAPRO     folic acid 1 MG  tablet  Commonly known as: FOLVITE     megestroL 400 mg/10 mL (10 mL) Susp  Commonly known as: MEGACE     methocarbamoL 500 MG Tab  Commonly known as: Robaxin     NEPHRO-JENN 0.8 mg Tab  Generic drug: B complex-vitamin C-folic acid     pantoprazole 40 MG tablet  Commonly known as: PROTONIX     potassium chloride 10 MEQ Tbsr  Commonly known as: KLOR-CON     traMADoL 50 mg tablet  Commonly known as: ULTRAM               Where to Get Your Medications        Information about where to get these medications is not yet available    Ask your nurse or doctor about these medications  aspirin 300 MG suppository  cloNIDine 0.2 mg/24 hr td ptwk 0.2 mg/24 hr          Explained in detail to the patient about the discharge plan, medications, and follow-up visits. Pt understands and agrees with the treatment plan  Discharge Disposition: Hospice/Home   Discharged Condition: stable  Diet-    Medications Per DC med rec  Activities as tolerated   Contact information for after-discharge care       Destination       HOSPICE [facility]    Service: Inpatient Hospice  Contact information:  [address], [address]  [place] [address]  [phone]                                 For further questions contact hospitalist office    Discharge time 33 minutes    For worsening symptoms, chest pain, shortness of breath, increased abdominal pain, high grade fever, stroke or stroke like symptoms, immediately go to the nearest Emergency Room or call 911 as soon as possible.      Federica Lopez M.D, on 2/16/2025. at 9:27 PM.

## 2025-02-26 LAB
POCT GLUCOSE: 146 MG/DL (ref 70–110)
POCT GLUCOSE: 69 MG/DL (ref 70–110)
POCT GLUCOSE: 87 MG/DL (ref 70–110)
POCT GLUCOSE: 90 MG/DL (ref 70–110)

## 2025-03-18 NOTE — ED NOTES
Reason for Disposition  • Taking antibiotic and cellulitis symptoms are BETTER and no fever    Protocols used: Cellulitis on Antibiotic Follow-up Call-A-[facility]     Bed: 09  Expected date:   Expected time:   Means of arrival:   Comments:  MARVIN

## (undated) DEVICE — TAPE SILK 3IN

## (undated) DEVICE — TRAY CENT PREM SUT REM PK SGL

## (undated) DEVICE — KIT MINI STK MAX COAX 5FR 10CM

## (undated) DEVICE — BIT DRILL 3FLUTE 270X3.8MM

## (undated) DEVICE — Device

## (undated) DEVICE — ADHESIVE DERMABOND ADVANCED

## (undated) DEVICE — SUT CTD VICRYL CT-1 27

## (undated) DEVICE — BNDG COFLEX FOAM LF2 ST 6X5YD

## (undated) DEVICE — GLOVE PROTEXIS HYDROGEL SZ9

## (undated) DEVICE — BIT DRILL 3.2MM 3 FLUTD 145MM

## (undated) DEVICE — DRESSING XEROFORM 5X9IN

## (undated) DEVICE — BLADE SURG CARBON STEEL #10

## (undated) DEVICE — DRAPE SHOULDER BEACH CHAIR

## (undated) DEVICE — COVER PROBE US 5.5X58L NON LTX

## (undated) DEVICE — DRESSING TEGADERM CHG 3.5X4.5

## (undated) DEVICE — ELECTRODE REM POLYHESIVE II

## (undated) DEVICE — GLOVE PROTEXIS NEU-THERA SZ6

## (undated) DEVICE — SUPPORT SLING SHOT II MEDIUM

## (undated) DEVICE — DRAPE C-ARMOR EQUIPMENT COVER

## (undated) DEVICE — GLOVE SIGNATURE MICRO LTX 6

## (undated) DEVICE — COVER FULLGUARD SHOE HIGH-TOP

## (undated) DEVICE — SUT 0 54IN COATED VICRYL U

## (undated) DEVICE — FORCEP HEMOSTAT MOSQUITO STR

## (undated) DEVICE — STOCKINETTE IMPERV INTRM 9X44

## (undated) DEVICE — TOWEL OR DISP STRL BLUE 4/PK

## (undated) DEVICE — CANNULA ADULT NASAL 7FT

## (undated) DEVICE — GLOVE PROTEXIS BLUE LATEX 9

## (undated) DEVICE — GOWN SMARTGOWN 3XL XLONG

## (undated) DEVICE — DRESSING TELFA ISLAND 2X3.75IN

## (undated) DEVICE — DRAPE STERI U-SHAPED 47X51IN

## (undated) DEVICE — APPLICATOR CHLORAPREP ORN 26ML

## (undated) DEVICE — FLOWSWITCH HP 1-W W/O LL

## (undated) DEVICE — SUT SILK 0 BLK BR CT-1 30IN

## (undated) DEVICE — STAPLER SKIN PROXIMATE WIDE

## (undated) DEVICE — COVER TABLE HVY DTY 60X90IN

## (undated) DEVICE — SUT MONOCRYL 3-0 PS-2 UND